# Patient Record
Sex: FEMALE | Race: WHITE | NOT HISPANIC OR LATINO | Employment: OTHER | URBAN - METROPOLITAN AREA
[De-identification: names, ages, dates, MRNs, and addresses within clinical notes are randomized per-mention and may not be internally consistent; named-entity substitution may affect disease eponyms.]

---

## 2017-01-06 ENCOUNTER — ALLSCRIPTS OFFICE VISIT (OUTPATIENT)
Dept: OTHER | Facility: OTHER | Age: 72
End: 2017-01-06

## 2017-01-06 DIAGNOSIS — E11.9 TYPE 2 DIABETES MELLITUS WITHOUT COMPLICATIONS (HCC): ICD-10-CM

## 2017-01-06 DIAGNOSIS — E78.5 HYPERLIPIDEMIA: ICD-10-CM

## 2017-03-03 ENCOUNTER — GENERIC CONVERSION - ENCOUNTER (OUTPATIENT)
Dept: OTHER | Facility: OTHER | Age: 72
End: 2017-03-03

## 2017-03-09 ENCOUNTER — GENERIC CONVERSION - ENCOUNTER (OUTPATIENT)
Dept: OTHER | Facility: OTHER | Age: 72
End: 2017-03-09

## 2017-08-30 ENCOUNTER — GENERIC CONVERSION - ENCOUNTER (OUTPATIENT)
Dept: OTHER | Facility: OTHER | Age: 72
End: 2017-08-30

## 2017-08-30 DIAGNOSIS — E11.9 TYPE 2 DIABETES MELLITUS WITHOUT COMPLICATIONS (HCC): ICD-10-CM

## 2017-11-13 ENCOUNTER — TRANSCRIBE ORDERS (OUTPATIENT)
Dept: ADMINISTRATIVE | Facility: HOSPITAL | Age: 72
End: 2017-11-13

## 2017-11-13 DIAGNOSIS — Z12.31 ENCOUNTER FOR SCREENING MAMMOGRAM FOR MALIGNANT NEOPLASM OF BREAST: ICD-10-CM

## 2017-11-13 DIAGNOSIS — Z12.39 SCREENING BREAST EXAMINATION: Primary | ICD-10-CM

## 2017-11-22 ENCOUNTER — GENERIC CONVERSION - ENCOUNTER (OUTPATIENT)
Dept: OTHER | Facility: OTHER | Age: 72
End: 2017-11-22

## 2017-11-22 ENCOUNTER — HOSPITAL ENCOUNTER (OUTPATIENT)
Dept: RADIOLOGY | Facility: HOSPITAL | Age: 72
Discharge: HOME | End: 2017-11-22
Attending: FAMILY MEDICINE
Payer: COMMERCIAL

## 2017-11-22 DIAGNOSIS — Z12.39 SCREENING BREAST EXAMINATION: ICD-10-CM

## 2017-11-22 LAB
BASOPHILS # BLD AUTO: 0 X10E3/UL (ref 0–0.2)
BASOPHILS # BLD AUTO: 1 %
CREATININE, URINE (HISTORICAL): 227.1 MG/DL
DEPRECATED RDW RBC AUTO: 14.3 % (ref 12.3–15.4)
EOSINOPHIL # BLD AUTO: 0.4 X10E3/UL (ref 0–0.4)
EOSINOPHIL # BLD AUTO: 5 %
HCT VFR BLD AUTO: 37.1 % (ref 34–46.6)
HGB BLD-MCNC: 11.9 G/DL (ref 11.1–15.9)
IMM.GRANULOCYTES (CD4/8) (HISTORICAL): 0 X10E3/UL (ref 0–0.1)
IMM.GRANULOCYTES (CD4/8) (HISTORICAL): 1 %
LYMPHOCYTES # BLD AUTO: 2.4 X10E3/UL (ref 0.7–3.1)
LYMPHOCYTES # BLD AUTO: 28 %
MCH RBC QN AUTO: 28.3 PG (ref 26.6–33)
MCHC RBC AUTO-ENTMCNC: 32.1 G/DL (ref 31.5–35.7)
MCV RBC AUTO: 88 FL (ref 79–97)
MICROALBUM.,U,RANDOM (HISTORICAL): 63.9 UG/ML
MICROALBUMIN/CREATININE RATIO (HISTORICAL): 28.1 MG/G CREAT (ref 0–30)
MONOCYTES # BLD AUTO: 0.8 X10E3/UL (ref 0.1–0.9)
MONOCYTES (HISTORICAL): 9 %
NEUTROPHILS # BLD AUTO: 4.9 X10E3/UL (ref 1.4–7)
NEUTROPHILS # BLD AUTO: 56 %
PLATELET # BLD AUTO: 396 X10E3/UL (ref 150–379)
RBC (HISTORICAL): 4.21 X10E6/UL (ref 3.77–5.28)
WBC # BLD AUTO: 8.5 X10E3/UL (ref 3.4–10.8)

## 2017-11-22 PROCEDURE — 77063 BREAST TOMOSYNTHESIS BI: CPT

## 2017-11-22 PROCEDURE — G0202 SCR MAMMO BI INCL CAD: HCPCS

## 2017-11-23 LAB
A/G RATIO (HISTORICAL): 1.8 (ref 1.2–2.2)
ALBUMIN SERPL BCP-MCNC: 4.4 G/DL (ref 3.5–4.8)
ALP SERPL-CCNC: 71 IU/L (ref 39–117)
ALT SERPL W P-5'-P-CCNC: 15 IU/L (ref 0–32)
AST SERPL W P-5'-P-CCNC: 12 IU/L (ref 0–40)
BILIRUB SERPL-MCNC: 0.3 MG/DL (ref 0–1.2)
BUN SERPL-MCNC: 27 MG/DL (ref 8–27)
BUN/CREA RATIO (HISTORICAL): 20 (ref 12–28)
CALCIUM SERPL-MCNC: 10.6 MG/DL (ref 8.7–10.3)
CHLORIDE SERPL-SCNC: 100 MMOL/L (ref 96–106)
CHOLEST SERPL-MCNC: 204 MG/DL (ref 100–199)
CO2 SERPL-SCNC: 24 MMOL/L (ref 18–29)
CREAT SERPL-MCNC: 1.34 MG/DL (ref 0.57–1)
EGFR AFRICAN AMERICAN (HISTORICAL): 45 ML/MIN/1.73
EGFR-AMERICAN CALC (HISTORICAL): 39 ML/MIN/1.73
GLUCOSE SERPL-MCNC: 172 MG/DL (ref 65–99)
HDLC SERPL-MCNC: 58 MG/DL
LDLC SERPL CALC-MCNC: 108 MG/DL (ref 0–99)
MAGNESIUM SERPL-MCNC: 2.3 MG/DL (ref 1.6–2.3)
POTASSIUM SERPL-SCNC: 5.1 MMOL/L (ref 3.5–5.2)
SODIUM SERPL-SCNC: 140 MMOL/L (ref 134–144)
TOT. GLOBULIN, SERUM (HISTORICAL): 2.4 G/DL (ref 1.5–4.5)
TOTAL PROTEIN (HISTORICAL): 6.8 G/DL (ref 6–8.5)
TRIGL SERPL-MCNC: 190 MG/DL (ref 0–149)

## 2017-11-30 ENCOUNTER — ALLSCRIPTS OFFICE VISIT (OUTPATIENT)
Dept: OTHER | Facility: OTHER | Age: 72
End: 2017-11-30

## 2017-11-30 LAB — HBA1C MFR BLD HPLC: 6.9 %

## 2018-01-14 VITALS
BODY MASS INDEX: 30.21 KG/M2 | TEMPERATURE: 96 F | DIASTOLIC BLOOD PRESSURE: 68 MMHG | HEART RATE: 112 BPM | RESPIRATION RATE: 18 BRPM | OXYGEN SATURATION: 98 % | HEIGHT: 61 IN | WEIGHT: 160 LBS | SYSTOLIC BLOOD PRESSURE: 124 MMHG

## 2018-01-22 VITALS
HEART RATE: 108 BPM | SYSTOLIC BLOOD PRESSURE: 126 MMHG | RESPIRATION RATE: 18 BRPM | DIASTOLIC BLOOD PRESSURE: 72 MMHG | OXYGEN SATURATION: 98 % | BODY MASS INDEX: 30.08 KG/M2 | HEIGHT: 61 IN | WEIGHT: 159.31 LBS

## 2018-01-31 DIAGNOSIS — M19.90 ARTHRITIS: Primary | ICD-10-CM

## 2018-01-31 RX ORDER — MELOXICAM 7.5 MG/1
TABLET ORAL
Qty: 30 TABLET | Refills: 5 | Status: SHIPPED | OUTPATIENT
Start: 2018-01-31 | End: 2018-02-06 | Stop reason: SDUPTHER

## 2018-02-06 DIAGNOSIS — M19.90 ARTHRITIS: ICD-10-CM

## 2018-02-06 RX ORDER — MELOXICAM 7.5 MG/1
7.5 TABLET ORAL DAILY
Qty: 90 TABLET | Refills: 0 | Status: SHIPPED | OUTPATIENT
Start: 2018-02-06 | End: 2018-10-26

## 2018-02-07 ENCOUNTER — TELEPHONE (OUTPATIENT)
Dept: FAMILY MEDICINE CLINIC | Facility: CLINIC | Age: 73
End: 2018-02-07

## 2018-02-07 NOTE — TELEPHONE ENCOUNTER
Pt needs refill of quinapril to [facility]    Claudette Vail said she spoke to someone about this already/

## 2018-02-08 DIAGNOSIS — I10 ESSENTIAL HYPERTENSION: Primary | ICD-10-CM

## 2018-02-08 RX ORDER — QUINAPRIL 40 MG/1
40 TABLET ORAL
Qty: 90 TABLET | Refills: 3 | Status: SHIPPED | OUTPATIENT
Start: 2018-02-08 | End: 2018-11-05 | Stop reason: SDUPTHER

## 2018-02-28 NOTE — PROGRESS NOTES
Assessment    1  Current every day smoker (305 1) (F17 200)   2  Family history of leukemia (V16 6) (Z80 6) : Father   3  Family history of diabetes mellitus (V18 0) (Z83 3) : Paternal Grandfather   4  Family history of malignant neoplasm of stomach (V16 0) (Z80 0) : Maternal Grandfather   5  Flu vaccine need (V04 81) (Z23)    Plan  DMII (diabetes mellitus, type 2)    · Accupril 40 MG Oral Tablet (Quinapril HCl); take 1 tablet by mouth once daily   · (1) MICROALBUMIN CREATININE RATIO, RANDOM URINE; Status:Active; Requested  for:13Nov2015;   Flu vaccine need    · Fluzone Quadrivalent 0 5 ML Intramuscular Suspension    Discussion/Summary    Here for lab followup        Chief Complaint  f/u blood work to check DM and c/o arthritis pain      History of Present Illness  here for review of labs   she is still having arthritis pain on occasion     Pain Assessment   the patient states they have pain  The pain is located in the arthritis pain  (on a scale of 0 to 10, the patient rates the pain at 9 )   Abuse And Domestic Violence Screen    Yes, the patient is safe at home  The patient states no one is hurting them  Depression And Suicide Screen  No, the patient has not had thoughts of hurting themself  No, the patient has not felt depressed in the past 7 days  Primary Language is  English  Review of Systems    Preventive Quality 65 and Older: The patient is currently asymptomatic Symptoms Include: The patient currently has no urinary incontinence symptoms  Active Problems    1  Bursitis Of Knee (726 60)   2  Capsulitis Of The Foot (726 90)   3  DMII (diabetes mellitus, type 2) (250 00) (E11 9)   4  Encounter for screening mammogram for malignant neoplasm of breast (V76 12)   (Z12 31)   5  Ganglion (727 43) (M67 40)   6  Hyperlipidemia (272 4) (E78 5)   7  Hypomagnesemia (275 2) (E83 42)   8  Screening for cardiovascular condition (V81 2) (Z13 6)   9   Screening for diabetes mellitus (V77 1) (Z13 1)    Past Medical History    1  History of sciatica (V12 49) (Z86 69)    The active problems and past medical history were reviewed and updated today  Surgical History    The surgical history was reviewed and updated today  Family History    1  Family history of leukemia (V16 6) (Z80 6)    2  Family history of malignant neoplasm of stomach (V16 0) (Z80 0)    3  Family history of diabetes mellitus (V18 0) (Z83 3)    The family history was reviewed and updated today  Social History    · Current every day smoker (305 1) (F17 200)  The social history was reviewed and updated today  Current Meds   1  Glimepiride 4 MG Oral Tablet; Take 1 tablet daily; Therapy: 46UZA0650 to (Last Rx:76Rvi9172)  Requested for: 50Nuf2668 Ordered   2  Magnesium Oxide 400 (241 3 Mg) MG Oral Tablet; TAKE 1 TABLET TWICE DAILY; Therapy: 15HSW3134 to (433 79 186)  Requested for: 41FFT4500; Last   Rx:[id number] Ordered   3  Magnesium Oxide 400 MG Oral Tablet; TAKE 1 TABLET TWICE DAILY  Requested for:   79GMP6082; Last Rx:71Rbd0407 Ordered   4  MetFORMIN HCl - 1000 MG Oral Tablet; take 1 tablet twice a day; Therapy: 07ZDG2173 to (Last Rx:46Hny7919)  Requested for: 19Fvy8607 Ordered   5  Pravastatin Sodium 80 MG Oral Tablet; Take 1 tablet daily; Therapy: 65WRQ0115 to (Last Rx:25Iol3781)  Requested for: 52QWS0384 Ordered   6  Quinapril HCl - 20 MG Oral Tablet; Take 1 tablet daily; Therapy: 00Ddr9703 to (Last Rx:09Nov2015)  Requested for: 72KUE9189 Ordered    Allergies    1  Motrin TABS   2  Sulfa Drugs    Vitals  Vital Signs [Data Includes: Current Encounter]    Recorded: 77VZX0048 10:11AM   Temperature 98 3 F   Heart Rate 103   Respiration 18   Systolic 214   Diastolic 60   Height 5 ft 0 5 in   Weight 167 lb    BMI Calculated 32 08   BSA Calculated 1 74   O2 Saturation 97     Physical Exam    Constitutional   General appearance: No acute distress, well appearing and well nourished      Eyes   Conjunctiva and lids: No swelling, erythema or discharge  Pulmonary   Auscultation of lungs: Clear to auscultation  Cardiovascular   Auscultation of heart: Normal rate and rhythm, normal S1 and S2, without murmurs  Signatures   Electronically signed by :  FABRICE Urrutia ; Nov 16 2015  2:51PM EST                       (Author)

## 2018-04-03 LAB
LEFT EYE DIABETIC RETINOPATHY: NORMAL
RIGHT EYE DIABETIC RETINOPATHY: NORMAL

## 2018-06-09 DIAGNOSIS — Z76.0 MEDICATION REFILL: Primary | ICD-10-CM

## 2018-06-10 DIAGNOSIS — Z79.4 TYPE 2 DIABETES MELLITUS WITH DIABETIC POLYNEUROPATHY, WITH LONG-TERM CURRENT USE OF INSULIN (HCC): Primary | ICD-10-CM

## 2018-06-10 DIAGNOSIS — E11.42 TYPE 2 DIABETES MELLITUS WITH DIABETIC POLYNEUROPATHY, WITH LONG-TERM CURRENT USE OF INSULIN (HCC): Primary | ICD-10-CM

## 2018-06-14 DIAGNOSIS — E11.9 TYPE 2 DIABETES MELLITUS WITHOUT COMPLICATION, WITHOUT LONG-TERM CURRENT USE OF INSULIN (HCC): Primary | ICD-10-CM

## 2018-06-14 RX ORDER — PRAVASTATIN SODIUM 80 MG/1
TABLET ORAL
COMMUNITY
Start: 2018-04-17 | End: 2018-06-14 | Stop reason: SDUPTHER

## 2018-06-19 DIAGNOSIS — E11.9 TYPE 2 DIABETES MELLITUS WITHOUT COMPLICATION, WITHOUT LONG-TERM CURRENT USE OF INSULIN (HCC): Primary | ICD-10-CM

## 2018-06-19 RX ORDER — PRAVASTATIN SODIUM 80 MG/1
80 TABLET ORAL DAILY
Qty: 90 TABLET | Refills: 0 | Status: SHIPPED | OUTPATIENT
Start: 2018-06-19 | End: 2019-01-17

## 2018-06-19 NOTE — TELEPHONE ENCOUNTER
This is another one that you need to go into the encounter and refuse and sign and close  Otherwise I can not close the message

## 2018-06-22 RX ORDER — PRAVASTATIN SODIUM 80 MG/1
TABLET ORAL
Qty: 90 TABLET | Refills: 3 | Status: SHIPPED | OUTPATIENT
Start: 2018-06-22 | End: 2019-05-25 | Stop reason: SDUPTHER

## 2018-06-27 RX ORDER — GLIMEPIRIDE 4 MG/1
4 TABLET ORAL
Qty: 90 TABLET | Refills: 1 | Status: SHIPPED | OUTPATIENT
Start: 2018-06-27 | End: 2018-11-05 | Stop reason: SDUPTHER

## 2018-07-23 ENCOUNTER — TELEPHONE (OUTPATIENT)
Dept: FAMILY MEDICINE CLINIC | Facility: CLINIC | Age: 73
End: 2018-07-23

## 2018-10-26 ENCOUNTER — OFFICE VISIT (OUTPATIENT)
Dept: FAMILY MEDICINE CLINIC | Facility: CLINIC | Age: 73
End: 2018-10-26
Payer: COMMERCIAL

## 2018-10-26 VITALS
BODY MASS INDEX: 28.43 KG/M2 | HEART RATE: 86 BPM | SYSTOLIC BLOOD PRESSURE: 132 MMHG | WEIGHT: 148 LBS | RESPIRATION RATE: 18 BRPM | DIASTOLIC BLOOD PRESSURE: 56 MMHG | OXYGEN SATURATION: 99 %

## 2018-10-26 DIAGNOSIS — D17.79 LIPOMA OF OTHER SPECIFIED SITES: ICD-10-CM

## 2018-10-26 DIAGNOSIS — Z23 NEED FOR INFLUENZA VACCINATION: ICD-10-CM

## 2018-10-26 DIAGNOSIS — E11.9 TYPE 2 DIABETES MELLITUS WITHOUT COMPLICATION, WITHOUT LONG-TERM CURRENT USE OF INSULIN (HCC): ICD-10-CM

## 2018-10-26 DIAGNOSIS — M19.90 ARTHRITIS: Primary | ICD-10-CM

## 2018-10-26 PROCEDURE — G0008 ADMIN INFLUENZA VIRUS VAC: HCPCS

## 2018-10-26 PROCEDURE — 99213 OFFICE O/P EST LOW 20 MIN: CPT | Performed by: FAMILY MEDICINE

## 2018-10-26 PROCEDURE — 90662 IIV NO PRSV INCREASED AG IM: CPT

## 2018-10-26 RX ORDER — MAGNESIUM OXIDE 400 MG/1
1 TABLET ORAL 2 TIMES DAILY
Refills: 5 | COMMUNITY
Start: 2018-09-08 | End: 2018-11-02 | Stop reason: SDUPTHER

## 2018-10-26 RX ORDER — CELECOXIB 200 MG/1
200 CAPSULE ORAL DAILY
Qty: 10 CAPSULE | Refills: 0 | Status: SHIPPED | OUTPATIENT
Start: 2018-10-26 | End: 2019-01-17

## 2018-10-26 RX ORDER — CELECOXIB 200 MG/1
200 CAPSULE ORAL 2 TIMES DAILY
Qty: 90 CAPSULE | Refills: 0 | Status: SHIPPED | OUTPATIENT
Start: 2018-10-26 | End: 2018-11-15 | Stop reason: SDUPTHER

## 2018-11-01 ENCOUNTER — OFFICE VISIT (OUTPATIENT)
Dept: SURGERY | Facility: CLINIC | Age: 73
End: 2018-11-01
Payer: COMMERCIAL

## 2018-11-01 VITALS
DIASTOLIC BLOOD PRESSURE: 60 MMHG | HEIGHT: 61 IN | WEIGHT: 148 LBS | HEART RATE: 109 BPM | BODY MASS INDEX: 27.94 KG/M2 | TEMPERATURE: 96.2 F | SYSTOLIC BLOOD PRESSURE: 140 MMHG

## 2018-11-01 DIAGNOSIS — D17.79 LIPOMA OF OTHER SPECIFIED SITES: ICD-10-CM

## 2018-11-01 PROBLEM — D17.24 LIPOMA OF LEFT LOWER EXTREMITY: Status: ACTIVE | Noted: 2018-11-01

## 2018-11-01 PROCEDURE — 99203 OFFICE O/P NEW LOW 30 MIN: CPT | Performed by: SURGERY

## 2018-11-01 NOTE — ASSESSMENT & PLAN NOTE
Soft, fluctuant, non tender mass on left buttock  · outpatient excision of lipoma to be scheduled for next tuesday

## 2018-11-01 NOTE — PROGRESS NOTES
Assessment/Plan:    Lipoma of left lower extremity  Soft, fluctuant, non tender mass on left buttock  · outpatient excision of lipoma to be scheduled for next tuesday          Subjective:      Patient ID: Paco Gr is a 74 y o  female  HPI    Pt comes to clinic today and complains of a mass on her left buttock  Patient reports that she has sciatica and has been given exercises to help alleviate pain; however over the last couple of months these exercises have not been helping and is suspicious that the mass she feel on her buttock is the culprit  Of note, She has a history of previous lipomas with a surgical removal of one lipoma in the left back area performed in 1986  Review of Systems  Per HPI and Physical Exam    Objective:      /60   Pulse (!) 109   Temp (!) 96 2 °F (35 7 °C) (Tympanic)   Ht 5' 0 5" (1 537 m)   Wt 67 1 kg (148 lb)   BMI 28 43 kg/m²          Physical Exam   Cardiovascular: Normal rate, regular rhythm and normal heart sounds  Pulmonary/Chest: Effort normal  She has wheezes     Skin:        soft, fluctuant, non tender, mass of left buttock

## 2018-11-02 DIAGNOSIS — E83.42 HYPOMAGNESEMIA: Primary | ICD-10-CM

## 2018-11-05 ENCOUNTER — TELEPHONE (OUTPATIENT)
Dept: SURGERY | Facility: CLINIC | Age: 73
End: 2018-11-05

## 2018-11-05 DIAGNOSIS — I10 ESSENTIAL HYPERTENSION: ICD-10-CM

## 2018-11-05 DIAGNOSIS — E11.9 TYPE 2 DIABETES MELLITUS WITHOUT COMPLICATION, WITHOUT LONG-TERM CURRENT USE OF INSULIN (HCC): ICD-10-CM

## 2018-11-05 RX ORDER — MAGNESIUM OXIDE 400 MG/1
1 TABLET ORAL 2 TIMES DAILY
Qty: 120 TABLET | Refills: 3 | Status: SHIPPED | OUTPATIENT
Start: 2018-11-05 | End: 2019-01-17

## 2018-11-05 RX ORDER — GLIMEPIRIDE 4 MG/1
TABLET ORAL
Qty: 90 TABLET | Refills: 0 | Status: SHIPPED | OUTPATIENT
Start: 2018-11-05 | End: 2019-02-18 | Stop reason: SDUPTHER

## 2018-11-05 RX ORDER — QUINAPRIL 40 MG/1
TABLET ORAL
Qty: 90 TABLET | Refills: 0 | Status: SHIPPED | OUTPATIENT
Start: 2018-11-05 | End: 2019-02-18 | Stop reason: SDUPTHER

## 2018-11-05 NOTE — TELEPHONE ENCOUNTER
No authorization required for CPT code 95735 per Natalie Cardona at [facility]  Patient is scheduled for procedure on 11/06/18 with Dr Lela Aguirre

## 2018-11-06 ENCOUNTER — PROCEDURE VISIT (OUTPATIENT)
Dept: SURGERY | Facility: CLINIC | Age: 73
End: 2018-11-06
Payer: COMMERCIAL

## 2018-11-06 VITALS
TEMPERATURE: 96 F | SYSTOLIC BLOOD PRESSURE: 140 MMHG | BODY MASS INDEX: 27.94 KG/M2 | WEIGHT: 148 LBS | HEIGHT: 61 IN | DIASTOLIC BLOOD PRESSURE: 70 MMHG

## 2018-11-06 DIAGNOSIS — D17.24 LIPOMA OF LEFT LOWER EXTREMITY: Primary | ICD-10-CM

## 2018-11-06 PROCEDURE — 99215 OFFICE O/P EST HI 40 MIN: CPT | Performed by: SURGERY

## 2018-11-06 PROCEDURE — 27043 EXC HIP PELVIS LES SC 3 CM/>: CPT | Performed by: SURGERY

## 2018-11-06 NOTE — PROGRESS NOTES
[facility] History and Physical Note:    Assessment:  Lipoma left buttock  Plan:  Excision in minor procedure clinic today  Chief Complaint:  I am here for removal of this lipoma  HPI  Patient is a very pleasant 68-year-old woman seen previously in the clinic for a slow growing mass her left buttock  Physical exam and ultrasound most consistent with a lipoma  Patient desires from removal due to painful symptoms when sitting on this area  Written consent obtained  PMH:  Past Medical History:   Diagnosis Date    Capsulitis of foot     Last assessed 07/29/13    Diabetes ([address] )     Ganglion     Last assessed 07/29/13    Sciatica        PSH:  Past Surgical History:   Procedure Laterality Date    HAND SURGERY Left     HYSTERECTOMY      KNEE SURGERY Left     LIPOMA RESECTION      TUBAL LIGATION         Home Meds:  Current Outpatient Prescriptions on File Prior to Visit   Medication Sig Dispense Refill    celecoxib (CeleBREX) 200 mg capsule Take 1 capsule (200 mg total) by mouth 2 (two) times a day 90 capsule 0    glimepiride (AMARYL) 4 mg tablet TAKE 1 TABLET BY MOUTH  DAILY WITH BREAKFAST 90 tablet 0    magnesium oxide (MAG-OX) 400 mg TAKE 1 TABLET TWICE DAILY   60 tablet 5    metFORMIN (GLUCOPHAGE) 1000 MG tablet TAKE 1 TABLET TWICE A  tablet 3    pravastatin (PRAVACHOL) 80 mg tablet TAKE 1 TABLET BY MOUTH  DAILY 90 tablet 3    quinapril (ACCUPRIL) 40 MG tablet TAKE 1 TABLET BY MOUTH  DAILY AT BEDTIME 90 tablet 0    celecoxib (CeleBREX) 200 mg capsule Take 1 capsule (200 mg total) by mouth daily (Patient not taking: Reported on 11/1/2018 ) 10 capsule 0    magnesium oxide (MAG-OX) 400 mg tablet Take 1 tablet (400 mg total) by mouth 2 (two) times a day (Patient not taking: Reported on 11/6/2018 ) 120 tablet 3    metFORMIN (GLUCOPHAGE) 1000 MG tablet Take 1 tablet (1,000 mg total) by mouth 2 (two) times a day with meals (Patient not taking: Reported on 11/1/2018 ) 120 tablet 0    pravastatin (PRAVACHOL) 80 mg tablet Take 1 tablet (80 mg total) by mouth daily (Patient not taking: Reported on 11/1/2018 ) 90 tablet 0     No current facility-administered medications on file prior to visit  Allergies: Allergies   Allergen Reactions    Ibuprofen     Sulfa Antibiotics        Social Hx:  Social History     Social History    Marital status:      Spouse name: N/A    Number of children: N/A    Years of education: N/A     Occupational History    Not on file  Social History Main Topics    Smoking status: Current Every Day Smoker    Smokeless tobacco: Never Used    Alcohol use Yes      Comment: Rarely    Drug use: No    Sexual activity: Not on file     Other Topics Concern    Not on file     Social History Narrative    No narrative on file        Family Hx:    Family History   Problem Relation Age of Onset    Leukemia Father     Stomach cancer Maternal Grandfather     Diabetes Paternal Grandfather          Review of Systems   Constitutional: Negative  HENT: Negative  Eyes: Negative  Respiratory: Negative  Cardiovascular: Negative  Gastrointestinal: Negative  Endocrine: Negative  Genitourinary: Negative  Musculoskeletal: Negative  Skin: Negative  Allergic/Immunologic: Negative  Neurological: Negative  Hematological: Negative  Psychiatric/Behavioral: Negative  /70   Temp (!) 96 °F (35 6 °C) (Tympanic)   Ht 5' 0 5" (1 537 m)   Wt 67 1 kg (148 lb)   BMI 28 43 kg/m²     Physical Exam   Constitutional: She is oriented to person, place, and time  She appears well-developed and well-nourished  HENT:   Head: Normocephalic and atraumatic  Eyes: Pupils are equal, round, and reactive to light  Neck: Normal range of motion  Neck supple  Cardiovascular: Normal rate and regular rhythm  No murmur heard  Pulmonary/Chest: Effort normal and breath sounds normal  No respiratory distress     Abdominal: She exhibits no distension  There is no tenderness  Musculoskeletal: Normal range of motion  Lymphadenopathy:     She has no cervical adenopathy  Neurological: She is alert and oriented to person, place, and time  Skin: Skin is warm and dry  Psychiatric: She has a normal mood and affect  Her behavior is normal      3 x 2 cm smooth, soft, mobile lesion is subcutaneous tissue left buttock not attached to skin or muscle  Procedures     4 cm skin incision made after local anesthesia  Lipoma removed from subcutaneous tissue intact  Skin closed with 4, interrupted 4 0 nylon sutures    Dressing placed    Pertinent labs reviewed    Pertinent images and available reads personally reviewed    Pertinent notes reviewed       Kaycee Garcia MD [address] [facility]  [phone]

## 2018-11-08 NOTE — PROGRESS NOTES
Assessment/Plan:    No problem-specific Assessment & Plan notes found for this encounter  Diagnoses and all orders for this visit:    Arthritis  -     celecoxib (CeleBREX) 200 mg capsule; Take 1 capsule (200 mg total) by mouth 2 (two) times a day  -     celecoxib (CeleBREX) 200 mg capsule; Take 1 capsule (200 mg total) by mouth daily (Patient not taking: Reported on 11/1/2018 )    Need for influenza vaccination  -     influenza vaccine, [id number], high-dose, PF 0 5 mL, for patients 65 yr+ (FLUZONE HIGH-DOSE)    Type 2 diabetes mellitus without complication, without long-term current use of insulin (HCC)  -     CBC and differential; Future  -     Comprehensive metabolic panel; Future  -     Cholesterol, total; Future  -     Hemoglobin A1C; Future  -     Microalbumin / creatinine urine ratio    Lipoma of other specified sites  -     Ambulatory referral to General Surgery; Future    Other orders  -     Discontinue: magnesium oxide (MAG-OX) 400 mg tablet; Take 1 tablet by mouth 2 (two) times a day          Subjective:      Patient ID: Ricky Wood is a 74 y o  female      Jamie Callahan is here with her son ED  She herself throughout the visit has voiced no complaints as I started to exit the room her son that she has been having a lot of pain in her feet which she did agree to once he mentioned it but wanted to know if I could give her narcotics for the time found are considering she had mention anything during the interview L I told him that I would not be willing to give narcotics and he seemed a little on the irritated side so I am not entirely sure if he was not seeking for himself  I told her that I would substitute the Mobic for Celebrex and we would see how that would do and she agreed to contact me if there was no relief        The following portions of the patient's history were reviewed and updated as appropriate: current medications, past family history, past medical history, past social history, past surgical history and problem list     Review of Systems   Constitutional: Negative  HENT: Negative  Eyes: Negative  Respiratory: Negative  Cardiovascular: Negative  Endocrine: Negative  Genitourinary: Negative  Objective:      /56   Pulse 86   Resp 18   Wt 67 1 kg (148 lb)   SpO2 99%   BMI 28 43 kg/m²          Physical Exam   Constitutional: She appears well-developed and well-nourished  Cardiovascular: Normal rate and regular rhythm  Pulmonary/Chest: Effort normal and breath sounds normal    Abdominal: Soft   Bowel sounds are normal

## 2018-11-15 DIAGNOSIS — M19.90 ARTHRITIS: ICD-10-CM

## 2018-11-19 ENCOUNTER — OFFICE VISIT (OUTPATIENT)
Dept: SURGERY | Facility: CLINIC | Age: 73
End: 2018-11-19

## 2018-11-19 VITALS
DIASTOLIC BLOOD PRESSURE: 62 MMHG | SYSTOLIC BLOOD PRESSURE: 102 MMHG | BODY MASS INDEX: 29.29 KG/M2 | HEART RATE: 92 BPM | WEIGHT: 149.2 LBS | HEIGHT: 60 IN

## 2018-11-19 DIAGNOSIS — Z09 POSTOPERATIVE EXAMINATION: Primary | ICD-10-CM

## 2018-11-19 PROBLEM — D17.24 LIPOMA OF LEFT LOWER EXTREMITY: Status: RESOLVED | Noted: 2018-11-01 | Resolved: 2018-11-19

## 2018-11-19 PROCEDURE — 99024 POSTOP FOLLOW-UP VISIT: CPT | Performed by: SURGERY

## 2018-11-19 RX ORDER — CELECOXIB 200 MG/1
CAPSULE ORAL
Qty: 90 CAPSULE | Refills: 3 | Status: SHIPPED | OUTPATIENT
Start: 2018-11-19 | End: 2019-01-17

## 2018-11-19 NOTE — PROGRESS NOTES
Patient is here for 1st visit status post excision of benign lipoma from left lower extremity  Wound healing well without signs of infection  Sutures removed  Follow up p r n

## 2018-12-07 DIAGNOSIS — Z76.0 MEDICATION REFILL: ICD-10-CM

## 2018-12-07 RX ORDER — MAGNESIUM OXIDE 400 MG/1
TABLET ORAL
Qty: 60 TABLET | Refills: 5 | Status: SHIPPED | OUTPATIENT
Start: 2018-12-07 | End: 2019-05-11 | Stop reason: SDUPTHER

## 2019-01-17 RX ORDER — FEXOFENADINE HCL 180 MG/1
180 TABLET ORAL AS NEEDED
COMMUNITY
End: 2020-03-26

## 2019-01-17 RX ORDER — IBUPROFEN 200 MG
600 TABLET ORAL EVERY 6 HOURS PRN
COMMUNITY
End: 2021-06-18 | Stop reason: HOSPADM

## 2019-01-17 NOTE — PRE-PROCEDURE INSTRUCTIONS
Pre-Surgery Instructions:   Medication Instructions    fexofenadine (ALLEGRA) 180 MG tablet Instructed patient per Anesthesia Guidelines   glimepiride (AMARYL) 4 mg tablet Instructed patient per Anesthesia Guidelines   ibuprofen (MOTRIN) 200 mg tablet Patient was instructed by Physician and understands   magnesium oxide (MAG-OX) 400 mg tablet Instructed patient per Anesthesia Guidelines   metFORMIN (GLUCOPHAGE) 1000 MG tablet Instructed patient per Anesthesia Guidelines   pravastatin (PRAVACHOL) 80 mg tablet Instructed patient per Anesthesia Guidelines   quinapril (ACCUPRIL) 40 MG tablet Instructed patient per Anesthesia Guidelines  Pre op instructions given  Pt to take blood sugar the am of surgery   [facility]    The following information was developed to assist you to prepare for your operation  What do I need to do before coming to the hospital?   Arrange for a responsible person to drive you to and from the hospital    Arrange care for your children at home  Children are not allowed in the recovery areas of the hospital   Plan to wear clothing that is easy to put on and take off  If you are having shoulder surgery, wear a shirt that buttons or zippers in the front  Bathing  o Shower the evening before and the morning of your surgery with an antibacterial soap  Please refer to the Pre Op Showering Instructions for Surgery Patients Sheet   o Remove nail polish and all body piercing jewelry  o Do not shave any body part for at least 24 hours before surgery-this includes face, arms, legs and upper body  Food  o Nothing to eat or drink after midnight the night before your surgery   This includes candy and chewing gum  o Exception: If your surgery is after 12:00pm (noon), you may have clear liquids such as 7-Up®, ginger ale, apple or cranberry juice, Jell-O®, water, or clear broth until 8:00 am  o Do not drink milk or juice with pulp on the morning before surgery  o Do not drink alcohol 24 hours before surgery  Medicine  o Follow instructions you received from your surgeon about which medicines you may take on the day of surgery  o If instructed to take medicine on the morning of surgery, take pills with just a small sip of water  Call your prescribing doctor for specific infroamtion on what to do if you take insulin    What should I bring to the hospital?    Bring:  Mitcheal Samanta or a walker, if you have them, for foot or knee surgery   A list of the daily medicines, vitamins, minerals, herbals and nutritional supplements you take  Include the dosages of medicines and the time you take them each day   Glasses, dentures or hearing aids   Minimal clothing; you will be wearing hospital sleepwear   Photo ID; required to verify your identity   If you have a Living Will or Power of , bring a copy of the documents   If you have an ostomy, bring an extra pouch and any supplies you use    Do not bring   Medicines or inhalers   Money, valuables or jewelry    What other information should I know about the day of surgery?  Notify your surgeons if you develop a cold, sore throat, cough, fever, rash or any other illness   Report to the Ambulatory Surgical/Same Day Surgery Unit   You will be instructed to stop at Registration only if you have not been pre-registered   Inform your  fi they do not stay that they will be asked by the staff to leave a phone number where they can be reached   Be available to be reached before surgery  In the event the operating room schedule changes, you may be asked to come in earlier or later than expected    *It is important to tell your doctor and others involved in your health care if you are taking or have been taking any non-prescription drugs, vitamins, minerals, herbals or other nutritional supplements   Any of these may interact with some food or medicines and cause a reaction

## 2019-01-18 ENCOUNTER — OFFICE VISIT (OUTPATIENT)
Dept: FAMILY MEDICINE CLINIC | Facility: CLINIC | Age: 74
End: 2019-01-18
Payer: COMMERCIAL

## 2019-01-18 VITALS
BODY MASS INDEX: 29.06 KG/M2 | TEMPERATURE: 97.3 F | HEART RATE: 107 BPM | SYSTOLIC BLOOD PRESSURE: 110 MMHG | OXYGEN SATURATION: 99 % | HEIGHT: 60 IN | RESPIRATION RATE: 18 BRPM | WEIGHT: 148 LBS | DIASTOLIC BLOOD PRESSURE: 50 MMHG

## 2019-01-18 DIAGNOSIS — Z01.818 PREOPERATIVE CLEARANCE: Primary | ICD-10-CM

## 2019-01-18 DIAGNOSIS — H25.9 AGE-RELATED CATARACT OF BOTH EYES, UNSPECIFIED AGE-RELATED CATARACT TYPE: ICD-10-CM

## 2019-01-18 PROCEDURE — 1160F RVW MEDS BY RX/DR IN RCRD: CPT | Performed by: FAMILY MEDICINE

## 2019-01-18 PROCEDURE — 99213 OFFICE O/P EST LOW 20 MIN: CPT | Performed by: FAMILY MEDICINE

## 2019-01-18 PROCEDURE — 1101F PT FALLS ASSESS-DOCD LE1/YR: CPT | Performed by: FAMILY MEDICINE

## 2019-01-18 PROCEDURE — 3725F SCREEN DEPRESSION PERFORMED: CPT | Performed by: FAMILY MEDICINE

## 2019-01-18 RX ORDER — TRIPROLIDINE/PSEUDOEPHEDRINE 2.5MG-60MG
TABLET ORAL
Refills: 3 | COMMUNITY
Start: 2019-01-08 | End: 2019-05-20

## 2019-01-18 RX ORDER — OFLOXACIN 3 MG/ML
SOLUTION/ DROPS OPHTHALMIC
Refills: 1 | COMMUNITY
Start: 2019-01-08 | End: 2019-02-08

## 2019-01-21 LAB
ALBUMIN SERPL-MCNC: 4.4 G/DL (ref 3.5–4.8)
ALBUMIN/GLOB SERPL: 1.8 {RATIO} (ref 1.2–2.2)
ALP SERPL-CCNC: 71 IU/L (ref 39–117)
ALT SERPL-CCNC: 14 IU/L (ref 0–32)
AST SERPL-CCNC: 14 IU/L (ref 0–40)
BASOPHILS # BLD AUTO: 0 X10E3/UL (ref 0–0.2)
BASOPHILS NFR BLD AUTO: 0 %
BILIRUB SERPL-MCNC: 0.3 MG/DL (ref 0–1.2)
BUN SERPL-MCNC: 23 MG/DL (ref 8–27)
BUN/CREAT SERPL: 18 (ref 12–28)
CALCIUM SERPL-MCNC: 11 MG/DL (ref 8.7–10.3)
CHLORIDE SERPL-SCNC: 98 MMOL/L (ref 96–106)
CHOLEST SERPL-MCNC: 181 MG/DL (ref 100–199)
CO2 SERPL-SCNC: 26 MMOL/L (ref 20–29)
CREAT SERPL-MCNC: 1.31 MG/DL (ref 0.57–1)
EOSINOPHIL # BLD AUTO: 0.4 X10E3/UL (ref 0–0.4)
EOSINOPHIL NFR BLD AUTO: 5 %
ERYTHROCYTE [DISTWIDTH] IN BLOOD BY AUTOMATED COUNT: 14.4 % (ref 12.3–15.4)
GLOBULIN SER-MCNC: 2.4 G/DL (ref 1.5–4.5)
GLUCOSE SERPL-MCNC: 136 MG/DL (ref 65–99)
HBA1C MFR BLD: 7.3 % (ref 4.8–5.6)
HCT VFR BLD AUTO: 32.2 % (ref 34–46.6)
HGB BLD-MCNC: 10.4 G/DL (ref 11.1–15.9)
IMM GRANULOCYTES # BLD: 0 X10E3/UL (ref 0–0.1)
IMM GRANULOCYTES NFR BLD: 0 %
LABCORP COMMENT: NORMAL
LYMPHOCYTES # BLD AUTO: 2.1 X10E3/UL (ref 0.7–3.1)
LYMPHOCYTES NFR BLD AUTO: 29 %
MCH RBC QN AUTO: 26.2 PG (ref 26.6–33)
MCHC RBC AUTO-ENTMCNC: 32.3 G/DL (ref 31.5–35.7)
MCV RBC AUTO: 81 FL (ref 79–97)
MONOCYTES # BLD AUTO: 0.7 X10E3/UL (ref 0.1–0.9)
MONOCYTES NFR BLD AUTO: 9 %
NEUTROPHILS # BLD AUTO: 4.2 X10E3/UL (ref 1.4–7)
NEUTROPHILS NFR BLD AUTO: 57 %
PLATELET # BLD AUTO: 410 X10E3/UL (ref 150–379)
POTASSIUM SERPL-SCNC: 5.4 MMOL/L (ref 3.5–5.2)
PROT SERPL-MCNC: 6.8 G/DL (ref 6–8.5)
RBC # BLD AUTO: 3.97 X10E6/UL (ref 3.77–5.28)
SL AMB EGFR AFRICAN AMERICAN: 46 ML/MIN/1.73
SL AMB EGFR NON AFRICAN AMERICAN: 40 ML/MIN/1.73
SODIUM SERPL-SCNC: 139 MMOL/L (ref 134–144)
WBC # BLD AUTO: 7.4 X10E3/UL (ref 3.4–10.8)

## 2019-01-23 ENCOUNTER — ANESTHESIA EVENT (OUTPATIENT)
Dept: PERIOP | Facility: AMBULARY SURGERY CENTER | Age: 74
End: 2019-01-23
Payer: COMMERCIAL

## 2019-01-23 PROBLEM — H25.811 COMBINED FORMS OF AGE-RELATED CATARACT OF RIGHT EYE: Status: ACTIVE | Noted: 2019-01-23

## 2019-01-23 NOTE — H&P
Admit Note     Burton Tirado    The above female patient   74y o   years old has been followed for cataract development in their Right eye  Due to the decrease in vision and the affect on their lifestyle, they have elected to have cataract surgery  The risks and benefits were discussed with the patient using verbal discussion and education material  The IOL option were also discussed  The patient was cleared by  their medical doctor and had an interview with the anesthesia department  No contraindications to the surgery were found  Informed consent was obtained for cataract surgery and possible intraocular lens implantation  Ophthalmic Examination:     A complete ophthalmic exam was performed  The best corrected visual acuity in each eye was  OD 20/100     and OS 20/50      The Snellen chart was used as well as glare testing if indicated to determine the level of vision function  Slit lamp was used to examine the cornea and anterior structures of the eye  No significant pathology was found as a contraindication to surgery  Intraocular pressures were checked and found to be within normal limits  Dilated fundus exam was performed and no significant pathology was found that would attribute to the decreased vision  Examination of the crystalline lens revealed significant cataract formation and the cataract was a significant cause of the patient's decrease in vision  The potential benefits of the cataract surgery out weighed the risks of the procedure and were reviewed with the patient during the pre-operative visit  In summary, it was determined that the patient's decrease in visual acuity was mainly attributable to the cataract formation  Cataract surgery was recommended to for visual rehabilitation and improvement in the patient's  lifestyle  The decision included the patient's ability to safely drive a motor vehicle as well as live independently   The patient had an A-scan to determine the power of the lens to be placed into the eye at the time of cataract surgery  The risks and benefits were also review again at this visit including total loss of the eye on rare occassions  The IOL options were also reviewed based on the patients lifestyle and ocular findings  The above patient was informed of the need to be cleared by their medical doctor prior to surgery  Any pre-operative labs would be determined by their primary care doctor and/or cardiologist      A potential Vision was checked and found to be  20/25 in the operative eye  The post operative plan and visits were reviewed with the patient and scheduled  Admitting Diagnosis:    Cataract Right Eye  Procedure Planned:  Cataract Extraction Right Eye with possible Intraocular lens Implant      Anesthesia: Local MAC    Surgeon: Rashid Browne MD

## 2019-01-24 ENCOUNTER — HOSPITAL ENCOUNTER (OUTPATIENT)
Facility: AMBULARY SURGERY CENTER | Age: 74
Setting detail: OUTPATIENT SURGERY
Discharge: HOME | End: 2019-01-24
Attending: OPHTHALMOLOGY | Admitting: OPHTHALMOLOGY
Payer: COMMERCIAL

## 2019-01-24 ENCOUNTER — ANESTHESIA (OUTPATIENT)
Dept: PERIOP | Facility: AMBULARY SURGERY CENTER | Age: 74
End: 2019-01-24
Payer: COMMERCIAL

## 2019-01-24 VITALS
OXYGEN SATURATION: 98 % | TEMPERATURE: 95.6 F | HEIGHT: 60 IN | BODY MASS INDEX: 29.25 KG/M2 | SYSTOLIC BLOOD PRESSURE: 138 MMHG | RESPIRATION RATE: 16 BRPM | WEIGHT: 149 LBS | DIASTOLIC BLOOD PRESSURE: 69 MMHG | HEART RATE: 76 BPM

## 2019-01-24 PROBLEM — H25.811 COMBINED FORMS OF AGE-RELATED CATARACT OF RIGHT EYE: Status: RESOLVED | Noted: 2019-01-23 | Resolved: 2019-01-24

## 2019-01-24 PROCEDURE — V2632 POST CHMBR INTRAOCULAR LENS: HCPCS | Performed by: OPHTHALMOLOGY

## 2019-01-24 DEVICE — IOL SN60WF 24.5: Type: IMPLANTABLE DEVICE | Site: EYE | Status: FUNCTIONAL

## 2019-01-24 RX ORDER — SODIUM CHLORIDE 9 MG/ML
125 INJECTION, SOLUTION INTRAVENOUS CONTINUOUS
Status: DISCONTINUED | OUTPATIENT
Start: 2019-01-24 | End: 2019-01-24 | Stop reason: HOSPADM

## 2019-01-24 RX ORDER — PROPOFOL 10 MG/ML
INJECTION, EMULSION INTRAVENOUS AS NEEDED
Status: DISCONTINUED | OUTPATIENT
Start: 2019-01-24 | End: 2019-01-24 | Stop reason: SURG

## 2019-01-24 RX ORDER — BALANCED SALT SOLUTION 6.4; .75; .48; .3; 3.9; 1.7 MG/ML; MG/ML; MG/ML; MG/ML; MG/ML; MG/ML
SOLUTION OPHTHALMIC AS NEEDED
Status: DISCONTINUED | OUTPATIENT
Start: 2019-01-24 | End: 2019-01-24 | Stop reason: HOSPADM

## 2019-01-24 RX ORDER — OFLOXACIN 3 MG/ML
1 SOLUTION/ DROPS OPHTHALMIC
Status: DISCONTINUED | OUTPATIENT
Start: 2019-01-25 | End: 2019-01-24 | Stop reason: HOSPADM

## 2019-01-24 RX ORDER — CYCLOPENTOLATE HYDROCHLORIDE 10 MG/ML
1 SOLUTION/ DROPS OPHTHALMIC
Status: COMPLETED | OUTPATIENT
Start: 2019-01-25 | End: 2019-01-24

## 2019-01-24 RX ORDER — LIDOCAINE HYDROCHLORIDE 20 MG/ML
1 JELLY TOPICAL
Status: DISCONTINUED | OUTPATIENT
Start: 2019-01-25 | End: 2019-01-24 | Stop reason: HOSPADM

## 2019-01-24 RX ORDER — TROPICAMIDE 10 MG/ML
1 SOLUTION/ DROPS OPHTHALMIC
Status: COMPLETED | OUTPATIENT
Start: 2019-01-25 | End: 2019-01-24

## 2019-01-24 RX ORDER — TETRACAINE HYDROCHLORIDE 5 MG/ML
SOLUTION OPHTHALMIC AS NEEDED
Status: DISCONTINUED | OUTPATIENT
Start: 2019-01-24 | End: 2019-01-24 | Stop reason: HOSPADM

## 2019-01-24 RX ORDER — OFLOXACIN 3 MG/ML
SOLUTION/ DROPS OPHTHALMIC AS NEEDED
Status: DISCONTINUED | OUTPATIENT
Start: 2019-01-24 | End: 2019-01-24 | Stop reason: HOSPADM

## 2019-01-24 RX ORDER — PHENYLEPHRINE HCL 2.5 %
1 DROPS OPHTHALMIC (EYE)
Status: COMPLETED | OUTPATIENT
Start: 2019-01-25 | End: 2019-01-24

## 2019-01-24 RX ADMIN — LIDOCAINE HYDROCHLORIDE 1 APPLICATION: 20 JELLY TOPICAL at 08:50

## 2019-01-24 RX ADMIN — CYCLOPENTOLATE HYDROCHLORIDE 1 DROP: 10 SOLUTION OPHTHALMIC at 09:03

## 2019-01-24 RX ADMIN — SODIUM CHLORIDE 125 ML/HR: 0.9 INJECTION, SOLUTION INTRAVENOUS at 08:52

## 2019-01-24 RX ADMIN — LIDOCAINE HYDROCHLORIDE 1 APPLICATION: 20 JELLY TOPICAL at 09:03

## 2019-01-24 RX ADMIN — OFLOXACIN 1 DROP: 3 SOLUTION/ DROPS OPHTHALMIC at 09:03

## 2019-01-24 RX ADMIN — CYCLOPENTOLATE HYDROCHLORIDE 1 DROP: 10 SOLUTION OPHTHALMIC at 08:36

## 2019-01-24 RX ADMIN — PHENYLEPHRINE HYDROCHLORIDE 1 DROP: 25 SOLUTION/ DROPS OPHTHALMIC at 08:51

## 2019-01-24 RX ADMIN — PROPOFOL 50 MG: 10 INJECTION, EMULSION INTRAVENOUS at 09:39

## 2019-01-24 RX ADMIN — CYCLOPENTOLATE HYDROCHLORIDE 1 DROP: 10 SOLUTION OPHTHALMIC at 08:50

## 2019-01-24 RX ADMIN — TROPICAMIDE 1 DROP: 10 SOLUTION/ DROPS OPHTHALMIC at 08:51

## 2019-01-24 RX ADMIN — OFLOXACIN 1 DROP: 3 SOLUTION/ DROPS OPHTHALMIC at 08:36

## 2019-01-24 RX ADMIN — TROPICAMIDE 1 DROP: 10 SOLUTION/ DROPS OPHTHALMIC at 09:04

## 2019-01-24 RX ADMIN — PHENYLEPHRINE HYDROCHLORIDE 1 DROP: 25 SOLUTION/ DROPS OPHTHALMIC at 09:03

## 2019-01-24 RX ADMIN — TROPICAMIDE 1 DROP: 10 SOLUTION/ DROPS OPHTHALMIC at 08:37

## 2019-01-24 RX ADMIN — PHENYLEPHRINE HYDROCHLORIDE 1 DROP: 25 SOLUTION/ DROPS OPHTHALMIC at 08:36

## 2019-01-24 RX ADMIN — OFLOXACIN 1 DROP: 3 SOLUTION/ DROPS OPHTHALMIC at 08:51

## 2019-01-24 RX ADMIN — LIDOCAINE HYDROCHLORIDE 1 APPLICATION: 20 JELLY TOPICAL at 08:36

## 2019-01-24 NOTE — OP NOTE
2 Postoperative Note  PATIENT NAME: Marko Morales  : 1945  MRN: [id number]  [facility] [address] OR ROOM 01    Surgery Date: 2019    Combined forms of age-related cataract of right eye [H25 811]  MIOSIS H57 03  Pseudoexfoliation of lens capsule     Post-Op Diagnosis Codes:     * Combined forms of age-related cataract of right eye [H25 811]  MIOSIS H57 03  Pseudoexfoliation of lens capsule       Procedure(s):  EXTRACTION EXTRACAPSULAR CATARACT PHACO INTRAOCULAR LENS (IOL) RIGHT EYE with BEEHLER PUPILLARY STRETCH FOR MIOSIS    Surgeon(s) and Role:     * Sohan Mcgraw MD - Primary    Assistants:  Circulator: Chelita Nunez RN; Armando Alejandro RN  Scrub Person: Irma Suarez    Anesthesia Type:   IV Sedation with Anesthesia     Anesthesiologist: Shabana Sotomayor MD    Operative Indication:  Vision reduced to 20/100 secondary to progressive cataract formation  The cataract was interfering with the patient's daily activities  All risks and benefits to the surgical procedure were explained to the patient and family members that were present during the pre-operative visit  Eye models and educational material were used as well as a video to explain cataract surgery  The risks included but were not limited to blindness, infection, choroidal hemorrhage, loss of the eye, glaucoma, corneal edema, macular edema, retinal detachment, the need for a corneal transplant and the need to wear glasses postoperatively  The risk of having to move or rotate the IOL in the rarre even the IOL power was incorrect was explained as well  The speciality IOL-intraocular lenses (ex  Toric, Multifocal),were discussed pre-operatively, if appropriate for this particular patient  The patient understood and signed the appropriate consent form  Operative Technique:  The patient was brought back to the operating suite and placed in the supine position  The patient was identified in from of the surgeon as well as the OR staff   The operative eye was confirmed and marked  The patient  was given a peribulbar block using  2% Lidocaine  The pressure of the eye was checked by digital massage  The operative eye was prepped and draped in the usual sterile fashion  A wire lid speculum was inserted  A clear corneal, temporal approach was used  A 2 75 Phaco blade was used to tunnel and enter the  cornea from the temporal side  The anterior chamber was entered and visco-elastic was used to deepen the anterior chamber  Side port paracentesis tracts were performed using a 1 0 mm paracentesis blade at approximately 12 and 6 position  A circular tear capsulotomy was performed using a 25 gauge bent needle and Utrata capsulotomy forceps  [place] dissection was carried out with balanced salt solution using a 27 gauge flat irrigating cannula  The nucleus was freely rotatable with in the capsular bag  The Nucleus was phacoemulsified   Using the [employer] Infinity machine and the phaco chop method  The cortical shell was removed using the bimanual I/A  The posterior capsule was polished as indicated  The posterior capsular bag was inflated using Provisc  The posterior chamber intraocular lens power+24 [id number] was taken from the package an  inspected and the power confirmed  The lens was then inserted  into the posterior capsular bag using the appropriate IOL folder and   The lens was well centered using the Sinsky hook  The Provisc was removed using the I/A unit  The side ports and the temporal incision were stromal hydrated until they were water tight  A 10-vicryl suture was placed to further secure the temporal incision if indicated after testing the incision  The pressure of the eye was adjusted accordingly using balance salt solution through the side ports  At the end of the case the patient was given a drop of Iopidine to prevent a pressure spike  Also, the patient was given a drop of antibiotic drop and antibiotic ointment to prevent infection   The patient's eye was then patched with an eye pad and covered with a plastic shield  The Patient was then taken to the recovery room  After vital signs were stable the patient was discharged with family/friend in satisfactory condition  Addendum: Due to poor dilation after several sets of dilating drops AND SUGARCAINE SOLUTION 1M X2 , the Beegarth iris pupillary stretcher-dilator was used to mechanically enlarge/stretch the miotic pupil  This improved visualization of the lens for completion of the circular tear capsulotomy as well as phacoemulsification of the lens  There were no complications    Joshua Villaseñor MD

## 2019-01-24 NOTE — DISCHARGE SUMMARY
Precious Camargo was discharged in satisfactory condition  Discharge instructions were reviewed and then given to the patient   Arrangements were made for follow up the next day with Janes Corona MD

## 2019-01-24 NOTE — ANESTHESIA PREPROCEDURE EVALUATION
Review of Systems/Medical History  Patient summary reviewed  Chart reviewed  No history of anesthetic complications     Cardiovascular  Exercise tolerance (METS): >4,  Hyperlipidemia, Hypertension ,    Pulmonary  Smoker cigarette smoker  Cumulative Pack Years: 25,        GI/Hepatic            Endo/Other  Diabetes type 2 Oral agent,      GYN       Hematology   Musculoskeletal    Arthritis     Neurology   Psychology           Physical Exam    Airway    Mallampati score: II  TM Distance: >3 FB  Neck ROM: full     Dental   lower dentures and upper dentures,     Cardiovascular  Cardiovascular exam normal    Pulmonary  Pulmonary exam normal     Other Findings        Anesthesia Plan  ASA Score- 2     Anesthesia Type- IV sedation with anesthesia with ASA Monitors  Additional Monitors:   Airway Plan:         Plan Factors-    Induction-     Postoperative Plan-     Informed Consent- Anesthetic plan and risks discussed with patient  I personally reviewed this patient with the CRNA  Discussed and agreed on the Anesthesia Plan with the CRNA  Goldy Russ

## 2019-01-24 NOTE — DISCHARGE INSTRUCTIONS
[facility]  Discharge Instructions    Dr Noni Bolanos and Dr Chau Bal discharged in satisfactory condition  Post operative instructions were given  Follow-up Appointment was given for 8:30 AM at [place] [address] on 1/25/19  Normal Symptoms: Foreign body sensation, scratchy, picky feeling  Try Extra Strength Tylenol for headache  Call Office if severe pain or discomfort  Keep patch on operative eye until 4 pm today  Bring sunglasses to office in the morning  Do not bring the 3 eye drops  NEW Instructions on how to use the 3 drops will be given in AM     Activity: Avoid any heavy,  bending, lifting or excessive activity until instructed  May walk around home with assistance  OK to watch TV  Avoid any excessive reading  No driving until told (Normally 2-4 days after surgery)  Avoid sleeping on operative eye  No water in the eye    Diet: Resume normal diet as tolerated  If experiencing nausea, try bland foods or liquid diet first      Resume normal medications unless otherwise instructed     If any Questions or Problems Please Call: [phone] take off [facility] and patch and start drops , Do not put shield back on during the day  Blurry Vision normal today  Pink/White Top                     [employer] Top               4:00PM                         4:05 PM               4:10 PM               8:00PM                         8: 05PM                8:10PM    BEDTIME:    Take Tan Top ONLY and then replace the plastic shield over eye  No gauze pad needed at bedtime    TOMORROW MORNING , before coming to office, take all THREE drops, then put on glasses  Example of times below  7:00AM                            7: 05AM                  7:10 AM            YOU MAY EXPERIENCE "DOUBLE VISION" - "Blurry Vision"  ONCE YOU TAKE OFF EYE PATCH/SHIELD THIS IS NORMAL

## 2019-01-31 NOTE — PROGRESS NOTES
Assessment/Plan:    No problem-specific Assessment & Plan notes found for this encounter  Patient is medically cleared for surgery     There are no diagnoses linked to this encounter  Subjective:      Patient ID: Shivani Tolliver is a 74 y o  female  Cayla Joy is here for preop clearance for cataract surgery in 2 weeks and then the next will be done 2 weeks after she is feeling fine no particular issues        The following portions of the patient's history were reviewed and updated as appropriate: current medications, past family history, past medical history, past social history, past surgical history and problem list     Review of Systems   Constitutional: Negative  HENT: Negative  Eyes: Negative  Respiratory: Negative  Cardiovascular: Negative  Endocrine: Negative  Genitourinary: Negative  Objective:      /50 (BP Location: Left arm, Patient Position: Sitting, Cuff Size: Adult)   Pulse (!) 107   Temp (!) 97 3 °F (36 3 °C) (Tympanic)   Resp 18   Ht 5' (1 524 m)   Wt 67 1 kg (148 lb)   SpO2 99%   BMI 28 90 kg/m²          Physical Exam   Constitutional: She appears well-developed and well-nourished  HENT:   Head: Normocephalic  Eyes: Pupils are equal, round, and reactive to light  Conjunctivae and EOM are normal    Neck: Normal range of motion  Cardiovascular: Normal rate and normal heart sounds  Pulmonary/Chest: Effort normal  No respiratory distress  She has no wheezes  Abdominal: Soft  Bowel sounds are normal    Musculoskeletal: She exhibits no edema  Psychiatric: She has a normal mood and affect   Her behavior is normal  Judgment and thought content normal

## 2019-02-07 DIAGNOSIS — Z79.4 TYPE 2 DIABETES MELLITUS WITH DIABETIC POLYNEUROPATHY, WITH LONG-TERM CURRENT USE OF INSULIN (HCC): ICD-10-CM

## 2019-02-07 DIAGNOSIS — E11.42 TYPE 2 DIABETES MELLITUS WITH DIABETIC POLYNEUROPATHY, WITH LONG-TERM CURRENT USE OF INSULIN (HCC): ICD-10-CM

## 2019-02-08 RX ORDER — ACETAMINOPHEN 500 MG
500 TABLET ORAL EVERY 6 HOURS PRN
COMMUNITY
End: 2020-01-10 | Stop reason: HOSPADM

## 2019-02-08 NOTE — PRE-PROCEDURE INSTRUCTIONS
Pre-Surgery Instructions:   Medication Instructions    acetaminophen (TYLENOL) 500 mg tablet Instructed patient per Anesthesia Guidelines   DUREZOL 0 05 % EMUL Instructed patient per Anesthesia Guidelines   fexofenadine (ALLEGRA) 180 MG tablet Instructed patient per Anesthesia Guidelines   glimepiride (AMARYL) 4 mg tablet Instructed patient per Anesthesia Guidelines   ibuprofen (MOTRIN) 200 mg tablet Instructed patient per Anesthesia Guidelines   magnesium oxide (MAG-OX) 400 mg tablet Instructed patient per Anesthesia Guidelines   metFORMIN (GLUCOPHAGE) 1000 MG tablet Instructed patient per Anesthesia Guidelines   pravastatin (PRAVACHOL) 80 mg tablet Instructed patient per Anesthesia Guidelines   quinapril (ACCUPRIL) 40 MG tablet Instructed patient per Anesthesia Guidelines  Pre op instructions given  Pt to take blood sugar the am of surgery   [facility] Surgical Experience    The following information was developed to assist you to prepare for your operation  What do I need to do before coming to the hospital?   Arrange for a responsible person to drive you to and from the hospital    Arrange care for your children at home  Children are not allowed in the recovery areas of the hospital   Plan to wear clothing that is easy to put on and take off  If you are having shoulder surgery, wear a shirt that buttons or zippers in the front  Bathing  o Shower the evening before and the morning of your surgery with an antibacterial soap  Please refer to the Pre Op Showering Instructions for Surgery Patients Sheet   o Remove nail polish and all body piercing jewelry  o Do not shave any body part for at least 24 hours before surgery-this includes face, arms, legs and upper body  Food  o Nothing to eat or drink after midnight the night before your surgery   This includes candy and chewing gum  o Exception: If your surgery is after 12:00pm (noon), you may have clear liquids such as 7-Up®, ginger ale, apple or cranberry juice, Jell-O®, water, or clear broth until 8:00 am  o Do not drink milk or juice with pulp on the morning before surgery  o Do not drink alcohol 24 hours before surgery  Medicine  o Follow instructions you received from your surgeon about which medicines you may take on the day of surgery  o If instructed to take medicine on the morning of surgery, take pills with just a small sip of water  Call your prescribing doctor for specific infroamtion on what to do if you take insulin    What should I bring to the hospital?    Bring:  Kathee Friday or a walker, if you have them, for foot or knee surgery   A list of the daily medicines, vitamins, minerals, herbals and nutritional supplements you take  Include the dosages of medicines and the time you take them each day   Glasses, dentures or hearing aids   Minimal clothing; you will be wearing hospital sleepwear   Photo ID; required to verify your identity   If you have a Living Will or Power of , bring a copy of the documents   If you have an ostomy, bring an extra pouch and any supplies you use    Do not bring   Medicines or inhalers   Money, valuables or jewelry    What other information should I know about the day of surgery?  Notify your surgeons if you develop a cold, sore throat, cough, fever, rash or any other illness   Report to the Ambulatory Surgical/Same Day Surgery Unit   You will be instructed to stop at Registration only if you have not been pre-registered   Inform your  fi they do not stay that they will be asked by the staff to leave a phone number where they can be reached   Be available to be reached before surgery   In the event the operating room schedule changes, you may be asked to come in earlier or later than expected    *It is important to tell your doctor and others involved in your health care if you are taking or have been taking any non-prescription drugs, vitamins, minerals, herbals or other nutritional supplements   Any of these may interact with some food or medicines and cause a reaction

## 2019-02-13 PROBLEM — H25.812 COMBINED FORMS OF AGE-RELATED CATARACT OF LEFT EYE: Status: ACTIVE | Noted: 2019-02-13

## 2019-02-13 NOTE — H&P
Admit Note     Winnie Larson    The above female patient   74y o   years old has been followed for cataract development in their Left eye  Due to the decrease in vision and the affect on their lifestyle, they have elected to have cataract surgery  The risks and benefits were discussed with the patient using verbal discussion and education material  The IOL option were also discussed  The patient was cleared by  their medical doctor and had an interview with the anesthesia department  No contraindications to the surgery were found  Informed consent was obtained for cataract surgery and possible intraocular lens implantation  Ophthalmic Examination:     A complete ophthalmic exam was performed  The best corrected visual acuity in each eye was  OD 20/60     and OS 20/50, 20/80 glare(med)      The Snellen chart was used as well as glare testing if indicated to determine the level of vision function  Slit lamp was used to examine the cornea and anterior structures of the eye  No significant pathology was found as a contraindication to surgery  Intraocular pressures were checked and found to be within normal limits  Dilated fundus exam was performed and no significant pathology was found that would attribute to the decreased vision  Examination of the crystalline lens revealed significant cataract formation and the cataract was a significant cause of the patient's decrease in vision  The potential benefits of the cataract surgery out weighed the risks of the procedure and were reviewed with the patient during the pre-operative visit  In summary, it was determined that the patient's decrease in visual acuity was mainly attributable to the cataract formation  Cataract surgery was recommended to for visual rehabilitation and improvement in the patient's  lifestyle  The decision included the patient's ability to safely drive a motor vehicle as well as live independently   The patient had an A-scan to determine the power of the lens to be placed into the eye at the time of cataract surgery  The risks and benefits were also review again at this visit including total loss of the eye on rare occassions  The IOL options were also reviewed based on the patients lifestyle and ocular findings  The above patient was informed of the need to be cleared by their medical doctor prior to surgery  Any pre-operative labs would be determined by their primary care doctor and/or cardiologist      A potential Vision was checked and found to be  20/25 in the operative eye  The post operative plan and visits were reviewed with the patient and scheduled  Admitting Diagnosis:    Cataract Left Eye  Procedure Planned:  Cataract Extraction Left Eye with possible Intraocular lens Implant      Anesthesia: Local MAC    Surgeon: Michael Espinoza MD

## 2019-02-14 ENCOUNTER — HOSPITAL ENCOUNTER (OUTPATIENT)
Facility: AMBULARY SURGERY CENTER | Age: 74
Setting detail: OUTPATIENT SURGERY
Discharge: HOME | End: 2019-02-14
Attending: OPHTHALMOLOGY | Admitting: OPHTHALMOLOGY
Payer: COMMERCIAL

## 2019-02-14 ENCOUNTER — ANESTHESIA EVENT (OUTPATIENT)
Dept: PERIOP | Facility: AMBULARY SURGERY CENTER | Age: 74
End: 2019-02-14
Payer: COMMERCIAL

## 2019-02-14 ENCOUNTER — ANESTHESIA (OUTPATIENT)
Dept: PERIOP | Facility: AMBULARY SURGERY CENTER | Age: 74
End: 2019-02-14
Payer: COMMERCIAL

## 2019-02-14 VITALS
SYSTOLIC BLOOD PRESSURE: 156 MMHG | DIASTOLIC BLOOD PRESSURE: 88 MMHG | RESPIRATION RATE: 16 BRPM | WEIGHT: 150 LBS | OXYGEN SATURATION: 99 % | HEIGHT: 60 IN | BODY MASS INDEX: 29.45 KG/M2 | TEMPERATURE: 96.5 F | HEART RATE: 72 BPM

## 2019-02-14 LAB — GLUCOSE SERPL-MCNC: 120 MG/DL (ref 65–140)

## 2019-02-14 PROCEDURE — V2632 POST CHMBR INTRAOCULAR LENS: HCPCS | Performed by: OPHTHALMOLOGY

## 2019-02-14 PROCEDURE — 82948 REAGENT STRIP/BLOOD GLUCOSE: CPT

## 2019-02-14 DEVICE — IOL SN60WF 24.0: Type: IMPLANTABLE DEVICE | Status: FUNCTIONAL

## 2019-02-14 RX ORDER — OFLOXACIN 3 MG/ML
SOLUTION/ DROPS OPHTHALMIC AS NEEDED
Status: DISCONTINUED | OUTPATIENT
Start: 2019-02-14 | End: 2019-02-14 | Stop reason: HOSPADM

## 2019-02-14 RX ORDER — SODIUM CHLORIDE, SODIUM LACTATE, POTASSIUM CHLORIDE, CALCIUM CHLORIDE 600; 310; 30; 20 MG/100ML; MG/100ML; MG/100ML; MG/100ML
125 INJECTION, SOLUTION INTRAVENOUS CONTINUOUS
Status: DISCONTINUED | OUTPATIENT
Start: 2019-02-14 | End: 2019-02-14 | Stop reason: HOSPADM

## 2019-02-14 RX ORDER — TETRACAINE HYDROCHLORIDE 5 MG/ML
SOLUTION OPHTHALMIC AS NEEDED
Status: DISCONTINUED | OUTPATIENT
Start: 2019-02-14 | End: 2019-02-14 | Stop reason: HOSPADM

## 2019-02-14 RX ORDER — LIDOCAINE HYDROCHLORIDE 10 MG/ML
INJECTION, SOLUTION INFILTRATION; PERINEURAL AS NEEDED
Status: DISCONTINUED | OUTPATIENT
Start: 2019-02-14 | End: 2019-02-14 | Stop reason: SURG

## 2019-02-14 RX ORDER — OFLOXACIN 3 MG/ML
1 SOLUTION/ DROPS OPHTHALMIC
Status: COMPLETED | OUTPATIENT
Start: 2019-02-14 | End: 2019-02-14

## 2019-02-14 RX ORDER — TROPICAMIDE 10 MG/ML
1 SOLUTION/ DROPS OPHTHALMIC
Status: COMPLETED | OUTPATIENT
Start: 2019-02-14 | End: 2019-02-14

## 2019-02-14 RX ORDER — PHENYLEPHRINE HCL 2.5 %
1 DROPS OPHTHALMIC (EYE)
Status: COMPLETED | OUTPATIENT
Start: 2019-02-14 | End: 2019-02-14

## 2019-02-14 RX ORDER — LIDOCAINE HYDROCHLORIDE 20 MG/ML
1 JELLY TOPICAL
Status: COMPLETED | OUTPATIENT
Start: 2019-02-14 | End: 2019-02-14

## 2019-02-14 RX ORDER — BALANCED SALT SOLUTION 6.4; .75; .48; .3; 3.9; 1.7 MG/ML; MG/ML; MG/ML; MG/ML; MG/ML; MG/ML
SOLUTION OPHTHALMIC AS NEEDED
Status: DISCONTINUED | OUTPATIENT
Start: 2019-02-14 | End: 2019-02-14 | Stop reason: HOSPADM

## 2019-02-14 RX ORDER — PROPOFOL 10 MG/ML
INJECTION, EMULSION INTRAVENOUS AS NEEDED
Status: DISCONTINUED | OUTPATIENT
Start: 2019-02-14 | End: 2019-02-14 | Stop reason: SURG

## 2019-02-14 RX ORDER — CYCLOPENTOLATE HYDROCHLORIDE 10 MG/ML
1 SOLUTION/ DROPS OPHTHALMIC
Status: COMPLETED | OUTPATIENT
Start: 2019-02-14 | End: 2019-02-14

## 2019-02-14 RX ADMIN — CYCLOPENTOLATE HYDROCHLORIDE 1 DROP: 10 SOLUTION/ DROPS OPHTHALMIC at 08:09

## 2019-02-14 RX ADMIN — SODIUM CHLORIDE, SODIUM LACTATE, POTASSIUM CHLORIDE, AND CALCIUM CHLORIDE 125 ML/HR: .6; .31; .03; .02 INJECTION, SOLUTION INTRAVENOUS at 08:11

## 2019-02-14 RX ADMIN — TROPICAMIDE 1 DROP: 10 SOLUTION/ DROPS OPHTHALMIC at 08:10

## 2019-02-14 RX ADMIN — PHENYLEPHRINE HYDROCHLORIDE 1 DROP: 25 SOLUTION/ DROPS OPHTHALMIC at 07:58

## 2019-02-14 RX ADMIN — PHENYLEPHRINE HYDROCHLORIDE 1 DROP: 25 SOLUTION/ DROPS OPHTHALMIC at 08:10

## 2019-02-14 RX ADMIN — LIDOCAINE HYDROCHLORIDE 1 APPLICATION: 20 JELLY TOPICAL at 08:17

## 2019-02-14 RX ADMIN — CYCLOPENTOLATE HYDROCHLORIDE 1 DROP: 10 SOLUTION/ DROPS OPHTHALMIC at 07:46

## 2019-02-14 RX ADMIN — OFLOXACIN 1 DROP: 3 SOLUTION OPHTHALMIC at 08:10

## 2019-02-14 RX ADMIN — CYCLOPENTOLATE HYDROCHLORIDE 1 DROP: 10 SOLUTION/ DROPS OPHTHALMIC at 07:58

## 2019-02-14 RX ADMIN — OFLOXACIN 1 DROP: 3 SOLUTION OPHTHALMIC at 07:58

## 2019-02-14 RX ADMIN — PHENYLEPHRINE HYDROCHLORIDE 1 DROP: 25 SOLUTION/ DROPS OPHTHALMIC at 07:46

## 2019-02-14 RX ADMIN — LIDOCAINE HYDROCHLORIDE 30 MG: 10 INJECTION, SOLUTION INFILTRATION; PERINEURAL at 08:39

## 2019-02-14 RX ADMIN — LIDOCAINE HYDROCHLORIDE 1 APPLICATION: 20 JELLY TOPICAL at 08:02

## 2019-02-14 RX ADMIN — PROPOFOL 50 MG: 10 INJECTION, EMULSION INTRAVENOUS at 08:39

## 2019-02-14 RX ADMIN — TROPICAMIDE 1 DROP: 10 SOLUTION/ DROPS OPHTHALMIC at 07:47

## 2019-02-14 RX ADMIN — LIDOCAINE HYDROCHLORIDE 1 APPLICATION: 20 JELLY TOPICAL at 07:47

## 2019-02-14 RX ADMIN — TROPICAMIDE 1 DROP: 10 SOLUTION/ DROPS OPHTHALMIC at 07:58

## 2019-02-14 RX ADMIN — OFLOXACIN 1 DROP: 3 SOLUTION OPHTHALMIC at 07:47

## 2019-02-14 RX ADMIN — LIDOCAINE HYDROCHLORIDE 1 APPLICATION: 20 JELLY TOPICAL at 08:31

## 2019-02-14 NOTE — DISCHARGE INSTRUCTIONS
[facility]  Discharge Instructions    Dr Dimitri Bolanos and Dr Maria Elena Guajardo discharged in satisfactory condition  Post operative instructions were given  Follow-up Appointment was given for 8:30 AM at the  [facility] on Friday 2/15/18  Normal Symptoms: Foreign body sensation, scratchy, picky feeling  Try Extra Strength Tylenol for headache  Call Office if severe pain or discomfort  Keep patch on operative eye until 4 pm today  Bring sunglasses to office in the morning  Do not bring the 3 eye drops  NEW Instructions on how to use the 3 drops will be given in AM     Activity: Avoid any heavy,  bending, lifting or excessive activity until instructed  May walk around home with assistance  OK to watch TV  Avoid any excessive reading  No driving until told (Normally 2-4 days after surgery)  Avoid sleeping on operative eye  No water in the eye    Diet: Resume normal diet as tolerated  If experiencing nausea, try bland foods or liquid diet first      Resume normal medications unless otherwise instructed     If any Questions or Problems Please Call: [phone] [facility] take off [facility] and patch and start drops , Do not put shield back on during the day  Blurry Vision normal today  Pink/White Top                     [employer] Top               4:00PM                         4:05 PM               4:10 PM               8:00PM                         8: 05PM                8:10PM    BEDTIME:    Take Tan Top ONLY and then replace the plastic shield over eye  No gauze pad needed at bedtime    TOMORROW MORNING , before coming to office, take all THREE drops, then put on glasses  Example of times below  7:00AM                      7: 05AM                  7:10 AM            YOU MAY EXPERIENCE "DOUBLE VISION" - "Blurry Vision"  ONCE YOU TAKE OFF EYE PATCH/SHIELD THIS IS NORMAL

## 2019-02-14 NOTE — ANESTHESIA PREPROCEDURE EVALUATION
Review of Systems/Medical History  Patient summary reviewed  Chart reviewed  No history of anesthetic complications     Cardiovascular  Exercise tolerance (METS): >4,  Hyperlipidemia, Hypertension ,    Pulmonary  Smoker cigarette smoker  Cumulative Pack Years: 25,        GI/Hepatic            Endo/Other  Diabetes type 2 Oral agent,      GYN       Hematology   Musculoskeletal    Arthritis     Neurology   Psychology           Physical Exam    Airway    Mallampati score: II  TM Distance: >3 FB  Neck ROM: full     Dental   lower dentures and upper dentures,     Cardiovascular  Cardiovascular exam normal    Pulmonary  Pulmonary exam normal     Other Findings        Anesthesia Plan  ASA Score- 2     Anesthesia Type- IV sedation with anesthesia with ASA Monitors  Additional Monitors:   Airway Plan:         Plan Factors-    Induction-     Postoperative Plan-     Informed Consent- Anesthetic plan and risks discussed with patient  I personally reviewed this patient with the CRNA  Discussed and agreed on the Anesthesia Plan with the CRNA  Claudette Vail

## 2019-02-14 NOTE — ANESTHESIA POSTPROCEDURE EVALUATION
Post-Op Assessment Note    CV Status:  Stable  Pain Score: 0    Pain management: adequate     Mental Status:  Alert and awake   Hydration Status:  Euvolemic   PONV Controlled:  None   Airway Patency:  Patent and adequate   Post Op Vitals Reviewed: Yes      Staff: CRNA           BP     Temp     Pulse     Resp      SpO2

## 2019-02-14 NOTE — OP NOTE
Postoperative Note  PATIENT NAME: Tex Bolivar  : 1945  MRN: [id number]  [place] [address] OR ROOM 01    Surgery Date: 2019    Combined forms of age-related cataract of left eye [H25 812]    Post-Op Diagnosis Codes:     * Combined forms of age-related cataract of left eye [H25 812]    Procedure(s):  EXTRACTION EXTRACAPSULAR CATARACT PHACO INTRAOCULAR LENS (IOL) LEFT EYE    Surgeon(s) and Role:     * Ronel Merino MD - Primary    Assistants:  Circulator: Katherine Orellana RN  Scrub Person: Florencia Corona    Anesthesia Type:   IV Sedation with Anesthesia     Anesthesiologist: Jocelyn Michaud DO  CRNA: Darrel Felipe CRNA    Operative Indication:  Vision reduced to 20/50, 20/80 glare (med) in the left eye secondary to progressive cataract formation  The cataract was interfering with the patient's daily activities  All risks and benefits to the surgical procedure were explained to the patient and family members that were present during the pre-operative visit  The risks included but were not limited to blindness, infection, choroidal hemorrhage, loss of the eye, glaucoma, corneal edema, macular edema, retinal detachment, the need for a corneal transplant and the need to wear glasses postoperatively  The rare possibility of having to remove/excahnage the IOL due to incorrect power or off axis (toric) was explained pre-oper to the patient  Educational materials, eye model and a video were all used to explain cataract surgery  The option of speciality IOL-lenses (Multifocal, toric IOL) were discussed pre-operatively as well as the risks and benefit of using the speciality IOL if indicated in this particular patient  The patient understood and signed the appropriate consent form  Operative Technique:  The patient was brought back to the operating suite and placed in the supine position  The patient was identified in from of the surgeon as well as the OR staff  The operative eye was confirmed and marked   The patient was given a peribulbar block using  2% Lidocaine  The pressure of the eye was checked by digital massage  The operative eye was prepped and draped in the usual sterile fashion  A wire lid speculum was inserted  A clear corneal, temporal approach was used  A 2 75 Phaco blade was used to tunnel and enter the  cornea from the temporal side  The anterior chamber was entered and visco-elastic was used to deepen the anterior chamber  Side port paracentesis tracts were performed using a 1 0 mm paracentesis blade at approximately 12 and 6 position  A circular tear capsulotomy was performed using a 25 gauge bent needle and Utrata capsulotomy forceps  [place] dissection was carried out with balanced salt solution using a 27 gauge flat irrigating cannula  The nucleus was freely rotatable with in the capsular bag  The Nucleus was phacoemulsified   Using the [employer] machine and the phaco chop method  The cortical shell was removed using the bimanual I/A  The posterior capsule was polished as indicated  The posterior capsular bag was inflated using Provisc  The posterior chamber intraocular lens power+24  [id number] was taken from the package an  inspected and the power confirmed  The lens was then inserted  into the posterior capsular bag using the appropriate IOL folder and   The lens was well centered using the Sinsky hook  The Provisc was removed using the I/A unit  The side ports and the temporal incision were stromal hydrated until they were water tight  A 10-vicryl suture was placed to further secure the temporal incision if indicated after testing the incision  The pressure of the eye was adjusted accordingly using balance salt solution through the side ports  At the end of the case the patient was given a drop of Iopidine to prevent a pressure spike  Also, the patient was given a drop of antibiotic drop and antibiotic ointment to prevent infection   The patient's eye was then patched with an eye pad and covered with a plastic shield  The Patient was then taken to the recovery room  After vital signs were stable the patient was discharged with family/friend in satisfactory condition       Addendum:  Marge Gomez MD

## 2019-02-14 NOTE — DISCHARGE SUMMARY
Paco Gr was discharged in satisfactory condition  Discharge instructions were reviewed and then given to the patient   Arrangements were made for follow up the next day with Dominique South MD

## 2019-02-18 DIAGNOSIS — I10 ESSENTIAL HYPERTENSION: ICD-10-CM

## 2019-02-18 DIAGNOSIS — E11.9 TYPE 2 DIABETES MELLITUS WITHOUT COMPLICATION, WITHOUT LONG-TERM CURRENT USE OF INSULIN (HCC): ICD-10-CM

## 2019-02-18 RX ORDER — QUINAPRIL 40 MG/1
TABLET ORAL
Qty: 90 TABLET | Refills: 0 | Status: SHIPPED | OUTPATIENT
Start: 2019-02-18 | End: 2019-05-25 | Stop reason: SDUPTHER

## 2019-02-18 RX ORDER — GLIMEPIRIDE 4 MG/1
TABLET ORAL
Qty: 90 TABLET | Refills: 0 | Status: SHIPPED | OUTPATIENT
Start: 2019-02-18 | End: 2019-05-25 | Stop reason: SDUPTHER

## 2019-03-26 LAB
LEFT EYE DIABETIC RETINOPATHY: NORMAL
RIGHT EYE DIABETIC RETINOPATHY: NORMAL

## 2019-05-11 DIAGNOSIS — Z76.0 MEDICATION REFILL: ICD-10-CM

## 2019-05-11 RX ORDER — MAGNESIUM OXIDE 400 MG/1
TABLET ORAL
Qty: 60 TABLET | Refills: 4 | Status: SHIPPED | OUTPATIENT
Start: 2019-05-11 | End: 2020-03-26 | Stop reason: SDUPTHER

## 2019-05-14 ENCOUNTER — TELEPHONE (OUTPATIENT)
Dept: FAMILY MEDICINE CLINIC | Facility: CLINIC | Age: 74
End: 2019-05-14

## 2019-05-20 ENCOUNTER — OFFICE VISIT (OUTPATIENT)
Dept: FAMILY MEDICINE CLINIC | Facility: CLINIC | Age: 74
End: 2019-05-20
Payer: COMMERCIAL

## 2019-05-20 VITALS
BODY MASS INDEX: 28.51 KG/M2 | DIASTOLIC BLOOD PRESSURE: 42 MMHG | HEART RATE: 112 BPM | WEIGHT: 151 LBS | SYSTOLIC BLOOD PRESSURE: 122 MMHG | OXYGEN SATURATION: 97 % | HEIGHT: 61 IN | RESPIRATION RATE: 20 BRPM | TEMPERATURE: 97.5 F

## 2019-05-20 DIAGNOSIS — Z00.00 MEDICARE ANNUAL WELLNESS VISIT, SUBSEQUENT: Primary | ICD-10-CM

## 2019-05-20 DIAGNOSIS — H91.93 BILATERAL CHANGE IN HEARING: ICD-10-CM

## 2019-05-20 DIAGNOSIS — Z12.31 ENCOUNTER FOR SCREENING MAMMOGRAM FOR BREAST CANCER: ICD-10-CM

## 2019-05-20 DIAGNOSIS — Z23 ENCOUNTER FOR IMMUNIZATION: ICD-10-CM

## 2019-05-20 DIAGNOSIS — Z12.12 SCREENING FOR COLORECTAL CANCER: ICD-10-CM

## 2019-05-20 DIAGNOSIS — Z12.11 SCREENING FOR COLORECTAL CANCER: ICD-10-CM

## 2019-05-20 DIAGNOSIS — Z74.09 DECLINING MOBILITY: ICD-10-CM

## 2019-05-20 DIAGNOSIS — Z72.0 DECLINED SMOKING CESSATION: ICD-10-CM

## 2019-05-20 DIAGNOSIS — Z12.11 COLON CANCER SCREENING: ICD-10-CM

## 2019-05-20 DIAGNOSIS — H53.9 CHANGE IN VISION: ICD-10-CM

## 2019-05-20 PROCEDURE — 4040F PNEUMOC VAC/ADMIN/RCVD: CPT | Performed by: FAMILY MEDICINE

## 2019-05-20 PROCEDURE — 1101F PT FALLS ASSESS-DOCD LE1/YR: CPT | Performed by: FAMILY MEDICINE

## 2019-05-20 PROCEDURE — G0439 PPPS, SUBSEQ VISIT: HCPCS | Performed by: FAMILY MEDICINE

## 2019-05-20 PROCEDURE — 1170F FXNL STATUS ASSESSED: CPT | Performed by: FAMILY MEDICINE

## 2019-05-20 PROCEDURE — 90670 PCV13 VACCINE IM: CPT | Performed by: FAMILY MEDICINE

## 2019-05-20 PROCEDURE — 1125F AMNT PAIN NOTED PAIN PRSNT: CPT | Performed by: FAMILY MEDICINE

## 2019-05-20 PROCEDURE — G0009 ADMIN PNEUMOCOCCAL VACCINE: HCPCS | Performed by: FAMILY MEDICINE

## 2019-05-20 PROCEDURE — 3725F SCREEN DEPRESSION PERFORMED: CPT | Performed by: FAMILY MEDICINE

## 2019-05-20 PROCEDURE — 1160F RVW MEDS BY RX/DR IN RCRD: CPT | Performed by: FAMILY MEDICINE

## 2019-05-25 DIAGNOSIS — E11.9 TYPE 2 DIABETES MELLITUS WITHOUT COMPLICATION, WITHOUT LONG-TERM CURRENT USE OF INSULIN (HCC): ICD-10-CM

## 2019-05-25 DIAGNOSIS — Z79.4 TYPE 2 DIABETES MELLITUS WITH DIABETIC POLYNEUROPATHY, WITH LONG-TERM CURRENT USE OF INSULIN (HCC): ICD-10-CM

## 2019-05-25 DIAGNOSIS — I10 ESSENTIAL HYPERTENSION: ICD-10-CM

## 2019-05-25 DIAGNOSIS — E11.42 TYPE 2 DIABETES MELLITUS WITH DIABETIC POLYNEUROPATHY, WITH LONG-TERM CURRENT USE OF INSULIN (HCC): ICD-10-CM

## 2019-05-28 RX ORDER — QUINAPRIL 40 MG/1
TABLET ORAL
Qty: 90 TABLET | Refills: 0 | Status: SHIPPED | OUTPATIENT
Start: 2019-05-28 | End: 2019-09-01 | Stop reason: SDUPTHER

## 2019-05-28 RX ORDER — PRAVASTATIN SODIUM 80 MG/1
TABLET ORAL
Qty: 90 TABLET | Refills: 3 | Status: SHIPPED | OUTPATIENT
Start: 2019-05-28 | End: 2020-01-10 | Stop reason: HOSPADM

## 2019-05-28 RX ORDER — GLIMEPIRIDE 4 MG/1
TABLET ORAL
Qty: 90 TABLET | Refills: 0 | Status: SHIPPED | OUTPATIENT
Start: 2019-05-28 | End: 2019-09-01 | Stop reason: SDUPTHER

## 2019-09-01 DIAGNOSIS — I10 ESSENTIAL HYPERTENSION: ICD-10-CM

## 2019-09-01 DIAGNOSIS — E11.9 TYPE 2 DIABETES MELLITUS WITHOUT COMPLICATION, WITHOUT LONG-TERM CURRENT USE OF INSULIN (HCC): ICD-10-CM

## 2019-09-03 RX ORDER — GLIMEPIRIDE 4 MG/1
TABLET ORAL
Qty: 90 TABLET | Refills: 0 | Status: SHIPPED | OUTPATIENT
Start: 2019-09-03 | End: 2019-12-04 | Stop reason: SDUPTHER

## 2019-09-03 RX ORDER — QUINAPRIL 40 MG/1
TABLET ORAL
Qty: 90 TABLET | Refills: 0 | Status: SHIPPED | OUTPATIENT
Start: 2019-09-03 | End: 2019-12-04 | Stop reason: SDUPTHER

## 2019-10-10 ENCOUNTER — TELEPHONE (OUTPATIENT)
Dept: FAMILY MEDICINE CLINIC | Facility: CLINIC | Age: 74
End: 2019-10-10

## 2019-12-04 DIAGNOSIS — I10 ESSENTIAL HYPERTENSION: ICD-10-CM

## 2019-12-04 DIAGNOSIS — E11.9 TYPE 2 DIABETES MELLITUS WITHOUT COMPLICATION, WITHOUT LONG-TERM CURRENT USE OF INSULIN (HCC): ICD-10-CM

## 2019-12-04 RX ORDER — QUINAPRIL 40 MG/1
TABLET ORAL
Qty: 90 TABLET | Refills: 0 | Status: SHIPPED | OUTPATIENT
Start: 2019-12-04 | End: 2020-01-14 | Stop reason: SDUPTHER

## 2019-12-04 RX ORDER — GLIMEPIRIDE 4 MG/1
TABLET ORAL
Qty: 90 TABLET | Refills: 0 | Status: SHIPPED | OUTPATIENT
Start: 2019-12-04 | End: 2020-01-14 | Stop reason: SDUPTHER

## 2019-12-23 DIAGNOSIS — Z76.0 MEDICATION REFILL: ICD-10-CM

## 2019-12-24 RX ORDER — MAGNESIUM OXIDE 400 MG/1
TABLET ORAL
Qty: 180 TABLET | Refills: 1 | Status: SHIPPED | OUTPATIENT
Start: 2019-12-24 | End: 2020-01-24 | Stop reason: SDUPTHER

## 2020-01-09 ENCOUNTER — APPOINTMENT (EMERGENCY)
Dept: RADIOLOGY | Facility: HOSPITAL | Age: 75
DRG: 065 | End: 2020-01-09
Payer: COMMERCIAL

## 2020-01-09 ENCOUNTER — HOSPITAL ENCOUNTER (INPATIENT)
Facility: HOSPITAL | Age: 75
LOS: 1 days | Discharge: HOME HEALTH | DRG: 065 | End: 2020-01-10
Attending: EMERGENCY MEDICINE | Admitting: FAMILY MEDICINE
Payer: COMMERCIAL

## 2020-01-09 DIAGNOSIS — D64.9 ANEMIA: ICD-10-CM

## 2020-01-09 DIAGNOSIS — N39.0 UTI (URINARY TRACT INFECTION): ICD-10-CM

## 2020-01-09 DIAGNOSIS — E78.5 HYPERLIPIDEMIA: ICD-10-CM

## 2020-01-09 DIAGNOSIS — I63.9 STROKE (CEREBRUM) (HCC): Primary | ICD-10-CM

## 2020-01-09 DIAGNOSIS — R41.82 ALTERED MENTAL STATUS: ICD-10-CM

## 2020-01-09 PROBLEM — Z81.2 FAMILY HISTORY OF TOBACCO ABUSE: Status: ACTIVE | Noted: 2020-01-09

## 2020-01-09 PROBLEM — M19.90 ARTHRITIS: Status: ACTIVE | Noted: 2020-01-09

## 2020-01-09 PROBLEM — I10 HYPERTENSION: Status: ACTIVE | Noted: 2020-01-09

## 2020-01-09 PROBLEM — N18.9 ACUTE KIDNEY INJURY SUPERIMPOSED ON CKD (HCC): Status: ACTIVE | Noted: 2020-01-09

## 2020-01-09 PROBLEM — Z87.891 HISTORY OF TOBACCO ABUSE: Status: ACTIVE | Noted: 2020-01-09

## 2020-01-09 PROBLEM — N17.9 AKI (ACUTE KIDNEY INJURY) (HCC): Status: ACTIVE | Noted: 2020-01-09

## 2020-01-09 LAB
ABO GROUP BLD: NORMAL
ABO GROUP BLD: NORMAL
ALBUMIN SERPL BCP-MCNC: 3.6 G/DL (ref 3.5–5)
ALP SERPL-CCNC: 64 U/L (ref 46–116)
ALT SERPL W P-5'-P-CCNC: 19 U/L (ref 12–78)
AMMONIA PLAS-SCNC: <10 UMOL/L (ref 11–35)
ANION GAP SERPL CALCULATED.3IONS-SCNC: 7 MMOL/L (ref 4–13)
APTT PPP: 21 SECONDS (ref 25–32)
AST SERPL W P-5'-P-CCNC: 14 U/L (ref 5–45)
BASOPHILS # BLD AUTO: 0.03 THOUSANDS/ΜL (ref 0–0.1)
BASOPHILS NFR BLD AUTO: 0 % (ref 0–1)
BILIRUB SERPL-MCNC: 0.5 MG/DL (ref 0.2–1)
BLD GP AB SCN SERPL QL: NEGATIVE
BUN SERPL-MCNC: 31 MG/DL (ref 5–25)
CALCIUM SERPL-MCNC: 9.9 MG/DL (ref 8.3–10.1)
CHLORIDE SERPL-SCNC: 104 MMOL/L (ref 100–108)
CHOLEST SERPL-MCNC: 150 MG/DL (ref 50–200)
CO2 SERPL-SCNC: 27 MMOL/L (ref 21–32)
CREAT SERPL-MCNC: 1.7 MG/DL (ref 0.6–1.3)
EOSINOPHIL # BLD AUTO: 0.24 THOUSAND/ΜL (ref 0–0.61)
EOSINOPHIL NFR BLD AUTO: 3 % (ref 0–6)
ERYTHROCYTE [DISTWIDTH] IN BLOOD BY AUTOMATED COUNT: 16 % (ref 11.6–15.1)
ETHANOL SERPL-MCNC: <3 MG/DL (ref 0–3)
FERRITIN SERPL-MCNC: 6 NG/ML (ref 8–388)
FOLATE SERPL-MCNC: 12.5 NG/ML (ref 3.1–17.5)
GFR SERPL CREATININE-BSD FRML MDRD: 29 ML/MIN/1.73SQ M
GLUCOSE SERPL-MCNC: 121 MG/DL (ref 65–140)
GLUCOSE SERPL-MCNC: 131 MG/DL (ref 65–140)
GLUCOSE SERPL-MCNC: 79 MG/DL (ref 65–140)
HCT VFR BLD AUTO: 26 % (ref 34.8–46.1)
HDLC SERPL-MCNC: 57 MG/DL
HGB BLD-MCNC: 7.3 G/DL (ref 11.5–15.4)
HGB RETIC QN AUTO: 21.6 PG (ref 30–38.3)
IMM GRANULOCYTES # BLD AUTO: 0.03 THOUSAND/UL (ref 0–0.2)
IMM GRANULOCYTES NFR BLD AUTO: 0 % (ref 0–2)
IMM RETICS NFR: 23.8 % (ref 0–14)
INR PPP: 0.88 (ref 0.91–1.09)
IRON SATN MFR SERPL: 3 %
IRON SERPL-MCNC: 14 UG/DL (ref 50–170)
LDLC SERPL CALC-MCNC: 71 MG/DL (ref 0–100)
LIPASE SERPL-CCNC: 162 U/L (ref 73–393)
LYMPHOCYTES # BLD AUTO: 0.94 THOUSANDS/ΜL (ref 0.6–4.47)
LYMPHOCYTES NFR BLD AUTO: 13 % (ref 14–44)
MAGNESIUM SERPL-MCNC: 2.3 MG/DL (ref 1.6–2.6)
MCH RBC QN AUTO: 21.4 PG (ref 26.8–34.3)
MCHC RBC AUTO-ENTMCNC: 28.1 G/DL (ref 31.4–37.4)
MCV RBC AUTO: 76 FL (ref 82–98)
MONOCYTES # BLD AUTO: 0.41 THOUSAND/ΜL (ref 0.17–1.22)
MONOCYTES NFR BLD AUTO: 6 % (ref 4–12)
NEUTROPHILS # BLD AUTO: 5.37 THOUSANDS/ΜL (ref 1.85–7.62)
NEUTS SEG NFR BLD AUTO: 78 % (ref 43–75)
NONHDLC SERPL-MCNC: 93 MG/DL
NRBC BLD AUTO-RTO: 0 /100 WBCS
PLATELET # BLD AUTO: 358 THOUSANDS/UL (ref 149–390)
PMV BLD AUTO: 9.7 FL (ref 8.9–12.7)
POTASSIUM SERPL-SCNC: 5.2 MMOL/L (ref 3.5–5.3)
PROT SERPL-MCNC: 7 G/DL (ref 6.4–8.2)
PROTHROMBIN TIME: 9.4 SECONDS (ref 9.8–12)
RBC # BLD AUTO: 3.41 MILLION/UL (ref 3.81–5.12)
RETICS # AUTO: ABNORMAL 10*3/UL (ref 14097–95744)
RETICS # CALC: 1.42 % (ref 0.37–1.87)
RH BLD: POSITIVE
RH BLD: POSITIVE
SODIUM SERPL-SCNC: 138 MMOL/L (ref 136–145)
SPECIMEN EXPIRATION DATE: NORMAL
TIBC SERPL-MCNC: 424 UG/DL (ref 250–450)
TRIGL SERPL-MCNC: 108 MG/DL
TROPONIN I SERPL-MCNC: <0.02 NG/ML
TSH SERPL DL<=0.05 MIU/L-ACNC: 0.86 UIU/ML (ref 0.36–3.74)
WBC # BLD AUTO: 7.02 THOUSAND/UL (ref 4.31–10.16)

## 2020-01-09 PROCEDURE — 83735 ASSAY OF MAGNESIUM: CPT | Performed by: EMERGENCY MEDICINE

## 2020-01-09 PROCEDURE — 85046 RETICYTE/HGB CONCENTRATE: CPT | Performed by: STUDENT IN AN ORGANIZED HEALTH CARE EDUCATION/TRAINING PROGRAM

## 2020-01-09 PROCEDURE — 80320 DRUG SCREEN QUANTALCOHOLS: CPT | Performed by: EMERGENCY MEDICINE

## 2020-01-09 PROCEDURE — 99223 1ST HOSP IP/OBS HIGH 75: CPT | Performed by: FAMILY MEDICINE

## 2020-01-09 PROCEDURE — 96361 HYDRATE IV INFUSION ADD-ON: CPT

## 2020-01-09 PROCEDURE — 96360 HYDRATION IV INFUSION INIT: CPT

## 2020-01-09 PROCEDURE — 86900 BLOOD TYPING SEROLOGIC ABO: CPT | Performed by: EMERGENCY MEDICINE

## 2020-01-09 PROCEDURE — 80061 LIPID PANEL: CPT | Performed by: STUDENT IN AN ORGANIZED HEALTH CARE EDUCATION/TRAINING PROGRAM

## 2020-01-09 PROCEDURE — 36415 COLL VENOUS BLD VENIPUNCTURE: CPT | Performed by: EMERGENCY MEDICINE

## 2020-01-09 PROCEDURE — 80053 COMPREHEN METABOLIC PANEL: CPT | Performed by: EMERGENCY MEDICINE

## 2020-01-09 PROCEDURE — 85610 PROTHROMBIN TIME: CPT | Performed by: EMERGENCY MEDICINE

## 2020-01-09 PROCEDURE — 83918 ORGANIC ACIDS TOTAL QUANT: CPT | Performed by: STUDENT IN AN ORGANIZED HEALTH CARE EDUCATION/TRAINING PROGRAM

## 2020-01-09 PROCEDURE — 87081 CULTURE SCREEN ONLY: CPT | Performed by: STUDENT IN AN ORGANIZED HEALTH CARE EDUCATION/TRAINING PROGRAM

## 2020-01-09 PROCEDURE — 71046 X-RAY EXAM CHEST 2 VIEWS: CPT

## 2020-01-09 PROCEDURE — P9016 RBC LEUKOCYTES REDUCED: HCPCS

## 2020-01-09 PROCEDURE — 82140 ASSAY OF AMMONIA: CPT | Performed by: EMERGENCY MEDICINE

## 2020-01-09 PROCEDURE — 86850 RBC ANTIBODY SCREEN: CPT | Performed by: EMERGENCY MEDICINE

## 2020-01-09 PROCEDURE — 86901 BLOOD TYPING SEROLOGIC RH(D): CPT | Performed by: EMERGENCY MEDICINE

## 2020-01-09 PROCEDURE — 93005 ELECTROCARDIOGRAM TRACING: CPT

## 2020-01-09 PROCEDURE — 83540 ASSAY OF IRON: CPT | Performed by: STUDENT IN AN ORGANIZED HEALTH CARE EDUCATION/TRAINING PROGRAM

## 2020-01-09 PROCEDURE — 86920 COMPATIBILITY TEST SPIN: CPT

## 2020-01-09 PROCEDURE — 84484 ASSAY OF TROPONIN QUANT: CPT | Performed by: EMERGENCY MEDICINE

## 2020-01-09 PROCEDURE — 99223 1ST HOSP IP/OBS HIGH 75: CPT | Performed by: PSYCHIATRY & NEUROLOGY

## 2020-01-09 PROCEDURE — 85025 COMPLETE CBC W/AUTO DIFF WBC: CPT | Performed by: EMERGENCY MEDICINE

## 2020-01-09 PROCEDURE — 83690 ASSAY OF LIPASE: CPT | Performed by: EMERGENCY MEDICINE

## 2020-01-09 PROCEDURE — 99285 EMERGENCY DEPT VISIT HI MDM: CPT | Performed by: EMERGENCY MEDICINE

## 2020-01-09 PROCEDURE — 82948 REAGENT STRIP/BLOOD GLUCOSE: CPT

## 2020-01-09 PROCEDURE — 85730 THROMBOPLASTIN TIME PARTIAL: CPT | Performed by: EMERGENCY MEDICINE

## 2020-01-09 PROCEDURE — 82728 ASSAY OF FERRITIN: CPT | Performed by: STUDENT IN AN ORGANIZED HEALTH CARE EDUCATION/TRAINING PROGRAM

## 2020-01-09 PROCEDURE — 83550 IRON BINDING TEST: CPT | Performed by: STUDENT IN AN ORGANIZED HEALTH CARE EDUCATION/TRAINING PROGRAM

## 2020-01-09 PROCEDURE — 82746 ASSAY OF FOLIC ACID SERUM: CPT | Performed by: STUDENT IN AN ORGANIZED HEALTH CARE EDUCATION/TRAINING PROGRAM

## 2020-01-09 PROCEDURE — 99285 EMERGENCY DEPT VISIT HI MDM: CPT

## 2020-01-09 PROCEDURE — 70450 CT HEAD/BRAIN W/O DYE: CPT

## 2020-01-09 PROCEDURE — 84443 ASSAY THYROID STIM HORMONE: CPT | Performed by: EMERGENCY MEDICINE

## 2020-01-09 RX ORDER — NICOTINE 21 MG/24HR
1 PATCH, TRANSDERMAL 24 HOURS TRANSDERMAL DAILY
Status: DISCONTINUED | OUTPATIENT
Start: 2020-01-09 | End: 2020-01-10 | Stop reason: HOSPADM

## 2020-01-09 RX ORDER — HEPARIN SODIUM 5000 [USP'U]/ML
5000 INJECTION, SOLUTION INTRAVENOUS; SUBCUTANEOUS EVERY 8 HOURS SCHEDULED
Status: DISCONTINUED | OUTPATIENT
Start: 2020-01-09 | End: 2020-01-10 | Stop reason: HOSPADM

## 2020-01-09 RX ORDER — ACETAMINOPHEN 325 MG/1
650 TABLET ORAL EVERY 6 HOURS PRN
Status: DISCONTINUED | OUTPATIENT
Start: 2020-01-09 | End: 2020-01-10 | Stop reason: HOSPADM

## 2020-01-09 RX ORDER — ASPIRIN 325 MG
325 TABLET ORAL DAILY
Status: DISCONTINUED | OUTPATIENT
Start: 2020-01-09 | End: 2020-01-10 | Stop reason: HOSPADM

## 2020-01-09 RX ORDER — SODIUM CHLORIDE 9 MG/ML
100 INJECTION, SOLUTION INTRAVENOUS CONTINUOUS
Status: DISCONTINUED | OUTPATIENT
Start: 2020-01-09 | End: 2020-01-10 | Stop reason: HOSPADM

## 2020-01-09 RX ORDER — PRAVASTATIN SODIUM 80 MG/1
80 TABLET ORAL DAILY
Status: DISCONTINUED | OUTPATIENT
Start: 2020-01-10 | End: 2020-01-10

## 2020-01-09 RX ORDER — LISINOPRIL 20 MG/1
40 TABLET ORAL DAILY
Status: DISCONTINUED | OUTPATIENT
Start: 2020-01-10 | End: 2020-01-10 | Stop reason: HOSPADM

## 2020-01-09 RX ADMIN — SODIUM CHLORIDE 100 ML/HR: 0.9 INJECTION, SOLUTION INTRAVENOUS at 20:16

## 2020-01-09 RX ADMIN — ASPIRIN 325 MG: 325 TABLET, FILM COATED ORAL at 18:16

## 2020-01-09 RX ADMIN — HEPARIN SODIUM 5000 UNITS: 5000 INJECTION INTRAVENOUS; SUBCUTANEOUS at 18:16

## 2020-01-09 RX ADMIN — SODIUM CHLORIDE 1000 ML: 0.9 INJECTION, SOLUTION INTRAVENOUS at 11:23

## 2020-01-09 RX ADMIN — ACETAMINOPHEN 650 MG: 325 TABLET, FILM COATED ORAL at 18:16

## 2020-01-09 NOTE — H&P
H&P Exam - Kelley Benítez 66 y o  female MRN: [id number]    Unit/Bed#: [address] [place] [address] Encounter: [id number]      Assessment/Plan     Assessment:  Patient is a 12-year-old female with past medical history of diabetes, hypertension, hyperlipidemia, and arthritis who presents altered mental status that began the morning of admission  The patient is seen and evaluated at bedside and found to require inpatient admission  She will be admitted to [address] [place] [address]  Plan:  * Altered mental status  Assessment & Plan  Patient was also found to be at baseline 16 hours prior to admission  At baseline patient with functional to all ADLs  At time of admission patient is AAO to person and place only  HB 7 3 on admission  Ammonia within normal limits  Ethanol < 0 3  CT head:   No CT evidence of acute intracranial process  Chronic microangiopathy  TSH within normal limits    Altered mental status likely secondary to affects of chronic anemia  No focal deficits found on neurological exam, making TIA/stroke less likely, however expressive aphasia is suggestive  Patient is also outside of tPA window  Seizure unlikely, however given acuity and patient's symptoms of staring and not responding appropriately cannot rule out absence seizure  Possible source of infection  UA not resulted yet, although pt has no urinary complaints  Wheezes and crackles heard over bilateral lungs, however CXR showed NAD  · Will start aspirin 325 mg p o  q day  · Will order bedside speech and swallow and keep patient NPO, sips with meds for now  · Will follow-up B12, iron panel, reticulocyte count  · Will follow-up MRI head  · Will follow-up UDS  · Will follow-up UA  · Will consult Neurology for now  · Neurochecks q 4 hours  · Will order PT/OT evaluation       Acute kidney injury superimposed on CKD [facility])  Assessment & Plan  A creatinine on baseline was 1 7 elevated from baseline of approximately 1 3  GFR is 29 on admission  Prior GFR in January 2019 was 36, confirming diagnosis of CKD    ALEXANDREA could be present in the setting of hypoxic injury, dehydration or developing infection  Given elevated BUN and patient is history, dehydration most likely  · Will continue to monitor with daily BMP  · Will hold all nephrotoxic medications  · Will start normal saline at 100 cc/hour    Anemia  Assessment & Plan  Patient's hemoglobin was 7 3 admission     Previous hemoglobin in November 2017 was 11 1  MCV on admission was 76 suggesting possible iron deficiency anemia, infection, or malnutrition  No laura blood per rectal exam   Hemoccult done in ED was negative  Order was placed for 1 unit PRBC transfusion in the ED  Will order a 2nd unit PRBC as well as H&H to be drawn 2 hours post transfusion    · Will follow-up iron panel  · Will follow-up reticulocyte count  · Will follow-up Hemoccult x2  · Will defer GI consult for colonoscopy for now  · Will follow-up post transfusion H&H  · Will follow daily CBC  · Patient currently denies shortness of breath, diaphoresis, headache, nausea, dizziness  Will continue to follow clinically  Hypertension  Assessment & Plan  VS WNL for now  BP on admission 141/62    · Given that a CT likely secondary to dehydration and patient has slightly elevated blood pressure on admission, will continue ACE-inhibitor  · Will replace home quinapril to lisinopril 40 mg p o  Q day as quinapril is not on formulary  Arthritis  Assessment & Plan  Pt does not complain of pain at this time  Will order Tylenol 600 mg po q6h prn  · Will not give NSAID secondary to impaired renal function    History of tobacco abuse  Assessment & Plan  Patient has a 50 pack-year history of tobacco abuse, and she currently smokes 1 pack per day  Son who lives at home also is a current smoker  · Will  on tobacco cessation during this admission  · Will order nicotine patch          Hyperlipidemia  Assessment & Plan  Prior lipid panel in November 2017: Chol 204 Trig 190 HDL 58     · Will order lipid panel  · Continue home pravastatin 80 mg p o  q d    DMII (diabetes mellitus, type 2) ([facility])  Assessment & Plan  Lab Results   Component Value Date    HGBA1C 7 3 (H) 01/15/2019       Recent Labs     01/09/20  1052   POCGLU 121       Blood Sugar Average: Last 72 hrs:  (P) 121     · Last hemoglobin A1c in January 2019 was 7 3  PCOT glucose in ED was 121  · Will order HGB A1c  · Will hold home medications glimepiride 4 mg p o  Q d  and metformin 1000 mg p o  B i d  secondary to ALEXANDREA  · ISSA2  with Accu-Cheks t i d  And q h s  Combined forms of age-related cataract of left eye  Assessment & Plan  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/2019        History of Present Illness    Patient is a 64-year-old female with past medical history of diabetes, CKD, hypertension, hyperlipidemia, and arthritis who presents with altered mental status that began the morning of admission  Patient unable to appropriately answer all questions  History obtained from both son who resides patient as well as patient  Per patient's son who spoke to patient several times during the day yesterday,  patient was able to respond appropriately and was at her baseline  The patient did not wake up for breakfast at her usual time this morning  Patient's son went to her room to wake her from bed at 9:30 a m  Valerie Skipper At that time patient was able to sit up slightly but was unable to respond to questions appropriately or maintain appropriate eye contact  Patient is then attempted to pull patient out of bed but had difficulty doing so  Patient complained to sign a headache the night prior to admission  Son last spoke to patient at 5:00 p m  Night prior to admission  Patient does not recall this entire episode and is unable to say why she is here today    Per patient's son, pt did not have involuntary muscle movements, loss of bowel or bladder function, facial droop, fall, trauma, loss of consciousness  At baseline patient is fully independent to all ADLs  When questioned regarding fatigue or chest pain in the last 2 weeks patient stated that she does believe she has occasional fatigue and dizziness  At the time of exam patient denies chest pain, headache, diaphoresis, fever, chills, abdominal pain, dysuria, hematuria, difficulty with bowel movements  PCP: Oscar Bright, DO    Review of Systems   Constitutional: Positive for activity change  Negative for appetite change, chills, diaphoresis, fatigue, fever and unexpected weight change  HENT: Negative for hearing loss, rhinorrhea, sinus pressure, sinus pain and sore throat  Eyes: Negative for photophobia, pain and visual disturbance  Respiratory: Negative for cough, choking, chest tightness, shortness of breath and wheezing  Cardiovascular: Negative for chest pain, palpitations and leg swelling  Gastrointestinal: Negative  Genitourinary: Negative for decreased urine volume, difficulty urinating, dysuria, flank pain, hematuria and urgency  Neurological: Positive for dizziness, speech difficulty and weakness  Negative for tremors, seizures, syncope, facial asymmetry, light-headedness, numbness and headaches         Historical Information   Past Medical History:   Diagnosis Date    Arthritis     Capsulitis of foot     Last assessed 07/29/13    Diabetes ([facility] [address] )     type 2    Full dentures     Ganglion     Last assessed 07/29/13    Hearing decreased     Hyperlipidemia     Hypertension     Magnesium deficiency     Sciatica     Wears glasses      Past Surgical History:   Procedure Laterality Date    CATARACT EXTRACTION      COLONOSCOPY      DILATION AND CURETTAGE OF UTERUS      x 4    HAND SURGERY Left     fx repaired w/wrist bone    HYSTERECTOMY      left ovary remains    KNEE ARTHROSCOPY Left     LIPOMA RESECTION      removed from back and left buttock    MD XCAPSL CTRC RMVL INSJ IO LENS PROSTH W/O ECP Right 1/24/2019    Procedure: EXTRACTION EXTRACAPSULAR CATARACT PHACO INTRAOCULAR LENS (IOL); Surgeon: Nikunj Murry MD;  Location: [facility] MAIN OR;  Service: Ophthalmology    NV XCAPSL CTRC RMVL INSJ IO LENS PROSTH W/O ECP Left 2/14/2019    Procedure: EXTRACTION EXTRACAPSULAR CATARACT PHACO INTRAOCULAR LENS (IOL); Surgeon: Nikunj Murry MD;  Location: [facility] MAIN OR;  Service: Ophthalmology    TONSILLECTOMY      and adenoids    TUBAL LIGATION       Social History   Social History     Substance and Sexual Activity   Alcohol Use Yes    Frequency: Monthly or less    Drinks per session: 1 or 2    Binge frequency: Never    Comment: Rarely     Social History     Substance and Sexual Activity   Drug Use No     Social History     Tobacco Use   Smoking Status Current Every Day Smoker    Packs/day: 1 00    Years: 35 00    Pack years: 35 00   Smokeless Tobacco Never Used     Family History: non-contributory    Meds/Allergies   all medications and allergies reviewed  Allergies   Allergen Reactions    Aleve [Naproxen] Other (See Comments)     "weird" sensation entire body    Sulfa Antibiotics Rash       Objective   Vitals: Blood pressure 159/67, pulse 72, temperature 98 5 °F (36 9 °C), temperature source Oral, resp  rate 16, height 5' 1" (1 549 m), weight 69 1 kg (152 lb 5 4 oz), SpO2 98 %  No intake or output data in the 24 hours ending 01/09/20 1612    Invasive Devices     Peripheral Intravenous Line            Peripheral IV 01/09/20 Left Antecubital less than 1 day    Peripheral IV 01/09/20 Right Antecubital less than 1 day                Physical Exam   Constitutional: She appears well-developed and well-nourished  HENT:   Head: Normocephalic and atraumatic  Right Ear: External ear normal    Left Ear: External ear normal    Eyes: Pupils are equal, round, and reactive to light  Neck: Normal range of motion  Neck supple  Cardiovascular: Normal rate, regular rhythm and normal heart sounds  Pulmonary/Chest:   Minimal crackles and wheezes heard bilaterally  Worse over left side   Abdominal: Soft  Bowel sounds are normal  She exhibits no distension  There is no tenderness  There is no rebound and no guarding  No gross blood per rectum   Vitals reviewed        Lab Results:   Results for orders placed or performed during the hospital encounter of 01/09/20   CBC and differential   Result Value Ref Range    WBC 7 02 4 31 - 10 16 Thousand/uL    RBC 3 41 (L) 3 81 - 5 12 Million/uL    Hemoglobin 7 3 (L) 11 5 - 15 4 g/dL    Hematocrit 26 0 (L) 34 8 - 46 1 %    MCV 76 (L) 82 - 98 fL    MCH 21 4 (L) 26 8 - 34 3 pg    MCHC 28 1 (L) 31 4 - 37 4 g/dL    RDW 16 0 (H) 11 6 - 15 1 %    MPV 9 7 8 9 - 12 7 fL    Platelets 234 414 - 760 Thousands/uL    nRBC 0 /100 WBCs    Neutrophils Relative 78 (H) 43 - 75 %    Immat GRANS % 0 0 - 2 %    Lymphocytes Relative 13 (L) 14 - 44 %    Monocytes Relative 6 4 - 12 %    Eosinophils Relative 3 0 - 6 %    Basophils Relative 0 0 - 1 %    Neutrophils Absolute 5 37 1 85 - 7 62 Thousands/µL    Immature Grans Absolute 0 03 0 00 - 0 20 Thousand/uL    Lymphocytes Absolute 0 94 0 60 - 4 47 Thousands/µL    Monocytes Absolute 0 41 0 17 - 1 22 Thousand/µL    Eosinophils Absolute 0 24 0 00 - 0 61 Thousand/µL    Basophils Absolute 0 03 0 00 - 0 10 Thousands/µL   Protime-INR   Result Value Ref Range    Protime 9 4 (L) 9 8 - 12 0 seconds    INR 0 88 (L) 0 91 - 1 09   APTT   Result Value Ref Range    PTT 21 (L) 25 - 32 seconds   Comprehensive metabolic panel   Result Value Ref Range    Sodium 138 136 - 145 mmol/L    Potassium 5 2 3 5 - 5 3 mmol/L    Chloride 104 100 - 108 mmol/L    CO2 27 21 - 32 mmol/L    ANION GAP 7 4 - 13 mmol/L    BUN 31 (H) 5 - 25 mg/dL    Creatinine 1 70 (H) 0 60 - 1 30 mg/dL    Glucose 131 65 - 140 mg/dL    Calcium 9 9 8 3 - 10 1 mg/dL    AST 14 5 - 45 U/L    ALT 19 12 - 78 U/L    Alkaline Phosphatase 64 46 - 116 U/L    Total Protein 7 0 6 4 - 8 2 g/dL    Albumin 3 6 3 5 - 5 0 g/dL    Total Bilirubin 0 50 0 20 - 1 00 mg/dL    eGFR 29 ml/min/1 73sq m   TSH   Result Value Ref Range    TSH 3RD GENERATON 0 865 0 358 - 3 740 uIU/mL   Ammonia   Result Value Ref Range    Ammonia <10 (L) 11 - 35 umol/L   Magnesium   Result Value Ref Range    Magnesium 2 3 1 6 - 2 6 mg/dL   Lipase   Result Value Ref Range    Lipase 162 73 - 393 u/L   Troponin I   Result Value Ref Range    Troponin I <0 02 <=0 04 ng/mL   Ethanol   Result Value Ref Range    Ethanol Lvl <3 0 - 3 mg/dL   Type and screen   Result Value Ref Range    ABO Grouping A     Rh Factor Positive     Antibody Screen Negative     Specimen Expiration Date [id number]    Prepare Leukoreduced RBC:Has consent been obtained? Yes, 2 Units   Result Value Ref Range    Unit Product Code E5034Z08     Unit Number [id number]     Unit ABO A     Unit [facility] NEG     Crossmatch Compatible     Unit Dispense Status Crossmatched     Unit Product Code M2249L53     Unit Number [id number]     Unit ABO A     Unit RH NEG     Crossmatch Compatible     Unit Dispense Status Issued    ABO/Rh   Result Value Ref Range    ABO Grouping A     Rh Factor Positive    Fingerstick Glucose (POCT)   Result Value Ref Range    POC Glucose 121 65 - 140 mg/dl       Imaging: I have personally reviewed pertinent reports  EKG, Pathology, and Other Studies: I have personally reviewed pertinent reports  Code Status: Level 1 - Full Code    Counseling / Coordination of Care  Total floor / unit time spent today 30 minutes

## 2020-01-09 NOTE — ASSESSMENT & PLAN NOTE
Patient's hemoglobin was 7 3 admission     Previous hemoglobin in November 2017 was 11 1  MCV on admission was 76 suggesting possible iron deficiency anemia, infection, or malnutrition  No laura blood per rectal exam   Hemoccult done in ED was negative  Order was placed for 1 unit PRBC transfusion in the ED  Will order a 2nd unit PRBC as well as H&H to be drawn 2 hours post transfusion    · HB increased to 10 from 7 s/p 2 U leukoreduced PRBCs  · Fe panel showed decreased Fe and Ferritin, indicating iron deficiency anemia  · Immature retic count elevated indicating chronic increase in RBC production  · Patient likely had FE deficiency anemia in the setting of CKD, will give IV Fe x 1 now  · Will DC patient on PO Fe  · Will follow-up Hemoccult x2  · Will defer GI consult for colonoscopy for now  · Will follow daily CBC  · Patient currently denies shortness of breath, diaphoresis, headache, nausea, dizziness  Will continue to follow clinically

## 2020-01-09 NOTE — ASSESSMENT & PLAN NOTE
Prior lipid panel in November 2017: Chol 204 Trig 190 HDL 58     · lipid panel results (1/10):  Chol 150 Trig 108  HDL 57 LDL 71  · Changed  home pravastatin 80 mg p o  Q d to atorvastatin 80 mg po qd  · Will DC patient on atorvastatin 40 mg po qd

## 2020-01-09 NOTE — ASSESSMENT & PLAN NOTE
VS WNL for now  BP on admission 141/62  Elevated Bps overnight, currently stable  · Given that a CT likely secondary to dehydration and patient has slightly elevated blood pressure on admission, will continue ACE-inhibitor  · Will replace home quinapril with  lisinopril 40 mg p o  Q day as quinapril is not on formulary

## 2020-01-09 NOTE — ASSESSMENT & PLAN NOTE
Pt does not complain of pain at this time  Will order Tylenol 600 mg po q6h prn      · Will not give NSAID secondary to impaired renal function

## 2020-01-09 NOTE — ED PROVIDER NOTES
History  Chief Complaint   Patient presents with    Altered Mental Status     son reports pt last normal 200 yesterday - found pt with weakness and difficulty "finding words" this am      Headache       History provided by:  Patient   used: No      Patient brought by family for report of generalized weakness and difficulty finding words  Patient was last known normal last evening  No recent falls or trauma  Denies any recent fever, cough  Reports diabetes  Denies any pain, shortness of breath  Denies any nausea, vomiting, diarrhea  No aggravating or alleviating symptoms  Per family, difficulty ambulating at home  Prior to Admission Medications   Prescriptions Last Dose Informant Patient Reported?  Taking?   acetaminophen (TYLENOL) 500 mg tablet   Yes No   Sig: Take 500 mg by mouth every 6 (six) hours as needed for mild pain   fexofenadine (ALLEGRA) 180 MG tablet   Yes No   Sig: Take 180 mg by mouth as needed   glimepiride (AMARYL) 4 mg tablet   No No   Sig: TAKE 1 TABLET BY MOUTH  DAILY WITH BREAKFAST   ibuprofen (MOTRIN) 200 mg tablet   Yes No   Sig: Take 600 mg by mouth every 6 (six) hours as needed for mild pain   magnesium oxide (MAG-OX) 400 mg tablet   No No   Sig: TAKE 1 TABLET BY MOUTH TWICE A DAY   magnesium oxide (MAG-OX) 400 mg tablet   No No   Sig: TAKE 1 TABLET BY MOUTH TWICE A DAY   metFORMIN (GLUCOPHAGE) 1000 MG tablet   No No   Sig: TAKE 1 TABLET BY MOUTH  TWICE A DAY   pravastatin (PRAVACHOL) 80 mg tablet   No No   Sig: TAKE 1 TABLET BY MOUTH  DAILY   quinapril (ACCUPRIL) 40 MG tablet   No No   Sig: TAKE 1 TABLET BY MOUTH  DAILY AT BEDTIME      Facility-Administered Medications: None       Past Medical History:   Diagnosis Date    Arthritis     Capsulitis of foot     Last assessed 07/29/13    Diabetes (HCC)     type 2    Full dentures     Ganglion     Last assessed 07/29/13    Hearing decreased     Hyperlipidemia     Hypertension     Magnesium deficiency  Sciatica     Wears glasses        Past Surgical History:   Procedure Laterality Date    CATARACT EXTRACTION      COLONOSCOPY      DILATION AND CURETTAGE OF UTERUS      x 4    HAND SURGERY Left     fx repaired w/wrist bone    HYSTERECTOMY      left ovary remains    KNEE ARTHROSCOPY Left     LIPOMA RESECTION      removed from back and left buttock    AZ XCAPSL CTRC RMVL INSJ IO LENS PROSTH W/O ECP Right 1/24/2019    Procedure: EXTRACTION EXTRACAPSULAR CATARACT PHACO INTRAOCULAR LENS (IOL); Surgeon: Narayan Martines MD;  Location: [facility] MAIN OR;  Service: Ophthalmology    AZ XCAPSL CTRC RMVL INSJ IO LENS PROSTH W/O ECP Left 2/14/2019    Procedure: EXTRACTION EXTRACAPSULAR CATARACT PHACO INTRAOCULAR LENS (IOL); Surgeon: Narayan Martines MD;  Location: [facility] MAIN OR;  Service: Ophthalmology    TONSILLECTOMY      and adenoids    TUBAL LIGATION         Family History   Problem Relation Age of Onset    Leukemia Father     Early death Father 39        leukemia    Stomach cancer Maternal Grandfather     Diabetes Paternal Grandfather     Diabetes Sister         insulin dependent    Diabetes Brother         insulin    Diabetes Sister         insulin    Diabetes Sister         insulin     I have reviewed and agree with the history as documented  Social History     Tobacco Use    Smoking status: Current Every Day Smoker     Packs/day: 0 50     Years: 35 00     Pack years: 17 50    Smokeless tobacco: Never Used   Substance Use Topics    Alcohol use: Yes     Comment: Rarely    Drug use: No        Review of Systems   Constitutional: Positive for activity change and fatigue  Negative for appetite change, chills, diaphoresis and fever  HENT: Negative for congestion, dental problem, ear discharge, facial swelling, nosebleeds, rhinorrhea, sinus pressure and trouble swallowing  Eyes: Negative for photophobia, discharge, itching and visual disturbance     Respiratory: Negative for choking, chest tightness and shortness of breath  Cardiovascular: Negative for chest pain, palpitations and leg swelling  Gastrointestinal: Negative for abdominal distention, abdominal pain, constipation, diarrhea, nausea and vomiting  Endocrine: Negative for polydipsia and polyphagia  Genitourinary: Negative for decreased urine volume, difficulty urinating, dysuria, flank pain, frequency, hematuria, vaginal bleeding and vaginal discharge  Musculoskeletal: Negative for back pain, gait problem, joint swelling, neck pain and neck stiffness  Skin: Negative for color change and rash  Neurological: Positive for weakness  Negative for dizziness, facial asymmetry, speech difficulty and light-headedness  Psychiatric/Behavioral: Negative for agitation and behavioral problems  The patient is not nervous/anxious and is not hyperactive  All other systems reviewed and are negative  Physical Exam  Physical Exam   Constitutional: She is oriented to person, place, and time  She appears well-developed and well-nourished  No distress  HENT:   Head: Normocephalic and atraumatic  Eyes: Pupils are equal, round, and reactive to light  EOM are normal    Neck: Normal range of motion  Neck supple  Cardiovascular: Normal rate, regular rhythm and normal heart sounds  No murmur heard  Pulmonary/Chest: Effort normal and breath sounds normal  No respiratory distress  She has no wheezes  She has no rales  Abdominal: Soft  Bowel sounds are normal  She exhibits no distension  There is no tenderness  There is no rebound and no guarding  Rectal exam with no gross blood   Musculoskeletal: Normal range of motion  She exhibits no edema or deformity  Lymphadenopathy:     She has no cervical adenopathy  Neurological: She is alert and oriented to person, place, and time  No cranial nerve deficit  She exhibits normal muscle tone  Coordination normal    Alert, oriented  Slowed mentation  No focal neurologic deficits    Normal strength and sensation  No coordination deficits  Skin: Capillary refill takes less than 2 seconds  No rash noted  No erythema  There is pallor  Psychiatric: She has a normal mood and affect  Her behavior is normal    Nursing note and vitals reviewed  Vital Signs  ED Triage Vitals   Temperature Pulse Respirations Blood Pressure SpO2   01/09/20 1050 01/09/20 1050 01/09/20 1050 01/09/20 1052 01/09/20 1050   (!) 96 8 °F (36 °C) 71 18 144/63 97 %      Temp Source Heart Rate Source Patient Position - Orthostatic VS BP Location FiO2 (%)   01/09/20 1050 01/09/20 1050 01/09/20 1052 01/09/20 1052 --   Tympanic Monitor Lying Right arm       Pain Score       01/09/20 1050       No Pain           Vitals:    01/09/20 1100 01/09/20 1212 01/09/20 1215 01/09/20 1221   BP: 144/63 142/62 163/70 141/62   Pulse: 70 70 71 84   Patient Position - Orthostatic VS:  Lying - Orthostatic VS Sitting - Orthostatic VS Standing - Orthostatic VS         Visual Acuity  Visual Acuity      Most Recent Value   L Pupil Size (mm)  2   R Pupil Size (mm)  2          ED Medications  Medications   sodium chloride 0 9 % bolus 1,000 mL (1,000 mL Intravenous New Bag 1/9/20 1123)       Diagnostic Studies  Results Reviewed     Procedure Component Value Units Date/Time    TSH [[id number]]  (Normal) Collected:  01/09/20 1114    Lab Status:  Final result Specimen:  Blood from Arm, Right Updated:  01/09/20 1214     TSH 3RD GENERATON 0 865 uIU/mL     Narrative:       Patients undergoing fluorescein dye angiography may retain small amounts of fluorescein in the body for 48-72 hours post procedure  Samples containing fluorescein can produce falsely depressed TSH values  If the patient had this procedure,a specimen should be resubmitted post fluorescein clearance        Magnesium [[id number]]  (Normal) Collected:  01/09/20 1114    Lab Status:  Final result Specimen:  Blood from Arm, Right Updated:  01/09/20 1214     Magnesium 2 3 mg/dL     Lipase [[id number]]  (Normal) Collected:  01/09/20 1114    Lab Status:  Final result Specimen:  Blood from Arm, Right Updated:  01/09/20 1214     Lipase 162 u/L     Troponin I [[id number]]  (Normal) Collected:  01/09/20 1114    Lab Status:  Final result Specimen:  Blood from Arm, Right Updated:  01/09/20 1211     Troponin I <0 02 ng/mL     Comprehensive metabolic panel [[id number]]  (Abnormal) Collected:  01/09/20 1114    Lab Status:  Final result Specimen:  Blood from Arm, Right Updated:  01/09/20 1204     Sodium 138 mmol/L      Potassium 5 2 mmol/L      Chloride 104 mmol/L      CO2 27 mmol/L      ANION GAP 7 mmol/L      BUN 31 mg/dL      Creatinine 1 70 mg/dL      Glucose 131 mg/dL      Calcium 9 9 mg/dL      AST 14 U/L      ALT 19 U/L      Alkaline Phosphatase 64 U/L      Total Protein 7 0 g/dL      Albumin 3 6 g/dL      Total Bilirubin 0 50 mg/dL      eGFR 29 ml/min/1 73sq m     Narrative:       [place] guidelines for Chronic Kidney Disease (CKD):     Stage 1 with normal or high GFR (GFR > 90 mL/min/1 73 square meters)    Stage 2 Mild CKD (GFR = 60-89 mL/min/1 73 square meters)    Stage 3A Moderate CKD (GFR = 45-59 mL/min/1 73 square meters)    Stage 3B Moderate CKD (GFR = 30-44 mL/min/1 73 square meters)    Stage 4 Severe CKD (GFR = 15-29 mL/min/1 73 square meters)    Stage 5 End Stage CKD (GFR <15 mL/min/1 73 square meters)  Note: GFR calculation is accurate only with a steady state creatinine    Protime-INR [[id number]]  (Abnormal) Collected:  01/09/20 1114    Lab Status:  Final result Specimen:  Blood from Arm, Right Updated:  01/09/20 1159     Protime 9 4 seconds      INR 0 88    APTT [[id number]]  (Abnormal) Collected:  01/09/20 1114    Lab Status:  Final result Specimen:  Blood from Arm, Right Updated:  01/09/20 1159     PTT 21 seconds     Ethanol [[id number]]  (Normal) Collected:  01/09/20 1114    Lab Status:  Final result Specimen:  Blood from Arm, Right Updated:  01/09/20 1156     Ethanol Lvl <3 mg/dL Ammonia [[id number]]  (Abnormal) Collected:  01/09/20 1114    Lab Status:  Final result Specimen:  Blood from Arm, Right Updated:  01/09/20 1149     Ammonia <10 umol/L     CBC and differential [[id number]]  (Abnormal) Collected:  01/09/20 1114    Lab Status:  Final result Specimen:  Blood from Arm, Right Updated:  01/09/20 1141     WBC 7 02 Thousand/uL      RBC 3 41 Million/uL      Hemoglobin 7 3 g/dL      Hematocrit 26 0 %      MCV 76 fL      MCH 21 4 pg      MCHC 28 1 g/dL      RDW 16 0 %      MPV 9 7 fL      Platelets 781 Thousands/uL      nRBC 0 /100 WBCs      Neutrophils Relative 78 %      Immat GRANS % 0 %      Lymphocytes Relative 13 %      Monocytes Relative 6 %      Eosinophils Relative 3 %      Basophils Relative 0 %      Neutrophils Absolute 5 37 Thousands/µL      Immature Grans Absolute 0 03 Thousand/uL      Lymphocytes Absolute 0 94 Thousands/µL      Monocytes Absolute 0 41 Thousand/µL      Eosinophils Absolute 0 24 Thousand/µL      Basophils Absolute 0 03 Thousands/µL     Fingerstick Glucose (POCT) [[id number]]  (Normal) Collected:  01/09/20 1052    Lab Status:  Final result Updated:  01/09/20 1107     POC Glucose 121 mg/dl     Rapid drug screen, urine [[id number]]     Lab Status:  No result Specimen:  Urine     UA w Reflex to Microscopic w Reflex to Culture [[id number]]     Lab Status:  No result Specimen:  Urine                  CT head wo contrast   Final Result by Yovani Benítez MD (01/09 1247)      No CT evidence of acute intracranial process  Chronic microangiopathy                          Workstation performed: [id number]         XR chest 2 views    (Results Pending)              Procedures  ECG 12 Lead Documentation Only  Date/Time: 1/9/2020 10:53 AM  Performed by: Karla Larsen MD  Authorized by: Karla Larsen MD     Indications / Diagnosis:  Generalized weakness  ECG reviewed by me, the ED Provider: yes    Patient location:  ED  Interpretation:     Interpretation: normal    Rate: ECG rate:  72    ECG rate assessment: normal    Rhythm:     Rhythm: sinus rhythm    Ectopy:     Ectopy: none    QRS:     QRS axis:  Normal    QRS intervals:  Normal  ST segments:     ST segments:  Normal  T waves:     T waves: normal    CriticalCare Time  Performed by: Eloisa Kehr, MD  Authorized by: Eloisa Kehr, MD     Critical care provider statement:     Critical care time (minutes):  35    Critical care time was exclusive of:  Separately billable procedures and treating other patients and teaching time    Critical care was necessary to treat or prevent imminent or life-threatening deterioration of the following conditions:  Circulatory failure    Critical care was time spent personally by me on the following activities:  Ordering and review of laboratory studies, ordering and review of radiographic studies, re-evaluation of patient's condition and examination of patient    I assumed direction of critical care for this patient from another provider in my specialty: no    Comments:      New onset symptomatic anemia, generalized weakness, need for blood transfusion in emergency department  ED Course                               MDM  Number of Diagnoses or Management Options  Altered mental status: new and requires workup  Anemia: new and requires workup     Amount and/or Complexity of Data Reviewed  Clinical lab tests: ordered and reviewed  Tests in the radiology section of CPT®: ordered and reviewed  Tests in the medicine section of CPT®: ordered and reviewed  Discuss the patient with other providers: yes    Risk of Complications, Morbidity, and/or Mortality  Presenting problems: high  Diagnostic procedures: moderate  Management options: high  General comments: Generalized weakness  History and presentation not consistent with acute stroke  Out of tPA window regardless  CT head with microangiopathic disease  Laboratory studies concerning for new onset anemia  Symptomatic anemia    No physical exam evidence of lower GI bleed  Will transfuse 1 unit packed red blood cells and admit to hospital for further evaluation of generalized weakness, new onset anemia  Patient Progress  Patient progress: improved        Disposition  Final diagnoses: Altered mental status   Anemia     Time reflects when diagnosis was documented in both MDM as applicable and the Disposition within this note     Time User Action Codes Description Comment    1/9/2020 12:54 PM Leticia Lighter Add [R41 82] Altered mental status     1/9/2020 12:54 PM Leticia Lighter Add [D64 9] Anemia       ED Disposition     ED Disposition Condition Date/Time Comment    Admit Stable Thu Jan 9, 2020 12:54 PM Case was discussed with Dr Charly Quevedo and the patient's admission status was agreed to be Admission Status: inpatient status to the service of Dr Charly Silvestre    None         Patient's Medications   Discharge Prescriptions    No medications on file     No discharge procedures on file      ED Provider  Electronically Signed by           Mayra Smart MD  01/09/20 [phone]

## 2020-01-09 NOTE — ASSESSMENT & PLAN NOTE
Patient has a 50 pack-year history of tobacco abuse, and she currently smokes 1 pack per day  Son who lives at home also is a current smoker  · Will  on tobacco cessation during this admission  · Will order nicotine patch

## 2020-01-09 NOTE — ASSESSMENT & PLAN NOTE
A creatinine on baseline was 1 7 elevated from baseline of approximately 1 3  GFR is 29 on admission  Prior GFR in January 2019 was 36, confirming diagnosis of CKD    ALEXANDREA could be present in the setting of hypoxic injury, dehydration or developing infection  Given elevated BUN and patient's history, dehydration most likely      · Started normal saline at 100 cc/hour  - ALEXANDREA resolved ; Cr 1 2 today  · Will continue to monitor with daily BMP  · Will hold all nephrotoxic medications

## 2020-01-09 NOTE — ASSESSMENT & PLAN NOTE
Patient was also found to be at baseline 16 hours prior to admission  At baseline patient with functional to all ADLs  At time of admission patient is AAO to person and place only  HB 7 3 on admission  Ammonia within normal limits  Ethanol < 0 3  CT head:   No CT evidence of acute intracranial process  Chronic microangiopathy  TSH within normal limits    Altered mental status likely secondary to affects of chronic anemia  No focal deficits found on neurological exam, making TIA/stroke less likely, however expressive aphasia is suggestive  Patient is also outside of tPA window  Seizure unlikely, however given acuity and patient's symptoms of staring and not responding appropriately cannot rule out absence seizure  Possible source of infection  UA not resulted yet, although pt has no urinary complaints  Wheezes and crackles heard over bilateral lungs, however CXR showed NAD  · MRI head confirmed L GABO infarct  · Will start aspirin 325 mg p o  Q day, plavix dosing dose 300 mg x1 and plavix 75 mg po during admission  · Neurology consult: recommend Plavix during admission with loading dose 300mg followed by 75 mg po qd, and DC patient on Atorvastatin 40 mg po qd and  mg po qd (NO plavix on DC)  · Pt passed bedside speech/swallow   Ordered carb control level 2 diet  · Will follow-up B12, Folate WNL  · Will follow-up UDS  · UA positive for leuks - will follow-up Urine Micro  · Neurochecks q 4 hours  · Will fu PT/OT evaluation

## 2020-01-09 NOTE — ASSESSMENT & PLAN NOTE
Lab Results   Component Value Date    HGBA1C 7 3 (H) 01/15/2019       Recent Labs     01/09/20  1052   POCGLU 121       Blood Sugar Average: Last 72 hrs:  (P) 121     · Last hemoglobin A1c in January 2019 was 7 3  Glucose controlled so far  · FU  HGB A1c  · Will hold home medications glimepiride 4 mg p o  Q d  and metformin 1000 mg p o  B i d  secondary to ALEXANDREA  · ISSA2  with Accu-Cheks t i d  And q h s

## 2020-01-10 ENCOUNTER — APPOINTMENT (INPATIENT)
Dept: RADIOLOGY | Facility: HOSPITAL | Age: 75
DRG: 065 | End: 2020-01-10
Payer: COMMERCIAL

## 2020-01-10 VITALS
OXYGEN SATURATION: 93 % | RESPIRATION RATE: 17 BRPM | TEMPERATURE: 99.2 F | WEIGHT: 152.1 LBS | BODY MASS INDEX: 28.72 KG/M2 | HEIGHT: 61 IN | HEART RATE: 57 BPM | SYSTOLIC BLOOD PRESSURE: 147 MMHG | DIASTOLIC BLOOD PRESSURE: 64 MMHG

## 2020-01-10 DIAGNOSIS — I63.9 STROKE (HCC): Primary | ICD-10-CM

## 2020-01-10 DIAGNOSIS — E78.5 HYPERLIPIDEMIA: ICD-10-CM

## 2020-01-10 DIAGNOSIS — I63.9 STROKE (CEREBRUM) (HCC): ICD-10-CM

## 2020-01-10 DIAGNOSIS — D64.9 ANEMIA: ICD-10-CM

## 2020-01-10 DIAGNOSIS — Z87.891 HISTORY OF TOBACCO ABUSE: Primary | ICD-10-CM

## 2020-01-10 DIAGNOSIS — N39.0 UTI (URINARY TRACT INFECTION): ICD-10-CM

## 2020-01-10 LAB
ABO GROUP BLD BPU: NORMAL
ALBUMIN SERPL BCP-MCNC: 3.2 G/DL (ref 3.5–5)
ALP SERPL-CCNC: 60 U/L (ref 46–116)
ALT SERPL W P-5'-P-CCNC: 16 U/L (ref 12–78)
AMPHETAMINES SERPL QL SCN: NEGATIVE
ANION GAP SERPL CALCULATED.3IONS-SCNC: 7 MMOL/L (ref 4–13)
AST SERPL W P-5'-P-CCNC: 15 U/L (ref 5–45)
ATRIAL RATE: 72 BPM
BACTERIA UR QL AUTO: ABNORMAL /HPF
BARBITURATES UR QL: NEGATIVE
BENZODIAZ UR QL: NEGATIVE
BILIRUB SERPL-MCNC: 1.9 MG/DL (ref 0.2–1)
BILIRUB UR QL STRIP: NEGATIVE
BPU ID: NORMAL
BUN SERPL-MCNC: 22 MG/DL (ref 5–25)
CALCIUM SERPL-MCNC: 9 MG/DL (ref 8.3–10.1)
CHLORIDE SERPL-SCNC: 107 MMOL/L (ref 100–108)
CLARITY UR: ABNORMAL
CO2 SERPL-SCNC: 26 MMOL/L (ref 21–32)
COCAINE UR QL: NEGATIVE
COLOR UR: ABNORMAL
CREAT SERPL-MCNC: 1.29 MG/DL (ref 0.6–1.3)
CROSSMATCH: NORMAL
ERYTHROCYTE [DISTWIDTH] IN BLOOD BY AUTOMATED COUNT: 15.7 % (ref 11.6–15.1)
EST. AVERAGE GLUCOSE BLD GHB EST-MCNC: 137 MG/DL
GFR SERPL CREATININE-BSD FRML MDRD: 40 ML/MIN/1.73SQ M
GLUCOSE SERPL-MCNC: 242 MG/DL (ref 65–140)
GLUCOSE SERPL-MCNC: 67 MG/DL (ref 65–140)
GLUCOSE SERPL-MCNC: 72 MG/DL (ref 65–140)
GLUCOSE UR STRIP-MCNC: NEGATIVE MG/DL
HBA1C MFR BLD: 6.4 % (ref 4.2–6.3)
HCT VFR BLD AUTO: 32.7 % (ref 34.8–46.1)
HGB BLD-MCNC: 10 G/DL (ref 11.5–15.4)
HGB UR QL STRIP.AUTO: NEGATIVE
KETONES UR STRIP-MCNC: NEGATIVE MG/DL
LEUKOCYTE ESTERASE UR QL STRIP: ABNORMAL
MAGNESIUM SERPL-MCNC: 2.1 MG/DL (ref 1.6–2.6)
MCH RBC QN AUTO: 24 PG (ref 26.8–34.3)
MCHC RBC AUTO-ENTMCNC: 30.6 G/DL (ref 31.4–37.4)
MCV RBC AUTO: 79 FL (ref 82–98)
METHADONE UR QL: NEGATIVE
NITRITE UR QL STRIP: NEGATIVE
NON-SQ EPI CELLS URNS QL MICRO: ABNORMAL /HPF
OPIATES UR QL SCN: NEGATIVE
P AXIS: 14 DEGREES
PCP UR QL: NEGATIVE
PH UR STRIP.AUTO: 6 [PH]
PHOSPHATE SERPL-MCNC: 2.9 MG/DL (ref 2.3–4.1)
PLATELET # BLD AUTO: 312 THOUSANDS/UL (ref 149–390)
PMV BLD AUTO: 9.7 FL (ref 8.9–12.7)
POTASSIUM SERPL-SCNC: 4.5 MMOL/L (ref 3.5–5.3)
PR INTERVAL: 132 MS
PROT SERPL-MCNC: 6.1 G/DL (ref 6.4–8.2)
PROT UR STRIP-MCNC: NEGATIVE MG/DL
QRS AXIS: -27 DEGREES
QRSD INTERVAL: 86 MS
QT INTERVAL: 398 MS
QTC INTERVAL: 435 MS
RBC # BLD AUTO: 4.16 MILLION/UL (ref 3.81–5.12)
RBC #/AREA URNS AUTO: ABNORMAL /HPF
SODIUM SERPL-SCNC: 140 MMOL/L (ref 136–145)
SP GR UR STRIP.AUTO: 1.01 (ref 1–1.03)
T WAVE AXIS: 48 DEGREES
THC UR QL: NEGATIVE
UNIT DISPENSE STATUS: NORMAL
UNIT PRODUCT CODE: NORMAL
UNIT RH: NORMAL
UROBILINOGEN UR QL STRIP.AUTO: 0.2 E.U./DL
VENTRICULAR RATE: 72 BPM
WBC # BLD AUTO: 6.81 THOUSAND/UL (ref 4.31–10.16)
WBC #/AREA URNS AUTO: ABNORMAL /HPF

## 2020-01-10 PROCEDURE — 84100 ASSAY OF PHOSPHORUS: CPT | Performed by: STUDENT IN AN ORGANIZED HEALTH CARE EDUCATION/TRAINING PROGRAM

## 2020-01-10 PROCEDURE — 70551 MRI BRAIN STEM W/O DYE: CPT

## 2020-01-10 PROCEDURE — 97110 THERAPEUTIC EXERCISES: CPT

## 2020-01-10 PROCEDURE — 97530 THERAPEUTIC ACTIVITIES: CPT

## 2020-01-10 PROCEDURE — 82948 REAGENT STRIP/BLOOD GLUCOSE: CPT

## 2020-01-10 PROCEDURE — 97129 THER IVNTJ 1ST 15 MIN: CPT

## 2020-01-10 PROCEDURE — NC001 PR NO CHARGE: Performed by: FAMILY MEDICINE

## 2020-01-10 PROCEDURE — 85027 COMPLETE CBC AUTOMATED: CPT | Performed by: STUDENT IN AN ORGANIZED HEALTH CARE EDUCATION/TRAINING PROGRAM

## 2020-01-10 PROCEDURE — 81001 URINALYSIS AUTO W/SCOPE: CPT | Performed by: STUDENT IN AN ORGANIZED HEALTH CARE EDUCATION/TRAINING PROGRAM

## 2020-01-10 PROCEDURE — 93010 ELECTROCARDIOGRAM REPORT: CPT | Performed by: INTERNAL MEDICINE

## 2020-01-10 PROCEDURE — 30233N1 TRANSFUSION OF NONAUTOLOGOUS RED BLOOD CELLS INTO PERIPHERAL VEIN, PERCUTANEOUS APPROACH: ICD-10-PCS | Performed by: FAMILY MEDICINE

## 2020-01-10 PROCEDURE — 83735 ASSAY OF MAGNESIUM: CPT | Performed by: STUDENT IN AN ORGANIZED HEALTH CARE EDUCATION/TRAINING PROGRAM

## 2020-01-10 PROCEDURE — 80053 COMPREHEN METABOLIC PANEL: CPT | Performed by: STUDENT IN AN ORGANIZED HEALTH CARE EDUCATION/TRAINING PROGRAM

## 2020-01-10 PROCEDURE — 80307 DRUG TEST PRSMV CHEM ANLYZR: CPT | Performed by: STUDENT IN AN ORGANIZED HEALTH CARE EDUCATION/TRAINING PROGRAM

## 2020-01-10 PROCEDURE — 97167 OT EVAL HIGH COMPLEX 60 MIN: CPT

## 2020-01-10 PROCEDURE — 97163 PT EVAL HIGH COMPLEX 45 MIN: CPT

## 2020-01-10 PROCEDURE — 83036 HEMOGLOBIN GLYCOSYLATED A1C: CPT | Performed by: STUDENT IN AN ORGANIZED HEALTH CARE EDUCATION/TRAINING PROGRAM

## 2020-01-10 PROCEDURE — 99238 HOSP IP/OBS DSCHRG MGMT 30/<: CPT | Performed by: FAMILY MEDICINE

## 2020-01-10 RX ORDER — ASPIRIN 325 MG
325 TABLET ORAL DAILY
Qty: 90 TABLET | Refills: 3 | Status: SHIPPED | OUTPATIENT
Start: 2020-01-11 | End: 2020-02-28 | Stop reason: ALTCHOICE

## 2020-01-10 RX ORDER — NICOTINE 21 MG/24HR
1 PATCH, TRANSDERMAL 24 HOURS TRANSDERMAL EVERY 24 HOURS
Qty: 28 PATCH | Refills: 0 | Status: SHIPPED | OUTPATIENT
Start: 2020-01-10 | End: 2020-02-17

## 2020-01-10 RX ORDER — ATORVASTATIN CALCIUM 40 MG/1
40 TABLET, FILM COATED ORAL DAILY
Qty: 90 TABLET | Refills: 3 | Status: SHIPPED | OUTPATIENT
Start: 2020-01-10 | End: 2020-01-10 | Stop reason: SDUPTHER

## 2020-01-10 RX ORDER — CLOPIDOGREL BISULFATE 75 MG/1
75 TABLET ORAL DAILY
Status: DISCONTINUED | OUTPATIENT
Start: 2020-01-11 | End: 2020-01-10 | Stop reason: HOSPADM

## 2020-01-10 RX ORDER — CLOPIDOGREL BISULFATE 75 MG/1
300 TABLET ORAL DAILY
Status: DISCONTINUED | OUTPATIENT
Start: 2020-01-10 | End: 2020-01-10

## 2020-01-10 RX ORDER — CEPHALEXIN 500 MG/1
500 CAPSULE ORAL 3 TIMES DAILY
Qty: 21 CAPSULE | Refills: 0 | Status: SHIPPED | OUTPATIENT
Start: 2020-01-10 | End: 2020-01-10 | Stop reason: SDUPTHER

## 2020-01-10 RX ORDER — ASPIRIN 325 MG
325 TABLET ORAL DAILY
Qty: 90 TABLET | Refills: 3 | Status: SHIPPED | OUTPATIENT
Start: 2020-01-11 | End: 2020-01-10

## 2020-01-10 RX ORDER — ATORVASTATIN CALCIUM 40 MG/1
40 TABLET, FILM COATED ORAL DAILY
Qty: 90 TABLET | Refills: 3 | Status: CANCELLED | OUTPATIENT
Start: 2020-01-10

## 2020-01-10 RX ORDER — CLOPIDOGREL BISULFATE 75 MG/1
300 TABLET ORAL ONCE
Status: COMPLETED | OUTPATIENT
Start: 2020-01-10 | End: 2020-01-10

## 2020-01-10 RX ORDER — CEPHALEXIN 500 MG/1
500 CAPSULE ORAL 3 TIMES DAILY
Qty: 21 CAPSULE | Refills: 0 | Status: SHIPPED | OUTPATIENT
Start: 2020-01-10 | End: 2020-01-17

## 2020-01-10 RX ORDER — FERROUS SULFATE TAB EC 324 MG (65 MG FE EQUIVALENT) 324 (65 FE) MG
324 TABLET DELAYED RESPONSE ORAL
Qty: 180 TABLET | Refills: 3 | Status: SHIPPED | OUTPATIENT
Start: 2020-01-10 | End: 2020-01-10 | Stop reason: SDUPTHER

## 2020-01-10 RX ORDER — ATORVASTATIN CALCIUM 40 MG/1
40 TABLET, FILM COATED ORAL DAILY
Qty: 90 TABLET | Refills: 3 | Status: SHIPPED | OUTPATIENT
Start: 2020-01-10 | End: 2020-03-26 | Stop reason: SDUPTHER

## 2020-01-10 RX ORDER — FERROUS SULFATE TAB EC 324 MG (65 MG FE EQUIVALENT) 324 (65 FE) MG
324 TABLET DELAYED RESPONSE ORAL
Qty: 180 TABLET | Refills: 3 | Status: SHIPPED | OUTPATIENT
Start: 2020-01-10 | End: 2020-07-02 | Stop reason: SDUPTHER

## 2020-01-10 RX ORDER — ASPIRIN 325 MG
325 TABLET ORAL DAILY
Qty: 90 TABLET | Refills: 0 | Status: SHIPPED | OUTPATIENT
Start: 2020-01-11 | End: 2020-01-10 | Stop reason: SDUPTHER

## 2020-01-10 RX ORDER — ASPIRIN 325 MG
325 TABLET ORAL DAILY
Qty: 90 TABLET | Refills: 3 | Status: CANCELLED | OUTPATIENT
Start: 2020-01-11

## 2020-01-10 RX ORDER — ATORVASTATIN CALCIUM 80 MG/1
80 TABLET, FILM COATED ORAL
Status: DISCONTINUED | OUTPATIENT
Start: 2020-01-11 | End: 2020-01-10 | Stop reason: HOSPADM

## 2020-01-10 RX ADMIN — CLOPIDOGREL BISULFATE 300 MG: 75 TABLET ORAL at 12:50

## 2020-01-10 RX ADMIN — HEPARIN SODIUM 5000 UNITS: 5000 INJECTION INTRAVENOUS; SUBCUTANEOUS at 05:30

## 2020-01-10 RX ADMIN — PRAVASTATIN SODIUM 80 MG: 80 TABLET ORAL at 10:15

## 2020-01-10 RX ADMIN — ASPIRIN 325 MG: 325 TABLET, FILM COATED ORAL at 10:15

## 2020-01-10 RX ADMIN — INSULIN LISPRO 2 UNITS: 100 INJECTION, SOLUTION INTRAVENOUS; SUBCUTANEOUS at 12:50

## 2020-01-10 RX ADMIN — LISINOPRIL 40 MG: 20 TABLET ORAL at 10:15

## 2020-01-10 RX ADMIN — SODIUM CHLORIDE 100 ML/HR: 0.9 INJECTION, SOLUTION INTRAVENOUS at 04:09

## 2020-01-10 RX ADMIN — IRON SUCROSE 200 MG: 20 INJECTION, SOLUTION INTRAVENOUS at 10:14

## 2020-01-10 NOTE — PROGRESS NOTES
Progress Note - Alexis Adame 66 y o  female MRN: [id number]    Unit/Bed#: [address] 404[address]01 Encounter: [id number]      Assessment/Plan:  * Altered mental status  Assessment & Plan  Patient was also found to be at baseline 16 hours prior to admission  At baseline patient with functional to all ADLs  At time of admission patient is AAO to person and place only  HB 7 3 on admission  Ammonia within normal limits  Ethanol < 0 3  CT head:   No CT evidence of acute intracranial process  Chronic microangiopathy  TSH within normal limits    Altered mental status likely secondary to affects of chronic anemia  No focal deficits found on neurological exam, making TIA/stroke less likely, however expressive aphasia is suggestive  Patient is also outside of tPA window  Seizure unlikely, however given acuity and patient's symptoms of staring and not responding appropriately cannot rule out absence seizure  Possible source of infection  UA not resulted yet, although pt has no urinary complaints  Wheezes and crackles heard over bilateral lungs, however CXR showed NAD  · Will start aspirin 325 mg p o  Q day (given negative Hemoccult as per ED physician, will start given possibility of thromboembolic event in brain  Will re-evaluate this medication after MRI read)  · Neurology consult: fu MRI, cont ASA  · Pt passed bedside speech/swallow  Ordered carb control level 2 diet  · Will follow-up B12, Folate WNL  · Will follow-up MRI head  · Will follow-up UDS  · UA positive for leuks - will follow-up Urine Micro  · Neurochecks q 4 hours  · Will fu PT/OT evaluation       Acute kidney injury superimposed on CKD [facility])  Assessment & Plan  A creatinine on baseline was 1 7 elevated from baseline of approximately 1 3  GFR is 29 on admission  Prior GFR in January 2019 was 36, confirming diagnosis of CKD    ALEXANDREA could be present in the setting of hypoxic injury, dehydration or developing infection    Given elevated BUN and patient's history, dehydration most likely  · Started normal saline at 100 cc/hour  - ALEXANDREA resolved ; Cr 1 2 today  · Will continue to monitor with daily BMP  · Will hold all nephrotoxic medications      Anemia  Assessment & Plan  Patient's hemoglobin was 7 3 admission     Previous hemoglobin in November 2017 was 11 1  MCV on admission was 76 suggesting possible iron deficiency anemia, infection, or malnutrition  No laura blood per rectal exam   Hemoccult done in ED was negative  Order was placed for 1 unit PRBC transfusion in the ED  Will order a 2nd unit PRBC as well as H&H to be drawn 2 hours post transfusion    · HB increased to 10 from 7 s/p 2 U leukoreduced PRBCs  · Fe panel showed decreased Fe and Ferritin, indicating iron deficiency anemia  · Immature retic count elevated indicating chronic increase in RBC production  · Patient likely had FE deficiency anemia in the setting of CKD, will give IV Fe x 1 now  · Will follow-up Hemoccult x2  · Will defer GI consult for colonoscopy for now  · Will follow daily CBC  · Patient currently denies shortness of breath, diaphoresis, headache, nausea, dizziness  Will continue to follow clinically  Hypertension  Assessment & Plan  VS WNL for now  BP on admission 141/62  Elevated Bps overnight, currently stable  · Given that a CT likely secondary to dehydration and patient has slightly elevated blood pressure on admission, will continue ACE-inhibitor  · Will replace home quinapril with  lisinopril 40 mg p o  Q day as quinapril is not on formulary  Arthritis  Assessment & Plan  Pt does not complain of pain at this time  Will order Tylenol 600 mg po q6h prn  · Will not give NSAID secondary to impaired renal function    History of tobacco abuse  Assessment & Plan  Patient has a 50 pack-year history of tobacco abuse, and she currently smokes 1 pack per day  Son who lives at home also is a current smoker      · Will  on tobacco cessation during this admission  · Will order nicotine patch  Hyperlipidemia  Assessment & Plan  Prior lipid panel in November 2017: Chol 204 Trig 190 HDL 58     · lipid panel results (1/10): Chol 150 Trig 108  HDL 57 LDL 71  · Continue home pravastatin 80 mg p o  q d    DMII (diabetes mellitus, type 2) ([facility])  Assessment & Plan  Lab Results   Component Value Date    HGBA1C 7 3 (H) 01/15/2019       Recent Labs     01/09/20  1052   POCGLU 121       Blood Sugar Average: Last 72 hrs:  (P) 121     · Last hemoglobin A1c in January 2019 was 7 3  Glucose controlled so far  · FU  HGB A1c  · Will hold home medications glimepiride 4 mg p o  Q d  and metformin 1000 mg p o  B i d  secondary to ALEXANDREA  · ISSA2  with Accu-Cheks t i d  And q h s  Combined forms of age-related cataract of left eye  Assessment & Plan  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/2019        Subjective:   Pt AAO x 2  Denies headache, fever, chills, chest pain  Pt still unable to respond consistently or appropriately to questions  Pt does not recall recent events over the past few days  Objective:     Vitals: Blood pressure 147/64, pulse 57, temperature 99 2 °F (37 3 °C), temperature source Oral, resp  rate 17, height 5' 1" (1 549 m), weight 69 kg (152 lb 1 6 oz), SpO2 93 %  ,Body mass index is 28 74 kg/m²        Intake/Output Summary (Last 24 hours) at 1/10/2020 0950  Last data filed at 1/10/2020 0050  Gross per 24 hour   Intake 700 ml   Output --   Net 700 ml       Physical Exam: General appearance: alert, oriented to person and place only  Head: Normocephalic, without obvious abnormality, atraumatic  Lungs: wheezes  Heart: regular rate and rhythm, S1, S2 normal, no murmur, click, rub or gallop  Abdomen: soft, non-tender; bowel sounds normal; no masses,  no organomegaly  Extremities: extremities normal, warm and well-perfused; no cyanosis, clubbing, or edema  Pulses: 2+ and symmetric  Neurologic: Mental status: alert, oriented to person and place only  Cranial nerves: normal  Sensory: normal  Motor: grossly normal  Coordination: finger to nose normal normal   Speech: difficulty word finding    Invasive Devices     Peripheral Intravenous Line            Peripheral IV 01/09/20 Left Antecubital 1 day    Peripheral IV 01/09/20 Right Antecubital less than 1 day                Lab, Imaging and other studies: I have personally reviewed pertinent reports      VTE Pharmacologic Prophylaxis: Heparin  VTE Mechanical Prophylaxis: sequential compression device

## 2020-01-10 NOTE — DISCHARGE SUMMARY
[facility] Discharge Summary - Ping Sanchez 66 y o  female MRN: [id number]    [facility] [address] [facility] MRI Room / Bed: [place] [address]-* Encounter: [id number]    BRIEF OVERVIEW  Admitting Provider: Ana Victoria MD  Discharge Provider: Ana Victoria MD    Discharge To: Home      Outpatient Follow-Up:   [facility]  Dr Sydnie Nivees Neurology    Things to address at first follow up visit:   Has patient followed up with Dr Sydnie Nieves? Has patient continued with  mg po qd and Atorvastatin 40 mg po qd started during admission? Is patient taking PO Fe started during admission? Has patient finished 7 day course of Levaquin for incidental UTI treatment? Is patient still smoking? ?  Did patient send in the cologard with specimen? Labs and results pending at discharge:   Methylmalonic acide, serum    Admission Date: 1/9/2020     Discharge Date: No discharge date for patient encounter  Primary Discharge Diagnosis  Principal Problem:    Stroke (cerebrum) (HCC)  Active Problems:    Anemia    Acute kidney injury superimposed on CKD (HCC)    Hypertension    Combined forms of age-related cataract of left eye    DMII (diabetes mellitus, type 2) (HCC)    Hyperlipidemia    History of tobacco abuse    Arthritis  Resolved Problems:    * No resolved hospital problems  *      Consulting Providers   Dr Sydnie Nieves Neurology        [address] [place]    Procedures Performed/Pertinent Test results  MRI brain wo contrast   Final Result      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  I personally discussed this study with Dr Sid Pandya on 1/10/2020 at 10:35 AM                Workstation performed: [id number]         CT head wo contrast   Final Result      No CT evidence of acute intracranial process  Chronic microangiopathy                          Workstation performed: [id number]         XR chest 2 views   Final Result      No acute cardiopulmonary disease  Workstation performed: [id number]               HPI  As per admission note  Patient is a 66-year-old female with past medical history of diabetes, CKD, hypertension, hyperlipidemia, and arthritis who presents with altered mental status that began the morning of admission  Patient unable to appropriately answer all questions  History obtained from both son who resides patient as well as patient  Per patient's son who spoke to patient several times during the day yesterday,  patient was able to respond appropriately and was at her baseline  The patient did not wake up for breakfast at her usual time this morning  Patient's son went to her room to wake her from bed at 9:30 a m  [place] At that time patient was able to sit up slightly but was unable to respond to questions appropriately or maintain appropriate eye contact  Patient is then attempted to pull patient out of bed but had difficulty doing so  Patient complained to sign a headache the night prior to admission  Son last spoke to patient at 5:00 p m  Night prior to admission  Patient does not recall this entire episode and is unable to say why she is here today  Per patient's son, pt did not have involuntary muscle movements, loss of bowel or bladder function, facial droop, fall, trauma, loss of consciousness  At baseline patient is fully independent to all ADLs  When questioned regarding fatigue or chest pain in the last 2 weeks patient stated that she does believe she has occasional fatigue and dizziness  At the time of exam patient denies chest pain, headache, diaphoresis, fever, chills, abdominal pain, dysuria, hematuria, difficulty with bowel movements  Hospital Course  CT head performed in the ED was negative  CT was deferred secondary to patient's baseline chronic kidney disease disease  MRI was ordered and completed the following day    Patient had no neuro logical focal deficits, but displayed expressive aphasia and possible memory loss  The friends at this time of admission included stroke, metabolic encephalopathy secondary to anemia found on admission, delirium secondary to unknown source of infection, and absence seizures  Neurology consult was placed  Patient was initially placed NPO pending result of a bedside speech and swallow, which she passed  MRI resulted on the 2nd day of admission and patient was found to have left anterior cerebral artery infarct  At the time of admission patient was outside of tPA window  Aspirin 365 mg p o  Q day had already been started  Patient was already on pravastatin and  This was changed to atorvastatin 40 mg p o  Q day to be continued on discharge  Patient was given 1 loading dose of Plavix 300 mg p o  Per Neurology, patient will only need to continue aspirin and atorvastatin on discharge  Patient will follow up with Neurology on discharge  Hemoglobin was found to be 7 3 at admission  Given patient's presentation of altered mentation patient was treated as possible toxic metabolic encephalopathy  Rectal lung exam ED showed no gross blood, Hemoccult x1 in ED was negative  Hemoccult x2 was ordered however patient did not pass bowel movement during admission  1 unit PRBC due to reduced as ordered by the ED physician  A 2nd unit of PRBC was ordered by floor attending  Hemoglobin status post 2 unit PRBC was 10 0  Iron panel showed low ferritin and low iron, and reticulocyte count was found to be low  Patient presumed to have iron deficiency anemia secondary to CKD  Patient started on p o  Iron which she will continue at discharge  GI evaluation for colonoscopy was deferred as patient did not want to have colonoscopy done and Hemoccult on admission was negative  Also, patient stated that she had occult cologard kit at her home which she had not filled yet  Advised patient on the importance of colon cancer screening    Patient stated that she will complete and send the color guard kit upon return to home  UA was collected in the ED as patient was displaying altered mentation  Patient denied any dysuria, hematuria, increased frequency  UA resulted on the 2nd day of admission  UA found to have leukocytes with subsequent reflex to urine micro found to have bacteria  Patient was discharged on p o  Levaquin 7 day course  Patient was seen and evaluated by Physical therapy  Recommendation is made for home PT and supervision  Patient also asked for rolling walker  All aspects of care and discharge instructions as well as follow-up provider the discussed with patient and her son at bedside  Patient and son verbalized understanding  On the day of discharge patient was eating a cardiac diet without problem,  ambulating with supervision around the room using rolling walker, and saturating well on room air  Physical Exam at Discharge  See progress note from day of discharge  Medications   Prior to Admission medications    Medication Sig Start Date End Date Taking?  Authorizing Provider   acetaminophen (TYLENOL) 500 mg tablet Take 500 mg by mouth every 6 (six) hours as needed for mild pain   Yes Historical Provider, MD   fexofenadine (ALLEGRA) 180 MG tablet Take 180 mg by mouth as needed   Yes Historical Provider, MD   glimepiride (AMARYL) 4 mg tablet TAKE 1 TABLET BY MOUTH  DAILY WITH BREAKFAST 12/4/19  Yes Taylor Roles, DO   ibuprofen (MOTRIN) 200 mg tablet Take 600 mg by mouth every 6 (six) hours as needed for mild pain   Yes Historical Provider, MD   magnesium oxide (MAG-OX) 400 mg tablet TAKE 1 TABLET BY MOUTH TWICE A DAY 5/11/19  Yes Darrold Gina, DO   metFORMIN (GLUCOPHAGE) 1000 MG tablet TAKE 1 TABLET BY MOUTH  TWICE A DAY 5/28/19  Yes Hansa Avila, DO   pravastatin (PRAVACHOL) 80 mg tablet TAKE 1 TABLET BY MOUTH  DAILY 5/28/19  Yes Hansa Avila,    quinapril (ACCUPRIL) 40 MG tablet TAKE 1 TABLET BY MOUTH  DAILY AT BEDTIME 12/4/19  Yes Gagandeep Weeks [place], DO   aspirin 325 mg tablet Take 1 tablet (325 mg total) by mouth daily 1/11/20   Tino Ríos MD   atorvastatin (LIPITOR) 40 mg tablet Take 1 tablet (40 mg total) by mouth daily 1/10/20   Tino Ríos MD   cephalexin (KEFLEX) 500 mg capsule Take 1 capsule (500 mg total) by mouth 3 (three) times a day for 7 days 1/10/20 1/17/20  Tino Ríos MD   ferrous sulfate 324 (65 Fe) mg Take 1 tablet (324 mg total) by mouth 2 (two) times a day before meals 1/10/20 4/9/20  Tino Ríos MD   magnesium oxide (MAG-OX) 400 mg tablet TAKE 1 TABLET BY MOUTH TWICE A DAY 12/24/19   Brent Vernon,    aspirin 325 mg tablet Take 1 tablet (325 mg total) by mouth daily 1/11/20 1/10/20  Tino Ríos MD      copy from the AVS    Allergies  Allergies   Allergen Reactions    Aleve [Naproxen] Other (See Comments)     "weird" sensation entire body    Sulfa Antibiotics Rash       Diet restrictions: as tolerated  Activity restrictions: as tolerated  Code Status: Level 1 - Full Code    Discharge Condition: stable      Discharge  Statement   I spent 30 minutes discharging the patient  This time was spent on the day of discharge  I had direct contact with the patient on the day of discharge

## 2020-01-10 NOTE — SPEECH THERAPY NOTE
Speech Language/Pathology  Speech/Language Pathology  Assessment    Patient Name: Claudia VICTOR Date: 1/10/2020     Problem List  Principal Problem:    Altered mental status  Active Problems:    Combined forms of age-related cataract of left eye    DMII (diabetes mellitus, type 2) ([address] )    Hyperlipidemia    Anemia    History of tobacco abuse    Acute kidney injury superimposed on CKD ([address] )    Arthritis    Hypertension    Past Medical History  Past Medical History:   Diagnosis Date    Arthritis     Capsulitis of foot     Last assessed 07/29/13    Diabetes ([address] )     type 2    Full dentures     Ganglion     Last assessed 07/29/13    Hearing decreased     Hyperlipidemia     Hypertension     Magnesium deficiency     Sciatica     Wears glasses      Past Surgical History  Past Surgical History:   Procedure Laterality Date    CATARACT EXTRACTION      COLONOSCOPY      DILATION AND CURETTAGE OF UTERUS      x 4    HAND SURGERY Left     fx repaired w/wrist bone    HYSTERECTOMY      left ovary remains    KNEE ARTHROSCOPY Left     LIPOMA RESECTION      removed from back and left buttock    MD XCAPSL CTRC RMVL INSJ IO LENS PROSTH W/O ECP Right 1/24/2019    Procedure: EXTRACTION EXTRACAPSULAR CATARACT PHACO INTRAOCULAR LENS (IOL); Surgeon: Jose Romo MD;  Location: [facility] MAIN OR;  Service: Ophthalmology    MD XCAPSL CTRC RMVL INSJ IO LENS PROSTH W/O ECP Left 2/14/2019    Procedure: EXTRACTION EXTRACAPSULAR CATARACT PHACO INTRAOCULAR LENS (IOL); Surgeon: Jose Romo MD;  Location: [facility] OR;  Service: Ophthalmology    TONSILLECTOMY      and adenoids    TUBAL LIGATION         Patient is a 73-year-old female with past medical history of diabetes, CKD, hypertension, hyperlipidemia, and arthritis who presents with altered mental status that began the morning of admission  Patient unable to appropriately answer all questions    History obtained from both son who resides patient as well as patient  Per patient's son who spoke to patient several times during the day yesterday,  patient was able to respond appropriately and was at her baseline  The patient did not wake up for breakfast at her usual time this morning  Patient's son went to her room to wake her from bed at 9:30 a m  [place] At that time patient was able to sit up slightly but was unable to respond to questions appropriately or maintain appropriate eye contact  Patient is then attempted to pull patient out of bed but had difficulty doing so  Patient complained to sign a headache the night prior to admission  Son last spoke to patient at 5:00 p m  Night prior to admission  Patient does not recall this entire episode and is unable to say why she is here today  Per patient's son, pt did not have involuntary muscle movements, loss of bowel or bladder function, facial droop, fall, trauma, loss of consciousness  At baseline patient is fully independent to all ADLs  When questioned regarding fatigue or chest pain in the last 2 weeks patient stated that she does believe she has occasional fatigue and dizziness  Consult received for speech/swallow eval on stroke pathway  Pt passed nsg swallow screen; observed pt eating breakkfast-bagel sand and coffee  Pt able to bite and chew bagel sand w/o dentures in place w/o difficulty  Dentures left at home per pt  No speech/language deficits noted  Pt was oriented x4  Mild errors w/ bio info (age as 66 instead of 66,  as 1945 instead of Kailyn 10, 1945; address was [facility] instead of [facility]-able to self correct this one)- will defer to OT for cog eval     NIH score is 0  MRI is pending  No need for formal speech/swallow eval at this time  Reconsult if needed      April Ronni Claude, MA CCC-SLP  Speech Patholgist  [place] license # [id number] [id number]  [place] license # [id number]  Available via [employer]

## 2020-01-10 NOTE — PHYSICAL THERAPY NOTE
PT EVALUATION       01/10/20 1025   Note Type   Note type Eval/Treat   Pain Assessment   Pain Assessment No/denies pain   Home Living   Type of [address] Two level;Bed/bath upstairs  (3 [address])   Home Equipment   (none)   Prior Function   Level of [place] Independent with ADLs and functional mobility   Lives With Alone  (son is around often)   ADL Assistance Independent   Restrictions/Precautions   Other Precautions Fall Risk   General   Additional Pertinent History Pt admitted with TME with reports of transient aphasia and dizziness  Pt reports she feels at her baseline now  MRI positive for L GABO infarction  Cognition   Overall Cognitive Status WFL   Arousal/Participation Alert   Comments impulsive   RLE Assessment   RLE Assessment   (ROM WFL, MMT 4/5)   LLE Assessment   LLE Assessment   (ROM WFL, MMT 4/5)   Coordination   Movements are Fluid and Coordinated   (heel to shin intact)   Bed Mobility   Supine to Sit 5  Supervision   Sit to Supine 5  Supervision   Transfers   Sit to Stand 4  Minimal assistance   Stand to Sit 4  Minimal assistance   Stand pivot 4  Minimal assistance   Ambulation/Elevation   Gait pattern   (multiple balance losses)   Gait Assistance 4  Minimal assist   Assistive Device   (none)   Distance 50 feet   Balance   Static Sitting Fair +   Dynamic Sitting Fair +   Static Standing Fair +   Dynamic Standing Fair -   Activity Tolerance   Activity Tolerance Patient tolerated treatment well   Assessment   Prognosis Good   Problem List Decreased strength; Impaired balance;Decreased mobility; Decreased safety awareness   Assessment Patient seen for Physical Therapy evaluation  Patient admitted with Altered mental status  Comorbidities affecting patient's physical performance include: DM, anemia, smoker, arthritis    Personal factors affecting patient at time of initial evaluation include: stairs to enter home, inability to navigate level surfaces without external assistance, tobacco use  and inability to live alone  Prior to admission, patient was independent with functional mobility without assistive device and independent with ADLS  Please find objective findings from Physical Therapy assessment regarding body systems outlined above with impairments and limitations including impaired balance, gait deviations, decreased activity tolerance, decreased functional mobility tolerance, decreased safety awareness and fall risk  The Barthel Index was used as a functional outcome tool presenting with a score of 60 today indicating moderate limitations of functional mobility and ADLS  Patient's clinical presentation is currently unstable/unpredictable as seen in patient's presentation of increased fall risk and new onset of impairment of functional mobility  Pt would benefit from continued Physical Therapy treatment to address deficits as defined above and maximize level of functional mobility  As demonstrated by objective findings, the assigned level of complexity for this evaluation is high  Goals   Patient Goals "go home"   STG Expiration Date 01/17/20   Short Term Goal #1 improve transfers to supervision, improve ambulation with walker to supervision 100 feet, supervision up and down 4 steps   LTG Expiration Date 01/24/20   Long Term Goal #1 no falls, improve standing dynamic balance to at least fair plus to decrease fall risk, independent transfers and ambulation with least restrictive device  Plan   Treatment/Interventions ADL retraining;Functional transfer training;LE strengthening/ROM; Elevations; Therapeutic exercise; Endurance training;Gait training;Bed mobility; Equipment eval/education;Patient/family training   PT Frequency 7x/wk   Recommendation   Recommendation 24 hour supervision/assist;Home PT   Equipment Recommended   (rolling walker)   Barthel Index   Feeding 10   Bathing 0   Grooming Score 5   Dressing Score 5   Bladder Score 0   Bowels Score 10   Toilet Use Score 5 Transfers (Bed/Chair) Score 10   Mobility (Level Surface) Score 10   Stairs Score 5   Barthel Index Score 61   Licensure   [place] License Number  Ariel PT [id number]       Time In:1010  Time Out:1025  Time in 1230  Time out 1250  Total Time: 35      S: "I feel fine"  O:  Gait training with cane, with rolling walker, and without device x 60 feet each with min assist/supervision  Pt had multiple balance losses without device, a few with the cane, and even less with use of the walker  Pt totally incontinent of urine so pt given new socks, venodynes, gown, underwear with pad  Pt able to toilet with supervision  A:  Recommend rolling walker use with supervision as pt demonstrates impaired balance as well as impaired safety awareness  Pt is also impulsive leading to balance losses    P:  Continue PT    Stefan Samayoa, PT

## 2020-01-10 NOTE — DISCHARGE INSTRUCTIONS
Stroke   AMBULATORY CARE:   A stroke  happens when blood flow to part of the brain is interrupted  This can cause serious brain damage from a lack of oxygen  Brain function may be affected depending on where the stroke happens  A stroke caused by a blood clot is called an ischemic stroke  Ischemic stroke is the most common type  A stroke caused by a burst or torn blood vessel is called a hemorrhagic stroke  When stroke symptoms go away completely within minutes to hours and do not cause damage, it is called a transient ischemic attack (TIA)  A TIA is a warning sign that you are at risk of soon having a stroke  Know the warning signs of a stroke: The word F A S T  can help you remember and recognize warning signs of a stroke  · F = Face:  One side of the face droops  · A = Arms:  One arm starts to drop when both arms are raised  · S = Speech:  Speech is slurred or sounds different than usual     · T = Time:  A person who is having a stroke needs to be seen immediately  A stroke is a medical emergency that needs immediate treatment  Most medicines and treatments work best the sooner they are given  ·        Call 911 for any of the following:   · You have any of the following signs of a stroke:      ¨ Numbness or drooping on one side of your face     ¨ Weakness in an arm or leg    ¨ Confusion or difficulty speaking    ¨ Dizziness, a severe headache, or vision loss    · You have a seizure  · You feel lightheaded, short of breath, and have chest pain  Seek care immediately if:   · Your blood sugar level or blood pressure is higher or lower than usual     · You have trouble swallowing  · You fall  Contact your healthcare provider if:   · You have trouble having a bowel movement or urinating  · You have questions or concerns about your condition or care    Signs and symptoms of a stroke  will depend on the type of stroke you had and where it occurred:  · Sudden weakness in your arm, leg, or face    · Sudden trouble walking    · Trouble speaking or understanding words you read or hear     · Vision changes    · Sudden inability to feel part of your body or limbs    · Loss of consciousness    · Vomiting or a severe headache  Treatment  depends on the type of stroke you had:  · Medicines  may be given to prevent blood clots, break up clots, or help your blood clot more easily  You may need medicines to treat high cholesterol, high blood pressure, or diabetes  · Thrombolysis  is a procedure used to break apart clots in an artery  A catheter is guided into the artery until it is near the clot  Medicine is put through the catheter that will help break apart the clot  Or, the clot is pulled out of the artery  · Surgery  may be used to remove a blood clot or to relieve pressure within your brain  You may also need surgery to remove plaque buildup from your carotid arteries  Follow up with your healthcare provider as directed: You may need to come in for regular tests of your brain function  If you are taking warfarin, you will need to come in for regular blood tests  Your INR levels will also need to be checked  These tests help make sure you are taking the right amount of warfarin  Write down your questions so you remember to ask them during your visits  Go to rehabilitation (rehab) as directed:  Rehab is an important part of treatment  A speech therapist can help you relearn or improve your ability to talk and swallow  You may start slowly and start doing more difficult tasks over time  Physical therapists can help you gain strength and build endurance  Occupational therapists can teach you new ways to do daily activities, such as getting dressed  Therapy can help you improve your ability to walk or keep your balance  Your therapy may include tasks or movements you will need to do for everyday activities  An example is being able to raise or lower yourself from a chair    Care for yourself after a stroke:   · Make your home safe  Remove anything you might trip over  Tape electrical cords down  Keep paths clear throughout your home  Make sure your home is well lighted  Put nonslip materials on surfaces that might be slippery  An example is your bathtub or shower floor  · Use assistive devices  A cane or walker may help you keep your balance as you walk  Prevent a stroke:   · Manage health conditions  Conditions such as atrial fibrillation and diabetes can increase your risk for a stroke  Control your glucose carefully if you have hyperglycemia or diabetes  · Check your blood pressure as directed  High blood pressure can increase your risk for a stroke  If you have high blood pressure, follow your healthcare provider's directions for controlling your blood pressure  · Do not smoke  Nicotine and other chemicals in cigarettes and cigars can increase your risk for another stroke and cause lung damage  Ask your healthcare provider for information if you currently smoke and need help to quit  E-cigarettes or smokeless tobacco still contain nicotine  Talk to your healthcare provider before you use these products  · Do not drink alcohol  Alcohol can increase your risk for a stroke  Alcohol may also increase your blood pressure or thin your blood  Blood thinning can increase your risk for hemorrhagic stroke  · Eat a variety of healthy foods  Healthy foods include whole-grain breads, low-fat dairy products, beans, lean meats, and fish  Eat at least 5 servings of fruits and vegetables each day  Choose foods that are low in fat, cholesterol, salt, and sugar  Eat foods that are high in potassium, such as potatoes and bananas  · Exercise as directed  Activity is important for preventing another stroke  You may need to work with an exercise therapist to learn how to exercise safely  Exercise may help you be able to do your normal activities more easily   Exercise also helps control your blood pressure and weight  · Maintain a healthy weight  Ask your healthcare provider how much you should weigh  Ask him or her to help you create a weight loss plan if you are overweight  Ask about the best exercise plan for you  · Manage stress  Stress can increase your blood pressure  Find new ways to relax, such as deep breathing or listening to music  What you need to know about depression:  Depression can happen after a stroke  Talk to your healthcare provider if you have depression that continues or is getting worse  Your provider may be able to help treat your depression  Your provider can also recommend support groups for you to join  A support group is a place to talk with others who have had a stroke  It may also help to talk to friends and family members about how you are feeling  Tell your family and friends that if they see these signs, to let your healthcare provider know  You may show any of the following signs of depression:  · Extreme sadness    · Avoiding social interaction with family or friends    · A lack of interest in things you once enjoyed    · Irritability    · Trouble sleeping    · Low energy levels    · A change in eating habits or sudden weight gain or loss  © 2017 [address] [facility] Information is for End User's use only and may not be sold, redistributed or otherwise used for commercial purposes  All illustrations and images included in CareNotes® are the copyrighted property of A D [employer] M [employer]  or Archie Esquivel  The above information is an  only  It is not intended as medical advice for individual conditions or treatments  Talk to your doctor, nurse or pharmacist before following any medical regimen to see if it is safe and effective for you

## 2020-01-10 NOTE — OCCUPATIONAL THERAPY NOTE
OT Evaluation       01/10/20 1155   Note Type   Note type Eval only   Restrictions/Precautions   Other Precautions Fall Risk   Pain Assessment   Pain Assessment No/denies pain   Pain Score No Pain   Home Living   Type of Home House   Home Layout Two level;Bed/bath upstairs  (3 PEDRITO)   Bathroom Shower/Tub Tub/shower unit   Bathroom Toilet Standard   Bathroom Equipment Other (Comment)  (None)   Home Equipment Other (Comment)  (None)   Prior Function   Level of Cross Independent with ADLs and functional mobility   Lives With Alone  (Son is around often, checks on patient multiple times a day)   Receives Help From Family   ADL Assistance Independent   IADLs Needs assistance   ADL   Eating Assistance 7  Independent   Grooming Assistance 5  Supervision/Setup   UB Bathing Assistance 5  Supervision/Setup   LB Bathing Assistance 4  Minimal Assistance   UB Dressing Assistance 5  Supervision/Setup   LB Dressing Assistance 4  Minimal Assistance   Toileting Assistance  5  Supervision/Setup   Bed Mobility   Supine to Sit 5  Supervision   Sit to Supine 5  Supervision   Transfers   Sit to Stand 4  Minimal assistance   Stand to Sit 4  Minimal assistance   Stand pivot 4  Minimal assistance   Additional items   (RW)   Functional Mobility   Functional Mobility 4  Minimal assistance   Additional Comments Ambulated several steps in room with RW and min assist   Balance   Static Sitting Fair +   Dynamic Sitting Fair +   Static Standing Fair +   Dynamic Standing Fair -   Activity Tolerance   Activity Tolerance Patient tolerated treatment well   RUE Assessment   RUE Assessment X  (AROM WFL, strength impaired)   RUE Strength   RUE Overall Strength Deficits   R Shoulder Flexion 4-/5   R Shoulder Extension 4-/5   R Shoulder ABduction 4-/5   R Elbow Flexion 4-/5   R Elbow Extension 4-/5   R Wrist Flexion 4-/5   R Wrist Extension 4-/5   LUE Assessment   LUE Assessment WFL  (Strength grossly 4+/5 throughout)   Hand Function   Fine Motor Coordination Impaired   Sensation   Light Touch Partial deficits in the RUE   Cognition   Arousal/Participation Alert  (Easily distracted)   Attention Attends with cues to redirect   Orientation Level Oriented to person;Oriented to place;Oriented to time;Oriented to situation   Following Commands Follows multistep commands with increased time or repetition   Comments Patient with some word finding difficulties during routine conversation; required occassional increased time to answer questions; when asked the date unable to recall the word 'January' however per SLP was able to earlier in the AM  Patient requires increased time for multistep command following and problem solving, limited insight into current functional limitations  Will perform cognitive assessment on patient at later time   Assessment   Limitation Decreased ADL status; Decreased Safe judgement during ADL;Decreased UE strength;Decreased cognition;Decreased endurance;Decreased self-care trans;Decreased high-level ADLs   Prognosis Good   Assessment Patient evaluated by Occupational Therapy  Patient admitted with Stroke (cerebrum) ([facility] [address] )  The patients occupational profile, medical and therapy history includes a extensive additional review of physical, cognitive, or psychosocial history related to current functional performance  Comorbidities affecting functional mobility and ADLS include: DM, anemia, smoker, arthritis  Prior to admission, patient was independent with functional mobility without assistive device, independent with ADLS and independent with IADLS  The evaluation identifies the following performance deficits: weakness, impaired balance, decreased endurance, increased fall risk, new onset of impairment of functional mobility, decreased ADLS, pain, decreased activity tolerance, decreased safety awareness, decreased cognition and decreased strength, that result in activity limitations and/or participation restrictions   This evaluation requires clinical decision making of high complexity, because the patient presents with comorbidites that affect occupational performance and required significant modification of tasks or assistance with consideration of multiple treatment options  The Barthel Index was used as a functional outcome tool presenting with a score of 60, indicating moderate limitations of functional mobility and ADLS  Patient will benefit from skilled Occupational Therapy services to address above deficits and facilitate a safe return to prior level of function  Goals   Patient Goals to go home   STG Time Frame   (1-7 days)   Short Term Goal  Goals established to promote patient goal of going home:  Patient will increase standing tolerance to 5 minutes during ADL task to decrease assistance level and decrease fall risk; Patient will increase bed mobility to independent in preparation for ADLS and transfers; Patient will increase functional mobility to and from bathroom with rolling walker with supervision to increase performance with ADLS and to use a toilet; Patient will tolerate 5 minutes of UE ROM/strengthening to increase general activity tolerance and performance in ADLS/IADLS; Patient will improve functional activity tolerance to 5 minutes of sustained functional tasks to increase participation in basic self-care and decrease assistance level;  Patient will be able to to verbalize understanding and perform energy conservation/proper body mechanics during ADLS and functional mobility at least 75% of the time with minimal cueing to decrease signs of fatigue and increase stamina to return to prior level of function; Patient will increase static/dynamic sitting balance to good to improve the ability to sit at edge of bed or on a chair for ADLS;  Patient will increase dynamic standing balance to fair to improve postural stability and decrease fall risk during standing ADLS and transfers       LTG Time Frame   (8-14 days)   Long Term Goal Patient will increase standing tolerance to 10 minutes during ADL task to decrease assistance level and decrease fall risk; Patient will increase functional mobility to and from bathroom with rolling walker independently to increase performance with ADLS and to use a toilet; Patient will tolerate 10 minutes of UE ROM/strengthening to increase general activity tolerance and performance in ADLS/IADLS; Patient will improve functional activity tolerance to 10 minutes of sustained functional tasks to increase participation in basic self-care and decrease assistance level;  Patient will be able to to verbalize understanding and perform energy conservation/proper body mechanics during ADLS and functional mobility at least 90% of the time with nocueing to decrease signs of fatigue and increase stamina to return to prior level of function; Patient will increase dynamic standing balance to fair+ to improve postural stability and decrease fall risk during standing ADLS and transfers  Functional Transfer Goals   Pt Will Perform All Functional Transfers   (STG supervision, LTG independent)   ADL Goals   Pt Will Perform Grooming   (LTG independent)   Pt Will Perform Bathing   (STG supervision, LTG independent)   Pt Will Perform UE Dressing   (LTG independent)   Pt Will Perform LE Dressing   (STG supervision, LTG Independent)   Pt Will Perform Toileting   (LTG independent)   Plan   Treatment Interventions ADL retraining;Functional transfer training;UE strengthening/ROM; Endurance training;Cognitive reorientation;Patient/family training; Compensatory technique education;Continued evaluation; Activityengagement; Energy conservation   Goal Expiration Date 01/24/20   OT Frequency 3-5x/wk   Recommendation   OT Discharge Recommendation Home OT  (Home PT, Home SLP)   Barthel Index   Feeding 10   Bathing 0   Grooming Score 0   Dressing Score 5   Bladder Score 5   Bowels Score 10   Toilet Use Score 5   Transfers (Bed/Chair) Score 10 Mobility (Level Surface) Score 10   Stairs Score 5   Barthel Index Score 61   Licensure   [place] License Number  Serafin Caleb, OTR/L [id number]

## 2020-01-10 NOTE — SOCIAL WORK
Per Marilyn Alfaro, PT, recommended RW, 24 hr supervision, and home therapy vs outpatient therapy  Discussed with pt  Denies need for either rehab option  Feels she is doing well  Will accept RW for home  Dr Laurie Wright made aware  Will follow through with referral to RW to Lat[facility] xavier  No other d/c needs

## 2020-01-10 NOTE — UTILIZATION REVIEW
Initial Clinical Review    Admission: Date/Time/Statement: Inpatient Admission Orders (From admission, onward)     Ordered        01/09/20 1254  Inpatient Admission  Once                   Orders Placed This Encounter   Procedures    Inpatient Admission     Standing Status:   Standing     Number of Occurrences:   1     Order Specific Question:   Admitting Physician     Answer:   Chidi Torres [[id number]]     Order Specific Question:   Level of Care     Answer:   Med Surg [[id number]]     Order Specific Question:   Estimated length of stay     Answer:   More than 2 Midnights     Order Specific Question:   Certification     Answer:   I certify that inpatient services are medically necessary for this patient for a duration of greater than two midnights  See H&P and MD Progress Notes for additional information about the patient's course of treatment  ED Arrival Information     Expected Arrival Acuity Means of Arrival Escorted By Service Admission Type    - 1/9/2020 10:47 Emergent Ambulance Other (Comment) General Medicine Emergency    Arrival Complaint    shortness of breath        Chief Complaint   Patient presents with    Altered Mental Status     son reports pt last normal 200 yesterday - found pt with weakness and difficulty "finding words" this am      Headache   Assessment/Plan: 39-year-old female to ED from home via ambulance, past medical history of diabetes, CKD, hypertension, hyperlipidemia, and arthritis who presents with altered mental status that began the morning of admission  Patient unable to appropriately answer all questions, son present  Patient last known normal was last evening  C/o generaized weakness and difficulty finding words  In ED patient alert,oriented  Slowed mentation, no focal neurologic deficits  hgb found 7 3 , ct head no evidence of acute intracranial process  Admitted inpatient altered mental status, consult neurology ,continue asa qd, npo, mri ,neuro checks  timo continue ivns @100  Anemia, No laura blood per rectal exam   Hemoccult done in ED was negative  Order was placed for 1 unit PRBC transfusion in the ED  Will order a 2nd unit PRBC as well as H&H to be drawn 2 hours post transfusion    NEUROLOGY CONSULT  Pending MRI recommend continue asa until mri  Possible having confusion from low hgb ,once hgb metabolic issue are improved may be more lucid  ED Triage Vitals   Temperature Pulse Respirations Blood Pressure SpO2   01/09/20 1050 01/09/20 1050 01/09/20 1050 01/09/20 1052 01/09/20 1050   (!) 96 8 °F (36 °C) 71 18 144/63 97 %      Temp Source Heart Rate Source Patient Position - Orthostatic VS BP Location FiO2 (%)   01/09/20 1050 01/09/20 1050 01/09/20 1052 01/09/20 1052 --   Tympanic Monitor Lying Right arm       Pain Score       01/09/20 1050       No Pain        Wt Readings from Last 1 Encounters:   01/10/20 69 kg (152 lb 1 6 oz)     Additional Vital Signs:   01/09/20 1349  96 9 °F (36 1 °C)Abnormal   --  --  --  --  --  --   01/09/20 1345  --  70  23Abnormal   154/66  99 %  --  --   01/09/20 1339  --  84  18  154/66  100 %  None (Room air)  Lying   01/09/20 1221  --  84  20  141/62  99 %  None (Room air)  Standing - Orthostatic VS   01/09/20 1215  --  71  20  163/70  99 %  None (Room air)  Sitting - Orthostatic VS   01/09/20 1212  --  70  24Abnormal   142/62  99 %  None (Room air)         Pertinent Labs/Diagnostic Test Results:    MRI 1/10/20 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  1/9/20 CT HEAD No CT evidence of acute intracranial process  Chronic microangiopathy    CXR NAD   Results from last 7 days   Lab Units 01/10/20  0457 01/09/20  1114   WBC Thousand/uL 6 81 7 02   HEMOGLOBIN g/dL 10 0* 7 3*   HEMATOCRIT % 32 7* 26 0*   PLATELETS Thousands/uL 312 358   NEUTROS ABS Thousands/µL  --  5 37     Results from last 7 days   Lab Units 01/09/20  1824   RETIC CT ABS  53,700   RETIC CT PCT % 1 42     Results from last 7 days   Lab Units 01/10/20  0455 01/09/20  1114   SODIUM mmol/L 140 138   POTASSIUM mmol/L 4 5 5 2   CHLORIDE mmol/L 107 104   CO2 mmol/L 26 27   ANION GAP mmol/L 7 7   BUN mg/dL 22 31*   CREATININE mg/dL 1 29 1 70*   EGFR ml/min/1 73sq m 40 29   CALCIUM mg/dL 9 0 9 9   MAGNESIUM mg/dL 2 1 2 3   PHOSPHORUS mg/dL 2 9  --      Results from last 7 days   Lab Units 01/10/20  0457 01/09/20  1114   AST U/L 15 14   ALT U/L 16 19   ALK PHOS U/L 60 64   TOTAL PROTEIN g/dL 6 1* 7 0   ALBUMIN g/dL 3 2* 3 6   TOTAL BILIRUBIN mg/dL 1 90* 0 50   AMMONIA umol/L  --  <10*     Results from last 7 days   Lab Units 01/10/20  1216 01/10/20  0740 01/09/20  2113 01/09/20  1052   POC GLUCOSE mg/dl 242* 72 79 121     Results from last 7 days   Lab Units 01/10/20  0457 01/09/20  1114   GLUCOSE RANDOM mg/dL 67 131     Results from last 7 days   Lab Units 01/10/20  0457   HEMOGLOBIN A1C % 6 4*   EAG mg/dl 137     Results from last 7 days   Lab Units 01/09/20  1114   TROPONIN I ng/mL <0 02     Results from last 7 days   Lab Units 01/09/20  1114   PROTIME seconds 9 4*   INR  0 88*   PTT seconds 21*     Results from last 7 days   Lab Units 01/09/20  1114   TSH 3RD GENERATON uIU/mL 0 865     Results from last 7 days   Lab Units 01/09/20  1114   FERRITIN ng/mL 6*     Results from last 7 days   Lab Units 01/09/20  1114   LIPASE u/L 162     Results from last 7 days   Lab Units 01/10/20  0806   CLARITY UA  Cloudy   COLOR UA  Light Yellow   SPEC GRAV UA  1 015   PH UA  6 0   GLUCOSE UA mg/dl Negative   KETONES UA mg/dl Negative   BLOOD UA  Negative   PROTEIN UA mg/dl Negative   NITRITE UA  Negative   BILIRUBIN UA  Negative   UROBILINOGEN UA E U /dl 0 2   LEUKOCYTES UA  Trace*   WBC UA /hpf 2-4*   RBC UA /hpf 0-1*   BACTERIA UA /hpf Innumerable*   EPITHELIAL CELLS WET PREP /hpf Occasional     Results from last 7 days   Lab Units 01/10/20  0807   AMPH/METH  Negative   BARBITURATE UR  Negative   BENZODIAZEPINE UR  Negative   COCAINE UR  Negative   METHADONE URINE  Negative   OPIATE UR Negative   PCP UR  Negative   THC UR  Negative     Results from last 7 days   Lab Units 01/09/20  1114   ETHANOL LVL mg/dL <3     ED Treatment:   Medication Administration from 01/09/2020 1047 to 01/09/2020 1406       Date/Time Order Dose Route Action Action by Comments     01/09/2020 1123 sodium chloride 0 9 % bolus 1,000 mL 1,000 mL Intravenous New Bag Reynaldo Almazan RN         Past Medical History:   Diagnosis Date    Arthritis     Capsulitis of foot     Last assessed 07/29/13    Diabetes ([facility] [address] )     type 2    Full dentures     Ganglion     Last assessed 07/29/13    Hearing decreased     Hyperlipidemia     Hypertension     Magnesium deficiency     Sciatica     Wears glasses      Present on Admission:   Combined forms of age-related cataract of left eye   DMII (diabetes mellitus, type 2) ([facility])   Hyperlipidemia   Altered mental status   Anemia   Acute kidney injury superimposed on CKD (HCC)   Arthritis      Admitting Diagnosis: Altered mental status [R41 82]  Anemia [D64 9]  Age/Sex: 66 y o  female  Admission Orders:  Inpatient admit  Transfuse 2u prbc   Neuro checks q4h  MRI   npo  Speech swall bedside evaluation  Pt ot speech    Scheduled Medications:    Medications:  aspirin 325 mg Oral Daily   [START ON 1/11/2020] atorvastatin 80 mg Oral Daily With Dinner   clopidogrel 300 mg Oral Once   [START ON 1/11/2020] clopidogrel 75 mg Oral Daily   heparin (porcine) 5,000 Units Subcutaneous Q8H [address]   insulin lispro 1-5 Units Subcutaneous TID AC   lisinopril 40 mg Oral Daily   nicotine 1 patch Transdermal Daily     Continuous IV Infusions:    sodium chloride 100 mL/hr Intravenous Continuous     PRN Meds:    acetaminophen 650 mg Oral Q6H PRN       IP CONSULT TO NEUROLOGY  IP CONSULT TO CASE MANAGEMENT    Network Utilization Review Department  [email or web address]  ATTENTION: Please call with any questions or concerns to [phone] and carefully listen to the prompts so that you are directed to the right person  All voicemails are confidential   Zoila Langford all requests for admission clinical reviews, approved or denied determinations and any other requests to dedicated fax number below belonging to the campus where the patient is receiving treatment   List of dedicated fax numbers for the Facilities:  [address] DENIALS (Administrative/Medical Necessity) [phone]   [address]  (Maternity/NICU/Pediatrics) [phone]   Conda Sas [phone]   Jamas Piggs [phone]   Gina Dress [phone]   Levell Pleasure [phone]   [address] [place] [address] [phone]   [facility]  [phone]   [address] [facility]  [address] [facility] [address] [facility] [phone]

## 2020-01-10 NOTE — PLAN OF CARE
Problem: Potential for Falls  Goal: Patient will remain free of falls  Description  INTERVENTIONS:  - Assess patient frequently for physical needs  -  Identify cognitive and physical deficits and behaviors that affect risk of falls    -  Vincent fall precautions as indicated by assessment   - Educate patient/family on patient safety including physical limitations  - Instruct patient to call for assistance with activity based on assessment  - Modify environment to reduce risk of injury  - Consider OT/PT consult to assist with strengthening/mobility  Outcome: Progressing     Problem: Prexisting or High Potential for Compromised Skin Integrity  Goal: Skin integrity is maintained or improved  Description  INTERVENTIONS:  - Identify patients at risk for skin breakdown  - Assess and monitor skin integrity  - Assess and monitor nutrition and hydration status  - Monitor labs   - Assess for incontinence   - Turn and reposition patient  - Assist with mobility/ambulation  - Relieve pressure over bony prominences  - Avoid friction and shearing  - Provide appropriate hygiene as needed including keeping skin clean and dry  - Evaluate need for skin moisturizer/barrier cream  - Collaborate with interdisciplinary team   - Patient/family teaching  - Consider wound care consult   Outcome: Progressing     Problem: PAIN - ADULT  Goal: Verbalizes/displays adequate comfort level or baseline comfort level  Description  Interventions:  - Encourage patient to monitor pain and request assistance  - Assess pain using appropriate pain scale  - Administer analgesics based on type and severity of pain and evaluate response  - Implement non-pharmacological measures as appropriate and evaluate response  - Consider cultural and social influences on pain and pain management  - Notify physician/advanced practitioner if interventions unsuccessful or patient reports new pain  Outcome: Progressing     Problem: INFECTION - ADULT  Goal: Absence or prevention of progression during hospitalization  Description  INTERVENTIONS:  - Assess and monitor for signs and symptoms of infection  - Monitor lab/diagnostic results  - Monitor all insertion sites, i e  indwelling lines, tubes, and drains  - Monitor endotracheal if appropriate and nasal secretions for changes in amount and color  - Toyah appropriate cooling/warming therapies per order  - Administer medications as ordered  - Instruct and encourage patient and family to use good hand hygiene technique  - Identify and instruct in appropriate isolation precautions for identified infection/condition  Outcome: Progressing     Problem: SAFETY ADULT  Goal: Maintain or return to baseline ADL function  Description  INTERVENTIONS:  -  Assess patient's ability to carry out ADLs; assess patient's baseline for ADL function and identify physical deficits which impact ability to perform ADLs (bathing, care of mouth/teeth, toileting, grooming, dressing, etc )  - Assess/evaluate cause of self-care deficits   - Assess range of motion  - Assess patient's mobility; develop plan if impaired  - Assess patient's need for assistive devices and provide as appropriate  - Encourage maximum independence but intervene and supervise when necessary  - Involve family in performance of ADLs  - Assess for home care needs following discharge   - Consider OT consult to assist with ADL evaluation and planning for discharge  - Provide patient education as appropriate  Outcome: Progressing  Goal: Maintain or return mobility status to optimal level  Description  INTERVENTIONS:  - Assess patient's baseline mobility status (ambulation, transfers, stairs, etc )    - Identify cognitive and physical deficits and behaviors that affect mobility  - Identify mobility aids required to assist with transfers and/or ambulation (gait belt, sit-to-stand, lift, walker, cane, etc )  - Toyah fall precautions as indicated by assessment  - Record patient progress and toleration of activity level on Mobility SBAR; progress patient to next Phase/Stage  - Instruct patient to call for assistance with activity based on assessment  - Consider rehabilitation consult to assist with strengthening/weightbearing, etc   Outcome: Progressing     Problem: DISCHARGE PLANNING  Goal: Discharge to home or other facility with appropriate resources  Description  INTERVENTIONS:  - Identify barriers to discharge w/patient and caregiver  - Arrange for needed discharge resources and transportation as appropriate  - Identify discharge learning needs (meds, wound care, etc )  - Arrange for interpretive services to assist at discharge as needed  - Refer to Case Management Department for coordinating discharge planning if the patient needs post-hospital services based on physician/advanced practitioner order or complex needs related to functional status, cognitive ability, or social support system  Outcome: Progressing     Problem: Knowledge Deficit  Goal: Patient/family/caregiver demonstrates understanding of disease process, treatment plan, medications, and discharge instructions  Description  Complete learning assessment and assess knowledge base    Interventions:  - Provide teaching at level of understanding  - Provide teaching via preferred learning methods  Outcome: Progressing

## 2020-01-10 NOTE — CONSULTS
[facility]   Neurology Initial Consult    Senia Truong is a 66 y o  female  [address] [facility] [address] [facility]          Information obtained from:   Chief Complaint   Patient presents with    Altered Mental Status     son reports pt last normal 200 yesterday - found pt with weakness and difficulty "finding words" this am      Headache         Assessment/Plan:    1  T/M Encephalopathy  2  Transient aphasia  3  HTN  4  DM  5  Low hemoglobin  6  Tobacco abuse    67 yo female with episode of aphasia and confusion at home, some dysarthria per her son, all lasting possibly 30 minutes per him  Denies focal weakness, fall, numbness, vision issue  Brain mri pending at [facility] was unremarkable  Discussed smoking cessation and control of glucose  She was on Pravachol but unsure about daily asa  Recommend continue daily asa 325 until brain mri  Also possible she was having confusion from low hemoglobin and once metabolic issues are improved she may be more lucid  Await Dr Fatimah Matthews attest      HPI:    Ms  Senia Truong is a  17-year-old female admitted for an acute episode of confusion at home  She was found to have a very low hemoglobin of 7 3 and is currently getting a transfusion  Her son is here along with her grandchildren  Her some provides the history  The patient is still acutely confused somewhat but she answers some questions when prompted  She is not somnolent  Her son tells me that earlier this morning he came to make sure she was okay at her home as she lives alone  He states that she was acting strangely such as she stood up and then looked like she got dizzy so sat back down suddenly  She stood up and looked like she went to say something but could not get the words out, then held her chest or upper abdomen  This occurred about 3 times where she went to say something but could not get the words out, and then held her upper abdomen    The patient's son states that she looked like she may not have been able to speak due to pain  He is not entirely sure  He called 911 and she was able to walk out to the ambulance  There was no focal weakness of the arms or legs, no facial droop  He mentioned that there was transient dysarthria at 1 point  She was confused and not herself for most of this time  It was about a 1/2 hour between her symptoms and going into the ambulance  He states that since hospitalized she is beginning to be more herself  Head CT is unremarkable  Brain MRI is ordered and pending  Hemoglobin A1c 7 3%    LDL 71  Past Medical History:   Diagnosis Date    Arthritis     Capsulitis of foot     Last assessed 07/29/13    Diabetes ([facility] [address] )     type 2    Full dentures     Ganglion     Last assessed 07/29/13    Hearing decreased     Hyperlipidemia     Hypertension     Magnesium deficiency     Sciatica     Wears glasses        Past Surgical History:   Procedure Laterality Date    CATARACT EXTRACTION      COLONOSCOPY      DILATION AND CURETTAGE OF UTERUS      x 4    HAND SURGERY Left     fx repaired w/wrist bone    HYSTERECTOMY      left ovary remains    KNEE ARTHROSCOPY Left     LIPOMA RESECTION      removed from back and left buttock    MS XCAPSL CTRC RMVL INSJ IO LENS PROSTH W/O ECP Right 1/24/2019    Procedure: EXTRACTION EXTRACAPSULAR CATARACT PHACO INTRAOCULAR LENS (IOL); Surgeon: Rita Herzog MD;  Location: [facility] MAIN OR;  Service: Ophthalmology    MS XCAPSL CTRC RMVL INSJ IO LENS PROSTH W/O ECP Left 2/14/2019    Procedure: EXTRACTION EXTRACAPSULAR CATARACT PHACO INTRAOCULAR LENS (IOL);   Surgeon: Rita Herzog MD;  Location: [facility] OR;  Service: Ophthalmology    TONSILLECTOMY      and adenoids    TUBAL LIGATION         Allergies   Allergen Reactions    Aleve [Naproxen] Other (See Comments)     "weird" sensation entire body    Sulfa Antibiotics Rash         Current Facility-Administered Medications:     acetaminophen (TYLENOL) tablet 650 mg, 650 mg, Oral, Q6H PRN, Leslie Wilson MD, 650 mg at 01/09/20 1816    aspirin tablet 325 mg, 325 mg, Oral, Daily, Jailyn Palomares MD, 325 mg at 01/09/20 1816    heparin (porcine) subcutaneous injection 5,000 Units, 5,000 Units, Subcutaneous, Q8H [address], 5,000 Units at 01/10/20 0530 **AND** Platelet count, , , Once, Jailyn Palomares MD    insulin lispro (HumaLOG) 100 units/mL subcutaneous injection 1-5 Units, 1-5 Units, Subcutaneous, TID AC **AND** Fingerstick Glucose (POCT), , , TID AC, Jailyn Palomares MD    lisinopril (ZESTRIL) tablet 40 mg, 40 mg, Oral, Daily, Jailyn Palomares MD    nicotine (NICODERM CQ) 21 mg/24 hr TD 24 hr patch 1 patch, 1 patch, Transdermal, Daily, Jailyn Palomares MD    pravastatin (PRAVACHOL) tablet 80 mg, 80 mg, Oral, Daily, Jailyn Palomares MD    sodium chloride 0 9 % infusion, 100 mL/hr, Intravenous, Continuous, Jailyn Palomares MD, Last Rate: 100 mL/hr at 01/10/20 0409, 100 mL/hr at 01/10/20 0409    Social History     Socioeconomic History    Marital status:      Spouse name: Not on file    Number of children: Not on file    Years of education: Not on file    Highest education level: Not on file   Occupational History    Not on file   Social Needs    Financial resource strain: Not on file    Food insecurity:     Worry: Not on file     Inability: Not on file    Transportation needs:     Medical: Not on file     Non-medical: Not on file   Tobacco Use    Smoking status: Current Every Day Smoker     Packs/day: 1 00     Years: 35 00     Pack years: 35 00     Types: Cigarettes    Smokeless tobacco: Never Used   Substance and Sexual Activity    Alcohol use: Yes     Frequency: Monthly or less     Drinks per session: 1 or 2     Binge frequency: Never     Comment: Rarely    Drug use: No    Sexual activity: Not Currently   Lifestyle    Physical activity:     Days per week: Not on file     Minutes per session: Not on file    Stress: Not on file   Relationships    Social connections:     Talks on phone: Not on file     Gets together: Not on file     Attends Denominational service: Not on file     Active member of club or organization: Not on file     Attends meetings of clubs or organizations: Not on file     Relationship status: Not on file    Intimate partner violence:     Fear of current or ex partner: Not on file     Emotionally abused: Not on file     Physically abused: Not on file     Forced sexual activity: Not on file   Other Topics Concern    Not on file   Social History Narrative    Not on file       Family History   Problem Relation Age of Onset    Leukemia Father     Early death Father 39        leukemia    Stomach cancer Maternal Grandfather     Diabetes Paternal Grandfather     Diabetes Sister         insulin dependent    Diabetes Brother         insulin    Diabetes Sister         insulin    Diabetes Sister         insulin         Review of systems:  Please see HPI for positive symptoms  No fever, no chills, no weight change  Ocular: No drainage, no blurred vision  HEENT:  No sore throat, earache, or congestion  No neck pain  COR:  No chest pain  No palpitations  Lungs:  no sob, wheezing,  GI:  no  nausea, no vomiting, no diarrhea, no constipation, no anorexia  :  No dysuria, frequency, or urgency  No hematuria  Musculoskeletal:  No joint pain or swelling or edema  Skin:  No rash or itching  Psychiatric:  no anxiety, no depression  Endocrine:  No polyuria or polydipsia  Physical examination:  Vitals:    01/10/20 0050   BP: 162/72   Pulse: 60   Resp: 18   Temp: 98 °F (36 7 °C)   SpO2:        GENERAL APPEARANCE:  The patient is alert, oriented  He is in no acute distress  HEENT:  Head is normocephalic  The sinuses are otherwise nontender  Pupils are equal and reactive  NECK:  Supple without lymphadenopathy  Could not hear bruits b/l  HEART:  Regular rate and rhythm  LUNGS:  clear to auscultation  No crackles but there is diffuse wheezing    ABDOMEN: Soft, nontender, nondistended with good bowel sounds heard  EXTREMITIES:  Without cyanosis, clubbing or edema  Mental status: The patient is alert  Naming is good- she names toothbrush and comb  She is oriented to hospital and floor, but not city, day, month or year  She knows the President  She repeats 3 words but cannot recall them in 3 minutes  Cannot perform serial sevens or spelling  No dysarthria  Cranial nerves:  CN II: Visual fields are full to confrontation  Fundoscopic exam is normal with sharp discs and no vascular changes    Pupils are 3 mm and briskly reactive to light  CN III, IV, VI: At primary gaze, there is no eye deviation  CN V: Facial sensation is intact to pinprick in all 3 divisions bilaterally  Corneal responses are intact  CN VII: Face is symmetric with normal eye closure and smile  CN VIII: Hearing is normal to rubbing fingers  CN IX, X: Palate elevates symmetrically  Phonation is normal   CN XI: Head turning and shoulder shrug are intact  CN XII: Tongue is midline with normal movements and no atrophy  Motor: There is no pronator drift of out-stretched arms  Muscle bulk and tone are normal      Muscle exam-- generalized muscle weakness  Arm Right Left Leg Right Left   Deltoid 5-/5 5-/5 Iliopsoas 4/5 4/5   Biceps 5/5 5/5 Quads 4/5 4/5   Triceps 5/5 5/5 Hamstrings 4/5 4/5   Wrist Extension   Ankle Dorsi Flexion 5/5 5/5   Wrist Flexion   Ankle Plantar Flexion 5/5 5/5   Interossei 4/5 4/5 Ankle Eversion 5/5 5/5   APB 5-/5 5-/5 Ankle Inversion 5/5 5/5       Reflexes   RJ BJ TJ KJ AJ Plantars Greenwood's   Right 2+ 2+ 2+ 2+ Trace Mute Not present   Left 2+ 2+ 2+ 2+ Trace Mute Not present     Sensory:  Light touch, pinprick, position sense, and vibration sense are intact in fingers and toes  Coordination:  Rapid alternating movements and fine finger movements are intact  There is no dysmetria on finger-to-nose and heel-knee-shin   There are no abnormal or extraneous movements  Gait/Stance:  Deferred  Lab Results   Component Value Date    WBC 6 81 01/10/2020    HGB 10 0 (L) 01/10/2020    HCT 32 7 (L) 01/10/2020    MCV 79 (L) 01/10/2020     01/10/2020     Lab Results   Component Value Date    HGBA1C 7 3 (H) 01/15/2019     Lab Results   Component Value Date    ALT 19 01/09/2020    AST 14 01/09/2020    ALKPHOS 64 01/09/2020    BILITOT 0 3 11/22/2017     Lab Results   Component Value Date    GLUCOSE 172 (H) 11/22/2017    CALCIUM 9 9 01/09/2020     11/22/2017    K 5 2 01/09/2020    CO2 27 01/09/2020     01/09/2020    BUN 31 (H) 01/09/2020    CREATININE 1 70 (H) 01/09/2020         Radiology          Review of reports and notes reveal:    Independent Interpretation of images or specimens:  Xr Chest 2 Views    Result Date: 1/9/2020  No acute cardiopulmonary disease  Workstation performed: [id number]     Ct Head Wo Contrast    Result Date: 1/9/2020  No CT evidence of acute intracranial process  Chronic microangiopathy  Workstation performed: [id number]       Thank you for this consult  Total time of encounter: 70 minutes  More than 50% of time was spent in counseling and coordination of care of patient  MAYDA Mendoza    [facility]  [address]  [address]

## 2020-01-10 NOTE — SOCIAL WORK
Son in room, wants pt to have home therapy  Pt now agreeable  Provided choices of [facility] agencies, chose [facility]    Will make referral

## 2020-01-10 NOTE — OCCUPATIONAL THERAPY NOTE
OT Treatment Note       01/10/20 1415   Restrictions/Precautions   Other Precautions Cognitive; Fall Risk   Pain Assessment   Pain Assessment No/denies pain   Pain Score No Pain   Right Upper Extremity- Strength   R Hand Thumb; Index finger; Long finger;Ring finger;Little finger   RUE Strength Comment Patient issued red grade resistive sponge  Educated on R hand fine motor strengthening and coordination activities to improve use, strength, and coordination of R hand including: gross grasp and release, in hand manipulation, and gross finger flexion exercies  Patient performed 1`0 reps each  Encouraged to continue at home, pt verbalized good understanding   Cognition   Arousal/Participation Alert; Cooperative   Attention Attends with cues to redirect   Orientation Level Oriented to person;Oriented to place;Oriented to time;Oriented to situation   Following Commands Follows multistep commands with increased time or repetition   Comments Cognition formally assessed with the [address] [place] [address] and SLUMS assessments  Patient scored a 15/30 (normal is > 26/30)  Patient with increased difficulty in memory recall, attention, and executive functioning tasks  Patient with increased frustration during assessment and taking increased time to complete  Also completed SLUMS assessment: Patient scored a 12/30 indicating 'dementia'  Patient with increased difficulty with problem solving tasks, attention, and recall tasks  Patient will benefit from continued OT services for cognitive assessment and intervention during functional ADLs/iADLs  Patient will also benefit from outpatient or home speech therapy services for higher level cognitive tasks  Cognition Assessment Tools [facility]  (and SLUMS)   Assessment   Assessment Patient with less word finding difficulties during PM session compared to AM however still with increased time required to answer questions, patient also with difficulties during cognitive assessments as described above   Patient educated on RUE strengthening exercises with use of red grade resistive sponge and verbalized understanding  Per chart patient initially refusing home therapy services, spoke with patient and son about role and benefit of continue therapy services and patient now agreeable  DIMPLE, Igor Morrow , and Junie Armagh notified of the same  At end of session patient supine in bed, son at bedside and all needs met     Recommendation   OT Discharge Recommendation Home OT  (Home PT, Home SLP)   Licensure   [place] License Number  [place], [place] [id number]

## 2020-01-11 LAB — MRSA NOSE QL CULT: NORMAL

## 2020-01-13 ENCOUNTER — TRANSCRIBE ORDERS (OUTPATIENT)
Dept: ADMINISTRATIVE | Facility: HOSPITAL | Age: 75
End: 2020-01-13

## 2020-01-13 ENCOUNTER — TRANSITIONAL CARE MANAGEMENT (OUTPATIENT)
Dept: FAMILY MEDICINE CLINIC | Facility: CLINIC | Age: 75
End: 2020-01-13

## 2020-01-13 NOTE — UTILIZATION REVIEW
Initial Clinical Review    Admission: Date/Time/Statement:   Inpatient Admission Orders (From admission, onward)     Ordered        01/09/20 1254  Inpatient Admission  Once                   Orders Placed This Encounter   Procedures    Inpatient Admission     Standing Status:   Standing     Number of Occurrences:   1     Order Specific Question:   Admitting Physician     Answer:   Volodymyr Kulkarni [[id number]]     Order Specific Question:   Level of Care     Answer:   Med Surg [[id number]]     Order Specific Question:   Estimated length of stay     Answer:   More than 2 Midnights     Order Specific Question:   Certification     Answer:   I certify that inpatient services are medically necessary for this patient for a duration of greater than two midnights  See H&P and MD Progress Notes for additional information about the patient's course of treatment  ED Arrival Information     Expected Arrival Acuity Means of Arrival Escorted By Service Admission Type    - 1/9/2020 10:47 Emergent Ambulance Other (Comment) General Medicine Emergency    Arrival Complaint    shortness of breath        Chief Complaint   Patient presents with    Altered Mental Status     son reports pt last normal 200 yesterday - found pt with weakness and difficulty "finding words" this am      Headache   Assessment/Plan: 66-year-old female to ED from home via ambulance, past medical history of diabetes, CKD, hypertension, hyperlipidemia, and arthritis who presents with altered mental status that began the morning of admission  Patient unable to appropriately answer all questions, son present  Patient last known normal was last evening  C/o generaized weakness and difficulty finding words  In ED patient alert,oriented  Slowed mentation, no focal neurologic deficits  hgb found 7 3 , ct head no evidence of acute intracranial process  Admitted inpatient altered mental status, consult neurology ,continue asa qd, npo, mri ,neuro checks  timo continue ivns @100  Anemia, No laura blood per rectal exam   Hemoccult done in ED was negative  Order was placed for 1 unit PRBC transfusion in the ED  Will order a 2nd unit PRBC as well as H&H to be drawn 2 hours post transfusion    NEUROLOGY CONSULT  Pending MRI recommend continue asa until mri  Possible having confusion from low hgb ,once hgb metabolic issue are improved may be more lucid  ED Triage Vitals   Temperature Pulse Respirations Blood Pressure SpO2   01/09/20 1050 01/09/20 1050 01/09/20 1050 01/09/20 1052 01/09/20 1050   (!) 96 8 °F (36 °C) 71 18 144/63 97 %      Temp Source Heart Rate Source Patient Position - Orthostatic VS BP Location FiO2 (%)   01/09/20 1050 01/09/20 1050 01/09/20 1052 01/09/20 1052 --   Tympanic Monitor Lying Right arm       Pain Score       01/09/20 1050       No Pain          Wt Readings from Last 1 Encounters:   01/10/20 69 kg (152 lb 1 6 oz)     Additional Vital Signs:   01/09/20 1349  96 9 °F (36 1 °C)Abnormal   --  --  --  --  --  --   01/09/20 1345  --  70  23Abnormal   154/66  99 %  --  --   01/09/20 1339  --  84  18  154/66  100 %  None (Room air)  Lying   01/09/20 1221  --  84  20  141/62  99 %  None (Room air)  Standing - Orthostatic VS   01/09/20 1215  --  71  20  163/70  99 %  None (Room air)  Sitting - Orthostatic VS   01/09/20 1212  --  70  24Abnormal   142/62  99 %  None (Room air)         Pertinent Labs/Diagnostic Test Results:    MRI 1/10/20 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  1/9/20 CT HEAD No CT evidence of acute intracranial process  Chronic microangiopathy    CXR NAD   Results from last 7 days   Lab Units 01/10/20  0457 01/09/20  1114   WBC Thousand/uL 6 81 7 02   HEMOGLOBIN g/dL 10 0* 7 3*   HEMATOCRIT % 32 7* 26 0*   PLATELETS Thousands/uL 312 358   NEUTROS ABS Thousands/µL  --  5 37     Results from last 7 days   Lab Units 01/09/20  1824   RETIC CT ABS  53,700   RETIC CT PCT % 1 42     Results from last 7 days   Lab Units 01/10/20  0452 01/09/20  1114   SODIUM mmol/L 140 138   POTASSIUM mmol/L 4 5 5 2   CHLORIDE mmol/L 107 104   CO2 mmol/L 26 27   ANION GAP mmol/L 7 7   BUN mg/dL 22 31*   CREATININE mg/dL 1 29 1 70*   EGFR ml/min/1 73sq m 40 29   CALCIUM mg/dL 9 0 9 9   MAGNESIUM mg/dL 2 1 2 3   PHOSPHORUS mg/dL 2 9  --      Results from last 7 days   Lab Units 01/10/20  0457 01/09/20  1114   AST U/L 15 14   ALT U/L 16 19   ALK PHOS U/L 60 64   TOTAL PROTEIN g/dL 6 1* 7 0   ALBUMIN g/dL 3 2* 3 6   TOTAL BILIRUBIN mg/dL 1 90* 0 50   AMMONIA umol/L  --  <10*     Results from last 7 days   Lab Units 01/10/20  1216 01/10/20  0740 01/09/20  2113 01/09/20  1052   POC GLUCOSE mg/dl 242* 72 79 121     Results from last 7 days   Lab Units 01/10/20  0457 01/09/20  1114   GLUCOSE RANDOM mg/dL 67 131     Results from last 7 days   Lab Units 01/10/20  0457   HEMOGLOBIN A1C % 6 4*   EAG mg/dl 137     Results from last 7 days   Lab Units 01/09/20  1114   TROPONIN I ng/mL <0 02     Results from last 7 days   Lab Units 01/09/20  1114   PROTIME seconds 9 4*   INR  0 88*   PTT seconds 21*     Results from last 7 days   Lab Units 01/09/20  1114   TSH 3RD GENERATON uIU/mL 0 865     Results from last 7 days   Lab Units 01/09/20  1114   FERRITIN ng/mL 6*     Results from last 7 days   Lab Units 01/09/20  1114   LIPASE u/L 162     Results from last 7 days   Lab Units 01/10/20  0806   CLARITY UA  Cloudy   COLOR UA  Light Yellow   SPEC GRAV UA  1 015   PH UA  6 0   GLUCOSE UA mg/dl Negative   KETONES UA mg/dl Negative   BLOOD UA  Negative   PROTEIN UA mg/dl Negative   NITRITE UA  Negative   BILIRUBIN UA  Negative   UROBILINOGEN UA E U /dl 0 2   LEUKOCYTES UA  Trace*   WBC UA /hpf 2-4*   RBC UA /hpf 0-1*   BACTERIA UA /hpf Innumerable*   EPITHELIAL CELLS WET PREP /hpf Occasional     Results from last 7 days   Lab Units 01/10/20  0807   AMPH/METH  Negative   BARBITURATE UR  Negative   BENZODIAZEPINE UR  Negative   COCAINE UR  Negative   METHADONE URINE  Negative   OPIATE UR Negative   PCP UR  Negative   THC UR  Negative     Results from last 7 days   Lab Units 01/09/20  1114   ETHANOL LVL mg/dL <3     ED Treatment:   Medication Administration from 01/09/2020 1047 to 01/09/2020 1406       Date/Time Order Dose Route Action Action by Comments     01/09/2020 1123 sodium chloride 0 9 % bolus 1,000 mL 1,000 mL Intravenous New Deborah Boss RN         Past Medical History:   Diagnosis Date    Arthritis     Capsulitis of foot     Last assessed 07/29/13    Diabetes ([facility] [address] )     type 2    Full dentures     Ganglion     Last assessed 07/29/13    Hearing decreased     Hyperlipidemia     Hypertension     Magnesium deficiency     Sciatica     Wears glasses      Present on Admission:   Combined forms of age-related cataract of left eye   DMII (diabetes mellitus, type 2) ([facility] [address] )   Hyperlipidemia   [facility]   Anemia   Acute kidney injury superimposed on CKD ([facility])   Arthritis      Admitting Diagnosis: Altered mental status [R41 82]  Anemia [D64 9]  Age/Sex: 66 y o  female  Admission Orders:  Inpatient admit  Transfuse 2u prbc   Neuro checks q4h  MRI   npo  Speech swall bedside evaluation  Pt ot speech    Scheduled Medications:    No current facility-administered medications for this encounter  Continuous IV Infusions:    No current facility-administered medications for this encounter  PRN Meds:    No current facility-administered medications for this encounter  IP CONSULT TO NEUROLOGY  IP CONSULT TO CASE MANAGEMENT    Network Utilization Review Department  [email or web address]  ATTENTION: Please call with any questions or concerns to [phone] and carefully listen to the prompts so that you are directed to the right person   All voicemails are confidential   Ty Limb all requests for admission clinical reviews, approved or denied determinations and any other requests to dedicated fax number below belonging to the campus where the patient is receiving treatment  List of dedicated fax numbers for the Facilities:  [address] [place] [address] DENIALS (Administrative/Medical Necessity) [phone]   [address] [place] [address] (Maternity/NICU/Pediatrics) [phone]   Yu Ernst [phone]   Marcello Horner [phone]   Aissatou Delgado [phone]   Deisy Thomas [phone]   [facility] 15[address] [facility] [phone]   [facility]  [phone]   [address] [facility]  [phone]   [address] [place] [address] [address] [facility] [phone]       Notification of Discharge  This is a Notification of Discharge from our facility [address]  Please be advised that this patient has been discharge from our facility  Below you will find the admission and discharge date and time including the patients disposition  PRESENTATION DATE: 1/9/2020 10:49 AM  OBS ADMISSION DATE:   IP ADMISSION DATE: 1/9/20 1254   DISCHARGE DATE: 1/10/2020  3:20 PM  DISPOSITION: Home/Self Care Home/Self Care   Admission Orders listed below:  Admission Orders (From admission, onward)     Ordered        01/09/20 1254  Inpatient Admission  Once                   Please contact the UR Department if additional information is required to close this patient's authorization/case  [facility] Utilization Review Department  Main: [phone] x carefully listen to the prompts  All voicemails are confidential   [email or web address]  Send all requests for admission clinical reviews, approved or denied determinations and any other requests to dedicated fax number below belonging to the campus where the patient is receiving treatment   List of dedicated fax numbers:  [address] DENIALS (Administrative/Medical Necessity) [phone]   [address] [place] [address] (Maternity/NICU/Pediatrics) [phone]   ST  Fatimah Pole [phone]   Mariely Ros [phone]   [address] [place] [address] [phone]   [address] [place] [address] 1525 [facility] [phone]   [facility]  [phone]   [address] [facility]  [address] [facility] [address] [phone]

## 2020-01-14 ENCOUNTER — HOSPITAL ENCOUNTER (OUTPATIENT)
Dept: NON INVASIVE DIAGNOSTICS | Facility: HOSPITAL | Age: 75
Discharge: HOME | End: 2020-01-14
Attending: PSYCHIATRY & NEUROLOGY
Payer: COMMERCIAL

## 2020-01-14 ENCOUNTER — HOSPITAL ENCOUNTER (OUTPATIENT)
Dept: RADIOLOGY | Facility: HOSPITAL | Age: 75
Discharge: HOME | End: 2020-01-14
Attending: PSYCHIATRY & NEUROLOGY
Payer: COMMERCIAL

## 2020-01-14 DIAGNOSIS — I63.9 STROKE (HCC): ICD-10-CM

## 2020-01-14 DIAGNOSIS — E11.9 TYPE 2 DIABETES MELLITUS WITHOUT COMPLICATION, WITHOUT LONG-TERM CURRENT USE OF INSULIN (HCC): ICD-10-CM

## 2020-01-14 DIAGNOSIS — I10 ESSENTIAL HYPERTENSION: ICD-10-CM

## 2020-01-14 PROCEDURE — 93880 EXTRACRANIAL BILAT STUDY: CPT | Performed by: SURGERY

## 2020-01-14 PROCEDURE — 93306 TTE W/DOPPLER COMPLETE: CPT

## 2020-01-14 PROCEDURE — 93880 EXTRACRANIAL BILAT STUDY: CPT

## 2020-01-14 PROCEDURE — 93799 UNLISTED CV SVC/PROCEDURE: CPT

## 2020-01-14 RX ORDER — QUINAPRIL 40 MG/1
40 TABLET ORAL
Qty: 90 TABLET | Refills: 1 | Status: SHIPPED | OUTPATIENT
Start: 2020-01-14 | End: 2020-03-26 | Stop reason: SDUPTHER

## 2020-01-14 RX ORDER — GLIMEPIRIDE 4 MG/1
4 TABLET ORAL
Qty: 90 TABLET | Refills: 1 | Status: SHIPPED | OUTPATIENT
Start: 2020-01-14 | End: 2020-03-26 | Stop reason: SDUPTHER

## 2020-01-14 NOTE — TELEPHONE ENCOUNTER
DR Espinoza 23  SHE IS CALLING TO LET YOU KNOW THAT SHE WILL BE SEEING PT  2 TIMES A WEEK FOR 3 WEEKS, AND PT HAS REALLY GOOD SPONTANEOUS RECOVERY FROM THE CVA  SHE WILL BE FAXING OVER THE ORDERS FOR YOU TO SIGN

## 2020-01-15 PROCEDURE — 93306 TTE W/DOPPLER COMPLETE: CPT | Performed by: INTERNAL MEDICINE

## 2020-01-16 LAB
METHYLMALONATE SERPL-SCNC: 252 NMOL/L (ref 0–378)
SL AMB DISCLAIMER: NORMAL

## 2020-01-24 ENCOUNTER — OFFICE VISIT (OUTPATIENT)
Dept: FAMILY MEDICINE CLINIC | Facility: CLINIC | Age: 75
End: 2020-01-24
Payer: COMMERCIAL

## 2020-01-24 VITALS
HEIGHT: 60 IN | BODY MASS INDEX: 29.84 KG/M2 | WEIGHT: 152 LBS | RESPIRATION RATE: 18 BRPM | DIASTOLIC BLOOD PRESSURE: 68 MMHG | OXYGEN SATURATION: 96 % | SYSTOLIC BLOOD PRESSURE: 106 MMHG | TEMPERATURE: 96 F | HEART RATE: 62 BPM

## 2020-01-24 DIAGNOSIS — Z23 ENCOUNTER FOR IMMUNIZATION: ICD-10-CM

## 2020-01-24 DIAGNOSIS — I63.9 CEREBROVASCULAR ACCIDENT (CVA), UNSPECIFIED MECHANISM (HCC): ICD-10-CM

## 2020-01-24 DIAGNOSIS — D50.9 IRON DEFICIENCY ANEMIA, UNSPECIFIED IRON DEFICIENCY ANEMIA TYPE: Primary | ICD-10-CM

## 2020-01-24 PROCEDURE — G0008 ADMIN INFLUENZA VIRUS VAC: HCPCS

## 2020-01-24 PROCEDURE — 99214 OFFICE O/P EST MOD 30 MIN: CPT | Performed by: FAMILY MEDICINE

## 2020-01-24 PROCEDURE — 90662 IIV NO PRSV INCREASED AG IM: CPT

## 2020-01-24 PROCEDURE — 1160F RVW MEDS BY RX/DR IN RCRD: CPT | Performed by: FAMILY MEDICINE

## 2020-01-24 PROCEDURE — 4004F PT TOBACCO SCREEN RCVD TLK: CPT | Performed by: FAMILY MEDICINE

## 2020-01-27 NOTE — PROGRESS NOTES
Assessment/Plan:    No problem-specific Assessment & Plan notes found for this encounter  Diagnoses and all orders for this visit:    Iron deficiency anemia, unspecified iron deficiency anemia type  -     CBC and differential; Future    Encounter for immunization  -     influenza vaccine, [id number], high-dose, PF 0 5 mL (FLUZONE HIGH-DOSE)    Cerebrovascular accident (CVA), unspecified mechanism ([facility] [address] )          Subjective:      Patient ID: Vijay Rodarte is a 66 y o  female  Octavio Cox is here with her son for a follow-up visit post hospital  She was admitted for garbled speech and right-sided weakness and was found to have an acute stroke she was also found to be cyclic be significantly anemic which is not in her past medical history  She is now feeling much better she does feel like she might have some residual decreased strength in her right but physical therapy and occupational therapy very happy with the way she is coming along   She did bring her sugar log her sugars are all over the place she is taking the med various times some of them right after lunch some of them for several hours after lunch I printed up a referral for dietary for her she can pick that up  some stool to do so she will keep track of her sugars and her food content and the time she is taking her sugars and relationship to her meal and come in in 2 weeks  She has not had any cigarettes since before she went to the hospital  The following portions of the patient's history were reviewed and updated as appropriate: current medications, past family history, past medical history, past social history, past surgical history and problem list     Review of Systems   Constitutional: Negative  Eyes: Negative  Respiratory: Negative  Cardiovascular: Negative            Objective:      /68 (BP Location: Left arm, Patient Position: Sitting, Cuff Size: Large)   Pulse 62   Temp (!) 96 °F (35 6 °C) (Tympanic)   Resp 18   Ht 5' (1 524 m)   Wt 68 9 kg (152 lb)   SpO2 96%   BMI 29 69 kg/m²          Physical Exam   Constitutional: She is oriented to person, place, and time  She appears well-developed and well-nourished  Eyes: Pupils are equal, round, and reactive to light  EOM are normal    Cardiovascular: Normal rate, regular rhythm, normal heart sounds and intact distal pulses  Exam reveals no gallop and no friction rub  No murmur heard  Pulmonary/Chest: Effort normal and breath sounds normal  No stridor  No respiratory distress  She has no wheezes  She has no rales  She exhibits no tenderness  Musculoskeletal: She exhibits no edema  Neurological: She is alert and oriented to person, place, and time  She displays normal reflexes  No cranial nerve deficit or sensory deficit  She exhibits normal muscle tone   Coordination normal    Muscle strength is basically equal there may be a very very slight decrease in her shoulder flexors and extensors but it is minimal best

## 2020-01-29 ENCOUNTER — TELEPHONE (OUTPATIENT)
Dept: FAMILY MEDICINE CLINIC | Facility: CLINIC | Age: 75
End: 2020-01-29

## 2020-01-29 NOTE — TELEPHONE ENCOUNTER
We were faxed over forms to be signed by physician from [facility] [place]  Pt last saw Dr Marie Craft on 1/24/2020  Original in blue team bin  Copy to be scanned

## 2020-01-31 ENCOUNTER — TELEPHONE (OUTPATIENT)
Dept: FAMILY MEDICINE CLINIC | Facility: CLINIC | Age: 75
End: 2020-01-31

## 2020-01-31 NOTE — TELEPHONE ENCOUNTER
Dr Gwendel Saint pt    , request for order from health services placed in red team folder at nurse station

## 2020-02-03 LAB
BASOPHILS # BLD AUTO: 0 X10E3/UL (ref 0–0.2)
BASOPHILS NFR BLD AUTO: 0 %
EOSINOPHIL # BLD AUTO: 0.4 X10E3/UL (ref 0–0.4)
EOSINOPHIL NFR BLD AUTO: 5 %
ERYTHROCYTE [DISTWIDTH] IN BLOOD BY AUTOMATED COUNT: 23.1 % (ref 11.7–15.4)
HCT VFR BLD AUTO: 32.8 % (ref 34–46.6)
HGB BLD-MCNC: 10.5 G/DL (ref 11.1–15.9)
IMM GRANULOCYTES # BLD: 0 X10E3/UL (ref 0–0.1)
IMM GRANULOCYTES NFR BLD: 0 %
LYMPHOCYTES # BLD AUTO: 1.7 X10E3/UL (ref 0.7–3.1)
LYMPHOCYTES NFR BLD AUTO: 18 %
MCH RBC QN AUTO: 24.8 PG (ref 26.6–33)
MCHC RBC AUTO-ENTMCNC: 32 G/DL (ref 31.5–35.7)
MCV RBC AUTO: 77 FL (ref 79–97)
MONOCYTES # BLD AUTO: 0.6 X10E3/UL (ref 0.1–0.9)
MONOCYTES NFR BLD AUTO: 7 %
MORPHOLOGY BLD-IMP: ABNORMAL
NEUTROPHILS # BLD AUTO: 6.4 X10E3/UL (ref 1.4–7)
NEUTROPHILS NFR BLD AUTO: 70 %
PLATELET # BLD AUTO: 415 X10E3/UL (ref 150–450)
RBC # BLD AUTO: 4.24 X10E6/UL (ref 3.77–5.28)
WBC # BLD AUTO: 9.2 X10E3/UL (ref 3.4–10.8)

## 2020-02-04 ENCOUNTER — TELEPHONE (OUTPATIENT)
Dept: FAMILY MEDICINE CLINIC | Facility: CLINIC | Age: 75
End: 2020-02-04

## 2020-02-04 NOTE — TELEPHONE ENCOUNTER
We got a form from [facility] [place] to be signed by Bhavesh Rowland  Patient was last seen in the office on 1/24/2020  Original left in blue halle and copy left to be scanned in   Thanks

## 2020-02-17 ENCOUNTER — OFFICE VISIT (OUTPATIENT)
Dept: FAMILY MEDICINE CLINIC | Facility: CLINIC | Age: 75
End: 2020-02-17
Payer: COMMERCIAL

## 2020-02-17 VITALS
OXYGEN SATURATION: 99 % | TEMPERATURE: 97.8 F | HEIGHT: 60 IN | HEART RATE: 87 BPM | WEIGHT: 151 LBS | BODY MASS INDEX: 29.64 KG/M2 | DIASTOLIC BLOOD PRESSURE: 60 MMHG | SYSTOLIC BLOOD PRESSURE: 110 MMHG

## 2020-02-17 DIAGNOSIS — D50.9 IRON DEFICIENCY ANEMIA, UNSPECIFIED IRON DEFICIENCY ANEMIA TYPE: Primary | ICD-10-CM

## 2020-02-17 DIAGNOSIS — E11.42 TYPE 2 DIABETES MELLITUS WITH DIABETIC POLYNEUROPATHY, WITHOUT LONG-TERM CURRENT USE OF INSULIN (HCC): ICD-10-CM

## 2020-02-17 DIAGNOSIS — R26.89 BALANCE DISORDER: ICD-10-CM

## 2020-02-17 PROCEDURE — 3066F NEPHROPATHY DOC TX: CPT | Performed by: FAMILY MEDICINE

## 2020-02-17 PROCEDURE — 1111F DSCHRG MED/CURRENT MED MERGE: CPT | Performed by: FAMILY MEDICINE

## 2020-02-17 PROCEDURE — 3008F BODY MASS INDEX DOCD: CPT | Performed by: FAMILY MEDICINE

## 2020-02-17 PROCEDURE — 99213 OFFICE O/P EST LOW 20 MIN: CPT | Performed by: FAMILY MEDICINE

## 2020-02-17 PROCEDURE — 3078F DIAST BP <80 MM HG: CPT | Performed by: FAMILY MEDICINE

## 2020-02-17 PROCEDURE — 2022F DILAT RTA XM EVC RTNOPTHY: CPT | Performed by: FAMILY MEDICINE

## 2020-02-17 PROCEDURE — 1160F RVW MEDS BY RX/DR IN RCRD: CPT | Performed by: FAMILY MEDICINE

## 2020-02-17 PROCEDURE — 4004F PT TOBACCO SCREEN RCVD TLK: CPT | Performed by: FAMILY MEDICINE

## 2020-02-17 PROCEDURE — 4040F PNEUMOC VAC/ADMIN/RCVD: CPT | Performed by: FAMILY MEDICINE

## 2020-02-17 PROCEDURE — 3074F SYST BP LT 130 MM HG: CPT | Performed by: FAMILY MEDICINE

## 2020-02-17 PROCEDURE — 3044F HG A1C LEVEL LT 7.0%: CPT | Performed by: FAMILY MEDICINE

## 2020-02-17 NOTE — PROGRESS NOTES
Assessment/Plan:    No problem-specific Assessment & Plan notes found for this encounter  Diagnoses and all orders for this visit:    Iron deficiency anemia, unspecified iron deficiency anemia type  -     Ambulatory referral to Gastroenterology; Future    Balance disorder  -     Ambulatory referral to Physical Therapy; Future    Type 2 diabetes mellitus with diabetic polyneuropathy, without long-term current use of insulin (HCC)          Subjective:      Patient ID: Brooks Campbell is a 66 y o  female      Here for recheck   She did not bring in her sugar diary  According to both of them Her and son they are more consistant in low to mis 100s with more careful things with diet  She is feelign well  Her son noticed with walking she will sway occasionally   She does not notice it  They request OP PT referral given  Her HB is level at 10 8 and holding  She is having a lot of heartburn even on pepcid which she takes over the counter  For months  She never got her cologuard   Set is home per her son  I told her with the persistant GERD and anemia she should be scoped from above and below         The following portions of the patient's history were reviewed and updated as appropriate: current medications, past family history, past medical history, past social history, past surgical history and problem list     Review of Systems      Objective:      /60   Pulse 87   Temp 97 8 °F (36 6 °C)   Ht 5' (1 524 m)   Wt 68 5 kg (151 lb)   SpO2 99%   BMI 29 49 kg/m²          Physical Exam  no exam done as was here 3 weeks for TRACY HUMPHREY todays visit was to close loops on care post DC

## 2020-02-21 ENCOUNTER — TELEPHONE (OUTPATIENT)
Dept: FAMILY MEDICINE CLINIC | Facility: CLINIC | Age: 75
End: 2020-02-21

## 2020-02-24 ENCOUNTER — EVALUATION (OUTPATIENT)
Dept: PHYSICAL THERAPY | Facility: CLINIC | Age: 75
End: 2020-02-24
Payer: COMMERCIAL

## 2020-02-24 DIAGNOSIS — R26.89 BALANCE DISORDER: ICD-10-CM

## 2020-02-24 DIAGNOSIS — I63.9 CEREBROVASCULAR ACCIDENT (CVA), UNSPECIFIED MECHANISM (HCC): Primary | ICD-10-CM

## 2020-02-24 PROCEDURE — 97163 PT EVAL HIGH COMPLEX 45 MIN: CPT | Performed by: PHYSICAL THERAPIST

## 2020-02-24 NOTE — PROGRESS NOTES
PT Evaluation     Today's date: 2020  Patient name: Emperatriz Stevenson  : 1945  MRN: [id number]  Referring provider: Daniel Davis DO  Dx:   Encounter Diagnosis     ICD-10-CM    1  Cerebrovascular accident (CVA), unspecified mechanism ([facility] [address] ) I63 9    2  Balance disorder R26 89 Ambulatory referral to Physical Therapy                  Assessment  Assessment details: Norrine Lombard is a 66 y o  Patient who presents for PT evaluation of dizziness and balance issues  She reports that she had a stroke in January and has been having PT at home since  She reports decreased strength in her legs, which gross MMT rated 3-3+/5 for left side and 3/5 for right side  She also shows decreased muscular strength from 5xSTS test, which she completed in 17 seconds  Patient displays full ROM with bilateral LE  Patient completed her TUG test in 12 46 seconds which is WNL  She demonstrated an increased risk of falls through her  DGI score  Her muscular endurance is below average for her age as shown through the 6 minute walk test where she walked 775 ft  Her walking speed is also below average, with her 10 meter walk test showing her gait speed at 2 75 ft/sec  She displays a veering gait pattern where she drifts from right to left instead of walking in a straight line  She occasionally demonstrates a scissoring gait pattern and has increased dizziness when making quick turns  Patient will benefit from skilled PT services to increase her muscular strength, endurance, and decrease her risk of falls  Impairments: abnormal gait, abnormal movement, activity intolerance, impaired balance, impaired physical strength, lacks appropriate home exercise program and safety issue  Understanding of Dx/Px/POC: good   Prognosis: good    Goals  STG (4 Weeks)  1  Patient will be independent with basic HEP  2  Patient will increase her DGI score from  to 15/24 to show minimal detectable change and a decrease in risk of falls  3   Patient will improve her 6 minute walk test from 775 ft to 1000 to show an increase in muscular endurance and minimal detectable change  4  Patient will improve 5xSTS test from 17 seconds to 14 seconds to show increased muscular strength and minimal detectable change      LTG (8 Weeks)  1  Patient will be independent with complex HEP  2  Patient will independently ambulate outside without LOB for increased community ambulation  3  Patient will independently transfer from floor to standing for increased functional independence  4  Patient will increase 6 minute walk test from 1000 ft to 1300 ft to show increased muscular endurance and minimal detectable change  5  Patient will increase 5xSTS from 14 seconds to 11 seconds to show increased muscular strength and minimal detectable change      Plan  Patient would benefit from: skilled physical therapy  Planned therapy interventions: ADL retraining, balance, balance/weight bearing training, gait training, graded activity, graded exercise, home exercise program, manual therapy, neuromuscular re-education, patient education, postural training, stretching, strengthening, therapeutic activities, therapeutic exercise and therapeutic training  Frequency: 2x week  Plan of Care beginning date: 2020  Plan of Care expiration date: 2020        Subjective Evaluation    History of Present Illness  Mechanism of injury: Patient reports that she had a stroke in January and has been having home therapy since  She states she is having pain in her hands/hips/knees/and right foot that she rates 5/10  She reports that she has been having difficulty with her balance and increased right leg weakness     Pain  Current pain ratin  Relieving factors: medications    Social Support  Steps to enter house: yes  Stairs in house: yes   Lives in: multiple-level home  Lives with: young children    Employment status: not working  Hand dominance: right    Treatments  Previous treatment: physical therapy  Patient Goals  Patient goals for therapy: decreased pain, improved balance, increased strength and independence with ADLs/IADLs          Objective     Functional Assessment        Comments  BLE MMT: 3-3+/5     MCTSIB:   -FTEO: 30 seconds  -FTEC: 30 seconds  -FTEO w/ foam: 30 seconds  -FTEC w/ foam: 19    TU 46    5xSTS: 17 seconds    30 second STS: 8 reps    DGI:     6 MWT: 775 ft    10 MWT: 11 82 seconds = 0 84 m/sec    Gait Deviation: occasional scissoring gait, slow carlie, R/L veering,              Precautions:  has a past medical history of Arthritis, Capsulitis of foot, Diabetes ([address] ), Full dentures, Ganglion, Hearing decreased, Hyperlipidemia, Hypertension, Magnesium deficiency, Sciatica, and Wears glasses      Daily Interventions:     -Hip Flexion  -Hip Abduction  -Hip Extension  -6" Step ups  -6" Hurdles, FWD/LAT  -STS  -Med Yue

## 2020-02-26 ENCOUNTER — TELEPHONE (OUTPATIENT)
Dept: FAMILY MEDICINE CLINIC | Facility: CLINIC | Age: 75
End: 2020-02-26

## 2020-02-26 ENCOUNTER — OFFICE VISIT (OUTPATIENT)
Dept: PHYSICAL THERAPY | Facility: CLINIC | Age: 75
End: 2020-02-26
Payer: COMMERCIAL

## 2020-02-26 DIAGNOSIS — I63.9 CEREBROVASCULAR ACCIDENT (CVA), UNSPECIFIED MECHANISM (HCC): ICD-10-CM

## 2020-02-26 DIAGNOSIS — R26.89 BALANCE DISORDER: Primary | ICD-10-CM

## 2020-02-26 PROCEDURE — 97110 THERAPEUTIC EXERCISES: CPT | Performed by: PHYSICAL THERAPIST

## 2020-02-26 PROCEDURE — 97530 THERAPEUTIC ACTIVITIES: CPT | Performed by: PHYSICAL THERAPIST

## 2020-02-26 NOTE — TELEPHONE ENCOUNTER
Received fax from [facility] of discharge orders  Copy scanned into chart & original placed in [facility] [place] team bin

## 2020-02-26 NOTE — PROGRESS NOTES
Daily Note     Today's date: 2020  Patient name: Carolina Aponte  : 1945  MRN: [id number]  Referring provider: Saritha Ridley DO  Dx:   Encounter Diagnosis     ICD-10-CM    1  Balance disorder R26 89    2  C[facility]brovascular accident ([facility]), unspecified [facility] ) I63 9                   Subjective: Patient reports that she is having no pain this morning       Objective: See treatment diary below    -Hip Flexion: x20  -Hip Abduction: x20  -Hip Extension: x20  -6" Step ups: x20, FWD/Lateral  -6" Step taps: x20 FWD/Lateral  -Seated TB (red) Hip Abduction: x20  -STS w/ 5 lb DB: x20  -Core Rotation w/ 5 lb DB: x20      Assessment: Patient tolerated treatment well today  Patient able to perform hip exercises with 1 UE support  Patient required verbal cueing for hip exercises to improve exercise form  Patient able to complete core rotation and STS exercises with 5 lbs to increase muscular strength and improve balance  Hip abduction TB exercises were added to strengthen hip abductors due to valgus collapse during sit to stand exercises  Patient will benefit from skilled PT services to increase her muscular strength, endurance, balance, and functional mobility      Plan: Continue per plan of care  Precautions:  has a past medical history of Arthritis, Capsulitis of foot, Diabetes ([facility] ), Full dentures, Ganglion, Hearing decreased, Hyperlipidemia, Hypertension, Magnesium deficiency, Sciatica, and Wears glasses

## 2020-02-28 ENCOUNTER — TELEPHONE (OUTPATIENT)
Dept: FAMILY MEDICINE CLINIC | Facility: CLINIC | Age: 75
End: 2020-02-28

## 2020-02-28 ENCOUNTER — OFFICE VISIT (OUTPATIENT)
Dept: PHYSICAL THERAPY | Facility: CLINIC | Age: 75
End: 2020-02-28
Payer: COMMERCIAL

## 2020-02-28 DIAGNOSIS — I63.9 CEREBROVASCULAR ACCIDENT (CVA), UNSPECIFIED MECHANISM (HCC): ICD-10-CM

## 2020-02-28 DIAGNOSIS — R26.89 BALANCE DISORDER: Primary | ICD-10-CM

## 2020-02-28 PROCEDURE — 97110 THERAPEUTIC EXERCISES: CPT | Performed by: PHYSICAL THERAPIST

## 2020-02-28 PROCEDURE — 97112 NEUROMUSCULAR REEDUCATION: CPT | Performed by: PHYSICAL THERAPIST

## 2020-02-28 RX ORDER — CLOPIDOGREL BISULFATE 75 MG/1
TABLET ORAL
COMMUNITY
Start: 2020-01-29 | End: 2020-07-28 | Stop reason: SDUPTHER

## 2020-02-28 NOTE — PROGRESS NOTES
Daily Note     Today's date: 2020  Patient name: Haylee Noriega  : 1945  MRN: [id number]  Referring provider: Dimas Peraza DO  Dx:   Encounter Diagnosis     ICD-10-CM    1  Balance disorder R26 89    2  C[facility]brovascular accident ([facility]), unspecified [facility] ([facility] ) I63 9                   Subjective: Patient reports feeling sore yesterday from therapy on , but is feeling well today       Objective: See treatment diary below    -Hip Flexion: x20  -Hip Abduction: x20  -Hip Extension: x20  -6" Hurdles: FWD, 3 cycles  -STS w/ 5 lb DB and TB (red) for abduction: 10 x 2  -Core Rotation w/ 5 lb DB: x20  -FTEC w/ foam: 4 x 30 seconds      Assessment: Patient tolerated treatment well today  Patient progressed to STS exercises with TB to provide cueing to prevent valgus knee collapse  During 6" Hurdles patient displayed bilateral circumduction gait pattern when going over the hurdles  After 3 cycles patient reported increased right leg pain and increased fatigue, therefore exercise was stopped  Patient josefina benefit from skilled PT services to increase her muscular strength, endurance, balance, and functional mobility  Plan: Continue per plan of care  Precautions:  has a past medical history of Arthritis, Capsulitis of foot, Diabetes ([facility] [address] ), Full dentures, Ganglion, Hearing decreased, Hyperlipidemia, Hypertension, Magnesium deficiency, Sciatica, and Wears glasses

## 2020-02-28 NOTE — TELEPHONE ENCOUNTER
Received fax from [employer] to have Discharge orders signed   Copy scanned into chart & original placed in [facility] team bin

## 2020-03-02 ENCOUNTER — APPOINTMENT (OUTPATIENT)
Dept: PHYSICAL THERAPY | Facility: CLINIC | Age: 75
End: 2020-03-02
Payer: COMMERCIAL

## 2020-03-04 ENCOUNTER — OFFICE VISIT (OUTPATIENT)
Dept: PHYSICAL THERAPY | Facility: CLINIC | Age: 75
End: 2020-03-04
Payer: COMMERCIAL

## 2020-03-04 DIAGNOSIS — I63.9 CEREBROVASCULAR ACCIDENT (CVA), UNSPECIFIED MECHANISM (HCC): ICD-10-CM

## 2020-03-04 DIAGNOSIS — R26.89 BALANCE DISORDER: Primary | ICD-10-CM

## 2020-03-04 PROCEDURE — 97110 THERAPEUTIC EXERCISES: CPT | Performed by: PHYSICAL THERAPIST

## 2020-03-04 PROCEDURE — 97112 NEUROMUSCULAR REEDUCATION: CPT | Performed by: PHYSICAL THERAPIST

## 2020-03-04 NOTE — PROGRESS NOTES
Daily Note     Today's date: 3/4/2020  Patient name: Nicho Browne  : 1945  MRN: [id number]  Referring provider: Faina Frey DO  Dx:   Encounter Diagnosis     ICD-10-CM    1  Balance disorder R26 89    2  Cerebrovascular accident (CVA), unspecified mechanism ([facility] [address] ) I63 9        Start Time: 1015  Stop Time: 1100  Total time in clinic (min): 45 minutes    Subjective: Patient reports that she is feeling good today, reports that she drove today  Objective: See treatment diary below    -Hip Flexion: x20 bilaterally on foam today  -Hip Abduction: x20 bilaterally on foam today  -Hip Extension: x20 bilaterally on foam today  -6" Hurdles: FWD 10 cycles, LAT 3 cycles  -STS w/ 5 lb DB and TB (red) for abduction: 10 x 2  -Core Rotation w/ 5 lb DB: x20  -FTEC w/ foam: 3 x 60 seconds      Assessment: Patient tolerated treatment well today  Patient progressed to foam pad with hip exercises  Patient able to increase time with FTEC on foam and decrease swaying, demonstrating increased proprioceptive awareness  Patient required verbal cueing for Hurdles to prevent circumduction gait pattern  Patient will benefit from skilled PT services to increase her muscular strength, endurance, balance, and functional mobility  Plan: Continue per plan of care  Precautions:  has a past medical history of Arthritis, Capsulitis of foot, Diabetes ([facility] [address] ), Full dentures, Ganglion, Hearing decreased, Hyperlipidemia, Hypertension, Magnesium deficiency, Sciatica, and Wears glasses

## 2020-03-06 ENCOUNTER — OFFICE VISIT (OUTPATIENT)
Dept: PHYSICAL THERAPY | Facility: CLINIC | Age: 75
End: 2020-03-06
Payer: COMMERCIAL

## 2020-03-06 DIAGNOSIS — R26.89 BALANCE DISORDER: Primary | ICD-10-CM

## 2020-03-06 DIAGNOSIS — I63.9 CEREBROVASCULAR ACCIDENT (CVA), UNSPECIFIED MECHANISM (HCC): ICD-10-CM

## 2020-03-06 PROCEDURE — 97112 NEUROMUSCULAR REEDUCATION: CPT | Performed by: PHYSICAL THERAPIST

## 2020-03-06 PROCEDURE — 97110 THERAPEUTIC EXERCISES: CPT | Performed by: PHYSICAL THERAPIST

## 2020-03-06 NOTE — PROGRESS NOTES
Daily Note     Today's date: 3/6/2020  Patient name: Emperatriz Stevenson  : 1945  MRN: [id number]  Referring provider: Daniel Davis DO  Dx:   Encounter Diagnosis     ICD-10-CM    1  Balance disorder R26 89    2  Cerebrovascular accident (CVA), unspecified mechanism ([facility] [address] ) I63 9        Start Time: 1003  Stop Time: 1048  Total time in clinic (min): 45 minutes    Subjective: Patient reports that she is having R knee pain due to the weather  She notes that her pain was significant this morning  Objective: See treatment diary below    -Hip Flexion: x20 bilaterally on foam today; 1 UE   -Hip Abduction: x20 bilaterally on foam today; 1 UE  -Hip Extension: x20 bilaterally on foam today; 1 UE  -6" Hurdles: FWD 10 cycles, LAT 3 cycles (held today due to knee pain)  -STS w/ 5 lb DB and TB (red) for abduction: 10 x 2 (held today due to knee pain)  -Standing Core Rotation w/ 6 lb DB on foam, back supported: x10   -Seated Core rotation w/ 6 lb DB; x20  -Seated PNF diagonals w/ 6 lb DB; 20x each side --> therapist facilitating scapular depression on R side during shoulder elevation    -Anterior/Posterior Sway on foam, 1 UE; 30x  -Tandem stepping forward, firm surface, 1 UE; 10 feet x10 cycles   -FTEC w/ foam: 3 x 60 seconds    Assessment: Patient tolerated her treatment session well this date with less need for rest breaks  She was challenged with first trial of feet together EC on foam with frequent utilization of hip strategy to stay upright however with increased reps minimal postural sway noted  This demonstrates improvements made with vestibular system of balance  Regressed standing core rotation to sitting due to patient complaints of knee pain  Patient notes that her knee pain was 5/10 at end of session this date and her pain feels better then it did when she walked  Patient will benefit from skilled PT services to increase her muscular strength, endurance, balance, and functional mobility       Plan: Continue per plan of care  Precautions:  has a past medical history of Arthritis, Capsulitis of foot, Diabetes ([address] ), Full dentures, Ganglion, Hearing decreased, Hyperlipidemia, Hypertension, Magnesium deficiency, Sciatica, and Wears glasses

## 2020-03-09 ENCOUNTER — OFFICE VISIT (OUTPATIENT)
Dept: PHYSICAL THERAPY | Facility: CLINIC | Age: 75
End: 2020-03-09
Payer: COMMERCIAL

## 2020-03-09 DIAGNOSIS — I63.9 CEREBROVASCULAR ACCIDENT (CVA), UNSPECIFIED MECHANISM (HCC): ICD-10-CM

## 2020-03-09 DIAGNOSIS — R26.89 BALANCE DISORDER: Primary | ICD-10-CM

## 2020-03-09 PROCEDURE — 97140 MANUAL THERAPY 1/> REGIONS: CPT | Performed by: PHYSICAL THERAPIST

## 2020-03-09 NOTE — PROGRESS NOTES
Daily Note     Today's date: 3/9/2020  Patient name: Lois Jim  : 1945  MRN: [id number]  Referring provider: Lucy Lloyd DO  Dx:   Encounter Diagnosis     ICD-10-CM    1  Balance disorder R26 89    2  Cerebrovascular accident (CVA), unspecified mechanism ([facility] [address] ) I63 9                   Subjective: Patient reports that she was able to grocery shop and put away all groceries independently    Objective: See treatment diary below    -Hip Flexion: x20 bilaterally on foam today; 1 UE   -Hip Abduction: x20 bilaterally on foam today; 1 UE  -Hip Extension: x20 bilaterally on foam today; 1 UE  -FTEC w/ foam: 1 min x 3  -Tandem Ambulation: 10 ft x 10  -BOSU stand ups: FWD/LAT, x20      Not Performed    -6" Hurdles: FWD 10 cycles, LAT 3 cycles (held today due to knee pain)  -STS w/ 5 lb DB and TB (red) for abduction: 10 x 2 (held today due to knee pain)  -Standing Core Rotation w/ 6 lb DB on foam, back supported: x10   -Seated Core rotation w/ 6 lb DB; x20  -Seated PNF diagonals w/ 6 lb DB; 20x each side --> therapist facilitating scapular depression on R side during shoulder elevation    -Anterior/Posterior Sway on foam, 1 UE; 30x  -Tandem stepping forward, firm surface, 1 UE; 10 feet x10 cycles   -FTEC w/ foam: 3 x 60 seconds    Assessment: Patient tolerated treatment well today  Patient able to perform 3-way hip exercises standing on foam to challenge dynamic balance  Patient required 1 UE support while on foam, looking to decrease UE support in the future  Patient progressed to step ups on BOSU ball to challenge patients balance and improve muscular strength  Patient also started tandem ambulation, but needed 1 UE support to prevent lateral LOB  Patient will benefit from skilled PT services to improve her muscular strength, endurance, balance, and functional mobility      Plan: Continue per plan of care        Precautions:  has a past medical history of Arthritis, Capsulitis of foot, Diabetes ([facility] [address] ), Full dentures, Ganglion, Hearing decreased, Hyperlipidemia, Hypertension, Magnesium deficiency, Sciatica, and Wears glasses

## 2020-03-11 ENCOUNTER — OFFICE VISIT (OUTPATIENT)
Dept: PHYSICAL THERAPY | Facility: CLINIC | Age: 75
End: 2020-03-11
Payer: COMMERCIAL

## 2020-03-11 DIAGNOSIS — R26.89 BALANCE DISORDER: Primary | ICD-10-CM

## 2020-03-11 DIAGNOSIS — I63.9 CEREBROVASCULAR ACCIDENT (CVA), UNSPECIFIED MECHANISM (HCC): ICD-10-CM

## 2020-03-11 PROCEDURE — 97110 THERAPEUTIC EXERCISES: CPT

## 2020-03-11 PROCEDURE — 97112 NEUROMUSCULAR REEDUCATION: CPT

## 2020-03-11 PROCEDURE — 97116 GAIT TRAINING THERAPY: CPT

## 2020-03-11 NOTE — PROGRESS NOTES
Daily Note     Today's date: 3/11/2020  Patient name: Renetta Guerrero  : 1945  MRN: [id number]  Referring provider: Mary Wong DO  Dx:   Encounter Diagnosis     ICD-10-CM    1  Balance disorder R26 89    2  Cerebrovascular accident (CVA), unspecified mechanism ([facility] [address] ) I63 9                   Subjective: Patient reports that she was able to grocery shop and put away all groceries independently    Objective: See treatment diary below    Performed w/ 3 lb ankle weights  - Hip Flexion: x20 bilaterally on foam today; 1 UE   - Hip Abduction: x20 bilaterally on foam today; 1 UE  - Hip Extension: x20 bilaterally on foam today; 1 UE  - Step Ups from foam: 4", fwd/lat, haptic UE  - Tandem Ambulation: 10 ft x 10  - Sidestepping on firm with cone taps: 4 cycles, 0 UE, PT supervision  - Standing core rotations on foam: 6 lb DB, 15 reps, with back supported    Not Performed    -6" Hurdles: FWD 10 cycles, LAT 3 cycles (held today due to knee pain)  -STS w/ 5 lb DB and TB (red) for abduction: 10 x 2 (held today due to knee pain)  -Standing Core Rotation w/ 6 lb DB on foam, back supported: x10   -Seated Core rotation w/ 6 lb DB; x20  -Seated PNF diagonals w/ 6 lb DB; 20x each side --> therapist facilitating scapular depression on R side during shoulder elevation    -Anterior/Posterior Sway on foam, 1 UE; 30x  -Tandem stepping forward, firm surface, 1 UE; 10 feet x10 cycles   -FTEC w/ foam: 3 x 60 seconds    Assessment: Patient able to tolerate treatment session well today  She was challenged with sidestepping on firm with cone taps with increased postural sway and 2 LoB requiring UE support to maintain balance  She demonstrated 1 LoB posteriorly with core rotations on foam and reported "my arm is a little sore  Patient will benefit from skilled PT services to improve her muscular strength, endurance, balance, and functional mobility  Plan: Continue per plan of care        Precautions:  has a past medical history of Arthritis, Capsulitis of foot, Diabetes ([facility] [address] ), Full dentures, Ganglion, Hearing decreased, Hyperlipidemia, Hypertension, Magnesium deficiency, Sciatica, and Wears glasses

## 2020-03-13 ENCOUNTER — OFFICE VISIT (OUTPATIENT)
Dept: PHYSICAL THERAPY | Facility: CLINIC | Age: 75
End: 2020-03-13
Payer: COMMERCIAL

## 2020-03-13 DIAGNOSIS — I63.9 CEREBROVASCULAR ACCIDENT (CVA), UNSPECIFIED MECHANISM (HCC): ICD-10-CM

## 2020-03-13 DIAGNOSIS — R26.89 BALANCE DISORDER: Primary | ICD-10-CM

## 2020-03-13 PROCEDURE — 97112 NEUROMUSCULAR REEDUCATION: CPT | Performed by: PHYSICAL THERAPIST

## 2020-03-13 NOTE — PROGRESS NOTES
Daily Note     Today's date: 3/13/2020  Patient name: Vijay Rodarte  : 1945  MRN: [id number]  Referring provider: Román Wallace DO  Dx:   Encounter Diagnosis     ICD-10-CM    1  Balance disorder R26 89    2  Cerebrovascular accident (CVA), unspecified mechanism ([facility] [address] ) I63 9                   Subjective: Patient reports no complaints  Objective: See treatment diary below    Performed w/ 3 lb ankle weights  - Hip Flexion: x20 bilaterally on foam today; 1 UE   - Hip Abduction: x20 bilaterally on foam today; 1 UE  - Hip Extension: x20 bilaterally on foam today; 1 UE  - Step Taps from foam: 8", fwd, 1 fingertip support  - Step Ups from foam: 4", fwd/lat, haptic UE   - Tandem Ambulation: 10 ft x 10  - Sidestepping on firm with cone taps: 4 cycles, 0 UE, PT supervision  - 6" Hurdles: FWD and LAT 10 cycles, LAT 3 cycles    Not Performed:  - Standing core rotations on foam: 6 lb DB, 15 reps, with back supported  -STS w/ 5 lb DB and TB (red) for abduction: 10 x 2 (held today due to knee pain)  -Standing Core Rotation w/ 6 lb DB on foam, back supported: x10   -Seated Core rotation w/ 6 lb DB; x20  -Seated PNF diagonals w/ 6 lb DB; 20x each side --> therapist facilitating scapular depression on R side during shoulder elevation    -Anterior/Posterior Sway on foam, 1 UE; 30x  -Tandem stepping forward, firm surface, 1 UE; 10 feet x10 cycles   -FTEC w/ foam: 3 x 60 seconds    Assessment: Patient able to tolerate treatment session well today  Guarding required during all exercises without UE support  Staggering noted during tandem ambulation, pt close to the wall for haptic touch  Patient will benefit from skilled PT services to improve her muscular strength, endurance, balance, and functional mobility  Plan: Continue per plan of care        Precautions:  has a past medical history of Arthritis, Capsulitis of foot, Diabetes ([facility] [address] ), Full dentures, Ganglion, Hearing decreased, Hyperlipidemia, Hypertension, Magnesium deficiency, Sciatica, and Wears glasses

## 2020-03-16 ENCOUNTER — APPOINTMENT (OUTPATIENT)
Dept: PHYSICAL THERAPY | Facility: CLINIC | Age: 75
End: 2020-03-16
Payer: COMMERCIAL

## 2020-03-18 ENCOUNTER — APPOINTMENT (OUTPATIENT)
Dept: PHYSICAL THERAPY | Facility: CLINIC | Age: 75
End: 2020-03-18
Payer: COMMERCIAL

## 2020-03-20 ENCOUNTER — APPOINTMENT (OUTPATIENT)
Dept: PHYSICAL THERAPY | Facility: CLINIC | Age: 75
End: 2020-03-20
Payer: COMMERCIAL

## 2020-03-23 ENCOUNTER — APPOINTMENT (OUTPATIENT)
Dept: PHYSICAL THERAPY | Facility: CLINIC | Age: 75
End: 2020-03-23
Payer: COMMERCIAL

## 2020-03-25 ENCOUNTER — APPOINTMENT (OUTPATIENT)
Dept: PHYSICAL THERAPY | Facility: CLINIC | Age: 75
End: 2020-03-25
Payer: COMMERCIAL

## 2020-03-26 ENCOUNTER — TELEMEDICINE (OUTPATIENT)
Dept: FAMILY MEDICINE CLINIC | Facility: CLINIC | Age: 75
End: 2020-03-26
Payer: COMMERCIAL

## 2020-03-26 DIAGNOSIS — Z76.0 MEDICATION REFILL: ICD-10-CM

## 2020-03-26 DIAGNOSIS — E78.5 HYPERLIPIDEMIA: ICD-10-CM

## 2020-03-26 DIAGNOSIS — I10 ESSENTIAL HYPERTENSION: ICD-10-CM

## 2020-03-26 DIAGNOSIS — Z79.4 TYPE 2 DIABETES MELLITUS WITH DIABETIC POLYNEUROPATHY, WITH LONG-TERM CURRENT USE OF INSULIN (HCC): ICD-10-CM

## 2020-03-26 DIAGNOSIS — E11.9 TYPE 2 DIABETES MELLITUS WITHOUT COMPLICATION, WITHOUT LONG-TERM CURRENT USE OF INSULIN (HCC): ICD-10-CM

## 2020-03-26 DIAGNOSIS — E11.42 TYPE 2 DIABETES MELLITUS WITH DIABETIC POLYNEUROPATHY, WITH LONG-TERM CURRENT USE OF INSULIN (HCC): ICD-10-CM

## 2020-03-26 DIAGNOSIS — I63.9 STROKE (CEREBRUM) (HCC): ICD-10-CM

## 2020-03-26 PROCEDURE — 99213 OFFICE O/P EST LOW 20 MIN: CPT | Performed by: FAMILY MEDICINE

## 2020-03-26 PROCEDURE — 1160F RVW MEDS BY RX/DR IN RCRD: CPT | Performed by: FAMILY MEDICINE

## 2020-03-26 RX ORDER — GLIMEPIRIDE 4 MG/1
4 TABLET ORAL
Qty: 90 TABLET | Refills: 1 | Status: SHIPPED | OUTPATIENT
Start: 2020-03-26 | End: 2020-07-02

## 2020-03-26 RX ORDER — MAGNESIUM OXIDE 400 MG/1
1 TABLET ORAL 2 TIMES DAILY
Qty: 60 TABLET | Refills: 4 | Status: SHIPPED | OUTPATIENT
Start: 2020-03-26 | End: 2020-07-13 | Stop reason: SDUPTHER

## 2020-03-26 RX ORDER — ATORVASTATIN CALCIUM 40 MG/1
40 TABLET, FILM COATED ORAL DAILY
Qty: 90 TABLET | Refills: 3 | Status: SHIPPED | OUTPATIENT
Start: 2020-03-26 | End: 2020-07-02 | Stop reason: ALTCHOICE

## 2020-03-26 RX ORDER — QUINAPRIL 40 MG/1
40 TABLET ORAL
Qty: 90 TABLET | Refills: 1 | Status: SHIPPED | OUTPATIENT
Start: 2020-03-26 | End: 2020-07-28 | Stop reason: SDUPTHER

## 2020-03-27 ENCOUNTER — TELEPHONE (OUTPATIENT)
Dept: PHYSICAL THERAPY | Facility: CLINIC | Age: 75
End: 2020-03-27

## 2020-03-27 ENCOUNTER — APPOINTMENT (OUTPATIENT)
Dept: PHYSICAL THERAPY | Facility: CLINIC | Age: 75
End: 2020-03-27
Payer: COMMERCIAL

## 2020-03-27 NOTE — TELEPHONE ENCOUNTER
Patient reports she has not been to PT because "I have not been feeling well from my sinuses " She declined evisits/virtual visits and plans to return to PT "when my sinuses clear up "

## 2020-04-29 ENCOUNTER — TELEPHONE (OUTPATIENT)
Dept: PHYSICAL THERAPY | Facility: CLINIC | Age: 75
End: 2020-04-29

## 2020-05-26 LAB
LEFT EYE DIABETIC RETINOPATHY: NORMAL
RIGHT EYE DIABETIC RETINOPATHY: NORMAL

## 2020-05-28 ENCOUNTER — TELEPHONE (OUTPATIENT)
Dept: FAMILY MEDICINE CLINIC | Facility: CLINIC | Age: 75
End: 2020-05-28

## 2020-06-03 ENCOUNTER — TELEPHONE (OUTPATIENT)
Dept: NEUROLOGY | Facility: CLINIC | Age: 75
End: 2020-06-03

## 2020-06-05 ENCOUNTER — TELEMEDICINE (OUTPATIENT)
Dept: GASTROENTEROLOGY | Facility: CLINIC | Age: 75
End: 2020-06-05
Payer: COMMERCIAL

## 2020-06-05 DIAGNOSIS — K21.9 GASTROESOPHAGEAL REFLUX DISEASE WITHOUT ESOPHAGITIS: Primary | ICD-10-CM

## 2020-06-05 DIAGNOSIS — D50.9 IRON DEFICIENCY ANEMIA, UNSPECIFIED IRON DEFICIENCY ANEMIA TYPE: ICD-10-CM

## 2020-06-05 PROCEDURE — 99442 PR PHYS/QHP TELEPHONE EVALUATION 11-20 MIN: CPT | Performed by: INTERNAL MEDICINE

## 2020-06-09 ENCOUNTER — TELEPHONE (OUTPATIENT)
Dept: FAMILY MEDICINE CLINIC | Facility: CLINIC | Age: 75
End: 2020-06-09

## 2020-06-10 ENCOUNTER — TELEPHONE (OUTPATIENT)
Dept: GASTROENTEROLOGY | Facility: AMBULARY SURGERY CENTER | Age: 75
End: 2020-06-10

## 2020-06-15 ENCOUNTER — TELEPHONE (OUTPATIENT)
Dept: GASTROENTEROLOGY | Facility: AMBULARY SURGERY CENTER | Age: 75
End: 2020-06-15

## 2020-06-17 LAB
ALBUMIN SERPL-MCNC: 4.2 G/DL (ref 3.7–4.7)
ALBUMIN/GLOB SERPL: 2 {RATIO} (ref 1.2–2.2)
ALP SERPL-CCNC: 67 IU/L (ref 39–117)
ALT SERPL-CCNC: 14 IU/L (ref 0–32)
AST SERPL-CCNC: 14 IU/L (ref 0–40)
BASOPHILS # BLD AUTO: 0 X10E3/UL (ref 0–0.2)
BASOPHILS NFR BLD AUTO: 1 %
BILIRUB SERPL-MCNC: 0.4 MG/DL (ref 0–1.2)
BUN SERPL-MCNC: 26 MG/DL (ref 8–27)
BUN/CREAT SERPL: 20 (ref 12–28)
CALCIUM SERPL-MCNC: 11 MG/DL (ref 8.7–10.3)
CHLORIDE SERPL-SCNC: 102 MMOL/L (ref 96–106)
CO2 SERPL-SCNC: 22 MMOL/L (ref 20–29)
CREAT SERPL-MCNC: 1.31 MG/DL (ref 0.57–1)
EOSINOPHIL # BLD AUTO: 0.3 X10E3/UL (ref 0–0.4)
EOSINOPHIL NFR BLD AUTO: 5 %
ERYTHROCYTE [DISTWIDTH] IN BLOOD BY AUTOMATED COUNT: 13.5 % (ref 11.7–15.4)
GLOBULIN SER-MCNC: 2.1 G/DL (ref 1.5–4.5)
GLUCOSE SERPL-MCNC: 82 MG/DL (ref 65–99)
HBA1C MFR BLD: 6.3 % (ref 4.8–5.6)
HCT VFR BLD AUTO: 30 % (ref 34–46.6)
HGB BLD-MCNC: 9.4 G/DL (ref 11.1–15.9)
IMM GRANULOCYTES # BLD: 0 X10E3/UL (ref 0–0.1)
IMM GRANULOCYTES NFR BLD: 0 %
LYMPHOCYTES # BLD AUTO: 1.3 X10E3/UL (ref 0.7–3.1)
LYMPHOCYTES NFR BLD AUTO: 21 %
MCH RBC QN AUTO: 27.3 PG (ref 26.6–33)
MCHC RBC AUTO-ENTMCNC: 31.3 G/DL (ref 31.5–35.7)
MCV RBC AUTO: 87 FL (ref 79–97)
MONOCYTES # BLD AUTO: 0.6 X10E3/UL (ref 0.1–0.9)
MONOCYTES NFR BLD AUTO: 9 %
NEUTROPHILS # BLD AUTO: 3.9 X10E3/UL (ref 1.4–7)
NEUTROPHILS NFR BLD AUTO: 64 %
PLATELET # BLD AUTO: 461 X10E3/UL (ref 150–450)
POTASSIUM SERPL-SCNC: 4.7 MMOL/L (ref 3.5–5.2)
PROT SERPL-MCNC: 6.3 G/DL (ref 6–8.5)
RBC # BLD AUTO: 3.44 X10E6/UL (ref 3.77–5.28)
SL AMB EGFR AFRICAN AMERICAN: 45 ML/MIN/1.73
SL AMB EGFR NON AFRICAN AMERICAN: 39 ML/MIN/1.73
SODIUM SERPL-SCNC: 141 MMOL/L (ref 134–144)
WBC # BLD AUTO: 6.1 X10E3/UL (ref 3.4–10.8)

## 2020-06-22 DIAGNOSIS — Z11.59 SCREENING FOR VIRAL DISEASE: ICD-10-CM

## 2020-06-22 PROCEDURE — U0003 INFECTIOUS AGENT DETECTION BY NUCLEIC ACID (DNA OR RNA); SEVERE ACUTE RESPIRATORY SYNDROME CORONAVIRUS 2 (SARS-COV-2) (CORONAVIRUS DISEASE [COVID-19]), AMPLIFIED PROBE TECHNIQUE, MAKING USE OF HIGH THROUGHPUT TECHNOLOGIES AS DESCRIBED BY CMS-2020-01-R: HCPCS

## 2020-06-23 LAB — SARS-COV-2 RNA SPEC QL NAA+PROBE: NOT DETECTED

## 2020-06-25 ENCOUNTER — ANESTHESIA EVENT (OUTPATIENT)
Dept: GASTROENTEROLOGY | Facility: AMBULARY SURGERY CENTER | Age: 75
End: 2020-06-25

## 2020-06-26 ENCOUNTER — HOSPITAL ENCOUNTER (OUTPATIENT)
Dept: GASTROENTEROLOGY | Facility: AMBULARY SURGERY CENTER | Age: 75
Setting detail: OUTPATIENT SURGERY
Discharge: HOME | End: 2020-06-26
Attending: INTERNAL MEDICINE
Payer: COMMERCIAL

## 2020-06-26 ENCOUNTER — ANESTHESIA (OUTPATIENT)
Dept: GASTROENTEROLOGY | Facility: AMBULARY SURGERY CENTER | Age: 75
End: 2020-06-26

## 2020-06-26 VITALS
TEMPERATURE: 95.6 F | RESPIRATION RATE: 16 BRPM | HEIGHT: 60 IN | OXYGEN SATURATION: 98 % | SYSTOLIC BLOOD PRESSURE: 138 MMHG | DIASTOLIC BLOOD PRESSURE: 62 MMHG | HEART RATE: 67 BPM | BODY MASS INDEX: 29.64 KG/M2 | WEIGHT: 151 LBS

## 2020-06-26 DIAGNOSIS — D50.9 IRON DEFICIENCY ANEMIA, UNSPECIFIED IRON DEFICIENCY ANEMIA TYPE: ICD-10-CM

## 2020-06-26 DIAGNOSIS — K21.9 GASTROESOPHAGEAL REFLUX DISEASE WITHOUT ESOPHAGITIS: ICD-10-CM

## 2020-06-26 LAB — GLUCOSE SERPL-MCNC: 138 MG/DL (ref 65–140)

## 2020-06-26 PROCEDURE — 45380 COLONOSCOPY AND BIOPSY: CPT | Performed by: INTERNAL MEDICINE

## 2020-06-26 PROCEDURE — 88305 TISSUE EXAM BY PATHOLOGIST: CPT | Performed by: PATHOLOGY

## 2020-06-26 PROCEDURE — 43239 EGD BIOPSY SINGLE/MULTIPLE: CPT | Performed by: INTERNAL MEDICINE

## 2020-06-26 PROCEDURE — 82948 REAGENT STRIP/BLOOD GLUCOSE: CPT

## 2020-06-26 RX ORDER — PANTOPRAZOLE SODIUM 40 MG/1
40 TABLET, DELAYED RELEASE ORAL DAILY
Qty: 30 TABLET | Refills: 3 | Status: SHIPPED | OUTPATIENT
Start: 2020-06-26 | End: 2020-09-18

## 2020-06-26 RX ORDER — PROPOFOL 10 MG/ML
INJECTION, EMULSION INTRAVENOUS CONTINUOUS PRN
Status: DISCONTINUED | OUTPATIENT
Start: 2020-06-26 | End: 2020-06-26 | Stop reason: SURG

## 2020-06-26 RX ORDER — LIDOCAINE HYDROCHLORIDE 10 MG/ML
INJECTION, SOLUTION EPIDURAL; INFILTRATION; INTRACAUDAL; PERINEURAL AS NEEDED
Status: DISCONTINUED | OUTPATIENT
Start: 2020-06-26 | End: 2020-06-26 | Stop reason: SURG

## 2020-06-26 RX ORDER — PROPOFOL 10 MG/ML
INJECTION, EMULSION INTRAVENOUS AS NEEDED
Status: DISCONTINUED | OUTPATIENT
Start: 2020-06-26 | End: 2020-06-26 | Stop reason: SURG

## 2020-06-26 RX ORDER — SODIUM CHLORIDE, SODIUM LACTATE, POTASSIUM CHLORIDE, CALCIUM CHLORIDE 600; 310; 30; 20 MG/100ML; MG/100ML; MG/100ML; MG/100ML
75 INJECTION, SOLUTION INTRAVENOUS CONTINUOUS
Status: DISCONTINUED | OUTPATIENT
Start: 2020-06-26 | End: 2020-06-30 | Stop reason: HOSPADM

## 2020-06-26 RX ADMIN — PROPOFOL 150 MG: 10 INJECTION, EMULSION INTRAVENOUS at 09:03

## 2020-06-26 RX ADMIN — PROPOFOL 100 MCG/KG/MIN: 10 INJECTION, EMULSION INTRAVENOUS at 09:03

## 2020-06-26 RX ADMIN — SODIUM CHLORIDE, SODIUM LACTATE, POTASSIUM CHLORIDE, AND CALCIUM CHLORIDE 75 ML/HR: .6; .31; .03; .02 INJECTION, SOLUTION INTRAVENOUS at 08:58

## 2020-06-26 RX ADMIN — LIDOCAINE HYDROCHLORIDE 50 MG: 10 INJECTION, SOLUTION EPIDURAL; INFILTRATION; INTRACAUDAL; PERINEURAL at 09:03

## 2020-06-26 RX ADMIN — SODIUM CHLORIDE, SODIUM LACTATE, POTASSIUM CHLORIDE, AND CALCIUM CHLORIDE: .6; .31; .03; .02 INJECTION, SOLUTION INTRAVENOUS at 08:59

## 2020-06-30 ENCOUNTER — TELEPHONE (OUTPATIENT)
Dept: GASTROENTEROLOGY | Facility: AMBULARY SURGERY CENTER | Age: 75
End: 2020-06-30

## 2020-06-30 NOTE — TELEPHONE ENCOUNTER
Patients GI provider:  Dr Jack Best    Number to return call: (  [phone]    Reason for call: Pt calling to get biopsy results    Scheduled procedure/appointment date if applicable: Apt/procedure  NA

## 2020-07-02 ENCOUNTER — OFFICE VISIT (OUTPATIENT)
Dept: FAMILY MEDICINE CLINIC | Facility: CLINIC | Age: 75
End: 2020-07-02
Payer: COMMERCIAL

## 2020-07-02 VITALS
BODY MASS INDEX: 29.06 KG/M2 | TEMPERATURE: 98 F | HEART RATE: 76 BPM | HEIGHT: 60 IN | WEIGHT: 148 LBS | SYSTOLIC BLOOD PRESSURE: 116 MMHG | DIASTOLIC BLOOD PRESSURE: 70 MMHG | RESPIRATION RATE: 20 BRPM | OXYGEN SATURATION: 97 %

## 2020-07-02 DIAGNOSIS — M19.90 ARTHRITIS: ICD-10-CM

## 2020-07-02 DIAGNOSIS — N18.9 ACUTE KIDNEY INJURY SUPERIMPOSED ON CKD (HCC): ICD-10-CM

## 2020-07-02 DIAGNOSIS — E11.9 TYPE 2 DIABETES MELLITUS WITHOUT COMPLICATION, WITHOUT LONG-TERM CURRENT USE OF INSULIN (HCC): ICD-10-CM

## 2020-07-02 DIAGNOSIS — E78.5 HYPERLIPIDEMIA, UNSPECIFIED HYPERLIPIDEMIA TYPE: ICD-10-CM

## 2020-07-02 DIAGNOSIS — K21.9 GASTROESOPHAGEAL REFLUX DISEASE WITHOUT ESOPHAGITIS: ICD-10-CM

## 2020-07-02 DIAGNOSIS — D50.0 IRON DEFICIENCY ANEMIA DUE TO CHRONIC BLOOD LOSS: Primary | ICD-10-CM

## 2020-07-02 DIAGNOSIS — I63.9 CEREBROVASCULAR ACCIDENT (CVA), UNSPECIFIED MECHANISM (HCC): ICD-10-CM

## 2020-07-02 DIAGNOSIS — N17.9 ACUTE KIDNEY INJURY SUPERIMPOSED ON CKD (HCC): ICD-10-CM

## 2020-07-02 DIAGNOSIS — D64.9 ANEMIA: Primary | ICD-10-CM

## 2020-07-02 DIAGNOSIS — I10 ESSENTIAL HYPERTENSION: ICD-10-CM

## 2020-07-02 PROCEDURE — 3044F HG A1C LEVEL LT 7.0%: CPT | Performed by: FAMILY MEDICINE

## 2020-07-02 PROCEDURE — 1100F PTFALLS ASSESS-DOCD GE2>/YR: CPT | Performed by: FAMILY MEDICINE

## 2020-07-02 PROCEDURE — 4040F PNEUMOC VAC/ADMIN/RCVD: CPT | Performed by: FAMILY MEDICINE

## 2020-07-02 PROCEDURE — 3078F DIAST BP <80 MM HG: CPT | Performed by: FAMILY MEDICINE

## 2020-07-02 PROCEDURE — 4004F PT TOBACCO SCREEN RCVD TLK: CPT | Performed by: FAMILY MEDICINE

## 2020-07-02 PROCEDURE — 99215 OFFICE O/P EST HI 40 MIN: CPT | Performed by: FAMILY MEDICINE

## 2020-07-02 PROCEDURE — 3008F BODY MASS INDEX DOCD: CPT | Performed by: FAMILY MEDICINE

## 2020-07-02 PROCEDURE — 3288F FALL RISK ASSESSMENT DOCD: CPT | Performed by: FAMILY MEDICINE

## 2020-07-02 PROCEDURE — 2022F DILAT RTA XM EVC RTNOPTHY: CPT | Performed by: FAMILY MEDICINE

## 2020-07-02 PROCEDURE — 3066F NEPHROPATHY DOC TX: CPT | Performed by: FAMILY MEDICINE

## 2020-07-02 PROCEDURE — 3074F SYST BP LT 130 MM HG: CPT | Performed by: FAMILY MEDICINE

## 2020-07-02 PROCEDURE — 1160F RVW MEDS BY RX/DR IN RCRD: CPT | Performed by: FAMILY MEDICINE

## 2020-07-02 RX ORDER — FERROUS SULFATE TAB EC 324 MG (65 MG FE EQUIVALENT) 324 (65 FE) MG
324 TABLET DELAYED RESPONSE ORAL
Qty: 180 TABLET | Refills: 0 | Status: SHIPPED | OUTPATIENT
Start: 2020-07-02 | End: 2020-07-13 | Stop reason: SDUPTHER

## 2020-07-02 RX ORDER — GLIMEPIRIDE 4 MG/1
2 TABLET ORAL
Qty: 90 TABLET | Refills: 1
Start: 2020-07-02 | End: 2020-07-28 | Stop reason: SDUPTHER

## 2020-07-02 RX ORDER — PRAVASTATIN SODIUM 80 MG/1
80 TABLET ORAL DAILY
COMMUNITY
End: 2020-10-07

## 2020-07-02 NOTE — PROGRESS NOTES
Assessment/Plan:     Diagnoses and all orders for this visit:    Anemia  Comments: Follow up repeat CBC ordered by GI  Possibly due to CKD  Continue PO iron  Pt is currently asymptomatic  Most recent Hgb 9 4  Orders:  -     ferrous sulfate 324 (65 Fe) mg; Take 1 tablet (324 mg total) by mouth 2 (two) times a day before meals    Acute kidney injury superimposed on CKD ([facility] )  Comments:  Cr level is currently at baseline at 1 3  Will monitor  Cerebrovascular accident (CVA), unspecified mechanism ([facility] )        - Follow up with neurology  Continue Plavix, statin  Essential hypertension  Comments:  Controlled  Continue quinapril  Counseled on diet/exercise modifications  Gastroesophageal reflux disease without esophagitis  Comments:  Controlled on protonix  Hyperlipidemia, unspecified hyperlipidemia type  Comments:  Controlled  Continue pravastatin  Arthritis  Comments:  Controlled with OTC Tylenol use  Type 2 diabetes mellitus without complication, without long-term current use of insulin ([facility])  Comments:  Controlled  Given episodes of hypoglycemia, will decrease dose of glimepiride from 4mg to 2 mg  Continue to monitor blood sugars  Foot exam at next visit  Orders:  -     glimepiride (AMARYL) 4 mg tablet; Take 0 5 tablets (2 mg total) by mouth daily with breakfast      BMI Counseling: Body mass index is 28 9 kg/m²  Discussed with patient's BMI with her  The BMI is above normal  Nutrition recommendations include reducing portion sizes, decreasing overall calorie intake, 3-5 servings of fruits/vegetables daily and reducing fast food intake  Exercise recommendations include moderate aerobic physical activity for 150 minutes/week  Subjective:      Patient ID: Ricky Wood is a 66 y o  female  HPI   Jamie Callahan presents today to establish care and to discuss chronic medical conditions   Pt has history of type II DM, CVA, hypertension, ALEXANDREA on CKD, GERD, iron def anemia, hyperlipidemia, and arthritis  Pt was previously a pt of [facility], seeing Dr Beth Skinner  Today states she overall feels well  Recently had an EGD and colonoscopy done by GI for microscopic anemia  EGD showed mild duodenitis, gastritis and grade B esophagitis  Colonoscopy showed mild sigmoid diverticulosis and internal hemorrhoids  Biopsies pending  No acute source of anemia  Pt's last Hgb was 9 4  Repeat CBC and FOBT pending  She is currently taking protonix daily  She denies fatigue, SOB, weakness, HA/dizziness  Pt checks her blood sugars regularly at home and they have been in the normal range with some occasional low blood sugars  Her recent A1c is 6 3  She is currently on metformin and glimepiride  Hypertension has been controlled on quinapril and cholesterol controlled on pravastatin  Pt eats a well balanced diet and tries to walk for exercise  She denies  Pt had CVA at the beginning of this year  Currently on Plavix, statin  Denies any weakness, numbness/tingling, HA/dizziness, vision changes  Arthritis is controlled with PRN Tylenol      The following portions of the patient's history were reviewed and updated as appropriate: allergies, current medications, past family history, past medical history, past social history, past surgical history and problem list     Review of Systems   Constitutional: Negative for activity change, appetite change, chills, diaphoresis, fatigue and fever  HENT: Negative  Respiratory: Negative for cough, choking, chest tightness, shortness of breath and wheezing  Cardiovascular: Negative for chest pain, palpitations and leg swelling  Gastrointestinal: Negative for abdominal distention, abdominal pain, constipation, diarrhea, nausea and vomiting  Genitourinary: Negative for difficulty urinating, dyspareunia, dysuria, enuresis, flank pain, frequency, menstrual problem, pelvic pain and urgency  Musculoskeletal: Positive for arthralgias   Negative for back pain, gait problem, joint swelling, myalgias, neck pain and neck stiffness  Skin: Negative  Neurological: Negative for dizziness, tremors, syncope, facial asymmetry, weakness, light-headedness, numbness and headaches  Psychiatric/Behavioral: Negative            Objective:      /70   Pulse 76   Temp 98 °F (36 7 °C)   Resp 20   Ht 5' (1 524 m)   Wt 67 1 kg (148 lb)   SpO2 97%   BMI 28 90 kg/m²          Physical Exam

## 2020-07-08 NOTE — TELEPHONE ENCOUNTER
Patient Is aware of her results and going for testing she did ask that the slip be mailed to her so I will do that as well

## 2020-07-13 DIAGNOSIS — D64.9 ANEMIA: ICD-10-CM

## 2020-07-13 DIAGNOSIS — Z76.0 MEDICATION REFILL: ICD-10-CM

## 2020-07-13 RX ORDER — FERROUS SULFATE TAB EC 324 MG (65 MG FE EQUIVALENT) 324 (65 FE) MG
324 TABLET DELAYED RESPONSE ORAL
Qty: 180 TABLET | Refills: 0 | Status: SHIPPED | OUTPATIENT
Start: 2020-07-13 | End: 2020-09-03

## 2020-07-13 RX ORDER — MAGNESIUM OXIDE 400 MG/1
1 TABLET ORAL 2 TIMES DAILY
Qty: 60 TABLET | Refills: 4 | Status: ON HOLD | OUTPATIENT
Start: 2020-07-13 | End: 2021-06-14

## 2020-07-23 LAB
BASOPHILS # BLD AUTO: 0 X10E3/UL (ref 0–0.2)
BASOPHILS NFR BLD AUTO: 1 %
EOSINOPHIL # BLD AUTO: 0.3 X10E3/UL (ref 0–0.4)
EOSINOPHIL NFR BLD AUTO: 4 %
ERYTHROCYTE [DISTWIDTH] IN BLOOD BY AUTOMATED COUNT: 13.9 % (ref 11.7–15.4)
HCT VFR BLD AUTO: 32.3 % (ref 34–46.6)
HGB BLD-MCNC: 9.9 G/DL (ref 11.1–15.9)
IMM GRANULOCYTES # BLD: 0 X10E3/UL (ref 0–0.1)
IMM GRANULOCYTES NFR BLD: 1 %
LYMPHOCYTES # BLD AUTO: 1.6 X10E3/UL (ref 0.7–3.1)
LYMPHOCYTES NFR BLD AUTO: 23 %
MCH RBC QN AUTO: 25.4 PG (ref 26.6–33)
MCHC RBC AUTO-ENTMCNC: 30.7 G/DL (ref 31.5–35.7)
MCV RBC AUTO: 83 FL (ref 79–97)
MONOCYTES # BLD AUTO: 0.7 X10E3/UL (ref 0.1–0.9)
MONOCYTES NFR BLD AUTO: 9 %
NEUTROPHILS # BLD AUTO: 4.6 X10E3/UL (ref 1.4–7)
NEUTROPHILS NFR BLD AUTO: 62 %
PLATELET # BLD AUTO: 503 X10E3/UL (ref 150–450)
RBC # BLD AUTO: 3.89 X10E6/UL (ref 3.77–5.28)
WBC # BLD AUTO: 7.3 X10E3/UL (ref 3.4–10.8)

## 2020-07-24 LAB — HEMOCCULT STL QL IA: NEGATIVE

## 2020-07-28 ENCOUNTER — TELEPHONE (OUTPATIENT)
Dept: FAMILY MEDICINE CLINIC | Facility: CLINIC | Age: 75
End: 2020-07-28

## 2020-07-28 DIAGNOSIS — E11.9 TYPE 2 DIABETES MELLITUS WITHOUT COMPLICATION, WITHOUT LONG-TERM CURRENT USE OF INSULIN (HCC): ICD-10-CM

## 2020-07-28 DIAGNOSIS — I10 ESSENTIAL HYPERTENSION: ICD-10-CM

## 2020-07-28 DIAGNOSIS — Z79.4 TYPE 2 DIABETES MELLITUS WITH DIABETIC POLYNEUROPATHY, WITH LONG-TERM CURRENT USE OF INSULIN (HCC): ICD-10-CM

## 2020-07-28 DIAGNOSIS — I63.9 CEREBROVASCULAR ACCIDENT (CVA), UNSPECIFIED MECHANISM (HCC): Primary | ICD-10-CM

## 2020-07-28 DIAGNOSIS — E11.42 TYPE 2 DIABETES MELLITUS WITH DIABETIC POLYNEUROPATHY, WITH LONG-TERM CURRENT USE OF INSULIN (HCC): ICD-10-CM

## 2020-07-28 RX ORDER — CLOPIDOGREL BISULFATE 75 MG/1
75 TABLET ORAL DAILY
Qty: 90 TABLET | Refills: 0 | Status: SHIPPED | OUTPATIENT
Start: 2020-07-28 | End: 2020-09-09 | Stop reason: SDUPTHER

## 2020-07-28 RX ORDER — GLIMEPIRIDE 4 MG/1
2 TABLET ORAL
Qty: 90 TABLET | Refills: 1
Start: 2020-07-28 | End: 2020-12-02 | Stop reason: ALTCHOICE

## 2020-07-28 RX ORDER — QUINAPRIL 40 MG/1
40 TABLET ORAL
Qty: 90 TABLET | Refills: 1 | Status: SHIPPED | OUTPATIENT
Start: 2020-07-28 | End: 2020-10-02 | Stop reason: SDUPTHER

## 2020-07-29 ENCOUNTER — TELEPHONE (OUTPATIENT)
Dept: GASTROENTEROLOGY | Facility: CLINIC | Age: 75
End: 2020-07-29

## 2020-07-29 ENCOUNTER — TELEPHONE (OUTPATIENT)
Dept: SURGICAL ONCOLOGY | Facility: CLINIC | Age: 75
End: 2020-07-29

## 2020-07-29 DIAGNOSIS — D75.839 THROMBOCYTOSIS: Primary | ICD-10-CM

## 2020-07-29 DIAGNOSIS — D64.9 ANEMIA, UNSPECIFIED TYPE: ICD-10-CM

## 2020-07-29 NOTE — TELEPHONE ENCOUNTER
----- Message from Trent Lyman MD sent at 7/28/2020  1:52 PM EDT -----  Please inform the patient that her hemoglobin is stable  Her platelet count has been pretty high  I would recommend that she follow-up with her PCP and consider referral to a hematologist to evaluate for other sources of anemia that are not related to GI blood loss  Please have the patient call with any questions or concerns

## 2020-07-29 NOTE — TELEPHONE ENCOUNTER
----- Message from Jazmin Mcclellan MD sent at 7/28/2020  5:38 PM EDT -----  Please inform the patient that her stool tests did not show any blood which is good news  It is possible that she should receive IV iron infusions  Additionally it is possible that she may have had slow bleeding from the inflammation in her esophagus while she is on her Plavix  We will continue to monitor her blood count, I would like to see her back in 2 months time in the office  Please have the patient call with any questions or concerns

## 2020-07-29 NOTE — PROGRESS NOTES
Called pt and discussed recent blood work which was done by her GI, Dr Lavonne Sanford  She continues to be anemic and now platelet count has been increasing  Will have Carla evaluated by heme-onc  Referral placed in chart

## 2020-07-29 NOTE — TELEPHONE ENCOUNTER
New Patient Encounter    New Patient Intake Form   Patient Details:  Sammy Garzon  1945  [id number]    Background Information:  [address] starts by opening a telephone encounter and gathering the following information   Who is calling to schedule? If not self, relationship to patient? self   Referring Provider Dr Dennis Muñoz   What is the diagnosis? Thrombocytosis and Anemia   Is this diagnosis confirmed? Yes   When was the diagnosis? 7/29   Is there a confirmed diagnosis from a biopsy/tissue reviewed by pathology? na   Is patient aware of diagnosis? Yes   Is there a personal history and what kind? na   Is there a family history and what kind? na   Reason for visit? New Diagnosis   Have you had any imaging or labs done? If so: when, where? yes  [facility]   Are records in [email or web address]? yes   Was the patient told to bring a disk? no   Does the patient smoke or Vape? no   If yes, how many packs or cartridges per day? na   Scheduling Information:   Preferred [place]:  [facility]     Are there any dates/time the patient cannot be seen? na   Miscellaneous: na   After completing the above information, please route to Financial Counselor and the appropriate Nurse Navigator for review

## 2020-07-30 DIAGNOSIS — I63.9 CEREBROVASCULAR ACCIDENT (CVA), UNSPECIFIED MECHANISM (HCC): ICD-10-CM

## 2020-07-31 RX ORDER — CLOPIDOGREL BISULFATE 75 MG/1
75 TABLET ORAL DAILY
Qty: 90 TABLET | Refills: 0 | OUTPATIENT
Start: 2020-07-31

## 2020-08-14 ENCOUNTER — TELEPHONE (OUTPATIENT)
Dept: GASTROENTEROLOGY | Facility: CLINIC | Age: 75
End: 2020-08-14

## 2020-08-23 DIAGNOSIS — E11.9 TYPE 2 DIABETES MELLITUS WITHOUT COMPLICATION, WITHOUT LONG-TERM CURRENT USE OF INSULIN (HCC): ICD-10-CM

## 2020-09-01 ENCOUNTER — CONSULT (OUTPATIENT)
Dept: HEMATOLOGY ONCOLOGY | Facility: MEDICAL CENTER | Age: 75
End: 2020-09-01
Payer: COMMERCIAL

## 2020-09-01 VITALS
DIASTOLIC BLOOD PRESSURE: 52 MMHG | RESPIRATION RATE: 16 BRPM | WEIGHT: 148.4 LBS | HEART RATE: 112 BPM | HEIGHT: 60 IN | OXYGEN SATURATION: 100 % | SYSTOLIC BLOOD PRESSURE: 120 MMHG | TEMPERATURE: 96.8 F | BODY MASS INDEX: 29.13 KG/M2

## 2020-09-01 DIAGNOSIS — D64.9 ANEMIA, UNSPECIFIED TYPE: ICD-10-CM

## 2020-09-01 DIAGNOSIS — D75.839 THROMBOCYTOSIS: ICD-10-CM

## 2020-09-01 PROBLEM — D50.9 IRON DEFICIENCY ANEMIA: Status: ACTIVE | Noted: 2020-09-01

## 2020-09-01 PROCEDURE — 99204 OFFICE O/P NEW MOD 45 MIN: CPT | Performed by: PHYSICIAN ASSISTANT

## 2020-09-01 PROCEDURE — 3078F DIAST BP <80 MM HG: CPT | Performed by: PHYSICIAN ASSISTANT

## 2020-09-01 RX ORDER — SODIUM CHLORIDE 9 MG/ML
20 INJECTION, SOLUTION INTRAVENOUS ONCE
Status: CANCELLED | OUTPATIENT
Start: 2020-09-15

## 2020-09-01 NOTE — PROGRESS NOTES
[facility] [address] [address]  Hematology Ambulatory Consult  Senia Truong, 1945, [id number]  9/1/2020    Assessment/Plan:  1  Thrombocytosis ([address] ); 2  Anemia, unspecified type  This is a 78-year-old female with history of iron deficiency found in January 2021 patient was hospitalized for stroke  Etiology of iron deficiency was never discovered  Patient has been on oral supplementation  Unfortunately, patient's hemoglobin has never rebounded back to a normal range  Patient is now at the Hematology Clinic for further evaluation  Patient's anemia is likely secondary to incomplete iron supplementation given that her RBCs arm microcytic and the patient has mild thrombocytopenia which can be seen in iron deficiency anemias  I have advised the patient to follow up with blood work tomorrow 9/2/20 which will include iron studies and CBC through [facility]   If these blood tests demonstrate true iron deficiency patient will keep appointments for IV iron replacement  I discussed with the patient different formulations of IV iron including Venofer and Feraheme  Patient understands that at depending on insurance coverage, Georga Kenrick will be the treatment of choice however, this will be substituted for Venofer if insurance dictates  I reviewed with the patient that both formulations result similarly  We discussed the differences in the amount of time needed for administration  Patient obviously would prefer to infusion room visits verses seven but is accepting of seven weekly infusions of necessary  I discussed with the patient potential side effects of IV iron which include but are not limited to infusion site reactions, headache, nausea, hypotension and anaphylaxis  Patient wishes to proceed  This appointment will be made for the patient today    However if blood work does not demonstrate iron deficiency then they will be canceled  I reviewed with the patient that once IV iron infusions start, she should discontinue oral supplementation  I recommended instead a daily supplement, multivitamin with iron  Patient has seen Dr Leny Landon in the past and I will get his most recent note as well as results from studies including cystoscopy however I have asked the patient to undergo urinalysis to rule out hematuria  Etiology:  Etiology of anemia is likely iron deficiency secondary to malabsorption issue due to PPI use plus an insult prior to January 2020  Oral supplementation has failed to bring the patient's hemoglobin back up to normal range  Colonoscopy and endoscopy did not reveal blood-loss anemia source  Patient has never had a capsule endoscopy  Last fecal occult blood test was completed in July 2020, no additional workup needed at this time  - Ambulatory referral to Hematology / Oncology  - CBC and differential; Future  - Iron Saturation %; Future  - TIBC; Future  - Iron; Future  - Ferritin; Future  - CBC and differential; Future      The patient is scheduled for follow-up in approximately 3 months with Dr Giovany Wilkinson  Patient voiced agreement and understanding to the above  Patient knows to call the Hematology/Oncology office with any questions and concerns regarding the above  I have spent 60 minutes with Patient and family today in which greater than 50% of this time was spent in counseling/coordination of care regarding Diagnostic results, Prognosis, Intructions for management and Impressions  Barrier(s) to care: None  The patient is able to self care     -------------------------------------------------------------------------------------------------------    Chief Complaint   Patient presents with    Consult     thrombocytopenia and anemia     Referring provider:   Marlene Bergman MD  [address] [place] [address]    History of present illness:    This is a 66year old female with past medical history of diabetes well controlled, GERD on Protonix therapy, hypertension, hyperlipidemia who was found to be iron deficient in January 2020 when hospitalized after having a stroke  Patient was hospitalized after having altered mental status changes  Patient was back to mental status baseline after 16 hours  On admission, the patient had a hemoglobin of 7 3 grams/deciliter and was very microcytic with high RDW  Patient was transfused with 2 units of packed red blood cells in the emergency room  Patient also underwent GI consultation and had a colonoscopy as well as an endoscopy after discharge that did not demonstrate etiology of iron deficiency  Iron studies in January 2020 demonstrated iron saturation of 3% with ferritin of 6 ng/dL  At discharge patient was started on oral iron supplementation twice a day  Patient initially had a lot of constipation but after being on it for some time, this seemed to even now  Patient continue to have follow-up with GI physicians  Follow-up with fecal occult blood test was negative  Patient had a urinalysis completed during hospitalization in January 2020 but follow-up urinalysis has not been completed  Patient denies blood-loss vaginally  Interval history:  Presently the patient notes that she has mild fatigue however she does not have any other significant symptoms  Patient is happy with new physicians following up with the [address]  Review of Systems   Constitutional: Positive for fatigue  Negative for appetite change, fever and unexpected weight change  HENT: Negative for nosebleeds  Respiratory: Negative for cough, choking and shortness of breath  Negative hemoptysis  Cardiovascular: Negative for chest pain, palpitations and leg swelling  Gastrointestinal: Negative  Negative for abdominal distention, abdominal pain, anal bleeding, blood in stool, constipation, diarrhea, nausea and vomiting  Endocrine: Negative  Negative for cold intolerance  Genitourinary: Negative  Negative for hematuria, menstrual problem, vaginal bleeding, vaginal discharge and vaginal pain  Musculoskeletal: Negative  Negative for arthralgias, myalgias, neck pain and neck stiffness  Skin: Negative  Negative for color change, pallor and rash  Allergic/Immunologic: Negative  Negative for immunocompromised state  Neurological: Negative  Negative for weakness and headaches  Hematological: Negative for adenopathy  Does not bruise/bleed easily  All other systems reviewed and are negative  Patient Active Problem List   Diagnosis    Combined forms of age-related cataract of left eye    DMII (diabetes mellitus, type 2) ([facility] )    Hyperlipidemia    Stroke (cerebrum) ([facility] )    Anemia    Family history of tobacco abuse    History of tobacco abuse    [facility]    Arthritis    Hypertension    GERD (gastroesophageal reflux disease)    Iron deficiency anemia       Past Medical History:   Diagnosis Date    Arthritis     Capsulitis of foot     Last assessed 07/29/13    Diabetes ([facility] )     type 2    Full dentures     Ganglion     Last assessed 07/29/13    Hearing decreased     Hyperlipidemia     Hypertension     Magnesium deficiency     Sciatica     Wears glasses        Past Surgical History:   Procedure Laterality Date    CATARACT EXTRACTION      COLONOSCOPY      DILATION AND CURETTAGE OF UTERUS      x 4    HAND SURGERY Left     fx repaired w/wrist bone    HYSTERECTOMY      left ovary remains    KNEE ARTHROSCOPY Left     LIPOMA RESECTION      removed from back and left buttock    MD XCAPSL CTRC RMVL INSJ IO LENS PROSTH W/O ECP Right 1/24/2019    Procedure: EXTRACTION EXTRACAPSULAR CATARACT PHACO INTRAOCULAR LENS (IOL);   Surgeon: Vernon Tristan MD;  [facility] MA[facility];  Service: Ophthalmology    MD XCAPSL CTRC RMVL INSJ IO LENS PROSTH W/O ECP Left 2/14/2019    Procedure: EXTRACTION EXTRACAPSULAR CATARACT PHACO INTRAOCULAR LENS (IOL);   Surgeon: Ronel Merino MD;  Location: [facility] MAIN OR;  Service: Ophthalmology    TONSILLECTOMY      and adenoids    TUBAL LIGATION         Family History   Problem Relation Age of Onset    Leukemia Father     Early death Father 39        leukemia    Stomach cancer Maternal Grandfather     Diabetes Paternal Grandfather     Diabetes Sister         insulin dependent    Diabetes Brother         insulin    Diabetes Sister         insulin    Diabetes Sister         insulin     Social History     Socioeconomic History    Marital status:      Spouse name: None    Number of children: None    Years of education: None    Highest education level: None   Occupational History    None   Social Needs    Financial resource strain: None    Food insecurity     Worry: None     Inability: None    Transportation needs     Medical: None     Non-medical: None   Tobacco Use    Smoking status: Current Every Day Smoker     Packs/day: 0 25     Years: 35 00     Pack years: 8 75     Types: Cigarettes    Smokeless tobacco: Never Used   Substance and Sexual Activity    Alcohol use: Not Currently     Comment: Rarely    Drug use: No    Sexual activity: Not Currently   Lifestyle    Physical activity     Days per week: None     Minutes per session: None    Stress: None   Relationships    Social connections     Talks on phone: None     Gets together: None     Attends [employer] service: None     Active member of club or organization: None     Attends meetings of clubs or organizations: None     Relationship status: None    Intimate partner violence     Fear of current or ex partner: None     Emotionally abused: None     Physically abused: None     Forced sexual activity: None   Other Topics Concern    None   Social History Narrative    None       Current Outpatient Medications:     clopidogrel (PLAVIX) 75 mg tablet, Take 1 tablet (75 mg total) by mouth daily, Disp: 90 tablet, Rfl: 0    ferrous sulfate 324 (65 Fe) mg, Take 1 tablet (324 mg total) by mouth 2 (two) times a day before meals, Disp: 180 tablet, Rfl: 0    glimepiride (AMARYL) 4 mg tablet, Take 0 5 tablets (2 mg total) by mouth daily with breakfast, Disp: 90 tablet, Rfl: 1    ibuprofen (MOTRIN) 200 mg tablet, Take 600 mg by mouth every 6 (six) hours as needed for mild pain, Disp: , Rfl:     magnesium oxide (MAG-OX) 400 mg tablet, Take 1 tablet (400 mg total) by mouth 2 (two) times a day, Disp: 60 tablet, Rfl: 4    metFORMIN (GLUCOPHAGE) 1000 MG tablet, Take 1 tablet (1,000 mg total) by mouth 2 (two) times a day, Disp: 180 tablet, Rfl: 3    pantoprazole (PROTONIX) 40 mg tablet, Take 1 tablet (40 mg total) by mouth daily, Disp: 30 tablet, Rfl: 3    pravastatin (PRAVACHOL) 80 mg tablet, Take 80 mg by mouth daily, Disp: , Rfl:     quinapril (ACCUPRIL) 40 MG tablet, Take 1 tablet (40 mg total) by mouth daily at bedtime, Disp: 90 tablet, Rfl: 1    Allergies   Allergen Reactions    Aleve [Naproxen] Other (See Comments)     "weird" sensation entire body    Sulfa Antibiotics Rash     Objective:  /52 (BP Location: Left arm, Patient Position: Sitting, Cuff Size: Adult)   Pulse (!) 112   Temp (!) 96 8 °F (36 °C)   Resp 16   Ht 5' (1 524 m)   Wt 67 3 kg (148 lb 6 4 oz)   SpO2 100%   BMI 28 98 kg/m²   Physical Exam  Constitutional:       General: She is not in acute distress  Appearance: She is well-developed  HENT:      Head: Normocephalic and atraumatic  Eyes:      General: No scleral icterus  Pupils: Pupils are equal, round, and reactive to light  Cardiovascular:      Rate and Rhythm: Normal rate and regular rhythm  Heart sounds: No murmur  Pulmonary:      Effort: Pulmonary effort is normal  No respiratory distress  Skin:     General: Skin is warm  Coloration: Skin is not pale  Findings: No rash     Neurological:      Mental Status: She is alert and oriented to person, place, and time  Psychiatric:         Thought Content: Thought content normal        Result Review  Labs:  Lab Results   Component Value Date    WBC 7 3 07/22/2020    HGB 9 9 (L) 07/22/2020    HCT 32 3 (L) 07/22/2020    MCV 83 07/22/2020     (H) 07/22/2020     Lab Results   Component Value Date     11/22/2017    SODIUM 141 06/16/2020    K 4 7 06/16/2020     06/16/2020    CO2 22 06/16/2020    AGAP 7 01/10/2020    BUN 26 06/16/2020    CREATININE 1 31 (H) 06/16/2020    GLUC 82 06/16/2020    CALCIUM 9 0 01/10/2020    AST 14 06/16/2020    ALT 14 06/16/2020    ALKPHOS 60 01/10/2020    PROT 6 8 11/22/2017    TP 6 3 06/16/2020    BILITOT 0 3 11/22/2017    TBILI 0 4 06/16/2020    EGFR 40 01/10/2020     Lab Results   Component Value Date    IRON 14 (L) 01/09/2020    TIBC 424 01/09/2020    FERRITIN 6 (L) 01/09/2020       Please note: This report has been generated by a voice recognition software system  Therefore there may be syntax, spelling, and/or grammatical errors  Please call if you have any questions

## 2020-09-03 DIAGNOSIS — D64.9 ANEMIA: ICD-10-CM

## 2020-09-03 LAB
BASOPHILS # BLD AUTO: 0 X10E3/UL (ref 0–0.2)
BASOPHILS NFR BLD AUTO: 1 %
EOSINOPHIL # BLD AUTO: 0.2 X10E3/UL (ref 0–0.4)
EOSINOPHIL NFR BLD AUTO: 4 %
ERYTHROCYTE [DISTWIDTH] IN BLOOD BY AUTOMATED COUNT: 15.5 % (ref 11.7–15.4)
FERRITIN SERPL-MCNC: 12 NG/ML (ref 15–150)
HCT VFR BLD AUTO: 29.9 % (ref 34–46.6)
HGB BLD-MCNC: 9.4 G/DL (ref 11.1–15.9)
IMM GRANULOCYTES # BLD: 0 X10E3/UL (ref 0–0.1)
IMM GRANULOCYTES NFR BLD: 0 %
IRON SATN MFR SERPL: 8 % (ref 15–55)
IRON SERPL-MCNC: 30 UG/DL (ref 27–139)
LYMPHOCYTES # BLD AUTO: 1.3 X10E3/UL (ref 0.7–3.1)
LYMPHOCYTES NFR BLD AUTO: 21 %
MCH RBC QN AUTO: 25.6 PG (ref 26.6–33)
MCHC RBC AUTO-ENTMCNC: 31.4 G/DL (ref 31.5–35.7)
MCV RBC AUTO: 82 FL (ref 79–97)
MONOCYTES # BLD AUTO: 0.5 X10E3/UL (ref 0.1–0.9)
MONOCYTES NFR BLD AUTO: 9 %
NEUTROPHILS # BLD AUTO: 3.9 X10E3/UL (ref 1.4–7)
NEUTROPHILS NFR BLD AUTO: 65 %
PLATELET # BLD AUTO: 442 X10E3/UL (ref 150–450)
RBC # BLD AUTO: 3.67 X10E6/UL (ref 3.77–5.28)
TIBC SERPL-MCNC: 369 UG/DL (ref 250–450)
UIBC SERPL-MCNC: 339 UG/DL (ref 118–369)
WBC # BLD AUTO: 6 X10E3/UL (ref 3.4–10.8)

## 2020-09-03 RX ORDER — SODIUM CHLORIDE 9 MG/ML
20 INJECTION, SOLUTION INTRAVENOUS ONCE
Status: CANCELLED | OUTPATIENT
Start: 2020-09-17

## 2020-09-03 RX ORDER — SODIUM CHLORIDE 9 MG/ML
20 INJECTION, SOLUTION INTRAVENOUS ONCE
Status: CANCELLED | OUTPATIENT
Start: 2020-09-03

## 2020-09-03 RX ORDER — FERROUS SULFATE TAB EC 324 MG (65 MG FE EQUIVALENT) 324 (65 FE) MG
324 TABLET DELAYED RESPONSE ORAL
Qty: 60 TABLET | Refills: 2 | Status: SHIPPED | OUTPATIENT
Start: 2020-09-03 | End: 2020-11-06 | Stop reason: SDUPTHER

## 2020-09-08 ENCOUNTER — TELEPHONE (OUTPATIENT)
Dept: HEMATOLOGY ONCOLOGY | Facility: CLINIC | Age: 75
End: 2020-09-08

## 2020-09-08 NOTE — TELEPHONE ENCOUNTER
Plavix was reviewed on med list during consult with ANTHONY Nolan  Patient is taking this medication as prescribed by her PCP with dx of CVA  She should continue as directed by that physician  thanks

## 2020-09-08 NOTE — TELEPHONE ENCOUNTER
Patient called to verify with Dr Melburn Canavan and AMANDA Christian that it is still ok for her to be on Plavix with a diagnosis of Thrombocytosis ([facility] ); 2  Anemia, unspecified type  No active signs of bleeding per patient  Please review with Dr Melburn Canavan  Ok to 's Oncology Nurse Triage

## 2020-09-09 DIAGNOSIS — I63.9 CEREBROVASCULAR ACCIDENT (CVA), UNSPECIFIED MECHANISM (HCC): ICD-10-CM

## 2020-09-09 RX ORDER — CLOPIDOGREL BISULFATE 75 MG/1
75 TABLET ORAL DAILY
Qty: 90 TABLET | Refills: 0 | Status: SHIPPED | OUTPATIENT
Start: 2020-09-09 | End: 2020-10-01

## 2020-09-15 ENCOUNTER — HOSPITAL ENCOUNTER (OUTPATIENT)
Dept: INFUSION CENTER | Facility: HOSPITAL | Age: 75
Discharge: HOME | End: 2020-09-15
Attending: INTERNAL MEDICINE

## 2020-09-16 RX ORDER — SODIUM CHLORIDE 9 MG/ML
20 INJECTION, SOLUTION INTRAVENOUS ONCE
Status: CANCELLED | OUTPATIENT
Start: 2020-09-24

## 2020-09-16 NOTE — PROGRESS NOTES
Patient is a self DC at this time with several phone calls with not wanting to return  Pt was self DC secondary to start of COVID 19

## 2020-09-17 ENCOUNTER — HOSPITAL ENCOUNTER (OUTPATIENT)
Dept: INFUSION CENTER | Facility: HOSPITAL | Age: 75
Discharge: HOME | End: 2020-09-17
Attending: INTERNAL MEDICINE
Payer: COMMERCIAL

## 2020-09-17 VITALS
OXYGEN SATURATION: 99 % | SYSTOLIC BLOOD PRESSURE: 128 MMHG | HEART RATE: 112 BPM | RESPIRATION RATE: 16 BRPM | TEMPERATURE: 96.7 F | DIASTOLIC BLOOD PRESSURE: 59 MMHG

## 2020-09-17 DIAGNOSIS — D50.9 IRON DEFICIENCY ANEMIA, UNSPECIFIED IRON DEFICIENCY ANEMIA TYPE: Primary | ICD-10-CM

## 2020-09-17 PROCEDURE — 96365 THER/PROPH/DIAG IV INF INIT: CPT

## 2020-09-17 RX ORDER — SODIUM CHLORIDE 9 MG/ML
20 INJECTION, SOLUTION INTRAVENOUS ONCE
Status: COMPLETED | OUTPATIENT
Start: 2020-09-17 | End: 2020-09-17

## 2020-09-17 RX ADMIN — IRON SUCROSE 200 MG: 20 INJECTION, SOLUTION INTRAVENOUS at 12:07

## 2020-09-17 RX ADMIN — SODIUM CHLORIDE 20 ML/HR: 9 INJECTION, SOLUTION INTRAVENOUS at 12:07

## 2020-09-18 DIAGNOSIS — K21.9 GASTROESOPHAGEAL REFLUX DISEASE WITHOUT ESOPHAGITIS: ICD-10-CM

## 2020-09-18 RX ORDER — PANTOPRAZOLE SODIUM 40 MG/1
TABLET, DELAYED RELEASE ORAL
Qty: 90 TABLET | Refills: 1 | Status: SHIPPED | OUTPATIENT
Start: 2020-09-18 | End: 2021-01-30

## 2020-09-24 ENCOUNTER — HOSPITAL ENCOUNTER (OUTPATIENT)
Dept: INFUSION CENTER | Facility: HOSPITAL | Age: 75
Discharge: HOME | End: 2020-09-24
Attending: INTERNAL MEDICINE
Payer: COMMERCIAL

## 2020-09-24 VITALS
OXYGEN SATURATION: 99 % | RESPIRATION RATE: 16 BRPM | TEMPERATURE: 97.6 F | DIASTOLIC BLOOD PRESSURE: 60 MMHG | HEART RATE: 114 BPM | SYSTOLIC BLOOD PRESSURE: 126 MMHG

## 2020-09-24 DIAGNOSIS — D50.9 IRON DEFICIENCY ANEMIA, UNSPECIFIED IRON DEFICIENCY ANEMIA TYPE: Primary | ICD-10-CM

## 2020-09-24 PROCEDURE — 96365 THER/PROPH/DIAG IV INF INIT: CPT

## 2020-09-24 RX ORDER — SODIUM CHLORIDE 9 MG/ML
20 INJECTION, SOLUTION INTRAVENOUS ONCE
Status: CANCELLED | OUTPATIENT
Start: 2020-10-01

## 2020-09-24 RX ORDER — SODIUM CHLORIDE 9 MG/ML
20 INJECTION, SOLUTION INTRAVENOUS ONCE
Status: COMPLETED | OUTPATIENT
Start: 2020-09-24 | End: 2020-09-24

## 2020-09-24 RX ADMIN — IRON SUCROSE 200 MG: 20 INJECTION, SOLUTION INTRAVENOUS at 12:14

## 2020-09-24 RX ADMIN — SODIUM CHLORIDE 20 ML/HR: 9 INJECTION, SOLUTION INTRAVENOUS at 12:14

## 2020-09-25 ENCOUNTER — OFFICE VISIT (OUTPATIENT)
Dept: GASTROENTEROLOGY | Facility: CLINIC | Age: 75
End: 2020-09-25
Payer: COMMERCIAL

## 2020-09-25 VITALS — HEIGHT: 60 IN | TEMPERATURE: 97.9 F | HEART RATE: 106 BPM | BODY MASS INDEX: 28.27 KG/M2 | WEIGHT: 144 LBS

## 2020-09-25 DIAGNOSIS — D50.9 IRON DEFICIENCY ANEMIA, UNSPECIFIED IRON DEFICIENCY ANEMIA TYPE: ICD-10-CM

## 2020-09-25 DIAGNOSIS — K21.00 GASTROESOPHAGEAL REFLUX DISEASE WITH ESOPHAGITIS: Primary | ICD-10-CM

## 2020-09-25 PROCEDURE — 99213 OFFICE O/P EST LOW 20 MIN: CPT | Performed by: INTERNAL MEDICINE

## 2020-09-25 NOTE — LETTER
September 25, 2020     Juarez Moreno MD  [facility] [address]    Patient: Edy Mccall   YOB: 1945   Date of Visit: 9/25/2020       Dear Dr Sajan Mendez:    Thank you for referring Jr Hood to me for evaluation  Below are my notes for this consultation  If you have questions, please do not hesitate to call me  I look forward to following your patient along with you  Sincerely,        Demetrio Capps MD        CC: No Recipients  Demetrio Capps MD  9/25/2020 10:00 AM  [facility]  Edy Mccall 66 y o  female MRN: [id number]            Assessment & Plan:    Pleasant 64-year-old female here for follow-up of GERD and iron deficiency anemia  1  GERD:  Currently well controlled with daily PPI therapy  -we will continue  -very small possibility that some of her microcytic anemia was result of severe esophagitis in the setting of Plavix  -no indication to repeat upper endoscopy at this time  -continue PPI therapy for 6 more months and then we will try to taper off    2  Iron deficiency anemia:  Does not appear to be a GI source, stools were negative for occult blood, EGD and colonoscopy as noted above relatively unremarkable  -patient is following with Hematology, will have repeat CBC in December  -possibly secondary to poor oral intake or malabsorption process not identified by endoscopic evaluation  -no indication for capsule endoscopy at this time if hemoglobin responds appropriately to iron infusions, if not then we will proceed with capsule endoscopy to exclude other sources of GI continued blood loss      There are no diagnoses linked to this encounter         _____________________________________________________________        CC: Follow-up of iron deficiency    HPI:  Edy Mccall is a 66 y  o female who is here for follow-up    As you know this is a pleasant 64-year-old female, initially presented with iron-deficiency anemia of unclear etiology  Endoscopy and colonoscopy were relatively unremarkable except for esophagitis, patient reports that her reflux symptoms, belching much improved after starting daily PPI therapy  She has started iron infusions with Hematology she notes after her 2nd infusions, her shortness of breath and fatigue has greatly improved  Patient denies any melena or rectal bleeding  Denies any abdominal pain  She has tried to modify her diet and has lost about 6 or 7 lb but still within a few lb of her baseline weight  ROS:  The remainder of the ROS was negative except for the pertinent positives mentioned in HPI  Allergies: Aleve [naproxen] and Sulfa antibiotics    Medications:   Current Outpatient Medications:     clopidogrel (PLAVIX) 75 mg tablet, Take 1 tablet (75 mg total) by mouth daily, Disp: 90 tablet, Rfl: 0    ferrous sulfate 324 (65 Fe) mg, TAKE 1 TABLET (324 MG TOTAL) BY MOUTH 2 (TWO) TIMES A DAY BEFORE MEALS, Disp: 60 tablet, Rfl: 2    glimepiride (AMARYL) 4 mg tablet, Take 0 5 tablets (2 mg total) by mouth daily with breakfast, Disp: 90 tablet, Rfl: 1    ibuprofen (MOTRIN) 200 mg tablet, Take 600 mg by mouth every 6 (six) hours as needed for mild pain, Disp: , Rfl:     magnesium oxide (MAG-OX) 400 mg tablet, Take 1 tablet (400 mg total) by mouth 2 (two) times a day, Disp: 60 tablet, Rfl: 4    metFORMIN (GLUCOPHAGE) 1000 MG tablet, Take 1 tablet (1,000 mg total) by mouth 2 (two) times a day, Disp: 180 tablet, Rfl: 3    pantoprazole (PROTONIX) 40 mg tablet, TAKE 1 TABLET BY MOUTH EVERY DAY, Disp: 90 tablet, Rfl: 1    pravastatin (PRAVACHOL) 80 mg tablet, Take 80 mg by mouth daily, Disp: , Rfl:     quinapril (ACCUPRIL) 40 MG tablet, Take 1 tablet (40 mg total) by mouth daily at bedtime, Disp: 90 tablet, Rfl: 1  No current facility-administered medications for this visit       Past Medical History:   Diagnosis Date    Anemia     Arthritis     Capsulitis of foot     Last assessed 07/29/13    Diabetes ([address] )     type 2    Full dentures     Ganglion     Last assessed 07/29/13    Hearing decreased     Hyperlipidemia     Hypertension     Magnesium deficiency     Sciatica     Wears glasses        Past Surgical History:   Procedure Laterality Date    CATARACT EXTRACTION      COLONOSCOPY      DILATION AND CURETTAGE OF UTERUS      x 4    HAND SURGERY Left     fx repaired w/wrist bone    HYSTERECTOMY      left ovary remains    KNEE ARTHROSCOPY Left     LIPOMA RESECTION      removed from back and left buttock    MO XCAPSL CTRC RMVL INSJ IO LENS PROSTH W/O ECP Right 1/24/2019    Procedure: EXTRACTION EXTRACAPSULAR CATARACT PHACO INTRAOCULAR LENS (IOL); Surgeon: Chetan Wei MD;  Location: [facility] MAIN OR;  Service: Ophthalmology    MO XCAPSL CTRC RMVL INSJ IO LENS PROSTH W/O ECP Left 2/14/2019    Procedure: EXTRACTION EXTRACAPSULAR CATARACT PHACO INTRAOCULAR LENS (IOL); Surgeon: Chetan Wei MD;  Location: [facility] MAIN OR;  Service: Ophthalmology    TONSILLECTOMY      and adenoids    TUBAL LIGATION         Family History   Problem Relation Age of Onset    Leukemia Father     Early death Father 39        leukemia    Stomach cancer Maternal Grandfather     Diabetes Paternal Grandfather     Diabetes Sister         insulin dependent    Diabetes Brother         insulin    Diabetes Sister         insulin    Diabetes Sister         insulin        reports that she has been smoking cigarettes  She has a 17 50 pack-year smoking history  She has never used smokeless tobacco  She reports previous alcohol use  She reports that she does not use drugs        Physical Exam:    Pulse (!) 106   Temp 97 9 °F (36 6 °C)   Ht 5' (1 524 m)   Wt 65 3 kg (144 lb)   BMI 28 12 kg/m²     Gen: wn/wd, NAD, pleasant elderly female  HEENT: anicteric, MMM, no cervical LAD  CVS: RRR, no m/r/g  CHEST: CTA b/l  ABD: +BS, soft, NT,ND, no hepatosplenomegaly  EXT: no c/c/e  NEURO: aaox3  SKIN: NO rashes

## 2020-09-25 NOTE — PROGRESS NOTES
[facility]  Oanh Reed 66 y o  female MRN: [id number]            Assessment & Plan:    Pleasant 66-year-old female here for follow-up of GERD and iron deficiency anemia  1  GERD:  Currently well controlled with daily PPI therapy  -we will continue  -very small possibility that some of her microcytic anemia was result of severe esophagitis in the setting of Plavix  -no indication to repeat upper endoscopy at this time  -continue PPI therapy for 6 more months and then we will try to taper off    2  Iron deficiency anemia:  Does not appear to be a GI source, stools were negative for occult blood, EGD and colonoscopy as noted above relatively unremarkable  -patient is following with Hematology, will have repeat CBC in December  -possibly secondary to poor oral intake or malabsorption process not identified by endoscopic evaluation  -no indication for capsule endoscopy at this time if hemoglobin responds appropriately to iron infusions, if not then we will proceed with capsule endoscopy to exclude other sources of GI continued blood loss      There are no diagnoses linked to this encounter         _____________________________________________________________        CC: Follow-up of iron deficiency    HPI:  Oanh Reed is a 66 y  o female who is here for follow-up  As you know this is a pleasant 66-year-old female, initially presented with iron-deficiency anemia of unclear etiology  Endoscopy and colonoscopy were relatively unremarkable except for esophagitis, patient reports that her reflux symptoms, belching much improved after starting daily PPI therapy  She has started iron infusions with Hematology she notes after her 2nd infusions, her shortness of breath and fatigue has greatly improved  Patient denies any melena or rectal bleeding  Denies any abdominal pain    She has tried to modify her diet and has lost about 6 or 7 lb but still within a few lb of her baseline weight  ROS:  The remainder of the ROS was negative except for the pertinent positives mentioned in HPI  Allergies: Aleve [naproxen] and Sulfa antibiotics    Medications:   Current Outpatient Medications:     clopidogrel (PLAVIX) 75 mg tablet, Take 1 tablet (75 mg total) by mouth daily, Disp: 90 tablet, Rfl: 0    ferrous sulfate 324 (65 Fe) mg, TAKE 1 TABLET (324 MG TOTAL) BY MOUTH 2 (TWO) TIMES A DAY BEFORE MEALS, Disp: 60 tablet, Rfl: 2    glimepiride (AMARYL) 4 mg tablet, Take 0 5 tablets (2 mg total) by mouth daily with breakfast, Disp: 90 tablet, Rfl: 1    ibuprofen (MOTRIN) 200 mg tablet, Take 600 mg by mouth every 6 (six) hours as needed for mild pain, Disp: , Rfl:     magnesium oxide (MAG-OX) 400 mg tablet, Take 1 tablet (400 mg total) by mouth 2 (two) times a day, Disp: 60 tablet, Rfl: 4    metFORMIN (GLUCOPHAGE) 1000 MG tablet, Take 1 tablet (1,000 mg total) by mouth 2 (two) times a day, Disp: 180 tablet, Rfl: 3    pantoprazole (PROTONIX) 40 mg tablet, TAKE 1 TABLET BY MOUTH EVERY DAY, Disp: 90 tablet, Rfl: 1    pravastatin (PRAVACHOL) 80 mg tablet, Take 80 mg by mouth daily, Disp: , Rfl:     quinapril (ACCUPRIL) 40 MG tablet, Take 1 tablet (40 mg total) by mouth daily at bedtime, Disp: 90 tablet, Rfl: 1  No current facility-administered medications for this visit       Past Medical History:   Diagnosis Date    Anemia     Arthritis     Capsulitis of foot     Last assessed 07/29/13    Diabetes ([address] )     type 2    Full dentures     Ganglion     Last assessed 07/29/13    Hearing decreased     Hyperlipidemia     Hypertension     Magnesium deficiency     Sciatica     Wears glasses        Past Surgical History:   Procedure Laterality Date    CATARACT EXTRACTION      COLONOSCOPY      DILATION AND CURETTAGE OF UTERUS      x 4    HAND SURGERY Left     fx repaired w/wrist bone    HYSTERECTOMY      left ovary remains    KNEE ARTHROSCOPY Left     LIPOMA RESECTION      removed from back and left buttock    WI XCAPSL CTRC RMVL INSJ IO LENS PROSTH W/O ECP Right 1/24/2019    Procedure: EXTRACTION EXTRACAPSULAR CATARACT PHACO INTRAOCULAR LENS (IOL); Surgeon: Dominique South MD;  Location: [facility] MAIN OR;  Service: Ophthalmology    WI XCAPSL CTRC RMVL INSJ IO LENS PROSTH W/O ECP Left 2/14/2019    Procedure: EXTRACTION EXTRACAPSULAR CATARACT PHACO INTRAOCULAR LENS (IOL); Surgeon: Dominique South MD;  Location: [facility] MAIN OR;  Service: Ophthalmology    TONSILLECTOMY      and adenoids    TUBAL LIGATION         Family History   Problem Relation Age of Onset    Leukemia Father     Early death Father 39        leukemia    Stomach cancer Maternal Grandfather     Diabetes Paternal Grandfather     Diabetes Sister         insulin dependent    Diabetes Brother         insulin    Diabetes Sister         insulin    Diabetes Sister         insulin        reports that she has been smoking cigarettes  She has a 17 50 pack-year smoking history  She has never used smokeless tobacco  She reports previous alcohol use  She reports that she does not use drugs        Physical Exam:    Pulse (!) 106   Temp 97 9 °F (36 6 °C)   Ht 5' (1 524 m)   Wt 65 3 kg (144 lb)   BMI 28 12 kg/m²     Gen: wn/wd, NAD, pleasant elderly female  HEENT: anicteric, MMM, no cervical LAD  CVS: RRR, no m/r/g  CHEST: CTA b/l  ABD: +BS, soft, NT,ND, no hepatosplenomegaly  EXT: no c/c/e  NEURO: aaox3  SKIN: NO rashes

## 2020-10-01 ENCOUNTER — HOSPITAL ENCOUNTER (OUTPATIENT)
Dept: INFUSION CENTER | Facility: HOSPITAL | Age: 75
Discharge: HOME | End: 2020-10-01
Attending: INTERNAL MEDICINE
Payer: COMMERCIAL

## 2020-10-01 VITALS
TEMPERATURE: 97.2 F | RESPIRATION RATE: 16 BRPM | SYSTOLIC BLOOD PRESSURE: 148 MMHG | DIASTOLIC BLOOD PRESSURE: 67 MMHG | HEART RATE: 117 BPM | OXYGEN SATURATION: 99 %

## 2020-10-01 DIAGNOSIS — D50.9 IRON DEFICIENCY ANEMIA, UNSPECIFIED IRON DEFICIENCY ANEMIA TYPE: Primary | ICD-10-CM

## 2020-10-01 DIAGNOSIS — I63.9 CEREBROVASCULAR ACCIDENT (CVA), UNSPECIFIED MECHANISM (HCC): ICD-10-CM

## 2020-10-01 PROCEDURE — 96365 THER/PROPH/DIAG IV INF INIT: CPT

## 2020-10-01 RX ORDER — SODIUM CHLORIDE 9 MG/ML
20 INJECTION, SOLUTION INTRAVENOUS ONCE
Status: CANCELLED | OUTPATIENT
Start: 2020-10-08

## 2020-10-01 RX ORDER — SODIUM CHLORIDE 9 MG/ML
20 INJECTION, SOLUTION INTRAVENOUS ONCE
Status: COMPLETED | OUTPATIENT
Start: 2020-10-01 | End: 2020-10-01

## 2020-10-01 RX ORDER — CLOPIDOGREL BISULFATE 75 MG/1
TABLET ORAL
Qty: 90 TABLET | Refills: 0 | Status: SHIPPED | OUTPATIENT
Start: 2020-10-01 | End: 2021-02-03 | Stop reason: SDUPTHER

## 2020-10-01 RX ADMIN — IRON SUCROSE 200 MG: 20 INJECTION, SOLUTION INTRAVENOUS at 12:17

## 2020-10-01 RX ADMIN — SODIUM CHLORIDE 20 ML/HR: 0.9 INJECTION, SOLUTION INTRAVENOUS at 12:17

## 2020-10-02 ENCOUNTER — OFFICE VISIT (OUTPATIENT)
Dept: FAMILY MEDICINE CLINIC | Facility: CLINIC | Age: 75
End: 2020-10-02
Payer: COMMERCIAL

## 2020-10-02 VITALS
HEIGHT: 60 IN | WEIGHT: 141 LBS | SYSTOLIC BLOOD PRESSURE: 128 MMHG | DIASTOLIC BLOOD PRESSURE: 66 MMHG | TEMPERATURE: 97 F | BODY MASS INDEX: 27.68 KG/M2 | RESPIRATION RATE: 16 BRPM | HEART RATE: 84 BPM

## 2020-10-02 DIAGNOSIS — Z79.4 TYPE 2 DIABETES MELLITUS WITH DIABETIC POLYNEUROPATHY, WITH LONG-TERM CURRENT USE OF INSULIN (HCC): ICD-10-CM

## 2020-10-02 DIAGNOSIS — E78.5 HYPERLIPIDEMIA, UNSPECIFIED HYPERLIPIDEMIA TYPE: ICD-10-CM

## 2020-10-02 DIAGNOSIS — Z00.00 MEDICARE ANNUAL WELLNESS VISIT, SUBSEQUENT: Primary | ICD-10-CM

## 2020-10-02 DIAGNOSIS — R00.0 TACHYCARDIA: ICD-10-CM

## 2020-10-02 DIAGNOSIS — M19.90 ARTHRITIS: ICD-10-CM

## 2020-10-02 DIAGNOSIS — K21.00 GASTROESOPHAGEAL REFLUX DISEASE WITH ESOPHAGITIS WITHOUT HEMORRHAGE: ICD-10-CM

## 2020-10-02 DIAGNOSIS — E11.42 TYPE 2 DIABETES MELLITUS WITH DIABETIC POLYNEUROPATHY, WITH LONG-TERM CURRENT USE OF INSULIN (HCC): ICD-10-CM

## 2020-10-02 DIAGNOSIS — I10 ESSENTIAL HYPERTENSION: ICD-10-CM

## 2020-10-02 DIAGNOSIS — D50.9 IRON DEFICIENCY ANEMIA, UNSPECIFIED IRON DEFICIENCY ANEMIA TYPE: ICD-10-CM

## 2020-10-02 PROCEDURE — 99214 OFFICE O/P EST MOD 30 MIN: CPT | Performed by: FAMILY MEDICINE

## 2020-10-02 PROCEDURE — 1125F AMNT PAIN NOTED PAIN PRSNT: CPT | Performed by: FAMILY MEDICINE

## 2020-10-02 PROCEDURE — 3725F SCREEN DEPRESSION PERFORMED: CPT | Performed by: FAMILY MEDICINE

## 2020-10-02 PROCEDURE — 1170F FXNL STATUS ASSESSED: CPT | Performed by: FAMILY MEDICINE

## 2020-10-02 PROCEDURE — G0439 PPPS, SUBSEQ VISIT: HCPCS | Performed by: FAMILY MEDICINE

## 2020-10-02 PROCEDURE — 1160F RVW MEDS BY RX/DR IN RCRD: CPT | Performed by: FAMILY MEDICINE

## 2020-10-02 PROCEDURE — 4004F PT TOBACCO SCREEN RCVD TLK: CPT | Performed by: FAMILY MEDICINE

## 2020-10-02 RX ORDER — QUINAPRIL 40 MG/1
40 TABLET ORAL
Qty: 90 TABLET | Refills: 1 | Status: SHIPPED | OUTPATIENT
Start: 2020-10-02 | End: 2021-05-03

## 2020-10-06 ENCOUNTER — TELEPHONE (OUTPATIENT)
Dept: FAMILY MEDICINE CLINIC | Facility: CLINIC | Age: 75
End: 2020-10-06

## 2020-10-06 DIAGNOSIS — E78.5 HYPERLIPIDEMIA, UNSPECIFIED HYPERLIPIDEMIA TYPE: Primary | ICD-10-CM

## 2020-10-07 RX ORDER — ATORVASTATIN CALCIUM 40 MG/1
40 TABLET, FILM COATED ORAL DAILY
Qty: 90 TABLET | Refills: 1 | Status: SHIPPED | OUTPATIENT
Start: 2020-10-07 | End: 2021-03-13

## 2020-10-08 ENCOUNTER — HOSPITAL ENCOUNTER (OUTPATIENT)
Dept: INFUSION CENTER | Facility: HOSPITAL | Age: 75
Discharge: HOME | End: 2020-10-08
Attending: INTERNAL MEDICINE
Payer: COMMERCIAL

## 2020-10-08 VITALS
DIASTOLIC BLOOD PRESSURE: 68 MMHG | HEART RATE: 108 BPM | OXYGEN SATURATION: 99 % | TEMPERATURE: 96.9 F | RESPIRATION RATE: 18 BRPM | SYSTOLIC BLOOD PRESSURE: 132 MMHG

## 2020-10-08 DIAGNOSIS — D50.9 IRON DEFICIENCY ANEMIA, UNSPECIFIED IRON DEFICIENCY ANEMIA TYPE: Primary | ICD-10-CM

## 2020-10-08 PROCEDURE — 96365 THER/PROPH/DIAG IV INF INIT: CPT

## 2020-10-08 RX ORDER — SODIUM CHLORIDE 9 MG/ML
20 INJECTION, SOLUTION INTRAVENOUS ONCE
Status: CANCELLED | OUTPATIENT
Start: 2020-10-15

## 2020-10-08 RX ORDER — SODIUM CHLORIDE 9 MG/ML
20 INJECTION, SOLUTION INTRAVENOUS ONCE
Status: COMPLETED | OUTPATIENT
Start: 2020-10-08 | End: 2020-10-08

## 2020-10-08 RX ADMIN — IRON SUCROSE 200 MG: 20 INJECTION, SOLUTION INTRAVENOUS at 12:18

## 2020-10-08 RX ADMIN — SODIUM CHLORIDE 20 ML/HR: 9 INJECTION, SOLUTION INTRAVENOUS at 12:18

## 2020-10-15 ENCOUNTER — HOSPITAL ENCOUNTER (OUTPATIENT)
Dept: INFUSION CENTER | Facility: HOSPITAL | Age: 75
Discharge: HOME | End: 2020-10-15
Attending: INTERNAL MEDICINE
Payer: COMMERCIAL

## 2020-10-15 VITALS
OXYGEN SATURATION: 96 % | SYSTOLIC BLOOD PRESSURE: 138 MMHG | HEART RATE: 110 BPM | RESPIRATION RATE: 16 BRPM | TEMPERATURE: 96.3 F | DIASTOLIC BLOOD PRESSURE: 61 MMHG

## 2020-10-15 DIAGNOSIS — D50.9 IRON DEFICIENCY ANEMIA, UNSPECIFIED IRON DEFICIENCY ANEMIA TYPE: Primary | ICD-10-CM

## 2020-10-15 PROCEDURE — 96365 THER/PROPH/DIAG IV INF INIT: CPT

## 2020-10-15 RX ORDER — SODIUM CHLORIDE 9 MG/ML
20 INJECTION, SOLUTION INTRAVENOUS ONCE
Status: CANCELLED | OUTPATIENT
Start: 2020-10-22

## 2020-10-15 RX ORDER — SODIUM CHLORIDE 9 MG/ML
20 INJECTION, SOLUTION INTRAVENOUS ONCE
Status: COMPLETED | OUTPATIENT
Start: 2020-10-15 | End: 2020-10-15

## 2020-10-15 RX ADMIN — SODIUM CHLORIDE 20 ML/HR: 9 INJECTION, SOLUTION INTRAVENOUS at 12:20

## 2020-10-15 RX ADMIN — IRON SUCROSE 200 MG: 20 INJECTION, SOLUTION INTRAVENOUS at 12:20

## 2020-10-20 ENCOUNTER — IMMUNIZATIONS (OUTPATIENT)
Dept: FAMILY MEDICINE CLINIC | Facility: CLINIC | Age: 75
End: 2020-10-20
Payer: COMMERCIAL

## 2020-10-20 VITALS — TEMPERATURE: 97.5 F

## 2020-10-20 DIAGNOSIS — Z23 NEED FOR VACCINATION: Primary | ICD-10-CM

## 2020-10-20 PROCEDURE — G0008 ADMIN INFLUENZA VIRUS VAC: HCPCS

## 2020-10-20 PROCEDURE — 90662 IIV NO PRSV INCREASED AG IM: CPT

## 2020-10-22 ENCOUNTER — HOSPITAL ENCOUNTER (OUTPATIENT)
Dept: INFUSION CENTER | Facility: HOSPITAL | Age: 75
Discharge: HOME | End: 2020-10-22
Attending: INTERNAL MEDICINE
Payer: COMMERCIAL

## 2020-10-22 VITALS
OXYGEN SATURATION: 98 % | SYSTOLIC BLOOD PRESSURE: 119 MMHG | HEART RATE: 98 BPM | DIASTOLIC BLOOD PRESSURE: 74 MMHG | RESPIRATION RATE: 16 BRPM | TEMPERATURE: 97.1 F

## 2020-10-22 DIAGNOSIS — D50.9 IRON DEFICIENCY ANEMIA, UNSPECIFIED IRON DEFICIENCY ANEMIA TYPE: Primary | ICD-10-CM

## 2020-10-22 PROCEDURE — 96365 THER/PROPH/DIAG IV INF INIT: CPT

## 2020-10-22 RX ORDER — SODIUM CHLORIDE 9 MG/ML
20 INJECTION, SOLUTION INTRAVENOUS ONCE
Status: CANCELLED | OUTPATIENT
Start: 2020-10-29

## 2020-10-22 RX ORDER — SODIUM CHLORIDE 9 MG/ML
20 INJECTION, SOLUTION INTRAVENOUS ONCE
Status: COMPLETED | OUTPATIENT
Start: 2020-10-22 | End: 2020-10-22

## 2020-10-22 RX ADMIN — SODIUM CHLORIDE 20 ML/HR: 0.9 INJECTION, SOLUTION INTRAVENOUS at 12:22

## 2020-10-22 RX ADMIN — IRON SUCROSE 200 MG: 20 INJECTION, SOLUTION INTRAVENOUS at 12:23

## 2020-10-27 ENCOUNTER — HOSPITAL ENCOUNTER (OUTPATIENT)
Dept: INFUSION CENTER | Facility: HOSPITAL | Age: 75
Discharge: HOME | End: 2020-10-27
Attending: INTERNAL MEDICINE
Payer: COMMERCIAL

## 2020-10-27 VITALS
SYSTOLIC BLOOD PRESSURE: 119 MMHG | RESPIRATION RATE: 18 BRPM | HEART RATE: 84 BPM | DIASTOLIC BLOOD PRESSURE: 58 MMHG | TEMPERATURE: 96.8 F | OXYGEN SATURATION: 100 %

## 2020-10-27 DIAGNOSIS — D50.9 IRON DEFICIENCY ANEMIA, UNSPECIFIED IRON DEFICIENCY ANEMIA TYPE: Primary | ICD-10-CM

## 2020-10-27 PROCEDURE — 96365 THER/PROPH/DIAG IV INF INIT: CPT

## 2020-10-27 RX ORDER — SODIUM CHLORIDE 9 MG/ML
20 INJECTION, SOLUTION INTRAVENOUS ONCE
Status: COMPLETED | OUTPATIENT
Start: 2020-10-27 | End: 2020-10-27

## 2020-10-27 RX ORDER — SODIUM CHLORIDE 9 MG/ML
20 INJECTION, SOLUTION INTRAVENOUS ONCE
Status: CANCELLED | OUTPATIENT
Start: 2020-10-27

## 2020-10-27 RX ADMIN — SODIUM CHLORIDE 20 ML/HR: 0.9 INJECTION, SOLUTION INTRAVENOUS at 12:20

## 2020-10-27 RX ADMIN — IRON SUCROSE 200 MG: 20 INJECTION, SOLUTION INTRAVENOUS at 12:21

## 2020-11-06 DIAGNOSIS — D64.9 ANEMIA: ICD-10-CM

## 2020-11-06 RX ORDER — FERROUS SULFATE TAB EC 324 MG (65 MG FE EQUIVALENT) 324 (65 FE) MG
324 TABLET DELAYED RESPONSE ORAL
Qty: 60 TABLET | Refills: 2 | Status: SHIPPED | OUTPATIENT
Start: 2020-11-06 | End: 2020-12-01

## 2020-11-27 LAB
ALBUMIN SERPL-MCNC: 4.2 G/DL (ref 3.7–4.7)
ALBUMIN/GLOB SERPL: 2.2 {RATIO} (ref 1.2–2.2)
ALP SERPL-CCNC: 75 IU/L (ref 39–117)
ALT SERPL-CCNC: 17 IU/L (ref 0–32)
AST SERPL-CCNC: 15 IU/L (ref 0–40)
BASOPHILS # BLD AUTO: 0 X10E3/UL (ref 0–0.2)
BASOPHILS NFR BLD AUTO: 1 %
BILIRUB SERPL-MCNC: 0.5 MG/DL (ref 0–1.2)
BUN SERPL-MCNC: 20 MG/DL (ref 8–27)
BUN/CREAT SERPL: 18 (ref 12–28)
CALCIUM SERPL-MCNC: 11.8 MG/DL (ref 8.7–10.3)
CHLORIDE SERPL-SCNC: 101 MMOL/L (ref 96–106)
CHOLEST SERPL-MCNC: 114 MG/DL (ref 100–199)
CO2 SERPL-SCNC: 27 MMOL/L (ref 20–29)
CREAT SERPL-MCNC: 1.13 MG/DL (ref 0.57–1)
EOSINOPHIL # BLD AUTO: 0.2 X10E3/UL (ref 0–0.4)
EOSINOPHIL NFR BLD AUTO: 3 %
ERYTHROCYTE [DISTWIDTH] IN BLOOD BY AUTOMATED COUNT: 17.7 % (ref 11.7–15.4)
FERRITIN SERPL-MCNC: 246 NG/ML (ref 15–150)
GLOBULIN SER-MCNC: 1.9 G/DL (ref 1.5–4.5)
GLUCOSE SERPL-MCNC: 106 MG/DL (ref 65–99)
HBA1C MFR BLD: 5.8 % (ref 4.8–5.6)
HCT VFR BLD AUTO: 35.3 % (ref 34–46.6)
HDLC SERPL-MCNC: 47 MG/DL
HGB BLD-MCNC: 11.9 G/DL (ref 11.1–15.9)
IMM GRANULOCYTES # BLD: 0 X10E3/UL (ref 0–0.1)
IMM GRANULOCYTES NFR BLD: 1 %
IRON SATN MFR SERPL: 63 % (ref 15–55)
IRON SERPL-MCNC: 164 UG/DL (ref 27–139)
LDLC SERPL CALC-MCNC: 41 MG/DL (ref 0–99)
LYMPHOCYTES # BLD AUTO: 1.4 X10E3/UL (ref 0.7–3.1)
LYMPHOCYTES NFR BLD AUTO: 21 %
MCH RBC QN AUTO: 30.7 PG (ref 26.6–33)
MCHC RBC AUTO-ENTMCNC: 33.7 G/DL (ref 31.5–35.7)
MCV RBC AUTO: 91 FL (ref 79–97)
MICRODELETION SYND BLD/T FISH: NORMAL
MICRODELETION SYND BLD/T FISH: NORMAL
MONOCYTES # BLD AUTO: 0.5 X10E3/UL (ref 0.1–0.9)
MONOCYTES NFR BLD AUTO: 7 %
NEUTROPHILS # BLD AUTO: 4.5 X10E3/UL (ref 1.4–7)
NEUTROPHILS NFR BLD AUTO: 67 %
PLATELET # BLD AUTO: 311 X10E3/UL (ref 150–450)
POTASSIUM SERPL-SCNC: 4.5 MMOL/L (ref 3.5–5.2)
PROT SERPL-MCNC: 6.1 G/DL (ref 6–8.5)
RBC # BLD AUTO: 3.87 X10E6/UL (ref 3.77–5.28)
SL AMB EGFR AFRICAN AMERICAN: 54 ML/MIN/1.73
SL AMB EGFR NON AFRICAN AMERICAN: 47 ML/MIN/1.73
SL AMB PDF IMAGE: NORMAL
SODIUM SERPL-SCNC: 140 MMOL/L (ref 134–144)
TIBC SERPL-MCNC: 262 UG/DL (ref 250–450)
TRIGL SERPL-MCNC: 153 MG/DL (ref 0–149)
UIBC SERPL-MCNC: 98 UG/DL (ref 118–369)
WBC # BLD AUTO: 6.5 X10E3/UL (ref 3.4–10.8)

## 2020-11-30 ENCOUNTER — DOCUMENTATION (OUTPATIENT)
Dept: HEMATOLOGY ONCOLOGY | Facility: MEDICAL CENTER | Age: 75
End: 2020-11-30

## 2020-12-01 ENCOUNTER — OFFICE VISIT (OUTPATIENT)
Dept: HEMATOLOGY ONCOLOGY | Facility: MEDICAL CENTER | Age: 75
End: 2020-12-01
Payer: COMMERCIAL

## 2020-12-01 VITALS
BODY MASS INDEX: 26.7 KG/M2 | DIASTOLIC BLOOD PRESSURE: 78 MMHG | OXYGEN SATURATION: 94 % | TEMPERATURE: 97.7 F | WEIGHT: 136 LBS | HEART RATE: 91 BPM | SYSTOLIC BLOOD PRESSURE: 148 MMHG | RESPIRATION RATE: 16 BRPM | HEIGHT: 60 IN

## 2020-12-01 DIAGNOSIS — D75.838 REACTIVE THROMBOCYTOSIS: ICD-10-CM

## 2020-12-01 DIAGNOSIS — D50.8 OTHER IRON DEFICIENCY ANEMIA: Primary | ICD-10-CM

## 2020-12-01 DIAGNOSIS — D64.9 ANEMIA: ICD-10-CM

## 2020-12-01 PROCEDURE — 1160F RVW MEDS BY RX/DR IN RCRD: CPT | Performed by: PHYSICIAN ASSISTANT

## 2020-12-01 PROCEDURE — 99215 OFFICE O/P EST HI 40 MIN: CPT | Performed by: PHYSICIAN ASSISTANT

## 2020-12-01 RX ORDER — FERROUS SULFATE TAB EC 324 MG (65 MG FE EQUIVALENT) 324 (65 FE) MG
324 TABLET DELAYED RESPONSE ORAL
Qty: 60 TABLET | Refills: 2 | Status: SHIPPED | OUTPATIENT
Start: 2020-12-01 | End: 2020-12-31

## 2020-12-02 ENCOUNTER — OFFICE VISIT (OUTPATIENT)
Dept: FAMILY MEDICINE CLINIC | Facility: CLINIC | Age: 75
End: 2020-12-02
Payer: COMMERCIAL

## 2020-12-02 VITALS
HEART RATE: 68 BPM | TEMPERATURE: 97.5 F | DIASTOLIC BLOOD PRESSURE: 64 MMHG | RESPIRATION RATE: 16 BRPM | WEIGHT: 133 LBS | BODY MASS INDEX: 26.11 KG/M2 | SYSTOLIC BLOOD PRESSURE: 122 MMHG | HEIGHT: 60 IN

## 2020-12-02 DIAGNOSIS — M25.561 CHRONIC PAIN OF RIGHT KNEE: ICD-10-CM

## 2020-12-02 DIAGNOSIS — K21.00 GASTROESOPHAGEAL REFLUX DISEASE WITH ESOPHAGITIS WITHOUT HEMORRHAGE: ICD-10-CM

## 2020-12-02 DIAGNOSIS — N17.9 ACUTE KIDNEY INJURY SUPERIMPOSED ON CKD (HCC): ICD-10-CM

## 2020-12-02 DIAGNOSIS — D50.9 IRON DEFICIENCY ANEMIA, UNSPECIFIED IRON DEFICIENCY ANEMIA TYPE: ICD-10-CM

## 2020-12-02 DIAGNOSIS — N18.9 ACUTE KIDNEY INJURY SUPERIMPOSED ON CKD (HCC): ICD-10-CM

## 2020-12-02 DIAGNOSIS — G89.29 CHRONIC PAIN OF RIGHT KNEE: ICD-10-CM

## 2020-12-02 DIAGNOSIS — E11.42 TYPE 2 DIABETES MELLITUS WITH DIABETIC POLYNEUROPATHY, WITHOUT LONG-TERM CURRENT USE OF INSULIN (HCC): Primary | ICD-10-CM

## 2020-12-02 DIAGNOSIS — I10 ESSENTIAL HYPERTENSION: ICD-10-CM

## 2020-12-02 PROCEDURE — 1160F RVW MEDS BY RX/DR IN RCRD: CPT | Performed by: FAMILY MEDICINE

## 2020-12-02 PROCEDURE — 3074F SYST BP LT 130 MM HG: CPT | Performed by: FAMILY MEDICINE

## 2020-12-02 PROCEDURE — 3078F DIAST BP <80 MM HG: CPT | Performed by: FAMILY MEDICINE

## 2020-12-02 PROCEDURE — 99214 OFFICE O/P EST MOD 30 MIN: CPT | Performed by: FAMILY MEDICINE

## 2020-12-02 PROCEDURE — 4004F PT TOBACCO SCREEN RCVD TLK: CPT | Performed by: FAMILY MEDICINE

## 2020-12-24 RX ORDER — GLIMEPIRIDE 4 MG/1
TABLET ORAL
Qty: 90 TABLET | Refills: 3 | OUTPATIENT
Start: 2020-12-24

## 2020-12-28 ENCOUNTER — EVALUATION (OUTPATIENT)
Dept: PHYSICAL THERAPY | Facility: CLINIC | Age: 75
End: 2020-12-28
Payer: COMMERCIAL

## 2020-12-28 DIAGNOSIS — M25.561 CHRONIC PAIN OF RIGHT KNEE: Primary | ICD-10-CM

## 2020-12-28 DIAGNOSIS — G89.29 CHRONIC PAIN OF RIGHT KNEE: Primary | ICD-10-CM

## 2020-12-28 PROCEDURE — 97110 THERAPEUTIC EXERCISES: CPT | Performed by: PHYSICAL THERAPIST

## 2020-12-28 PROCEDURE — 97161 PT EVAL LOW COMPLEX 20 MIN: CPT | Performed by: PHYSICAL THERAPIST

## 2020-12-31 DIAGNOSIS — D64.9 ANEMIA: ICD-10-CM

## 2020-12-31 RX ORDER — FERROUS SULFATE TAB EC 324 MG (65 MG FE EQUIVALENT) 324 (65 FE) MG
324 TABLET DELAYED RESPONSE ORAL
Qty: 60 TABLET | Refills: 2 | Status: SHIPPED | OUTPATIENT
Start: 2020-12-31 | End: 2021-01-29 | Stop reason: SDUPTHER

## 2021-01-04 ENCOUNTER — OFFICE VISIT (OUTPATIENT)
Dept: PHYSICAL THERAPY | Facility: CLINIC | Age: 76
End: 2021-01-04
Payer: COMMERCIAL

## 2021-01-04 DIAGNOSIS — M25.561 CHRONIC PAIN OF RIGHT KNEE: Primary | ICD-10-CM

## 2021-01-04 DIAGNOSIS — G89.29 CHRONIC PAIN OF RIGHT KNEE: Primary | ICD-10-CM

## 2021-01-04 PROCEDURE — 97140 MANUAL THERAPY 1/> REGIONS: CPT | Performed by: PHYSICAL THERAPIST

## 2021-01-04 PROCEDURE — 97110 THERAPEUTIC EXERCISES: CPT | Performed by: PHYSICAL THERAPIST

## 2021-01-04 NOTE — PROGRESS NOTES
Daily Note     Today's date: 2021  Patient name: Александр Hill  : 1945  MRN: [id number]  Referring provider: Othel Goldmann, MD  Dx:   Encounter Diagnosis     ICD-10-CM    1  Chronic pain of right knee  M25 561     G89 29                   Subjective: "feelign better"  Pt purchased new sneakers and feeling better      Objective: See treatment diary below      Assessment: Tolerated treatment well  Patient would benefit from continued PT  Challenged by strengthening exercises      Plan: Continue per plan of care        Precautions: DM II, Fall Risk      Manuals             PROM 10                                                   Neuro Re-Ed             Q Sets             SLS                                                                              Ther Ex             HEP             Bike 6'            TKE Blue :03 20            SLR 20            SL Hip Abd 20            Clamshells R :03 20            LAQ/SAQ 1 5# 20            SL Leg Press 35# 20            Stand Hip 3 way                                                    Ther Activity                                       Gait Training                                       Modalities             CP prn

## 2021-01-07 ENCOUNTER — OFFICE VISIT (OUTPATIENT)
Dept: PHYSICAL THERAPY | Facility: CLINIC | Age: 76
End: 2021-01-07
Payer: COMMERCIAL

## 2021-01-07 DIAGNOSIS — M25.561 CHRONIC PAIN OF RIGHT KNEE: Primary | ICD-10-CM

## 2021-01-07 DIAGNOSIS — G89.29 CHRONIC PAIN OF RIGHT KNEE: Primary | ICD-10-CM

## 2021-01-07 PROCEDURE — 97112 NEUROMUSCULAR REEDUCATION: CPT

## 2021-01-07 PROCEDURE — 97140 MANUAL THERAPY 1/> REGIONS: CPT

## 2021-01-07 PROCEDURE — 97110 THERAPEUTIC EXERCISES: CPT

## 2021-01-07 NOTE — PROGRESS NOTES
Daily Note     Today's date: 2021  Patient name: Sommer Quiroga  : 1945  MRN: [id number]  Referring provider: Char Cain MD  Dx:   Encounter Diagnosis     ICD-10-CM    1  Chronic pain of right knee  M25 561     G89 29        Start Time: 1500  Stop Time: 1545  Total time in clinic (min): 45 minutes    Subjective: Pt reports that she didn't sleep well last night due to noisy car traffic outside her house  She does feel that her R knee is improving  Objective: See treatment diary below      Assessment: Tolerated treatment well  Patient would benefit from continued PT  Quad lag noted during SLR, trialed Q set with max VC  Some soreness reported post session  Plan: Continue per plan of care        Precautions: DM II, Fall Risk      Manuals            PROM 10 10                                                  Neuro Re-Ed             Q Sets  10x :03           SLS  5x :05                                                                            Ther Ex             HEP             Bike 6' 6'           TKE Blue :03 20            SLR 20 20           SL Hip Abd 20 10           Rosangela R :03 20 R :03 20           LAQ/SAQ 1 5# 20 1 5# 20            SL Leg Press 35# 20 35# 20            Stand Hip 3 way  10 ea                                                  Ther Activity                                       Gait Training                                       Modalities             CP prn

## 2021-01-11 ENCOUNTER — OFFICE VISIT (OUTPATIENT)
Dept: PHYSICAL THERAPY | Facility: CLINIC | Age: 76
End: 2021-01-11
Payer: COMMERCIAL

## 2021-01-11 DIAGNOSIS — M25.561 CHRONIC PAIN OF RIGHT KNEE: Primary | ICD-10-CM

## 2021-01-11 DIAGNOSIS — G89.29 CHRONIC PAIN OF RIGHT KNEE: Primary | ICD-10-CM

## 2021-01-11 PROCEDURE — 97110 THERAPEUTIC EXERCISES: CPT

## 2021-01-11 PROCEDURE — 97140 MANUAL THERAPY 1/> REGIONS: CPT

## 2021-01-11 PROCEDURE — 97112 NEUROMUSCULAR REEDUCATION: CPT

## 2021-01-11 NOTE — PROGRESS NOTES
Daily Note     Today's date: 2021  Patient name: Nicho Browne  : 1945  MRN: [id number]  Referring provider: Lázaro Conde MD  Dx:   Encounter Diagnosis     ICD-10-CM    1  Chronic pain of right knee  M25 561     G89 29                   Subjective: Patient states that she has noticed an improvement with pain while ambulating  Objective: See treatment diary below      Assessment: Tolerated treatment fair  Patient exhibited good technique with therapeutic exercises      Plan: Continue per plan of care        Precautions: DM II, Fall Risk      Manuals           PROM 10 10 8                                                 Neuro Re-Ed             Q Sets  10x :03 :05 x 10           SLS  5x :05 :05 x 5                                                                            Ther Ex             HEP             Bike 6' 6' 6'          TKE Blue :03 20  BTB :03 x 20           SLR 20 20 20           SL Hip Abd 20 10 10          Clamshells R :03 20 R :03 20 R x 20           LAQ/SAQ 1 5# 20 1 5# 20  1 5# x 20           SL Leg Press 35# 20 35# 20  40# x 20           Stand Hip 3 way  10 ea 10x                                                 Ther Activity                                       Gait Training                                       Modalities             CP prn

## 2021-01-14 ENCOUNTER — OFFICE VISIT (OUTPATIENT)
Dept: PHYSICAL THERAPY | Facility: CLINIC | Age: 76
End: 2021-01-14
Payer: COMMERCIAL

## 2021-01-14 DIAGNOSIS — M25.561 CHRONIC PAIN OF RIGHT KNEE: Primary | ICD-10-CM

## 2021-01-14 DIAGNOSIS — G89.29 CHRONIC PAIN OF RIGHT KNEE: Primary | ICD-10-CM

## 2021-01-14 PROCEDURE — 97140 MANUAL THERAPY 1/> REGIONS: CPT

## 2021-01-14 PROCEDURE — 97110 THERAPEUTIC EXERCISES: CPT

## 2021-01-14 NOTE — PROGRESS NOTES
Daily Note     Today's date: 2021  Patient name: Urban Evans  : 1945  MRN: [id number]  Referring provider: Jeyson Phillips MD  Dx:   Encounter Diagnosis     ICD-10-CM    1  Chronic pain of right knee  M25 561     G89 29        Start Time: 1430  Stop Time: 1506  Total time in clinic (min): 36 minutes    Subjective: Patient reports that she thinks she is getting better but was very sore following last session  Objective: See treatment diary below      Assessment: Tolerated treatment fair  No progressions this session due to lasting soreness  Painful with end range flexion  Patient exhibited good technique with therapeutic exercises      Plan: Continue per plan of care        Precautions: DM II, Fall Risk      Manuals          PROM 10 10 8 8'                                                Neuro Re-Ed             Q Sets  10x :03 :05 x 10  :05x10         SLS  5x :05 :05 x 5  :05x5                                                                          Ther Ex             HEP             Bike 6' 6' 6' 6'          TKE Blue :03 20  BTB :03 x 20  np         SLR 20 20 20  20         SL Hip Abd 20 10 10 10         Rosangela R :03 20 R :03 20 R x 20  R x 20          LAQ/SAQ 1 5# 20 1 5# 20  1 5# x 20  1 5# 20          SL Leg Press 35# 20 35# 20  40# x 20  np         Stand Hip 3 way  10 ea 10x 10x                                                Ther Activity                                       Gait Training                                       Modalities             CP prn

## 2021-01-18 ENCOUNTER — APPOINTMENT (OUTPATIENT)
Dept: PHYSICAL THERAPY | Facility: CLINIC | Age: 76
End: 2021-01-18
Payer: COMMERCIAL

## 2021-01-21 ENCOUNTER — OFFICE VISIT (OUTPATIENT)
Dept: PHYSICAL THERAPY | Facility: CLINIC | Age: 76
End: 2021-01-21
Payer: COMMERCIAL

## 2021-01-21 DIAGNOSIS — M25.561 CHRONIC PAIN OF RIGHT KNEE: Primary | ICD-10-CM

## 2021-01-21 DIAGNOSIS — G89.29 CHRONIC PAIN OF RIGHT KNEE: Primary | ICD-10-CM

## 2021-01-21 PROCEDURE — 97110 THERAPEUTIC EXERCISES: CPT

## 2021-01-21 PROCEDURE — 97140 MANUAL THERAPY 1/> REGIONS: CPT

## 2021-01-21 NOTE — PROGRESS NOTES
Daily Note     Today's date: 2021  Patient name: Tia Davies  : 1945  MRN: [id number]  Referring provider: Gissel Novak MD  Dx:   Encounter Diagnosis     ICD-10-CM    1  Chronic pain of right knee  M25 561     G89 29        Start Time: 1530  Stop Time: 1608  Total time in clinic (min): 38 minutes    Subjective: Patient reports that on  her R foot swelled and then her R knee swelled too  She did spend "a couple hours" on her feet making cookies on   Objective: See treatment diary below  Observation: pt does have swelling in bilateral ankles  Advised to contact PCP if swelling continues  Assessment: Tolerated treatment fair  Knee flexion limited by pain to grossly 80 degrees this session  Is painful with quad sets  No progressions due to soreness  Did regress program        Plan: Continue per plan of care        Precautions: DM II, Fall Risk      Manuals         PROM 10 10 8 8' 8'                                               Neuro Re-Ed             Q Sets  10x :03 :05 x 10  :05x10 :05 10        SLS  5x :05 :05 x 5  :05x5 :05 x5                                                                         Ther Ex             HEP             Bike 6' 6' 6' 6'  6'         TKE Blue :03 20  BTB :03 x 20  np         SLR 20 20 20  20 2x10        SL Hip Abd 20 10 10 10         Clamshells R :03 20 R :03 20 R x 20  R x 20  2x10        LAQ/SAQ 1 5# 20 1 5# 20  1 5# x 20  1 5# 20  20        SL Leg Press 35# 20 35# 20  40# x 20  np np        Stand Hip 3 way  10 ea 10x 10x np                                                Ther Activity                                       Gait Training                                       Modalities             CP prn

## 2021-01-25 ENCOUNTER — EVALUATION (OUTPATIENT)
Dept: PHYSICAL THERAPY | Facility: CLINIC | Age: 76
End: 2021-01-25
Payer: COMMERCIAL

## 2021-01-25 DIAGNOSIS — G89.29 CHRONIC PAIN OF RIGHT KNEE: Primary | ICD-10-CM

## 2021-01-25 DIAGNOSIS — M25.561 CHRONIC PAIN OF RIGHT KNEE: Primary | ICD-10-CM

## 2021-01-25 PROCEDURE — 97140 MANUAL THERAPY 1/> REGIONS: CPT | Performed by: PHYSICAL THERAPIST

## 2021-01-25 PROCEDURE — 97110 THERAPEUTIC EXERCISES: CPT | Performed by: PHYSICAL THERAPIST

## 2021-01-26 DIAGNOSIS — Z76.0 MEDICATION REFILL: ICD-10-CM

## 2021-01-28 ENCOUNTER — OFFICE VISIT (OUTPATIENT)
Dept: PHYSICAL THERAPY | Facility: CLINIC | Age: 76
End: 2021-01-28
Payer: COMMERCIAL

## 2021-01-28 DIAGNOSIS — M25.561 CHRONIC PAIN OF RIGHT KNEE: Primary | ICD-10-CM

## 2021-01-28 DIAGNOSIS — G89.29 CHRONIC PAIN OF RIGHT KNEE: Primary | ICD-10-CM

## 2021-01-28 PROCEDURE — 97110 THERAPEUTIC EXERCISES: CPT | Performed by: PHYSICAL THERAPIST

## 2021-01-28 PROCEDURE — 97140 MANUAL THERAPY 1/> REGIONS: CPT | Performed by: PHYSICAL THERAPIST

## 2021-01-28 NOTE — PROGRESS NOTES
Daily Note     Today's date: 2021  Patient name: Guru Townsend  : 1945  MRN: [id number]  Referring provider: Prosper Lloyd MD  Dx:   Encounter Diagnosis     ICD-10-CM    1  Chronic pain of right knee  M25 561     G89 29                   Subjective: Since last visit, patient has fallen, but did not injure herself      Objective: See treatment diary below      Assessment: Tolerated treatment fair  Patient Pt challenged by strenghtneing exercises      Plan: Continue per plan of care        Precautions: DM II, Fall Risk  Manuals       PROM 10 10 8 8' 8' 8' 8                                             Neuro Re-Ed             Q Sets  10x :03 :05 x 10  :05x10 :05 10 :05 10 :05 10      SLS  5x :05 :05 x 5  :05x5 :05 x5 :05 5 :05 10                                                                       Ther Ex             HEP             Bike 6' 6' 6' 6'  6'  6' 6'      TKE Blue :03 20  BTB :03 x 20  np   Blue :03 20      SLR 20 20 20  20 2x10 20x 1# 20      SL Hip Abd 20 10 10 10   1# 20      Clamshells R :03 20 R :03 20 R x 20  R x 20  2x10 20x 20      LAQ/SAQ 1 5# 20 1 5# 20  1 5# x 20  1 5# 20  20  1# 20      SL Leg Press 35# 20 35# 20  40# x 20  np np 50# 20 50# 20      Stand Hip 3 way  10 ea 10x 10x np   1# 10 ea                                             Ther Activity                                       Gait Training                                       Modalities             CP prn

## 2021-01-29 DIAGNOSIS — D64.9 ANEMIA: ICD-10-CM

## 2021-01-29 RX ORDER — FERROUS SULFATE TAB EC 324 MG (65 MG FE EQUIVALENT) 324 (65 FE) MG
324 TABLET DELAYED RESPONSE ORAL
Qty: 60 TABLET | Refills: 2 | Status: SHIPPED | OUTPATIENT
Start: 2021-01-29 | End: 2021-02-26

## 2021-01-30 DIAGNOSIS — K21.9 GASTROESOPHAGEAL REFLUX DISEASE WITHOUT ESOPHAGITIS: ICD-10-CM

## 2021-01-30 RX ORDER — PANTOPRAZOLE SODIUM 40 MG/1
TABLET, DELAYED RELEASE ORAL
Qty: 90 TABLET | Refills: 1 | Status: SHIPPED | OUTPATIENT
Start: 2021-01-30 | End: 2021-08-26

## 2021-02-01 ENCOUNTER — APPOINTMENT (OUTPATIENT)
Dept: PHYSICAL THERAPY | Facility: CLINIC | Age: 76
End: 2021-02-01
Payer: COMMERCIAL

## 2021-02-03 DIAGNOSIS — I63.9 CEREBROVASCULAR ACCIDENT (CVA), UNSPECIFIED MECHANISM (HCC): ICD-10-CM

## 2021-02-03 RX ORDER — CLOPIDOGREL BISULFATE 75 MG/1
75 TABLET ORAL DAILY
Qty: 90 TABLET | Refills: 0 | Status: SHIPPED | OUTPATIENT
Start: 2021-02-03 | End: 2021-04-28

## 2021-02-04 ENCOUNTER — APPOINTMENT (OUTPATIENT)
Dept: PHYSICAL THERAPY | Facility: CLINIC | Age: 76
End: 2021-02-04
Payer: COMMERCIAL

## 2021-02-08 ENCOUNTER — OFFICE VISIT (OUTPATIENT)
Dept: PHYSICAL THERAPY | Facility: CLINIC | Age: 76
End: 2021-02-08
Payer: COMMERCIAL

## 2021-02-08 DIAGNOSIS — M25.561 CHRONIC PAIN OF RIGHT KNEE: Primary | ICD-10-CM

## 2021-02-08 DIAGNOSIS — G89.29 CHRONIC PAIN OF RIGHT KNEE: Primary | ICD-10-CM

## 2021-02-08 PROCEDURE — 97140 MANUAL THERAPY 1/> REGIONS: CPT | Performed by: PHYSICAL THERAPIST

## 2021-02-08 PROCEDURE — 97110 THERAPEUTIC EXERCISES: CPT | Performed by: PHYSICAL THERAPIST

## 2021-02-08 NOTE — PROGRESS NOTES
Daily Note     Today's date: 2021  Patient name: Breanna Junior  : 1945  MRN: [id number]  Referring provider: Jennifer Latif MD  Dx: No diagnosis found  Subjective: No significant change since last visit      Objective: See treatment diary below      Assessment: Tolerated treatment well  Patient would benefit from continued PT      Plan: Continue per plan of care        Precautions: DM II, Fall Risk  Manuals      PROM 10 10 8 8' 8' 8' 8 8                                            Neuro Re-Ed             Q Sets  10x :03 :05 x 10  :05x10 :05 10 :05 10 :05 10 :05 10     SLS  5x :05 :05 x 5  :05x5 :05 x5 :05 5 :05 10 :05 10                                                                      Ther Ex             HEP             Bike 6' 6' 6' 6'  6'  6' 6' 6'     TKE Blue :03 20  BTB :03 x 20  np   Blue :03 20 Blue :03 20     SLR 20 20 20  20 2x10 20x 1# 20 1# 20     SL Hip Abd 20 10 10 10   1# 20 1# 20     Clamshells R :03 20 R :03 20 R x 20  R x 20  2x10 20x 20 R 20     LAQ/SAQ 1 5# 20 1 5# 20  1 5# x 20  1 5# 20  20  1# 20 1# 20     SL Leg Press 35# 20 35# 20  40# x 20  np np 50# 20 50# 20 55# 20     Stand Hip 3 way  10 ea 10x 10x np   1# 10 ea 1# 20                                            Ther Activity                                       Gait Training                                       Modalities             CP prn

## 2021-02-09 ENCOUNTER — TELEPHONE (OUTPATIENT)
Dept: GASTROENTEROLOGY | Facility: AMBULARY SURGERY CENTER | Age: 76
End: 2021-02-09

## 2021-02-11 ENCOUNTER — OFFICE VISIT (OUTPATIENT)
Dept: PHYSICAL THERAPY | Facility: CLINIC | Age: 76
End: 2021-02-11
Payer: COMMERCIAL

## 2021-02-11 DIAGNOSIS — G89.29 CHRONIC PAIN OF RIGHT KNEE: Primary | ICD-10-CM

## 2021-02-11 DIAGNOSIS — M25.561 CHRONIC PAIN OF RIGHT KNEE: Primary | ICD-10-CM

## 2021-02-11 PROCEDURE — 97110 THERAPEUTIC EXERCISES: CPT

## 2021-02-11 NOTE — PROGRESS NOTES
Daily Note     Today's date: 2021  Patient name: Claudia Arango  : 1945  MRN: [id number]  Referring provider: Maurice Junior MD  Dx:   Encounter Diagnosis     ICD-10-CM    1  Chronic pain of right knee  M25 561     G89 29                   Subjective: Patient reports her knee is feeling good today  Objective: See treatment diary below      Assessment: Patient has good tolerance for TE today, reports that she is able to do the stairs at home now  Plan: Continue per plan of care        Precautions: DM II, Fall Risk  Manuals     PROM 10 10 8 8' 8' 8' 8 8                                            Neuro Re-Ed             Q Sets  10x :03 :05 x 10  :05x10 :05 10 :05 10 :05 10 :05 10 :05x10    SLS  5x :05 :05 x 5  :05x5 :05 x5 :05 5 :05 10 :05 10 :05x10                                                                     Ther Ex             HEP             Bike 6' 6' 6' 6'  6'  6' 6' 6' 6'    TKE Blue :03 20  BTB :03 x 20  np   Blue :03 20 Blue :03 20 Blue :03x20    SLR 20 20 20  20 2x10 20x 1# 20 1# 20 1# x20    SL Hip Abd 20 10 10 10   1# 20 1# 20 1# x20    Clamshells R :03 20 R :03 20 R x 20  R x 20  2x10 20x 20 R 20 R 20    LAQ/SAQ 1 5# 20 1 5# 20  1 5# x 20  1 5# 20  20  1# 20 1# 20 1# x20    SL Leg Press 35# 20 35# 20  40# x 20  np np 50# 20 50# 20 55# 20     Stand Hip 3 way  10 ea 10x 10x np   1# 10 ea 1# 20 1# 2x10                                           Ther Activity                                       Gait Training                                       Modalities             CP prn

## 2021-02-12 DIAGNOSIS — Z23 ENCOUNTER FOR IMMUNIZATION: ICD-10-CM

## 2021-02-15 ENCOUNTER — OFFICE VISIT (OUTPATIENT)
Dept: PHYSICAL THERAPY | Facility: CLINIC | Age: 76
End: 2021-02-15
Payer: COMMERCIAL

## 2021-02-15 DIAGNOSIS — M25.561 CHRONIC PAIN OF RIGHT KNEE: Primary | ICD-10-CM

## 2021-02-15 DIAGNOSIS — G89.29 CHRONIC PAIN OF RIGHT KNEE: Primary | ICD-10-CM

## 2021-02-15 PROCEDURE — 97110 THERAPEUTIC EXERCISES: CPT | Performed by: PHYSICAL THERAPIST

## 2021-02-15 PROCEDURE — 97112 NEUROMUSCULAR REEDUCATION: CPT | Performed by: PHYSICAL THERAPIST

## 2021-02-15 NOTE — PROGRESS NOTES
Daily Note     Today's date: 2/15/2021  Patient name: Calderon Dasilva  : 1945  MRN: [id number]  Referring provider: Valentina Lebron MD  Dx:   Encounter Diagnosis     ICD-10-CM    1  Chronic pain of right knee  M25 561     G89 29                   Subjective: Patient reports she is continuing to do her exercises and is still a little sore today       Objective: See treatment diary below      Assessment: Patient has good tolerance for TE today, reports that she is able to do the stairs at home now  Plan: Continue per plan of care        Precautions: DM II, Fall Risk  Manuals 1/4 01/07 1/11 01/14 01/21 1/25 1/28 2/8 2/11 2/15   PROM 10 10 8 8' 8' 8' 8 8  8'                                          Neuro Re-Ed             Q Sets  10x :03 :05 x 10  :05x10 :05 10 :05 10 :05 10 :05 10 :05x10 :05 x10   SLS  5x :05 :05 x 5  :05x5 :05 x5 :05 5 :05 10 :05 10 :05x10 :05 x10                                                                    Ther Ex             HEP             Bike 6' 6' 6' 6'  6'  6' 6' 6' 6' 6'    TKE Blue :03 20  BTB :03 x 20  np   Blue :03 20 Blue :03 20 Blue :03x20 Blue :03 x20   SLR 20 20 20  20 2x10 20x 1# 20 1# 20 1# x20 1# 20x   SL Hip Abd 20 10 10 10   1# 20 1# 20 1# x20 1# 20x   Clamshells R :03 20 R :03 20 R x 20  R x 20  2x10 20x 20 R 20 R 20 R 20x   LAQ/SAQ 1 5# 20 1 5# 20  1 5# x 20  1 5# 20  20  1# 20 1# 20 1# x20 1# 20x   SL Leg Press 35# 20 35# 20  40# x 20  np np 50# 20 50# 20 55# 20  55# 20x   Stand Hip 3 way  10 ea 10x 10x np   1# 10 ea 1# 20 1# 2x10 1# 20x                                          Ther Activity                                       Gait Training                                       Modalities             CP prn

## 2021-02-18 ENCOUNTER — APPOINTMENT (OUTPATIENT)
Dept: PHYSICAL THERAPY | Facility: CLINIC | Age: 76
End: 2021-02-18
Payer: COMMERCIAL

## 2021-02-22 ENCOUNTER — APPOINTMENT (OUTPATIENT)
Dept: PHYSICAL THERAPY | Facility: CLINIC | Age: 76
End: 2021-02-22
Payer: COMMERCIAL

## 2021-02-23 ENCOUNTER — EVALUATION (OUTPATIENT)
Dept: PHYSICAL THERAPY | Facility: CLINIC | Age: 76
End: 2021-02-23
Payer: COMMERCIAL

## 2021-02-23 DIAGNOSIS — G89.29 CHRONIC PAIN OF RIGHT KNEE: Primary | ICD-10-CM

## 2021-02-23 DIAGNOSIS — M25.561 CHRONIC PAIN OF RIGHT KNEE: Primary | ICD-10-CM

## 2021-02-23 PROCEDURE — 97116 GAIT TRAINING THERAPY: CPT | Performed by: PHYSICAL THERAPIST

## 2021-02-23 PROCEDURE — 97140 MANUAL THERAPY 1/> REGIONS: CPT | Performed by: PHYSICAL THERAPIST

## 2021-02-23 PROCEDURE — 97110 THERAPEUTIC EXERCISES: CPT | Performed by: PHYSICAL THERAPIST

## 2021-02-23 NOTE — PROGRESS NOTES
PT ReEvaluation    Today's date: 2021  Patient name: Bhavik Yip  : 1945  MRN: [id number]  Referring provider: Gerhardt Stands, MD  Dx:   Encounter Diagnosis     ICD-10-CM    1  Chronic pain of right knee  M25 561     G89 29                   Assessment  Assessment details: Patient has been compliant with attending PT and home exercise program since initial eval   She  has made progress towards her goals and improvements in objective data since initial eval but is still limited compared to prior level of function  She continues with to have above listed impairments and would benefit from additional skilled PT to address these deficits to return to maximal level of function  She is making steady progress but still has functional weakness especially on stairs, tenderness, limited ROM and functional deficits  Thank you for this pleasant referral       Impairments: abnormal or restricted ROM, activity intolerance, impaired physical strength and pain with function  Understanding of Dx/Px/POC: good   Prognosis: good    Goals  ST-6 weeks  1  Patient to be independent with HEP - Met  2  Decrease pain at least 2 subjective levels  - Met      LT-12 weeks  1    Patient to voice comfort with self management of condition - Met  2   75% or > decreased pain  - Partially Met  3   75% or > decreased functional deficits  - Partially Met  4  Normalize AROM of all deficit planes  - Partially Met  5  Normalize strength  - Partilally Met  7  Patient to voice understanding of activities/positions to avoid  - Met  9    Normalize Gait- Partiially Met    Plan  Patient would benefit from: skilled PT  Referral necessary: No  Planned modality interventions: cryotherapy  Planned therapy interventions: IADL retraining, joint mobilization, manual therapy, motor coordination training, neuromuscular re-education, patient education, postural training, self care, strengthening, stretching, therapeutic activities, therapeutic exercise, home exercise program, flexibility, ADL training, balance and body mechanics training  Frequency: 2x week  Duration in weeks: 6  Treatment plan discussed with: patient        Subjective Evaluation    History of Present Illness  Onset date: few months ago  Mechanism of injury: Patient is feeling "a lot better"  She is more active and "sitting around less"  She infrequently ambulates stairs but feels safe when she does them  She is going for longer walks more as well includign to the grocery store  She wishes to continue therapy and to be able to walk stairs more frequently at home (her son limits her ability out of fear for safety)          Patient Goals: "Do everything I always did"    A lot better, doing more, sitting around less  Quality of life: good    Pain  At best pain ratin  At worst pain ratin  Location: posterior knee          Objective     Active Range of Motion     Right Knee   Flexion: 115 degrees with pain  Extension: 0 degrees     Passive Range of Motion     Right Knee   Flexion: 120 degrees with pain    Strength/Myotome Testing     Right Knee   Flexion: 4  Extension: 4-    General Comments:      Knee Comments  Gait: Improved- but some increased M/L sway remains  Steps: Step to pattern, UE handrail- safe/stable ascending; step to descending most steps  DL Squat: 80 (was 65)  SLS < 2 sec  (-) Ligamentous Testing  Hip MMT 4/5 t/o  (+) TTP t/o joint especially M/L joint lines, resolved at Patellar Grooves             Precautions: DM II, Fall Risk    Manuals 2/23 01/07 1/11 01/14 01/21 1/25 1/28 2/8 2/11 2/15   PROM 10 10 8 8' 8' 8' 8 8  8'                                          Neuro Re-Ed             Q Sets  10x :03 :05 x 10  :05x10 :05 10 :05 10 :05 10 :05 10 :05x10 :05 x10   SLS :05 10 5x :05 :05 x 5  :05x5 :05 x5 :05 5 :05 10 :05 10 :05x10 :05 x10                                                                    Ther Ex             HEP             Bike 6' 6' 6' 6'  6'  6' 6' 6' 6' 6'    TKE Blue :03 20  BTB :03 x 20  np   Blue :03 20 Blue :03 20 Blue :03x20 Blue :03 x20   SLR 20 1 5# 20 20  20 2x10 20x 1# 20 1# 20 1# x20 1# 20x   SL Hip Abd 20 1 5#  10 10 10   1# 20 1# 20 1# x20 1# 20x   Clamshells R :03 20 R :03 20 R x 20  R x 20  2x10 20x 20 R 20 R 20 R 20x   LAQ/SAQ 2# 20 1 5# 20  1 5# x 20  1 5# 20  20  1# 20 1# 20 1# x20 1# 20x   SL Leg Press 55# 20 35# 20  40# x 20  np np 50# 20 50# 20 55# 20  55# 20x   Stand Hip 3 way 2# 20 10 ea 10x 10x np   1# 10 ea 1# 20 1# 2x10 1# 20x   Squats 20                                      Ther Activity                                       Gait Training             Stairs 1FF                         Modalities             CP prn

## 2021-02-25 ENCOUNTER — OFFICE VISIT (OUTPATIENT)
Dept: PHYSICAL THERAPY | Facility: CLINIC | Age: 76
End: 2021-02-25
Payer: COMMERCIAL

## 2021-02-25 DIAGNOSIS — G89.29 CHRONIC PAIN OF RIGHT KNEE: Primary | ICD-10-CM

## 2021-02-25 DIAGNOSIS — M25.561 CHRONIC PAIN OF RIGHT KNEE: Primary | ICD-10-CM

## 2021-02-25 PROCEDURE — 97140 MANUAL THERAPY 1/> REGIONS: CPT

## 2021-02-25 PROCEDURE — 97110 THERAPEUTIC EXERCISES: CPT

## 2021-02-26 DIAGNOSIS — D64.9 ANEMIA: ICD-10-CM

## 2021-02-26 RX ORDER — FERROUS SULFATE TAB EC 324 MG (65 MG FE EQUIVALENT) 324 (65 FE) MG
324 TABLET DELAYED RESPONSE ORAL
Qty: 180 TABLET | Refills: 1 | Status: SHIPPED | OUTPATIENT
Start: 2021-02-26 | End: 2021-03-02

## 2021-02-27 LAB
ALBUMIN SERPL-MCNC: 4.3 G/DL (ref 3.7–4.7)
ALBUMIN/GLOB SERPL: 2.2 {RATIO} (ref 1.2–2.2)
ALP SERPL-CCNC: 132 IU/L (ref 39–117)
ALT SERPL-CCNC: 12 IU/L (ref 0–32)
AST SERPL-CCNC: 12 IU/L (ref 0–40)
BASOPHILS # BLD AUTO: 0 X10E3/UL (ref 0–0.2)
BASOPHILS NFR BLD AUTO: 1 %
BILIRUB SERPL-MCNC: 0.6 MG/DL (ref 0–1.2)
BUN SERPL-MCNC: 19 MG/DL (ref 8–27)
BUN/CREAT SERPL: 18 (ref 12–28)
CALCIUM SERPL-MCNC: 11.5 MG/DL (ref 8.7–10.3)
CHLORIDE SERPL-SCNC: 102 MMOL/L (ref 96–106)
CO2 SERPL-SCNC: 26 MMOL/L (ref 20–29)
CREAT SERPL-MCNC: 1.03 MG/DL (ref 0.57–1)
EOSINOPHIL # BLD AUTO: 0.4 X10E3/UL (ref 0–0.4)
EOSINOPHIL NFR BLD AUTO: 6 %
ERYTHROCYTE [DISTWIDTH] IN BLOOD BY AUTOMATED COUNT: 12.5 % (ref 11.7–15.4)
FERRITIN SERPL-MCNC: 142 NG/ML (ref 15–150)
GLOBULIN SER-MCNC: 2 G/DL (ref 1.5–4.5)
GLUCOSE SERPL-MCNC: 146 MG/DL (ref 65–99)
HBA1C MFR BLD: 6.3 % (ref 4.8–5.6)
HCT VFR BLD AUTO: 36.3 % (ref 34–46.6)
HGB BLD-MCNC: 11.9 G/DL (ref 11.1–15.9)
IMM GRANULOCYTES # BLD: 0 X10E3/UL (ref 0–0.1)
IMM GRANULOCYTES NFR BLD: 1 %
IRON SATN MFR SERPL: 29 % (ref 15–55)
IRON SERPL-MCNC: 82 UG/DL (ref 27–139)
LYMPHOCYTES # BLD AUTO: 1.5 X10E3/UL (ref 0.7–3.1)
LYMPHOCYTES NFR BLD AUTO: 24 %
MCH RBC QN AUTO: 31.6 PG (ref 26.6–33)
MCHC RBC AUTO-ENTMCNC: 32.8 G/DL (ref 31.5–35.7)
MCV RBC AUTO: 96 FL (ref 79–97)
MICRODELETION SYND BLD/T FISH: NORMAL
MONOCYTES # BLD AUTO: 0.5 X10E3/UL (ref 0.1–0.9)
MONOCYTES NFR BLD AUTO: 8 %
NEUTROPHILS # BLD AUTO: 3.8 X10E3/UL (ref 1.4–7)
NEUTROPHILS NFR BLD AUTO: 60 %
PLATELET # BLD AUTO: 375 X10E3/UL (ref 150–450)
POTASSIUM SERPL-SCNC: 4.7 MMOL/L (ref 3.5–5.2)
PROT SERPL-MCNC: 6.3 G/DL (ref 6–8.5)
RBC # BLD AUTO: 3.77 X10E6/UL (ref 3.77–5.28)
SL AMB EGFR AFRICAN AMERICAN: 61 ML/MIN/1.73
SL AMB EGFR NON AFRICAN AMERICAN: 53 ML/MIN/1.73
SODIUM SERPL-SCNC: 141 MMOL/L (ref 134–144)
TIBC SERPL-MCNC: 284 UG/DL (ref 250–450)
UIBC SERPL-MCNC: 202 UG/DL (ref 118–369)
WBC # BLD AUTO: 6.2 X10E3/UL (ref 3.4–10.8)

## 2021-03-01 ENCOUNTER — TELEPHONE (OUTPATIENT)
Dept: HEMATOLOGY ONCOLOGY | Facility: CLINIC | Age: 76
End: 2021-03-01

## 2021-03-01 ENCOUNTER — OFFICE VISIT (OUTPATIENT)
Dept: PHYSICAL THERAPY | Facility: CLINIC | Age: 76
End: 2021-03-01
Payer: COMMERCIAL

## 2021-03-01 DIAGNOSIS — G89.29 CHRONIC PAIN OF RIGHT KNEE: Primary | ICD-10-CM

## 2021-03-01 DIAGNOSIS — M25.561 CHRONIC PAIN OF RIGHT KNEE: Primary | ICD-10-CM

## 2021-03-01 PROCEDURE — 97140 MANUAL THERAPY 1/> REGIONS: CPT

## 2021-03-01 PROCEDURE — 97110 THERAPEUTIC EXERCISES: CPT

## 2021-03-01 NOTE — PROGRESS NOTES
Daily Note     Today's date: 3/1/2021  Patient name: Pricila Sullivan  : 1945  MRN: [id number]  Referring provider: Michael Bunn MD  Dx:   Encounter Diagnosis     ICD-10-CM    1  Chronic pain of right knee  M25 561     G89 29        Start Time: 1500  Stop Time: 1538  Total time in clinic (min): 38 minutes    Subjective: Pt states "The front of my knee feels better but I still get the muscle spasms in the back"      Objective: See treatment diary below  Step-to pattern for stair descent  Assessment: Tolerated treatment well  No increase in symptoms throughout  Patient demonstrated fatigue post treatment      Plan: Continue per plan of care        Precautions: DM II, Fall Risk    Manuals 2/23 02/25 03/01  01/21 1/25 1/28 2/8 2/11 2/15   PROM 10 10' 10'   8' 8' 8 8  8'                                          Neuro Re-Ed             Q Sets     :05 10 :05 10 :05 10 :05 10 :05x10 :05 x10   SLS :05 10 nv :05 10  :05 x5 :05 5 :05 10 :05 10 :05x10 :05 x10                                                                    Ther Ex             HEP             Bike 6' 6' 6'  6'  6' 6' 6' 6' 6'    TKE Blue :03 20 Blue :03 20  Blue 20 :03     Blue :03 20 Blue :03 20 Blue :03x20 Blue :03 x20   SLR 20 1 5# 20  1 5# 20 1 5#  2x10 20x 1# 20 1# 20 1# x20 1# 20x   SL Hip Abd 20 1 5#  20 1 5#  20 1 5#    1# 20 1# 20 1# x20 1# 20x   Clamshells R :03 20 R :03 20 R 20 :03  2x10 20x 20 R 20 R 20 R 20x   LAQ/SAQ 2# 20 2# 20  2# 20   20  1# 20 1# 20 1# x20 1# 20x   SL Leg Press 55# 20 nv 50# 20x  np 50# 20 50# 20 55# 20  55# 20x   Stand Hip 3 way 2# 20 2# 20  2# 20  np   1# 10 ea 1# 20 1# 2x10 1# 20x   Squats 20 20 20                                    Ther Activity                                       Gait Training             Stairs 1FF 1 FF 1 FF                       Modalities             CP prn

## 2021-03-01 NOTE — TELEPHONE ENCOUNTER
COVID Screening   Patient called in to go over screening questions     Response was Negative     Important Notes    Went over appointment details, patient voiced understanding

## 2021-03-02 ENCOUNTER — OFFICE VISIT (OUTPATIENT)
Dept: HEMATOLOGY ONCOLOGY | Facility: MEDICAL CENTER | Age: 76
End: 2021-03-02
Payer: COMMERCIAL

## 2021-03-02 ENCOUNTER — RA CDI HCC (OUTPATIENT)
Dept: OTHER | Facility: HOSPITAL | Age: 76
End: 2021-03-02

## 2021-03-02 VITALS
WEIGHT: 131.6 LBS | DIASTOLIC BLOOD PRESSURE: 78 MMHG | HEIGHT: 60 IN | OXYGEN SATURATION: 98 % | BODY MASS INDEX: 25.84 KG/M2 | TEMPERATURE: 96.8 F | HEART RATE: 99 BPM | SYSTOLIC BLOOD PRESSURE: 130 MMHG | RESPIRATION RATE: 18 BRPM

## 2021-03-02 DIAGNOSIS — D50.8 OTHER IRON DEFICIENCY ANEMIA: Primary | ICD-10-CM

## 2021-03-02 PROCEDURE — 99214 OFFICE O/P EST MOD 30 MIN: CPT | Performed by: PHYSICIAN ASSISTANT

## 2021-03-02 RX ORDER — FERROUS SULFATE TAB EC 324 MG (65 MG FE EQUIVALENT) 324 (65 FE) MG
324 TABLET DELAYED RESPONSE ORAL 3 TIMES WEEKLY
Qty: 30 TABLET | Refills: 1
Start: 2021-03-03 | End: 2021-03-02

## 2021-03-02 RX ORDER — FERROUS SULFATE TAB EC 324 MG (65 MG FE EQUIVALENT) 324 (65 FE) MG
324 TABLET DELAYED RESPONSE ORAL 3 TIMES WEEKLY
Qty: 30 TABLET | Refills: 1
Start: 2021-03-03 | End: 2021-07-01 | Stop reason: SDUPTHER

## 2021-03-02 NOTE — PROGRESS NOTES
[facility] [address] [address]  Hematology Ambulatory Follow-Up  Betzaida Funez, 1945, [id number]  3/2/2021    Assessment/Plan:    1  Other iron deficiency anemia    This is a 26-year-old female with history of iron deficiency anemia with precise etiology night quite fully understood  Patient underwent IV supplementation with excellent results  Patient's blood work continues to remain stable without significant decrease or change to hemoglobin  Patient will follow-up again in three months with blood work prior  The patient and her son are aware of symptoms of blood-loss anemia/iron deficiency anemia and will call the office if any concerns occur between this visit and the next  Etiology of iron deficiency anemia:  Patient has longstanding history of PPI therapy is likely inhibiting absorption of iron  Furthermore, there is an insult with an acute change in iron studies/ RBC indices  GI evaluation was negative for cause of iron deficiency including colonoscopy and endoscopy in June 2020  Patient's hemoglobin remains stable  In the future, we will consider capsule endoscopy if hemoglobin decreases  - CBC and differential; Future  - Iron Saturation %; Future  - TIBC; Future  - Iron; Future  - Ferritin; Future  - ferrous sulfate 324 (65 Fe) mg; Take 1 tablet (324 mg total) by mouth 3 (three) times a week  Dispense: 30 tablet; Refill: 1      The patient is scheduled for follow-up in approximately 3 months with Dr Loida Lyles  Patient voiced agreement and understanding to the above  Patient knows to call the Hematology/Oncology office with any questions and concerns regarding the above        Barrier(s) to care: None  The patient is able to self care   ------------------------------------------------------------------------------------------------------    Chief Complaint   Patient presents with    Follow-up     Iron Deficiency Anemia       History of present illness: This is a 66year old female with past medical history of diabetes well controlled, GERD on Protonix therapy, hypertension, hyperlipidemia who was found to be iron deficient in January 2020 when hospitalized after having a stroke      Patient was hospitalized after having altered mental status changes  Patient was back to mental status baseline after 16 hours  On admission, the patient had a hemoglobin of 7 3 grams/deciliter and was very microcytic with high RDW  Patient was transfused with 2 units of packed red blood cells in the emergency room  Patient also underwent GI consultation and had a colonoscopy as well as an endoscopy after discharge that did not demonstrate etiology of iron deficiency  Iron studies in January 2020 demonstrated iron saturation of 3% with ferritin of 6 ng/dL      At discharge patient was started on oral iron supplementation twice a day  Patient initially had a lot of constipation but after being on it for some time, this seemed to even now  Patient continue to have follow-up with GI physicians  Follow-up with fecal occult blood test was negative  Patient had a urinalysis completed during hospitalization in January 2020 but follow-up urinalysis has not been completed  Patient denies blood-loss vaginally      In Sept and Oct 2020, patient underwent treatment with IV Venofer 200 mg IV  Patient notes some issues with obtaining venous access but otherwise treatments went well      Follow-up blood work on 11/27 demonstrated hemoglobin of 11 9, platelet count of 194, ferritin of 246, TIBC normalized at 262  Interval history:  2/26/2021:  Ferritin = 142,   TIBC = 284, iron saturation 29%, hemoglobin=  11 9, platelet count 916, MCV 96, RDW 12 5     Feeling well no c/o    Review of Systems   Constitutional: Negative for appetite change, fatigue, fever and unexpected weight change  HENT: Negative for nosebleeds      Respiratory: Negative for cough, choking and shortness of breath  Negative hemoptysis  Cardiovascular: Negative for chest pain, palpitations and leg swelling  Gastrointestinal: Negative  Negative for abdominal distention, abdominal pain, anal bleeding, blood in stool, constipation, diarrhea, nausea and vomiting  Endocrine: Negative  Negative for cold intolerance  Genitourinary: Negative  Negative for hematuria, menstrual problem, vaginal bleeding, vaginal discharge and vaginal pain  Musculoskeletal: Negative  Negative for arthralgias, myalgias, neck pain and neck stiffness  Skin: Negative  Negative for color change, pallor and rash  Allergic/Immunologic: Negative  Negative for immunocompromised state  Neurological: Negative  Negative for weakness and headaches  Hematological: Negative for adenopathy  Does not bruise/bleed easily  All other systems reviewed and are negative        Patient Active Problem List   Diagnosis    Combined forms of age-related cataract of left eye    DMII (diabetes mellitus, type 2) ([facility] )    Hyperlipidemia    [facility]roke [facility]ce[facility]brum[facility] )    Anemia    Family history of tobacco abuse    History of tobacco abuse    Acute kidney injury superimposed on CKD ([facility] )    Arthritis    Hypertension    GERD (gastroesophageal reflux disease)    Iron deficiency anemia       Past Medical History:   Diagnosis Date    Anemia     Arthritis     Capsulitis of foot     Last assessed 07/29/13    Diabetes ([facility] )     type 2    Full dentures     Ganglion     Last assessed 07/29/13    Hearing decreased     Hyperlipidemia     Hypertension     Magnesium deficiency     Sciatica     Wears glasses        Past Surgical History:   Procedure Laterality Date    CATARACT EXTRACTION      COLONOSCOPY      DILATION AND CURETTAGE OF UTERUS      x 4    HAND SURGERY Left     fx repaired w/wrist bone    HYSTERECTOMY      left ovary remains    KNEE ARTHROSCOPY Left     LIPOMA RESECTION removed from back and left buttock    NJ XCAPSL CTRC RMVL INSJ IO LENS PROSTH W/O ECP Right 1/24/2019    Procedure: EXTRACTION EXTRACAPSULAR CATARACT PHACO INTRAOCULAR LENS (IOL); Surgeon: Marlon Tovar MD;  Location: [facility] MAIN OR;  Service: Ophthalmology    NJ XCAPSL CTRC RMVL INSJ IO LENS PROSTH W/O ECP Left 2/14/2019    Procedure: EXTRACTION EXTRACAPSULAR CATARACT PHACO INTRAOCULAR LENS (IOL);   Surgeon: Marlon Tovar MD;  Location: [facility] MAIN OR;  Service: Ophthalmology    TONSILLECTOMY      and adenoids    TUBAL LIGATION         Family History   Problem Relation Age of Onset    Leukemia Father     Early death Father 39        leukemia    Stomach cancer Maternal Grandfather     Diabetes Paternal Grandfather     Diabetes Sister         insulin dependent    Diabetes Brother         insulin    Diabetes Sister         insulin    Diabetes Sister         insulin       Social History     Socioeconomic History    Marital status:      Spouse name: None    Number of children: None    Years of education: None    Highest education level: None   Occupational History    None   Social Needs    Financial resource strain: None    Food insecurity     Worry: None     Inability: None    Transportation needs     Medical: None     Non-medical: None   Tobacco Use    Smoking status: Current Every Day Smoker     Packs/day: 0 50     Years: 35 00     Pack years: 17 50     Types: Cigarettes    Smokeless tobacco: Never Used   Substance and Sexual Activity    Alcohol use: Not Currently     Comment: Rarely    Drug use: No    Sexual activity: None   Lifestyle    Physical activity     Days per week: None     Minutes per session: None    Stress: None   Relationships    Social connections     Talks on phone: None     Gets together: None     Attends Orthodox service: None     Active member of club or organization: None     Attends meetings of clubs or organizations: None     Relationship status: None    Intimate partner violence     Fear of current or ex partner: None     Emotionally abused: None     Physically abused: None     Forced sexual activity: None   Other Topics Concern    None   Social History Narrative    None         Current Outpatient Medications:     atorvastatin (LIPITOR) 40 mg tablet, Take 1 tablet (40 mg total) by mouth daily, Disp: 90 tablet, Rfl: 1    clopidogrel (PLAVIX) 75 mg tablet, Take 1 tablet (75 mg total) by mouth daily, Disp: 90 tablet, Rfl: 0    [START ON 3/3/2021] ferrous sulfate 324 (65 Fe) mg, Take 1 tablet (324 mg total) by mouth 3 (three) times a week, Disp: 30 tablet, Rfl: 1    ibuprofen (MOTRIN) 200 mg tablet, Take 600 mg by mouth every 6 (six) hours as needed for mild pain, Disp: , Rfl:     magnesium oxide (MAG-OX) 400 mg tablet, Take 1 tablet (400 mg total) by mouth 2 (two) times a day, Disp: 60 tablet, Rfl: 4    metFORMIN (GLUCOPHAGE) 1000 MG tablet, Take 1 tablet (1,000 mg total) by mouth 2 (two) times a day, Disp: 180 tablet, Rfl: 3    pantoprazole (PROTONIX) 40 mg tablet, TAKE 1 TABLET BY MOUTH EVERY DAY, Disp: 90 tablet, Rfl: 1    quinapril (ACCUPRIL) 40 MG tablet, Take 1 tablet (40 mg total) by mouth daily at bedtime, Disp: 90 tablet, Rfl: 1    Allergies   Allergen Reactions    Aleve [Naproxen] Other (See Comments)     "weird" sensation entire body    Sulfa Antibiotics Rash       Objective:  /78 (BP Location: Right arm, Patient Position: Sitting, Cuff Size: Adult)   Pulse 99   Temp (!) 96 8 °F (36 °C)   Resp 18   Ht 5' (1 524 m)   Wt 59 7 kg (131 lb 9 6 oz)   SpO2 98%   BMI 25 70 kg/m²    Physical Exam  Constitutional:       General: She is not in acute distress  Appearance: She is well-developed  HENT:      Head: Normocephalic and atraumatic  Eyes:      General: No scleral icterus  Pupils: Pupils are equal, round, and reactive to light  Cardiovascular:      Rate and Rhythm: Normal rate and regular rhythm  Heart sounds:  No murmur  Pulmonary:      Effort: Pulmonary effort is normal  No respiratory distress  Skin:     General: Skin is warm  Coloration: Skin is not pale  Findings: No rash  Neurological:      Mental Status: She is alert and oriented to person, place, and time  Psychiatric:         Thought Content: Thought content normal        Result Review  Labs:  Lab Results   Component Value Date    WBC 6 2 02/26/2021    HGB 11 9 02/26/2021    HCT 36 3 02/26/2021    MCV 96 02/26/2021     02/26/2021     Lab Results   Component Value Date     11/22/2017    SODIUM 141 02/26/2021    K 4 7 02/26/2021     02/26/2021    CO2 26 02/26/2021    AGAP 7 01/10/2020    BUN 19 02/26/2021    CREATININE 1 03 (H) 02/26/2021    GLUC 146 (H) 02/26/2021    CALCIUM 9 0 01/10/2020    AST 12 02/26/2021    ALT 12 02/26/2021    ALKPHOS 60 01/10/2020    PROT 6 8 11/22/2017    TP 6 3 02/26/2021    BILITOT 0 3 11/22/2017    TBILI 0 6 02/26/2021    EGFR 40 01/10/2020       Please note: This report has been generated by a voice recognition software system  Therefore there may be syntax, spelling, and/or grammatical errors  Please call if you have any questions

## 2021-03-02 NOTE — PROGRESS NOTES
[place] [id number]  coding oppertunities             Chart reviewed, (number of) suggestions sent to provider: 4     Problem listed updated   Provider Accepted, (number of) suggestions accepted: 4              [place] [address]  coding oppertunities             Chart reviewed, (number of) suggestions sent to provider: 4                   E11 42 T2DM with diabetic polyneuropathy ([facility] )    E11 22, N18 31 T2DM with diabetic stage 3a CKD ([place] [address] )    D47 3 Thrombocytosis ([place] [address] )    Z86 73 Personal history, (TIA) and cerebral infarction without residual deficits      If this is correct, please document and assess at your next visit 3/9/21

## 2021-03-03 ENCOUNTER — TELEPHONE (OUTPATIENT)
Dept: FAMILY MEDICINE CLINIC | Facility: CLINIC | Age: 76
End: 2021-03-03

## 2021-03-03 PROBLEM — E11.22 TYPE 2 DIABETES MELLITUS WITH STAGE 3 CHRONIC KIDNEY DISEASE (HCC): Status: ACTIVE | Noted: 2021-03-03

## 2021-03-03 PROBLEM — N18.30 TYPE 2 DIABETES MELLITUS WITH STAGE 3 CHRONIC KIDNEY DISEASE (HCC): Status: ACTIVE | Noted: 2021-03-03

## 2021-03-03 PROBLEM — Z86.73 PERSONAL HISTORY OF TRANSIENT ISCHEMIC ATTACK (TIA), AND CEREBRAL INFARCTION WITHOUT RESIDUAL DEFICITS: Status: ACTIVE | Noted: 2020-01-09

## 2021-03-03 PROBLEM — D75.839 THROMBOCYTOSIS: Status: ACTIVE | Noted: 2021-03-03

## 2021-03-04 ENCOUNTER — OFFICE VISIT (OUTPATIENT)
Dept: PHYSICAL THERAPY | Facility: CLINIC | Age: 76
End: 2021-03-04
Payer: COMMERCIAL

## 2021-03-04 DIAGNOSIS — G89.29 CHRONIC PAIN OF RIGHT KNEE: Primary | ICD-10-CM

## 2021-03-04 DIAGNOSIS — M25.561 CHRONIC PAIN OF RIGHT KNEE: Primary | ICD-10-CM

## 2021-03-04 PROCEDURE — 97110 THERAPEUTIC EXERCISES: CPT

## 2021-03-04 PROCEDURE — 97140 MANUAL THERAPY 1/> REGIONS: CPT

## 2021-03-04 NOTE — PROGRESS NOTES
Daily Note     Today's date: 3/4/2021  Patient name: Corrine Mendez  : 1945  MRN: [id number]  Referring provider: Paulene Dubin, MD  Dx:   Encounter Diagnosis     ICD-10-CM    1  Chronic pain of right knee  M25 561     G89 29        Start Time: 1453  Stop Time: 1533  Total time in clinic (min): 40 minutes    Subjective: Pt states "I was able to walk around and water my plants, the one I need a step-stool for!"       Objective: See treatment diary below      Assessment: Tolerated treatment well  Better tolerance to interventions  Patient would benefit from continued PT      Plan: Continue per plan of care        Precautions: DM II, Fall Risk    Manuals 2/23 02/25 03/01 03/04  1/25 1/28 2/8 2/11 2/15   PROM 10 10' 10'  10  8' 8 8  8'                                          Neuro Re-Ed             Q Sets      :05 10 :05 10 :05 10 :05x10 :05 x10   SLS :05 10 nv :05 10 :05 10   :05 5 :05 10 :05 10 :05x10 :05 x10                                                                    Ther Ex             HEP             Bike 6' 6' 6' 6'   6' 6' 6' 6' 6'    TKE Blue :03 20 Blue :03 20  Blue 20 :03  Blue 20 :03    Blue :03 20 Blue :03 20 Blue :03x20 Blue :03 x20   SLR 20 1 5# 20  1 5# 20 1 5# 20 1 5#  20x 1# 20 1# 20 1# x20 1# 20x   SL Hip Abd 20 1 5#  20 1 5#  20 1 5# 20 1 5#   1# 20 1# 20 1# x20 1# 20x   Clamshells R :03 20 R :03 20 R 20 :03 R :03 20  20x 20 R 20 R 20 R 20x   LAQ/SAQ 2# 20 2# 20  2# 20  2# 20    1# 20 1# 20 1# x20 1# 20x   SL Leg Press 55# 20 nv 50# 20x 50# 20x  50# 20 50# 20 55# 20  55# 20x   Stand Hip 3 way 2# 20 2# 20  2# 20 2# 20    1# 10 ea 1# 20 1# 2x10 1# 20x   Squats 20 20 20 20                                   Ther Activity                                       Gait Training             Stairs 1FF 1 FF 1 FF 1 FF                      Modalities             CP prn

## 2021-03-08 ENCOUNTER — OFFICE VISIT (OUTPATIENT)
Dept: PHYSICAL THERAPY | Facility: CLINIC | Age: 76
End: 2021-03-08
Payer: COMMERCIAL

## 2021-03-08 DIAGNOSIS — M25.561 CHRONIC PAIN OF RIGHT KNEE: Primary | ICD-10-CM

## 2021-03-08 DIAGNOSIS — G89.29 CHRONIC PAIN OF RIGHT KNEE: Primary | ICD-10-CM

## 2021-03-08 PROCEDURE — 97116 GAIT TRAINING THERAPY: CPT | Performed by: PHYSICAL THERAPIST

## 2021-03-08 PROCEDURE — 97140 MANUAL THERAPY 1/> REGIONS: CPT | Performed by: PHYSICAL THERAPIST

## 2021-03-08 PROCEDURE — 97110 THERAPEUTIC EXERCISES: CPT | Performed by: PHYSICAL THERAPIST

## 2021-03-08 NOTE — PROGRESS NOTES
Daily Note     Today's date: 3/8/2021  Patient name: Cale Boothe  : 1945  MRN: [id number]  Referring provider: Yuri Robertson MD  Dx:   Encounter Diagnosis     ICD-10-CM    1  Chronic pain of right knee  M25 561     G89 29                   Subjective: Continues to feel a bit better each week  Doing more stairs at home, though son doesn't support her doing this and prefers she doesn't do them      Objective: See treatment diary below      Assessment: Tolerated treatment well  Patient demonstrates safe/stable form on steps (descending step to pattern)      Plan: Continue per plan of care        Precautions: DM II, Fall Risk    Manuals 2/23 02/25 03/01 03/04 3/8 1/25 1/28 2/8 2/11 2/15   PROM 10 10' 10'  10 10 8' 8 8  8'                                          Neuro Re-Ed             Q Sets      :05 10 :05 10 :05 10 :05x10 :05 x10   SLS :05 10 nv :05 10 :05 10  :05 10 :05 5 :05 10 :05 10 :05x10 :05 x10                                                                    Ther Ex             HEP             Bike 6' 6' 6' 6'  6' 6' 6' 6' 6' 6'    TKE Blue :03 20 Blue :03 20  Blue 20 :03  Blue 20 :03  Blue :03 20  Blue :03 20 Blue :03 20 Blue :03x20 Blue :03 x20   SLR 20 1 5# 20  1 5# 20 1 5# 20 1 5# 2# 20 20x 1# 20 1# 20 1# x20 1# 20x   SL Hip Abd 20 1 5#  20 1 5#  20 1 5# 20 1 5# 2# 20  1# 20 1# 20 1# x20 1# 20x   Clamshells R :03 20 R :03 20 R 20 :03 R :03 20 R :03 20 20x 20 R 20 R 20 R 20x   LAQ/SAQ 2# 20 2# 20  2# 20  2# 20  2# 20  1# 20 1# 20 1# x20 1# 20x   SL Leg Press 55# 20 nv 50# 20x 50# 20x 60# 20 50# 20 50# 20 55# 20  55# 20x   Stand Hip 3 way 2# 20 2# 20  2# 20 2# 20  2# 20  1# 10 ea 1# 20 1# 2x10 1# 20x   Squats 20 20 20 20 20                                  Ther Activity                                       Gait Training             Stairs 1FF 1 FF 1 FF 1 FF 2 FF                     Modalities             CP prn

## 2021-03-09 ENCOUNTER — OFFICE VISIT (OUTPATIENT)
Dept: FAMILY MEDICINE CLINIC | Facility: CLINIC | Age: 76
End: 2021-03-09
Payer: COMMERCIAL

## 2021-03-09 VITALS
RESPIRATION RATE: 16 BRPM | WEIGHT: 129 LBS | TEMPERATURE: 95.6 F | HEART RATE: 103 BPM | DIASTOLIC BLOOD PRESSURE: 66 MMHG | OXYGEN SATURATION: 97 % | HEIGHT: 60 IN | BODY MASS INDEX: 25.32 KG/M2 | SYSTOLIC BLOOD PRESSURE: 140 MMHG

## 2021-03-09 DIAGNOSIS — N18.9 ACUTE KIDNEY INJURY SUPERIMPOSED ON CKD (HCC): ICD-10-CM

## 2021-03-09 DIAGNOSIS — H91.93 DECREASED HEARING, BILATERAL: ICD-10-CM

## 2021-03-09 DIAGNOSIS — E11.42 TYPE 2 DIABETES MELLITUS WITH DIABETIC POLYNEUROPATHY, WITHOUT LONG-TERM CURRENT USE OF INSULIN (HCC): Primary | ICD-10-CM

## 2021-03-09 DIAGNOSIS — N17.9 ACUTE KIDNEY INJURY SUPERIMPOSED ON CKD (HCC): ICD-10-CM

## 2021-03-09 DIAGNOSIS — D50.8 OTHER IRON DEFICIENCY ANEMIA: ICD-10-CM

## 2021-03-09 DIAGNOSIS — D75.839 THROMBOCYTOSIS: ICD-10-CM

## 2021-03-09 DIAGNOSIS — I10 ESSENTIAL HYPERTENSION: ICD-10-CM

## 2021-03-09 DIAGNOSIS — K21.00 GASTROESOPHAGEAL REFLUX DISEASE WITH ESOPHAGITIS WITHOUT HEMORRHAGE: ICD-10-CM

## 2021-03-09 PROCEDURE — 1160F RVW MEDS BY RX/DR IN RCRD: CPT | Performed by: FAMILY MEDICINE

## 2021-03-09 PROCEDURE — 99214 OFFICE O/P EST MOD 30 MIN: CPT | Performed by: FAMILY MEDICINE

## 2021-03-09 PROCEDURE — 3078F DIAST BP <80 MM HG: CPT | Performed by: FAMILY MEDICINE

## 2021-03-09 PROCEDURE — 3077F SYST BP >= 140 MM HG: CPT | Performed by: FAMILY MEDICINE

## 2021-03-09 PROCEDURE — 4004F PT TOBACCO SCREEN RCVD TLK: CPT | Performed by: FAMILY MEDICINE

## 2021-03-09 NOTE — PROGRESS NOTES
Assessment/Plan:       Diagnoses and all orders for this visit:    Type 2 diabetes mellitus with diabetic polyneuropathy, without long-term current use of insulin ([facility] )  Comments:  controlled, continue metformin  counseled on diet and exercise  eye and foot exams are up to date  Thrombocytosis ([facility] )  Comments:  resolved  follows heme-onc     Decreased hearing, bilateral  -     Ambulatory referral to Audiology; Future    Essential hypertension  Comments:  continue quinapril  Acute kidney injury superimposed on CKD ([facility] )  Comments:  creatinine level has improved on recent blood work  Gastroesophageal reflux disease with esophagitis without hemorrhage  Comments:  continue PPI    Other iron deficiency anemia  Comments:  managed by heme-onc        Subjective:      Patient ID: Tia Davies is a 66 y o  female  HPI  Usha Loza presents today for follow up of chronic conditions including type II DM, hypertension, hyperlipidemia, GERD, arthritis, iron def anemia  She overall feels well  She has been receiving iron infusions for iron def anemia, managed by heme-onc  Her diabetes is well controlled on metformin  States that her blood sugars have been in range  Denies hypo or hyperglycemic symptoms  Has been compliant with her medications  Eye exam up to date  Tries to eat a diabetic diet  Blood pressures have been controlled  She is compliant with BP medication as well as statin for cholesterol  Pt has hx of CVA- compliant with Plavix  GERD controlled with pantoprazole  Arthritis controlled with ibuprofen  She does admit to some decreased hearing bilaterally which she would like a formal hearing evaluation for  Started a while ago, but progressively worsening       The following portions of the patient's history were reviewed and updated as appropriate: allergies, current medications, past family history, past medical history, past social history, past surgical history and problem list     Review of Systems   Constitutional: Negative for activity change, appetite change, chills, diaphoresis, fatigue and fever  HENT: Negative  Respiratory: Negative for cough, choking, chest tightness, shortness of breath and wheezing  Cardiovascular: Negative for chest pain, palpitations and leg swelling  Gastrointestinal: Negative for abdominal distention, abdominal pain, constipation, diarrhea, nausea and vomiting  Genitourinary: Negative for difficulty urinating, dyspareunia, dysuria, enuresis, flank pain, frequency, menstrual problem, pelvic pain and urgency  Musculoskeletal: Negative for arthralgias, back pain, gait problem, joint swelling, myalgias, neck pain and neck stiffness  Skin: Negative  Neurological: Negative for dizziness, tremors, syncope, facial asymmetry, weakness, light-headedness, numbness and headaches  Psychiatric/Behavioral: Negative  Objective:      /66   Pulse 103   Temp (!) 95 6 °F (35 3 °C)   Resp 16   Ht 5' (1 524 m)   Wt 58 5 kg (129 lb)   SpO2 97%   BMI 25 19 kg/m²          Physical Exam  Constitutional:       General: She is not in acute distress  Appearance: She is well-developed  She is not diaphoretic  HENT:      Head: Normocephalic and atraumatic  Neck:      Musculoskeletal: Normal range of motion and neck supple  Cardiovascular:      Rate and Rhythm: Normal rate and regular rhythm  Heart sounds: Normal heart sounds  No murmur  No friction rub  No gallop  Pulmonary:      Effort: Pulmonary effort is normal  No respiratory distress  Breath sounds: Normal breath sounds  No wheezing or rales  Chest:      Chest wall: No tenderness  Abdominal:      General: Bowel sounds are normal  There is no distension  Palpations: Abdomen is soft  There is no mass  Tenderness: There is no abdominal tenderness  There is no guarding or rebound  Musculoskeletal: Normal range of motion  General: No deformity     Skin:     General: Skin is warm and dry  Neurological:      Mental Status: She is alert and oriented to person, place, and time  Psychiatric:         Behavior: Behavior normal          Thought Content:  Thought content normal          Judgment: Judgment normal

## 2021-03-11 ENCOUNTER — OFFICE VISIT (OUTPATIENT)
Dept: PHYSICAL THERAPY | Facility: CLINIC | Age: 76
End: 2021-03-11
Payer: COMMERCIAL

## 2021-03-11 DIAGNOSIS — M25.561 CHRONIC PAIN OF RIGHT KNEE: Primary | ICD-10-CM

## 2021-03-11 DIAGNOSIS — G89.29 CHRONIC PAIN OF RIGHT KNEE: Primary | ICD-10-CM

## 2021-03-11 PROCEDURE — 97140 MANUAL THERAPY 1/> REGIONS: CPT

## 2021-03-11 PROCEDURE — 97110 THERAPEUTIC EXERCISES: CPT

## 2021-03-11 NOTE — PROGRESS NOTES
Daily Note     Today's date: 3/11/2021  Patient name: Breanna Junior  : 1945  MRN: [id number]  Referring provider: Jennifer Latif MD  Dx:   Encounter Diagnosis     ICD-10-CM    1  Chronic pain of right knee  M25 561     G89 29        Start Time: 1500  Stop Time: 1549  Total time in clinic (min): 49 minutes    Subjective: Pt reports that she was able to go to the store by herself and bend/reach for everything she needed  Objective: See treatment diary below      Assessment: Tolerated treatment well  Patient demonstrated good form with all TE  Plan: Pt will do HEP and follow up as needed        Precautions: DM II, Fall Risk    Manuals 2/23 02/25 03/01 03/04 3/8 03/11  2/8 2/11 2/15   PROM 10 10' 10'  10 10 10'   8  8'                                          Neuro Re-Ed             Q Sets        :05 10 :05x10 :05 x10   SLS :05 10 nv :05 10 :05 10  :05 10 :05 10  :05 10 :05x10 :05 x10                                                                    Ther Ex             HEP             Bike 6' 6' 6' 6'  6' 6'  6' 6' 6'    TKE Blue :03 20 Blue :03 20  Blue 20 :03  Blue 20 :03  Blue :03 20 Blue 20 :03  Blue :03 20 Blue :03x20 Blue :03 x20   SLR 20 1 5# 20  1 5# 20 1 5# 20 1 5# 2# 20 2# 20  1# 20 1# x20 1# 20x   SL Hip Abd 20 1 5#  20 1 5#  20 1 5# 20 1 5# 2# 20 2# 20   1# 20 1# x20 1# 20x   Clamshells R :03 20 R :03 20 R 20 :03 R :03 20 R :03 20 R :03 20   R 20 R 20 R 20x   LAQ/SAQ 2# 20 2# 20  2# 20  2# 20  2# 20 2# 20   1# 20 1# x20 1# 20x   SL Leg Press 55# 20 nv 50# 20x 50# 20x 60# 20 50# 20   55# 20  55# 20x   Stand Hip 3 way 2# 20 2# 20  2# 20 2# 20  2# 20 2# 20  1# 20 1# 2x10 1# 20x   Squats 20 20 20 20 20 20                                 Ther Activity                                       Gait Training             Stairs 1FF 1 FF 1 FF 1 FF 2 FF 2 FF                    Modalities             CP prn

## 2021-03-13 DIAGNOSIS — E78.5 HYPERLIPIDEMIA, UNSPECIFIED HYPERLIPIDEMIA TYPE: ICD-10-CM

## 2021-03-13 RX ORDER — ATORVASTATIN CALCIUM 40 MG/1
TABLET, FILM COATED ORAL
Qty: 90 TABLET | Refills: 3 | Status: SHIPPED | OUTPATIENT
Start: 2021-03-13 | End: 2021-06-11 | Stop reason: HOSPADM

## 2021-03-18 ENCOUNTER — OFFICE VISIT (OUTPATIENT)
Dept: AUDIOLOGY | Facility: CLINIC | Age: 76
End: 2021-03-18
Payer: COMMERCIAL

## 2021-03-18 DIAGNOSIS — H91.93 DECREASED HEARING, BILATERAL: ICD-10-CM

## 2021-03-18 DIAGNOSIS — H90.3 SENSORY HEARING LOSS, BILATERAL: Primary | ICD-10-CM

## 2021-03-18 PROCEDURE — 92557 COMPREHENSIVE HEARING TEST: CPT | Performed by: AUDIOLOGIST

## 2021-03-18 PROCEDURE — 92567 TYMPANOMETRY: CPT | Performed by: AUDIOLOGIST

## 2021-03-18 NOTE — PROGRESS NOTES
HEARING EVALUATION    Name:  Pricila Sullivan  :  1945  Age:  66 y o  Date of Evaluation: 21     History:  Gradual hearing loss   Reason for visit: Pricila Sullivan is being seen today at the request of Dr Sri Mckeon for an evaluation of hearing  Patient and her son Ed report difficulties hearing conversation intermittently, difficulty with the TV and phone  She recently got her first smart phone  No tinnitus or dizziness/imbalance is reported  She has a history of intermittent work related noise exposure  She has a history of ear infections, as a child, and reports "left hearing issues after having chicken pox"  See medical list and medication list for additional information  EVALUATION:    Otoscopic Evaluation:   Right Ear:  Scant cerumen, did not remove today    Left Ear: clear canal     Tympanometry:   Right: Type C - slight negative pressure  - 115     Left: Type A - normal middle ear pressure and compliance    Audiogram Results:  Mild sloping to severe SHL bilaterally with good word recognition scores, in quiet  *see attached audiogram    RECOMMENDATIONS:  1  Consider a Hearing aid trial  / discussed Diana Reddy RC (chestnut brown) 6 mm DB deepthi  Provided information to the patient and her son regarding the technology and price point  2  Annual hearing evaluations for monitoring purposes  PATIENT EDUCATION:   Discussed results and recommendations with the patient and her son  Questions were addressed and the patient was encouraged to contact our department should concerns arise      Anais Carver , CCC-A, [place]# [id number], Hearing Aid Dispenser, [place]# [id number]  Clinical Audiologist

## 2021-04-28 DIAGNOSIS — I63.9 CEREBROVASCULAR ACCIDENT (CVA), UNSPECIFIED MECHANISM (HCC): ICD-10-CM

## 2021-04-28 RX ORDER — CLOPIDOGREL BISULFATE 75 MG/1
TABLET ORAL
Qty: 90 TABLET | Refills: 0 | Status: SHIPPED | OUTPATIENT
Start: 2021-04-28 | End: 2021-07-22

## 2021-05-03 DIAGNOSIS — I10 ESSENTIAL HYPERTENSION: ICD-10-CM

## 2021-05-03 RX ORDER — QUINAPRIL 40 MG/1
TABLET ORAL
Qty: 90 TABLET | Refills: 3 | Status: SHIPPED | OUTPATIENT
Start: 2021-05-03 | End: 2022-05-02

## 2021-05-28 LAB
LEFT EYE DIABETIC RETINOPATHY: NORMAL
RIGHT EYE DIABETIC RETINOPATHY: NORMAL

## 2021-06-04 LAB
BASOPHILS # BLD AUTO: 0 X10E3/UL (ref 0–0.2)
BASOPHILS NFR BLD AUTO: 1 %
EOSINOPHIL # BLD AUTO: 0.2 X10E3/UL (ref 0–0.4)
EOSINOPHIL NFR BLD AUTO: 3 %
ERYTHROCYTE [DISTWIDTH] IN BLOOD BY AUTOMATED COUNT: 12.6 % (ref 11.7–15.4)
FERRITIN SERPL-MCNC: 24 NG/ML (ref 15–150)
HCT VFR BLD AUTO: 36 % (ref 34–46.6)
HGB BLD-MCNC: 11.5 G/DL (ref 11.1–15.9)
IMM GRANULOCYTES # BLD: 0 X10E3/UL (ref 0–0.1)
IMM GRANULOCYTES NFR BLD: 0 %
IRON SATN MFR SERPL: 20 % (ref 15–55)
IRON SERPL-MCNC: 65 UG/DL (ref 27–139)
LYMPHOCYTES # BLD AUTO: 1.9 X10E3/UL (ref 0.7–3.1)
LYMPHOCYTES NFR BLD AUTO: 25 %
MCH RBC QN AUTO: 29.9 PG (ref 26.6–33)
MCHC RBC AUTO-ENTMCNC: 31.9 G/DL (ref 31.5–35.7)
MCV RBC AUTO: 94 FL (ref 79–97)
MONOCYTES # BLD AUTO: 0.7 X10E3/UL (ref 0.1–0.9)
MONOCYTES NFR BLD AUTO: 9 %
NEUTROPHILS # BLD AUTO: 4.6 X10E3/UL (ref 1.4–7)
NEUTROPHILS NFR BLD AUTO: 62 %
PLATELET # BLD AUTO: 355 X10E3/UL (ref 150–450)
RBC # BLD AUTO: 3.84 X10E6/UL (ref 3.77–5.28)
TIBC SERPL-MCNC: 326 UG/DL (ref 250–450)
UIBC SERPL-MCNC: 261 UG/DL (ref 118–369)
WBC # BLD AUTO: 7.5 X10E3/UL (ref 3.4–10.8)

## 2021-06-06 ENCOUNTER — APPOINTMENT (EMERGENCY)
Dept: RADIOLOGY | Facility: HOSPITAL | Age: 76
DRG: 066 | End: 2021-06-06
Payer: COMMERCIAL

## 2021-06-06 ENCOUNTER — HOSPITAL ENCOUNTER (INPATIENT)
Facility: HOSPITAL | Age: 76
LOS: 5 days | Discharge: NURSING, REHAB OR LONG-TERM CARE | DRG: 066 | End: 2021-06-11
Attending: EMERGENCY MEDICINE | Admitting: ANESTHESIOLOGY
Payer: COMMERCIAL

## 2021-06-06 DIAGNOSIS — R42 VERTIGO: Primary | ICD-10-CM

## 2021-06-06 DIAGNOSIS — I63.9 STROKE (CEREBRUM) (HCC): ICD-10-CM

## 2021-06-06 DIAGNOSIS — R42 DIZZINESS OF UNKNOWN CAUSE: ICD-10-CM

## 2021-06-06 DIAGNOSIS — R77.8 ELEVATED TROPONIN: ICD-10-CM

## 2021-06-06 PROBLEM — I10 ESSENTIAL HYPERTENSION: Chronic | Status: ACTIVE | Noted: 2020-01-09

## 2021-06-06 PROBLEM — D50.9 IRON DEFICIENCY ANEMIA: Chronic | Status: ACTIVE | Noted: 2020-09-01

## 2021-06-06 PROBLEM — R79.89 TROPONIN LEVEL ELEVATED: Status: ACTIVE | Noted: 2021-06-06

## 2021-06-06 PROBLEM — R91.1 NODULE OF UPPER LOBE OF RIGHT LUNG: Status: ACTIVE | Noted: 2021-06-06

## 2021-06-06 PROBLEM — K21.9 GERD (GASTROESOPHAGEAL REFLUX DISEASE): Chronic | Status: ACTIVE | Noted: 2020-06-05

## 2021-06-06 PROBLEM — N18.9 ACUTE KIDNEY INJURY SUPERIMPOSED ON CKD (HCC): Status: RESOLVED | Noted: 2020-01-09 | Resolved: 2021-06-06

## 2021-06-06 PROBLEM — D75.839 THROMBOCYTOSIS: Status: RESOLVED | Noted: 2021-03-03 | Resolved: 2021-06-06

## 2021-06-06 PROBLEM — E04.1 THYROID NODULE GREATER THAN OR EQUAL TO 1.5 CM IN DIAMETER INCIDENTALLY NOTED ON IMAGING STUDY: Status: ACTIVE | Noted: 2021-06-06

## 2021-06-06 PROBLEM — E11.22 TYPE 2 DIABETES MELLITUS WITH STAGE 3 CHRONIC KIDNEY DISEASE (HCC): Chronic | Status: ACTIVE | Noted: 2021-03-03

## 2021-06-06 PROBLEM — Z87.891 HISTORY OF TOBACCO ABUSE: Chronic | Status: ACTIVE | Noted: 2020-01-09

## 2021-06-06 PROBLEM — N17.9 ACUTE KIDNEY INJURY SUPERIMPOSED ON CKD (HCC): Status: RESOLVED | Noted: 2020-01-09 | Resolved: 2021-06-06

## 2021-06-06 PROBLEM — N18.30 TYPE 2 DIABETES MELLITUS WITH STAGE 3 CHRONIC KIDNEY DISEASE (HCC): Chronic | Status: ACTIVE | Noted: 2021-03-03

## 2021-06-06 LAB
ALBUMIN SERPL BCP-MCNC: 3.3 G/DL (ref 3.5–5)
ALP SERPL-CCNC: 89 U/L (ref 46–116)
ALT SERPL W P-5'-P-CCNC: 26 U/L (ref 12–78)
ANION GAP SERPL CALCULATED.3IONS-SCNC: 8 MMOL/L (ref 4–13)
APTT PPP: 23 SECONDS (ref 23–37)
AST SERPL W P-5'-P-CCNC: 15 U/L (ref 5–45)
BACTERIA UR QL AUTO: ABNORMAL /HPF
BASOPHILS # BLD AUTO: 0.03 THOUSANDS/ΜL (ref 0–0.1)
BASOPHILS NFR BLD AUTO: 0 % (ref 0–1)
BILIRUB SERPL-MCNC: 0.56 MG/DL (ref 0.2–1)
BILIRUB UR QL STRIP: NEGATIVE
BUN SERPL-MCNC: 26 MG/DL (ref 5–25)
CALCIUM ALBUM COR SERPL-MCNC: 10.5 MG/DL (ref 8.3–10.1)
CALCIUM SERPL-MCNC: 9.9 MG/DL (ref 8.3–10.1)
CHLORIDE SERPL-SCNC: 104 MMOL/L (ref 100–108)
CLARITY UR: ABNORMAL
CO2 SERPL-SCNC: 29 MMOL/L (ref 21–32)
COLOR UR: YELLOW
CREAT SERPL-MCNC: 1.14 MG/DL (ref 0.6–1.3)
EOSINOPHIL # BLD AUTO: 0 THOUSAND/ΜL (ref 0–0.61)
EOSINOPHIL NFR BLD AUTO: 0 % (ref 0–6)
ERYTHROCYTE [DISTWIDTH] IN BLOOD BY AUTOMATED COUNT: 12.7 % (ref 11.6–15.1)
GFR SERPL CREATININE-BSD FRML MDRD: 46 ML/MIN/1.73SQ M
GLUCOSE SERPL-MCNC: 203 MG/DL (ref 65–140)
GLUCOSE SERPL-MCNC: 214 MG/DL (ref 65–140)
GLUCOSE SERPL-MCNC: 233 MG/DL (ref 65–140)
GLUCOSE UR STRIP-MCNC: ABNORMAL MG/DL
HCT VFR BLD AUTO: 34.7 % (ref 34.8–46.1)
HGB BLD-MCNC: 11 G/DL (ref 11.5–15.4)
HGB UR QL STRIP.AUTO: ABNORMAL
IMM GRANULOCYTES # BLD AUTO: 0.05 THOUSAND/UL (ref 0–0.2)
IMM GRANULOCYTES NFR BLD AUTO: 1 % (ref 0–2)
INR PPP: 0.89 (ref 0.84–1.19)
KETONES UR STRIP-MCNC: ABNORMAL MG/DL
LEUKOCYTE ESTERASE UR QL STRIP: NEGATIVE
LYMPHOCYTES # BLD AUTO: 0.38 THOUSANDS/ΜL (ref 0.6–4.47)
LYMPHOCYTES NFR BLD AUTO: 5 % (ref 14–44)
MCH RBC QN AUTO: 30.1 PG (ref 26.8–34.3)
MCHC RBC AUTO-ENTMCNC: 31.7 G/DL (ref 31.4–37.4)
MCV RBC AUTO: 95 FL (ref 82–98)
MONOCYTES # BLD AUTO: 0.25 THOUSAND/ΜL (ref 0.17–1.22)
MONOCYTES NFR BLD AUTO: 3 % (ref 4–12)
NEUTROPHILS # BLD AUTO: 7.63 THOUSANDS/ΜL (ref 1.85–7.62)
NEUTS SEG NFR BLD AUTO: 91 % (ref 43–75)
NITRITE UR QL STRIP: NEGATIVE
NON-SQ EPI CELLS URNS QL MICRO: ABNORMAL /HPF
NRBC BLD AUTO-RTO: 0 /100 WBCS
PH UR STRIP.AUTO: 7 [PH]
PLATELET # BLD AUTO: 291 THOUSANDS/UL (ref 149–390)
PMV BLD AUTO: 9.9 FL (ref 8.9–12.7)
POTASSIUM SERPL-SCNC: 4.5 MMOL/L (ref 3.5–5.3)
PROT SERPL-MCNC: 6.4 G/DL (ref 6.4–8.2)
PROT UR STRIP-MCNC: ABNORMAL MG/DL
PROTHROMBIN TIME: 11.9 SECONDS (ref 11.6–14.5)
RBC # BLD AUTO: 3.65 MILLION/UL (ref 3.81–5.12)
RBC #/AREA URNS AUTO: ABNORMAL /HPF
SODIUM SERPL-SCNC: 141 MMOL/L (ref 136–145)
SP GR UR STRIP.AUTO: 1.01 (ref 1–1.03)
TROPONIN I SERPL-MCNC: 0.14 NG/ML
TROPONIN I SERPL-MCNC: 0.29 NG/ML
UROBILINOGEN UR QL STRIP.AUTO: 0.2 E.U./DL
WBC # BLD AUTO: 8.34 THOUSAND/UL (ref 4.31–10.16)
WBC #/AREA URNS AUTO: ABNORMAL /HPF

## 2021-06-06 PROCEDURE — 99285 EMERGENCY DEPT VISIT HI MDM: CPT

## 2021-06-06 PROCEDURE — 81001 URINALYSIS AUTO W/SCOPE: CPT | Performed by: EMERGENCY MEDICINE

## 2021-06-06 PROCEDURE — G1004 CDSM NDSC: HCPCS

## 2021-06-06 PROCEDURE — 70496 CT ANGIOGRAPHY HEAD: CPT

## 2021-06-06 PROCEDURE — 87081 CULTURE SCREEN ONLY: CPT | Performed by: ANESTHESIOLOGY

## 2021-06-06 PROCEDURE — 85730 THROMBOPLASTIN TIME PARTIAL: CPT | Performed by: EMERGENCY MEDICINE

## 2021-06-06 PROCEDURE — 82948 REAGENT STRIP/BLOOD GLUCOSE: CPT

## 2021-06-06 PROCEDURE — 93005 ELECTROCARDIOGRAM TRACING: CPT

## 2021-06-06 PROCEDURE — 36415 COLL VENOUS BLD VENIPUNCTURE: CPT | Performed by: EMERGENCY MEDICINE

## 2021-06-06 PROCEDURE — 99291 CRITICAL CARE FIRST HOUR: CPT | Performed by: EMERGENCY MEDICINE

## 2021-06-06 PROCEDURE — 84484 ASSAY OF TROPONIN QUANT: CPT | Performed by: EMERGENCY MEDICINE

## 2021-06-06 PROCEDURE — 70498 CT ANGIOGRAPHY NECK: CPT

## 2021-06-06 PROCEDURE — 71045 X-RAY EXAM CHEST 1 VIEW: CPT

## 2021-06-06 PROCEDURE — 84484 ASSAY OF TROPONIN QUANT: CPT | Performed by: NURSE PRACTITIONER

## 2021-06-06 PROCEDURE — 85610 PROTHROMBIN TIME: CPT | Performed by: EMERGENCY MEDICINE

## 2021-06-06 PROCEDURE — 85025 COMPLETE CBC W/AUTO DIFF WBC: CPT | Performed by: EMERGENCY MEDICINE

## 2021-06-06 PROCEDURE — 99223 1ST HOSP IP/OBS HIGH 75: CPT | Performed by: NURSE PRACTITIONER

## 2021-06-06 PROCEDURE — 80053 COMPREHEN METABOLIC PANEL: CPT | Performed by: EMERGENCY MEDICINE

## 2021-06-06 RX ORDER — CLOPIDOGREL BISULFATE 75 MG/1
75 TABLET ORAL DAILY
Status: DISCONTINUED | OUTPATIENT
Start: 2021-06-07 | End: 2021-06-11 | Stop reason: HOSPADM

## 2021-06-06 RX ORDER — CLOPIDOGREL BISULFATE 75 MG/1
300 TABLET ORAL ONCE
Status: COMPLETED | OUTPATIENT
Start: 2021-06-06 | End: 2021-06-06

## 2021-06-06 RX ORDER — ASPIRIN 325 MG
325 TABLET ORAL ONCE
Status: COMPLETED | OUTPATIENT
Start: 2021-06-06 | End: 2021-06-06

## 2021-06-06 RX ORDER — ATORVASTATIN CALCIUM 40 MG/1
40 TABLET, FILM COATED ORAL EVERY EVENING
Status: DISCONTINUED | OUTPATIENT
Start: 2021-06-06 | End: 2021-06-06

## 2021-06-06 RX ORDER — ONDANSETRON 2 MG/ML
INJECTION INTRAMUSCULAR; INTRAVENOUS
Status: COMPLETED
Start: 2021-06-06 | End: 2021-06-06

## 2021-06-06 RX ORDER — ATORVASTATIN CALCIUM 40 MG/1
40 TABLET, FILM COATED ORAL
Status: DISCONTINUED | OUTPATIENT
Start: 2021-06-07 | End: 2021-06-06

## 2021-06-06 RX ORDER — PANTOPRAZOLE SODIUM 40 MG/1
40 TABLET, DELAYED RELEASE ORAL
Status: DISCONTINUED | OUTPATIENT
Start: 2021-06-07 | End: 2021-06-11 | Stop reason: HOSPADM

## 2021-06-06 RX ORDER — LISINOPRIL 20 MG/1
40 TABLET ORAL
Status: DISCONTINUED | OUTPATIENT
Start: 2021-06-06 | End: 2021-06-06

## 2021-06-06 RX ORDER — FERROUS SULFATE 325(65) MG
325 TABLET ORAL 3 TIMES WEEKLY
Status: DISCONTINUED | OUTPATIENT
Start: 2021-06-07 | End: 2021-06-11 | Stop reason: HOSPADM

## 2021-06-06 RX ORDER — ATORVASTATIN CALCIUM 80 MG/1
80 TABLET, FILM COATED ORAL EVERY EVENING
Status: DISCONTINUED | OUTPATIENT
Start: 2021-06-06 | End: 2021-06-11 | Stop reason: HOSPADM

## 2021-06-06 RX ORDER — SODIUM CHLORIDE, SODIUM GLUCONATE, SODIUM ACETATE, POTASSIUM CHLORIDE, MAGNESIUM CHLORIDE, SODIUM PHOSPHATE, DIBASIC, AND POTASSIUM PHOSPHATE .53; .5; .37; .037; .03; .012; .00082 G/100ML; G/100ML; G/100ML; G/100ML; G/100ML; G/100ML; G/100ML
50 INJECTION, SOLUTION INTRAVENOUS CONTINUOUS
Status: DISCONTINUED | OUTPATIENT
Start: 2021-06-06 | End: 2021-06-07

## 2021-06-06 RX ORDER — ASPIRIN 81 MG/1
81 TABLET, CHEWABLE ORAL DAILY
Status: DISCONTINUED | OUTPATIENT
Start: 2021-06-07 | End: 2021-06-11 | Stop reason: HOSPADM

## 2021-06-06 RX ORDER — LISINOPRIL 20 MG/1
40 TABLET ORAL
Status: DISCONTINUED | OUTPATIENT
Start: 2021-06-07 | End: 2021-06-11 | Stop reason: HOSPADM

## 2021-06-06 RX ADMIN — SODIUM CHLORIDE, SODIUM GLUCONATE, SODIUM ACETATE, POTASSIUM CHLORIDE, MAGNESIUM CHLORIDE, SODIUM PHOSPHATE, DIBASIC, AND POTASSIUM PHOSPHATE 50 ML/HR: .53; .5; .37; .037; .03; .012; .00082 INJECTION, SOLUTION INTRAVENOUS at 23:20

## 2021-06-06 RX ADMIN — ASPIRIN 325 MG: 325 TABLET ORAL at 21:43

## 2021-06-06 RX ADMIN — IOHEXOL 85 ML: 350 INJECTION, SOLUTION INTRAVENOUS at 20:03

## 2021-06-06 RX ADMIN — CLOPIDOGREL BISULFATE 300 MG: 75 TABLET ORAL at 23:19

## 2021-06-06 RX ADMIN — MAGNESIUM OXIDE TAB 400 MG (241.3 MG ELEMENTAL MG) 400 MG: 400 (241.3 MG) TAB at 23:28

## 2021-06-06 RX ADMIN — INSULIN LISPRO 2 UNITS: 100 INJECTION, SOLUTION INTRAVENOUS; SUBCUTANEOUS at 23:28

## 2021-06-06 RX ADMIN — ATORVASTATIN CALCIUM 80 MG: 80 TABLET ORAL at 23:19

## 2021-06-06 NOTE — ED PROVIDER NOTES
History  Chief Complaint   Patient presents with    Dizziness     Pt arrived via EMS from home with c/o dizziness, nausea, vomiting, diarrhea since this morning after having coffee  67 yo female c/o sudden onset severe spinning dizziness at 11 am   Has been persistent all day  Worse with any movement and can't walk   + associated nausea  She did vomit x one in ambulance  No head, chest, belly pain  Never had before  No trauma  History provided by:  Patient   used: No    Dizziness  Associated symptoms: nausea and vomiting    Associated symptoms: no chest pain, no diarrhea, no headaches and no shortness of breath        Prior to Admission Medications   Prescriptions Last Dose Informant Patient Reported?  Taking?   atorvastatin (LIPITOR) 40 mg tablet   No No   Sig: TAKE 1 TABLET BY MOUTH  DAILY   clopidogrel (PLAVIX) 75 mg tablet   No No   Sig: TAKE 1 TABLET BY MOUTH EVERY DAY   ferrous sulfate 324 (65 Fe) mg   No No   Sig: Take 1 tablet (324 mg total) by mouth 3 (three) times a week   ibuprofen (MOTRIN) 200 mg tablet  Self Yes No   Sig: Take 600 mg by mouth every 6 (six) hours as needed for mild pain   magnesium oxide (MAG-OX) 400 mg tablet  Self No No   Sig: Take 1 tablet (400 mg total) by mouth 2 (two) times a day   metFORMIN (GLUCOPHAGE) 1000 MG tablet  Self No No   Sig: Take 1 tablet (1,000 mg total) by mouth 2 (two) times a day   pantoprazole (PROTONIX) 40 mg tablet  Self No No   Sig: TAKE 1 TABLET BY MOUTH EVERY DAY   quinapril (ACCUPRIL) 40 MG tablet   No No   Sig: TAKE 1 TABLET BY MOUTH  DAILY AT BEDTIME      Facility-Administered Medications: None       Past Medical History:   Diagnosis Date    Anemia     Arthritis     Capsulitis of foot     Last assessed 07/29/13    Diabetes (HCC)     type 2    Full dentures     Ganglion     Last assessed 07/29/13    Hearing decreased     Hyperlipidemia     Hypertension     Magnesium deficiency     Sciatica     Wears glasses Past Surgical History:   Procedure Laterality Date    CATARACT EXTRACTION      COLONOSCOPY      DILATION AND CURETTAGE OF UTERUS      x 4    HAND SURGERY Left     fx repaired w/wrist bone    HYSTERECTOMY      left ovary remains    KNEE ARTHROSCOPY Left     LIPOMA RESECTION      removed from back and left buttock    MN XCAPSL CTRC RMVL INSJ IO LENS PROSTH W/O ECP Right 1/24/2019    Procedure: EXTRACTION EXTRACAPSULAR CATARACT PHACO INTRAOCULAR LENS (IOL); Surgeon: Girish Osborn MD;  Location: [facility] MAIN OR;  Service: Ophthalmology    MN XCAPSL CTRC RMVL INSJ IO LENS PROSTH W/O ECP Left 2/14/2019    Procedure: EXTRACTION EXTRACAPSULAR CATARACT PHACO INTRAOCULAR LENS (IOL); Surgeon: Girish Osborn MD;  Location: [facility] MAIN OR;  Service: Ophthalmology    TONSILLECTOMY      and adenoids    TUBAL LIGATION         Family History   Problem Relation Age of Onset    Leukemia Father     Early death Father 39        leukemia    Stomach cancer Maternal Grandfather     Diabetes Paternal Grandfather     Diabetes Sister         insulin dependent    Diabetes Brother         insulin    Diabetes Sister         insulin    Diabetes Sister         insulin     I have reviewed and agree with the history as documented  E-Cigarette/Vaping    E-Cigarette Use Never User      E-Cigarette/Vaping Substances    Nicotine No     THC No     CBD No     Flavoring No     Other No     Unknown No      Social History     Tobacco Use    Smoking status: Current Every Day Smoker     Packs/day: 0 50     Years: 35 00     Pack years: 17 50     Types: Cigarettes    Smokeless tobacco: Never Used   Substance Use Topics    Alcohol use: Not Currently     Comment: Rarely    Drug use: Never       Review of Systems   Constitutional: Negative  Negative for fever  HENT: Negative  Eyes: Negative  Respiratory: Negative  Negative for cough and shortness of breath  Cardiovascular: Negative  Negative for chest pain  Gastrointestinal: Positive for nausea and vomiting  Negative for abdominal pain and diarrhea  Genitourinary: Negative  Negative for dysuria and flank pain  Musculoskeletal: Positive for gait problem  Negative for back pain and myalgias  Skin: Negative  Negative for rash  Neurological: Positive for dizziness  Negative for headaches  Hematological: Does not bruise/bleed easily  Psychiatric/Behavioral: Negative  All other systems reviewed and are negative  Physical Exam  Physical Exam  Vitals signs and nursing note reviewed  Constitutional:       General: She is not in acute distress  Appearance: She is well-developed  She is ill-appearing  She is not toxic-appearing or diaphoretic  HENT:      Head: Normocephalic and atraumatic  Eyes:      Extraocular Movements: Extraocular movements intact  Conjunctiva/sclera: Conjunctivae normal       Pupils: Pupils are equal, round, and reactive to light  Neck:      Musculoskeletal: Normal range of motion and neck supple  Cardiovascular:      Rate and Rhythm: Normal rate and regular rhythm  Heart sounds: Normal heart sounds  No murmur  Pulmonary:      Effort: Pulmonary effort is normal  No respiratory distress  Breath sounds: Normal breath sounds  Abdominal:      General: Bowel sounds are normal  There is no distension  Palpations: Abdomen is soft  Tenderness: There is no abdominal tenderness  Musculoskeletal: Normal range of motion  General: No deformity  Right lower leg: No edema  Left lower leg: No edema  Skin:     General: Skin is warm and dry  Coloration: Skin is not pale  Findings: No rash  Neurological:      General: No focal deficit present  Mental Status: She is alert and oriented to person, place, and time  Cranial Nerves: No cranial nerve deficit  Motor: No weakness        Comments: Speech intact, no drift, no droop, finger to nose intact, heel down shin intact, motor intact   Psychiatric:         Mood and Affect: Mood normal          Behavior: Behavior normal          Vital Signs  ED Triage Vitals   Temperature Pulse Respirations Blood Pressure SpO2   06/06/21 1945 06/06/21 1945 06/06/21 1945 06/06/21 1947 06/06/21 1945   98 5 °F (36 9 °C) 70 18 (!) 182/77 96 %      Temp Source Heart Rate Source Patient Position - Orthostatic VS BP Location FiO2 (%)   06/06/21 1945 06/06/21 1945 06/06/21 1945 06/06/21 1945 --   Oral Monitor Lying Right arm       Pain Score       06/06/21 1945       No Pain           Vitals:    06/06/21 2000 06/06/21 2015 06/06/21 2030 06/06/21 2046   BP: 164/73 135/62 (!) 181/70 165/73   Pulse: 58 69 70 73   Patient Position - Orthostatic VS: Sitting Sitting Lying Sitting         Visual Acuity  Visual Acuity      Most Recent Value   L Pupil Size (mm)  3   R Pupil Size (mm)  3          ED Medications  Medications   aspirin tablet 325 mg (has no administration in time range)   atorvastatin (LIPITOR) tablet 40 mg (has no administration in time range)   ondansetron (ZOFRAN) 4 mg/2 mL injection **ADS Override Pull** (0 mg  Given to EMS 6/6/21 1951)   iohexol (OMNIPAQUE) 350 MG/ML injection (SINGLE-DOSE) 100 mL (85 mL Intravenous Given 6/6/21 2003)       Diagnostic Studies  Results Reviewed     Procedure Component Value Units Date/Time    CBC and differential [[id number]]  (Abnormal) Collected: 06/06/21 1950    Lab Status: Final result Specimen: Blood from Arm, Left Updated: 06/06/21 2020     WBC 8 34 Thousand/uL      RBC 3 65 Million/uL      Hemoglobin 11 0 g/dL      Hematocrit 34 7 %      MCV 95 fL      MCH 30 1 pg      MCHC 31 7 g/dL      RDW 12 7 %      MPV 9 9 fL      Platelets 796 Thousands/uL      nRBC 0 /100 WBCs      Neutrophils Relative 91 %      Immat GRANS % 1 %      Lymphocytes Relative 5 %      Monocytes Relative 3 %      Eosinophils Relative 0 %      Basophils Relative 0 %      Neutrophils Absolute 7 63 Thousands/µL      Immature Grans Absolute 0 05 Thousand/uL      Lymphocytes Absolute 0 38 Thousands/µL      Monocytes Absolute 0 25 Thousand/µL      Eosinophils Absolute 0 00 Thousand/µL      Basophils Absolute 0 03 Thousands/µL     Narrative: This is an appended report  These results have been appended to a previously verified report      Troponin I [[id number]]  (Abnormal) Collected: 06/06/21 1950    Lab Status: Final result Specimen: Blood from Arm, Left Updated: 06/06/21 2015     Troponin I 0 14 ng/mL     Comprehensive metabolic panel [[id number]]  (Abnormal) Collected: 06/06/21 1950    Lab Status: Final result Specimen: Blood from Arm, Left Updated: 06/06/21 2013     Sodium 141 mmol/L      Potassium 4 5 mmol/L      Chloride 104 mmol/L      CO2 29 mmol/L      ANION GAP 8 mmol/L      BUN 26 mg/dL      Creatinine 1 14 mg/dL      Glucose 233 mg/dL      Calcium 9 9 mg/dL      Corrected Calcium 10 5 mg/dL      AST 15 U/L      ALT 26 U/L      Alkaline Phosphatase 89 U/L      Total Protein 6 4 g/dL      Albumin 3 3 g/dL      Total Bilirubin 0 56 mg/dL      eGFR 46 ml/min/1 73sq m     Narrative:      [facility] guidelines for Chronic Kidney Disease (CKD):     Stage 1 with normal or high GFR (GFR > 90 mL/min/1 73 square meters)    Stage 2 Mild CKD (GFR = 60-89 mL/min/1 73 square meters)    Stage 3A Moderate CKD (GFR = 45-59 mL/min/1 73 square meters)    Stage 3B Moderate CKD (GFR = 30-44 mL/min/1 73 square meters)    Stage 4 Severe CKD (GFR = 15-29 mL/min/1 73 square meters)    Stage 5 End Stage CKD (GFR <15 mL/min/1 73 square meters)  Note: GFR calculation is accurate only with a steady state creatinine    Protime-INR [[id number]]  (Normal) Collected: 06/06/21 1950    Lab Status: Final result Specimen: Blood from Arm, Left Updated: 06/06/21 2006     Protime 11 9 seconds      INR 0 89    APTT [[id number]]  (Normal) Collected: 06/06/21 1950    Lab Status: Final result Specimen: Blood from Arm, Left Updated: 06/06/21 2006 PTT 23 seconds     Fingerstick Glucose (POCT) [[id number]]  (Abnormal) Collected: 06/06/21 1947    Lab Status: Final result Updated: 06/06/21 1948     POC Glucose 203 mg/dl     UA (URINE) with reflex to Scope [[id number]]     Lab Status: No result Specimen: Urine                  CTA head and neck with and without contrast   Final Result by Smitha Ching MD (06/06 2050)      67% stenosis of the right vertebral artery origin indicating moderate stenosis          60% stenosis of the left vertebral artery origin consistent with mild stenosis          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 (3:177-181)  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       Biophysical moderate emphysematous changes are noted         Right upper lobe 3 mm nodule (602: 1:15)  Based on current Fleischner Society 2017 Guidelines on incidental pulmonary nodule, no routine follow-up is needed if the patient is considered low risk for lung cancer  If the patient is considered high risk    for lung cancer, 12 month follow-up non-contrast chest CT is recommended  2 6 x 1 9 cm right thyroid lobe nodule  Incidental discovery of one or more thyroid nodule(s) measuring more than 1 5 cm and without suspicious features is noted in this patient who is above 28years old; according to guidelines published in the    February 2015 white paper on incidental thyroid nodules in the Journal of the [employer] of Radiology [facility]), further characterization with thyroid ultrasound is recommended  I personally discussed this study with Ghazal Denny on 9/4/4336 at 5:23 PM                Workstation performed: [id number]         CT stroke alert brain   Final Result by Smitha Ching MD (06/06 2050)      No acute intracranial abnormality           Findings were directly discussed with Ghazal Denny on 8/9/0864 7:96 PM       Workstation performed: [id number]         XR chest 1 view portable    (Results Pending)              Procedures  ECG 12 Lead Documentation Only    Date/Time: 6/6/2021 8:22 PM  Performed by: Memo Mcmullen MD  Authorized by: Memo Mcmullen MD     Indications / Diagnosis:  Stroke alert  ECG reviewed by me, the ED Provider: yes    Patient location:  ED  Previous ECG:     Previous ECG:  Unavailable  Interpretation:     Interpretation: abnormal    Rate:     ECG rate:  76    ECG rate assessment: normal    Rhythm:     Rhythm: sinus rhythm    Ectopy:     Ectopy: none    QRS:     QRS axis:  Left  Conduction:     Conduction: normal    ST segments:     ST segments:  Normal  T waves:     T waves: normal      CriticalCare Time  Performed by: Memo Mcmullen MD  Authorized by: Memo Mcmullen MD     Critical care provider statement:     Critical care time (minutes):  30    Critical care time was exclusive of:  Separately billable procedures and treating other patients    Critical care was necessary to treat or prevent imminent or life-threatening deterioration of the following conditions:  CNS failure or compromise    Critical care was time spent personally by me on the following activities:  Obtaining history from patient or surrogate, development of treatment plan with patient or surrogate, discussions with consultants, evaluation of patient's response to treatment, examination of patient, interpretation of cardiac output measurements, ordering and performing treatments and interventions, ordering and review of laboratory studies, ordering and review of radiographic studies, re-evaluation of patient's condition and review of old charts    I assumed direction of critical care for this patient from another provider in my specialty: no               ED Course                   First Filed Value   TPA Decision  Patient not a TPA candidate  Patient is not a candidate options  Unclear time of onset outside appropriate time window                                  MDM  Number of Diagnoses or Management Options  Elevated troponin:   [facility]   Vertigo: Diagnosis management comments: Stroke alert called for new onset vertigo  Discussed with Dr Arben Nava  Pt  Is out of the window for tPA  She is on Plavix  2047 - discussed with radiology and Dr Arben Nava again  NIH scale zero  Unclear if M2 branch occlusion acute as there are no prior studies but it doesn't really make sense with her vertigo  Since unclear, will admit to step down for closer monitoring  Troponin is mildly elevated but no acute EKG changes and no chest pain  Will admit for further evaluation, plavix/aspirin, high dose lipitor, MRI, follow troponins  Pt  And family advised  Disposition  Final diagnoses:   Vertigo   Stroke (cerebrum) ([facility])   Elevated troponin     Time reflects when diagnosis was documented in both MDM as applicable and the Disposition within this note     Time User Action Codes Description Comment    6/2/8682  7:77 PM Elias CHAVEZ Add [H55] Vertigo     1/8/2682  7:95 PM Elias CHAVEZ Add [H42 7] Stroke (cerebrum) ([facility] )     7/0/6021  8:86 PM Elias CHAVEZ Add [I39 6] Elevated troponin       ED Disposition     ED Disposition Condition Date/Time Comment    Admit Stable Sun Jun 6, 2021  8:52 PM Case was discussed with *Dr Arben Nava, hospitalist, ICU** and the patient's admission status was agreed to be Admission Status: inpatient status         Follow-up Information    None         Patient's Medications   Discharge Prescriptions    No medications on file     No discharge procedures on file      PDMP Review     None          ED Provider  Electronically Signed by           Patricia Reed MD  71/07/21 0751

## 2021-06-07 ENCOUNTER — TELEPHONE (OUTPATIENT)
Dept: HEMATOLOGY ONCOLOGY | Facility: CLINIC | Age: 76
End: 2021-06-07

## 2021-06-07 ENCOUNTER — APPOINTMENT (INPATIENT)
Dept: RADIOLOGY | Facility: HOSPITAL | Age: 76
DRG: 066 | End: 2021-06-07
Payer: COMMERCIAL

## 2021-06-07 LAB
ANION GAP SERPL CALCULATED.3IONS-SCNC: 8 MMOL/L (ref 4–13)
ATRIAL RATE: 76 BPM
BASOPHILS # BLD AUTO: 0.01 THOUSANDS/ΜL (ref 0–0.1)
BASOPHILS NFR BLD AUTO: 0 % (ref 0–1)
BUN SERPL-MCNC: 24 MG/DL (ref 5–25)
CALCIUM SERPL-MCNC: 10.1 MG/DL (ref 8.3–10.1)
CHLORIDE SERPL-SCNC: 104 MMOL/L (ref 100–108)
CHOLEST SERPL-MCNC: 152 MG/DL (ref 50–200)
CO2 SERPL-SCNC: 29 MMOL/L (ref 21–32)
CREAT SERPL-MCNC: 1.06 MG/DL (ref 0.6–1.3)
EOSINOPHIL # BLD AUTO: 0.02 THOUSAND/ΜL (ref 0–0.61)
EOSINOPHIL NFR BLD AUTO: 0 % (ref 0–6)
ERYTHROCYTE [DISTWIDTH] IN BLOOD BY AUTOMATED COUNT: 12.7 % (ref 11.6–15.1)
EST. AVERAGE GLUCOSE BLD GHB EST-MCNC: 160 MG/DL
GFR SERPL CREATININE-BSD FRML MDRD: 51 ML/MIN/1.73SQ M
GLUCOSE SERPL-MCNC: 128 MG/DL (ref 65–140)
GLUCOSE SERPL-MCNC: 148 MG/DL (ref 65–140)
GLUCOSE SERPL-MCNC: 198 MG/DL (ref 65–140)
GLUCOSE SERPL-MCNC: 243 MG/DL (ref 65–140)
HBA1C MFR BLD: 7.2 %
HCT VFR BLD AUTO: 36.6 % (ref 34.8–46.1)
HDLC SERPL-MCNC: 58 MG/DL
HGB BLD-MCNC: 11.9 G/DL (ref 11.5–15.4)
IMM GRANULOCYTES # BLD AUTO: 0.08 THOUSAND/UL (ref 0–0.2)
IMM GRANULOCYTES NFR BLD AUTO: 1 % (ref 0–2)
LDLC SERPL CALC-MCNC: 72 MG/DL (ref 0–100)
LYMPHOCYTES # BLD AUTO: 0.94 THOUSANDS/ΜL (ref 0.6–4.47)
LYMPHOCYTES NFR BLD AUTO: 12 % (ref 14–44)
MAGNESIUM SERPL-MCNC: 1.8 MG/DL (ref 1.6–2.6)
MCH RBC QN AUTO: 30.3 PG (ref 26.8–34.3)
MCHC RBC AUTO-ENTMCNC: 32.5 G/DL (ref 31.4–37.4)
MCV RBC AUTO: 93 FL (ref 82–98)
MONOCYTES # BLD AUTO: 0.67 THOUSAND/ΜL (ref 0.17–1.22)
MONOCYTES NFR BLD AUTO: 9 % (ref 4–12)
NEUTROPHILS # BLD AUTO: 6.2 THOUSANDS/ΜL (ref 1.85–7.62)
NEUTS SEG NFR BLD AUTO: 78 % (ref 43–75)
NRBC BLD AUTO-RTO: 0 /100 WBCS
P AXIS: 59 DEGREES
PHOSPHATE SERPL-MCNC: 4.1 MG/DL (ref 2.3–4.1)
PLATELET # BLD AUTO: 318 THOUSANDS/UL (ref 149–390)
PMV BLD AUTO: 9.9 FL (ref 8.9–12.7)
POTASSIUM SERPL-SCNC: 5.1 MMOL/L (ref 3.5–5.3)
PR INTERVAL: 150 MS
QRS AXIS: -35 DEGREES
QRSD INTERVAL: 86 MS
QT INTERVAL: 398 MS
QTC INTERVAL: 447 MS
RBC # BLD AUTO: 3.93 MILLION/UL (ref 3.81–5.12)
SODIUM SERPL-SCNC: 141 MMOL/L (ref 136–145)
T WAVE AXIS: 12 DEGREES
TRIGL SERPL-MCNC: 109 MG/DL
TROPONIN I SERPL-MCNC: 0.4 NG/ML
TROPONIN I SERPL-MCNC: 0.41 NG/ML
VENTRICULAR RATE: 76 BPM
WBC # BLD AUTO: 7.92 THOUSAND/UL (ref 4.31–10.16)

## 2021-06-07 PROCEDURE — 84100 ASSAY OF PHOSPHORUS: CPT | Performed by: NURSE PRACTITIONER

## 2021-06-07 PROCEDURE — 70551 MRI BRAIN STEM W/O DYE: CPT

## 2021-06-07 PROCEDURE — 99223 1ST HOSP IP/OBS HIGH 75: CPT | Performed by: PSYCHIATRY & NEUROLOGY

## 2021-06-07 PROCEDURE — 80048 BASIC METABOLIC PNL TOTAL CA: CPT | Performed by: NURSE PRACTITIONER

## 2021-06-07 PROCEDURE — 99232 SBSQ HOSP IP/OBS MODERATE 35: CPT | Performed by: INTERNAL MEDICINE

## 2021-06-07 PROCEDURE — 84484 ASSAY OF TROPONIN QUANT: CPT | Performed by: NURSE PRACTITIONER

## 2021-06-07 PROCEDURE — 83735 ASSAY OF MAGNESIUM: CPT | Performed by: NURSE PRACTITIONER

## 2021-06-07 PROCEDURE — 84484 ASSAY OF TROPONIN QUANT: CPT | Performed by: PHYSICIAN ASSISTANT

## 2021-06-07 PROCEDURE — 97110 THERAPEUTIC EXERCISES: CPT

## 2021-06-07 PROCEDURE — 83036 HEMOGLOBIN GLYCOSYLATED A1C: CPT | Performed by: NURSE PRACTITIONER

## 2021-06-07 PROCEDURE — G1004 CDSM NDSC: HCPCS

## 2021-06-07 PROCEDURE — 80061 LIPID PANEL: CPT | Performed by: NURSE PRACTITIONER

## 2021-06-07 PROCEDURE — 85025 COMPLETE CBC W/AUTO DIFF WBC: CPT | Performed by: NURSE PRACTITIONER

## 2021-06-07 PROCEDURE — 93010 ELECTROCARDIOGRAM REPORT: CPT | Performed by: INTERNAL MEDICINE

## 2021-06-07 PROCEDURE — 82948 REAGENT STRIP/BLOOD GLUCOSE: CPT

## 2021-06-07 PROCEDURE — 97163 PT EVAL HIGH COMPLEX 45 MIN: CPT

## 2021-06-07 RX ORDER — MAGNESIUM SULFATE HEPTAHYDRATE 40 MG/ML
2 INJECTION, SOLUTION INTRAVENOUS ONCE
Status: COMPLETED | OUTPATIENT
Start: 2021-06-07 | End: 2021-06-07

## 2021-06-07 RX ADMIN — FERROUS SULFATE TAB 325 MG (65 MG ELEMENTAL FE) 325 MG: 325 (65 FE) TAB at 08:26

## 2021-06-07 RX ADMIN — PANTOPRAZOLE SODIUM 40 MG: 40 TABLET, DELAYED RELEASE ORAL at 05:32

## 2021-06-07 RX ADMIN — CLOPIDOGREL BISULFATE 75 MG: 75 TABLET ORAL at 08:27

## 2021-06-07 RX ADMIN — ATORVASTATIN CALCIUM 80 MG: 80 TABLET ORAL at 17:13

## 2021-06-07 RX ADMIN — MAGNESIUM OXIDE TAB 400 MG (241.3 MG ELEMENTAL MG) 400 MG: 400 (241.3 MG) TAB at 17:13

## 2021-06-07 RX ADMIN — ASPIRIN 81 MG CHEWABLE TABLET 81 MG: 81 TABLET CHEWABLE at 08:27

## 2021-06-07 RX ADMIN — MAGNESIUM OXIDE TAB 400 MG (241.3 MG ELEMENTAL MG) 400 MG: 400 (241.3 MG) TAB at 08:27

## 2021-06-07 RX ADMIN — INSULIN LISPRO 1 UNITS: 100 INJECTION, SOLUTION INTRAVENOUS; SUBCUTANEOUS at 21:16

## 2021-06-07 RX ADMIN — LISINOPRIL 40 MG: 20 TABLET ORAL at 21:14

## 2021-06-07 RX ADMIN — MAGNESIUM SULFATE HEPTAHYDRATE 2 G: 40 INJECTION, SOLUTION INTRAVENOUS at 08:31

## 2021-06-07 RX ADMIN — ENOXAPARIN SODIUM 30 MG: 30 INJECTION SUBCUTANEOUS at 08:26

## 2021-06-07 RX ADMIN — INSULIN LISPRO 2 UNITS: 100 INJECTION, SOLUTION INTRAVENOUS; SUBCUTANEOUS at 16:28

## 2021-06-07 NOTE — TELEPHONE ENCOUNTER
I spoke with patient's son Manuela Perez and informed him that we will wait until tomorrow to cancel office visit to see if the patient will be discharged and able to make it  Manuela Mayai asked me what the patient was having done at the hospital as he is not able to be there and I instructed him to contact the hospital so they can answer his questions

## 2021-06-07 NOTE — H&P
Lonny 38 1945, 66 y o  female MRN: [id number]  Unit/Bed#: ICU 11 Encounter: [id number]  Primary Care Provider: Veronica Johnston MD   Date and time admitted to hospital: 6/6/2021  7:43 PM    * Dizziness of unknown cause  Assessment & Plan  · Prior hx CVA 1/2020 no deficits  · NIH= 0 in ED and remains 0  · States dizziness better since arrival but increases with head movement  · CTH negative but CTA shows 67% stenosis of the right vertebral artery origin indicating moderate stenosis  60% stenosis of the left vertebral artery origin consistent with mild stenosis  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     · Neuro c/s in ED for stroke alert does not feel occlusion is related, but requesting frequent neuro checks during the night  · MRI ordered  · Echo ordered  · Started ASA, cont Lipitor and Plavix  · Neuro checks per [facility] protocol for now    Troponin level elevated  Assessment & Plan  · No signs of ischemia, no c/o CP or SOB  · Will recheck at midnight    Nodule of upper lobe of right lung  Assessment & Plan  · Incidental finding in CT scan  · Will need f/u as outpt    Thyroid nodule greater than or equal to 1 5 cm in diameter incidentally noted on imaging study  Assessment & Plan  · Incidental finding on CT scan  · Will need outpt ultrasound per protocol measures 2 6 x 1 9 cm    Type 2 diabetes mellitus with stage 3 chronic kidney disease ([facility] [address] )  Assessment & Plan  Lab Results   Component Value Date    HGBA1C 6 3 (H) 02/26/2021       Recent Labs     06/06/21 1947   POCGLU 203*       Blood Sugar Average: Last 72 hrs:  (P) 203     · Hold home Metformin since received dye load for CTA, restart in 48 hours  · Start IVF @ 50 cc/hr since received dye, recheck Cr in am  · Start SSI coverage    Iron deficiency anemia  Assessment & Plan  · Cont home Iron therapy 325 mg 3 x per week M-W-F    GERD (gastroesophageal reflux disease)  Assessment & Plan  · Cont home Protonix    Essential hypertension  Assessment & Plan  · Resume home meds takes Accupril but autosub Lisinopril 40 mg QHS    History of tobacco abuse  Assessment & Plan  · No nicotine patch at this time  · Tobacco cessation upon discharge    Hyperlipidemia  Assessment & Plan  · Cont home Lipitor  · Recheck Lipid panel per stroke protocol      -------------------------------------------------------------------------------------------------------------  Chief Complaint:  Dizziness    History of Present Illness   Sammy Garzon is a 66 y o  female w/ pmhx of HTN, DM type 2, stage III CKD, HLD, tobacco abuse, JAMES, GERD, and prior CVA of 1/2020 with no prior deficits who presents with c/o dizziness and nausea since this morning around 11:00 a m  She reports the dizziness is worse with movement and nausea is worse as well  She denies any visual changes, weakness of extremities, or difficulty speaking  Stroke alert called in the ED but was not a candidate for tPA due to timing and NIH of 0  Neurology consulted and recommends frequent neuro checks throughout the night  History obtained from chart review and the patient   -------------------------------------------------------------------------------------------------------------  Dispo: Admit to Stepdown Level 1    Code Status: Level 1 - Full Code  --------------------------------------------------------------------------------------------------------------  Review of Systems   Constitutional: Negative  HENT: Negative  Eyes: Negative  Respiratory: Negative  Cardiovascular: Negative  Gastrointestinal: Negative  Genitourinary: Negative  Musculoskeletal: Negative  Skin: Negative  Neurological: Positive for dizziness  Psychiatric/Behavioral: Negative  All other systems reviewed and are negative  Physical Exam  Vitals signs and nursing note reviewed  Constitutional:       General: She is not in acute distress       Appearance: Normal appearance  She is normal weight  HENT:      Head: Normocephalic and atraumatic  Eyes:      General: No visual field deficit  Extraocular Movements: Extraocular movements intact  Right eye: No nystagmus  Left eye: No nystagmus  Pupils: Pupils are equal, round, and reactive to light  Neck:      Musculoskeletal: Normal range of motion  Cardiovascular:      Rate and Rhythm: Normal rate and regular rhythm  Pulses: Normal pulses  Heart sounds: Normal heart sounds  Pulmonary:      Effort: Pulmonary effort is normal       Breath sounds: Normal breath sounds  Abdominal:      General: Abdomen is flat  Bowel sounds are normal       Palpations: Abdomen is soft  Musculoskeletal: Normal range of motion  Right lower leg: No edema  Left lower leg: No edema  Skin:     General: Skin is warm and dry  Capillary Refill: Capillary refill takes less than 2 seconds  Neurological:      General: No focal deficit present  Mental Status: She is alert and oriented to person, place, and time  Mental status is at baseline  Cranial Nerves: No cranial nerve deficit, dysarthria or facial asymmetry  Sensory: No sensory deficit  Motor: No weakness or pronator drift  Coordination: Finger-Nose-Finger Test and Heel to [employer] normal    Psychiatric:         Mood and Affect: Mood normal          Behavior: Behavior normal          Thought Content:  Thought content normal        --------------------------------------------------------------------------------------------------------------  Vitals:   Vitals:    06/06/21 2046 06/06/21 2100 06/06/21 2130 06/06/21 2208   BP: 165/73 149/68 149/67 151/67   BP Location:  Right arm Right arm    Pulse: 73 70 72 69   Resp: 18 18 19 20   Temp:    (!) 97 2 °F (36 2 °C)   TempSrc:    Temporal   SpO2: 97% 96% 97% 95%   Weight:    55 2 kg (121 lb 11 1 oz)   Height:    5' (1 524 m)     Temp  Min: 97 2 °F (36 2 °C)  Max: 98 5 °F (36 9 °C)  IBW (Ideal Body Weight): 45 5 kg  Height: 5' (152 4 cm)  Body mass index is 23 77 kg/m²  Laboratory and Diagnostics:  Results from last 7 days   Lab Units 06/06/21 1950   WBC Thousand/uL 8 34   HEMOGLOBIN g/dL 11 0*   HEMATOCRIT % 34 7*   PLATELETS Thousands/uL 291   NEUTROS PCT % 91*   MONOS PCT % 3*     Results from last 7 days   Lab Units 06/06/21 1950   SODIUM mmol/L 141   POTASSIUM mmol/L 4 5   CHLORIDE mmol/L 104   CO2 mmol/L 29   ANION GAP mmol/L 8   BUN mg/dL 26*   CREATININE mg/dL 1 14   CALCIUM mg/dL 9 9   GLUCOSE RANDOM mg/dL 233*   ALT U/L 26   AST U/L 15   ALK PHOS U/L 89   ALBUMIN g/dL 3 3*   TOTAL BILIRUBIN mg/dL 0 56          Results from last 7 days   Lab Units 06/06/21 1950   INR  0 89   PTT seconds 23      Results from last 7 days   Lab Units 06/06/21 1950   TROPONIN I ng/mL 0 14*         ABG:    VBG:          Micro:        EKG: NSR  Imaging: I have personally reviewed pertinent reports  and I have personally reviewed pertinent films in PACS      Historical Information   Past Medical History:   Diagnosis Date    Anemia     Arthritis     Capsulitis of foot     Last assessed 07/29/13    Diabetes ([facility] [address] )     type 2    Full dentures     Ganglion     Last assessed 07/29/13    Hearing decreased     Hyperlipidemia     Hypertension     Magnesium deficiency     Sciatica     Wears glasses      Past Surgical History:   Procedure Laterality Date    CATARACT EXTRACTION      COLONOSCOPY      DILATION AND CURETTAGE OF UTERUS      x 4    HAND SURGERY Left     fx repaired w/wrist bone    HYSTERECTOMY      left ovary remains    KNEE ARTHROSCOPY Left     LIPOMA RESECTION      removed from back and left buttock    WA XCAPSL CTRC RMVL INSJ IO LENS PROSTH W/O ECP Right 1/24/2019    Procedure: EXTRACTION EXTRACAPSULAR CATARACT PHACO INTRAOCULAR LENS (IOL);   Surgeon: Chetan Wei MD;  Location: [facility] MAIN OR;  Service: Ophthalmology    WA XCAPSL CTRC RMVL INSJ IO LENS PROSTH W/O ECP Left 2/14/2019    Procedure: EXTRACTION EXTRACAPSULAR CATARACT PHACO INTRAOCULAR LENS (IOL);   Surgeon: Vernon Tristan MD;  Location[facility] MA[facility];  Service: Ophthalmology    TONSILLECTOMY      and adenoids    TUBAL LIGATION       Social History   Social History     Substance and Sexual Activity   Alcohol Use Not Currently    Comment: Rarely     Social History     Substance and Sexual Activity   Drug Use Never     Social History     Tobacco Use   Smoking Status Current Every Day Smoker    Packs/day: 0 50    Years: 35 00    Pack years: 17 50    Types: Cigarettes   Smokeless Tobacco Never Used     Exercise History: ambulatory  Family History:   Family History   Problem Relation Age of Onset    Leukemia Father     Early death Father 39        leukemia    Stomach cancer Maternal Grandfather     Diabetes Paternal Grandfather     Diabetes Sister         insulin dependent    Diabetes Brother         insulin    Diabetes Sister         insulin    Diabetes Sister         insulin     I have reviewed this patient's family history and commented on sigificant items within the HPI      Medications:  Current Facility-Administered Medications   Medication Dose Route Frequency    [START ON 6/7/2021] aspirin chewable tablet 81 mg  81 mg Oral Daily    atorvastatin (LIPITOR) tablet 40 mg  40 mg Oral QPM    [START ON 6/7/2021] clopidogrel (PLAVIX) tablet 75 mg  75 mg Oral Daily    [START ON 6/7/2021] enoxaparin (LOVENOX) subcutaneous injection 30 mg  30 mg Subcutaneous Daily    [START ON 6/7/2021] ferrous sulfate tablet 325 mg  325 mg Oral Once per day on Mon Wed Fri    [START ON 6/7/2021] insulin lispro (HumaLOG) 100 units/mL subcutaneous injection 1-5 Units  1-5 Units Subcutaneous TID AC    insulin lispro (HumaLOG) 100 units/mL subcutaneous injection 1-5 Units  1-5 Units Subcutaneous HS    lisinopril (ZESTRIL) tablet 40 mg  40 mg Oral HS    magnesium oxide (MAG-OX) tablet 400 mg  400 mg Oral BID    multi-electrolyte (ISOLYTE-S PH 7 4 equivalent) IV solution  50 mL/hr Intravenous Continuous    [START ON 6/7/2021] pantoprazole (PROTONIX) EC tablet 40 mg  40 mg Oral Early Morning     Home medications:  Prior to Admission Medications   Prescriptions Last Dose Informant Patient Reported? Taking?   atorvastatin (LIPITOR) 40 mg tablet 6/5/2021 at Unknown time  No Yes   Sig: TAKE 1 TABLET BY MOUTH  DAILY   clopidogrel (PLAVIX) 75 mg tablet 6/5/2021 at Unknown time  No Yes   Sig: TAKE 1 TABLET BY MOUTH EVERY DAY   ferrous sulfate 324 (65 Fe) mg   No No   Sig: Take 1 tablet (324 mg total) by mouth 3 (three) times a week   ibuprofen (MOTRIN) 200 mg tablet 6/5/2021 at Unknown time Self Yes Yes   Sig: Take 600 mg by mouth every 6 (six) hours as needed for mild pain   magnesium oxide (MAG-OX) 400 mg tablet 6/5/2021 at Unknown time Self No Yes   Sig: Take 1 tablet (400 mg total) by mouth 2 (two) times a day   metFORMIN (GLUCOPHAGE) 1000 MG tablet 6/5/2021 at Unknown time Self No Yes   Sig: Take 1 tablet (1,000 mg total) by mouth 2 (two) times a day   pantoprazole (PROTONIX) 40 mg tablet 6/5/2021 at Unknown time Self No Yes   Sig: TAKE 1 TABLET BY MOUTH EVERY DAY   quinapril (ACCUPRIL) 40 MG tablet 6/5/2021 at Unknown time  No Yes   Sig: TAKE 1 TABLET BY MOUTH  DAILY AT BEDTIME      Facility-Administered Medications: None     Allergies:   Allergies   Allergen Reactions    Aleve [Naproxen] Other (See Comments)     "weird" sensation entire body    Sulfa Antibiotics Rash       ------------------------------------------------------------------------------------------------------------  Advance Directive and Living Will:      Power of :    POLST:    ------------------------------------------------------------------------------------------------------------  Anticipated Length of Stay is > 2 midnights    Care Time Delivered:   No Critical Care time spent       ROLAND Larsen    Portions of the record may have been created with voice recognition software  Occasional wrong word or "sound a like" substitutions may have occurred due to the inherent limitations of voice recognition software    Read the chart carefully and recognize, using context, where substitutions have occurred

## 2021-06-07 NOTE — UTILIZATION REVIEW
Initial Clinical Review    Admission: Date/Time/Statement:   Admission Orders (From admission, onward)     Ordered        06/06/21 2112  Inpatient Admission  Once                   Orders Placed This Encounter   Procedures    Inpatient Admission     Standing Status:   Standing     Number of Occurrences:   1     Order Specific Question:   Level of Care     Answer:   Level 1 Stepdown [13]     Order Specific Question:   Estimated length of stay     Answer:   More than 2 Midnights     Order Specific Question:   Certification     Answer:   I certify that inpatient services are medically necessary for this patient for a duration of greater than two midnights  See H&P and MD Progress Notes for additional information about the patient's course of treatment  ED Arrival Information     Expected Arrival Acuity Means of Arrival Escorted By Service Admission Type    - 6/6/2021 19:41 Emergent Ambulance [address] Emergency    Arrival Complaint    Dehydration        Chief Complaint   Patient presents with    Dizziness     Pt arrived via EMS from home with c/o dizziness, nausea, vomiting, diarrhea since this morning after having coffee  Initial Presentation:    66 y o  female w/ pmhx of HTN, DM type 2, stage III CKD, HLD, tobacco abuse, JAMES, GERD, and prior CVA of 1/2020 with no prior deficits who presents with c/o dizziness and nausea since this morning around 11:00 a m  She reports the dizziness is worse with movement and nausea is worse as well  She denies any visual changes, weakness of extremities, or difficulty speaking  Stroke alert called in the ED but was not a candidate for tPA due to timing and NIH of 0  Neurology consulted and recommends frequent neuro checks throughout the night  Neuro eval negative  Admitted Inpatient to step down level 1   CTH negative but CTA shows 67% stenosis of the right vertebral artery origin indicating moderate stenosis   60% stenosis of the left vertebral artery origin consistent with mild stenosis  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  Neuro c/s in ED for stroke alert does not feel occlusion is related, but requesting frequent neuro checks during the night  MRI ordered   Echo ordered  Started ASA, cont Lipitor and Plavix  Neuro checks per [facility]oke protocol for now  Elevated Troponin  No signs of ischemia ,no c/o cp or sob  Will recheck at midnight  Nodule of upper lobe of right lung incidental fining in CT scan will need f/u outpatient  Thyroid nodule >1 5 cm incidental noted outpatient US  DM SSI    Date: 6/7/21   Day 2:   ICU HPI/24hr events: No acute events overnight; pt states she not dizzy at rest but becomes slightly dizzy with moving head side to side  Denies any nausea or vomiting overnight; denies any c/o of CP or SOB  NIH remains 0  Disposition: Transfer to [facility] with Telemetry   Code Status: Level 1 - Full Code    NEUROLOGY CONSULT     1  Acute Ischemic Middle Cerebellar Infarct  2  Chronic Lt GABO territory infarct  3  HTN  4  HLD  5  DM  6   Vertigo-sequela of Infarct  -Monitor on Telemetry  -Neuro Assessments per stroke pathway  -BASA daily- continue x 1 week then DC  -Plavix 75mg daily  -Lipitor 80mg daily-continue this dose on DC  -MRI of the Brain-independent read with acute ischemic middle cerebellar infarct  -Echo with shunt study  -PT/OT  -Speech   -Labs: Lipid and A1C  -OP Neurosurgical/Endovascular consult with Dr Chaka Ware re: Vertebral Artery stenosis  ED Triage Vitals   Temperature Pulse Respirations Blood Pressure SpO2   06/06/21 1945 06/06/21 1945 06/06/21 1945 06/06/21 1947 06/06/21 1945   98 5 °F (36 9 °C) 70 18 (!) 182/77 96 %      Temp Source Heart Rate Source Patient Position - Orthostatic VS BP Location FiO2 (%)   06/06/21 1945 06/06/21 1945 06/06/21 1945 06/06/21 1945 --   Oral Monitor Lying Right arm       Pain Score       06/06/21 1945       No Pain          Wt Readings from Last 1 Encounters:   06/07/21 56 2 kg (123 lb 14 4 oz) Additional Vital Signs:   06/07/21 0700  97 2 °F (36 2 °C)Abnormal   63  18  169/70  101  93 %  --  --   06/07/21 0600  --  62  17  161/69  99  93 %  --  --   06/07/21 0500  --  58  17  139/65  93  95 %  --  --   06/07/21 0400  98 2 °F (36 8 °C)  63  19  153/67  96  97 %  --  --   06/07/21 0300  --  64  18  140/63  90  95 %  --  --   06/07/21 0200  --  65  18  146/66  95  96 %  --  --   06/07/21 0100  --  72  20  175/76Abnormal   109  96 %  --  --   06/07/21 0000  --  66  25Abnormal   173/77Abnormal   110  95 %  --  --   06/06/21 2300  --  70  19  170/71  102  96 %  --  --   06/06/21 2208  97 2 °F (36 2 °C)Abnormal   69  20  151/67  96  95 %  --  --   06/06/21 2130  --  72  19  149/67  96  97 %  None (Room air)  Lying   06/06/21 2100  --  70  18  149/68  98  96 %  None (Room air)  Lying   06/06/21 2046  --  73  18  165/73  --  97 %  --  Sitting   06/06/21 2030  --  70  20  181/70Abnormal   --  97 %  --  Lying   06/06/21 20:15:06  --  69  18  135/62  --  96 %           Pertinent Labs/Diagnostic Test Results:   6/7 MRI  Small focus of diffusion restriction in the inferior cerebellar vermis indicative of an acute cerebellar lacunar infarction  2   Small chronic left frontal cortical infarction medially, stable from prior study indicative of small chronic anterior cerebral artery infarction  3   Trace, chronic microangiopathy elsewhere  4   Small amount of right mastoid air cell fluid/effusion  6/7 cxr No acute cardiopulmonary disease  6/6 CTA head neck 67% stenosis of the right vertebral artery origin indicating moderate stenosis      60% stenosis of the left vertebral artery origin consistent with mild stenosis      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 (3:177-181)  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   Biophysical moderate emphysematous changes are noted      Right upper lobe 3 mm nodule (602: 1:15)   Based on current Fleischner Society 2017 Guidelines on incidental pulmonary nodule, no routine follow-up is needed if the patient is considered low risk for lung cancer   If the patient is considered high risk   for lung cancer, 12 month follow-up non-contrast chest CT is recommended  2 6 x 1 9 cm right thyroid lobe nodule   Incidental discovery of one or more thyroid nodule(s) measuring more than 1 5 cm and without suspicious features is noted in this patient who is above 28years old; according to guidelines published in the   February 2015 white paper on incidental thyroid nodules in the Journal of the [employer] of Radiology [facility]), further characterization with thyroid ultrasound is recommended  6/6 CT brain No acute intracranial abnormality      Results from last 7 days   Lab Units 06/07/21 0531 06/06/21 1950 06/03/21  0953   WBC Thousand/uL 7 92 8 34  --    WHITE BLOOD CELL COUNT  x10E3/uL  --   --  7 5   HEMOGLOBIN g/dL 11 9 11 0*  --    HEMOGLOBIN  g/dL  --   --  11 5   HEMATOCRIT % 36 6 34 7*  --    HEMATOCRIT  %  --   --  36 0   PLATELETS Thousands/uL 318 291  --    PLATELETS  H49P6/DT  --   --  355   NEUTROS ABS Thousands/µL 6 20 7 63*  --    NEUTROS ABS   x10E3/uL  --   --  4 6     Results from last 7 days   Lab Units 06/07/21 0531 06/06/21 1950   SODIUM mmol/L 141 141   POTASSIUM mmol/L 5 1 4 5   CHLORIDE mmol/L 104 104   CO2 mmol/L 29 29   ANION GAP mmol/L 8 8   BUN mg/dL 24 26*   CREATININE mg/dL 1 06 1 14   EGFR ml/min/1 73sq m 51 46   CALCIUM mg/dL 10 1 9 9   MAGNESIUM mg/dL 1 8  --    PHOSPHORUS mg/dL 4 1  --      Results from last 7 days   Lab Units 06/06/21  1950   AST U/L 15   ALT U/L 26   ALK PHOS U/L 89   TOTAL PROTEIN g/dL 6 4   ALBUMIN g/dL 3 3*   TOTAL BILIRUBIN mg/dL 0 56     Results from last 7 days   Lab Units 06/06/21 2321 06/06/21 1947   POC GLUCOSE mg/dl 214* 203*     Results from last 7 days   Lab Units 06/07/21 0531 06/06/21 1950   GLUCOSE RANDOM mg/dL 128 233*     Results from last 7 days   Lab Units 06/07/21 0531 06/06/21  2315 06/06/21 1950   TROPONIN I ng/mL 0 41* 0 29* 0 14*     Results from last 7 days   Lab Units 06/06/21  1950   PROTIME seconds 11 9   INR  0 89   PTT seconds 23     Results from last 7 days   Lab Units 06/03/21  0953   FERRITIN ng/mL 24     Results from last 7 days   Lab Units 06/06/21  2315   CLARITY UA  Slightly Cloudy   COLOR UA  Yellow   SPEC GRAV UA  1 010   PH UA  7 0   GLUCOSE UA mg/dl 100 (1/10%)*   KETONES UA mg/dl Trace*   BLOOD UA  Moderate*   PROTEIN UA mg/dl 30 (1+)*   NITRITE UA  Negative   BILIRUBIN UA  Negative   UROBILINOGEN UA E U /dl 0 2   LEUKOCYTES UA  Negative   WBC UA /hpf 4-10*   RBC UA /hpf 4-10*   BACTERIA UA /hpf Innumerable*   EPITHELIAL CELLS WET PREP /hpf Moderate*     ED Treatment:   Medication Administration from 06/06/2021 1941 to 06/06/2021 2155       Date/Time Order Dose Route Action Action by Comments     06/06/2021 1951 ondansetron (ZOFRAN) 4 mg/2 mL injection **ADS Override Pull** 0 mg  Given to [address] [place] [address], [address], RN      06/06/2021 2003 iohexol (OMNIPAQUE) 350 MG/ML injection (SINGLE-DOSE) 100 mL 85 mL Intravenous Given Diane Hinojosa      06/06/2021 2143 aspirin tablet 325 mg 325 mg Oral Given Carline Perdue, RN         Past Medical History:   Diagnosis Date    Anemia     Arthritis     Capsulitis of foot     Last assessed 07/29/13    Diabetes ([address] )     type 2    Full dentures     Ganglion     Last assessed 07/29/13    Hearing decreased     Hyperlipidemia     Hypertension     Magnesium deficiency     Sciatica     Wears glasses      Present on Admission:   Dizziness of unknown cause   Hyperlipidemia   Essential hypertension   Iron deficiency anemia   Type 2 diabetes mellitus with stage 3 chronic kidney disease (HCC)   GERD (gastroesophageal reflux disease)   Nodule of upper lobe of right lung   Thyroid nodule greater than or equal to 1 5 cm in diameter incidentally noted on imaging study   Troponin level elevated      Admitting Diagnosis: Dizziness [R42]  Vertigo [R42]  [facility] [I63 9]  Elevated troponin [R77 8]  Age/Sex: 66 y o  female  Admission Orders:  icu  Tele mon  Neuro checks q1hr  Pt ot   Echo  Mri  Bmp cbc mrsa cx  Daily weight I/o  Scheduled Medications:  aspirin, 81 mg, Oral, Daily  atorvastatin, 80 mg, Oral, QPM  clopidogrel, 75 mg, Oral, Daily  enoxaparin, 30 mg, Subcutaneous, Daily  ferrous sulfate, 325 mg, Oral, Once per day on Mon Wed Fri  insulin lispro, 1-5 Units, Subcutaneous, TID AC  insulin lispro, 1-5 Units, Subcutaneous, HS  lisinopril, 40 mg, Oral, HS  magnesium oxide, 400 mg, Oral, BID  magnesium sulfate, 2 g, Intravenous, Once  pantoprazole, 40 mg, Oral, Early Morning      Continuous IV Infusions:     PRN Meds:       IP CONSULT TO CASE MANAGEMENT  IP CONSULT TO NEUROLOGY    Network Utilization Review Department  ATTENTION: Please call with any questions or concerns to [phone] and carefully listen to the prompts so that you are directed to the right person  All voicemails are confidential   Evangelista Pedroza all requests for admission clinical reviews, approved or denied determinations and any other requests to dedicated fax number below belonging to the campus where the patient is receiving treatment   List of dedicated fax numbers for the Facilities:  [address] DENIALS (Administrative/Medical Necessity) [phone]   [address] (Maternity/NICU/Pediatrics) [phone]   [address] [address] [facility] [address] [id number] [place] [address] Karlo Ochoa [phone] [facility] Remedios Velazco Doctor [phone]

## 2021-06-07 NOTE — ASSESSMENT & PLAN NOTE
· Still no signs of ischemia, no c/o CP or SOB  · Trop at midnight elevated to 0 29, repeat this am pending

## 2021-06-07 NOTE — ASSESSMENT & PLAN NOTE
Lab Results   Component Value Date    HGBA1C 6 3 (H) 02/26/2021       Recent Labs     06/06/21  1947 06/06/21  2321   POCGLU 203* 214*       Blood Sugar Average: Last 72 hrs:  (P) 208 5     · Hold home Metformin since received dye load for CTA, restart 6/9  · Cont IVF @ 50 cc/hr since received dye, recheck Cr pending this am  · Cont SSI coverage  · Repeat HgbA1C per stroke protocol

## 2021-06-07 NOTE — PHYSICAL THERAPY NOTE
PT EVALUATION     06/07/21 1510   Note Type   Note type Evaluation   Pain Assessment   Pain Assessment Tool Pain Assessment not indicated - pt denies pain   Home Living   Type of Home Apartment   Home Layout Two level; Able to live on main level with bedroom/bathroom; Laundry in basement;Stairs to enter with rails  (3-4 stairs to enter)   [address] [facility],[address]   Additional Comments patient not using an assistive device prior to admission   Prior Function   Level of [place] Independent with ADLs and functional mobility   Lives With Son   Receives Help From Family   ADL Assistance Independent   IADLs Independent   Comments patient independent doing laundry and driving as well as food shopping   Restrictions/Precautions   Other Precautions Chair Alarm; Bed Alarm; Fall Risk;Multiple lines  (in ICU)   General   Additional Pertinent History chart reviewed, patient admitted with dizziness new stroke findings  Patient seen in ICU this time   Family/Caregiver Present No   Cognition   Overall Cognitive Status WFL   Arousal/Participation Cooperative   Orientation Level Oriented X4   Following Commands Follows all commands and directions without difficulty   RLE Assessment   RLE Assessment   (ROM WFL, strength 3+/5)   LLE Assessment   LLE Assessment   (ROM WFL, strength 3+/5)   Coordination   Movements are Fluid and Coordinated 0   Coordination and Movement Description Decreased coordination and balance standing weight-bearing activities and transfers   Bed Mobility   Supine to Sit 5  Supervision   Sit to Supine 5  Supervision   Transfers   Sit to Stand 4  Minimal assistance   Additional items Assist x 1   Stand to Sit 4  Minimal assistance   Additional items Assist x 1   Ambulation/Elevation   Gait Assistance 3  Moderate assist   Additional items Assist x 1;Verbal cues; Tactile cues   Assistive Device   (none, hand hold assist utilized)   Distance 10 feet with change in direction, unsteady gait with decreased coordination noted, narrow base of support and shortened stride length    Balance   Static Sitting Fair   Dynamic Sitting Fair -   Static Standing Fair -   Dynamic Standing Poor +   Ambulatory Poor +   Activity Tolerance   Activity Tolerance Patient tolerated treatment well;Patient limited by fatigue   Nurse Made Aware yes   Assessment   Prognosis Good   Problem List Decreased strength;Decreased range of motion;Decreased endurance; Impaired balance;Decreased mobility; Decreased coordination   Assessment Patient seen for Physical Therapy evaluation  Patient admitted with Dizziness of unknown cause  Comorbidities affecting patient's physical performance include:HTN, DM type 2, stage III CKD, HLD, tobacco abuse, JAMES, GERD, and prior CVA of 1/2020 with no prior deficits    Personal factors affecting patient at time of initial evaluation include: stairs to enter home, inability to ambulate household distances, inability to navigate community distances, inability to navigate level surfaces without external assistance, inability to perform dynamic tasks in community, limited home support, inability to perform physical activity, inability to perform ADLS and inability to perform IADLS   Prior to admission, patient was independent with functional mobility without assistive device, independent with ADLS, independent with IADLS, living in a multi-level home, ambulating household distance, ambulating community distances and home with family assist   Please find objective findings from Physical Therapy assessment regarding body systems outlined above with impairments and limitations including weakness, decreased ROM, impaired balance, decreased endurance, impaired coordination, gait deviations, decreased activity tolerance, decreased functional mobility tolerance and fall risk    The Barthel Index was used as a functional outcome tool presenting with a score of 50 today indicating marked limitations of functional mobility and ADLS   Patient's clinical presentation is currently unstable/unpredictable as seen in patient's presentation of vital sign response, changing level of pain, increased fall risk, new onset of impairment of functional mobility, decreased endurance and new onset of weakness  Pt would benefit from continued Physical Therapy treatment to address deficits as defined above and maximize level of functional mobility  As demonstrated by objective findings, the assigned level of complexity for this evaluation is high  The patient's [facility] Basic Mobility Inpatient Short Form Raw Score is 16, Standardized Score is 38 32  A standardized score less than 42 9 suggests the patient may benefit from discharge to post-acute rehabilitation services  Please also refer to the recommendation of the Physical Therapist for safe discharge planning  Goals   Patient Goals To get back to independent   STG Expiration Date 06/14/21   Short Term Goal #1 Transfers and gait with roller walker with supervision   Short Term Goal #2 Gait endurance to functional household distances, strength bilateral lower extremities 4/5   LTG Expiration Date 06/21/21   Long Term Goal #1 Improved balance and coordination to good   Long Term Goal #2 Independent gait without assistive device, increase gait endurance to functional community distances all to meet patient goal of returning to normal activity   Plan   Treatment/Interventions ADL retraining;Functional transfer training;LE strengthening/ROM; Elevations; Therapeutic exercise; Endurance training;Patient/family training;Equipment eval/education; Bed mobility;Gait training; Compensatory technique education   PT Frequency Once a day   Recommendation   PT Discharge Recommendation Post acute rehabilitation services   [facility] Basic Mobility Inpatient   Turning in Bed Without Bedrails 4   Lying on Back to Sitting on Edge of Flat Bed 3   Moving Bed to Chair 3   Standing Up From Chair 3   Walk in Room 2   Climb 3-5 Stairs 1 Basic Mobility Inpatient Raw Score 16   Basic Mobility Standardized Score 38 32   Modified Michelle Scale   Modified Michelle Scale 4   Barthel Index   Feeding 10   Bathing 0   Grooming Score 0   Dressing Score 5   Bladder Score 10   Bowels Score 10   Toilet Use Score 5   Transfers (Bed/Chair) Score 10   Mobility (Level Surface) Score 0   Stairs Score 0   Barthel Index Score 48   Licensure   [place] License Number  Nina Taylor PT [id number]     PT TREATMENT  Time In:1455  Time Out:1510  Total Time: 15min      S:  Patient without pain complaints  O:  -bilateral lower extremity strengthening exercises completed sitting on bed edge without lumbar support:  Long arc quads, ankle pumps, hip flexion, heel to shin and hip abduction all 10 reps alternating activity for coordination training  -standing balance activity completed with sidestepping and backward walking well as standing and marching in place  A:  Patient cooperative and motivated to return to independent function and will benefit from continued physical therapy with progression as tolerated and neuro re-education  P:  Short-term rehab    Marisa Colón PT

## 2021-06-07 NOTE — ASSESSMENT & PLAN NOTE
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

## 2021-06-07 NOTE — TELEPHONE ENCOUNTER
Patients son, Alyssa Eagle, calling in to inform Dr Florence Unger that patient has been admitted into [facility] on Sunday  He would like a call back to discuss the follow up appt on 06/08/2021      Alyssa Eagle can be reached at [phone]

## 2021-06-07 NOTE — ASSESSMENT & PLAN NOTE
Lab Results   Component Value Date    HGBA1C 6 3 (H) 02/26/2021       Recent Labs     06/06/21 1947   POCGLU 203*       Blood Sugar Average: Last 72 hrs:  (P) 203     · Hold home Metformin since received dye load for CTA, restart in 48 hours  · Start IVF @ 50 cc/hr since received dye, recheck Cr in am  · Start SSI coverage

## 2021-06-07 NOTE — ED NOTES
ICU provider at bedside  Kendrick Nichole) family remains at bedside  Call bell within reach  Will continue to monitor       Jax Oliver RN  06/06/21 1658

## 2021-06-07 NOTE — QUICK NOTE
Senia Truong is a 65 yo F with PMH of stroke presents with cc of vertigo  She was LSN prior to 11am  She reports positional vertigo  There is associated nausea and vomiting  No other stroke like symptoms  Her NIHSS is 0  Patient was last seen by us in Jan of 2020 for left GABO ischemic stroke  TPA Decision: Patient not a TPA candidate  Unclear time of onset outside appropriate time window  Patient's CTA reveals b/l vert stenosis mild to moderate and right M2 occlusion  It's unclear how chronic this is  With her sx of vertigo, posterior circulation stroke would be expected if it's not peripheral  She is out of window for IV tPA  Her sxs are not significant for IR  Will monitor closely  neurochecks q2h  Permissive HTN  Aspirin and plavix load of 300mg  lipitor 80mg  MRI brain  TTE w/shunt  Official consult will follow      Reason for Consult / Principal Problem: stroke like sxs  Hx and PE limited by: none  Patient last known well: 11am  Stroke alert called: 7:45pm  Neurology time of arrival: n/a   Discussed case with ED at 7:45pm

## 2021-06-07 NOTE — ASSESSMENT & PLAN NOTE
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

## 2021-06-07 NOTE — CONSULTS
[facility] [address]   Neurology Initial Consult    Precious Camargo is a 66 y o  female  ICU 11/ICU 11          Information obtained from:   Chief Complaint   Patient presents with    Dizziness     Pt arrived via EMS from home with c/o dizziness, nausea, vomiting, diarrhea since this morning after having coffee  Assessment/Plan:      1  Acute Ischemic Middle Cerebellar Infarct  2  Chronic Lt GABO territory infarct  3  HTN  4  HLD  5  DM  6  Vertigo-sequela of Infarct    -Monitor on Telemetry  -Neuro Assessments per stroke pathway  -BASA daily- continue x 1 week then DC  -Plavix 75mg daily  -Lipitor 80mg daily-continue this dose on DC  -MRI of the Brain-independent read with acute ischemic middle cerebellar infarct  -Echo with shunt study  -PT/OT  -Speech   -Labs: Lipid and A1C  -OP Neurosurgical/Endovascular consult with Dr Travis Massey re: Vertebral Artery stenosis    Pt is a 64yo female with significant vascular risk factors with LDL and A1C out of goal range, significant atherosclerotic disease with R M2 occlusive disease and VA stenosis  Pt on Plavix and Lipitor 40 at home  Pt with sudden onset of Vertigo, worse with any movement  Pt was brought to the ED and stated on the stroke pathway  CT indicative of stenosis and occlusive disease, no acute infarct  Pt was admitted for further work up  Pt MRI independently read per this provider, noted Acute Ischemic Middle Cerebellar infarct and noted chronic Lt GABO infarct  Pt only on Plavix at home, however, due to significant athero will add BASA x 1 week then resume Plavix  Pt will remain on high dose statin and follow up OP with [place]/Endovascular surgery with Dr Travis Massey for further eval of the VA stenosis  Echo remains pending at this time  Precious Camargo will need follow up in 8-10 weeks with general attending or advance practitioner  She will not require outpatient neurological testing   Will be ordering OP referral to Dr Travis Massey in [place] for OP eval of stenosis  HPI:  Shanice Newman is a 64yo female with PMH DM, Anemia, HLD, HTN and CKD  Pt is a smoker  Pt reports that she woke on Sunday morning and was feeling fine  Noted she was up and got her coffee and going about her normal routine when she suddenly had onset of room spinning dizziness  Pt stated that she was not steady on her feet with this and she had difficulty with moving around  Pt stated that any time she tried to move, she was worsen and did eventually develop Nausea and Vomiting  Pt stated that she was unable to do anything  Pt noted that she called her family and she was brought to the ED  On arrival, pt /77 with ongoing gait disturbance, dizziness and nausea with vomiting  Stroke alert called and pt was taken for CT  CTH without abnl findings, CTA with B/L VA stenosis with occlusion noted to the Rt MCA in M2 territory  Pt also with mildly elevated troponin of 0 14  Pt received ASA and plavix load with high dose statin  Admitted under stroke pathway  Pt was not a candidate for tPA due to uncertain window of time  EndoVascular consulted and was not a candidate for intervention at this time  Noted that the occlusive disease is not likely etiology of her current symptoms  Pt admitted under the stroke pathway  Pt reports that she has no history of vertigo  She noted that her sister does but she never had any episodes  She is hard of hearing and had surgery on her Lt ear as a child due to chicken pox scarring but had no issues with vertigo  She is harder of hearing in the Rt ear than the left  Pt noted that she was unable to ambulate due to movement worsening her symptoms  She noted that eventually she did get nauseated and vomitous at home, she was unable to tolerate and she was brought to the ed  Pt denies any head trauma, ear or head pain, no headache or congestion  Not recent acute illness although feels she may have been dehydrated    Pt noted that the only change she had was a change in her DM medications and is now only taking the metformin  Pt reports that she had a stroke a year ago, but is not sure where the stroke was, stated that no one ever told her  It appears that pt had infarct to the [facility] territory from prior notation  Pt had declined Neuro follow up according to notations, stating that she had Neurology evaluations  Pt is on Plavix at home with atorvastatin 40mg  Pt is alert and oriented with fluent clear speech  She has good naming, repetition and comprehension  Pt RUE weaker than the left which appears to be baseline  Sensation and reflexes equal bilaterally  Pt gait is unsteady with stumbling mostly to the left with some upper body heaviness  Pt has no issues with EOM, prolonged gaze  No Nystagmus noted  Pt unable to perform heel toe walking and tandem gait deferred while in ICU  Due to fall risk  Pt denies any vertigo or dizziness with exam of position changes during this exam    MRI of Brain done, noted acute ischemic middle cerebellar infarct with chronic Lt GABO territory infarct  Pt will remain on DAPT x 1 week then resume her Plavix only  Will continue on high dose statin and refer OP to Endovascular/NS for further eval of VA stenosis  Echo pending  LDL and A1C outside of goal ranges, recommend working with PCP re: euglycemia and LDL reduction        Past Medical History:   Diagnosis Date    Anemia     Arthritis     Capsulitis of foot     Last assessed 07/29/13    Diabetes ([facility] [address] )     type 2    Full dentures     Ganglion     Last assessed 07/29/13    Hearing decreased     Hyperlipidemia     Hypertension     Magnesium deficiency     Sciatica     Wears glasses        Past Surgical History:   Procedure Laterality Date    CATARACT EXTRACTION      COLONOSCOPY      DILATION AND CURETTAGE OF UTERUS      x 4    HAND SURGERY Left     fx repaired w/wrist bone    HYSTERECTOMY      left ovary remains    KNEE ARTHROSCOPY Left     LIPOMA RESECTION      removed from back and left buttock    CO XCAPSL CTRC RMVL INSJ IO LENS PROSTH W/O ECP Right 1/24/2019    Procedure: EXTRACTION EXTRACAPSULAR CATARACT PHACO INTRAOCULAR LENS (IOL); Surgeon: Lachelle Wagoner MD;  Location: [facility] MAIN OR;  Service: Ophthalmology    CO XCAPSL CTRC RMVL INSJ IO LENS PROSTH W/O ECP Left 2/14/2019    Procedure: EXTRACTION EXTRACAPSULAR CATARACT PHACO INTRAOCULAR LENS (IOL);   Surgeon: Lachelle Wagoner MD;  Location: [facility] MAIN OR;  Service: Ophthalmology    TONSILLECTOMY      and adenoids    TUBAL LIGATION         Allergies   Allergen Reactions    Aleve [Naproxen] Other (See Comments)     "weird" sensation entire body    Sulfa Antibiotics Rash         Current Facility-Administered Medications:     aspirin chewable tablet 81 mg, 81 mg, Oral, Daily, ROLAND Lau, 81 mg at 06/07/21 0827    atorvastatin (LIPITOR) tablet 80 mg, 80 mg, Oral, QPM, ROLAND Lau, 80 mg at 06/06/21 2319    clopidogrel (PLAVIX) tablet 75 mg, 75 mg, Oral, Daily, ROLAND Lau, 75 mg at 06/07/21 0827    enoxaparin (LOVENOX) subcutaneous injection 30 mg, 30 mg, Subcutaneous, Daily, ROLAND Lau, 30 mg at 06/07/21 6755    ferrous sulfate tablet 325 mg, 325 mg, Oral, Once per day on Mon Wed Fri, ROLAND Lau, 325 mg at 06/07/21 5061    insulin lispro (HumaLOG) 100 units/mL subcutaneous injection 1-5 Units, 1-5 Units, Subcutaneous, TID AC **AND** Fingerstick Glucose (POCT), , , 4x Daily AC and at bedtime, ROLAND Lau    insulin lispro (HumaLOG) 100 units/mL subcutaneous injection 1-5 Units, 1-5 Units, Subcutaneous, HS, ROLAND Lau, 2 Units at 06/06/21 2328    lisinopril (ZESTRIL) tablet 40 mg, 40 mg, Oral, HS, ROLAND Lau    magnesium oxide (MAG-OX) tablet 400 mg, 400 mg, Oral, BID, ROLAND Lau, 400 mg at 06/07/21 0827    pantoprazole (PROTONIX) EC tablet 40 mg, 40 mg, Oral, Early Morning, Katerine Needles, CRNP, 40 mg at 06/07/21 0532    Social History     Socioeconomic History    Marital status:      Spouse name: Not on file    Number of children: Not on file    Years of education: Not on file    Highest education level: Not on file   Occupational History    Not on file   Social Needs    Financial resource strain: Not on file    Food insecurity     Worry: Not on file     Inability: Not on file   [employer] needs     Medical: Not on file     Non-medical: Not on file   Tobacco Use    Smoking status: Current Every Day Smoker     Packs/day: 0 50     Years: 35 00     Pack years: 17 50     Types: Cigarettes    Smokeless tobacco: Never Used   Substance and Sexual Activity    Alcohol use: Not Currently     Comment: Rarely    Drug use: Never    Sexual activity: Not on file   Lifestyle    Physical activity     Days per week: Not on file     Minutes per session: Not on file    Stress: Not on file   Relationships    Social connections     Talks on phone: Not on file     Gets together: Not on file     Attends Hindu service: Not on file     Active member of club or organization: Not on file     Attends meetings of clubs or organizations: Not on file     Relationship status: Not on file    Intimate partner violence     Fear of current or ex partner: Not on file     Emotionally abused: Not on file     Physically abused: Not on file     Forced sexual activity: Not on file   Other Topics Concern    Not on file   Social History Narrative    Not on file       Family History   Problem Relation Age of Onset    Leukemia Father     Early death Father 39        leukemia    Stomach cancer Maternal Grandfather     Diabetes Paternal Grandfather     Diabetes Sister         insulin dependent    Diabetes Brother         insulin    Diabetes Sister         insulin    Diabetes Sister         insulin         Review of systems:  Please see HPI for positive symptoms     Constitutional: No fever, no chills, no weight change  Ocular: No diplopia, no blurred vision, spots/zigzag lines  HEENT:  No sore throat, headache or congestion  COR:  No chest pain  No palpitations  Lungs:  no sob  GI:  no  nausea, no vomiting, no diarrhea, no constipation, no anorexia  :  No dysuria, frequency, or urgency  No hematuria  Musculoskeletal:  No joint pain or swelling   Skin:  No rash or itching  Psychiatric:  no anxiety, no depression  Endocrine:  No polyuria or polydipsia  Physical examination:  /57   Pulse 58   Temp 97 5 °F (36 4 °C) (Temporal)   Resp (!) 42   Ht 5' (1 524 m)   Wt 56 2 kg (123 lb 14 4 oz)   SpO2 (!) 88%   BMI 24 20 kg/m²     GENERAL APPEARANCE:  The patient is alert, oriented  HEENT:  Head is normocephalic  Pupils are equal and reactive  NECK:  Supple without lymphadenopathy  HEART:  Regular rate and rhythm  LUNGS:  No audible wheezing or stridor heard  ABDOMEN:  Soft, nontender, nondistended with good bowel sounds heard  EXTREMITIES:  Without cyanosis, clubbing or edema  Mental status: The patient is alert, attentive, and oriented  Speech is clear and fluent, good repetition, comprehension, and naming  Cranial nerves:  CN II: Visual fields are full to confrontation  Fundoscopic exam is normal with sharp discs and no vascular changes  Pupils are 3 mm and briskly reactive to light  CN III, IV, VI: At primary gaze, there is no eye deviation    -discomfort for extended gaze  CN V: Facial sensation is intact to pinprick in all 3 divisions bilaterally  Corneal responses are intact  CN VII: Face is symmetric with normal eye closure and smile  CN VIII: Hearing is normal to rubbing fingers  CN IX, X: Palate elevates symmetrically  Phonation is normal   CN XI: Head turning and shoulder shrug are intact  CN XII: Tongue is midline with normal movements and no atrophy  Motor: There is no pronator drift of out-stretched arms      Muscle bulk and tone are normal    RUE weakness, reports from old CVA (2020)  Muscle exam  Arm Right Left Leg Right Left   Deltoid 4+/5 5/5 Iliopsoas 5/5 5/5   Biceps 4+/5 5/5 Quads 5/5 5/5   Triceps 4+/5 5/5 Hamstrings 5/5 5/5   Wrist Extension 4+/5 5/5 Ankle Dorsi Flexion 5/5 5/5   Wrist Flexion 4+/5 5/5 Ankle Plantar Flexion 5/5 5/5        Reflexes    RJ BJ TJ KJ AJ Plantars Greenwood's   Right 2+ 2+ 2+ 2+ 2+ Downgoing Not present   Left 2+ 2+ 2+ 2+ 2+ Downgoing Not present      Sensory:  Light touch, Temperature, position sense, and vibration sense are intact in fingers and toes  Coordination:  Rapid alternating movements and fine finger movements are intact  There is +dysmetria on finger-to-nose and heel-knee-shin wnl  There are no abnormal or extraneous movements  Romberg mildly positive  Gait/Stance:  Normal casual gait  Deferred heel/toe walking and  tandem gait due to imbalance and connections to ICU monitoring for fall risk    Lab Results   Component Value Date    WBC 7 92 06/07/2021    HGB 11 9 06/07/2021    HCT 36 6 06/07/2021    MCV 93 06/07/2021     06/07/2021     Lab Results   Component Value Date    HGBA1C 7 2 (H) 06/07/2021     Lab Results   Component Value Date    ALT 26 06/06/2021    AST 15 06/06/2021    ALKPHOS 89 06/06/2021    BILITOT 0 3 11/22/2017     Lab Results   Component Value Date    GLUCOSE 172 (H) 11/22/2017    CALCIUM 10 1 06/07/2021     11/22/2017    K 5 1 06/07/2021    CO2 29 06/07/2021     06/07/2021    BUN 24 06/07/2021    CREATININE 1 06 06/07/2021     Chol 152  LDL 72    Review of reports and notes reveal:  Independent Interpretation of images or specimens:  Cta Head And Neck With And Without Contrast  Result Date: 6/6/2021  67% stenosis of the right vertebral artery origin indicating moderate stenosis    60% stenosis of the left vertebral artery origin consistent with mild stenosis      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 (3:177-181)   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 Biophysical moderate emphysematous changes are noted      Right upper lobe 3 mm nodule (602: 1:15)  Based on current Fleischner Society 2017 Guidelines on incidental pulmonary nodule, no routine follow-up is needed if the patient is considered low risk for lung cancer  If the patient is considered high risk for lung cancer, 12 month follow-up non-contrast chest CT is recommended  2 6 x 1 9 cm right thyroid lobe nodule  Incidental discovery of one or more thyroid nodule(s) measuring more than 1 5 cm and without suspicious features is noted in this patient who is above 28years old; according to guidelines published in the February 2015 white paper on incidental thyroid nodules in the Journal of the [address] [place] [address] of Radiology [place]), further characterization with thyroid ultrasound is recommended  I personally discussed this study with Gisela Naylor on 6/1/8218 at 1:06 PM  Workstation performed: [id number]     Xr Chest 1 View Portable  Result Date: 6/7/2021  No acute cardiopulmonary disease  Workstation performed: [id number]     Ct Stroke Alert Brain  Result Date: 6/6/2021  No acute intracranial abnormality  Findings were directly discussed with Gisela Naylor on 4/1/4202 5:28 PM  Workstation performed: [id number]       Thank you for this consult  Total time of encounter: 70 Minutes  More than 50% of time was spent in counseling and coordination of care of patient

## 2021-06-07 NOTE — PLAN OF CARE
Problem: Potential for Falls  Goal: Patient will remain free of falls  Description: INTERVENTIONS:  - Assess patient frequently for physical needs  -  Identify cognitive and physical deficits and behaviors that affect risk of falls    -  [place] fall precautions as indicated by assessment   - Educate patient/family on patient safety including physical limitations  - Instruct patient to call for assistance with activity based on assessment  - Modify environment to reduce risk of injury  - Consider OT/PT consult to assist with strengthening/mobility  Outcome: Progressing

## 2021-06-07 NOTE — ASSESSMENT & PLAN NOTE
· Incidental finding on CT scan  · Pt made aware she will need outpt ultrasound, measures 2 6 x 1 9 cm

## 2021-06-07 NOTE — PROGRESS NOTES
[address] 43  Progress Note - Irvin Sanchez 1945, 66 y o  female MRN: [id number]  Unit/Bed#: ICU 11 Encounter: [id number]  Primary Care Provider: Rubén Snider MD   Date and time admitted to hospital: 6/6/2021  7:43 PM    * Dizziness of unknown cause  Assessment & Plan  · Prior hx CVA 1/2020 no deficits  · NIH= 0 in ED and remains 0 this morning  · States dizziness better since arrival but still present with head movement less then on arrival  · CTH negative but CTA shows 67% stenosis of the right vertebral artery origin indicating moderate stenosis  60% stenosis of the left vertebral artery origin consistent with mild stenosis  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  · Neuro c/s official consult pending  · MRI ordered  · Echo ordered  · Cont ASA, Lipitor and Plavix   Plavix loaded w/ 300 mg last night per neuro, Lipitor increased to 80mg  · Neuro checks per sto protocol for now- should be ok to go to tele floor today    Troponin level elevated  Assessment & Plan  · Still no signs of ischemia, no c/o CP or SOB  · Trop at midnight elevated to 0 29, repeat this am pending    Nodule of upper lobe of right lung  Assessment & Plan  · Incidental finding on CT scan  · Pt made aware she will need f/u as outpt    Thyroid nodule greater than or equal to 1 5 cm in diameter incidentally noted on imaging study  Assessment & Plan  · Incidental finding on CT scan  · Pt made aware she will need outpt ultrasound, measures 2 6 x 1 9 cm    Type 2 diabetes mellitus with stage 3 chronic kidney disease ([facility] [address] )  Assessment & Plan  Lab Results   Component Value Date    HGBA1C 6 3 (H) 02/26/2021       Recent Labs     06/06/21 1947 06/06/21  2321   POCGLU 203* 214*       Blood Sugar Average: Last 72 hrs:  (P) 208 5     · Hold home Metformin since received dye load for CTA, restart 6/9  · Cont IVF @ 50 cc/hr since received dye, recheck Cr pending this am  · Cont SSI coverage  · Repeat HgbA1C per stroke protocol    Iron deficiency anemia  Assessment & Plan  · Cont home Iron therapy 325 mg 3 x per week     GERD (gastroesophageal reflux disease)  Assessment & Plan  · Cont home Protonix    Essential hypertension  Assessment & Plan  · Resume home meds, takes Accupril but autosub Lisinopril 40 mg QHS    History of tobacco abuse  Assessment & Plan  · Refused nicotine patch at this time  · Tobacco cessation upon discharge    Hyperlipidemia  Assessment & Plan  · Cont Lipitor, neuro increased dose to 80mg  · Recheck Lipid panel per stroke protocol this am      ----------------------------------------------------------------------------------------  HPI/24hr events: No acute events overnight; pt states she not dizzy at rest but becomes slightly dizzy with moving head side to side  Denies any nausea or vomiting overnight; denies any c/o of CP or SOB  NIH remains 0    Disposition: Transfer to [facility] with Telemetry   Code Status: Level 1 - Full Code  ---------------------------------------------------------------------------------------  SUBJECTIVE    Review of Systems   Constitutional: Negative  HENT: Negative  Eyes: Negative  Respiratory: Negative  Cardiovascular: Negative  Gastrointestinal: Negative  Genitourinary: Negative  Musculoskeletal: Negative  Skin: Negative  Neurological: Positive for dizziness  Psychiatric/Behavioral: Negative      All other systems reviewed and are negative       ---------------------------------------------------------------------------------------  OBJECTIVE    Vitals   Vitals:    21 0200 21 0300 21 0400 21 0532   BP: 146/66 140/63 153/67    Pulse: 65 64 63    Resp: 18 18 19    Temp:       TempSrc:       SpO2: 96% 95% 97%    Weight:    56 2 kg (123 lb 14 4 oz)   Height:         Temp (24hrs), Av 9 °F (36 6 °C), Min:97 2 °F (36 2 °C), Max:98 5 °F (36 9 °C)  Current: Temperature: (!) 97 2 °F (36 2 °C)          Respiratory:  SpO2: SpO2: 97 %, SpO2 Device: O2 Device: None (Room air)       Invasive/non-invasive ventilation settings   Respiratory    Lab Data (Last 4 hours)    None         O2/Vent Data (Last 4 hours)    None                Physical Exam  Vitals signs and nursing note reviewed  Constitutional:       General: She is not in acute distress  Appearance: Normal appearance  She is normal weight  HENT:      Head: Normocephalic and atraumatic  Eyes:      General: No visual field deficit  Extraocular Movements: Extraocular movements intact  Right eye: No nystagmus  Left eye: No nystagmus  Pupils: Pupils are equal, round, and reactive to light  Cardiovascular:      Rate and Rhythm: Normal rate and regular rhythm  Pulses: Normal pulses  Pulmonary:      Effort: Pulmonary effort is normal       Breath sounds: Normal breath sounds  Abdominal:      General: Abdomen is flat  Bowel sounds are normal  There is no distension  Palpations: Abdomen is soft  Tenderness: There is no abdominal tenderness  Musculoskeletal: Normal range of motion  Right lower leg: No edema  Left lower leg: No edema  Skin:     General: Skin is warm and dry  Capillary Refill: Capillary refill takes less than 2 seconds  Neurological:      General: No focal deficit present  Mental Status: She is alert and oriented to person, place, and time  Mental status is at baseline  Cranial Nerves: No cranial nerve deficit, dysarthria or facial asymmetry  Sensory: Sensation is intact  No sensory deficit  Motor: No weakness or pronator drift  Coordination: Finger-Nose-Finger Test and Heel to [employer] normal    Psychiatric:         Mood and Affect: Mood normal          Behavior: Behavior normal          Thought Content:  Thought content normal          Laboratory and Diagnostics:  Results from last 7 days   Lab Units 06/07/21  0531 06/06/21  1950   WBC Thousand/uL 7 92 8 34   HEMOGLOBIN g/dL 11 9 11 0* HEMATOCRIT % 36 6 34 7*   PLATELETS Thousands/uL 318 291   NEUTROS PCT % 78* 91*   MONOS PCT % 9 3*     Results from last 7 days   Lab Units 06/06/21  1950   SODIUM mmol/L 141   POTASSIUM mmol/L 4 5   CHLORIDE mmol/L 104   CO2 mmol/L 29   ANION GAP mmol/L 8   BUN mg/dL 26*   CREATININE mg/dL 1 14   CALCIUM mg/dL 9 9   GLUCOSE RANDOM mg/dL 233*   ALT U/L 26   AST U/L 15   ALK PHOS U/L 89   ALBUMIN g/dL 3 3*   TOTAL BILIRUBIN mg/dL 0 56          Results from last 7 days   Lab Units 06/06/21  1950   INR  0 89   PTT seconds 23      Results from last 7 days   Lab Units 06/06/21  2315 06/06/21  1950   TROPONIN I ng/mL 0 29* 0 14*         ABG:    VBG:          Micro        EKG: NSR  Imaging: I have personally reviewed pertinent reports  Intake and Output  I/O       06/05 0701 - 06/06 0700 06/06 0701 - 06/07 0700    Urine (mL/kg/hr)  510    Total Output  510    Net  -510                Height and Weights   Height: 5' (152 4 cm)  IBW (Ideal Body Weight): 45 5 kg  Body mass index is 24 2 kg/m²  Weight (last 2 days)     Date/Time   Weight    06/07/21 0532   56 2 (123 9)    06/06/21 2208   55 2 (121 69)                Nutrition       Diet Orders   (From admission, onward)             Start     Ordered    06/06/21 2227  Diet Cristian/CHO Controlled; Consistent Carbohydrate Diet Level 2 (5 carb servings/75 grams CHO/meal)  Diet effective now     Question Answer Comment   Diet Type Cristian/CHO Controlled    Cristian/CHO Controlled Consistent Carbohydrate Diet Level 2 (5 carb servings/75 grams CHO/meal)    RD to adjust diet per protocol?  Yes        06/06/21 2229                  Active Medications  Scheduled Meds:  Current Facility-Administered Medications   Medication Dose Route Frequency Provider Last Rate    aspirin  81 mg Oral Daily ROLAND Ling      atorvastatin  80 mg Oral QPM ROLAND Ling      clopidogrel  75 mg Oral Daily ROLAND Ling      enoxaparin  30 mg Subcutaneous Daily ROLAND Ling  ferrous sulfate  325 mg Oral Once per day on Mon Wed Fri ROLAND Radford      insulin lispro  1-5 Units Subcutaneous TID [facility] Vernadine ROLAND Capps      insulin lispro  1-5 Units Subcutaneous HS Vernadine ROLAND Capps      lisinopril  40 mg Oral HS Vernadine ROLAND Capps      magnesium Oxide  400 mg Oral BID Vernadine ROLAND Capps      multi-electrolyte  50 mL/hr Intravenous Continuous Vernadine ROLAND Capps 50 mL/hr (06/06/21 2320)    pantoprazole  40 mg Oral Early Morning VerROLAND Rolle       Continuous Infusions:  multi-electrolyte, 50 mL/hr, Last Rate: 50 mL/hr (06/06/21 2320)      PRN Meds:        Invasive Devices Review  Invasive Devices     Peripheral Intravenous Line            Peripheral IV 06/06/21 Left Antecubital less than 1 day    Peripheral IV 06/06/21 Right Antecubital less than 1 day                Rationale for remaining devices: N/A  ---------------------------------------------------------------------------------------  Advance Directive and Living Will:      Power of :    POLST:    ---------------------------------------------------------------------------------------  Care Time Delivered:   No Critical Care time spent       ROLAND Radford

## 2021-06-07 NOTE — INCIDENTAL FINDINGS
The following findings require follow up:  Radiographic finding   Finding:     Right upper lobe 3 mm nodule  Based on current Fleischner Society 2017 Guidelines on incidental pulmonary nodule, 12 month follow-up non-contrast chest CT is recommended      2 6 x 1 9 cm right thyroid lobe nodule  Incidental discovery of one or more thyroid nodule measuring more than 1 5 cm and without suspicious features is noted in this patient who is above 28years old; according to guidelines published in the February 2015 white paper on incidental thyroid nodules in the Journal of the [employer] of Radiology Alan Estrada), further characterization with thyroid ultrasound is recommended      Please notify the following clinician to assist with the follow up:   Dr Martir Rose

## 2021-06-08 ENCOUNTER — APPOINTMENT (INPATIENT)
Dept: NON INVASIVE DIAGNOSTICS | Facility: HOSPITAL | Age: 76
DRG: 066 | End: 2021-06-08
Payer: COMMERCIAL

## 2021-06-08 DIAGNOSIS — I65.03 STENOSIS OF BOTH VERTEBRAL ARTERIES: ICD-10-CM

## 2021-06-08 DIAGNOSIS — I63.213 CEREBRAL INFARCTION DUE TO BILATERAL STENOSIS OF VERTEBRAL ARTERIES (HCC): Primary | ICD-10-CM

## 2021-06-08 PROBLEM — I63.9 ACUTE ISCHEMIC STROKE (HCC): Status: ACTIVE | Noted: 2021-06-06

## 2021-06-08 LAB
ANION GAP SERPL CALCULATED.3IONS-SCNC: 6 MMOL/L (ref 4–13)
BUN SERPL-MCNC: 24 MG/DL (ref 5–25)
CALCIUM SERPL-MCNC: 9.4 MG/DL (ref 8.3–10.1)
CHLORIDE SERPL-SCNC: 105 MMOL/L (ref 100–108)
CO2 SERPL-SCNC: 30 MMOL/L (ref 21–32)
CREAT SERPL-MCNC: 1.26 MG/DL (ref 0.6–1.3)
ERYTHROCYTE [DISTWIDTH] IN BLOOD BY AUTOMATED COUNT: 13.1 % (ref 11.6–15.1)
GFR SERPL CREATININE-BSD FRML MDRD: 41 ML/MIN/1.73SQ M
GLUCOSE SERPL-MCNC: 163 MG/DL (ref 65–140)
GLUCOSE SERPL-MCNC: 255 MG/DL (ref 65–140)
GLUCOSE SERPL-MCNC: 284 MG/DL (ref 65–140)
GLUCOSE SERPL-MCNC: 325 MG/DL (ref 65–140)
HCT VFR BLD AUTO: 32.9 % (ref 34.8–46.1)
HGB BLD-MCNC: 10.6 G/DL (ref 11.5–15.4)
MCH RBC QN AUTO: 30.3 PG (ref 26.8–34.3)
MCHC RBC AUTO-ENTMCNC: 32.2 G/DL (ref 31.4–37.4)
MCV RBC AUTO: 94 FL (ref 82–98)
MRSA NOSE QL CULT: NORMAL
PLATELET # BLD AUTO: 292 THOUSANDS/UL (ref 149–390)
PMV BLD AUTO: 9.7 FL (ref 8.9–12.7)
POTASSIUM SERPL-SCNC: 4.2 MMOL/L (ref 3.5–5.3)
RBC # BLD AUTO: 3.5 MILLION/UL (ref 3.81–5.12)
SODIUM SERPL-SCNC: 141 MMOL/L (ref 136–145)
WBC # BLD AUTO: 6.68 THOUSAND/UL (ref 4.31–10.16)

## 2021-06-08 PROCEDURE — 82948 REAGENT STRIP/BLOOD GLUCOSE: CPT

## 2021-06-08 PROCEDURE — 80048 BASIC METABOLIC PNL TOTAL CA: CPT | Performed by: PHYSICIAN ASSISTANT

## 2021-06-08 PROCEDURE — 97110 THERAPEUTIC EXERCISES: CPT

## 2021-06-08 PROCEDURE — 85027 COMPLETE CBC AUTOMATED: CPT | Performed by: PHYSICIAN ASSISTANT

## 2021-06-08 PROCEDURE — 93306 TTE W/DOPPLER COMPLETE: CPT

## 2021-06-08 PROCEDURE — 97530 THERAPEUTIC ACTIVITIES: CPT

## 2021-06-08 PROCEDURE — 97167 OT EVAL HIGH COMPLEX 60 MIN: CPT

## 2021-06-08 PROCEDURE — 93306 TTE W/DOPPLER COMPLETE: CPT | Performed by: INTERNAL MEDICINE

## 2021-06-08 PROCEDURE — 97535 SELF CARE MNGMENT TRAINING: CPT

## 2021-06-08 PROCEDURE — 99232 SBSQ HOSP IP/OBS MODERATE 35: CPT | Performed by: INTERNAL MEDICINE

## 2021-06-08 PROCEDURE — 99233 SBSQ HOSP IP/OBS HIGH 50: CPT | Performed by: PSYCHIATRY & NEUROLOGY

## 2021-06-08 RX ADMIN — INSULIN LISPRO 1 UNITS: 100 INJECTION, SOLUTION INTRAVENOUS; SUBCUTANEOUS at 07:31

## 2021-06-08 RX ADMIN — PANTOPRAZOLE SODIUM 40 MG: 40 TABLET, DELAYED RELEASE ORAL at 05:22

## 2021-06-08 RX ADMIN — ASPIRIN 81 MG CHEWABLE TABLET 81 MG: 81 TABLET CHEWABLE at 09:02

## 2021-06-08 RX ADMIN — MAGNESIUM OXIDE TAB 400 MG (241.3 MG ELEMENTAL MG) 400 MG: 400 (241.3 MG) TAB at 17:31

## 2021-06-08 RX ADMIN — CLOPIDOGREL BISULFATE 75 MG: 75 TABLET ORAL at 09:02

## 2021-06-08 RX ADMIN — ENOXAPARIN SODIUM 30 MG: 30 INJECTION SUBCUTANEOUS at 09:01

## 2021-06-08 RX ADMIN — ATORVASTATIN CALCIUM 80 MG: 80 TABLET ORAL at 17:31

## 2021-06-08 RX ADMIN — INSULIN LISPRO 2 UNITS: 100 INJECTION, SOLUTION INTRAVENOUS; SUBCUTANEOUS at 21:22

## 2021-06-08 RX ADMIN — INSULIN LISPRO 3 UNITS: 100 INJECTION, SOLUTION INTRAVENOUS; SUBCUTANEOUS at 16:01

## 2021-06-08 RX ADMIN — MAGNESIUM OXIDE TAB 400 MG (241.3 MG ELEMENTAL MG) 400 MG: 400 (241.3 MG) TAB at 09:02

## 2021-06-08 RX ADMIN — LISINOPRIL 40 MG: 20 TABLET ORAL at 21:22

## 2021-06-08 RX ADMIN — INSULIN LISPRO 3 UNITS: 100 INJECTION, SOLUTION INTRAVENOUS; SUBCUTANEOUS at 11:26

## 2021-06-08 NOTE — OCCUPATIONAL THERAPY NOTE
OT EVALUATION       06/08/21 0935   Note Type   Note type Evaluation   Restrictions/Precautions   Other Precautions Chair Alarm; Bed Alarm; Fall Risk   Pain Assessment   Pain Assessment Tool Pain Assessment not indicated - pt denies pain   Home Living   Type of [address] Two level; Able to live on main level with bedroom/bathroom; Laundry in basement  (3-4 steps to enter )   Bathroom Shower/Tub Tub/shower unit   Bathroom Toilet Standard   Bathroom Equipment Grab bars in [facility] [address]   Prior Function   Level of [address] with ADLs and functional mobility   Lives With [employer] Help From Family   ADL Assistance Independent   IADLs Independent   Comments pt drives, does laundry in basement and grocery shops    ADL   Eating Assistance 7  [place] 5  [address] [facility] 3  Moderate Assistance   [address] [facility] 5  Supervision/Setup    [facility] 4  [address]  4  Minimal Assistance   Transfers   Sit to Stand 4  Minimal assistance   Additional items Assist x 1;Verbal cues   Stand to Sit 4  Minimal assistance   Additional items Assist x 1;Verbal cues   Functional Mobility   Functional Mobility 4  Minimal assistance   Additional Comments 20 feet   Additional items Rolling walker   Balance   Static Sitting Fair   Dynamic Sitting Fair -   Static Standing Fair -   Dynamic Standing Fair -   Activity Tolerance   Activity Tolerance Patient tolerated treatment well   Nurse Made Aware yes Florencia   RUE Assessment   RUE Assessment WFL  (grossly 4-/4/5 MMT)   LUE Assessment   LUE Assessment WFL  (Grossly 4/5 MMT)   Hand Function   Gross Motor Coordination Functional   Fine Motor Coordination Functional   Sensation   Light Touch No apparent deficits   Cognition   Overall Cognitive Status WFL   Arousal/Participation Cooperative   Attention Within functional limits   Orientation Level Oriented X4   Following Commands Follows all commands and directions without difficulty   Assessment   Limitation Decreased ADL status; Decreased UE strength;Decreased Safe judgement during ADL;Decreased endurance;Decreased high-level ADLs; Decreased self-care trans  (decreased balance and mobility )   Prognosis Good   Assessment Patient evaluated by Occupational Therapy  Patient admitted with Dizziness of unknown cause  The patients occupational profile, medical and therapy history includes a extensive additional review of physical, cognitive, or psychosocial history related to current functional performance  Comorbidities affecting functional mobility and ADLS include: anemia, arthritis, diabetes and hypertension and CVA  Prior to admission, patient was independent with functional mobility without assistive device, independent with ADLS and independent with IADLS  The evaluation identifies the following performance deficits: weakness, impaired balance, decreased endurance, increased fall risk, new onset of impairment of functional mobility, decreased ADLS, decreased IADLS, decreased activity tolerance, decreased safety awareness, impaired judgement and decreased strength, that result in activity limitations and/or participation restrictions  This evaluation requires clinical decision making of high complexity, because the patient presents with comorbidites that affect occupational performance and required significant modification of tasks or assistance with consideration of multiple treatment options  The Barthel Index was used as a functional outcome tool presenting with a score of 50, indicating marked limitations of functional mobility and ADLS  The patient's raw score on the -PAC Daily Activity inpatient short form is 18, standardized score is 38 66, less than 39 4  Patients at this level are likely to benefit from DC to post-acute rehabilitation services   Please refer to the recommendation of the Occupational Therapist for safe DC planning  Patient will benefit from skilled Occupational Therapy services to address above deficits and facilitate a safe return to prior level of function  Goals   Patient Goals to go home and be independent    STG Time Frame   (1-7 days)   Short Term Goal  Goals established to promote patient goal of to go home and be independent :  Patient will increase standing tolerance to 3 minutes during ADL task to decrease assistance level and decrease fall risk; Patient will increase bed mobility to independent in preparation for ADLS and transfers; Patient will increase functional mobility to and from bathroom with rolling walker with supervision to increase performance with ADLS and to use a toilet; Patient will tolerate 10 minutes of UE ROM/strengthening to increase general activity tolerance and performance in ADLS/IADLS; Patient will improve functional activity tolerance to 10 minutes of sustained functional tasks to increase participation in basic self-care and decrease assistance level;   Patient will increase dynamic sitting balance to fair to improve the ability to sit at edge of bed or on a chair for ADLS;  Patient will increase dynamic standing balance to fair to improve postural stability and decrease fall risk during standing ADLS and transfers       LTG Time Frame   (8-14 days)   Long Term Goal Patient will increase standing tolerance to 6 minutes during ADL task to decrease assistance level and decrease fall risk;  Patient will increase functional mobility to and from bathroom with rolling walker independently to increase performance with ADLS and to use a toilet; Patient will tolerate 20 minutes of UE ROM/strengthening to increase general activity tolerance and performance in ADLS/IADLS; Patient will improve functional activity tolerance to 20 minutes of sustained functional tasks to increase participation in basic self-care and decrease assistance level;   Patient will increase dynamic sitting balance to fair+ to improve the ability to sit at edge of bed or on a chair for ADLS;  Patient will increase dynamic standing balance to fair+ to improve postural stability and decrease fall risk during standing ADLS and transfers  Pt will score >/= 23/24 on AM-PAC Daily Activity Inpatient scale to promote safe independence with ADLs and functional mobility; Pt will score >/= 80/100 on Barthel Index in order to decrease caregiver assistance needed and increase ability to perform ADLs and functional mobility  Functional Transfer Goals   Pt Will Perform All Functional Transfers   (STG supervision LTG independent )   ADL Goals   Pt Will Perform Eating   (STG independent )   Pt Will Perform Grooming Standing at sink  (STG supervision LTG independent )   Pt Will Perform Bathing   (STG min assist LTG supervision )   Pt Will Perform UE Dressing   (STG independent )   Pt Will Perform LE Dressing   (STG supervision LTG independent )   Pt Will Perform Toileting   (STG supervision LTG independent )   Plan   Treatment Interventions ADL retraining;Functional transfer training;UE strengthening/ROM; Endurance training;Patient/family training;Equipment evaluation/education; Activityengagement; Compensatory technique education   Goal Expiration Date 06/22/21   OT Frequency 5x/wk   Additional Treatment Session   Start Time 0215   End Time 0935   Treatment Assessment S: patient denies pain O: Toilet transfer with min assist   Patient stood to wash hands at sink with min assist with setup and verbal cues for safety  Functional mobility 20 feet with RW with min assist and verbal cues for safety  Stand to sit supervision  Patient doffed/donned L sock seated with supervision  A: Tolerated well  Motivated to return to independent with ADLS and IADLS  Patient presents with unsteady gait now needing use of RW   P: Continued OT per plan of care  Recommend STR vs acute  Recommendation   OT Discharge Recommendation Post acute rehabilitation services   AM-PAC Daily Activity Inpatient   Lower Body Dressing 3   Bathing 2   Toileting 3   Upper Body Dressing 3   Grooming 3   Eating 4   Daily Activity Raw Score 18   Daily Activity Standardized Score (Calc for Raw Score >=11) 38 66   AM-PAC Applied Cognition Inpatient   Following a Speech/Presentation 4   Understanding Ordinary Conversation 4   Taking Medications 4   Remembering Where Things Are Placed or Put Away 4   Remembering List of 4-5 Errands 4   Taking Care of Complicated Tasks 4   Applied Cognition Raw Score 24   Applied Cognition Standardized Score 62 21   Barthel Index   Feeding 10   Bathing 0   Grooming Score 0   Dressing Score 5   Bladder Score 10   Bowels Score 10   Toilet Use Score 5   Transfers (Bed/Chair) Score 10   Mobility (Level Surface) Score 0   Stairs Score 0   Barthel Index Score 50   Modified Michelle Scale   Modified [place] Scale 4   Licensure   [place] License Number  Chloe Guo Prakash 85 OTR/L [id number]

## 2021-06-08 NOTE — ASSESSMENT & PLAN NOTE
Patient has history of CVA on 01/20  Patient presented with dizziness  CT of the head and neck showed 67% stenosis of the right vertebral artery and 60% stenosis of the left vertebral artery with occlusion of the right M2 branch  MRI of the brain showed small focus of diffusion restriction in inferior cerebellar vermis indicative of acute cerebellar lacunar infarction  Small chronic left frontal cortical infarction medially  Hemoglobin A1c 7 2  Continue aspirin, Lipitor and Plavix    Patient was loaded with Plavix 300 mg then continued on 75 milligram daily  2D echo with bubble study showed EF of 55-60% without any regional wall motion abnormalities, grade 1 diastolic dysfunction  Continue PT/OT  Patient will need placement in short-term rehabilitation

## 2021-06-08 NOTE — ASSESSMENT & PLAN NOTE
Patient had mild elevation of troponin and peak troponin was 0 29  2D echo showed no significant wall motion abnormalities  Outpatient follow-up with Cardiology for stress test

## 2021-06-08 NOTE — PROGRESS NOTES
Neurology Consult Follow Up      Shivani Tolliver is a 66 y o  female  ICU 11/ICU 11    [id number]        Assessment/Recommendations:    1  Acute Ischemic Middle Cerebellar Infarct  2  Chronic Lt GABO territory infarct  3  HTN  4  HLD  5  DM  6  Vertigo-sequela of Infarct  7  Tobacco Use     -Monitor on Telemetry  -Neuro Assessments per stroke pathway  -BASA daily- continue x 1 week then DC  -Plavix 75mg daily  -Lipitor 80mg daily-continue this dose on DC  -MRI of the Brain-independent read with acute ischemic middle cerebellar infarct  -Echo with shunt study   EF of 55-60%   Lt atrium mildly dilated, Rt atrium nml in size   No documentation of septal shunt study   -PT/OT-recommendations for acute rehab  -OP Neurosurgical/Endovascular consult with Dr Leron Kehr re: Vertebral Artery stenosis  -Assist with smoking cessation     Pt is a 64yo female with significant vascular risk factors with LDL and A1C out of goal range, significant atherosclerotic disease with R M2 occlusive disease and VA stenosis  Pt on Plavix and Lipitor 40 at home  Pt with sudden onset of Vertigo, worse with any movement  Pt was brought to the ED and stated on the stroke pathway  CT indicative of stenosis and occlusive disease, no acute infarct  Pt was admitted for further work up  Pt MRI independently read per this provider, noted Acute Ischemic Middle Cerebellar infarct and noted chronic Lt GABO infarct  Echo remains with mildly dilated Lt Atrium, no septal shunt study seen on findings  Pt only on Plavix at home, however, due to significant athero will add BASA x 1 week then resume Plavix  Pt will remain on high dose statin and follow up OP with [place]/Endovascular surgery with Dr Leron Kehr for further eval of the VA stenosis  Pt will benefit from assist for smoking cessation        Shivani Tolliver will need follow up in 8-10 weeks with general attending or advance practitioner  She will not require outpatient neurological testing   Will be ordering OP referral to Dr Yanet Byrnes in [place] for OP eval of stenosis         Chief Complaint:    Subjective:   Pt reports feeling a little be stronger today  Reports some imbalance with ambulation, required RW for balance and support but was able to get up and do ADLs and walk in her room a bit  No further reports of dizziness or vertigo        Past Medical History:   Diagnosis Date    Anemia     Arthritis     Capsulitis of foot     Last assessed 07/29/13    Diabetes ([facility] [address] )     type 2    Full dentures     Ganglion     Last assessed 07/29/13    Hearing decreased     Hyperlipidemia     Hypertension     Magnesium deficiency     Sciatica     Wears glasses      Social History     Socioeconomic History    Marital status:      Spouse name: Not on file    Number of children: Not on file    Years of education: Not on file    Highest education level: Not on file   Occupational History    Not on file   Social Needs    Financial resource strain: Not on file    Food insecurity     Worry: Not on file     Inability: Not on file    Transportation needs     Medical: Not on file     Non-medical: Not on file   Tobacco Use    Smoking status: Current Every Day Smoker     Packs/day: 0 50     Years: 35 00     Pack years: 17 50     Types: Cigarettes    Smokeless tobacco: Never Used   Substance and Sexual Activity    Alcohol use: Not Currently     Comment: Rarely    Drug use: Never    Sexual activity: Not on file   Lifestyle    Physical activity     Days per week: Not on file     Minutes per session: Not on file    Stress: Not on file   Relationships    Social connections     Talks on phone: Not on file     Gets together: Not on file     Attends Quaker service: Not on file     Active member of club or organization: Not on file     Attends meetings of clubs or organizations: Not on file     Relationship status: Not on file    Intimate partner violence     Fear of current or ex partner: Not on file Emotionally abused: Not on file     Physically abused: Not on file     Forced sexual activity: Not on file   Other Topics Concern    Not on file   Social History Narrative    Not on file     Family History   Problem Relation Age of Onset    Leukemia Father     Early death Father 39        leukemia    Stomach cancer Maternal Grandfather     Diabetes Paternal Grandfather     Diabetes Sister         insulin dependent    Diabetes Brother         insulin    Diabetes Sister         insulin    Diabetes Sister         insulin       ROS:  Please see HPI for positive symptoms  No fever, no chills, no weight change  Ocular: No diplopia, no blurred vision, spot/zigzag lines   HEENT:  No sore throat, headache or congestion  No neck pain  COR:  No chest pain  No palpitations  Lungs:  no sob  GI:  no  nausea, no vomiting, no diarrhea, no constipation, no anorexia  :  No dysuria, frequency, or urgency  No hematuria  Musculoskeletal:  No joint pain or swelling   +imbalance  Skin:  No rash or itching  Psychiatric:  no anxiety, no depression  Endocrine:  No polyuria or polydipsia  Objective:  /58 (BP Location: Right arm)   Pulse 58   Temp (!) 97 °F (36 1 °C) (Temporal)   Resp 20   Ht 5' (1 524 m)   Wt 56 kg (123 lb 7 3 oz)   SpO2 94%   BMI 24 11 kg/m²     General: alert   Mental status: oriented x3  Attention: normal  Knowledge: fair  Language and Speech: normal  Cranial nerves:   CN II: Visual fields are full to confrontation  Fundoscopic exam is normal with sharp discs and no vascular changes  Pupils are 3 mm and briskly reactive to light  CN III, IV, VI: At primary gaze, there is no eye deviation  CN V: Facial sensation is intact in all 3 divisions bilaterally  Corneal responses are intact  CN VII: Face is symmetrical, with normal eye closure and smile  CN VIII: Hearing is normal to rubbing fingers  CN IX, X: Palate elevates symmetrically   Phonation is normal   CN XI: Head turning and shoulder shrug are intact  CN XII: Tongue is mild rtward deviation with normal movements and no atrophy  Motor: There is no pronator drift of out-stretched arms  Muscle bulk and tone are normal       Muscle exam  Arm Right Left Leg Right Left   Deltoid 4+/5 5/5 Iliopsoas 5/5 5/5   Biceps 4+/5 5/5 Quads 5/5 5/5   Triceps 4+/5 5/5 Hamstrings 5/5 5/5   Wrist Extension 4+/5 5/5 Ankle Dorsi Flexion 5/5 5/5   Wrist Flexion 4+/5 5/5 Ankle Plantar Flexion 5/5 5/5       Sensory: normal to light touch, temperature, vibration  Proprioception intact  Gait: unsteady-requiring DME with RW  Coordination: finger to nose and heel to toe normal     Reflexes    RJ BJ TJ KJ AJ Plantars Greenwood's   Right 2+ 2+ 2+ 1+ 1+ Downgoing Not present   Left 2+ 2+ 2+ 1+ 1+ Downgoing Not present      Heart: Regular rate and rhythm  Lung:slight wheeze noted on exertion  Abd: soft, non-tender, non-distended with positive bowel sounds in all quads  Skin: dry and intact    Labs:      Lab Results   Component Value Date    WBC 6 68 06/08/2021    HGB 10 6 (L) 06/08/2021    HCT 32 9 (L) 06/08/2021    MCV 94 06/08/2021     06/08/2021     Lab Results   Component Value Date    HGBA1C 7 2 (H) 06/07/2021     Lab Results   Component Value Date    ALT 26 06/06/2021    AST 15 06/06/2021    ALKPHOS 89 06/06/2021    BILITOT 0 3 11/22/2017     Lab Results   Component Value Date    GLUCOSE 172 (H) 11/22/2017    CALCIUM 9 4 06/08/2021     11/22/2017    K 4 2 06/08/2021    CO2 30 06/08/2021     06/08/2021    BUN 24 06/08/2021    CREATININE 1 26 06/08/2021         Review of reports and notes reveal:   Independent review of films/reports:  Cta Head And Neck With And Without Contrast  Result Date: 6/6/2021  67% stenosis of the right vertebral artery origin indicating moderate stenosis    60% stenosis of the left vertebral artery origin consistent with mild stenosis    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 (3:177-181)   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 Biophysical moderate emphysematous changes are noted    Right upper lobe 3 mm nodule (602: 1:15)  Based on current Fleischner Society 2017 Guidelines on incidental pulmonary nodule, no routine follow-up is needed if the patient is considered low risk for lung cancer  If the patient is considered high risk for lung cancer, 12 month follow-up non-contrast chest CT is recommended  2 6 x 1 9 cm right thyroid lobe nodule  Incidental discovery of one or more thyroid nodule(s) measuring more than 1 5 cm and without suspicious features is noted in this patient who is above 28years old; according to guidelines published in the February 2015 white paper on incidental thyroid nodules in the Journal of the [employer] of Radiology Adis Muskrat), further characterization with thyroid ultrasound is recommended  I personally discussed this study with Diego Cazares on 7/4/1651 at 6:35 PM  Workstation performed: [id number]     Xr Chest 1 View Portable  Result Date: 6/7/2021  No acute cardiopulmonary disease  Workstation performed: [id number]     Mri Brain Wo Contrast  Result Date: 6/7/2021  1  Small focus of diffusion restriction in the inferior cerebellar vermis indicative of an acute cerebellar lacunar infarction  2   Small chronic left frontal cortical infarction medially, stable from prior study indicative of small chronic anterior cerebral artery infarction  3   Trace, chronic microangiopathy elsewhere  4   Small amount of right mastoid air cell fluid/effusion  Workstation performed: [id number]     Ct Stroke Alert Brain  Result Date: 6/6/2021  No acute intracranial abnormality  Findings were directly discussed with Diego Cazares on 9/6/5624 4:87 PM  Workstation performed: [id number]         Thank you for this consult      Total time of encounter:  40 min  More than 50% of the time was used in counseling and/or coordination of care  Extent of couseling and/or coordination of care

## 2021-06-08 NOTE — CASE MANAGEMENT
LOS: 2 GMLOS: 2  Bundle: NA  Readmission:  Unplanned Readmission Score: 8    CM Met with patient to introduce CM, review role, complete initial assessment and discuss DCP  Lives where: [place]  Lives with: Son  Home Type & Entry: [address] with 3-4STE  DME: RW  ADLs: Independent w/o AD for all self-care/home mgmt tasks  VNA history: [facility]  STR history: NA  Pharmacy Preference & Coverage: [facility] [address] [facility]  No reported issues with OOP costs  Mental Health/Drug/ETOH history:  Dialysis history: NA  POA/AD/LW: none reported, Son is Healthcare Rep  Income/Employment: FDC/Social Security  Transportation:  Drives self  PCP: Dr Garcia Molt: Son    DCP: Acute vs Sub-acute rehab    SW had indepth discussion with patient and her son regarding recommendation for post acute rehab and [place] [facility]  Pt #1 preference is to go to [facility]  If denied, would prefer [facility] referrals sent  Awaiting confirmation of ability to accept  No additional questions or concerns from pt or son at this time  Unit nurse made aware  CM reviewed discharge planning process including the following: identifying caregivers at home, preference for d/c planning needs,   availability of [facility] to [facility] program, availability of treatment team to discuss questions or concerns patient and/or family may have regarding diagnosis, plan of care, old or new medications and discharge planning   CM will continue to follow for care coordination and update assessment as appropriate

## 2021-06-08 NOTE — ASSESSMENT & PLAN NOTE
Lab Results   Component Value Date    HGBA1C 7 2 (H) 06/07/2021       Recent Labs     06/07/21  1621 06/07/21  2115 06/08/21  1112 06/08/21  1552   POCGLU 243* 198* 284* 325*       Blood Sugar Average: Last 72 hrs:  (P) 230 [id number]   Metformin has been on hold  Continue Humalog sliding scale with Accu-Cheks q a c   And hs

## 2021-06-08 NOTE — PHYSICAL THERAPY NOTE
PT TREATMENT     06/08/21 0915   PT Last Visit   PT Visit Date 06/08/21   Note Type   Note Type Treatment   Pain Assessment   Pain Assessment Tool 0-10   Pain Score No Pain   Restrictions/Precautions   Weight Bearing Precautions Per Order No   Other Precautions Chair Alarm; Bed Alarm;O2;Fall Risk   General   Chart Reviewed Yes   Family/Caregiver Present No   Cognition   Overall Cognitive Status WFL   Arousal/Participation Cooperative   Attention Within functional limits   Orientation Level Oriented X4   Following Commands Follows all commands and directions without difficulty   Subjective   Subjective "it feels so good to walk"   Bed Mobility   Supine to Sit 5  Supervision   Additional Comments to chair   Transfers   Sit to Stand 4  Minimal assistance   Additional items Assist x 1;Verbal cues   Stand to Sit 4  Minimal assistance   Additional items Assist x 1;Verbal cues   Stand pivot 4  Minimal assistance   Additional items Assist x 1;Verbal cues   Toilet transfer 4  Minimal assistance   Additional items Assist x 1;Verbal cues; Commode   Ambulation/Elevation   Gait Assistance 4  Minimal assist   Additional items Assist x 1;Verbal cues   Assistive Device Rolling walker   Distance at least 60 feet    Balance   Static Standing Fair  (with RW)   Dynamic Standing Fair  (with RW)   Ambulatory Fair  (with Rw)   Activity Tolerance   Activity Tolerance Patient tolerated treatment well   Nurse Made Aware yes: pt in chair with seat alarm activated  Exercises   Hip Flexion Sitting;20 reps;Bilateral   Knee AROM Long Arc Quad Sitting;20 reps;Bilateral   Ankle Pumps Sitting;20 reps;Bilateral  (heel toe raises)   Marching Standing;20 reps  (with hand held assist)   Balance training  fwd/bwd walking and side stepping l/r with hand held assist   Assessment   Prognosis Poor   Problem List Decreased strength;Decreased endurance; Impaired balance;Decreased mobility   Assessment Pt has made improvements with mobility and balance    Gait is steady with RW; initially lost balance when getting up from bed to transfer to commode, but regained with minimal assist ; will benefit from use of RW until strength returns  Cont  PT    The patient's AM-PAC Basic Mobility Inpatient Short Form Raw Score is 18, Standardized Score is 41 05  A standardized score less than 42 9 suggests the patient may benefit from discharge to post-acute rehabilitation services  Please also refer to the recommendation of the Physical Therapist for safe discharge planning  Goals   Patient Goals to get better and go home   Plan   Treatment/Interventions ADL retraining;Functional transfer training;LE strengthening/ROM; Therapeutic exercise; Endurance training;Patient/family training;Equipment eval/education; Bed mobility;Gait training;Spoke to nursing;OT   Progress Progressing toward goals   PT Frequency Once a day   Recommendation   PT Discharge Recommendation Post acute rehabilitation services   Additional Comments Pt will benefit from XU to return pt to her prior level of function  Continues with some balance concerns when not using walker for support  Expect this to improve with PT    AM-PAC Basic Mobility Inpatient   Turning in Bed Without Bedrails 4   Lying on Back to Sitting on Edge of Flat Bed 3   Moving Bed to Chair 3   Standing Up From Chair 3   Walk in Room 3   Climb 3-5 Stairs 2   Basic Mobility Inpatient Raw Score 18   Basic Mobility Standardized Score 07 39   Licensure   [place] License Number  Imani Chaconros Aguirre PT  [id number]

## 2021-06-08 NOTE — [facility] ADMISSION
Referral received for consideration of patient for inpatient acute rehab  Noted that patient continues with close BP/neuro monitoring, and we will continue to follow at this time

## 2021-06-08 NOTE — PROGRESS NOTES
[facility] 43  Progress Note - Antionette Mabel 1945, 66 y o  female MRN: [id number]  Unit/Bed#: ICU 11 Encounter: [id number]  Primary Care Provider: Jean Marie Kaur MD   Date and time admitted to hospital: 6/6/2021  7:43 PM    * Acute ischemic stroke [facility])  Assessment & Plan  Patient has history of CVA on 01/20  Patient presented with dizziness  CT of the head and neck showed 67% stenosis of the right vertebral artery and 60% stenosis of the left vertebral artery with occlusion of the right M2 branch  MRI of the brain showed small focus of diffusion restriction in inferior cerebellar vermis indicative of acute cerebellar lacunar infarction  Small chronic left frontal cortical infarction medially  Hemoglobin A1c 7 2  Continue aspirin, Lipitor and Plavix  Patient was loaded with Plavix 300 mg then continued on 75 milligram daily  2D echo with bubble study showed EF of 55-60% without any regional wall motion abnormalities, grade 1 diastolic dysfunction  Continue PT/OT  Patient will need placement in short-term rehabilitation      Troponin level elevated  Assessment & Plan  Patient had mild elevation of troponin and peak troponin was 0 29  2D echo showed no significant wall motion abnormalities  Outpatient follow-up with Cardiology for stress test    Type 2 diabetes mellitus with stage 3 chronic kidney disease [facility])  Assessment & Plan  Lab Results   Component Value Date    HGBA1C 7 2 (H) 06/07/2021       Recent Labs     06/07/21  1621 06/07/21  2115 06/08/21  1112 06/08/21  1552   POCGLU 243* 198* 284* 325*       Blood Sugar Average: Last 72 hrs:  (P) 230 [id number]   Metformin has been on hold  Continue Humalog sliding scale with Accu-Cheks q a c   And hs    Thyroid nodule greater than or equal to 1 5 cm in diameter incidentally noted on imaging study  Assessment & Plan  Patient will need outpatient thyroid ultrasound    Nodule of upper lobe of right lung  Assessment & Plan  Patient will need outpatient follow-up with repeat CT scan    Iron deficiency anemia  Assessment & Plan  Continue iron supplementation    GERD (gastroesophageal reflux disease)  Assessment & Plan  Continue Protonix    Essential hypertension  Assessment & Plan  Continue lisinopril    History of tobacco abuse  Assessment & Plan  Continue nicotine patch     Hyperlipidemia  Assessment & Plan  Continue Lipitor dose which was increased to 80 milligram p o  Daily  Cholesterol 152 and LDL 72        VTE Pharmacologic Prophylaxis:   Pharmacologic: Enoxaparin (Lovenox)  Mechanical VTE Prophylaxis in Place: Yes    Patient Centered Rounds: I have performed bedside rounds with nursing staff today  Discussions with Specialists or Other Care Team Provider: yes    Education and Discussions with Family / Patient: yes    Time Spent for Care: 45 minutes  More than 50% of total time spent on counseling and coordination of care as described above  Current Length of Stay: 2 day(s)    Current Patient Status: Inpatient   Certification Statement: The patient will continue to require additional inpatient hospital stay due to Acute stroke pending placement    Discharge Plan: STR    Code Status: Level 1 - Full Code      Subjective:   No acute events overnight per RN  Patient denies any chest pain, shortness of breath, weakness or dizziness  Objective:     Vitals:   Temp (24hrs), Av 7 °F (36 5 °C), Min:97 °F (36 1 °C), Max:98 4 °F (36 9 °C)    Temp:  [97 °F (36 1 °C)-98 4 °F (36 9 °C)] 98 4 °F (36 9 °C)  HR:  [58-64] 64  Resp:  [18-20] 20  BP: (103-132)/(49-62) 103/51  SpO2:  [94 %-98 %] 98 %  Body mass index is 24 11 kg/m²  Input and Output Summary (last 24 hours): Intake/Output Summary (Last 24 hours) at 2021 1903  Last data filed at 2021 1801  Gross per 24 hour   Intake 600 ml   Output 1285 ml   Net -685 ml       Physical Exam:     Physical Exam  Constitutional:       General: She is not in acute distress    HENT: Head: Normocephalic and atraumatic  Nose: Nose normal    Eyes:      Conjunctiva/sclera: Conjunctivae normal       Pupils: Pupils are equal, round, and reactive to light  Neck:      Musculoskeletal: Normal range of motion and neck supple  Cardiovascular:      Rate and Rhythm: Normal rate and regular rhythm  Heart sounds: Normal heart sounds  Pulmonary:      Effort: Pulmonary effort is normal  No respiratory distress  Breath sounds: Normal breath sounds  No wheezing or rales  Abdominal:      General: Bowel sounds are normal  There is no distension  Palpations: Abdomen is soft  Tenderness: There is no abdominal tenderness  There is no guarding or rebound  Skin:     General: Skin is warm and dry  Findings: No rash  Neurological:      Mental Status: She is alert  Cranial Nerves: No cranial nerve deficit  Additional Data:     Labs:    Results from last 7 days   Lab Units 06/08/21 0522 06/07/21  0531   WBC Thousand/uL 6 68 7 92   HEMOGLOBIN g/dL 10 6* 11 9   HEMATOCRIT % 32 9* 36 6   PLATELETS Thousands/uL 292 318   NEUTROS PCT %  --  78*   LYMPHS PCT %  --  12*   MONOS PCT %  --  9   EOS PCT %  --  0     Results from last 7 days   Lab Units 06/08/21 0522 06/06/21 1950   POTASSIUM mmol/L 4 2   < > 4 5   CHLORIDE mmol/L 105   < > 104   CO2 mmol/L 30   < > 29   BUN mg/dL 24   < > 26*   CREATININE mg/dL 1 26   < > 1 14   CALCIUM mg/dL 9 4   < > 9 9   ALK PHOS U/L  --   --  89   ALT U/L  --   --  26   AST U/L  --   --  15    < > = values in this interval not displayed  Results from last 7 days   Lab Units 06/06/21  1950   INR  0 89       * I Have Reviewed All Lab Data Listed Above  * Additional Pertinent Lab Tests Reviewed:  [facility] 64 Admission Reviewed    Imaging:    Imaging Reports Reviewed Today Include:  CT of the head and neck, MRI of the brain      Recent Cultures (last 7 days):           Last 24 Hours Medication List:   Current Facility-Administered Medications   Medication Dose Route Frequency Provider Last Rate    aspirin  81 mg Oral Daily Marcos Pittman PA-C      atorvastatin  80 mg Oral QPM Marcos Pittman PA-C      clopidogrel  75 mg Oral Daily Marcos Pittman PA-C      enoxaparin  30 mg Subcutaneous Daily Marcos Pittman PA-C      ferrous sulfate  325 mg Oral Once per day on Mon Wed Fri Maira Body ANTHONY Valerio      insulin lispro  1-5 Units Subcutaneous TID AC Mary Valerio PA-C      insulin lispro  1-5 Units Subcutaneous HS Marcos Pittman PA-C      lisinopril  40 mg Oral HS Mary Valerio PA-C      magnesium oxide  400 mg Oral BID Marcos Pittman PA-C      pantoprazole  40 mg Oral Early Morning Marcos Pittman PA-C          Today, Patient Was Seen By: Raven Griffin MD    ** Please Note: Dictation voice to text software may have been used in the creation of this document   **

## 2021-06-09 ENCOUNTER — APPOINTMENT (INPATIENT)
Dept: NON INVASIVE DIAGNOSTICS | Facility: HOSPITAL | Age: 76
DRG: 066 | End: 2021-06-09
Payer: COMMERCIAL

## 2021-06-09 LAB
GLUCOSE SERPL-MCNC: 181 MG/DL (ref 65–140)
GLUCOSE SERPL-MCNC: 205 MG/DL (ref 65–140)
GLUCOSE SERPL-MCNC: 213 MG/DL (ref 65–140)
GLUCOSE SERPL-MCNC: 330 MG/DL (ref 65–140)

## 2021-06-09 PROCEDURE — 97110 THERAPEUTIC EXERCISES: CPT

## 2021-06-09 PROCEDURE — 97116 GAIT TRAINING THERAPY: CPT

## 2021-06-09 PROCEDURE — 99232 SBSQ HOSP IP/OBS MODERATE 35: CPT | Performed by: INTERNAL MEDICINE

## 2021-06-09 PROCEDURE — 82948 REAGENT STRIP/BLOOD GLUCOSE: CPT

## 2021-06-09 RX ADMIN — INSULIN LISPRO 1 UNITS: 100 INJECTION, SOLUTION INTRAVENOUS; SUBCUTANEOUS at 16:02

## 2021-06-09 RX ADMIN — INSULIN LISPRO 2 UNITS: 100 INJECTION, SOLUTION INTRAVENOUS; SUBCUTANEOUS at 21:23

## 2021-06-09 RX ADMIN — FERROUS SULFATE TAB 325 MG (65 MG ELEMENTAL FE) 325 MG: 325 (65 FE) TAB at 08:03

## 2021-06-09 RX ADMIN — MAGNESIUM OXIDE TAB 400 MG (241.3 MG ELEMENTAL MG) 400 MG: 400 (241.3 MG) TAB at 17:16

## 2021-06-09 RX ADMIN — MAGNESIUM OXIDE TAB 400 MG (241.3 MG ELEMENTAL MG) 400 MG: 400 (241.3 MG) TAB at 08:03

## 2021-06-09 RX ADMIN — ASPIRIN 81 MG CHEWABLE TABLET 81 MG: 81 TABLET CHEWABLE at 08:03

## 2021-06-09 RX ADMIN — CLOPIDOGREL BISULFATE 75 MG: 75 TABLET ORAL at 08:03

## 2021-06-09 RX ADMIN — LISINOPRIL 40 MG: 20 TABLET ORAL at 21:23

## 2021-06-09 RX ADMIN — INSULIN LISPRO 1 UNITS: 100 INJECTION, SOLUTION INTRAVENOUS; SUBCUTANEOUS at 06:32

## 2021-06-09 RX ADMIN — ATORVASTATIN CALCIUM 80 MG: 80 TABLET ORAL at 17:16

## 2021-06-09 RX ADMIN — INSULIN LISPRO 4 UNITS: 100 INJECTION, SOLUTION INTRAVENOUS; SUBCUTANEOUS at 11:18

## 2021-06-09 RX ADMIN — ENOXAPARIN SODIUM 30 MG: 30 INJECTION SUBCUTANEOUS at 08:02

## 2021-06-09 RX ADMIN — PANTOPRAZOLE SODIUM 40 MG: 40 TABLET, DELAYED RELEASE ORAL at 05:37

## 2021-06-09 NOTE — QUICK NOTE
Initial NIH at time of stroke    NIHSS:  1a Level of Consciousness: 0 = Alert   1b  LOC Questions: 0 = Answers both correctly   1c  LOC Commands: 0 = Obeys both correctly   2  Best Gaze: 0 = Normal   3  Visual: 0 = No visual field loss   4  Facial Palsy: 0=Normal symmetric movement   5a  Motor Right Arm: 0=No drift, limb holds 90 (or 45) degrees for full 10 seconds   5b  Motor Left Arm: 0=No drift, limb holds 90 (or 45) degrees for full 10 seconds   6a  Motor Right Le=No drift, limb holds 90 (or 45) degrees for full 10 seconds   6b  Motor Left Le=No drift, limb holds 90 (or 45) degrees for full 10 seconds   7  Limb Ataxia:  0=Absent   8  Sensory: 0=Normal; no sensory loss   9  Best Language:  0=No aphasia, normal   10  Dysarthria: 0=Normal articulation   11  Extinction and Inattention (formerly Neglect): 0=No abnormality   Total Score: 0       NIH=0 for this stroke  Pt does have some mild weakness to RUE from previous stroke, however, no drift on pronation

## 2021-06-09 NOTE — CASE MANAGEMENT
SW advised by [facility] liaison that pt has been clinically accepted  Will need prior authorization  Per attending, pt to have bubble study today  Pending results pt will be medically cleared later today  ARC to submit for auth this afternoon  Patient made aware and agreeable to plan  Attending and unit nurse notified

## 2021-06-09 NOTE — ASSESSMENT & PLAN NOTE
Patient has history of CVA on 01/20  Patient presented with dizziness  CT of the head and neck showed 67% stenosis of the right vertebral artery and 60% stenosis of the left vertebral artery with occlusion of the right M2 branch  MRI of the brain showed small focus of diffusion restriction in inferior cerebellar vermis indicative of acute cerebellar lacunar infarction  Small chronic left frontal cortical infarction medially  Hemoglobin A1c 7 2  Continue aspirin, Lipitor and Plavix  Patient was loaded with Plavix 300 mg then continued on 75 milligram daily  2D echo showed EF of 55-60% without any regional wall motion abnormalities, grade 1 diastolic dysfunction  2D echo was without bubble study  Neurology discussed with Cardiology who reported that her no evidence of PFO or right-to-left shunt on the prior exam  Continue PT/OT  Pending placement in short-term rehabilitation

## 2021-06-09 NOTE — PHYSICAL THERAPY NOTE
PT TREATMENT     06/09/21 1445   PT Last Visit   PT Visit Date 06/09/21   Note Type   Note Type Treatment   Pain Assessment   Pain Assessment Tool Pain Assessment not indicated - pt denies pain   Restrictions/Precautions   Other Precautions Fall Risk;Bed Alarm; Chair Alarm   General   Chart Reviewed Yes   Subjective   Subjective It feels good to get up and move   Bed Mobility   Supine to Sit 5  Supervision   Transfers   Sit to Stand 4  Minimal assistance   Additional items Assist x 1;Verbal cues   Stand to Sit 4  Minimal assistance   Additional items Assist x 1;Verbal cues   Ambulation/Elevation   Gait pattern   (Min to mod unsteadiness without the RW, leans to left side)   Gait Assistance 4  Minimal assist   Additional items Assist x 1;Verbal cues; Tactile cues   Assistive Device Rolling walker;None   Distance 75 ft x 4 reps with Min assist/S with a RW with brief rest between each repetition; 75 ft x 2 reps with minimal handhold assist   Balance   Static Sitting Fair +   Static Standing Fair   Dynamic Standing Fair -   Ambulatory Fair -   Activity Tolerance   Activity Tolerance Patient tolerated treatment well;Patient limited by fatigue   Exercises   Hip Flexion 20 reps;Bilateral;Sitting  (Alternating)   Knee AROM Long Arc Quad 20 reps;Bilateral;Sitting  (Alternating)   Ankle Pumps 20 reps;Bilateral;Sitting  (Alternating)   Assessment   Assessment Pt is making steady progress with bed mobility, transfers, gait with and without the RW, gait endurance and balance  Pt with good tolerance to BLE exercises but is noted with mild incoordination with alternating the exercises  Pt will benefit from skilled physical therapy services to return her to her prior level of function and will benefit from rehab at discharge  Pt is noted with good motivation and participation in PT session, and is motivated to improve daily       The patient's AM-PAC Basic Mobility Inpatient Short Form Raw Score is 18, Standardized Score is 41 05  A standardized score less than 42 9 suggests the patient may benefit from discharge to post-acute rehabilitation services  Please also refer to the recommendation of the Physical Therapist for safe discharge planning  Plan   Treatment/Interventions ADL retraining;Functional transfer training;LE strengthening/ROM; Elevations; Therapeutic exercise; Endurance training;Patient/family training;Equipment eval/education; Bed mobility;Gait training; Compensatory technique education   PT Frequency Once a day   Recommendation   PT Discharge Recommendation Post acute rehabilitation services   AM-PAC Basic Mobility Inpatient   Turning in Bed Without Bedrails 3   Lying on Back to Sitting on Edge of Flat Bed 3   Moving Bed to Chair 3   Standing Up From Chair 3   Walk in Room 3   Climb 3-5 Stairs 3   Basic Mobility Inpatient Raw Score 18   Basic Mobility Standardized Score 89 77   Licensure   [place] License Number  Yovany Mcnulty PT [id number]

## 2021-06-09 NOTE — PROGRESS NOTES
Horacio 43  Progress Note - Barryolyn Males 1945, 66 y o  female MRN: [id number]  Unit/Bed#: ICU 11 Encounter: [id number]  Primary Care Provider: Bhupinder Machado MD   Date and time admitted to hospital: 6/6/2021  7:43 PM    * Acute ischemic stroke [facility])  Assessment & Plan  Patient has history of CVA on 01/20  Patient presented with dizziness  CT of the head and neck showed 67% stenosis of the right vertebral artery and 60% stenosis of the left vertebral artery with occlusion of the right M2 branch  MRI of the brain showed small focus of diffusion restriction in inferior cerebellar vermis indicative of acute cerebellar lacunar infarction  Small chronic left frontal cortical infarction medially  Hemoglobin A1c 7 2  Continue aspirin, Lipitor and Plavix  Patient was loaded with Plavix 300 mg then continued on 75 milligram daily  2D echo showed EF of 55-60% without any regional wall motion abnormalities, grade 1 diastolic dysfunction  2D echo was without bubble study  Neurology discussed with Cardiology who reported that her no evidence of PFO or right-to-left shunt on the prior exam  Continue PT/OT  Pending placement in short-term rehabilitation  Troponin level elevated  Assessment & Plan  Patient had mild elevation of troponin and peak troponin was 0 29  2D echo showed no significant wall motion abnormalities  Outpatient follow-up with Cardiology for stress test    Type 2 diabetes mellitus with stage 3 chronic kidney disease [facility])  Assessment & Plan  Lab Results   Component Value Date    HGBA1C 7 2 (H) 06/07/2021       Recent Labs     06/08/21  1552 06/08/21  2038 06/09/21  0611 06/09/21  1108   POCGLU 325* 255* 181* 330*       Blood Sugar Average: Last 72 hrs:  (P) 238 1   Metformin has been on hold  Continue Humalog sliding scale with Accu-Cheks q a c   And hs    Thyroid nodule greater than or equal to 1 5 cm in diameter incidentally noted on imaging study  Assessment & Plan  Patient will need outpatient thyroid ultrasound    Nodule of upper lobe of right lung  Assessment & Plan  Patient will need outpatient follow-up with repeat CT scan    Iron deficiency anemia  Assessment & Plan  Continue iron supplementation    GERD (gastroesophageal reflux disease)  Assessment & Plan  Continue Protonix    Essential hypertension  Assessment & Plan  Continue lisinopril    History of tobacco abuse  Assessment & Plan  Continue nicotine patch     Hyperlipidemia  Assessment & Plan  Continue Lipitor dose which was increased to 80 milligram p o  Daily  Cholesterol 152 and LDL 72        VTE Pharmacologic Prophylaxis:   Pharmacologic: Enoxaparin (Lovenox)  Mechanical VTE Prophylaxis in Place: Yes    Patient Centered Rounds: I have performed bedside rounds with nursing staff today  Discussions with Specialists or Other Care Team Provider: Yes Faraz Valle    Education and Discussions with Family / Patient: yes    Time Spent for Care: 45 minutes  More than 50% of total time spent on counseling and coordination of care as described above  Current Length of Stay: 3 day(s)    Current Patient Status: Inpatient   Certification Statement: The patient will continue to require additional inpatient hospital stay due to Acute stroke pending placement    Discharge Plan:  Acute rehabilitation    Code Status: Level 1 - Full Code      Subjective:   Patient is feeling better  Is able to do better with physical therapy  Patient denies any tingling or numbness, chest pain or shortness of breath  Objective:     Vitals:   Temp (24hrs), Av 9 °F (36 1 °C), Min:96 1 °F (35 6 °C), Max:97 5 °F (36 4 °C)    Temp:  [96 1 °F (35 6 °C)-97 5 °F (36 4 °C)] 97 °F (36 1 °C)  HR:  [55-67] 67  Resp:  [18-35] 18  BP: (113-128)/(53-75) 128/60  SpO2:  [93 %-96 %] 96 %  Body mass index is 24 8 kg/m²  Input and Output Summary (last 24 hours):        Intake/Output Summary (Last 24 hours) at 2021 0429  Last data filed at 6/9/2021 0801  Gross per 24 hour   Intake 360 ml   Output --   Net 360 ml       Physical Exam:     Physical Exam  Constitutional:       General: She is not in acute distress  HENT:      Head: Normocephalic and atraumatic  Nose: Nose normal    Eyes:      Conjunctiva/sclera: Conjunctivae normal       Pupils: Pupils are equal, round, and reactive to light  Neck:      Musculoskeletal: Normal range of motion and neck supple  Cardiovascular:      Rate and Rhythm: Normal rate and regular rhythm  Heart sounds: Normal heart sounds  Pulmonary:      Effort: Pulmonary effort is normal  No respiratory distress  Breath sounds: Normal breath sounds  No wheezing or rales  Abdominal:      General: Bowel sounds are normal  There is no distension  Palpations: Abdomen is soft  Tenderness: There is no abdominal tenderness  There is no guarding or rebound  Skin:     General: Skin is warm and dry  Findings: No rash  Neurological:      Mental Status: She is alert  Cranial Nerves: No cranial nerve deficit  Additional Data:     Labs:    Results from last 7 days   Lab Units 06/08/21  0522 06/07/21  0531   WBC Thousand/uL 6 68 7 92   HEMOGLOBIN g/dL 10 6* 11 9   HEMATOCRIT % 32 9* 36 6   PLATELETS Thousands/uL 292 318   NEUTROS PCT %  --  78*   LYMPHS PCT %  --  12*   MONOS PCT %  --  9   EOS PCT %  --  0     Results from last 7 days   Lab Units 06/08/21 0522 06/06/21 1950   POTASSIUM mmol/L 4 2   < > 4 5   CHLORIDE mmol/L 105   < > 104   CO2 mmol/L 30   < > 29   BUN mg/dL 24   < > 26*   CREATININE mg/dL 1 26   < > 1 14   CALCIUM mg/dL 9 4   < > 9 9   ALK PHOS U/L  --   --  89   ALT U/L  --   --  26   AST U/L  --   --  15    < > = values in this interval not displayed  Results from last 7 days   Lab Units 06/06/21  1950   INR  0 89       * I Have Reviewed All Lab Data Listed Above  * Additional Pertinent Lab Tests Reviewed:  Moise 64 Admission Reviewed        Recent Cultures (last 7 days):           Last 24 Hours Medication List:   Current Facility-Administered Medications   Medication Dose Route Frequency Provider Last Rate    aspirin  81 mg Oral Daily Fabio Montana PA-C      atorvastatin  80 mg Oral QPM Fabio Montana PA-C      clopidogrel  75 mg Oral Daily Fabio Montana PA-C      enoxaparin  30 mg Subcutaneous Daily Fabio Montana PA-C      ferrous sulfate  325 mg Oral Once per day on Mon Wed Fri Elke Valerio PA-C      insulin lispro  1-5 Units Subcutaneous TID AC Mary Valerio PA-C      insulin lispro  1-5 Units Subcutaneous HS Fabio Montana PA-C      lisinopril  40 mg Oral HS Mary Valerio PA-C      magnesium oxide  400 mg Oral BID Fabio Montana PA-C      pantoprazole  40 mg Oral Early Morning Fabio Montana PA-C          Today, Patient Was Seen By: Chelo Benitez MD    ** Please Note: Dictation voice to text software may have been used in the creation of this document   **

## 2021-06-09 NOTE — QUICK NOTE
Echo completed without shunt study  Reviewed with Cardiology, noted in comparison no significant changes from prior study in January of 2020    Per Cardiology, no shunt study was necessary as pt had no evidence of PFO or Rt to Left shunt on prior exam

## 2021-06-09 NOTE — ASSESSMENT & PLAN NOTE
Lab Results   Component Value Date    HGBA1C 7 2 (H) 06/07/2021       Recent Labs     06/08/21  1552 06/08/21 2038 06/09/21  0611 06/09/21  1108   POCGLU 325* 255* 181* 330*       Blood Sugar Average: Last 72 hrs:  (P) 238 1   Metformin has been on hold  Continue Humalog sliding scale with Accu-Cheks q a c   And hs

## 2021-06-09 NOTE — ARC ADMISSION
Patient's case was reviewed with [facility] physician, and patient is approved for inpatient acute rehab pending bubble study results and insurance authorization  Auth process will be initiated this afternoon  CM has been updated  Insurance authorization has been initiated, with pending ref #: [id number], and we await their determination at this time

## 2021-06-10 LAB
GLUCOSE SERPL-MCNC: 186 MG/DL (ref 65–140)
GLUCOSE SERPL-MCNC: 213 MG/DL (ref 65–140)
GLUCOSE SERPL-MCNC: 321 MG/DL (ref 65–140)
GLUCOSE SERPL-MCNC: 361 MG/DL (ref 65–140)

## 2021-06-10 PROCEDURE — 82948 REAGENT STRIP/BLOOD GLUCOSE: CPT

## 2021-06-10 PROCEDURE — 97535 SELF CARE MNGMENT TRAINING: CPT

## 2021-06-10 PROCEDURE — 97110 THERAPEUTIC EXERCISES: CPT

## 2021-06-10 PROCEDURE — 99232 SBSQ HOSP IP/OBS MODERATE 35: CPT | Performed by: INTERNAL MEDICINE

## 2021-06-10 RX ORDER — POLYETHYLENE GLYCOL 3350 17 G/17G
17 POWDER, FOR SOLUTION ORAL DAILY PRN
Status: DISCONTINUED | OUTPATIENT
Start: 2021-06-10 | End: 2021-06-11 | Stop reason: HOSPADM

## 2021-06-10 RX ADMIN — INSULIN LISPRO 2 UNITS: 100 INJECTION, SOLUTION INTRAVENOUS; SUBCUTANEOUS at 16:53

## 2021-06-10 RX ADMIN — INSULIN LISPRO 4 UNITS: 100 INJECTION, SOLUTION INTRAVENOUS; SUBCUTANEOUS at 12:06

## 2021-06-10 RX ADMIN — ENOXAPARIN SODIUM 30 MG: 30 INJECTION SUBCUTANEOUS at 08:43

## 2021-06-10 RX ADMIN — ASPIRIN 81 MG CHEWABLE TABLET 81 MG: 81 TABLET CHEWABLE at 08:43

## 2021-06-10 RX ADMIN — POLYETHYLENE GLYCOL 3350 17 G: 17 POWDER, FOR SOLUTION ORAL at 22:47

## 2021-06-10 RX ADMIN — LISINOPRIL 40 MG: 20 TABLET ORAL at 21:51

## 2021-06-10 RX ADMIN — MAGNESIUM OXIDE TAB 400 MG (241.3 MG ELEMENTAL MG) 400 MG: 400 (241.3 MG) TAB at 16:54

## 2021-06-10 RX ADMIN — INSULIN LISPRO 1 UNITS: 100 INJECTION, SOLUTION INTRAVENOUS; SUBCUTANEOUS at 08:44

## 2021-06-10 RX ADMIN — MAGNESIUM OXIDE TAB 400 MG (241.3 MG ELEMENTAL MG) 400 MG: 400 (241.3 MG) TAB at 08:43

## 2021-06-10 RX ADMIN — PANTOPRAZOLE SODIUM 40 MG: 40 TABLET, DELAYED RELEASE ORAL at 05:35

## 2021-06-10 RX ADMIN — CLOPIDOGREL BISULFATE 75 MG: 75 TABLET ORAL at 08:43

## 2021-06-10 RX ADMIN — ATORVASTATIN CALCIUM 80 MG: 80 TABLET ORAL at 16:54

## 2021-06-10 RX ADMIN — INSULIN LISPRO 3 UNITS: 100 INJECTION, SOLUTION INTRAVENOUS; SUBCUTANEOUS at 21:51

## 2021-06-10 NOTE — PROGRESS NOTES
Horacio 43  Progress Note - Alexis Adame 1945, 64 y o  female MRN: [id number]  Unit/Bed#: [address] [place] [address] Encounter: [id number]  Primary Care Provider: Phan Sheikh MD   Date and time admitted to hospital: 6/6/2021  7:43 PM    * Acute ischemic stroke [facility])  Assessment & Plan  Patient has history of CVA on 01/20  Patient presented with dizziness  CT of the head and neck showed 67% stenosis of the right vertebral artery and 60% stenosis of the left vertebral artery with occlusion of the right M2 branch  MRI of the brain showed small focus of diffusion restriction in inferior cerebellar vermis indicative of acute cerebellar lacunar infarction  Small chronic left frontal cortical infarction medially  Hemoglobin A1c 7 2  Continue aspirin, Lipitor and Plavix  Patient was loaded with Plavix 300 mg then continued on 75 milligram daily  2D echo showed EF of 55-60% without any regional wall motion abnormalities, grade 1 diastolic dysfunction  2D echo was without bubble study  Neurology discussed with Cardiology who reported that there is no evidence of PFO or right-to-left shunt on the prior exam  Continue PT/OT  Pending placement in short-term rehabilitation  Troponin level elevated  Assessment & Plan  Patient had mild elevation of troponin and peak troponin was 0 29  2D echo showed no significant wall motion abnormalities  Outpatient follow-up with Cardiology for stress test-discussed with son at bedside in detail    Type 2 diabetes mellitus with stage 3 chronic kidney disease [facility])  Assessment & Plan  Lab Results   Component Value Date    HGBA1C 7 2 (H) 06/07/2021       Recent Labs     06/09/21  2012 06/10/21  0739 06/10/21  1114 06/10/21  1610   POCGLU 213* 186* 361* 213*       Blood Sugar Average: Last 72 hrs:  (P) 241 [id number]   Metformin has been on hold  Continue Humalog sliding scale with Accu-Cheks q a c   And hs    Thyroid nodule greater than or equal to 1 5 cm in diameter incidentally noted on imaging study  Assessment & Plan  Patient will need outpatient thyroid ultrasound    Nodule of upper lobe of right lung  Assessment & Plan  Patient will need outpatient follow-up with repeat CT scan    Iron deficiency anemia  Assessment & Plan  Continue iron supplementation    GERD (gastroesophageal reflux disease)  Assessment & Plan  Continue Protonix    Essential hypertension  Assessment & Plan  Continue lisinopril    History of tobacco abuse  Assessment & Plan  Continue nicotine patch     Hyperlipidemia  Assessment & Plan  Continue Lipitor dose which was increased to 80 milligram p o  Daily  Cholesterol 152 and LDL 72        VTE Pharmacologic Prophylaxis:   Pharmacologic: Enoxaparin (Lovenox)  Mechanical VTE Prophylaxis in Place: Yes    Patient Centered Rounds: I have performed bedside rounds with nursing staff today  Discussions with Specialists or Other Care Team Provider: No    Education and Discussions with Family / Patient:  Discussed with son Shaina Gaona at bedside in detail    Time Spent for Care: 45 minutes  More than 50% of total time spent on counseling and coordination of care as described above  Current Length of Stay: 4 day(s)    Current Patient Status: Inpatient   Certification Statement: The patient will continue to require additional inpatient hospital stay due to Pending placement    Discharge Plan:  Short-term rehabilitation    Code Status: Level 1 - Full Code      Subjective:   Patient is feeling much better  Is able to ambulate with help of walker by herself without any support    Objective:     Vitals:   Temp (24hrs), Av 9 °F (36 6 °C), Min:97 7 °F (36 5 °C), Max:98 2 °F (36 8 °C)    Temp:  [97 7 °F (36 5 °C)-98 2 °F (36 8 °C)] 97 7 °F (36 5 °C)  HR:  [54-67] 62  Resp:  [16-19] 19  BP: (112-126)/(40-64) 121/51  SpO2:  [92 %-96 %] 96 %  Body mass index is 25 14 kg/m²  Input and Output Summary (last 24 hours):        Intake/Output Summary (Last 24 hours) at 6/10/2021 1710  Last data filed at 6/10/2021 0840  Gross per 24 hour   Intake 250 ml   Output --   Net 250 ml       Physical Exam:     Physical Exam  Constitutional:       General: She is not in acute distress  HENT:      Head: Normocephalic and atraumatic  Nose: Nose normal    Eyes:      Conjunctiva/sclera: Conjunctivae normal       Pupils: Pupils are equal, round, and reactive to light  Neck:      Musculoskeletal: Normal range of motion and neck supple  Cardiovascular:      Rate and Rhythm: Normal rate and regular rhythm  Heart sounds: Normal heart sounds  Pulmonary:      Effort: Pulmonary effort is normal  No respiratory distress  Breath sounds: Normal breath sounds  No wheezing or rales  Abdominal:      General: Bowel sounds are normal  There is no distension  Palpations: Abdomen is soft  Tenderness: There is no abdominal tenderness  There is no guarding or rebound  Skin:     General: Skin is warm and dry  Findings: No rash  Neurological:      Mental Status: She is alert  Cranial Nerves: No cranial nerve deficit  Additional Data:     Labs:    Results from last 7 days   Lab Units 06/08/21  0522 06/07/21  0531   WBC Thousand/uL 6 68 7 92   HEMOGLOBIN g/dL 10 6* 11 9   HEMATOCRIT % 32 9* 36 6   PLATELETS Thousands/uL 292 318   NEUTROS PCT %  --  78*   LYMPHS PCT %  --  12*   MONOS PCT %  --  9   EOS PCT %  --  0     Results from last 7 days   Lab Units 06/08/21  0522  06/06/21  1950   POTASSIUM mmol/L 4 2   < > 4 5   CHLORIDE mmol/L 105   < > 104   CO2 mmol/L 30   < > 29   BUN mg/dL 24   < > 26*   CREATININE mg/dL 1 26   < > 1 14   CALCIUM mg/dL 9 4   < > 9 9   ALK PHOS U/L  --   --  89   ALT U/L  --   --  26   AST U/L  --   --  15    < > = values in this interval not displayed  Results from last 7 days   Lab Units 06/06/21  1950   INR  0 89       * I Have Reviewed All Lab Data Listed Above  * Additional Pertinent Lab Tests Reviewed:  All Labs For Current Hospital Admission Reviewed        Recent Cultures (last 7 days):           Last 24 Hours Medication List:   Current Facility-Administered Medications   Medication Dose Route Frequency Provider Last Rate    aspirin  81 mg Oral Daily Xander Ramos PA-C      atorvastatin  80 mg Oral QPM Xander Ramos PA-C      clopidogrel  75 mg Oral Daily Xander Ramos PA-C      enoxaparin  30 mg Subcutaneous Daily Xander Ramos PA-C      ferrous sulfate  325 mg Oral Once per day on Mon Wed Fri [place] Stephanie Valerio PA-C      insulin lispro  1-5 Units Subcutaneous TID AC Mary Valerio PA-C      insulin lispro  1-5 Units Subcutaneous HS Xander Ramos PA-C      lisinopril  40 mg Oral HS Mary Valerio PA-C      magnesium oxide  400 mg Oral BID Xander Ramos PA-C      pantoprazole  40 mg Oral Early Morning Xander Ramos PA-C          Today, Patient Was Seen By: Betty Mays MD    ** Please Note: Dictation voice to text software may have been used in the creation of this document   **

## 2021-06-10 NOTE — CASE MANAGEMENT
PRATIMA sent message to Angie Quintana via [facility] for insurance auth update  Facility has not received auth yet  Will continue to follow

## 2021-06-10 NOTE — ARC ADMISSION
ARC liaison received phone call from patient's insurance company stating patient has been denied by medical director for inpatient acute rehab, as patient does not require ARC level of care  Peer to peer has been offered to be completed until 12pm tomorrow, 6/11, by attending physician calling [phone] with ref #: [id number]  CM has been updated

## 2021-06-10 NOTE — CASE MANAGEMENT
PRATIMA spoke with Daniella Torres at [facility] who states insurance denied acute rehab as patient does not meet acute level of care  Patient was accepted at [facility]  Son is agreeable to this placement as it was family second choice if denied acute  PRATIMA spoke with Emi Phoenix at [facility] who spoke with son and bed is available pending new auth       PRATIMA initiated a new authorization request  Pending auth number- [id number]    Clinicals faxed to [phone]

## 2021-06-10 NOTE — PHYSICAL THERAPY NOTE
PT TREATMENT     06/10/21 1455   Note Type   Note Type Treatment   Pain Assessment   Pain Assessment Tool Pain Assessment not indicated - pt denies pain   Restrictions/Precautions   Other Precautions Fall Risk   General   Chart Reviewed Yes   Subjective   Subjective "feeling better everyday"   Transfers   Sit to Stand 5  Supervision   Stand to Sit 5  Supervision   Stand pivot 5  Supervision   Ambulation/Elevation   Gait pattern   (unsteady veering L and R)   Gait Assistance 4  Minimal assist   Additional items   (cues for steering walker away from furniture )   Assistive Device Rolling walker   Distance 3x75 feet   Balance   Static Sitting Good   Dynamic Sitting Fair +   Static Standing Fair +   Dynamic Standing Fair   Ambulatory Fair -   Exercises   Neuro re-ed x 10 each standing without UE support with occasional min assist and seated rest breaks:  heel raises, marching, head turns R/L, up/down, lateral and A/P weight shifts  Pt stood with feet close, eyes closed x 20 seconds with minimal sway/no balance loss   Assessment   Prognosis Good   Problem List Decreased endurance;Decreased strength; Impaired balance;Decreased mobility   Assessment Pt demonstrates daily improvements in balance, coordination, function, and gait s/p cerebellar CVA  Pt is highly motivated and should continue to benefit from skilled PT  The patient's [facility] Basic Mobility Inpatient Short Form Raw Score is 20, Standardized Score is 43 99  A standardized score greater than 42 9 suggests the patient may benefit from discharge to home  Plan   Treatment/Interventions ADL retraining;Functional transfer training;LE strengthening/ROM; Elevations; Therapeutic exercise;Gait training;Bed mobility; Equipment eval/education;Patient/family training   Progress Progressing toward goals   Recommendation   PT Discharge Recommendation Post acute rehabilitation services   [facility] Basic Mobility Inpatient   Turning in Bed Without Bedrails 4   Lying on Back to Sitting on Edge of Flat Bed 4   Moving Bed to Chair 3   Standing Up From Chair 3   Walk in Room 3   Climb 3-5 Stairs 3   Basic Mobility Inpatient Raw Score 20   Basic Mobility Standardized Score 45 48   Licensure   [place] License Number  Ariel ETIENNE [id number]

## 2021-06-10 NOTE — ASSESSMENT & PLAN NOTE
Patient has history of CVA on 01/20  Patient presented with dizziness  CT of the head and neck showed 67% stenosis of the right vertebral artery and 60% stenosis of the left vertebral artery with occlusion of the right M2 branch  MRI of the brain showed small focus of diffusion restriction in inferior cerebellar vermis indicative of acute cerebellar lacunar infarction  Small chronic left frontal cortical infarction medially  Hemoglobin A1c 7 2  Continue aspirin, Lipitor and Plavix  Patient was loaded with Plavix 300 mg then continued on 75 milligram daily  2D echo showed EF of 55-60% without any regional wall motion abnormalities, grade 1 diastolic dysfunction  2D echo was without bubble study  Neurology discussed with Cardiology who reported that there is no evidence of PFO or right-to-left shunt on the prior exam  Continue PT/OT  Pending placement in short-term rehabilitation

## 2021-06-10 NOTE — ASSESSMENT & PLAN NOTE
Lab Results   Component Value Date    HGBA1C 7 2 (H) 06/07/2021       Recent Labs     06/09/21  2012 06/10/21  0739 06/10/21  1114 06/10/21  1610   POCGLU 213* 186* 361* 213*       Blood Sugar Average: Last 72 hrs:  (P) 241 [id number]   Metformin has been on hold  Continue Humalog sliding scale with Accu-Cheks q a c   And hs

## 2021-06-10 NOTE — ASSESSMENT & PLAN NOTE
Patient had mild elevation of troponin and peak troponin was 0 29  2D echo showed no significant wall motion abnormalities  Outpatient follow-up with Cardiology for stress test-discussed with son at bedside in detail

## 2021-06-10 NOTE — CASE MANAGEMENT
[facility] TCF's Admissions Coordinator had a telephone conversation with patient's son  Information on TCF services,  average length of stay, mandatory quarantine, visitation, and units current Covid status provided  Per patient's son, patient has not had the Covid vaccine

## 2021-06-10 NOTE — PLAN OF CARE
Problem: OCCUPATIONAL THERAPY ADULT  Goal: Performs self-care activities at highest level of function for planned discharge setting  See evaluation for individualized goals  Description:   Outcome: Progressing  Note: Limitation: Decreased ADL status, Decreased UE strength, Decreased Safe judgement during ADL, Decreased endurance, Decreased high-level ADLs, Decreased self-care trans(decreased balance and mobility )  Prognosis: Good  Assessment: Patient is motivated and cooperative  Patient is demonstrating progress towards OT goals  Patient with increased endurance to activiy today  Patient is improving with transfers, balance and ADLS  Patient will benefit from continued OT services to maximize functional perforamance with ADLS and IADLS  Patient did 4 loads of laundry in the basement prior to [address]         OT Discharge Recommendation: Post acute rehabilitation services

## 2021-06-11 ENCOUNTER — HOSPITAL ENCOUNTER (INPATIENT)
Facility: HOSPITAL | Age: 76
LOS: 7 days | Discharge: HOME | DRG: 057 | End: 2021-06-18
Attending: INTERNAL MEDICINE | Admitting: INTERNAL MEDICINE
Payer: COMMERCIAL

## 2021-06-11 VITALS
DIASTOLIC BLOOD PRESSURE: 43 MMHG | WEIGHT: 130.29 LBS | RESPIRATION RATE: 16 BRPM | TEMPERATURE: 97.3 F | HEIGHT: 60 IN | SYSTOLIC BLOOD PRESSURE: 126 MMHG | BODY MASS INDEX: 25.58 KG/M2 | OXYGEN SATURATION: 95 % | HEART RATE: 58 BPM

## 2021-06-11 DIAGNOSIS — I63.9 CEREBROVASCULAR ACCIDENT (CVA), UNSPECIFIED MECHANISM (HCC): Primary | ICD-10-CM

## 2021-06-11 LAB
GLUCOSE SERPL-MCNC: 217 MG/DL (ref 65–140)
GLUCOSE SERPL-MCNC: 224 MG/DL (ref 65–140)
GLUCOSE SERPL-MCNC: 311 MG/DL (ref 65–140)
GLUCOSE SERPL-MCNC: 325 MG/DL (ref 65–140)

## 2021-06-11 PROCEDURE — 97530 THERAPEUTIC ACTIVITIES: CPT

## 2021-06-11 PROCEDURE — 82948 REAGENT STRIP/BLOOD GLUCOSE: CPT

## 2021-06-11 PROCEDURE — 97110 THERAPEUTIC EXERCISES: CPT

## 2021-06-11 PROCEDURE — 99239 HOSP IP/OBS DSCHRG MGMT >30: CPT | Performed by: INTERNAL MEDICINE

## 2021-06-11 RX ORDER — CLOPIDOGREL BISULFATE 75 MG/1
75 TABLET ORAL DAILY
Status: DISCONTINUED | OUTPATIENT
Start: 2021-06-12 | End: 2021-06-18 | Stop reason: HOSPADM

## 2021-06-11 RX ORDER — POLYETHYLENE GLYCOL 3350 17 G/17G
17 POWDER, FOR SOLUTION ORAL DAILY PRN
Status: DISCONTINUED | OUTPATIENT
Start: 2021-06-11 | End: 2021-06-18 | Stop reason: HOSPADM

## 2021-06-11 RX ORDER — ASPIRIN 81 MG/1
81 TABLET, CHEWABLE ORAL DAILY
Status: DISCONTINUED | OUTPATIENT
Start: 2021-06-12 | End: 2021-06-13

## 2021-06-11 RX ORDER — LISINOPRIL 20 MG/1
40 TABLET ORAL
Status: DISCONTINUED | OUTPATIENT
Start: 2021-06-11 | End: 2021-06-18 | Stop reason: HOSPADM

## 2021-06-11 RX ORDER — FERROUS SULFATE 325(65) MG
325 TABLET ORAL 3 TIMES WEEKLY
Status: DISCONTINUED | OUTPATIENT
Start: 2021-06-14 | End: 2021-06-18 | Stop reason: HOSPADM

## 2021-06-11 RX ORDER — PANTOPRAZOLE SODIUM 40 MG/1
40 TABLET, DELAYED RELEASE ORAL
Status: DISCONTINUED | OUTPATIENT
Start: 2021-06-12 | End: 2021-06-18 | Stop reason: HOSPADM

## 2021-06-11 RX ORDER — ASPIRIN 81 MG/1
81 TABLET, CHEWABLE ORAL DAILY
Refills: 0
Start: 2021-06-12 | End: 2021-06-18 | Stop reason: HOSPADM

## 2021-06-11 RX ORDER — ATORVASTATIN CALCIUM 80 MG/1
80 TABLET, FILM COATED ORAL EVERY EVENING
Refills: 0
Start: 2021-06-11 | End: 2021-07-13 | Stop reason: SDUPTHER

## 2021-06-11 RX ORDER — ATORVASTATIN CALCIUM 80 MG/1
80 TABLET, FILM COATED ORAL EVERY EVENING
Status: DISCONTINUED | OUTPATIENT
Start: 2021-06-11 | End: 2021-06-18 | Stop reason: HOSPADM

## 2021-06-11 RX ADMIN — MAGNESIUM OXIDE TAB 400 MG (241.3 MG ELEMENTAL MG) 400 MG: 400 (241.3 MG) TAB at 18:07

## 2021-06-11 RX ADMIN — ASPIRIN 81 MG CHEWABLE TABLET 81 MG: 81 TABLET CHEWABLE at 09:17

## 2021-06-11 RX ADMIN — ATORVASTATIN CALCIUM 80 MG: 80 TABLET, FILM COATED ORAL at 18:07

## 2021-06-11 RX ADMIN — INSULIN LISPRO 2 UNITS: 100 INJECTION, SOLUTION INTRAVENOUS; SUBCUTANEOUS at 08:10

## 2021-06-11 RX ADMIN — CLOPIDOGREL BISULFATE 75 MG: 75 TABLET ORAL at 09:18

## 2021-06-11 RX ADMIN — PANTOPRAZOLE SODIUM 40 MG: 40 TABLET, DELAYED RELEASE ORAL at 05:20

## 2021-06-11 RX ADMIN — INSULIN LISPRO 3 UNITS: 100 INJECTION, SOLUTION INTRAVENOUS; SUBCUTANEOUS at 11:33

## 2021-06-11 RX ADMIN — INSULIN LISPRO 3 UNITS: 100 INJECTION, SOLUTION INTRAVENOUS; SUBCUTANEOUS at 22:11

## 2021-06-11 RX ADMIN — ENOXAPARIN SODIUM 30 MG: 30 INJECTION SUBCUTANEOUS at 09:17

## 2021-06-11 RX ADMIN — FERROUS SULFATE TAB 325 MG (65 MG ELEMENTAL FE) 325 MG: 325 (65 FE) TAB at 09:17

## 2021-06-11 RX ADMIN — MAGNESIUM OXIDE TAB 400 MG (241.3 MG ELEMENTAL MG) 400 MG: 400 (241.3 MG) TAB at 09:17

## 2021-06-11 NOTE — PLAN OF CARE
Problem: Potential for Falls  Goal: Patient will remain free of falls  Description: INTERVENTIONS:  - Assess patient frequently for physical needs  -  Identify cognitive and physical deficits and behaviors that affect risk of falls  -  [place] fall precautions as indicated by assessment   - Educate patient/family on patient safety including physical limitations  - Instruct patient to call for assistance with activity based on assessment  - Modify environment to reduce risk of injury  - Consider OT/PT consult to assist with strengthening/mobility  Outcome: Progressing     Problem: SAFETY ADULT  Goal: Patient will remain free of falls  Description: INTERVENTIONS:  - Assess patient frequently for physical needs  -  Identify cognitive and physical deficits and behaviors that affect risk of falls    -  [place] fall precautions as indicated by assessment   - Educate patient/family on patient safety including physical limitations  - Instruct patient to call for assistance with activity based on assessment  - Modify environment to reduce risk of injury  - Consider OT/PT consult to assist with strengthening/mobility  Outcome: Progressing  Goal: Maintain or return to baseline ADL function  Description: INTERVENTIONS:  -  Assess patient's ability to carry out ADLs; assess patient's baseline for ADL function and identify physical deficits which impact ability to perform ADLs (bathing, care of mouth/teeth, toileting, grooming, dressing, etc )  - Assess/evaluate cause of self-care deficits   - Assess range of motion  - Assess patient's mobility; develop plan if impaired  - Assess patient's need for assistive devices and provide as appropriate  - Encourage maximum independence but intervene and supervise when necessary  - Involve family in performance of ADLs  - Assess for home care needs following discharge   - Consider OT consult to assist with ADL evaluation and planning for discharge  - Provide patient education as appropriate  Outcome: Progressing  Goal: Maintain or return mobility status to optimal level  Description: INTERVENTIONS:  - Assess patient's baseline mobility status (ambulation, transfers, stairs, etc )    - Identify cognitive and physical deficits and behaviors that affect mobility  - Identify mobility aids required to assist with transfers and/or ambulation (gait belt, sit-to-stand, lift, walker, cane, etc )  - [place] fall precautions as indicated by assessment  - Record patient progress and toleration of activity level on Mobility SBAR; progress patient to next Phase/Stage  - Instruct patient to call for assistance with activity based on assessment  - Consider rehabilitation consult to assist with strengthening/weightbearing, etc   Outcome: Progressing     Problem: DISCHARGE PLANNING  Goal: Discharge to home or other facility with appropriate resources  Description: INTERVENTIONS:  - Identify barriers to discharge w/patient and caregiver  - Arrange for needed discharge resources and transportation as appropriate  - Identify discharge learning needs (meds, wound care, etc )  - Arrange for interpretive services to assist at discharge as needed  - Refer to Case Management Department for coordinating discharge planning if the patient needs post-hospital services based on physician/advanced practitioner order or complex needs related to functional status, cognitive ability, or social support system  Outcome: Progressing     Problem: Neurological Deficit  Goal: Neurological status is stable or improving  Description: Interventions:  - Monitor and assess patient's level of consciousness, motor function, sensory function, and level of assistance needed for ADLs  - Monitor and report changes from baseline  Collaborate with interdisciplinary team to initiate plan and implement interventions as ordered  - Provide and maintain a safe environment  - Consider seizure precautions  - Consider fall precautions    - Consider aspiration precautions  - Consider bleeding precautions  Outcome: Progressing     Problem: Activity Intolerance/Impaired Mobility  Goal: Mobility/activity is maintained at optimum level for patient  Description: Interventions:  - Assess and monitor patient  barriers to mobility and need for assistive/adaptive devices  - Assess patient's emotional response to limitations  - Collaborate with interdisciplinary team and initiate plans and interventions as ordered  - Encourage independent activity per ability   - Maintain proper body alignment  - Perform active/passive rom as tolerated/ordered    - Plan activities to conserve energy   - Turn patient as appropriate  Outcome: Progressing     Problem: Prexisting or High Potential for Compromised Skin Integrity  Goal: Skin integrity is maintained or improved  Description: INTERVENTIONS:  - Identify patients at risk for skin breakdown  - Assess and monitor skin integrity  - Assess and monitor nutrition and hydration status  - Monitor labs   - Assess for incontinence   - Turn and reposition patient  - Assist with mobility/ambulation  - Relieve pressure over bony prominences  - Avoid friction and shearing  - Provide appropriate hygiene as needed including keeping skin clean and dry  - Evaluate need for skin moisturizer/barrier cream  - Collaborate with interdisciplinary team   - Patient/family teaching  - Consider wound care consult   Outcome: Progressing

## 2021-06-11 NOTE — DISCHARGE SUMMARY
Horacio 43  Discharge- Rosio Hendrickson 1945, 64 y o  female MRN: [id number]  Unit/Bed#: [address] [place] [address] Encounter: [id number]  Primary Care Provider: Shelby Herman MD   Date and time admitted to hospital: 6/6/2021  7:43 PM    * Acute ischemic stroke [facility])  Assessment & Plan  Patient has history of CVA on 01/20  Patient presented with dizziness  CT of the head and neck showed 67% stenosis of the right vertebral artery and 60% stenosis of the left vertebral artery with occlusion of the right M2 branch  MRI of the brain showed small focus of diffusion restriction in inferior cerebellar vermis indicative of acute cerebellar lacunar infarction  Small chronic left frontal cortical infarction medially  Hemoglobin A1c 7 2  Continue aspirin, Lipitor and Plavix  Patient was loaded with Plavix 300 mg then continued on 75 milligram daily  Continue aspirin for 1 week following which it can be discontinued  2D echo showed EF of 55-60% without any regional wall motion abnormalities, grade 1 diastolic dysfunction  2D echo was without bubble study    Neurology discussed with Cardiology who reported that there is no evidence of PFO or right-to-left shunt on the prior exam  Continue PT/OT  Patient to be discharged to short-term rehabilitation  Patient will follow-up outpatient with Dr Kacey Keita neurosurgery regarding basilar stenosis      Troponin level elevated  Assessment & Plan  Patient had mild elevation of troponin and peak troponin was 0 29  2D echo showed no significant wall motion abnormalities  Outpatient follow-up with Cardiology for stress test-discussed with son at bedside in detail    Type 2 diabetes mellitus with stage 3 chronic kidney disease [facility])  Assessment & Plan  Lab Results   Component Value Date    HGBA1C 7 2 (H) 06/07/2021       Recent Labs     06/10/21  1610 06/10/21  2147 06/11/21  0737 06/11/21  1102   POCGLU 213* 321* 217* 311*       Blood Sugar Average: Last 72 hrs:  (P) 045 [id number]   Resume metformin upon discharge    Thyroid nodule greater than or equal to 1 5 cm in diameter incidentally noted on imaging study  Assessment & Plan  Patient will need outpatient thyroid ultrasound    Nodule of upper lobe of right lung  Assessment & Plan  Patient will need outpatient follow-up with repeat CT scan    Iron deficiency anemia  Assessment & Plan  Continue iron supplementation    GERD (gastroesophageal reflux disease)  Assessment & Plan  Continue Protonix    Essential hypertension  Assessment & Plan  Continue quinapril    History of tobacco abuse  Assessment & Plan  Continue nicotine patch     Hyperlipidemia  Assessment & Plan  Continue Lipitor dose which was increased to 80 milligram p o  Daily  Cholesterol 152 and LDL 72        Discharging Physician / Practitioner: Maddy Reveles MD  PCP: Wilner Hunt MD  Admission Date:   Admission Orders (From admission, onward)     Ordered        06/06/21 2112  Inpatient Admission  Once                   Discharge Date: 06/11/21    Resolved Problems  Date Reviewed: 6/11/2021    None          Consultations During Hospital Stay:  · Neurology    Procedures Performed:   · 2D echo showed EF of 50 for 60% without any regional wall motion abnormalities, grade 1 diastolic dysfunction       Outpatient Tests Requested:  · Follow-up with PCP regarding thyroid ultrasound repeat CT scan in 3 months  Follow-up with Neurosurgery    Complications:  None    Reason for Admission:  Dizziness    Hospital Course:     Tex Bolivar is a 64 y o  female patient with past medical history hypertension, diabetes, stage III CKD, hyperlipidemia, tobacco abuse, anemia, prior CVA who originally presented to the hospital on 6/6/2021 due to dizziness and nausea  Patient was not a candidate for tPA due to timing and NIH stroke scale of 0  Patient was initially admitted to step-down unit    CT scan of the head and neck showed stenosis and 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  Later patient had MRI of the brain which showed acute ischemic stroke in the cerebellar vermis and small chronic left frontal cortical infarction  Patient also had 2D echo which was unremarkable  Patient was started on dual antiplatelet therapy with aspirin and Plavix after receiving Plavix load  Patient was doing well with PT/OT  Patient also had mild elevation of troponin but Echo showed no significant wall motion abnormalities and patient did not have any chest pain  Patient continued remained stable and will be discharged to short-term rehabilitation for continued PT  Please see above list of diagnoses and related plan for additional information  Condition at Discharge: stable     Discharge Day Visit / Exam:     Subjective:  Patient is feeling tired today as she did not sleep well last night  Otherwise denies any weakness, tingling or numbness  Patient continues to ambulate with help of a walker independently  Vitals: Blood Pressure: (!) 126/43 (06/11/21 0912)  Pulse: 58 (06/11/21 0912)  Temperature: (!) 97 3 °F (36 3 °C) (06/11/21 0912)  Temp Source: Oral (06/11/21 0912)  Respirations: 16 (06/11/21 0912)  Height: 5' (152 4 cm) (06/09/21 1837)  Weight - Scale: 59 1 kg (130 lb 4 7 oz) (06/11/21 0600)  SpO2: 95 % (06/11/21 0912)  Exam:   Physical Exam  Constitutional:       Appearance: Normal appearance  HENT:      Head: Normocephalic and atraumatic  Nose: Nose normal       Mouth/Throat:      Mouth: Mucous membranes are moist       Pharynx: Oropharynx is clear  Eyes:      Extraocular Movements: Extraocular movements intact  Pupils: Pupils are equal, round, and reactive to light  Neck:      Musculoskeletal: Normal range of motion and neck supple  Cardiovascular:      Rate and Rhythm: Normal rate and regular rhythm  Pulmonary:      Effort: Pulmonary effort is normal       Breath sounds: Normal breath sounds     Abdominal:      General: Bowel sounds are normal  There is no distension  Palpations: Abdomen is soft  Tenderness: There is no abdominal tenderness  Musculoskeletal:         General: No swelling  Skin:     General: Skin is warm and dry  Neurological:      General: No focal deficit present  Mental Status: She is alert  Discussion with Family: Son Max Newton    Discharge instructions/Information to patient and family:   See after visit summary for information provided to patient and family  Provisions for Follow-Up Care:  See after visit summary for information related to follow-up care and any pertinent home health orders  Disposition:     [facility]        Planned Readmission: No     Discharge Statement:  I spent 35 minutes discharging the patient  This time was spent on the day of discharge  I had direct contact with the patient on the day of discharge  Greater than 50% of the total time was spent examining patient, answering all patient questions, arranging and discussing plan of care with patient as well as directly providing post-discharge instructions  Additional time then spent on discharge activities  Discharge Medications:  See after visit summary for reconciled discharge medications provided to patient and family        ** Please Note: This note has been constructed using a voice recognition system **

## 2021-06-11 NOTE — ASSESSMENT & PLAN NOTE
Patient has history of CVA on 01/20  Patient presented with dizziness  CT of the head and neck showed 67% stenosis of the right vertebral artery and 60% stenosis of the left vertebral artery with occlusion of the right M2 branch  MRI of the brain showed small focus of diffusion restriction in inferior cerebellar vermis indicative of acute cerebellar lacunar infarction  Small chronic left frontal cortical infarction medially  Hemoglobin A1c 7 2  Continue aspirin, Lipitor and Plavix  Patient was loaded with Plavix 300 mg then continued on 75 milligram daily  Continue aspirin for 1 week following which it can be discontinued  2D echo showed EF of 55-60% without any regional wall motion abnormalities, grade 1 diastolic dysfunction  2D echo was without bubble study    Neurology discussed with Cardiology who reported that there is no evidence of PFO or right-to-left shunt on the prior exam  Continue PT/OT  Patient to be discharged to short-term rehabilitation  Patient will follow-up outpatient with Dr Lorenzana Life neurosurgery regarding basilar stenosis

## 2021-06-11 NOTE — ASSESSMENT & PLAN NOTE
Lab Results   Component Value Date    HGBA1C 7 2 (H) 06/07/2021       Recent Labs     06/10/21  1610 06/10/21  2147 06/11/21  0737 06/11/21  1102   POCGLU 213* 321* 217* 311*       Blood Sugar Average: Last 72 hrs:  (P) 261 [id number]   Resume metformin upon discharge

## 2021-06-11 NOTE — NURSING NOTE
The patient is discharged to [facility] for subacute rehab in no distress, pt's son at the bedside, given phone number of STR facility to him to follow  The patient is in no distress at time of discharge, accompanied by [facility]

## 2021-06-11 NOTE — PHYSICAL THERAPY NOTE
PT TREATMENT     06/11/21 1025   PT Last Visit   PT Visit Date 06/11/21   Note Type   Note Type Treatment   Pain Assessment   Pain Assessment Tool 0-10   Pain Score No Pain   Restrictions/Precautions   Weight Bearing Precautions Per Order No   Other Precautions Chair Alarm; Bed Alarm; Fall Risk   General   Chart Reviewed Yes   Family/Caregiver Present No   Cognition   Overall Cognitive Status WFL   Arousal/Participation Cooperative   Attention Within functional limits   Following Commands Follows all commands and directions without difficulty   Subjective   Subjective Hopes to go to rehab soon  Bed Mobility   Supine to Sit 5  Supervision   Additional Comments to chair   Transfers   Sit to Stand 5  Supervision   Additional items Verbal cues; Assist x 1   Stand to Sit 5  Supervision   Additional items Verbal cues   Ambulation/Elevation   Gait pattern   (unsteady without AD)   Gait Assistance 4  Minimal assist   Additional items   (verbal cues to navigate around objects in hallway)   Assistive Device Rolling walker   Distance 3 x 75 feet   Stair Management Assistance 5  Supervision   Additional items Verbal cues   Stair Management Technique Two rails; Alternating pattern   Number of Stairs 3   Balance   Static Sitting Good   Dynamic Sitting Fair +   Static Standing Fair +   Dynamic Standing Fair   Ambulatory Fair -   Activity Tolerance   Activity Tolerance Patient tolerated treatment well   Nurse Made Aware yes: Rachell Weiss: pt in chair with seat alarm activated   Exercises   Hip Flexion Sitting;20 reps;Bilateral   Knee AROM Long Arc Quad Sitting;20 reps;Bilateral   Ankle Pumps Sitting;20 reps;Bilateral   Neuro re-ed sidestepping r/l, fwd/bwd walking, walking with head turns r/l and then walking with looking up and down, stop/starts upon command   Assessment   Prognosis Good   Problem List Decreased strength;Decreased endurance; Impaired balance;Decreased mobility; Decreased coordination   Assessment Pt is very motivated  Doing well with functional mobilty, but concerns with balance with all wgt bearing activities  Walked with and without AD   Will benefit from STR   Goals   Patient Goals to go to rehab   Plan   Treatment/Interventions Functional transfer training;LE strengthening/ROM; Elevations; Therapeutic exercise; Endurance training;Patient/family training;Equipment eval/education; Bed mobility;Gait training;Spoke to nursing;Spoke to case management;OT   Progress Progressing toward goals   PT Frequency Once a day   Recommendation   PT Discharge Recommendation Post acute rehabilitation services   AM-PAC Basic Mobility Inpatient   Turning in Bed Without Bedrails 4   Lying on Back to Sitting on Edge of Flat Bed 4   Moving Bed to Chair 3   Standing Up From Chair 3   Walk in Room 3   Climb 3-5 Stairs 3   Basic Mobility Inpatient Raw Score 20   Basic Mobility Standardized Score 55 45   Licensure   [place] License Number  Ermelinda Ferris PT  [id number]

## 2021-06-11 NOTE — CASE MANAGEMENT
Transport arranged at 3:00 pm today with Rob DENG to [facility] for STR  Nurse, family, facility aware

## 2021-06-11 NOTE — CASE MANAGEMENT
SW received a call from [employer] at [address] [place] [address]  with Gilma Battles approval     Patient approved 6/11-6/15  NRD 6/16  Follow up with [facility]  Phone- [phone]  Fax- [phone]    Auth Number  [id number]    Sheree Duarte at [facility] aware that Gilma Batdeboras is received and confirmed bed availability today  Pending transport time

## 2021-06-12 LAB
GLUCOSE SERPL-MCNC: 228 MG/DL (ref 65–140)
GLUCOSE SERPL-MCNC: 277 MG/DL (ref 65–140)
GLUCOSE SERPL-MCNC: 284 MG/DL (ref 65–140)
GLUCOSE SERPL-MCNC: 292 MG/DL (ref 65–140)

## 2021-06-12 PROCEDURE — 97167 OT EVAL HIGH COMPLEX 60 MIN: CPT

## 2021-06-12 PROCEDURE — 97535 SELF CARE MNGMENT TRAINING: CPT

## 2021-06-12 PROCEDURE — 97163 PT EVAL HIGH COMPLEX 45 MIN: CPT

## 2021-06-12 PROCEDURE — 82948 REAGENT STRIP/BLOOD GLUCOSE: CPT

## 2021-06-12 PROCEDURE — 99306 1ST NF CARE HIGH MDM 50: CPT | Performed by: INTERNAL MEDICINE

## 2021-06-12 RX ADMIN — MAGNESIUM OXIDE TAB 400 MG (241.3 MG ELEMENTAL MG) 400 MG: 400 (241.3 MG) TAB at 08:31

## 2021-06-12 RX ADMIN — PANTOPRAZOLE SODIUM 40 MG: 40 TABLET, DELAYED RELEASE ORAL at 06:49

## 2021-06-12 RX ADMIN — LISINOPRIL 40 MG: 20 TABLET ORAL at 21:23

## 2021-06-12 RX ADMIN — CLOPIDOGREL BISULFATE 75 MG: 75 TABLET, FILM COATED ORAL at 08:31

## 2021-06-12 RX ADMIN — INSULIN LISPRO 3 UNITS: 100 INJECTION, SOLUTION INTRAVENOUS; SUBCUTANEOUS at 21:22

## 2021-06-12 RX ADMIN — INSULIN LISPRO 3 UNITS: 100 INJECTION, SOLUTION INTRAVENOUS; SUBCUTANEOUS at 18:47

## 2021-06-12 RX ADMIN — ASPIRIN 81 MG CHEWABLE TABLET 81 MG: 81 TABLET CHEWABLE at 08:31

## 2021-06-12 RX ADMIN — ATORVASTATIN CALCIUM 80 MG: 80 TABLET, FILM COATED ORAL at 17:02

## 2021-06-12 RX ADMIN — ENOXAPARIN SODIUM 30 MG: 30 INJECTION SUBCUTANEOUS at 08:31

## 2021-06-12 RX ADMIN — MAGNESIUM OXIDE TAB 400 MG (241.3 MG ELEMENTAL MG) 400 MG: 400 (241.3 MG) TAB at 17:02

## 2021-06-12 NOTE — NURSING NOTE
Pt is alert/oriented, able to ambulate well with therapy and no assistive device  When in room she does ring call bell appropriately and uses FWW to and from bathroom  Denies pain  Vitals WNL   Cont of B and B

## 2021-06-12 NOTE — OCCUPATIONAL THERAPY NOTE
Occupational Therapy Evaluation and Treatment      Patient Name: Leyla Yates  MBHKP'Y Date: 6/12/2021  Problem List  Principal Problem:    CVA (cerebral vascular accident) [facility])    Past Medical History  Past Medical History:   Diagnosis Date    Anemia     Arthritis     Capsulitis of foot     Last assessed 07/29/13    Diabetes ([address] )     type 2    Full dentures     Ganglion     Last assessed 07/29/13    Hearing decreased     Hyperlipidemia     Hypertension     Magnesium deficiency     Sciatica     Wears glasses      Past Surgical History  Past Surgical History:   Procedure Laterality Date    CATARACT EXTRACTION      COLONOSCOPY      DILATION AND CURETTAGE OF UTERUS      x 4    HAND SURGERY Left     fx repaired w/wrist bone    HYSTERECTOMY      left ovary remains    KNEE ARTHROSCOPY Left     LIPOMA RESECTION      removed from back and left buttock    NJ XCAPSL CTRC RMVL INSJ IO LENS PROSTH W/O ECP Right 1/24/2019    Procedure: EXTRACTION EXTRACAPSULAR CATARACT PHACO INTRAOCULAR LENS (IOL); Surgeon: Narayan Martines MD;  Location: [facility] MAIN OR;  Service: Ophthalmology    NJ XCAPSL CTRC RMVL INSJ IO LENS PROSTH W/O ECP Left 2/14/2019    Procedure: EXTRACTION EXTRACAPSULAR CATARACT PHACO INTRAOCULAR LENS (IOL);   Surgeon: Narayan Martines MD;  Location: [facility] MAIN OR;  Service: Ophthalmology    TONSILLECTOMY      and adenoids    TUBAL LIGATION        Time- 0691-1358 15 min eval 10 min txt   Vitals- stable   Standardized Assessment- Barthel Index 65/100  Home Evaluation (virtual)- will obtain photos as needed  Family/Caregiver Training- to initiate as needed    Additional Comments-  Pt remains in room with all needs at end of session      Assessment:        06/12/21 1140   OT Last Visit   OT Visit Date 06/12/21   Note Type   Note type Evaluation/Treatment    Restrictions/Precautions   Weight Bearing Precautions Per Order No   Other Precautions Fall Risk   Pain Assessment   Pain Assessment Tool 0-10 Pain Score No Pain   Home Living   Type of Home House   Home Layout Two level; Able to live on main level with bedroom/bathroom;Stairs to enter with rails; Performs ADLs on one level  (4 PEDRITO BL HR; FF to 2nd floor, no HR; FF to basement 1 HR )   Bathroom Shower/Tub Tub/shower unit   Bathroom Toilet Standard   Bathroom Equipment Grab bars in shower   [facility]   Prior Function   Level of [address] with ADLs and functional mobility   Lives With Son   Receives Help From Family   ADL Assistance Independent   IADLs Independent   Comments pta pt independent with ADLs, IADLs (including pet care), and mobility with no AD; (+) drives    Lifestyle   Reciprocal Relationships supportive family    Psychosocial   Psychosocial (WDL) WDL   Subjective   Subjective "I dont need the walker"    ADL   Eating Assistance 7  Independent   Grooming Assistance 6  Modified Independent   UB Bathing Assistance 6  Modified Independent   LB Bathing Assistance 5  Supervision/Setup   UB Dressing Assistance 6  Modified independent   LB Dressing Assistance 5  Supervision/Setup   Toileting Assistance  5  Supervision/Setup   Bed Mobility   Supine to Sit 6  Modified independent   Additional items Increased time required   Sit to Supine 6  Modified independent   Additional items Increased time required   Transfers   Sit to Stand 5  Supervision   Additional items Armrests; Increased time required   Stand to Sit 5  Supervision   Additional items Armrests; Increased time required   Stand pivot 5  Supervision   Additional items Armrests; Increased time required   Toilet transfer 5  Supervision   Additional items Armrests; Increased time required   Additional Comments with and without RW   Functional Mobility   Functional Mobility 5  Supervision   Additional Comments household distances ~200 ft with and without RW    Balance   Static Sitting Good   Dynamic Sitting Fair +   Static Standing Fair + Dynamic Standing Fair   Ambulatory Fair   Activity Tolerance   Activity Tolerance Patient tolerated treatment well   Nurse Made Aware Spoke to RN    RUE Assessment   RUE Assessment WFL  (4/5 )   LUE Assessment   LUE Assessment WFL  (4/5 )   Hand Function   Gross Motor Coordination Functional   Fine Motor Coordination Functional   Sensation   Light Touch No apparent deficits   Additional Comments denies numbness/tingling    Proprioception   Proprioception No apparent deficits   Vision-Basic Assessment   Current Vision Wears glasses all the time   Vision - Complex Assessment   Ocular Range of Motion [facility]   Tracking Able to track stimulus in all quads without difficulty   Perception   Inattention/Neglect Appears intact   Cognition   [facility]   Arousal/Participation Alert; Cooperative   Attention Within functional limits   Orientation Level Oriented X4   Memory Within functional limits   Following Commands Follows all commands and directions without difficulty   Comments motivated for therapy with good insight to deficits    Assessment   Limitation Decreased ADL status; Decreased UE ROM; Decreased UE strength;Decreased endurance;Decreased high-level ADLs  (balance deficits )   Prognosis Good   Assessment Pt is a 64 y o  female seen for OT evaluation s/p admit to [facility] on 6/11/2021 w/ CVA (cerebral vascular accident) ([facility] [address] )  CT of the head and neck showed 67% stenosis of the right vertebral artery and 60% stenosis of the left vertebral artery with occlusion of the right M2 branch  MRI of the brain showed small focus of diffusion restriction in inferior cerebellar vermis indicative of acute cerebellar lacunar infarction  OT completed an extensive review of pt's medical and social history  See PMH in pt chart  Personal factors affecting pt at time of IE include:steps to enter environment and difficulty performing IADLS    As per pt report, pta living with son and granddaughter in [address] [place] [address] with [facility], 1st floor setup bed/bath  FF to basement where laundry room is located  Independent with ADLs/IADLs and mobility with no AD  (+) drives  Upon evaluation, pt requires S with transfers and mobility with and without RW  Exhibits decreased strength to B/L UE's, equal in comparison  No change in vision  Intact finger-to-nose  Denies impaired sensation  Impaired balance limiting optimal performance at this time  Pt currently  Requires  S LB ADLs, and S toileting  2* the following deficits impacting occupational performance  Pt presents to OT below baseline due to the following performance deficits: weakness, decreased strength, decreased balance and decreased tolerance  Pt to benefit from continued skilled OT services while in the hospital to address deficits as defined above and maximize level of functional independence w ADL's and functional mobility  Occupational performance areas to address include: grooming, bathing/shower, toilet hygiene, dressing, functional mobility, community mobility, clothing management, household maintenance and care of pets  Pt with score of 65/100 on the Barthel Index  Based on OT evaluation, assessment(s), performance deficits listed, and current level of function, pt identified as a HIGH  complexity evaluation  From OT standpoint, recommendation at time of d/c would be home with no rehab needs  Goals   Patient Goals to go home safely    LTG Time Frame   (1 week )   Plan   Treatment Interventions ADL retraining;Functional transfer training;UE strengthening/ROM; Endurance training;Patient/family training;Equipment evaluation/education; Neuromuscular reeducation; Energy conservation; Activityengagement   Goal Expiration Date 06/19/21   OT Treatment Day 1   OT Frequency 5x/wk   Additional Treatment Session   Start Time 9320   End Time 2024   Treatment Assessment Occupational Therapy treatment following evaluation focused on progressing independence with functional transfers/mobility with emphasis on safety and fall prevention  Initial ambulatory trial performed with RW, progressed to all mobility without AD, supervision with 2-3 minor LOB that pt was safely able to self correct  Minimal challenge to dynamic standing balance today, tolerated well  Simulated tub transfer per home setup, supervision with use of lateral GB  Pt limited by weakness, endurance and static/dynamic standing balance   Pt educated on POC/purpose/benefits of OT/PT while on TCF, safe functional transfer techniques with appropriate body mechanics, fall prevention and safe discharge planning/ safety at home   Pt would benefit from continued OT sessions to further address above deficits to maximize independence      Additional Treatment Day 1   Recommendation   OT Discharge Recommendation No rehabilitation needs  (OP PT for balance training )   Barthel Index   Feeding 10   Bathing 0   Grooming Score 5   Dressing Score 5   Bladder Score 10   Bowels Score 10   Toilet Use Score 5   Transfers (Bed/Chair) Score 10   Mobility (Level Surface) Score 10   Stairs Score 0   Barthel Index Score 65     Goals LTG achieved within 1 week  Performance at Initial Evaluation 6/12/2021  Current Performance (last date completed)   Grooming while standing at sink Carissa/I MI seated     ADL transfers  Carissa/I Supervision    Bathroom mobility with appropriate AD Carissa/I Supervision    LB ADL, AE PRN Carissa/I Supervision    Toileting/clothing management and hygiene Carissa/I Supervision    Dynamic standing balance for increased safety when completing purposeful tasks F+ F    Increase standing tolerance for inc'd safety with standing purposeful tasks >10 min  3-4 min     Participate in therex 1-3x/week for inc'd overall stamina/activity tolerance for purposeful tasks To be completed      Tub/shower   Carissa/I Supervision (12")    Item retrieval for inc'd independence and safety negotiating home environment Carissa/I     Household management/Pet care (FF to basement where laundry is located) Carissa/I           [employer], MS, OTR/L

## 2021-06-12 NOTE — H&P
[facility]    H&P- [facility] [place] 1945, 64 y o  female MRN: [id number]  Unit/Bed#: -01 Encounter: [id number]  Primary Care Provider: Bruno Vazquez MD   Date and time admitted to hospital: 6/11/2021  4:05 PM    Troponin level elevated  Assessment & Plan  Patient had mild elevation of troponin and peak troponin was 0 29  2D echo showed no significant wall motion abnormalities  Outpatient follow-up with Cardiology for stress test-discussed with son at bedside in detail    Thyroid nodule greater than or equal to 1 5 cm in diameter incidentally noted on imaging study  Assessment & Plan  Patient will need outpatient thyroid ultrasound       Nodule of upper lobe of right lung  Assessment & Plan  Patient will need outpatient follow-up with repeat CT scan    Type 2 diabetes mellitus with stage 3 chronic kidney disease ([facility] [address] )  Assessment & Plan    Lab Results   Component Value Date    HGBA1C 7 2 (H) 06/07/2021       Continue metformin    Iron deficiency anemia  Assessment & Plan  Continue iron supplementation    GERD (gastroesophageal reflux disease)  Assessment & Plan  Continue Protonix    Essential hypertension  Assessment & Plan  On home quinapril-continue lisinopril while inpatient    Hyperlipidemia  Assessment & Plan  Continue Lipitor dose which was increased to 80 milligram p o  Daily  Cholesterol 152 and LDL 72       * CVA (cerebral vascular accident) [facility])  Assessment & Plan  Patient has history of CVA on 01/20  Presented previous hospitalization with dizziness  CT of the head and neck showed 67% stenosis of the right vertebral artery and 60% stenosis of the left vertebral artery with occlusion of the right M2 branch  MRI of the brain showed small focus of diffusion restriction in inferior cerebellar vermis indicative of acute cerebellar lacunar infarction  Small chronic left frontal cortical infarction medially  Hemoglobin A1c 7 2  Continue aspirin, Lipitor and Plavix  Patient was loaded with Plavix 300 mg then continued on 75 milligram daily  Continue aspirin for 1 week following which it can be discontinue last day 6/13/2021  2D echo showed EF of 55-60% without any regional wall motion abnormalities, grade 1 diastolic dysfunction  2D echo was without bubble study  Neurology discussed with Cardiology who reported that there is no evidence of PFO or right-to-left shunt on the prior exam  Consult PT/OT  Patient will follow-up outpatient with Dr Kacey Keita neurosurgery regarding basilar stenosis      VTE Prophylaxis: Enoxaparin (Lovenox)  / sequential compression device   Code Status:  Level 1  POLST: POLST form is not discussed and not completed at this time  Discussion with family: none    Anticipated Length of Stay:  Patient will be admitted on an SNF Short Term Inpatient basis with an anticipated length of stay of  more than 2 midnights  Justification for Hospital Stay: rehab     Total Time for Visit, including Counseling / Coordination of Care: 60 minutes  Greater than 50% of this total time spent on direct patient counseling and coordination of care  Chief Complaint:  Ambulatory dysfunction    History of Present Illness:    Rosio Hendrickson is a 64 y o  female with past medical history of prior CVA, thyroid nodule, type 2 diabetes, iron deficiency anemia, GERD, hyperlipidemia who presents with ambulatory dysfunction deconditioning following a recent admission for dizziness and nausea  Patient was a stroke alert however was not a candidate for tPA at that time due to in nature scale being 0  Patient received a CT scan of the head and neck which showed 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 the and MRI later confirmed acute ischemic stroke in the cerebellar vermis and small chronic left frontal cortical infarction  She was loaded with aspirin and Plavix and continued on Plavix    She is to be admitted to the rehab for acute physical therapy management  Patient currently remains stable  Review of Systems:    Review of Systems   Constitutional: Negative for activity change, chills and fever  Eyes: Negative for visual disturbance  Respiratory: Negative for cough, chest tightness and shortness of breath  Cardiovascular: Negative for chest pain and leg swelling  Gastrointestinal: Negative for abdominal pain, constipation, diarrhea, nausea and vomiting  Endocrine: Negative for cold intolerance and heat intolerance  Genitourinary: Negative for difficulty urinating  Musculoskeletal: Positive for gait problem  Skin: Negative for rash  Allergic/Immunologic: Negative  Neurological: Negative for dizziness, weakness and light-headedness  Hematological: Negative  Psychiatric/Behavioral: Negative  All other systems reviewed and are negative  Past Medical and Surgical History:     Past Medical History:   Diagnosis Date    Anemia     Arthritis     Capsulitis of foot     Last assessed 07/29/13    Diabetes ([address] )     type 2    Full dentures     Ganglion     Last assessed 07/29/13    Hearing decreased     Hyperlipidemia     Hypertension     Magnesium deficiency     Sciatica     Wears glasses        Past Surgical History:   Procedure Laterality Date    CATARACT EXTRACTION      COLONOSCOPY      DILATION AND CURETTAGE OF UTERUS      x 4    HAND SURGERY Left     fx repaired w/wrist bone    HYSTERECTOMY      left ovary remains    KNEE ARTHROSCOPY Left     LIPOMA RESECTION      removed from back and left buttock    WI XCAPSL CTRC RMVL INSJ IO LENS PROSTH W/O ECP Right 1/24/2019    Procedure: EXTRACTION EXTRACAPSULAR CATARACT PHACO INTRAOCULAR LENS (IOL); Surgeon: Kevyn Moser MD;  Location: [facility] MAIN OR;  Service: Ophthalmology    WI XCAPSL CTRC RMVL INSJ IO LENS PROSTH W/O ECP Left 2/14/2019    Procedure: EXTRACTION EXTRACAPSULAR CATARACT PHACO INTRAOCULAR LENS (IOL);   Surgeon: Kevyn Moser MD;  Location: [facility] MAIN OR;  Service: Ophthalmology    TONSILLECTOMY      and adenoids    TUBAL LIGATION         Meds/Allergies:    Prior to Admission medications    Medication Sig Start Date End Date Taking? Authorizing Provider   aspirin 81 mg chewable tablet Chew 1 tablet (81 mg total) daily for 7 days 6/12/21 6/19/21 Yes Shakira Hannah MD   atorvastatin (LIPITOR) 80 mg tablet Take 1 tablet (80 mg total) by mouth every evening 6/11/21  Yes Shakira Hannah MD   clopidogrel (PLAVIX) 75 mg tablet TAKE 1 TABLET BY MOUTH EVERY DAY 4/28/21  Yes Bruno Vazquez MD   magnesium oxide (MAG-OX) 400 mg tablet Take 1 tablet (400 mg total) by mouth 2 (two) times a day 7/13/20  Yes Bruno Vazquez MD   metFORMIN (GLUCOPHAGE) 1000 MG tablet Take 1 tablet (1,000 mg total) by mouth 2 (two) times a day 10/2/20  Yes Bruno Vazquez MD   pantoprazole (PROTONIX) 40 mg tablet TAKE 1 TABLET BY MOUTH EVERY DAY 1/30/21  Yes Cherylene Needy, MD   ferrous sulfate 324 (65 Fe) mg Take 1 tablet (324 mg total) by mouth 3 (three) times a week 3/3/21 6/1/21  Dennie Ka L Fulmore, PA-C   ibuprofen (MOTRIN) 200 mg tablet Take 600 mg by mouth every 6 (six) hours as needed for mild pain    Historical Provider, MD   quinapril (ACCUPRIL) 40 MG tablet TAKE 1 TABLET BY MOUTH  DAILY AT BEDTIME 5/3/21   Bruno Vazquez MD   ferrous sulfate 324 (65 Fe) mg TAKE 1 TABLET (324 MG TOTAL) BY MOUTH 2 (TWO) TIMES A DAY BEFORE MEALS 12/31/20   Julien Suazo, DO     I have reviewed home medications using allscripts  Allergies:    Allergies   Allergen Reactions    Aleve [Naproxen] Other (See Comments)     "weird" sensation entire body    Sulfa Antibiotics Rash       Social History:     Marital Status:    Occupation:  Retired  Patient Pre-hospital Living Situation:  Lives with son  Patient Pre-hospital Level of Mobility:  Move well  Patient Pre-hospital Diet Restrictions:  Diabetic  Substance Use History:   Social History     Substance and Sexual Activity   Alcohol Use Not Currently    Comment: Rarely     Social History     Tobacco Use   Smoking Status Current Every Day Smoker    Packs/day: 0 50    Years: 35 00    Pack years: 17 50    Types: Cigarettes   Smokeless Tobacco Never Used     Social History     Substance and Sexual Activity   Drug Use Never       Family History:    Family History   Problem Relation Age of Onset    Leukemia Father     Early death Father 39        leukemia    Stomach cancer Maternal Grandfather     Diabetes Paternal Grandfather     Diabetes Sister         insulin dependent    Diabetes Brother         insulin    Diabetes Sister         insulin    Diabetes Sister         insulin       Physical Exam:     Vitals:   Blood Pressure: 134/57 (06/13/21 0754)  Pulse: 56 (06/13/21 0754)  Temperature: (!) 96 8 °F (36 °C) (06/13/21 0754)  Temp Source: Temporal (06/13/21 0754)  Respirations: 20 (06/13/21 0754)  Height: 5' (152 4 cm) (06/11/21 1625)  Weight - Scale: 60 3 kg (132 lb 15 oz) (06/13/21 0541)  SpO2: 96 % (06/13/21 0754)    Physical Exam  Constitutional:       General: She is not in acute distress  Appearance: Normal appearance  HENT:      Head: Normocephalic and atraumatic  Eyes:      General:         Right eye: No discharge  Left eye: No discharge  Cardiovascular:      Rate and Rhythm: Normal rate and regular rhythm  Pulses: Normal pulses  Heart sounds: No murmur heard  Pulmonary:      Effort: Pulmonary effort is normal  No respiratory distress  Breath sounds: Normal breath sounds  No wheezing  Musculoskeletal:      Right lower leg: No edema  Left lower leg: No edema  Skin:     General: Skin is warm and dry  Neurological:      General: No focal deficit present  Mental Status: She is alert and oriented to person, place, and time  Mental status is at baseline  Psychiatric:         Mood and Affect: Mood normal          Additional Data:     Lab Results: I have personally reviewed pertinent reports  Results from last 7 days   Lab Units 06/08/21  0522 06/07/21  0531   WBC Thousand/uL 6 68 7 92   HEMOGLOBIN g/dL 10 6* 11 9   HEMATOCRIT % 32 9* 36 6   PLATELETS Thousands/uL 292 318   NEUTROS PCT %  --  78*   LYMPHS PCT %  --  12*   MONOS PCT %  --  9   EOS PCT %  --  0     Results from last 7 days   Lab Units 06/08/21  0522 06/06/21  1950   SODIUM mmol/L 141 141   POTASSIUM mmol/L 4 2 4 5   CHLORIDE mmol/L 105 104   CO2 mmol/L 30 29   BUN mg/dL 24 26*   CREATININE mg/dL 1 26 1 14   ANION GAP mmol/L 6 8   CALCIUM mg/dL 9 4 9 9   ALBUMIN g/dL  --  3 3*   TOTAL BILIRUBIN mg/dL  --  0 56   ALK PHOS U/L  --  89   ALT U/L  --  26   AST U/L  --  15   GLUCOSE RANDOM mg/dL 163* 233*     Results from last 7 days   Lab Units 06/06/21  1950   INR  0 89     Results from last 7 days   Lab Units 06/13/21  1132 06/13/21  0611 06/12/21 2011 06/12/21  1620 06/12/21  1117 06/12/21  0611 06/11/21 2009 06/11/21  1728 06/11/21  1102 06/11/21  0737 06/10/21  2147 06/10/21  1610   POC GLUCOSE mg/dl 351* 229* 284* 277* 292* 228* 325* 224* 311* 217* 321* 213*     Results from last 7 days   Lab Units 06/07/21  0531   HEMOGLOBIN A1C % 7 2*           Imaging: I have personally reviewed pertinent reports  No orders to display       EKG, Pathology, and Other Studies Reviewed on Admission:   · EKG: none    Allscripts / Epic Records Reviewed: Yes     ** Please Note: This note has been constructed using a voice recognition system   **

## 2021-06-12 NOTE — PLAN OF CARE
Problem: OCCUPATIONAL THERAPY ADULT  Goal: Performs self-care activities at highest level of function for planned discharge setting  See evaluation for individualized goals  Description: Treatment Interventions: ADL retraining, Functional transfer training, UE strengthening/ROM, Endurance training, Patient/family training, Equipment evaluation/education, Neuromuscular reeducation, Energy conservation, Activityengagement          See flowsheet documentation for full assessment, interventions and recommendations     Outcome: Progressing  Note: Limitation: Decreased ADL status, Decreased UE ROM, Decreased UE strength, Decreased endurance, Decreased high-level ADLs(balance deficits )  Prognosis: Good  Assessment: see note      OT Discharge Recommendation: No rehabilitation needs(OP PT for balance training )

## 2021-06-13 LAB
GLUCOSE SERPL-MCNC: 211 MG/DL (ref 65–140)
GLUCOSE SERPL-MCNC: 224 MG/DL (ref 65–140)
GLUCOSE SERPL-MCNC: 229 MG/DL (ref 65–140)
GLUCOSE SERPL-MCNC: 351 MG/DL (ref 65–140)
SARS-COV-2 RNA RESP QL NAA+PROBE: NEGATIVE

## 2021-06-13 PROCEDURE — U0003 INFECTIOUS AGENT DETECTION BY NUCLEIC ACID (DNA OR RNA); SEVERE ACUTE RESPIRATORY SYNDROME CORONAVIRUS 2 (SARS-COV-2) (CORONAVIRUS DISEASE [COVID-19]), AMPLIFIED PROBE TECHNIQUE, MAKING USE OF HIGH THROUGHPUT TECHNOLOGIES AS DESCRIBED BY CMS-2020-01-R: HCPCS | Performed by: INTERNAL MEDICINE

## 2021-06-13 PROCEDURE — 82948 REAGENT STRIP/BLOOD GLUCOSE: CPT

## 2021-06-13 PROCEDURE — U0005 INFEC AGEN DETEC AMPLI PROBE: HCPCS | Performed by: INTERNAL MEDICINE

## 2021-06-13 RX ADMIN — INSULIN LISPRO 3 UNITS: 100 INJECTION, SOLUTION INTRAVENOUS; SUBCUTANEOUS at 08:51

## 2021-06-13 RX ADMIN — METFORMIN HYDROCHLORIDE 1000 MG: 500 TABLET ORAL at 18:08

## 2021-06-13 RX ADMIN — TUBERCULIN PURIFIED PROTEIN DERIVATIVE 5 UNITS: 5 INJECTION, SOLUTION INTRADERMAL at 12:50

## 2021-06-13 RX ADMIN — INSULIN LISPRO 2 UNITS: 100 INJECTION, SOLUTION INTRAVENOUS; SUBCUTANEOUS at 18:08

## 2021-06-13 RX ADMIN — INSULIN LISPRO 4 UNITS: 100 INJECTION, SOLUTION INTRAVENOUS; SUBCUTANEOUS at 12:49

## 2021-06-13 RX ADMIN — CLOPIDOGREL BISULFATE 75 MG: 75 TABLET, FILM COATED ORAL at 08:51

## 2021-06-13 RX ADMIN — INSULIN LISPRO 2 UNITS: 100 INJECTION, SOLUTION INTRAVENOUS; SUBCUTANEOUS at 21:38

## 2021-06-13 RX ADMIN — ENOXAPARIN SODIUM 30 MG: 30 INJECTION SUBCUTANEOUS at 08:51

## 2021-06-13 RX ADMIN — ASPIRIN 81 MG CHEWABLE TABLET 81 MG: 81 TABLET CHEWABLE at 08:51

## 2021-06-13 RX ADMIN — ATORVASTATIN CALCIUM 80 MG: 80 TABLET, FILM COATED ORAL at 18:08

## 2021-06-13 RX ADMIN — PANTOPRAZOLE SODIUM 40 MG: 40 TABLET, DELAYED RELEASE ORAL at 05:50

## 2021-06-13 RX ADMIN — MAGNESIUM OXIDE TAB 400 MG (241.3 MG ELEMENTAL MG) 400 MG: 400 (241.3 MG) TAB at 08:51

## 2021-06-13 RX ADMIN — MAGNESIUM OXIDE TAB 400 MG (241.3 MG ELEMENTAL MG) 400 MG: 400 (241.3 MG) TAB at 18:08

## 2021-06-13 NOTE — PHYSICAL THERAPY NOTE
Physical Therapy Evaluation: 25 minutes (16:10 to 16:35)    Patient's Name: Lendel Chamber    Admitting Diagnosis  CVA (cerebral vascular accident) [facility]) [I63 9]    Problem List  Patient Active Problem List   Diagnosis    Combined forms of age-related cataract of left eye    Type 2 diabetes mellitus with diabetic polyneuropathy ([facility] [address] )    Hyperlipidemia    Personal history of transient ischemic attack (TIA), and cerebral infarction without residual deficits    Family history of tobacco abuse    History of tobacco abuse    Arthritis    Essential hypertension    GERD (gastroesophageal reflux disease)    Iron deficiency anemia    Type 2 diabetes mellitus with stage 3 chronic kidney disease ([address] )    CVA (cerebral vascular accident) ([facility] [address] )    Nodule of upper lobe of right lung    Thyroid nodule greater than or equal to 1 5 cm in diameter incidentally noted on imaging study    Troponin level elevated       Past Medical History  Past Medical History:   Diagnosis Date    Anemia     Arthritis     Capsulitis of foot     Last assessed 07/29/13    Diabetes ([facility] [address] )     type 2    Full dentures     Ganglion     Last assessed 07/29/13    Hearing decreased     Hyperlipidemia     Hypertension     Magnesium deficiency     Sciatica     Wears glasses        Past Surgical History  Past Surgical History:   Procedure Laterality Date    CATARACT EXTRACTION      COLONOSCOPY      DILATION AND CURETTAGE OF UTERUS      x 4    HAND SURGERY Left     fx repaired w/wrist bone    HYSTERECTOMY      left ovary remains    KNEE ARTHROSCOPY Left     LIPOMA RESECTION      removed from back and left buttock    MN XCAPSL CTRC RMVL INSJ IO LENS PROSTH W/O ECP Right 1/24/2019    Procedure: EXTRACTION EXTRACAPSULAR CATARACT PHACO INTRAOCULAR LENS (IOL);   Surgeon: Leonela Galarza MD;  [facility] MAIN OR;  Service: Ophthalmology    MN XCAPSL CTRC RMVL INSJ IO LENS PROSTH W/O ECP Left 2/14/2019    Procedure: EXTRACTION EXTRACAPSULAR CATARACT PHACO INTRAOCULAR LENS (IOL); Surgeon: Nikunj Murry MD;  Location: [facility] MAIN OR;  Service: Ophthalmology    TONSILLECTOMY      and adenoids    TUBAL LIGATION            06/12/21 1610   PT Last Visit   PT Visit Date 06/12/21   Note Type   Note type Evaluation   Pain Assessment   Pain Assessment Tool Pain Assessment not indicated - pt denies pain   Home Living   Type of Home House  (4 PEDRITO BHR's (far apart) + doorstep)   Home Layout Two level; Able to live on main level with bedroom/bathroom;Stairs to enter with rails;Stairs to enter without rails  (Bed/bathroom 1st floor)   Bathroom Shower/Tub Tub/shower unit  (1st floor with grab bars)   Bathroom Toilet Standard   Bathroom Equipment Grab bars in [place] [id number] Other (Comment)  (RW)   Additional Comments Pt lives in a 2 story home and has a first floor set-up with bedroom and full bathroom  There is a full flight of steps to 2nd floor without HR's  Granddaughter's bedroom and the cats sleep on that floor  Pt infrequently goes upstairs to fill the cat feeder  Son and granddaughter will now do this  Prior Function   Level of [place] Independent with ADLs and functional mobility   Lives With Family  (Son and adult granddaughter live with pt, in her home   Receives Help From Family   ADL Assistance Independent   IADLs Independent  (Did laundry in basement and grocery shopped)   Comments Prior to admission, patient was independent with functional mobility, ambulated household distances with no assistive device and community distances with family assist   Pt is a +      Restrictions/Precautions   Weight Bearing Precautions Per Order No   Other Precautions Fall Risk;Visual impairment;Hard of hearing   General   Family/Caregiver Present No   Cognition   Arousal/Participation Cooperative   Attention Within functional limits   Orientation Level Oriented X4   Memory Within functional limits Following Commands Follows all commands and directions without difficulty   Light Touch   RLE Light Touch Grossly intact   LLE Light Touch Impaired  (Difficulty distinguishing between 2 and 3 toes)   Bed Mobility   Rolling R 7  Independent   Additional items   (Decreased time and effort)   Rolling L 7  Independent   Additional items   (Decreased time and effort)   Supine to Sit 6  Modified independent   Additional items Increased time required   Sit to Supine 6  Modified independent   Additional items Increased time required   Additional Comments HOB flat, no rail   Transfers   Sit to Stand 5  Supervision   Additional items Increased time required   Stand to Sit 5  Supervision   Additional items Increased time required   Stand pivot 5  Supervision  (SPT w/ SPC, SPT with no AD)   Additional items Increased time required  (Cues for [address] management)   Additional Comments Transfers: EOB, recliner chair, unsecured chair   Ambulation/Elevation   Gait pattern   (No LOB (but slight undsteadiness w/ no AD))   Gait Assistance 5  Supervision  (With [address] and no AD)   Assistive Device SPC; None   Distance With no AD, then with SPC-> 15 feet, 25 feet, 85 feet -> negotiated 5 turns/obstacles during 85 feet gait trials   (Decreased arm swing during gait)     Vitals-> After ambulating with no AD for 85 feet (seated):   /65, HR 60, SPO2 (RA) 96%     Stair Management Assistance   (Close S)   Additional items Verbal cues; Increased time required  (Cues for step to; but pt performed step over step)   Stair Management Technique Alternating pattern; One rail R;Foreward;Reciprocal  (Declined use of cane on steps)   Number of Stairs 4  (Slightly unsteady)   Curbs Doorstep Simulation (6 inch curb step placed in doorway)-> pt negotiated curb step with hand on door jamb for support with CG A-> No LOB; but slightly unsteady  (Performed 2 x)   Balance   Static Sitting Good   Dynamic Sitting Fair +   Static Standing Fair +  (With SPC and no AD)   Dynamic Standing Fair  (With SPC and no AD)   Ambulatory Fair  (With SPC and no AD)   Endurance Deficit   Endurance Deficit Yes  (Decreased endurance for activity)   Activity Tolerance   Activity Tolerance Patient tolerated treatment well   Assessment   Prognosis Good   Problem List Decreased strength;Decreased range of motion;Decreased endurance; Impaired balance;Decreased coordination;Decreased mobility; Impaired vision; Impaired hearing; Impaired judgement   Assessment Pt is a 64 y o  female seen for PT evaluation s/p admit to [facility] ([facility]) on 6/11/2021 with nausea and dizziness of unknown cause  After work-up pt found to have acute cerebellar CVA (cerebral vascular accident) ([facility] [address] )  CT of the head and neck showed 67% stenosis of the right vertebral artery and 60% stenosis of the left vertebral artery with occlusion of the right M2 branch  MRI of the brain showed small focus of diffusion restriction in inferior cerebellar vermis indicative of acute cerebellar lacunar infarction  PT completed an extensive review of pt's medical and social history  Comorbidities affecting patient's physical performance include: HTN, DM type 2, thyroid nodule (recent incidental finding on imaging study; recommending thyroid ultrasound), CKD stage 3, HLD, tobacco abuse, JAMES, GERD, nodule of upper lobe of right lung, and prior CVA of 1/2020 (with no prior deficits)  In summary, guiding factors including patient history, examination of body sytem(s), clinical presentation and clinical decision making were considered  Pt presents with comorbid conditions that impact function, comorbid conditions that may limit ability to progress, context of current functional limitations as compared to the prior level of function, impaired prior level of function, limited physical/social support, participation restrictions, with living environment deficits, and advanced age   Pt also presents with impaired: safety (slightly impulsive at times), skin condition, vision,  hearing ( decreased hearing in R ear), vital sign (low heart rate; post 85 feet ambulation trial), sensation, BLE MMT strength, functional strength, endurance for activity, sit and stand balance, bed mobility, transfers, and gait abilities  Functional mobility activities performed with no AD and a SPC  Slight unsteadiness assessed with no AD  Pt however is disinterested in using a cane at this time; stating " unless I absolutely have to"  May benefit from Standardized Balance Testing, to determine appropriateness of using an AD at this time  Pt in agreement with recommendation  Clinical presentation is with unstable and unpredictable characteristics  The assigned level of complexity is: High  Pt will benefit from skilled PT tx intervention to maximize safe mobility in prep for discharge  Barriers to Discharge Decreased caregiver support; Inaccessible home environment  (Below functional baseline)   Goals   Patient Goals " Walk without the cane"   LTG Expiration Date 06/23/21   Long Term Goal #1 See grid   PT Treatment Day 0   Plan   Treatment/Interventions ADL retraining;Functional transfer training;LE strengthening/ROM; Elevations; Therapeutic exercise; Endurance training;Cognitive reorientation;Patient/family training;Equipment eval/education; Bed mobility; Compensatory technique education;Gait training;Continued evaluation;Spoke to MD;Spoke to nursing;Spoke to case management;Spoke to advanced practitioner;OT;Family   PT Frequency   (6x/week for 1 5 weeks)   Recommendation   PT Discharge Recommendation Home with outpatient rehabilitation   Equipment Recommended   (To be determined)   Barthel Index   Feeding 10   Bathing 0   Grooming Score 5   Dressing Score 5   Bladder Score 10   Bowels Score 10   Toilet Use Score 5   Transfers (Bed/Chair) Score 10   Mobility (Level Surface) Score 10   Stairs Score 5   Barthel Index Score 70       Goals LTG 's achieved   within 1 5 weeks   Performance at Initial Evaluation (6/12/21) Current Performance   (last date completed)   Supine to sit transitions to increase ease of transfer, allow pt to get out of bed, and decrease caregiver burden   MOD I  HOB flat, no rails Supervision  HOB flat, no rails 6/12   Sit to supine transitions to increase ease of transfer, allow pt to get back into bed, and decrease caregiver burden   MOD I  HOB flat, no rails Supervision  HOB flat, no rails 6/12   Functional transfers to facilitate safe return to previous living environment     MOD I Sit <->stand S  SPT with SPC S  SPT with no AD S 6/12   Ambulation with No AD vs most appropriate AD, > or = 150 feet (for safe household distance ambulation)   MOD I Amb with SPC for 85 feet with  S    Amb with no AD for 85 feet with S   6/12   Ascend/descend 4 steps with right or left handrail, with device prn ( for safe access to previous living environment)    Pt has 4 PEDRITO home with BHR's far apart   MOD I Supervision  Negotiated 4 steps with RHR up 6/12   Ascend/descend a curb step, using appropriate AD and method, no handrails ( for safe access into and out of home via door step)   MOD I CG A  Performed curb step w/ hand on door jamb   No LOB; but slightly unsteady   6/12                     BLE MMT Strength  Bilateral hips flexion:3/5  Bilateral knees /:4-/5  Bilateral ankles DF/PF: 4-/5     Emily Sigala, PT

## 2021-06-13 NOTE — ASSESSMENT & PLAN NOTE
Continue Lipitor dose which was increased to 80 milligram p o   Daily  Cholesterol 152 and LDL 72

## 2021-06-13 NOTE — ASSESSMENT & PLAN NOTE
Patient has history of CVA on 01/20  Presented previous hospitalization with dizziness  CT of the head and neck showed 67% stenosis of the right vertebral artery and 60% stenosis of the left vertebral artery with occlusion of the right M2 branch  MRI of the brain showed small focus of diffusion restriction in inferior cerebellar vermis indicative of acute cerebellar lacunar infarction  Small chronic left frontal cortical infarction medially  Hemoglobin A1c 7 2  Continue aspirin, Lipitor and Plavix  Patient was loaded with Plavix 300 mg then continued on 75 milligram daily  Continue aspirin for 1 week following which it can be discontinue last day 6/13/2021  2D echo showed EF of 55-60% without any regional wall motion abnormalities, grade 1 diastolic dysfunction  2D echo was without bubble study    Neurology discussed with Cardiology who reported that there is no evidence of PFO or right-to-left shunt on the prior exam  Consult PT/OT  Patient will follow-up outpatient with Dr Swati Jimenes neurosurgery regarding basilar stenosis

## 2021-06-13 NOTE — PLAN OF CARE
Problem: PHYSICAL THERAPY ADULT  Goal: Performs mobility at highest level of function for planned discharge setting  See evaluation for individualized goals  Description: Treatment/Interventions: ADL retraining, Functional transfer training, LE strengthening/ROM, Elevations, Therapeutic exercise, Endurance training, Cognitive reorientation, Patient/family training, Equipment eval/education, Bed mobility, Compensatory technique education, Gait training, Continued evaluation, Spoke to MD, Spoke to nursing, Spoke to case management, Spoke to advanced practitioner, OT, Family  Equipment Recommended:  (To be determined)       See flowsheet documentation for full assessment, interventions and recommendations  6/13/2021 1820 by Salena Kaplan PT  Note: Prognosis: Good  Problem List: Decreased strength, Decreased range of motion, Decreased endurance, Impaired balance, Decreased coordination, Decreased mobility, Impaired vision, Impaired hearing, Impaired judgement  Assessment: Pt is a 64 y o  female seen for PT evaluation s/p admit to [facility] (TCF Unit) on 6/11/2021 with nausea and dizziness of unknown cause  After work-up pt found to have acute cerebellar CVA (cerebral vascular accident) ([facility] [address] )  CT of the head and neck showed 67% stenosis of the right vertebral artery and 60% stenosis of the left vertebral artery with occlusion of the right M2 branch  MRI of the brain showed small focus of diffusion restriction in inferior cerebellar vermis indicative of acute cerebellar lacunar infarction  PT completed an extensive review of pt's medical and social history  Comorbidities affecting patient's physical performance include: HTN, DM type 2, thyroid nodule (recent incidental finding on imaging study; recommending thyroid ultrasound), CKD stage 3, HLD, tobacco abuse, JAMES, GERD, and prior CVA of 1/2020 (with no prior deficits)  In summary, guiding factors including patient history, examination of body sytem(s), clinical presentation and clinical decision making were considered  Pt presents with comorbid conditions that impact function, comorbid conditions that may limit ability to progress, context of current functional limitations as compared to the prior level of function, impaired prior level of function, limited physical/social support, participation restrictions, with living environment deficits, and advanced age  Pt also presents with impaired: safety (slightly impulsive at times), skin condition, vision,  hearing ( decreased hearing in R ear), vital sign (low heart rate; post 85 feet ambulation trial), BLE MMT strength, functional strength, endurance for activity, sit and stand balance, bed mobility, transfers, and gait abilities  Functional mobility activities performed with no AD and a SPC  Slight unsteadiness assessed with no AD  Pt however is disinterested in using a cane at this time; stating " unless I absolutely have to"  May benefit from Standardized Balance Testing, to determine appropriateness of using an AD at this time  Pt in agreement with recommendation  Clinical presentation is with unstable and unpredictable characteristics  The assigned level of complexity is: High  Pt will benefit from skilled PT tx intervention to maximize safe mobility in prep for discharge  Barriers to Discharge: Decreased caregiver support, Inaccessible home environment (Below functional baseline)        PT Discharge Recommendation: Home with outpatient rehabilitation          See flowsheet documentation for full assessment       6/13/2021 1819 by Donny Dominique PT  Outcome: Progressing  Note: Prognosis: Good  Problem List: Decreased strength, Decreased range of motion, Decreased endurance, Impaired balance, Decreased coordination, Decreased mobility, Impaired vision, Impaired hearing, Impaired judgement  Assessment: Pt is a 64 y o  female seen for PT evaluation s/p admit to [facility] (TCF Unit) on 6/11/2021 with nausea and dizziness of unknown cause  After work-up pt found to have acute cerebellar CVA (cerebral vascular accident) ([facility] [address] )  CT of the head and neck showed 67% stenosis of the right vertebral artery and 60% stenosis of the left vertebral artery with occlusion of the right M2 branch  MRI of the brain showed small focus of diffusion restriction in inferior cerebellar vermis indicative of acute cerebellar lacunar infarction  PT completed an extensive review of pt's medical and social history  Comorbidities affecting patient's physical performance include: HTN, DM type 2, thyroid nodule (recent incidental finding on imaging study; recommending thyroid ultrasound), CKD stage 3, HLD, tobacco abuse, JAMES, GERD, and prior CVA of 1/2020 (with no prior deficits)  In summary, guiding factors including patient history, examination of body sytem(s), clinical presentation and clinical decision making were considered  Pt presents with comorbid conditions that impact function, comorbid conditions that may limit ability to progress, context of current functional limitations as compared to the prior level of function, impaired prior level of function, limited physical/social support, participation restrictions, with living environment deficits, and advanced age  Pt also presents with impaired: safety (slightly impulsive at times), skin condition, vision,  hearing ( decreased hearing in R ear), vital sign (low heart rate; post 85 feet ambulation trial), BLE MMT strength, functional strength, endurance for activity, sit and stand balance, bed mobility, transfers, and gait abilities  Functional mobility activities performed with no AD and a SPC  Slight unsteadiness assessed with no AD  Pt however is disinterested in using a cane at this time; stating " unless I absolutely have to"  May benefit from Standardized Balance Testing, to determine appropriateness of using an AD at this time  Pt in agreement with recommendation  Clinical presentation is with unstable and unpredictable characteristics  The assigned level of complexity is: High  Pt will benefit from skilled PT tx intervention to maximize safe mobility in prep for discharge  Barriers to Discharge: Decreased caregiver support, Inaccessible home environment (Below functional baseline)        PT Discharge Recommendation: Home with outpatient rehabilitation          See flowsheet documentation for full assessment

## 2021-06-13 NOTE — PROGRESS NOTES
Medication Regimen Review (MRR)    To promote positive health outcomes and reduce adverse consequences the patient's medication therapy has been reviewed by a pharmacist for the following potential problems:   1   documented indication and therapeutic benefits  2   appropriate dose, frequency, route, and duration of therapy  3   medication interactions, side effects, and allergies  4   medication or transcription errors  Medications are also reviewed for appropriate monitoring, duplicate therapy, and dose reduction  Based on the review please see the following recommendations  Patient information:    The patient is 64 y o  admitted for diabetes, anemia, hypertension, GERD, hyperlipidemia, and aftercare following acute ischemic stroke  Wt Readings from Last 1 Encounters:   06/13/21 60 3 kg (132 lb 15 oz)       Lab Results   Component Value Date    GLUCOSE 172 (H) 11/22/2017    CALCIUM 9 4 06/08/2021     11/22/2017    K 4 2 06/08/2021    CO2 30 06/08/2021     06/08/2021    BUN 24 06/08/2021    CREATININE 1 26 06/08/2021         Recommendations: There are no recommendations from the pharmacy department at this time      Van Gore, Pharmacist  [phone]

## 2021-06-14 LAB
GLUCOSE SERPL-MCNC: 177 MG/DL (ref 65–140)
GLUCOSE SERPL-MCNC: 196 MG/DL (ref 65–140)
GLUCOSE SERPL-MCNC: 216 MG/DL (ref 65–140)
GLUCOSE SERPL-MCNC: 257 MG/DL (ref 65–140)

## 2021-06-14 PROCEDURE — 97535 SELF CARE MNGMENT TRAINING: CPT

## 2021-06-14 PROCEDURE — 82948 REAGENT STRIP/BLOOD GLUCOSE: CPT

## 2021-06-14 PROCEDURE — 97110 THERAPEUTIC EXERCISES: CPT

## 2021-06-14 PROCEDURE — 97112 NEUROMUSCULAR REEDUCATION: CPT

## 2021-06-14 PROCEDURE — 97530 THERAPEUTIC ACTIVITIES: CPT

## 2021-06-14 RX ORDER — MAGNESIUM OXIDE 400 MG/1
TABLET ORAL
Qty: 180 TABLET | Refills: 3 | Status: SHIPPED | OUTPATIENT
Start: 2021-06-14 | End: 2022-07-05

## 2021-06-14 RX ADMIN — METFORMIN HYDROCHLORIDE 1000 MG: 500 TABLET ORAL at 08:11

## 2021-06-14 RX ADMIN — MAGNESIUM OXIDE TAB 400 MG (241.3 MG ELEMENTAL MG) 400 MG: 400 (241.3 MG) TAB at 08:11

## 2021-06-14 RX ADMIN — FERROUS SULFATE TAB 325 MG (65 MG ELEMENTAL FE) 325 MG: 325 (65 FE) TAB at 08:11

## 2021-06-14 RX ADMIN — ENOXAPARIN SODIUM 30 MG: 30 INJECTION SUBCUTANEOUS at 08:12

## 2021-06-14 RX ADMIN — CLOPIDOGREL BISULFATE 75 MG: 75 TABLET, FILM COATED ORAL at 08:11

## 2021-06-14 RX ADMIN — LISINOPRIL 40 MG: 20 TABLET ORAL at 21:18

## 2021-06-14 RX ADMIN — INSULIN LISPRO 2 UNITS: 100 INJECTION, SOLUTION INTRAVENOUS; SUBCUTANEOUS at 12:15

## 2021-06-14 RX ADMIN — PANTOPRAZOLE SODIUM 40 MG: 40 TABLET, DELAYED RELEASE ORAL at 06:13

## 2021-06-14 RX ADMIN — METFORMIN HYDROCHLORIDE 1000 MG: 500 TABLET ORAL at 16:35

## 2021-06-14 RX ADMIN — INSULIN LISPRO 2 UNITS: 100 INJECTION, SOLUTION INTRAVENOUS; SUBCUTANEOUS at 08:12

## 2021-06-14 RX ADMIN — ATORVASTATIN CALCIUM 80 MG: 80 TABLET, FILM COATED ORAL at 17:28

## 2021-06-14 RX ADMIN — INSULIN LISPRO 1 UNITS: 100 INJECTION, SOLUTION INTRAVENOUS; SUBCUTANEOUS at 21:36

## 2021-06-14 RX ADMIN — INSULIN LISPRO 1 UNITS: 100 INJECTION, SOLUTION INTRAVENOUS; SUBCUTANEOUS at 17:28

## 2021-06-14 RX ADMIN — MAGNESIUM OXIDE TAB 400 MG (241.3 MG ELEMENTAL MG) 400 MG: 400 (241.3 MG) TAB at 17:28

## 2021-06-14 NOTE — PHYSICAL THERAPY NOTE
PT TREATMENT  [id number] (25 MINUTES)       06/14/21 4534   PT Last Visit   PT Visit Date 06/14/21   Note Type   Note Type Treatment   Pain Assessment   Pain Assessment Tool 0-10   Pain Score No Pain   Restrictions/Precautions   Other Precautions Airborne/isolation;Contact/isolation   General   Chart Reviewed Yes   Response to Previous Treatment Patient with no complaints from previous session  Family/Caregiver Present No   Cognition   Overall Cognitive Status WFL   Arousal/Participation Alert   Attention Within functional limits   Orientation Level Oriented X4   Memory Within functional limits   Following Commands Follows all commands and directions without difficulty   Subjective   Subjective "I WANT TO BE ABLE TO DO THINGS ON MY OWN WITHOUT RINGING FOR THE NURSE"   Bed Mobility   Supine to Sit 7  Independent   Sit to Supine 7  Independent   Transfers   Sit to Stand 7  Independent   Stand to Sit 7  Independent   Additional Comments PATIENT PERFORMED > 10 SIT<-->STAND TRANSFERS FROM CHAIR (W/ AND W/O USE OF ARM RESTS) INDEPENDENTLY THROUGHTOUT TREATMENT SESSION  Ambulation/Elevation   Gait pattern WNL   Gait Assistance 7  Independent   Assistive Device None   Distance 5 FEET X 2 + 10 FEET X2 + 10 FEET X 2 (<--NORMAL WALKING)   ALSO AMBULATED PERFORMING DYANMIC BALANCE TASKS (APPROX 60 MORE FEET)    Balance   Static Sitting Normal   Dynamic Sitting Normal   Static Standing Good   Dynamic Standing Good   Ambulatory Fair   Higher level balance Tandem   Higher level balance rep range   (PLEASE SEE "NEURO ED" SECTION OF NOTE )   Endurance Deficit   Endurance Deficit Yes   Endurance Deficit Description REPORTS FATIGUE IN B/L LE- HAS NOT BEEN AMBULATING [facility]    Activity Tolerance   Activity Tolerance Patient tolerated treatment well   Nurse Made Aware F/U WITH RN POST TREAT- AWARE THAT PT IS RECOMMENDING PATIENT MOBILIE INDEPENDENLTY IN ROOM AT THIS TIME    Exercises   Neuro re-ed TO ASSESS FOR FALLS RISK PATIENT PARTICIPATED IN TIMED UP AND GO (TUG) W/O USE OF AD AND SCORED 13 SECONDS 1ST ATTEMPT AND 11 SECONDS 2ND ATTEMPT (>13 5 SECONDS INDICATIVE OF FALLS RISK)  SHE ALSO PERFORMED THE 5 TIME SIT TO STAND W/O USE OF ARMS ON ARMRESTS IN 11 SECONDS (>15 SECONDS INDICATIVE OF FALLS RISK)  SHE PARTICIPATED IN THE FOLLOWING STANDING BALANCE ACTIVITIES: FEET TOGETHER WITH EYES OPENED MAITAINED 30 SECONDS W/O LOB, STANDING WITH FEET TOGETHER AND EYES CLOSED MAINTAINED X 30 SECONDS W/ INCREASED LATERAL SWAY HOWEVER NO LOB, STANDING WITH R FOOT IN TANDEM STANCE WITH EYES OPENED MAINTAINED X 12 SECONDS BUT THEN LOB W/ SIDE STEP, AND TANDEM STAND WITH L LEG IN FRONT X 9 SECONDS THEN HAD LOB W/ SIDE STEP  DYNAMIC BALANCE TASKS: AMBULATED 20 FEET SIDE STEPPING W/O LOB, AMBULATED 20 FEET BACKWARDS W/O LOB HOWEVER GAIT SPEED REDUCED, AND AMBULATED 20 FEET TANDEM STYLE REQUIRING CONTACT GUARD ASSISTANCE FOR LOB  PATIENT ALSO DEMONSTRATED ABILITY TO WALK 20 FEET AND NEGOTIATE TURNS WHILE HOLDING HANDFUL OF ITEMS WITHOUT DIFFICULTY  ABLE TO PICK ITEM OFF FROM FLOOR W/O LOB  Assessment   Prognosis Good   Problem List Impaired balance;Decreased mobility; Decreased strength   Assessment PT INITIATED TREATMENT SESSION IN ORDER TO ASSIST PATIENT IN ACHIEVING GOALS TO IMPROVE TRANSFERS, AMBULATION, AND BALANCE  PATIENT RECEIVED OOB IN CHAIR  DEMONSTRATED ABILITY TO PERFORM SIT<-->STAND TRANSFERS AND AMBULATION W/O USE OF AD INDEPENDENTLY  AMBULATED HOUSEHOLD DISTANCE  PATIENT PARTICIPATED IN DYNAMIC STANDING AND AMBULATING BALANCE ACTIVITIES  DID REQUIRE CONTACT GUARD ASSISTANCE FOR DYNAMIC AMBULATORY TASKS (IE  TANDEM WALKING)  BASED ON FALLS RISK ASSESSMENTS (TUG AND 5 TIME SIT TO STAND) PATIENT IS NOT A FALLS RISK)  IN FUTURE SESSIONS PLAN TO INCREASE PATIENT PARTICIPATION IN COMMUNITY LEVEL AMBULATION AND STAIRS  PT D/C RECOMMENDATION REMAINS FOR REHAB   PATIENT WILL BENEFIT FROM CONTINUED SKILLED PT THIS ADMISSION TO ACHIEVE MAXIMAL FUNCTION AND SAFETY  Goals   Patient Goals "I FEEL NORMAL JUST MORE TIRED"   LTG Expiration Date 06/23/21   Long Term Goal #1 SEE GRID   Plan   Treatment/Interventions LE strengthening/ROM; Elevations; Therapeutic exercise; Endurance training;Equipment eval/education; Bed mobility;Gait training;OT;Spoke to nursing   Progress Progressing toward goals   PT Frequency   (6X/WEEK FOR 1 5 WEEKS)   Recommendation   PT Discharge Recommendation Home with outpatient rehabilitation   Equipment Recommended   (NONE)        Goals LTG 's achieved   within 1 5 weeks    Performance at Initial Evaluation (6/12/21) Current Performance   (last date completed)   Supine to sit transitions to increase ease of transfer, allow pt to get out of bed, and decrease caregiver burden    MOD I  HOB flat, no rails Supervision  HOB flat, no rails 6/12   Sit to supine transitions to increase ease of transfer, allow pt to get back into bed, and decrease caregiver burden    MOD I  HOB flat, no rails Supervision  HOB flat, no rails 6/12   Functional transfers to facilitate safe return to previous living environment     MOD I Sit <->stand S  SPT with SPC S  SPT with no AD S 6/13   Ambulation with No AD vs most appropriate AD, > or = 150 feet (for safe household distance ambulation)    MOD I Amb with SPC for 85 feet with  S     Amb with no AD for 85 feet with S    6/13   Ascend/descend 4 steps with right or left handrail, with device prn ( for safe access to previous living environment)     Pt has 4 PEDRITO home with BHR's far apart    MOD I Supervision  Negotiated 4 steps with RHR up 6/12   Ascend/descend a curb step, using appropriate AD and method, no handrails ( for safe access into and out of home via door step)    MOD I CG A  Performed curb step w/ hand on door jamb   No LOB; but slightly unsteady    6/12     MOHAN DAMON PT, DPT

## 2021-06-14 NOTE — OCCUPATIONAL THERAPY NOTE
Occupational Therapy Treatment Note    Name:  Julia Baugh   MRN:   [id number]  Age:     64 y o  Patient Active Problem List   Diagnosis    Combined forms of age-related cataract of left eye    Type 2 diabetes mellitus with diabetic polyneuropathy ([facility] )    Hyperlipidemia    Personal history of transient ischemic attack (TIA), and cerebral infarction without residual deficits    Family history of tobacco abuse    History of tobacco abuse    Arthritis    Essential hypertension    GERD (gastroesophageal reflux disease)    Iron deficiency anemia    Type 2 diabetes mellitus with stage 3 chronic kidney disease ([facility]    CVA (cerebral vascular accident) ([facility] )    Nodule of upper lobe of right lung    Thyroid nodule greater than or equal to 1 5 cm in diameter incidentally noted on imaging study    Troponin level elevated     CVA (cerebral vascular accident) ([facility] ) [I63 9]      Subjective/Goals: "to get back to my home"    Vitals: stable     Pain: no pain     Treatment Time: (972-0277) 53 min    Cognition: A&Ox4    Precautions: fall risk    EVALUATION information:   OT Goal expiration date: 6/19/21   Treatment day: 2   Discharge recommendation: no rehab needs (OP PT for balance training)   HOME SET UP:  o House- 2 level (able to live on main level with bed/bath)  o 4 PEDRITO BL HR and FF to 2nd floor + basement  o Tub/shower unit + GB   o Lives wih son and assist from family    Patient education: Patient ed on safety and fall prevention  Patient also demonstrating good awareness to rest needs and self pacing with session-- ed to continue to encourage patient to remain cautious of her limitations and awareness to symptoms when "not feeling well"  Additional comments: Pt is a 64 y o  female seen for OT evaluation s/p admit to [facility] on 6/11/2021 w/ CVA (cerebral vascular accident) ([facility] )   CT of the head and neck showed 67% stenosis of the right vertebral artery and 60% stenosis of the left vertebral artery with occlusion of the right M2 branch  MRI of the brain showed small focus of diffusion restriction in inferior cerebellar vermis indicative of acute cerebellar lacunar infarction  Assessment/Additional session details:  Patient seen this date for OT with focus on goals as set by OTR  Patient plans are to d/c home with family however she was very independent PTA  Patient states her big goal is to get to a senior living party at the end of the month and although she makes something to take she is also planning to make "not as much"-- demonstrating good awareness to needs & personal limitations  Patient completed mobility in room without AD and no present issues noted  Patient washed and dressed self without assist this AM per patient report with nursing confirmation  Patient requesting an independent sign with ed on process to obtain sign while on unit-- patient understanding and receptive  Overall patient does very well  Continue OT at this time to max tolerance and safety with goals below  At end of session patient remains in room with all needs within reach        Goals LTG achieved within 1 week  Performance at Initial Evaluation 6/12/2021  Current Performance (last date completed)   Grooming while standing at sink Carissa/I  6/14 MI seated  6/14 MI    ADL transfers  Carissa/I  6/14 Supervision  6/14 STS and SPT MI (good safety noted)   Bathroom mobility with appropriate AD Carissa/I  6/14 Supervision  6/14 MI without AD   LB ADL, AE PRN Carissa/I  6/14 Supervision  6/14 MI   Toileting/clothing management and hygiene Carissa/I  6/14 Supervision  6/14 MI   Dynamic standing balance for increased safety when completing purposeful tasks F+ F  6/14 Fair/Fair+   Increase standing tolerance for inc'd safety with standing purposeful tasks >10 min   6/14 3-4 min   6/14 10+ min with tasks in room    Participate in therex 1-3x/week for inc'd overall stamina/activity tolerance for purposeful tasks To be completed   6/14 6/14 2# weighted ball in all planes while standing 2x10    Tub/shower   Carissa/I Supervision (12")     Item retrieval for inc'd independence and safety negotiating home environment Carissa/I    6/14 Supervision without LOB and good safety-- no AD   Household management/Pet care (FF to basement where laundry is located) Carissa/I  6/14 6/14 simulated MI pet care     Andre Lloyd  6/14/2021

## 2021-06-14 NOTE — UTILIZATION REVIEW
Notification of Discharge   This is a Notification of Discharge from our facility [address]  Please be advised that this patient has been discharge from our facility  Below you will find the admission and discharge date and time including the patients disposition  UTILIZATION REVIEW CONTACT:  Sergio Fowler  Utilization   Network Utilization Review Department  Phone: [phone] x carefully listen to the prompts  All voicemails are confidential   Email: [email or web address]     PHYSICIAN ADVISORY SERVICES:  FOR HOOS-ID-EAEJ REVIEW - MEDICAL NECESSITY DENIAL  Phone: [phone]  Fax: [phone]  Email: [email or web address]"     PRESENTATION DATE: 6/6/2021  7:43 PM  OBERVATION ADMISSION DATE:   INPATIENT ADMISSION DATE: 6/6/21  9:12 PM   DISCHARGE DATE: 6/11/2021  3:15 PM  DISPOSITION: Released to SNF/TCU/SNU Facility Released to SNF/TCU/SNU Facility      IMPORTANT INFORMATION:  Send all requests for admission clinical reviews, approved or denied determinations and any other requests to dedicated fax number below belonging to the campus where the patient is receiving treatment   List of dedicated fax numbers:  [address] [place] [address] DENIALS (Administrative/Medical Necessity) [phone]   [address] [place] [address] (Maternity/NICU/Pediatrics) [phone]   Marko Muñoz [phone]   [place] Lizz [phone]   Eddie De La Cruz [phone]   [facility] [address] [facility] [phone]   [facility]  [phone]   [address] [place] [address], [address]  [address] [facility] [address] [facility] [phone]

## 2021-06-14 NOTE — PLAN OF CARE
Problem: OCCUPATIONAL THERAPY ADULT  Goal: Performs self-care activities at highest level of function for planned discharge setting  See evaluation for individualized goals  Description: Treatment Interventions: ADL retraining, Functional transfer training, UE strengthening/ROM, Endurance training, Patient/family training, Equipment evaluation/education, Neuromuscular reeducation, Energy conservation, Activityengagement          See flowsheet documentation for full assessment, interventions and recommendations     Outcome: Progressing  Note: Limitation: Decreased ADL status, Decreased UE ROM, Decreased UE strength, Decreased endurance, Decreased high-level ADLs (balance deficits )  Prognosis: Good  Assessment: see note      OT Discharge Recommendation: No rehabilitation needs (OP PT for balance training )

## 2021-06-14 NOTE — PLAN OF CARE
Problem: PHYSICAL THERAPY ADULT  Goal: Performs mobility at highest level of function for planned discharge setting  See evaluation for individualized goals  Description: Treatment/Interventions: ADL retraining, Functional transfer training, LE strengthening/ROM, Elevations, Therapeutic exercise, Endurance training, Cognitive reorientation, Patient/family training, Equipment eval/education, Bed mobility, Compensatory technique education, Gait training, Continued evaluation, Spoke to MD, Spoke to nursing, Spoke to case management, Spoke to advanced practitioner, OT, Family  Equipment Recommended:  (To be determined)       See flowsheet documentation for full assessment, interventions and recommendations  Outcome: Progressing  Note: Prognosis: Good  Problem List: Impaired balance, Decreased mobility, Decreased strength  Assessment: PT INITIATED TREATMENT SESSION IN ORDER TO ASSIST PATIENT IN ACHIEVING GOALS TO IMPROVE TRANSFERS, AMBULATION, AND BALANCE  PATIENT RECEIVED OOB IN CHAIR  DEMONSTRATED ABILITY TO PERFORM SIT<-->STAND TRANSFERS AND AMBULATION W/O USE OF AD INDEPENDENTLY  AMBULATED HOUSEHOLD DISTANCE  PATIENT PARTICIPATED IN DYNAMIC STANDING AND AMBULATING BALANCE ACTIVITIES  DID REQUIRE CONTACT GUARD ASSISTANCE FOR DYNAMIC AMBULATORY TASKS (IE  TANDEM WALKING)  BASED ON FALLS RISK ASSESSMENTS (TUG AND 5 TIME SIT TO STAND) PATIENT IS NOT A FALLS RISK)  IN FUTURE SESSIONS PLAN TO INCREASE PATIENT PARTICIPATION IN COMMUNITY LEVEL AMBULATION AND STAIRS  PT D/C RECOMMENDATION REMAINS FOR REHAB  PATIENT WILL BENEFIT FROM CONTINUED SKILLED PT THIS ADMISSION TO ACHIEVE MAXIMAL FUNCTION AND SAFETY  Barriers to Discharge: Decreased caregiver support, Inaccessible home environment (Below functional baseline)        PT Discharge Recommendation: Home with outpatient rehabilitation          See flowsheet documentation for full assessment

## 2021-06-15 LAB
GLUCOSE SERPL-MCNC: 170 MG/DL (ref 65–140)
GLUCOSE SERPL-MCNC: 182 MG/DL (ref 65–140)
GLUCOSE SERPL-MCNC: 223 MG/DL (ref 65–140)
GLUCOSE SERPL-MCNC: 275 MG/DL (ref 65–140)

## 2021-06-15 PROCEDURE — 97530 THERAPEUTIC ACTIVITIES: CPT

## 2021-06-15 PROCEDURE — 97110 THERAPEUTIC EXERCISES: CPT

## 2021-06-15 PROCEDURE — 97116 GAIT TRAINING THERAPY: CPT

## 2021-06-15 PROCEDURE — 82948 REAGENT STRIP/BLOOD GLUCOSE: CPT

## 2021-06-15 RX ADMIN — ENOXAPARIN SODIUM 30 MG: 30 INJECTION SUBCUTANEOUS at 08:32

## 2021-06-15 RX ADMIN — ATORVASTATIN CALCIUM 80 MG: 80 TABLET, FILM COATED ORAL at 17:06

## 2021-06-15 RX ADMIN — INSULIN LISPRO 1 UNITS: 100 INJECTION, SOLUTION INTRAVENOUS; SUBCUTANEOUS at 17:06

## 2021-06-15 RX ADMIN — MAGNESIUM OXIDE TAB 400 MG (241.3 MG ELEMENTAL MG) 400 MG: 400 (241.3 MG) TAB at 08:32

## 2021-06-15 RX ADMIN — PANTOPRAZOLE SODIUM 40 MG: 40 TABLET, DELAYED RELEASE ORAL at 05:55

## 2021-06-15 RX ADMIN — METFORMIN HYDROCHLORIDE 1000 MG: 500 TABLET ORAL at 07:55

## 2021-06-15 RX ADMIN — METFORMIN HYDROCHLORIDE 1000 MG: 500 TABLET ORAL at 17:06

## 2021-06-15 RX ADMIN — INSULIN LISPRO 2 UNITS: 100 INJECTION, SOLUTION INTRAVENOUS; SUBCUTANEOUS at 07:55

## 2021-06-15 RX ADMIN — INSULIN LISPRO 3 UNITS: 100 INJECTION, SOLUTION INTRAVENOUS; SUBCUTANEOUS at 12:20

## 2021-06-15 RX ADMIN — MAGNESIUM OXIDE TAB 400 MG (241.3 MG ELEMENTAL MG) 400 MG: 400 (241.3 MG) TAB at 17:07

## 2021-06-15 RX ADMIN — CLOPIDOGREL BISULFATE 75 MG: 75 TABLET, FILM COATED ORAL at 08:32

## 2021-06-15 NOTE — PHYSICAL THERAPY NOTE
40 minute treatment       06/15/21 1120   PT Last Visit   PT Visit Date 06/15/21   Note Type   Note Type Treatment   Pain Assessment   Pain Assessment Tool Pain Assessment not indicated - pt denies pain   Pain Score No Pain   Restrictions/Precautions   Weight Bearing Precautions Per Order No   Other Precautions Contact/isolation   General   Chart Reviewed Yes   Response to Previous Treatment Patient with no complaints from previous session  Family/Caregiver Present No   Cognition   Overall Cognitive Status WFL   Following Commands Follows all commands and directions without difficulty   Subjective   Subjective Pt reports she is amb within room herself   Transfers   Sit to Stand 7  Independent   Stand to Sit 7  Independent   Stand pivot 6  Modified independent   Ambulation/Elevation   Gait Assistance   (Independentfor 15'; Supervision for 50')   Assistive Device None   Distance 50' x 2   Stair Management Assistance   (CS)   Stair Management Technique One rail R;Alternating pattern; Step to pattern; Foreward  (alternating step up; step to down)   Number of Stairs 4;  1 curb step at door frame w/ Superversion  performed 2x   Balance   Ambulatory Fair   Activity Tolerance   Activity Tolerance Patient tolerated treatment well   Exercises   Hamstring Sets   (ham curls w/ tband x 30 reps)   Hip Abduction   (w/ tband x 30)   Hip Adduction   (ball squeeze x 30)   Balance training  ball toss at wall x 2 minutes w/ Supervision; repeated sit to stand x 10; cone taps w/ U/L support x 10 each; cone weaving; picking cones up from floor w/ SUpervision; tandem walking w/ CS; backwards walking   Assessment   Prognosis Good   Problem List Impaired balance;Decreased mobility; Decreased strength   Assessment Pt seen for PT treatment session  Pt  agreeable to PT  Pts gait is steady w/o AD  Pt did well on stairs, and with balance activities  1 slight LOB as pt was trying to step over a cone, instead of walking around it       Barriers to Discharge Decreased caregiver support; Inaccessible home environment   Goals   Patient Goals none stated   LTG Expiration Date 06/23/21   Plan   Treatment/Interventions   (COntinue per plan of care)   Progress Progressing toward goals   PT Frequency   (6x/week for 1 5 weeks)     Goals LTG 's achieved   within 1 5 weeks    Performance at Initial Evaluation (6/12/21) Current Performance   (last date completed)   Supine to sit transitions to increase ease of transfer, allow pt to get out of bed, and decrease caregiver burden    MOD I  HOB flat, no rails Supervision  HOB flat, no rails 6/12   Sit to supine transitions to increase ease of transfer, allow pt to get back into bed, and decrease caregiver burden    MOD I  HOB flat, no rails Supervision  HOB flat, no rails 6/12   Functional transfers to facilitate safe return to previous living environment     MOD I Sit <->stand S  SPT with SPC S  SPT with no AD S 6/15   Ambulation with No AD vs most appropriate AD, > or = 150 feet (for safe household distance ambulation)    MOD I Amb with SPC for 85 feet with  S     Amb with no AD for 85 feet with S    6/15   Ascend/descend 4 steps with right or left handrail, with device prn ( for safe access to previous living environment)     Pt has 4 PEDRITO home with BHR's far apart    MOD I Supervision  Negotiated 4 steps with RHR up 6/15   Ascend/descend a curb step, using appropriate AD and method, no handrails ( for safe access into and out of home via door step)    MOD I CG A  Performed curb step w/ hand on door jamb   No LOB; but slightly unsteady    6/15     Rose Mata, PTA

## 2021-06-15 NOTE — OCCUPATIONAL THERAPY NOTE
Occupational Therapy Treatment Note     Name:  Oanh Reed   MRN:   [id number]  Age:     64 y o  Patient Active Problem List   Diagnosis    Combined forms of age-related cataract of left eye    Type 2 diabetes mellitus with diabetic polyneuropathy ([facility] )    Hyperlipidemia    Personal history of transient ischemic attack (TIA), and cerebral infarction without residual deficits    Family history of tobacco abuse    History of tobacco abuse    Arthritis    Essential hypertension    GERD (gastroesophageal reflux disease)    Iron deficiency anemia    [facility] with [facility] 3 [facility]    CVA (cerebral vascular accident) ([facility] )    Nodule of upper lobe of right lung    Thyroid nodule greater than or equal to 1 5 cm in diameter incidentally noted on imaging study    Troponin level elevated      CVA (cerebral vascular accident) ([facility] ) [I63 9]        Subjective/Goals: "I think I am doing good"-- when asked of concerns for home she indicated feeling she was ready for a d/c and agreeable to ed for OP therapy for balance      Vitals: stable     Pain: no reported pain      Treatment Time: (860-2310) 55 minutes     Cognition: A&Ox4     Precautions: fall risk     EVALUATION information:  · OT Goal expiration date: 6/19/21  · Treatment day: 3  · Discharge recommendation: no rehab needs (OP PT for balance training)  · HOME SET UP:  ? House- 2 level (able to live on main level with bed/bath)  ? 4 PEDRITO BL HR and FF to 2nd floor + basement  ? Tub/shower unit + GB   ? Lives wih son and assist from family     Patient education: patient ed on safety and importance to slowing pace along with taking more caution with turns teri when fatigued  Patient without LOB with all functional tasks presented  Ed on recommendations for OP therapy vs home to further address higher level balance challenges        Additional comments: Pt is a 64 y  o  female seen for OT evaluation s/p admit to [facility] [place] 6/11/2021 w/ CVA (cerebral vascular accident) ([facility])  CT of the head and neck showed 67% stenosis of the right vertebral artery and 60% stenosis of the left vertebral artery with occlusion of the right M2 branch  MRI of the brain showed small focus of diffusion restriction in inferior cerebellar vermis indicative of acute cerebellar lacunar infarction      Assessment/Additional session details: Patient seen this date for OT with focus on goals as set by OTR  Patient agreeable to skilled OT session with focus on ADL's (bathing, dressing, toileting), Transfers (STS, SPT), Standing tolerance/balance, Strength/ROM/HEP, Tub/shower transfers, Home management, Activity tolerance, Education on safety, fall prevention and energy conservation techniques, Item retrieval/safe transport with DME ed and Bathroom/kitchen/home mobility  Barriers to treatment include higher level  balance  and standing tolerance for higher level IADL tasks  Family to complete IADL's with patient plans to manage her own shopping along with pet management  Patient educated on safe functional transfers techniques, the appropriate use of AE/DME to improve functional performance, and activity modification techniques for energy conservation  Plans for d/c are home with home OT and family support    At end of session patient remains in room seated at recliner with all needs within reach          Goals LTG achieved within 1 week  Performance at Initial Evaluation 6/12/2021  Current Performance (last date completed)   Grooming while standing at sink Carissa/I  6/14, 6/15 MI seated  6/15 MI    ADL transfers  Carissa/I  6/14, 6/15 Supervision  6/15 STS and SPT MI (good safety noted)   Bathroom mobility with appropriate AD Carissa/I  6/14, 6/15 Supervision  6/15 MI without AD   LB ADL, AE PRN Carissa/I  6/14, 6/15 Supervision  6/15 MI   Toileting/clothing management and hygiene Carissa/I  6/14, 6/15 Supervision  6/15 MI   Dynamic standing balance for increased safety when completing purposeful tasks F+  6/15 F 6/15 Fair+   6/14 Fair/Fair+   Increase standing tolerance for inc'd safety with standing purposeful tasks >10 min   6/14, 6/15 3-4 min   6/15 10+ min with tasks in room    Participate in therex 1-3x/week for inc'd overall stamina/activity tolerance for purposeful tasks To be completed   6/14, 6/15    6/15 3# hand weight in all planes while standing 2x10    Tub/shower   Carissa/I  6/15 Supervision (12")  6/15 MI   Item retrieval for inc'd independence and safety negotiating home environment Carissa/I  6/15   6/15 MI   6/14 Supervision without LOB and good safety-- no AD   Household management/Pet care (FF to basement where laundry is located) Carissa/I  6/14, 6/15   6/15 MI simulated in room   6/14 simulated MI pet care      Heraclio Kim  6/15/2021

## 2021-06-15 NOTE — PLAN OF CARE
Problem: PHYSICAL THERAPY ADULT  Goal: Performs mobility at highest level of function for planned discharge setting  See evaluation for individualized goals  Description: Treatment/Interventions: ADL retraining, Functional transfer training, LE strengthening/ROM, Elevations, Therapeutic exercise, Endurance training, Cognitive reorientation, Patient/family training, Equipment eval/education, Bed mobility, Compensatory technique education, Gait training, Continued evaluation, Spoke to MD, Spoke to nursing, Spoke to case management, Spoke to advanced practitioner, OT, Family  Equipment Recommended:  (To be determined)       See flowsheet documentation for full assessment, interventions and recommendations  Outcome: Progressing  Note: Prognosis: Good  Problem List: Impaired balance, Decreased mobility, Decreased strength  Assessment: Pt seen for PT treatment session  Pt  agreeable to PT  Pts gait is steady w/o AD  Pt did well on stairs, and with balance activities  1 slight LOB as pt was trying to step over a cone, instead of walking around it  Barriers to Discharge: Decreased caregiver support, Inaccessible home environment        PT Discharge Recommendation: Home with outpatient rehabilitation          See flowsheet documentation for full assessment

## 2021-06-15 NOTE — CASE MANAGEMENT
PRATIMA met with patient to discuss admission paperwork and to complete general assessment  Patient presented as alert during conversation  Patient reported that she has 4 steps to enter his 2 story home  Patient lives with her son in 2 story home  Patient reportedly independent with ADLS, grocery, and driving  [facility] [place]  Patient in agreement with [facility] meeting with luis daniel Pelletier  PRATIMA contacted [place] and scheduled [facility] meeting for 6/16 at 10:30am  PRATIMA will follow

## 2021-06-16 LAB
GLUCOSE SERPL-MCNC: 179 MG/DL (ref 65–140)
GLUCOSE SERPL-MCNC: 188 MG/DL (ref 65–140)
GLUCOSE SERPL-MCNC: 189 MG/DL (ref 65–140)
GLUCOSE SERPL-MCNC: 253 MG/DL (ref 65–140)
SARS-COV-2 RNA RESP QL NAA+PROBE: NEGATIVE

## 2021-06-16 PROCEDURE — U0005 INFEC AGEN DETEC AMPLI PROBE: HCPCS | Performed by: INTERNAL MEDICINE

## 2021-06-16 PROCEDURE — U0003 INFECTIOUS AGENT DETECTION BY NUCLEIC ACID (DNA OR RNA); SEVERE ACUTE RESPIRATORY SYNDROME CORONAVIRUS 2 (SARS-COV-2) (CORONAVIRUS DISEASE [COVID-19]), AMPLIFIED PROBE TECHNIQUE, MAKING USE OF HIGH THROUGHPUT TECHNOLOGIES AS DESCRIBED BY CMS-2020-01-R: HCPCS | Performed by: INTERNAL MEDICINE

## 2021-06-16 PROCEDURE — 97116 GAIT TRAINING THERAPY: CPT

## 2021-06-16 PROCEDURE — 97530 THERAPEUTIC ACTIVITIES: CPT

## 2021-06-16 PROCEDURE — 97535 SELF CARE MNGMENT TRAINING: CPT

## 2021-06-16 PROCEDURE — 82948 REAGENT STRIP/BLOOD GLUCOSE: CPT

## 2021-06-16 RX ADMIN — MAGNESIUM OXIDE TAB 400 MG (241.3 MG ELEMENTAL MG) 400 MG: 400 (241.3 MG) TAB at 17:04

## 2021-06-16 RX ADMIN — ENOXAPARIN SODIUM 30 MG: 30 INJECTION SUBCUTANEOUS at 09:18

## 2021-06-16 RX ADMIN — FERROUS SULFATE TAB 325 MG (65 MG ELEMENTAL FE) 325 MG: 325 (65 FE) TAB at 09:19

## 2021-06-16 RX ADMIN — ATORVASTATIN CALCIUM 80 MG: 80 TABLET, FILM COATED ORAL at 17:04

## 2021-06-16 RX ADMIN — INSULIN LISPRO 2 UNITS: 100 INJECTION, SOLUTION INTRAVENOUS; SUBCUTANEOUS at 13:36

## 2021-06-16 RX ADMIN — INSULIN LISPRO 1 UNITS: 100 INJECTION, SOLUTION INTRAVENOUS; SUBCUTANEOUS at 09:20

## 2021-06-16 RX ADMIN — MAGNESIUM OXIDE TAB 400 MG (241.3 MG ELEMENTAL MG) 400 MG: 400 (241.3 MG) TAB at 09:19

## 2021-06-16 RX ADMIN — INSULIN LISPRO 1 UNITS: 100 INJECTION, SOLUTION INTRAVENOUS; SUBCUTANEOUS at 18:30

## 2021-06-16 RX ADMIN — METFORMIN HYDROCHLORIDE 1000 MG: 500 TABLET ORAL at 17:04

## 2021-06-16 RX ADMIN — PANTOPRAZOLE SODIUM 40 MG: 40 TABLET, DELAYED RELEASE ORAL at 05:48

## 2021-06-16 RX ADMIN — METFORMIN HYDROCHLORIDE 1000 MG: 500 TABLET ORAL at 09:19

## 2021-06-16 RX ADMIN — CLOPIDOGREL BISULFATE 75 MG: 75 TABLET, FILM COATED ORAL at 09:19

## 2021-06-16 RX ADMIN — INSULIN LISPRO 1 UNITS: 100 INJECTION, SOLUTION INTRAVENOUS; SUBCUTANEOUS at 22:15

## 2021-06-16 NOTE — OCCUPATIONAL THERAPY NOTE
Occupational Therapy Treatment Note     Emily Howe  CHC:   [id number]  Age:     76 y o         Patient Active Problem List   Diagnosis    Combined forms of age-related cataract of left eye    Type 2 diabetes mellitus with diabetic polyneuropathy ([facility]    Hyperlipidemia    Personal history of transient ischemic attack (TIA), and cerebral infarction without residual deficits    Family history of tobacco abuse    History of tobacco abuse    Arthritis    Essential hypertension    GERD (gastroesophageal reflux disease)    Iron deficiency anemia    Type 2 diabetes mellitus with stage 3 chronic kidney disease ([facility] )    [facility]    Nodule of upper lobe of right lung    Thyroid nodule greater than or equal to 1 5 cm in diameter incidentally noted on imaging study    Troponin level elevated      [facility]A [facility] [I63 9]        Subjective/Goals: "to make dinner for my son on Sunday"     Vitals: stable     Pain:  no reports of pain      Treatment Time: (4193-6520) 54 minutes (27 min co treatment + 27 min 1:! Including Art Black)   * Patient seen for a partial co-treatment to focus on higher level balance challenges including management of fall recovery/preventio-- both PT and OT addressed goals separately     Cognition: A&Ox4     Precautions: fall risk     EVALUATION information:  · OT Goal expiration date: 6/19/21  · Treatment day: 4  · Discharge recommendation: no rehab needs (OP PT for balance training)  · HOME SET UP:  ? House- 2 level (able to live on main level with bed/bath)  ? 4 PEDRITO BL HR and FF to 2nd floor + basement  ? Tub/shower unit + GB   ? Lives wih son and assist from family     Patient education: ed on self pacing, energy conservation, fall prevention, and general safety awareness     Additional comments: Pt is a 64 y  o  female seen for OT evaluation s/p admit to [address] 6/11/2021 w/ CVA [facility]cerebral vascular accident) ([facility])  CT of the head and neck showed 67% stenosis of the right vertebral artery and 60% stenosis of the left vertebral artery with occlusion of the right M2 branch  MRI of the brain showed small focus of diffusion restriction in inferior cerebellar vermis indicative of acute cerebellar lacunar infarction      Assessment/Additional session details: Patient seen for [facility] and a co-treatment with PT to focus on higher level balance challenges including fall prevention and recovery  Patient continues to achieve goals set at this time with plans to d/c with OP therapy for balance  Son's concerns stemmed more on weight with recent weight loss and lack of social engagement-- SW discussed options  Plans for d/c this weekend with OT to continue at this time to max safety and consistent management of goals below    At end of session patient remains with SW and DON at [facility] with son via phone        Goals LTG achieved within 1 week  Performance at Initial Evaluation 6/12/2021  Current Performance (last date completed)   Grooming while standing at sink Carissa/I  6/14, 6/15, 6/16 MI seated  6/16 MI    ADL transfers  Carissa/I  6/14, 6/15, 6/16 Supervision  6/16 STS and SPT MI (good safety noted)   Bathroom mobility with appropriate AD Carissa/I  6/14, 6/15, 6/16 Supervision  6/16 MI without AD   LB ADL, AE PRN Carissa/I  6/14, 6/15, 6/16 Supervision  6/16 MI   Toileting/clothing management and hygiene Carissa/I  6/14, 6/15, 6/16 Supervision  6/16 MI   Dynamic standing balance for increased safety when completing purposeful tasks F+  6/15, 6/16 F 6/16 Fair+   6/14 Fair/Fair+   Increase standing tolerance for inc'd safety with standing purposeful tasks >10 min   6/14, 6/15, 6/16 3-4 min   6/16 10+ min with tasks in room    Participate in therex 1-3x/week for inc'd overall stamina/activity tolerance for purposeful tasks To be completed   6/14, 6/15    6/15 3# hand weight in all planes while standing 2x10    Tub/shower   Carissa/I  6/15, 6/16 Supervision (12")  6/16 MI Item retrieval for inc'd independence and safety negotiating home environment Carissa/I  6/15, 6/16 6/16 MI   6/14 Supervision without LOB and good safety-- no AD   Household management/Pet care (FF to basement where laundry is located) Carissa/I  6/14, 6/15, 6/16 6/16 MI simulated in room   6/14 simulated MI pet care      Marycarmen Brian  6/16/2021

## 2021-06-16 NOTE — CASE MANAGEMENT
[facility] meeting scheduled for today at 10:30am  Meeting attended by patient, OT, RHONDA, PRATIMA and patient's son Kasi Villarreal via phone  Per therapy, patient is doing well and ambulating independently  Patient is also dressing and bathing herself independently  Rehab PT/OT is recommending that patient dc with outpatient therapy to help with balancing  Patient and son in agreement  Son is concerned about patient's weight and socialization  DON reviewed weight and possible contributions to weight loss  Patient commented that she was very stressed last year and that no medication changes occurred at this time  Patient reports that she did not have an appetite prior, but since being at rehab, her appetite is coming back  As for socialization, patient did not seem as concerned as son, but in agreement with information in regards to home aide and adult day programs  PRATIMA did advise that patient's unwillingness to get COVID vaccine, but hinder going to these programs  DON reviewed COVID refusal letter  DON also provided patient with information about COVID vaccine  Son and patient in agreement with dc home on 6/18 at noon  [facility]   [facility]IMA [facility] programs to assist with socialization

## 2021-06-16 NOTE — UTILIZATION REVIEW
DATE:  6/16/21        AUTH#  [id number]          MEDICAL:      Taylor Rehman MD   Physician   Hospitalist   H&P      Signed   Date of Service:  6/12/2021  6:22 PM               [facility]     H&P- Alejandra Montero 1945, 64 y o  female MRN: [id number]  Unit/Bed#: -01 Encounter: [id number]  Primary Care Provider: Shannan Jiang MD   Date and time admitted to hospital: 6/11/2021  4:05 PM     Troponin level elevated  Assessment & Plan  Patient had mild elevation of troponin and peak troponin was 0 29  2D echo showed no significant wall motion abnormalities  Outpatient follow-up with Cardiology for stress test-discussed with son at bedside in detail     Thyroid nodule greater than or equal to 1 5 cm in diameter incidentally noted on imaging study  Assessment & Plan  Patient will need outpatient thyroid ultrasound        Nodule of upper lobe of right lung  Assessment & Plan  Patient will need outpatient follow-up with repeat CT scan     Type 2 diabetes mellitus with stage 3 chronic kidney disease (HCC)  Assessment & Plan           Lab Results   Component Value Date     HGBA1C 7 2 (H) 06/07/2021         Continue metformin     Iron deficiency anemia  Assessment & Plan  Continue iron supplementation     GERD (gastroesophageal reflux disease)  Assessment & Plan  Continue Protonix     Essential hypertension  Assessment & Plan  On home quinapril-continue lisinopril while inpatient     Hyperlipidemia  Assessment & Plan  Continue Lipitor dose which was increased to 80 milligram p o  Daily  Cholesterol 152 and LDL 72        * CVA (cerebral vascular accident) [facility])  Assessment & Plan  Patient has history of CVA on 01/20    Presented previous hospitalization with dizziness  CT of the head and neck showed 67% stenosis of the right vertebral artery and 60% stenosis of the left vertebral artery with occlusion of the right M2 branch  MRI of the brain showed small focus of diffusion restriction in inferior cerebellar vermis indicative of acute cerebellar lacunar infarction  Small chronic left frontal cortical infarction medially  Hemoglobin A1c 7 2  Continue aspirin, Lipitor and Plavix  Patient was loaded with Plavix 300 mg then continued on 75 milligram daily  Continue aspirin for 1 week following which it can be discontinue last day 6/13/2021  2D echo showed EF of 55-60% without any regional wall motion abnormalities, grade 1 diastolic dysfunction  2D echo was without bubble study  Neurology discussed with Cardiology who reported that there is no evidence of PFO or right-to-left shunt on the prior exam  Consult PT/OT  Patient will follow-up outpatient with Dr Sanam Anglin neurosurgery regarding basilar stenosis        VTE Prophylaxis: Enoxaparin (Lovenox)  / sequential compression device   Code Status:  Level 1  POLST: POLST form is not discussed and not completed at this time  Discussion with family: none     Anticipated Length of Stay:  Patient will be admitted on an SNF Short Term Inpatient basis with an anticipated length of stay of  more than 2 midnights  Justification for Hospital Stay: rehab      Total Time for Visit, including Counseling / Coordination of Care: 60 minutes  Greater than 50% of this total time spent on direct patient counseling and coordination of care      Chief Complaint:  Ambulatory dysfunction     History of Present Illness:     Gris Calabrese is a 64 y o  female with past medical history of prior CVA, thyroid nodule, type 2 diabetes, iron deficiency anemia, GERD, hyperlipidemia who presents with ambulatory dysfunction deconditioning following a recent admission for dizziness and nausea  Patient was a stroke alert however was not a candidate for tPA at that time due to in nature scale being 0   Patient received a CT scan of the head and neck which showed 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 the and MRI later confirmed acute ischemic stroke in the cerebellar vermis and small chronic left frontal cortical infarction  She was loaded with aspirin and Plavix and continued on Plavix  She is to be admitted to the rehab for acute physical therapy management  Patient currently remains stable       Review of Systems:     Review of Systems   Constitutional: Negative for activity change, chills and fever  Eyes: Negative for visual disturbance  Respiratory: Negative for cough, chest tightness and shortness of breath  Cardiovascular: Negative for chest pain and leg swelling  Gastrointestinal: Negative for abdominal pain, constipation, diarrhea, nausea and vomiting  Endocrine: Negative for cold intolerance and heat intolerance  Genitourinary: Negative for difficulty urinating  Musculoskeletal: Positive for gait problem  Skin: Negative for rash  Allergic/Immunologic: Negative  Neurological: Negative for dizziness, weakness and light-headedness  Hematological: Negative  Psychiatric/Behavioral: Negative      All other systems reviewed and are negative         Past Medical and Surgical History:      Medical History   Past Medical History:   Diagnosis Date    Anemia      Arthritis      Capsulitis of foot       Last assessed 07/29/13    Diabetes ([address] )       type 2    Full dentures      Ganglion       Last assessed 07/29/13    Hearing decreased      Hyperlipidemia      Hypertension      Magnesium deficiency      Sciatica      Wears glasses              Surgical History         Past Surgical History:   Procedure Laterality Date    CATARACT EXTRACTION        COLONOSCOPY        DILATION AND CURETTAGE OF UTERUS         x 4    HAND SURGERY Left       fx repaired w/wrist bone    HYSTERECTOMY         left ovary remains    KNEE ARTHROSCOPY Left      LIPOMA RESECTION         removed from back and left buttock    UT XCAPSL CTRC RMVL INSJ IO LENS PROSTH W/O ECP Right 1/24/2019     Procedure: EXTRACTION EXTRACAPSULAR CATARACT PHACO INTRAOCULAR LENS (IOL); Surgeon: Rita Herzog MD;  Location: [facility] MAIN OR;  Service: Ophthalmology    NE XCAPSL CTRC RMVL INSJ IO LENS PROSTH W/O ECP Left 2/14/2019     Procedure: EXTRACTION EXTRACAPSULAR CATARACT PHACO INTRAOCULAR LENS (IOL); Surgeon: Rita Herzog MD;  Location: [facility] MAIN OR;  Service: Ophthalmology    TONSILLECTOMY         and adenoids    TUBAL LIGATION                Meds/Allergies:             Prior to Admission medications    Medication Sig Start Date End Date Taking? Authorizing Provider   aspirin 81 mg chewable tablet Chew 1 tablet (81 mg total) daily for 7 days 6/12/21 6/19/21 Yes Gino Massey MD   atorvastatin (LIPITOR) 80 mg tablet Take 1 tablet (80 mg total) by mouth every evening 6/11/21   Yes Gino Massey MD   clopidogrel (PLAVIX) 75 mg tablet TAKE 1 TABLET BY MOUTH EVERY DAY 4/28/21   Yes Gasper Bernheim, MD   magnesium oxide (MAG-OX) 400 mg tablet Take 1 tablet (400 mg total) by mouth 2 (two) times a day 7/13/20   Yes Gasper Bernheim, MD   metFORMIN (GLUCOPHAGE) 1000 MG tablet Take 1 tablet (1,000 mg total) by mouth 2 (two) times a day 10/2/20   Yes Gasper Bernheim, MD   pantoprazole (PROTONIX) 40 mg tablet TAKE 1 TABLET BY MOUTH EVERY DAY 1/30/21   Yes Susan Alicea MD   ferrous sulfate 324 (65 Fe) mg Take 1 tablet (324 mg total) by mouth 3 (three) times a week 3/3/21 6/1/21   Yoon Gould PA-C   ibuprofen (MOTRIN) 200 mg tablet Take 600 mg by mouth every 6 (six) hours as needed for mild pain       Historical Provider, MD   quinapril (ACCUPRIL) 40 MG tablet TAKE 1 TABLET BY MOUTH  DAILY AT BEDTIME 5/3/21     Gasper Bernheim, MD   ferrous sulfate 324 (65 Fe) mg TAKE 1 TABLET (324 MG TOTAL) BY MOUTH 2 (TWO) TIMES A DAY BEFORE MEALS 12/31/20     Cleve Casey, DO      I have reviewed home medications using allscripts      Allergies:    Allergies   Allergen Reactions    Aleve [Naproxen] Other (See Comments)       "weird" sensation entire body    Sulfa Antibiotics Rash         Social History:     Marital Status:    Occupation:  Retired  Patient Pre-hospital Living Situation:  Lives with son  Patient Pre-hospital Level of Mobility:  Move well  Patient Pre-hospital Diet Restrictions:  Diabetic  Substance Use History:   Social History           Substance and Sexual Activity   Alcohol Use Not Currently     Comment: Rarely      Social History           Tobacco Use   Smoking Status Current Every Day Smoker    Packs/day: 0 50    Years: 35 00    Pack years: 17 50    Types: Cigarettes   Smokeless Tobacco Never Used      Social History          Substance and Sexual Activity   Drug Use Never         Family History:           Family History   Problem Relation Age of Onset    Leukemia Father      Early death Father 39         leukemia    Stomach cancer Maternal Grandfather      Diabetes Paternal Grandfather      Diabetes Sister           insulin dependent    Diabetes Brother           insulin    Diabetes Sister           insulin    Diabetes Sister           insulin         Physical Exam:      Vitals:   Blood Pressure: 134/57 (06/13/21 0754)  Pulse: 56 (06/13/21 0754)  Temperature: (!) 96 8 °F (36 °C) (06/13/21 0754)  Temp Source: Temporal (06/13/21 0754)  Respirations: 20 (06/13/21 0754)  Height: 5' (152 4 cm) (06/11/21 1625)  Weight - Scale: 60 3 kg (132 lb 15 oz) (06/13/21 0541)  SpO2: 96 % (06/13/21 0754)     Physical Exam  Constitutional:       General: She is not in acute distress  Appearance: Normal appearance  HENT:      Head: Normocephalic and atraumatic  Eyes:      General:         Right eye: No discharge  Left eye: No discharge  Cardiovascular:      Rate and Rhythm: Normal rate and regular rhythm  Pulses: Normal pulses  Heart sounds: No murmur heard  Pulmonary:      Effort: Pulmonary effort is normal  No respiratory distress  Breath sounds: Normal breath sounds  No wheezing  Musculoskeletal:      Right lower leg: No edema  Left lower leg: No edema  Skin:     General: Skin is warm and dry  Neurological:      General: No focal deficit present  Mental Status: She is alert and oriented to person, place, and time  Mental status is at baseline  Psychiatric:         Mood and Affect: Mood normal             Additional Data:      Lab Results: I have personally reviewed pertinent reports              Results from last 7 days   Lab Units 06/08/21  0522 06/07/21  0531   WBC Thousand/uL 6 68 7 92   HEMOGLOBIN g/dL 10 6* 11 9   HEMATOCRIT % 32 9* 36 6   PLATELETS Thousands/uL 292 318   NEUTROS PCT %  --  78*   LYMPHS PCT %  --  12*   MONOS PCT %  --  9   EOS PCT %  --  0            Results from last 7 days   Lab Units 06/08/21  0522 06/06/21  1950   SODIUM mmol/L 141 141   POTASSIUM mmol/L 4 2 4 5   CHLORIDE mmol/L 105 104   CO2 mmol/L 30 29   BUN mg/dL 24 26*   CREATININE mg/dL 1 26 1 14   ANION GAP mmol/L 6 8   CALCIUM mg/dL 9 4 9 9   ALBUMIN g/dL  --  3 3*   TOTAL BILIRUBIN mg/dL  --  0 56   ALK PHOS U/L  --  89   ALT U/L  --  26   AST U/L  --  15   GLUCOSE RANDOM mg/dL 163* 233*           Results from last 7 days   Lab Units 06/06/21  1950   INR   0 89                      Results from last 7 days   Lab Units 06/13/21  1132 06/13/21  0611 06/12/21 2011 06/12/21  1620 06/12/21  1117 06/12/21  0611 06/11/21 2009 06/11/21  1728 06/11/21  1102 06/11/21  0737 06/10/21  2147 06/10/21  1610   POC GLUCOSE mg/dl 351* 229* 284* 277* 292* 228* 325* 224* 311* 217* 321* 213*           Results from last 7 days   Lab Units 06/07/21  0531   HEMOGLOBIN A1C % 7 2*             Imaging: I have personally reviewed pertinent reports        No orders to display         EKG, Pathology, and Other Studies Reviewed on Admission:   · EKG: none     Allscripts / Epic Records Reviewed: Yes      ** Please Note: This note has been constructed using a voice recognition system   **                      PT NOTES:      Natalie Larsen PTA   Physical Therapist Assistant   Specialty:  Physical Therapy   Physical Therapy Note      Signed   Date of Service:  6/15/2021 12:00 PM               Signed             40 minute treatment          06/15/21 1120   PT Last Visit   PT Visit Date 06/15/21   Note Type   Note Type Treatment   Pain Assessment   Pain Assessment Tool Pain Assessment not indicated - pt denies pain   Pain Score No Pain   Restrictions/Precautions   Weight Bearing Precautions Per Order No   Other Precautions Contact/isolation   General   Chart Reviewed Yes   Response to Previous Treatment Patient with no complaints from previous session  Family/Caregiver Present No   Cognition   Overall Cognitive Status WFL   Following Commands Follows all commands and directions without difficulty   Subjective   Subjective Pt reports she is amb within room herself   Transfers   Sit to Stand 7  Independent   Stand to Sit 7  Independent   Stand pivot 6  Modified independent   Ambulation/Elevation   Gait Assistance    (Independentfor 15'; Supervision for 50')   Assistive Device None   Distance 50' x 2   Stair Management Assistance    (CS)   Stair Management Technique One rail R;Alternating pattern; Step to pattern; Foreward  (alternating step up; step to down)   Number of Stairs 4;  1 curb step at door frame w/ Superversion  performed 2x   Balance   Ambulatory Fair   Activity Tolerance   Activity Tolerance Patient tolerated treatment well   Exercises   Hamstring Sets    (ham curls w/ tband x 30 reps)   Hip Abduction    (w/ tband x 30)   Hip Adduction    (ball squeeze x 30)   Balance training  ball toss at wall x 2 minutes w/ Supervision; repeated sit to stand x 10; cone taps w/ U/L support x 10 each; cone weaving; picking cones up from floor w/ SUpervision; tandem walking w/ CS; backwards walking   Assessment   Prognosis Good   Problem List Impaired balance;Decreased mobility; Decreased strength   Assessment Pt seen for PT treatment session  Pt  agreeable to PT  Pts gait is steady w/o AD  Pt did well on stairs, and with balance activities  1 slight LOB as pt was trying to step over a cone, instead of walking around it  Barriers to Discharge Decreased caregiver support; Inaccessible home environment   Goals   Patient Goals none stated   LTG Expiration Date 06/23/21   Plan   Treatment/Interventions    (COntinue per plan of care)   Progress Progressing toward goals   PT Frequency    (6x/week for 1 5 weeks)      Goals LTG 's achieved   within 1 5 weeks    Performance at Initial Evaluation (6/12/21) Current Performance   (last date completed)   Supine to sit transitions to increase ease of transfer, allow pt to get out of bed, and decrease caregiver burden    MOD I  HOB flat, no rails Supervision  HOB flat, no rails 6/12   Sit to supine transitions to increase ease of transfer, allow pt to get back into bed, and decrease caregiver burden    MOD I  HOB flat, no rails Supervision  HOB flat, no rails 6/12   Functional transfers to facilitate safe return to previous living environment     MOD I Sit <->stand S  SPT with SPC S  SPT with no AD S 6/15   Ambulation with No AD vs most appropriate AD, > or = 150 feet (for safe household distance ambulation)    MOD I Amb with SPC for 85 feet with  S     Amb with no AD for 85 feet with S    6/15   Ascend/descend 4 steps with right or left handrail, with device prn ( for safe access to previous living environment)     Pt has 4 PEDRITO home with BHR's far apart    MOD I Supervision  Negotiated 4 steps with RHR up 6/15   Ascend/descend a curb step, using appropriate AD and method, no handrails ( for safe access into and out of home via door step)    MOD I CG A  Performed curb step w/ hand on door jamb   No LOB; but slightly unsteady    6/15      Rometta Ser, PTA                          OT NOTES:        Mu Murcia   Occupational Therapy Assistant   Specialty:  Occupational Therapy   Occupational Therapy Note      Signed   Date of Service:  6/16/2021 10:59 AM               Signed                Occupational Therapy Treatment Note     Name:  Carla Atkinson   WYQ:   [id number]  Age:     76 y o         Patient Active Problem List   Diagnosis    Combined forms of age-related cataract of left eye    Type 2 diabetes mellitus with diabetic polyneuropathy ([facility] [address]    Hyperlipidemia    Personal history of transient ischemic attack (TIA), and cerebral infarction without residual deficits    Family history of tobacco abuse    History of tobacco abuse    Arthritis    Essential hypertension    GERD (gastroesophageal reflux disease)    Iron deficiency anemia    Type 2 diabetes mellitus with stage 3 chronic kidney disease ([facility] [address] )    [facility]    Nodule of upper lobe of right lung    Thyroid nodule greater than or equal to 1 5 cm in diameter incidentally noted on imaging study    Troponin level elevated      [facility] [I63 9]        Subjective/Goals: "to make dinner for my son on Sunday"     Vitals: stable     Pain:  no reports of pain      Treatment Time: (2516-8446) 54 minutes (27 min co treatment + 27 min 1:! [facility]cluding [facility])            * Patient seen for a partial co-treatment to focus on higher level balance challenges including management of fall recovery/preventio-- both PT and OT addressed goals separately      Cognition: A&Ox4     Precautions: fall risk     EVALUATION information:  · OT Goal expiration date: 6/19/21  · Treatment day: 4  · Discharge recommendation: no rehab needs (OP PT for balance training)  · HOME SET UP:  ? House- 2 level (able to live on main level with bed/bath)  ? 4 PEDRITO BL HR and FF to 2nd floor + basement  ? Tub/shower unit + GB   ? Lives wih son and assist from family     Patient education: ed on self pacing, energy conservation, fall prevention, and general safety awareness     Additional comments: Pt is a 64 y  o  female seen for OT evaluation s/p admit to [address] 6/11/2021 w/ CVA (cerebral vascular accident) (HCC)  CT of the head and neck showed 67% stenosis of the right vertebral artery and 60% stenosis of the left vertebral artery with occlusion of the right M2 branch  MRI of the brain showed small focus of diffusion restriction in inferior cerebellar vermis indicative of acute cerebellar lacunar infarction      Assessment/Additional session details: Patient seen for [facility] and a co-treatment with PT to focus on higher level balance challenges including fall prevention and recovery  Patient continues to achieve goals set at this time with plans to d/c with OP therapy for balance  Son's concerns stemmed more on weight with recent weight loss and lack of social engagement-- SW discussed options  Plans for d/c this weekend with OT to continue at this time to max safety and consistent management of goals below    At end of session patient remains with PRATIMA and RHONDA at [facility] with son via phone        Goals LTG achieved within 1 week  Performance at Initial Evaluation 6/12/2021  Current Performance (last date completed)   Grooming while standing at sink Carissa/I  6/14, 6/15, 6/16 MI seated  6/16 MI    ADL transfers  Carissa/I  6/14, 6/15, 6/16 Supervision  6/16 STS and SPT MI (good safety noted)   Bathroom mobility with appropriate AD Carissa/I  6/14, 6/15, 6/16 Supervision  6/16 MI without AD   LB ADL, AE PRN Carissa/I  6/14, 6/15, 6/16 Supervision  6/16 MI   Toileting/clothing management and hygiene Carissa/I  6/14, 6/15, 6/16 Supervision  6/16 MI   Dynamic standing balance for increased safety when completing purposeful tasks F+  6/15, 6/16 F 6/16 Fair+   6/14 Fair/Fair+   Increase standing tolerance for inc'd safety with standing purposeful tasks >10 min   6/14, 6/15, 6/16 3-4 min   6/16 10+ min with tasks in room    Participate in therex 1-3x/week for inc'd overall stamina/activity tolerance for purposeful tasks To be completed   6/14, 6/15    6/15 3# hand weight in all planes while standing 2x10    Tub/shower   Carissa/I  6/15, 6/16 Supervision (12")  6/16 MI   Item retrieval for inc'd independence and safety negotiating home environment Carissa/I  6/15, 6/16 6/16 MI   6/14 Supervision without LOB and good safety-- no AD   Household management/Pet care (FF to basement where laundry is located) Carissa/I  6/14, 6/15, 6/16 6/16 MI simulated in room   6/14 simulated MI pet care      Suly García  6/16/2021                       DISCHARGE PLANNING:         JAMAAL Gonzalez      Specialty:  Care Coordination   Case Management       Addendum   Date of Service:  6/16/2021 11:37 AM                    [facility] meeting scheduled for today at 10:30am  Meeting attended by patient, OT, RHONDA, PRATIMA and patient's son Dolly Rubi via phone  Per therapy, patient is doing well and ambulating independently  Patient is also dressing and bathing herself independently  Rehab PT/OT is recommending that patient dc with outpatient therapy to help with balancing  Patient and son in agreement       Son is concerned about patient's weight and socialization  DON reviewed weight and possible contributions to weight loss  Patient commented that she was very stressed last year and that no medication changes occurred at this time  Patient reports that she did not have an appetite prior, but since being at rehab, her appetite is coming back  As for socialization, patient did not seem as concerned as son, but in agreement with information in regards to home aide and adult day programs  PRATIMA did advise that patient's unwillingness to get COVID vaccine, but hinder going to these programs  DON reviewed COVID refusal letter  DON also provided patient with information about COVID vaccine       Son and patient in agreement with dc home on 6/18 at noon   Patient confirmed that pharmacy of choice is [facility]   PRATIMA provided print out information obtained by [facility] in regards to Adult day programs to assist with socialization         Revision History

## 2021-06-16 NOTE — CASE MANAGEMENT
During [facility] we had a lengthy discussion with the pt and the son, Sergey Tejada re: getting Covid vaccine  The is vaccinated  However, the pt has declined because of fear of sever side effects  She sated that she has known at least two people that received the vaccine and had severe rx resulting in ICU stays  She was provided with the EAU and FAQ form  We also reviewed the most common side effects, risks and benefits  She kindly declined and will discuss with her PCP  Attestation form signed

## 2021-06-17 LAB
GLUCOSE SERPL-MCNC: 129 MG/DL (ref 65–140)
GLUCOSE SERPL-MCNC: 185 MG/DL (ref 65–140)
GLUCOSE SERPL-MCNC: 195 MG/DL (ref 65–140)
GLUCOSE SERPL-MCNC: 286 MG/DL (ref 65–140)

## 2021-06-17 PROCEDURE — 82948 REAGENT STRIP/BLOOD GLUCOSE: CPT

## 2021-06-17 PROCEDURE — 97530 THERAPEUTIC ACTIVITIES: CPT

## 2021-06-17 PROCEDURE — 97116 GAIT TRAINING THERAPY: CPT

## 2021-06-17 RX ADMIN — LISINOPRIL 40 MG: 20 TABLET ORAL at 22:07

## 2021-06-17 RX ADMIN — MAGNESIUM OXIDE TAB 400 MG (241.3 MG ELEMENTAL MG) 400 MG: 400 (241.3 MG) TAB at 10:10

## 2021-06-17 RX ADMIN — CLOPIDOGREL BISULFATE 75 MG: 75 TABLET, FILM COATED ORAL at 10:11

## 2021-06-17 RX ADMIN — ATORVASTATIN CALCIUM 80 MG: 80 TABLET, FILM COATED ORAL at 17:16

## 2021-06-17 RX ADMIN — METFORMIN HYDROCHLORIDE 1000 MG: 500 TABLET ORAL at 10:10

## 2021-06-17 RX ADMIN — ENOXAPARIN SODIUM 30 MG: 30 INJECTION SUBCUTANEOUS at 10:11

## 2021-06-17 RX ADMIN — INSULIN LISPRO 3 UNITS: 100 INJECTION, SOLUTION INTRAVENOUS; SUBCUTANEOUS at 12:45

## 2021-06-17 RX ADMIN — MAGNESIUM OXIDE TAB 400 MG (241.3 MG ELEMENTAL MG) 400 MG: 400 (241.3 MG) TAB at 17:16

## 2021-06-17 RX ADMIN — PANTOPRAZOLE SODIUM 40 MG: 40 TABLET, DELAYED RELEASE ORAL at 05:42

## 2021-06-17 RX ADMIN — METFORMIN HYDROCHLORIDE 1000 MG: 500 TABLET ORAL at 17:16

## 2021-06-17 NOTE — OCCUPATIONAL THERAPY NOTE
OCCUPATIONAL THERAPY TREATMENT AND DISCHARGE SUMMARY    TIME: 4896-0008 40 min   VITALS: 106/67 stable   PAIN: no pain   COGNITION: WFL  PRECAUTIONS: none     EXPIRATION DATE: 6/19/21  TREATMENT DAY: 5    ASSESSMENT:    Pt pleasant and cooperative during session  Successfully utilizes independent sign within room  Completed AM ADL with nursing independently  Consistently Ind  With all transfers and mobility with no AD  Simulated daily routine with bed mobility, bathroom mobility, and multiple STS from household surfaces - MI  At length, discussed progression in OT while on TCF and achievement of POC goals  Pt made significant gains in OT since initial evaluation  Pt educated on  safe functional transfer techniques with appropriate body mechanics, fall prevention, safety with ambulation and in-home navigation addressing environmental barriers and fall risks and safe discharge planning/ safety at home   Pt ready for d/c when medically able  Pt seated in recliner with call bell in reach to conclude session  DISPOSITION: Home with social support as needed; OP PT    AE/DME RECOMMENDATIONS: none needed       DISCHARGE SUMMARY: Pt discharged from OT caseload effective 6/17/2021, anticipated d/c home tomorrow  Participated in a total of 5 OT sessions  Pt achieved 11/11 goals per POC  Remains limited by weakness and static/dynamic standing balance    Pt is safe to return home with social support as needed  Pt denies questions/concerns for OT at this time  No further concerns noted       Goals LTG achieved within 1 week  Performance at Initial Evaluation 6/12/2021  Current Performance (last date completed)   Grooming while standing at sink Carissa/I  MET MI seated  6/16 MI    ADL transfers  Carissa/I  MET Supervision 6/17 Independent    6/16 STS and SPT MI (good safety noted)   Bathroom mobility with appropriate AD Carissa/I  MET Supervision 6/17 Independent    6/16 MI without AD   LB ADL, AE PRN Carissa/I  MET Supervision  6/16 MI   Toileting/clothing management and hygiene Carissa/I  MET Supervision  6/16 MI   Dynamic standing balance for increased safety when completing purposeful tasks F+  MET F 6/17 Fair+   6/14 Fair/Fair+   Increase standing tolerance for inc'd safety with standing purposeful tasks >10 min   MET 3-4 min  6/17 >10 min  6/16 10+ min with tasks in room    Participate in therex 1-3x/week for inc'd overall stamina/activity tolerance for purposeful tasks To be completed   MET    6/15 3# hand weight in all planes while standing 2x10    Tub/shower   Carissa/I  MET Supervision (12") 6/17 MI   Item retrieval for inc'd independence and safety negotiating home environment Carissa/I  MET   6/17 Independent    6/14 Supervision without LOB and good safety-- no AD   Household management/Pet care (FF to basement where laundry is located) Carissa/I  MET   6/17 Independent  - denied need to negotiate FF, son to complete laundry   6/16 MI simulated in room   6/14 simulated MI pet care     [facility], MS, OTR/L

## 2021-06-17 NOTE — PHYSICAL THERAPY NOTE
Physical Therapy Daily Treatment Note and Discharge Summary         06/17/21: 70 minutes (8:41 to 9:51)   PT Last Visit   PT Visit Date 06/17/21   Note Type   Note Type Treatment  (+ Discharge Summary)   Pain Assessment   Pain Assessment Tool Pain Assessment not indicated - pt denies pain   Pain Score No Pain   Pain Location/Orientation Location: Generalized   Pain Onset/Description Onset: Sudden   Patient's Stated Pain Goal No pain   Hospital Pain Intervention(s) Medication (See MAR); Repositioned   Restrictions/Precautions   Weight Bearing Precautions Per Order No   Other Precautions Visual impairment;Hard of hearing   General   Chart Reviewed Yes   Response to Previous Treatment Patient with no complaints from previous session  Family/Caregiver Present No   Cognition   Overall Cognitive Status WFL   Arousal/Participation Cooperative   Attention Within functional limits   Orientation Level Oriented X4   Memory Within functional limits   Following Commands Follows all commands and directions without difficulty   Bed Mobility   Rolling R 7  Independent   Rolling L 7  Independent   Supine to Sit 7  Independent   Sit to Supine 7  Independent   Additional Comments HOB flat, no rails, performed on both sides of bed   Transfers   Sit to Stand 7  Independent   Stand to Sit 7  Independent   Stand pivot 7  Independent   Toilet transfer 7  Independent   Car transfer 7  Independent  (Performed simulated car transfer on a tub transfer bench)   Additional items   (Managed BLE's over simulated car wall w/o difficulty)   Additional Comments Transfers: recliner, EOB, toilet, low unsecured chairs,   Ambulation/Elevation   Gait pattern   (Decreased bilateral arm swing, slow cadenec)   Gait Assistance 6  Modified independent   Additional items   (Safe and steady, no LOB)   Assistive Device None   Distance 30 feet x 2, 65 feet, 180 feet, 225 feet -> negotiated 5 turns/obstacles during 225 gait trial with slow steady carlie  Ambulated on carpet with no AD (to simulate an uneven surface), for  60 feet with   Stair Management Assistance 6  Modified independent   Number of Stairs 19  (4 with RHR,  4 with LHR, then 11 with RHR)   Curbs Door step Simulation (6 inch curb step placed in doorway)-> pt slowly negotiated curb step with hand on door jamb for support with MOD I -> No LOB  (Performed 2 trials)   Balance   Static Sitting Normal   Dynamic Sitting Normal   Static Standing Good  (Unsupported)   Dynamic Standing   (Good (-) unsupported)   Ambulatory   (Good (-) Unsupported)   Higher level balance   (See Neuro Re-ed section below for standardized balance tests)   Endurance Deficit   Endurance Deficit Yes  (Provided with therapeutic rest breaks as needed)   Activity Tolerance   Activity Tolerance Patient tolerated treatment well   Exercises   Neuro re-ed Jaskaran Chadwickler Balance Test: 51/56 (Good Balance); Timed Up and Go Test (without AD): 9 77, 7 53, 7 47) -> Mean Score: 8 25 seconds  A score of <  or = 10 seconds is normal and indicative of a low fall risk   Assessment   Prognosis Guarded   Problem List Decreased strength;Decreased range of motion;Decreased endurance; Impaired balance;Decreased mobility; Impaired vision; Impaired hearing;Decreased skin integrity   Assessment Pt is discharged from skilled PT effective today, 6/17/21  Pt remains on TCF Unit,  with plans to return to home with her son and adult granddaughter tomorrow  Since 6/12/21 PT Evaluation, pt was seen for 4/4 skilled PT tx sessions for therex, theract, and gait/elevation training  Pt with excellent progress in skilled PT, this past week  Improvement assessed with: functional strength, balance, activity tolerance, safety, bed mobility, transfers, and gait/elevations  All LTG's achieved or surpassed  Please see grid below and flowsheet, for details on pt's functional mobility status at discharge  Barriers to Discharge Decreased caregiver support; Inaccessible home environment Goals   Patient Goals "Go home and take a good shower"   LTG Expiration Date 06/23/21   Long Term Goal #1 See grid   PT Treatment Day 4   Plan   Treatment/Interventions ADL retraining;Functional transfer training;LE strengthening/ROM; Elevations; Therapeutic exercise; Endurance training;Patient/family training;Cognitive reorientation;Equipment eval/education;Gait training;Bed mobility; Compensatory technique education;Continued evaluation;Spoke to MD;Spoke to nursing;Spoke to advanced practitioner;Spoke to case management;OT;Family   Progress Discontinue PT   PT Frequency Other (Comment)  (6x/week for 1 5 weeks)   Recommendation   PT Discharge Recommendation Home with outpatient rehabilitation   Equipment Recommended   (No DME indicated )   PT - OK to Discharge Yes       Goals LTG 's achieved   within 1 5 weeks    Performance at Initial Evaluation (6/12/21) Current Performance   (last date completed)   Supine to sit transitions to increase ease of transfer, allow pt to get out of bed, and decrease caregiver burden    MOD I  HOB flat, no rails Supervision  HOB flat, no rails 6/17   Sit to supine transitions to increase ease of transfer, allow pt to get back into bed, and decrease caregiver burden    MOD I  HOB flat, no rails Supervision  HOB flat, no rails 6/17   Functional transfers to facilitate safe return to previous living environment     MOD I Sit <->stand S  SPT with SPC S  SPT with no AD S 6/17   Ambulation with No AD vs most appropriate AD, > or = 150 feet (for safe household distance ambulation)    MOD I Amb with SPC for 85 feet with  S     Amb with no AD for 85 feet with S    6/17   Ascend/descend 4 steps with right or left handrail, with device prn ( for safe access to previous living environment)     Pt has 4 PEDRITO home with BHR's far apart    MOD I Supervision  Negotiated 4 steps with RHR up 6/17   Ascend/descend a curb step, using appropriate AD and method, no handrails ( for safe access into and out of home via door step)    MOD I CG A  Performed curb step w/ hand on door jamb   No LOB; but slightly unsteady    6/17

## 2021-06-17 NOTE — PLAN OF CARE
Problem: PHYSICAL THERAPY ADULT  Goal: Performs mobility at highest level of function for planned discharge setting  See evaluation for individualized goals  Description: Treatment/Interventions: ADL retraining, Functional transfer training, LE strengthening/ROM, Elevations, Therapeutic exercise, Endurance training, Cognitive reorientation, Patient/family training, Equipment eval/education, Bed mobility, Compensatory technique education, Gait training, Continued evaluation, Spoke to MD, Spoke to nursing, Spoke to case management, Spoke to advanced practitioner, OT, Family  Equipment Recommended:  (To be determined)       See flowsheet documentation for full assessment, interventions and recommendations  6/17/2021 1418 by Lubna Harrison PT  Outcome: Adequate for Discharge  Note: Prognosis: Guarded  Problem List: Decreased strength, Decreased range of motion, Decreased endurance, Impaired balance, Decreased mobility, Impaired vision, Impaired hearing, Decreased skin integrity  Assessment: Pt is discharged from skilled PT effective today, 6/17/21  Pt remains on TCF Unit,  with plans to return to home with her son and adult granddaughter tomorrow  Since 6/12/21 PT Evaluation, pt was seen for 4/4 skilled PT tx sessions for therex, theract, and gait/elevation training  Pt with excellent progress in skilled PT, this past week  Improvement assessed with: functional strength, balance, activity tolerance, safety, bed mobility, transfers, and gait/elevations  All LTG's achieved or surpassed  Please see grid below and flowsheet, for details on pt's functional mobility status at discharge  Barriers to Discharge: Decreased caregiver support, Inaccessible home environment        PT Discharge Recommendation: Home with outpatient rehabilitation     PT - OK to Discharge: Yes    See flowsheet documentation for full assessment       6/17/2021 0836 by Lubna Harrison PT  Outcome: Progressing  Note: Prognosis: Excellent  Problem List: Decreased strength, Impaired balance, Decreased endurance, Decreased mobility, Impaired vision, Impaired hearing, Decreased skin integrity  Assessment: Pt agreeable to participate in theract and gait /elevation training; during niall's PT tx session  Pt reported that she has 11 steps with RHR up from basement laundry to first floor  Pt's family will be doing laundry upon return to home; but feels that she is now ready to start practicing more steps in therapy  Pt demonstrated ability to negotiate 11 steps with RHR up in stairwell, with MOD I   In conjunction with OT, pt participated in high level balance activities ~ 8  minutes without LOB  Also demonstrated ability to perform floor <->stand transfers, holding chair for support upon standing with MOD I  Pt very fatigued by end of session and noted to to have some unsteadiness; which resolved after therapeutic rest break  Pt has achieved or surpassed all of her LTG's and is ready for safe discharge home  A Care Conference is scheduled at 10:30 this morning to discuss d/c planning and needs  Barriers to Discharge: Decreased caregiver support, Inaccessible home environment        PT Discharge Recommendation: Home with outpatient rehabilitation          See flowsheet documentation for full assessment

## 2021-06-17 NOTE — PHYSICAL THERAPY NOTE
Physical Therapy Daily Treatment Note         06/16/21                 Total Treatment Time: 57 minutes (9:35 to 10:32)     Individual treatment session: 30 minutes: 9: 35 to 10:05     Co-Treatment Session: 27 minutes (10:05 ti 10:32) -> Performed in conjunction with OT to focus on higher level balance challenges; including fall prevention and recovery  Both PT and OT goals separately addressed throughout session       PT Last Visit   PT Visit Date 06/16/21   Note Type   Note Type Treatment   Pain Assessment   Pain Assessment Tool Pain Assessment not indicated - pt denies pain   Restrictions/Precautions   Weight Bearing Precautions Per Order No   Other Precautions Visual impairment;Hard of hearing   General   Chart Reviewed Yes   Response to Previous Treatment Patient with no complaints from previous session  Family/Caregiver Present No   Bed Mobility   Rolling R 7  Independent   Rolling L 7  Independent   Supine to Sit 7  Independent   Sit to Supine 7  Independent   Additional Comments Bed Mobility: HOB flat, no rails  (Decreased time and effort)   Transfers   Sit to Stand 7  Independent   Additional items   (Decreased time and effort)   Stand to Sit 7  Independent   Additional items   (Decreased time and effort)   Stand pivot 7  Independent  (SPT with no AD)   Additional items   (Decreased time and effort)   Toilet transfer 7  Independent  (Sit <->stand and SPT no AD)   Additional items Standard toilet  (Good bathroom safety)   Car transfer 6  Modified independent  (Simulated car transfer on shower bench)   Additional items Increased time required  (Managed BLE's over simulated car wall w/o difficulty)   Additional Comments Transfers: EOB, chair, toilet, shower bench, low unsecured chair;  Safe and steady during all transfers  (Floor<->stand transfer w/MOD I)   Ambulation/Elevation   Gait pattern   (Decreased bilateral arm swing)   Gait Assistance 6  Modified independent   Assistive Device None   Distance 30 feet x 2, 65 feet, 175 feet, 225 feet -> negotiated 5 turns/obstacles during 225 gait trial  (slow steady carlie)   Stair Management Assistance 6  Modified independent   Number of Stairs 19  (4 with RHR,  4 with LHR, then 11 with RHR)   Curbs Doorstep Simulation (6 inch curb step placed in doorway)-> pt negotiated curb step with hand on door jamb for support with MOD I-> No LOB; but slightly unsteady   Balance   Static Sitting Normal   Dynamic Sitting Normal   Static Standing   (Good )   Dynamic Standing Fair +  (Unsupported: Ball catch, throw at varying speeds ) and S for safety    Good (-)->picked objects off floor with hand and MOD I       Ambulatory Fair +  (Unsupported)   Endurance Deficit   Endurance Deficit Yes  (Provided with therapeutic rest breaks between activities)   Activity Tolerance   Activity Tolerance Patient tolerated treatment well   Assessment   Prognosis Excellent   Problem List Decreased strength; Impaired balance;Decreased endurance;Decreased mobility; Impaired vision; Impaired hearing;Decreased skin integrity   Assessment Pt agreeable to participate in theract and gait /elevation training; during niall's PT tx session  Pt reported that she has 11 steps with RHR up from basement laundry to first floor  Pt's family will be doing laundry upon return to home; but feels that she is now ready to start practicing more steps in therapy  Pt demonstrated ability to negotiate 11 steps with RHR up in stairwell, with MOD I   In conjunction with OT, pt participated in high level balance activities ~ 8  minutes without LOB  Also demonstrated ability to perform floor <->stand transfers, holding chair for support upon standing with MOD I  Pt very fatigued by end of session and noted to to have some unsteadiness; which resolved after therapeutic rest break  Pt has achieved or surpassed all of her LTG's and is ready for safe discharge home   A Care Conference is scheduled at 10:30 this morning to discuss d/c planning and needs  Barriers to Discharge Decreased caregiver support; Inaccessible home environment   Goals   Patient Goals "I'd like to go home this week"   LTG Expiration Date 06/23/21   Long Term Goal #1 See grid   PT Treatment Day 3   Plan   Treatment/Interventions ADL retraining;Functional transfer training;LE strengthening/ROM; Elevations; Therapeutic exercise; Endurance training;Cognitive reorientation;Patient/family training;Equipment eval/education; Bed mobility;Spoke to MD;Continued evaluation; Compensatory technique education;Gait training;Spoke to nursing;Spoke to case management;Spoke to advanced practitioner;OT;Family   Progress Improving as expected   PT Frequency   (6x/week for 1 5 weeks)   Recommendation   PT Discharge Recommendation Home with outpatient rehabilitation         Goals LTG 's achieved   within 1 5 weeks    Performance at Initial Evaluation (6/12/21) Current Performance   (last date completed)   Supine to sit transitions to increase ease of transfer, allow pt to get out of bed, and decrease caregiver burden    MOD I  HOB flat, no rails Supervision  HOB flat, no rails 6/16   Sit to supine transitions to increase ease of transfer, allow pt to get back into bed, and decrease caregiver burden    MOD I  HOB flat, no rails Supervision  HOB flat, no rails 6/16   Functional transfers to facilitate safe return to previous living environment     MOD I Sit <->stand S  SPT with SPC S  SPT with no AD S 6/16   Ambulation with No AD vs most appropriate AD, > or = 150 feet (for safe household distance ambulation)    MOD I Amb with SPC for 85 feet with  S     Amb with no AD for 85 feet with S    6/16   Ascend/descend 4 steps with right or left handrail, with device prn ( for safe access to previous living environment)     Pt has 4 PEDRITO home with BHR's far apart    MOD I Supervision  Negotiated 4 steps with RHR up 6/16   Ascend/descend a curb step, using appropriate AD and method, no handrails ( for safe access into and out of home via door step)    MOD I CG A  Performed curb step w/ hand on door jamb   No LOB; but slightly unsteady    6/16     Vitals  After ambulating 180 feet with no AD; followed by elevation training on steps (seated): /54, HR 63, SPO2 (RA) 97%    After balance challenges in stand for ~ 8 minutes (seated): /53, HR 62, SPO2 (RA) 97%

## 2021-06-17 NOTE — PLAN OF CARE
Problem: OCCUPATIONAL THERAPY ADULT  Goal: Performs self-care activities at highest level of function for planned discharge setting  See evaluation for individualized goals  Description: Treatment Interventions: ADL retraining, Functional transfer training, UE strengthening/ROM, Endurance training, Patient/family training, Equipment evaluation/education, Neuromuscular reeducation, Energy conservation, Activityengagement          See flowsheet documentation for full assessment, interventions and recommendations     Outcome: Completed  Note: Limitation: Decreased ADL status, Decreased UE ROM, Decreased UE strength, Decreased endurance, Decreased high-level ADLs (balance deficits )  Prognosis: Good  Assessment: see note      OT Discharge Recommendation: No rehabilitation needs (OP PT for balance training )

## 2021-06-17 NOTE — PLAN OF CARE
Problem: PHYSICAL THERAPY ADULT  Goal: Performs mobility at highest level of function for planned discharge setting  See evaluation for individualized goals  Description: Treatment/Interventions: ADL retraining, Functional transfer training, LE strengthening/ROM, Elevations, Therapeutic exercise, Endurance training, Cognitive reorientation, Patient/family training, Equipment eval/education, Bed mobility, Compensatory technique education, Gait training, Continued evaluation, Spoke to MD, Spoke to nursing, Spoke to case management, Spoke to advanced practitioner, OT, Family  Equipment Recommended:  (To be determined)       See flowsheet documentation for full assessment, interventions and recommendations  Outcome: Progressing  Note: Prognosis: Excellent  Problem List: Decreased strength, Impaired balance, Decreased endurance, Decreased mobility, Impaired vision, Impaired hearing, Decreased skin integrity  Assessment: Pt agreeable to participate in theract and gait /elevation training; during niall's PT tx session  Pt reported that she has 11 steps with RHR up from basement laundry to first floor  Pt's family will be doing laundry upon return to home; but feels that she is now ready to start practicing more steps in therapy  Pt demonstrated ability to negotiate 11 steps with RHR up in [facility], with MOD I   In conjunction with OT, pt participated in high level balance activities ~ 8  minutes without LOB  Also demonstrated ability to perform floor <->stand transfers, holding chair for support upon standing with MOD I  Pt very fatigued by end of session and noted to to have some unsteadiness; which resolved after therapeutic rest break  Pt has achieved or surpassed all of her LTG's and is ready for safe discharge home  A Care Conference is scheduled at 10:30 this morning to discuss d/c planning and needs    Barriers to Discharge: Decreased caregiver support, Inaccessible home environment        PT Discharge Recommendation: Home with outpatient rehabilitation          See flowsheet documentation for full assessment

## 2021-06-18 ENCOUNTER — TELEPHONE (OUTPATIENT)
Dept: FAMILY MEDICINE CLINIC | Facility: CLINIC | Age: 76
End: 2021-06-18

## 2021-06-18 ENCOUNTER — RA CDI HCC (OUTPATIENT)
Dept: OTHER | Facility: HOSPITAL | Age: 76
End: 2021-06-18

## 2021-06-18 VITALS
RESPIRATION RATE: 18 BRPM | SYSTOLIC BLOOD PRESSURE: 101 MMHG | BODY MASS INDEX: 24.93 KG/M2 | DIASTOLIC BLOOD PRESSURE: 49 MMHG | TEMPERATURE: 97.8 F | HEIGHT: 60 IN | OXYGEN SATURATION: 98 % | HEART RATE: 62 BPM | WEIGHT: 126.98 LBS

## 2021-06-18 DIAGNOSIS — Z86.73 PERSONAL HISTORY OF TRANSIENT ISCHEMIC ATTACK (TIA), AND CEREBRAL INFARCTION WITHOUT RESIDUAL DEFICITS: Primary | ICD-10-CM

## 2021-06-18 LAB
GLUCOSE SERPL-MCNC: 186 MG/DL (ref 65–140)
GLUCOSE SERPL-MCNC: 267 MG/DL (ref 65–140)

## 2021-06-18 PROCEDURE — 99315 NF DSCHRG MGMT 30 MIN/LESS: CPT | Performed by: INTERNAL MEDICINE

## 2021-06-18 PROCEDURE — 82948 REAGENT STRIP/BLOOD GLUCOSE: CPT

## 2021-06-18 RX ADMIN — ENOXAPARIN SODIUM 30 MG: 30 INJECTION SUBCUTANEOUS at 08:05

## 2021-06-18 RX ADMIN — MAGNESIUM OXIDE TAB 400 MG (241.3 MG ELEMENTAL MG) 400 MG: 400 (241.3 MG) TAB at 08:05

## 2021-06-18 RX ADMIN — INSULIN LISPRO 1 UNITS: 100 INJECTION, SOLUTION INTRAVENOUS; SUBCUTANEOUS at 08:05

## 2021-06-18 RX ADMIN — METFORMIN HYDROCHLORIDE 1000 MG: 500 TABLET ORAL at 08:05

## 2021-06-18 RX ADMIN — CLOPIDOGREL BISULFATE 75 MG: 75 TABLET, FILM COATED ORAL at 08:05

## 2021-06-18 RX ADMIN — PANTOPRAZOLE SODIUM 40 MG: 40 TABLET, DELAYED RELEASE ORAL at 06:02

## 2021-06-18 RX ADMIN — FERROUS SULFATE TAB 325 MG (65 MG ELEMENTAL FE) 325 MG: 325 (65 FE) TAB at 08:05

## 2021-06-18 NOTE — TELEPHONE ENCOUNTER
Please have her stop the Aspirin  They kept her on it in the beginning while the clopidogrel was kicking in  Let him know the clopidogrel  is stronger than the aspirin    Thank you,  Rosi Bhandari, DO

## 2021-06-18 NOTE — TELEPHONE ENCOUNTER
Discharged today from [address] [place], [place] [address]ab and he wants to know what if any are there medications she is supposed to be taking  He has her discharge papers but he doesn't understand them  He just wants to make sure she has the right medications today

## 2021-06-18 NOTE — CASE MANAGEMENT
PRATIMA continues to work on [facility] [employer]  PRATIMA confirmed that patient will dc home today at noon  Son to provide transport  [address] and LTC letter placed in chart and dc folder

## 2021-06-18 NOTE — PROGRESS NOTES
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

## 2021-06-18 NOTE — ASSESSMENT & PLAN NOTE
Patient has history of CVA on 01/20  Presented previous hospitalization with dizziness  CT of the head and neck showed 67% stenosis of the right vertebral artery and 60% stenosis of the left vertebral artery with occlusion of the right M2 branch  MRI of the brain showed small focus of diffusion restriction in inferior cerebellar vermis indicative of acute cerebellar lacunar infarction  Small chronic left frontal cortical infarction medially  Hemoglobin A1c 7 2  Continue aspirin, Lipitor and Plavix  Patient was loaded with Plavix 300 mg then continued on 75 milligram daily  Continue aspirin for 1 week following which it can be discontinue last day 6/13/2021   2D echo showed EF of 55-60% without any regional wall motion abnormalities, grade 1 diastolic dysfunction  Neurology discussed with Cardiology who reported that there is no evidence of PFO or right-to-left shunt on the prior exam        · Progressing well with PT/OT  · Supportive care  · Patient will follow-up outpatient with Dr Conrad Horn neurosurgery regarding basilar stenosis    · Plan for discharge home today with outpatient PT

## 2021-06-18 NOTE — ASSESSMENT & PLAN NOTE
On home quinapril-continue lisinopril while admitted to rehab  Full range of motion of upper and lower extremities, no joint tenderness/swelling.

## 2021-06-18 NOTE — DISCHARGE SUMMARY
[facility]  Discharge- Winnie Stafford 1945, 64 y o  female MRN: [id number]  Unit/Bed#: -01 Encounter: [id number]  Primary Care Provider: Gasper Bernheim, MD   Date and time admitted to hospital: 6/11/2021  4:05 PM    * CVA (cerebral vascular accident) [facility])  Assessment & Plan  Patient has history of CVA on 01/20  Presented previous hospitalization with dizziness  CT of the head and neck showed 67% stenosis of the right vertebral artery and 60% stenosis of the left vertebral artery with occlusion of the right M2 branch  MRI of the brain showed small focus of diffusion restriction in inferior cerebellar vermis indicative of acute cerebellar lacunar infarction  Small chronic left frontal cortical infarction medially  Hemoglobin A1c 7 2  Continue aspirin, Lipitor and Plavix  Patient was loaded with Plavix 300 mg then continued on 75 milligram daily  Continue aspirin for 1 week following which it can be discontinue last day 6/13/2021   2D echo showed EF of 55-60% without any regional wall motion abnormalities, grade 1 diastolic dysfunction  Neurology discussed with Cardiology who reported that there is no evidence of PFO or right-to-left shunt on the prior exam        · Progressing well with PT/OT  · Supportive care  · Patient will follow-up outpatient with Dr Benna Lefort neurosurgery regarding basilar stenosis  · Plan for discharge home today with outpatient PT      Thyroid nodule greater than or equal to 1 5 cm in diameter incidentally noted on imaging study  Assessment & Plan  Patient will need outpatient thyroid ultrasound  To be followed with PCP        Nodule of upper lobe of right lung  Assessment & Plan  Patient will need outpatient follow-up with repeat CT scan  Type 2 diabetes mellitus with stage 3 chronic kidney disease [facility])  Assessment & Plan    Lab Results   Component Value Date    HGBA1C 7 2 (H) 06/07/2021       Continue metformin home regimen      Iron deficiency anemia  Assessment & Plan  Continue iron supplementation  GERD (gastroesophageal reflux disease)  Assessment & Plan  Continue Protonix    Essential hypertension  Assessment & Plan  On home quinapril-continue lisinopril while admitted to rehab  Hyperlipidemia  Assessment & Plan  Continue Lipitor  80 milligram p o  Daily        Discharging Physician / Practitioner: Hyacinth Alvarez MD  PCP: Wilner Hunt MD  Admission Date:   Admission Orders (From admission, onward)     Ordered        06/12/21 Miguel Taylor 1  Admit Patient to  Once                   Discharge Date: 06/18/21    Medical Problems     Resolved Problems  Date Reviewed: 6/18/2021    None                Consultations During Hospital Stay:  · None    Procedures Performed:   · None    Significant Findings / Test Results:   · None    Incidental Findings:   · None     Test Results Pending at Discharge (will require follow up): · None     Outpatient Tests Requested:  · None    Complications:  None    Reason for Admission:  Ambulatory dysfunction  Hospital Course:     Tex Bolivar is a 64 y o  female patient who originally presented to the hospital on 6/11/2021 due to weakness and deconditioning following recent hospitalization for acute ischemic stroke  She progressed well with PT and OT and will be discharged with outpatient physical therapy to help with balance training   All questions and concerns addressed patient was in agreement with discharge plan  Please see above list of diagnoses and related plan for additional information  Condition at Discharge: fair     Discharge Day Visit / Exam:     Subjective:  Patient seen and examined on 06/17/2021  She is sitting up comfortable has no concern to go home and her son will pick her up    Vitals: Blood Pressure: (!) 101/49 (06/18/21 0659)  Pulse: 62 (06/18/21 0659)  Temperature: 97 8 °F (36 6 °C) (06/18/21 0659)  Temp Source: Temporal (06/18/21 0659)  Respirations: 18 (06/18/21 0893)  Height: 5' (152 4 cm) (06/11/21 1625)  Weight - Scale: 57 6 kg (126 lb 15 8 oz) (06/18/21 0547)  SpO2: 98 % (06/18/21 0659)  Exam:   Physical Exam  Vitals reviewed  Constitutional:       General: She is not in acute distress  Appearance: She is not ill-appearing  HENT:      Nose: No rhinorrhea  Eyes:      General:         Right eye: No discharge  Left eye: No discharge  Cardiovascular:      Rate and Rhythm: Normal rate and regular rhythm  Heart sounds: Normal heart sounds  No murmur heard  Pulmonary:      Effort: Pulmonary effort is normal  No respiratory distress  Breath sounds: No wheezing  Abdominal:      General: Bowel sounds are normal  There is no distension  Palpations: Abdomen is soft  Tenderness: There is no abdominal tenderness  Neurological:      Mental Status: She is alert and oriented to person, place, and time  Motor: No weakness  Psychiatric:         Behavior: Behavior normal          Discussion with Family:  Discussed with patient at bedside  Discharge instructions/Information to patient and family:   See after visit summary for information provided to patient and family  Provisions for Follow-Up Care:  See after visit summary for information related to follow-up care and any pertinent home health orders  Disposition:     Home    For Discharges to [facility] SNF:   · Not Applicable to this Patient - Not Applicable to this Patient    Planned Readmission:  None     Discharge Statement:  I spent 15 minutes discharging the patient  This time was spent on the day of discharge  I had direct contact with the patient on the day of discharge  Greater than 50% of the total time was spent examining patient, answering all patient questions, arranging and discussing plan of care with patient as well as directly providing post-discharge instructions  Additional time then spent on discharge activities      Discharge Medications:  See after visit summary for reconciled discharge medications provided to patient and family        ** Please Note: This note has been constructed using a voice recognition system **

## 2021-06-18 NOTE — TELEPHONE ENCOUNTER
Spoke with patients son Krysta Witt, informed him about the medications  There was an order placed for ambulatory pt, pt would like [facility] to evaluate pt  Can an order be placed for them to reach out to Krysta Witt    [facility]

## 2021-06-18 NOTE — DISCHARGE INSTRUCTIONS
Ischemic Stroke   AMBULATORY CARE:   An ischemic stroke  occurs when blood flow to a part of your brain is blocked  The blockage is usually caused by a blood clot that gets stuck in a narrow blood vessel  When oxygen cannot get to an area of the brain, tissue in that area may get damaged  The damage can cause loss of body functions controlled by that area of the brain  A stroke is a medical emergency that needs immediate treatment  Most medicines and treatments work best the sooner they are given  Warning signs of a stroke: The words BE FAST can help you remember and recognize warning signs of a stroke:  · B = Balance:  Sudden loss of balance    · E = Eyes:  Loss of vision in one or both eyes    · F = Face:  Face droops on one side    · A = Arms:  Arm drops when both arms are raised    · S = Speech:  Speech is slurred or sounds different    · T = Time:  Time to get help immediately     Signs and symptoms  may begin suddenly and worsen quickly  Any of the following may appear minutes or hours after a stroke:  · Severe headache    · Loss of vision in one or both eyes    · Numbness, tingling, weakness, or paralysis on one side of your body    · Trouble walking or speaking    · Dizziness, confusion, or fainting    Call your local emergency number (911 in the [address] [place] [address],[address]) or have someone else call if:   · You have any of the following signs of a stroke:      ? Numbness or drooping on one side of your face     ? Weakness in an arm or leg    ? Confusion or difficulty speaking    ? Dizziness, a severe headache, or vision loss       · You have a seizure  · You have chest pain or shortness of breath  Seek care immediately if:   · Your arm or leg feels warm, tender, and painful  It may look swollen and red  · You have loss of balance or coordination  · You have double vision or vision loss  · You have unusual or heavy bleeding      Call your doctor if:   · Your blood pressure is higher or lower than you were told it should be  · You have questions or concerns about your condition or care  Treatment  will be based on a plan of care created by you and your healthcare providers  The plan may include any of the following:  · Thrombolytics  help break apart clots  A medicine called tissue Plasminogen Activator (tPA) may be used to dissolve clots  tPA works best if given within 3 hours of an ischemic stroke  tPA can improve recovery within 3 months, but it can increase the risk for brain bleeding or other life-threatening health problems  Your healthcare provider will talk to you or family members about all risks and benefits of tPA  · Antiplatelets , such as aspirin, help prevent blood clots  Take your antiplatelet medicine exactly as directed  These medicines make it more likely for you to bleed or bruise  If you are told to take aspirin, do not take acetaminophen or ibuprofen instead  · Blood thinners  help prevent blood clots  Clots can cause strokes, heart attacks, and death  The following are general safety guidelines to follow while you are taking a blood thinner:    ? Watch for bleeding and bruising while you take blood thinners  Watch for bleeding from your gums or nose  Watch for blood in your urine and bowel movements  Use a soft washcloth on your skin, and a soft toothbrush to brush your teeth  This can keep your skin and gums from bleeding  If you shave, use an electric shaver  Do not play contact sports  ? Tell your dentist and other healthcare providers that you take a blood thinner  Wear a bracelet or necklace that says you take this medicine  ? Do not start or stop any other medicines unless your healthcare provider tells you to  Many medicines cannot be used with blood thinners  ? Take your blood thinner exactly as prescribed by your healthcare provider  Do not skip does or take less than prescribed   Tell your provider right away if you forget to take your blood thinner, or if you take too much     ? Warfarin  is a blood thinner that you may need to take  The following are things you should be aware of if you take warfarin:     § Foods and medicines can affect the amount of warfarin in your blood  Do not make major changes to your diet while you take warfarin  Warfarin works best when you eat about the same amount of vitamin K every day  Vitamin K is found in green leafy vegetables and certain other foods  Ask for more information about what to eat when you are taking warfarin  § You will need to see your healthcare provider for follow-up visits when you are on warfarin  You will need regular blood tests  These tests are used to decide how much medicine you need  · Medicines  may be given to treat other health conditions such as high cholesterol, high blood pressure, or diabetes  · Surgery  may be needed to remove the clot  You may also need surgery to widen arteries or to place a filter into a blood vessel  This surgery helps to improve blood flow and prevent clots  Recovery testing: Your healthcare provider will test your recovery 90 days (3 months) after your stroke  This may be done over the phone or in person  Your provider will ask how well you can do the activities you did before the stroke  He or she will also ask how well you can do your daily activities without help  Your provider may make recommendations for you based on your test  For example, you may need someone to help you walk safely  You may also need help with daily activities, such as getting dressed  Based on your answers, your provider may do this test again over time  Manage an ischemic stroke:   · Go to stroke rehabilitation (rehab) if directed  Rehab is a program run by specialists who will help you recover abilities you may have lost  Specialists include physical, occupational, and speech therapists  Physical therapists help you gain strength or keep your balance   Occupational therapists teach you new ways to do daily activities, such as getting dressed  Your therapy may include movements for everyday activities  An example is being able to raise yourself from a chair  A speech therapist helps you improve your ability to talk and swallow  · Wear pressure stockings as directed  Pressure stockings help keep blood from pooling in your leg veins  Your healthcare provider can prescribe stockings that are right for you  Do not buy over-the-counter pressure stockings unless your healthcare provider says it is okay  They may not fit correctly or may have elastic that cuts off your circulation  Ask your healthcare provider when to start wearing pressure stockings and how long to wear them each day  · Make your home safe  Remove anything you might trip over  Tape electrical cords down  Keep paths clear throughout your home  Make sure your home is well lit  Put nonslip materials on surfaces that might be slippery  An example is your bathtub or shower floor  A cane or walker may help you keep your balance as you walk  What you can do to prevent another stroke:   · Manage health conditions  A condition such as diabetes can increase your risk for a stroke  Control your blood sugar level if you have hyperglycemia or diabetes  Take your prescribed medicines and check your blood sugar level as directed  · Check your blood pressure as directed  High blood pressure can increase your risk for a stroke  If you have high blood pressure, follow your healthcare provider's directions for controlling it  · Do not use nicotine products or illegal drugs  Nicotine and other chemicals in cigarettes and cigars can cause blood vessel damage  Nicotine and illegal drugs both increase your risk for a stroke  Ask your healthcare provider for information if you currently smoke or use drugs and need help to quit  E- cigarettes or smokeless tobacco still contain nicotine   Talk to your healthcare provider before you use these products  · Talk to your healthcare provider about alcohol  Alcohol can raise your blood pressure  The recommended limit is 2 drinks in a day for men and 1 drink in a day for women  Do not binge drink or save a week's worth of alcohol to drink in 1 or 2 days  Limit weekly amounts as directed by your provider  · Eat a variety of healthy foods  Healthy foods include whole-grain breads, low-fat dairy products, beans, lean meats, and fish  Eat at least 5 servings of fruits and vegetables each day  Choose foods that are low in fat, cholesterol, salt, and sugar  Eat foods that are high in potassium, such as potatoes and bananas  A dietitian can help you create healthy meal plans  · Maintain a healthy weight  Ask your healthcare provider how much you should weigh  Ask him or her to help you create a weight loss plan if you are overweight  He or she can help you create small goals if you have a lot of weight to lose  · Exercise as directed  Exercise can lower your blood pressure, cholesterol, weight, and blood sugar levels  Healthcare providers will help you create exercise goals  They can also help you make a plan to reach your goals  For example, you can break exercise into 10 minute periods, 3 times in the day  Find an exercise that you enjoy  This will make it easier for you to reach your exercise goals  · Manage stress  Stress can raise your blood pressure  Find new ways to relax, such as deep breathing or listening to music  · Talk to your healthcare provider about risk factors for women  Birth control pills increase your risk, especially if you are older than 35 or smoke cigarettes  Talk to your healthcare provider about other forms of contraception  Estrogen levels drop during menopause  Low estrogen levels may increase your risk for stroke  Talk to your healthcare provider about hormone replacement therapy to reduce your risk for a stroke      What you need to know about depression after a stroke:  Talk to your healthcare provider if you have depression that continues or is getting worse  Your provider may be able to help treat your depression  Your provider can also recommend support groups for you to join  A support group is a place to talk with others who have had a stroke  It may also help to talk to friends and family members about how you are feeling  Tell your family and friends to let your healthcare provider know if they see any signs of depression:  · Extreme sadness    · Avoiding social interaction with family or friends    · A lack of interest in things you once enjoyed    · Irritability    · Trouble sleeping    · Low energy levels    · A change in eating habits or sudden weight gain or loss    Follow up with your doctor or neurologist as directed: You may need to come in over time for brain function or blood tests  Your provider may refer you to a specialist, or to other kinds of care  An example is palliative (comfort) care  Your provider can also give information about respite care to anyone who helps care for you  Respite care is a service to help caregivers take a break or get more rest  Write down your questions so you remember to ask them during your visits  For support and more information:   · National Stroke Association  [address] , [place] [address]  Phone: 2- 563 - 252-1980  Web Address: [employer] [address] Information is for End User's use only and may not be sold, redistributed or otherwise used for commercial purposes  All illustrations and images included in CareNotes® are the copyrighted property of A D A M , Inc  or [facility] David Dowell   The above information is an  only  It is not intended as medical advice for individual conditions or treatments  Talk to your doctor, nurse or pharmacist before following any medical regimen to see if it is safe and effective for you

## 2021-06-18 NOTE — ASSESSMENT & PLAN NOTE
Lab Results   Component Value Date    HGBA1C 7 2 (H) 06/07/2021       Continue metformin home regimen

## 2021-06-18 NOTE — TELEPHONE ENCOUNTER
I spoke with Juhi Pelletier regarding pts medication list from her discharge summary  I went over the medication she should currently be taking  Nothing has changed with those prescriptions  However, on the AVS it states that pt is to stop taking her aspirin 81 mg and ibuprofen 200 mg  Per Juhi Pelletier the hospital told him that she should be taking the aspirin  Pt has a follow up appointment with Dr Julien Weeks on Monday but he would like to know how to give her the medications for the weekend  Since Dr Julien Weeks is not in the office until Monday can you please look into this so we can let Juhi Pelletier know how the pt needs to take her meds  Thank you   Fatmata Andrade LPN

## 2021-06-21 ENCOUNTER — OFFICE VISIT (OUTPATIENT)
Dept: FAMILY MEDICINE CLINIC | Facility: CLINIC | Age: 76
End: 2021-06-21
Payer: COMMERCIAL

## 2021-06-21 VITALS
TEMPERATURE: 96.2 F | BODY MASS INDEX: 25.32 KG/M2 | HEART RATE: 60 BPM | HEIGHT: 60 IN | DIASTOLIC BLOOD PRESSURE: 45 MMHG | RESPIRATION RATE: 18 BRPM | SYSTOLIC BLOOD PRESSURE: 100 MMHG | WEIGHT: 129 LBS | OXYGEN SATURATION: 98 %

## 2021-06-21 DIAGNOSIS — N17.9 AKI (ACUTE KIDNEY INJURY) (HCC): ICD-10-CM

## 2021-06-21 DIAGNOSIS — R77.8 TROPONIN LEVEL ELEVATED: ICD-10-CM

## 2021-06-21 DIAGNOSIS — E11.22 TYPE 2 DIABETES MELLITUS WITH STAGE 3 CHRONIC KIDNEY DISEASE, UNSPECIFIED WHETHER LONG TERM INSULIN USE, UNSPECIFIED WHETHER STAGE 3A OR 3B CKD (HCC): ICD-10-CM

## 2021-06-21 DIAGNOSIS — R91.1 LUNG NODULE: ICD-10-CM

## 2021-06-21 DIAGNOSIS — N18.30 TYPE 2 DIABETES MELLITUS WITH STAGE 3 CHRONIC KIDNEY DISEASE, UNSPECIFIED WHETHER LONG TERM INSULIN USE, UNSPECIFIED WHETHER STAGE 3A OR 3B CKD (HCC): ICD-10-CM

## 2021-06-21 DIAGNOSIS — Z71.89 COMPLEX CARE COORDINATION: Primary | ICD-10-CM

## 2021-06-21 DIAGNOSIS — E04.1 THYROID NODULE GREATER THAN OR EQUAL TO 1.5 CM IN DIAMETER INCIDENTALLY NOTED ON IMAGING STUDY: ICD-10-CM

## 2021-06-21 DIAGNOSIS — E78.5 HYPERLIPIDEMIA, UNSPECIFIED HYPERLIPIDEMIA TYPE: ICD-10-CM

## 2021-06-21 DIAGNOSIS — I63.9 CEREBROVASCULAR ACCIDENT (CVA), UNSPECIFIED MECHANISM (HCC): Primary | ICD-10-CM

## 2021-06-21 DIAGNOSIS — I10 ESSENTIAL HYPERTENSION: ICD-10-CM

## 2021-06-21 PROCEDURE — 1111F DSCHRG MED/CURRENT MED MERGE: CPT | Performed by: FAMILY MEDICINE

## 2021-06-21 PROCEDURE — 99214 OFFICE O/P EST MOD 30 MIN: CPT | Performed by: FAMILY MEDICINE

## 2021-06-21 NOTE — PATIENT INSTRUCTIONS
1) make appointment with Dr Kacey Keita neurosurgery regarding basilar stenosis  2) make appointment with cardiologist for stress test    3) make appointment for thyroid ultrasound to follow up thyroid nodule  4) get CT chest done to follow up lung nodule in 6 months - around 12/21/2021    5) get blood work done to follow up kidney function  6) see PCP in September for appointment  7) Continue physical therapy

## 2021-06-21 NOTE — PROGRESS NOTES
Assessment/Plan:       Diagnoses and all orders for this visit:    Cerebrovascular accident (CVA), unspecified mechanism ([facility] [address] )  Comments:  she is doing well  continue PT/OT  She will follow up with neurosurgery  Thyroid nodule greater than or equal to 1 5 cm in diameter incidentally noted on imaging study  -     US thyroid; Future    Lung nodule  -     CT chest wo contrast; Future    ALEXANDREA (acute kidney injury) ([facility] [address] )  Comments:  blood work pending  Hyperlipidemia, unspecified hyperlipidemia type  Comments:  continue high dose statin  Essential hypertension  Comments:  BP on the lower end  She is asymptomatic  She will follow up with cardiology  Troponin level elevated  Comments: This was noted in the hospital  She was advised to get outpatient stress test    Orders:  -     Ambulatory referral to Cardiology; Future        Subjective:      Patient ID: Salomón Plaza is a 64 y o  female  HPI     Gsiele Last presents today for hospital/STR discharge follow up after being admitted at the hospital from 6/6-6/11/21 followed by STR from 6/11 to 6/18/21  She was hospitalized for acute ischemic stroke  CT of the head and neck showed 67% stenosis of the right vertebral artery and 60% stenosis of the left vertebral artery with occlusion of the right M2 branch  MRI of the brain showed small focus of diffusion restriction in inferior cerebellar vermis indicative of acute cerebellar lacunar infarction  Small chronic left frontal cortical infarction medially  She was given aspirin, high dose statin and plavix  The aspirin was later discontinued  She is compliant with current medications  2D echo showed EF of 55-60% without any regional wall motion abnormalities, grade 1 diastolic dysfunction  2D echo was without bubble study  Neurology discussed with Cardiology who reported that there is no evidence of PFO or right-to-left shunt on the prior exam  She was advised to get stress test done outpatient     She will be following up with neurosurgery- Dr Swati Jimenes  She just started home PT/OT  States she is doing great  She denies any constitutional symptoms  While in the hospital it was noted that she had a thyroid nodule and lung nodule on CTA head/neck  She will get follow up imaging for both  She is a smoker, but has not smoked since before hospitalization  She had a BMP ordered to follow up on ALEXANDREA/CKD which she has not done yet  The following portions of the patient's history were reviewed and updated as appropriate: allergies, current medications, past family history, past medical history, past social history, past surgical history and problem list     Review of Systems   Constitutional: Negative  HENT: Negative  Eyes: Negative  Respiratory: Negative  Cardiovascular: Negative  Gastrointestinal: Negative  Endocrine: Negative  Genitourinary: Negative  Musculoskeletal: Negative  Skin: Negative  Allergic/Immunologic: Negative  Neurological: Negative  Hematological: Negative  Psychiatric/Behavioral: Negative  Objective:      BP (!) 90/38   Pulse 60   Temp (!) 96 2 °F (35 7 °C)   Resp 18   Ht 5' (1 524 m)   Wt 58 5 kg (129 lb)   SpO2 98%   BMI 25 19 kg/m²          Physical Exam  Constitutional:       General: She is not in acute distress  Appearance: Normal appearance  She is well-developed  She is not diaphoretic  HENT:      Head: Normocephalic and atraumatic  Eyes:      General: No scleral icterus  Right eye: No discharge  Left eye: No discharge  Extraocular Movements: Extraocular movements intact  Conjunctiva/sclera: Conjunctivae normal       Pupils: Pupils are equal, round, and reactive to light  Cardiovascular:      Rate and Rhythm: Normal rate and regular rhythm  Heart sounds: Normal heart sounds  No murmur heard  No friction rub  No gallop  Pulmonary:      Effort: Pulmonary effort is normal  No respiratory distress  Breath sounds: Normal breath sounds  No wheezing or rales  Chest:      Chest wall: No tenderness  Abdominal:      General: Bowel sounds are normal  There is no distension  Palpations: Abdomen is soft  There is no mass  Tenderness: There is no abdominal tenderness  There is no guarding or rebound  Musculoskeletal:         General: No deformity  Normal range of motion  Cervical back: Normal range of motion and neck supple  Skin:     General: Skin is warm and dry  Findings: No erythema or rash  Neurological:      Mental Status: She is alert and oriented to person, place, and time  Mental status is at baseline  Psychiatric:         Behavior: Behavior normal          Thought Content:  Thought content normal          Judgment: Judgment normal

## 2021-06-23 ENCOUNTER — PATIENT OUTREACH (OUTPATIENT)
Dept: CASE MANAGEMENT | Facility: OTHER | Age: 76
End: 2021-06-23

## 2021-06-23 NOTE — PROGRESS NOTES
Patient is a SNF/ALEXANDREA pathway referral   She was discharged from [address] [place] [address] to home with TANA RUIZ for PT  PT evaluated her on 6/21/21 & found her to be independent with transfers & ambulation, no assistive devices needed  They recommend she progresses with outpatient PT if her PCP recommends  BMP was ordered, but is still pending  Called the patient on preferred mobile number listed  It is for her son Serafin Gamez  He recommends talking to the patient directly, but does state he will be taking her for blood work later this week  She will also resume outpatient PT at [facility]  Spoke to the patient & introduced care management  She agrees to continued outreach  Educated in monitoring for edema, monitoring BP, monitoring weight, monitoring temp & when to notify a provider  Educated to avoid NSAIDs with teach back  Educated on diet, stroke prevention, & signs of stroke with teach back  Patient states she has not smoked in 2 weeks & feels motivated not to start again  She declines additional support at this time  Patient agrees to continued care management outreach

## 2021-06-24 ENCOUNTER — HOSPITAL ENCOUNTER (OUTPATIENT)
Dept: ULTRASOUND IMAGING | Facility: HOSPITAL | Age: 76
Discharge: HOME | End: 2021-06-24
Attending: FAMILY MEDICINE
Payer: COMMERCIAL

## 2021-06-24 DIAGNOSIS — E04.1 THYROID NODULE GREATER THAN OR EQUAL TO 1.5 CM IN DIAMETER INCIDENTALLY NOTED ON IMAGING STUDY: ICD-10-CM

## 2021-06-24 PROCEDURE — 76536 US EXAM OF HEAD AND NECK: CPT

## 2021-06-26 LAB
BUN SERPL-MCNC: 26 MG/DL (ref 8–27)
BUN/CREAT SERPL: 21 (ref 12–28)
CALCIUM SERPL-MCNC: 10.7 MG/DL (ref 8.7–10.3)
CHLORIDE SERPL-SCNC: 103 MMOL/L (ref 96–106)
CO2 SERPL-SCNC: 24 MMOL/L (ref 20–29)
CREAT SERPL-MCNC: 1.26 MG/DL (ref 0.57–1)
GLUCOSE SERPL-MCNC: 195 MG/DL (ref 65–99)
MICRODELETION SYND BLD/T FISH: NORMAL
POTASSIUM SERPL-SCNC: 4.9 MMOL/L (ref 3.5–5.2)
SL AMB EGFR AFRICAN AMERICAN: 47 ML/MIN/1.73
SL AMB EGFR NON AFRICAN AMERICAN: 41 ML/MIN/1.73
SODIUM SERPL-SCNC: 142 MMOL/L (ref 134–144)

## 2021-07-01 ENCOUNTER — OFFICE VISIT (OUTPATIENT)
Dept: HEMATOLOGY ONCOLOGY | Facility: MEDICAL CENTER | Age: 76
End: 2021-07-01
Payer: COMMERCIAL

## 2021-07-01 ENCOUNTER — TELEPHONE (OUTPATIENT)
Dept: HEMATOLOGY ONCOLOGY | Facility: MEDICAL CENTER | Age: 76
End: 2021-07-01

## 2021-07-01 VITALS
HEART RATE: 71 BPM | DIASTOLIC BLOOD PRESSURE: 66 MMHG | WEIGHT: 129 LBS | RESPIRATION RATE: 16 BRPM | OXYGEN SATURATION: 98 % | HEIGHT: 60 IN | SYSTOLIC BLOOD PRESSURE: 102 MMHG | BODY MASS INDEX: 25.32 KG/M2 | TEMPERATURE: 98.4 F

## 2021-07-01 DIAGNOSIS — E04.1 THYROID NODULE: ICD-10-CM

## 2021-07-01 DIAGNOSIS — Z86.73 PERSONAL HISTORY OF TRANSIENT ISCHEMIC ATTACK (TIA), AND CEREBRAL INFARCTION WITHOUT RESIDUAL DEFICITS: ICD-10-CM

## 2021-07-01 DIAGNOSIS — Z87.891 HISTORY OF TOBACCO ABUSE: ICD-10-CM

## 2021-07-01 DIAGNOSIS — R91.1 SOLITARY PULMONARY NODULE: ICD-10-CM

## 2021-07-01 DIAGNOSIS — I63.9 CEREBROVASCULAR ACCIDENT (CVA), UNSPECIFIED MECHANISM (HCC): ICD-10-CM

## 2021-07-01 DIAGNOSIS — D50.8 OTHER IRON DEFICIENCY ANEMIA: Primary | ICD-10-CM

## 2021-07-01 PROCEDURE — 3074F SYST BP LT 130 MM HG: CPT | Performed by: PHYSICIAN ASSISTANT

## 2021-07-01 PROCEDURE — 1160F RVW MEDS BY RX/DR IN RCRD: CPT | Performed by: PHYSICIAN ASSISTANT

## 2021-07-01 PROCEDURE — 99215 OFFICE O/P EST HI 40 MIN: CPT | Performed by: PHYSICIAN ASSISTANT

## 2021-07-01 PROCEDURE — 1036F TOBACCO NON-USER: CPT | Performed by: PHYSICIAN ASSISTANT

## 2021-07-01 RX ORDER — FERROUS SULFATE TAB EC 324 MG (65 MG FE EQUIVALENT) 324 (65 FE) MG
324 TABLET DELAYED RESPONSE ORAL DAILY
Qty: 30 TABLET | Refills: 1
Start: 2021-07-01 | End: 2021-09-09 | Stop reason: SDUPTHER

## 2021-07-01 NOTE — TELEPHONE ENCOUNTER
----- Message from Timi Martinez PA-C sent at 7/1/2021  2:22 PM EDT -----  Thyroid U/S not read- could you call and request

## 2021-07-01 NOTE — PROGRESS NOTES
[facility] [address] [address]  Hematology Ambulatory Follow-Up  Marcos Hall, 1945, [id number]  7/1/2021    Assessment/Plan:    1  Other iron deficiency anemia  Etiology for iron deficiency is unclear  However, the patient's diet is not very high in iron  Patient's labs have been followed over the past several months  Patient's ferritin has dropped to 24 ng/dL  Patient states that she is feeling okay, better since discharge from the hospital approximately one month ago  At this time, patient is not symptomatic iron deficiency  Patient's hemoglobin levels acceptable  We discussed follow-up in three months with blood work beforehand  If patient develops symptoms of iron deficiency, patient should follow-up in this office sooner  Patient understands call the office  In the meantime, we will increase the amount of oral iron could daily  Patient understands that if she becomes constipated with this, she is also to call the office so that we can start her on IV medication  Patient and her son voiced understanding and agreement the above  - CBC and differential; Future  - Comprehensive metabolic panel; Future  - Iron Panel (Includes Ferritin, Iron Sat%, Iron, and TIBC); Future  - ferrous sulfate 324 (65 Fe) mg; Take 1 tablet (324 mg total) by mouth daily  Dispense: 30 tablet; Refill: 1    2  Thyroid nodule  2 4 cm Nodule noted on CT Neck - U/S follow up pending  PCP follow up  3  Cerebrovascular accident (CVA), unspecified mechanism ([address] )  4  Personal history of transient ischemic attack (TIA), and cerebral infarction without residual deficits  Recent CVA, pt on plavix  5   Pulmonary Nodule, 3mm  6   Hx of tobacco use  CT follow up scheduled for 12/2021  Quit Tobacco on 6/6/2021  The patient is scheduled for follow-up in approximately 3 months     Patient voiced agreement and understanding to the above  Patient knows to call the Hematology/Oncology office with any questions and concerns regarding the above  Barrier(s) to care: None  The patient is able to self care   ------------------------------------------------------------------------------------------------------    Chief Complaint   Patient presents with    Follow-up       History of present illness: This is a 66year old female with past medical history of diabetes well controlled, GERD on Protonix therapy, hypertension, hyperlipidemia who was found to be iron deficient in January 2020 when hospitalized after having a stroke      Patient was hospitalized after having altered mental status changes   Patient was back to mental status baseline after 16 hours   On admission, the patient had a hemoglobin of 7 3 grams/deciliter and was very microcytic with high RDW   Patient was transfused with 2 units of packed red blood cells in the emergency room   Patient also underwent GI consultation and had a colonoscopy as well as an endoscopy after discharge that did not demonstrate etiology of iron deficiency   Iron studies in January 2020 demonstrated iron saturation of 3% with ferritin of 6 ng/dL      At discharge patient was started on oral iron supplementation twice a day   Patient initially had a lot of constipation but after being on it for some time, this seemed to even now   Patient continue to have follow-up with GI physicians   Follow-up with fecal occult blood test was negative   Patient had a urinalysis completed during hospitalization in January 2020 but follow-up urinalysis has not been completed   Patient denies blood-loss vaginally      In Sept and Oct 2020, patient underwent treatment with IV Venofer 200 mg IV   Patient notes some issues with obtaining venous access but otherwise treatments went well      Follow-up blood work on 11/27 demonstrated hemoglobin of 11 9, platelet count of 300, ferritin of 246, TIBC normalized at 262        2/26/2021:  Ferritin = 142,   TIBC = 284, iron saturation 29%, hemoglobin=  11 9, platelet count 248, MCV 96, RDW 12 5     6/3/2021 Ferritin = 24,   TIBC = 326, iron saturation 20%, hemoglobin=  11 5, platelet count 639, MCV 94, RDW 12 6     Interval history:  07/01/21:  Recent CVA 6/6/2021- on plavix  No blood loss noted from bowel, bladder  Diet devoid of iron rich veg, meat       Review of Systems   Constitutional: Negative for appetite change, fatigue, fever and unexpected weight change  HENT: Negative for nosebleeds  Respiratory: Negative for cough, choking and shortness of breath  Negative hemoptysis  Cardiovascular: Negative for chest pain, palpitations and leg swelling  Gastrointestinal: Negative  Negative for abdominal distention, abdominal pain, anal bleeding, blood in stool, constipation, diarrhea, nausea and vomiting  Endocrine: Negative  Negative for cold intolerance  Genitourinary: Negative  Negative for hematuria, menstrual problem, vaginal bleeding, vaginal discharge and vaginal pain  Musculoskeletal: Negative  Negative for arthralgias, myalgias, neck pain and neck stiffness  Skin: Negative  Negative for color change, pallor and rash  Allergic/Immunologic: Negative  Negative for immunocompromised state  Neurological: Negative  Negative for weakness and headaches  Hematological: Negative for adenopathy  Does not bruise/bleed easily  All other systems reviewed and are negative        Patient Active Problem List   Diagnosis    Combined forms of age-related cataract of left eye    Type 2 diabetes mellitus with diabetic polyneuropathy ([facility] [address] )    Hyperlipidemia    Personal history of transient ischemic attack (TIA), and cerebral infarction without residual deficits    Family history of tobacco abuse    History of tobacco abuse    Arthritis    Essential hypertension    GERD (gastroesophageal reflux disease)    Iron deficiency anemia    Type 2 diabetes mellitus with stage 3 chronic kidney disease ([facility] [address] )    CVA (cerebral vascular accident) ([facility] [address] )    Nodule of upper lobe of right lung    Thyroid nodule greater than or equal to 1 5 cm in diameter incidentally noted on imaging study    Troponin level elevated       Past Medical History:   Diagnosis Date    Anemia     Arthritis     Capsulitis of foot     Last assessed 07/29/13    Diabetes ([facility] [address] )     type 2    Full dentures     Ganglion     Last assessed 07/29/13    Hearing decreased     Hyperlipidemia     Hypertension     Magnesium deficiency     Sciatica     Wears glasses        Past Surgical History:   Procedure Laterality Date    CATARACT EXTRACTION      COLONOSCOPY      DILATION AND CURETTAGE OF UTERUS      x 4    HAND SURGERY Left     fx repaired w/wrist bone    HYSTERECTOMY      left ovary remains    KNEE ARTHROSCOPY Left     LIPOMA RESECTION      removed from back and left buttock    WV XCAPSL CTRC RMVL INSJ IO LENS PROSTH W/O ECP Right 1/24/2019    Procedure: EXTRACTION EXTRACAPSULAR CATARACT PHACO INTRAOCULAR LENS (IOL); Surgeon: Mor Khan MD;  Location: [facility] MAIN OR;  Service: Ophthalmology    WV XCAPSL CTRC RMVL INSJ IO LENS PROSTH W/O ECP Left 2/14/2019    Procedure: EXTRACTION EXTRACAPSULAR CATARACT PHACO INTRAOCULAR LENS (IOL);   Surgeon: Mor Khan MD;  Location: [facility] MAIN OR;  Service: Ophthalmology    TONSILLECTOMY      and adenoids    TUBAL LIGATION         Family History   Problem Relation Age of Onset    Leukemia Father     Early death Father 39        leukemia    Stomach cancer Maternal Grandfather     Diabetes Paternal Grandfather     Diabetes Sister         insulin dependent    Diabetes Brother         insulin    Diabetes Sister         insulin    Diabetes Sister         insulin       Social History     Socioeconomic History    Marital status:      Spouse name: None    Number of children: None    Years of education: None    Highest education level: None   Occupational History    None   Tobacco Use    Smoking status: Former Smoker     Packs/day: 0 50     Years: 35 00     Pack years: 17 50     Types: Cigarettes     Quit date: 2021     Years since quittin 0    Smokeless tobacco: Never Used   Vaping Use    Vaping Use: Never used   Substance and Sexual Activity    Alcohol use: Not Currently     Comment: Rarely    Drug use: Never    Sexual activity: Not Currently   Other Topics Concern    None   Social History Narrative    None     Social Determinants of Health     Financial Resource Strain: Low Risk     Difficulty of Paying Living Expenses: Not hard at all   Food Insecurity: No Food Insecurity    Worried About Running Out of Food in the Last Year: Never true    Yadi of Food in the Last Year: Never true   Transportation Needs: No Transportation Needs    Lack of Transportation (Medical): No    Lack of Transportation (Non-Medical): No   Physical Activity: Inactive    Days of Exercise per Week: 0 days    Minutes of Exercise per Session: 0 min   Stress: No Stress Concern Present    Feeling of Stress :  Only a little   Social Connections: Socially Isolated    Frequency of Communication with Friends and Family: More than three times a week    Frequency of Social Gatherings with Friends and Family: More than three times a week    Attends Confucianism Services: Never    Active Member of Clubs or Organizations: No    Attends Club or Organization Meetings: Never    Marital Status:    Intimate Partner Violence: Not At Risk    Fear of Current or Ex-Partner: No    Emotionally Abused: No    Physically Abused: No    Sexually Abused: No         Current Outpatient Medications:     atorvastatin (LIPITOR) 80 mg tablet, Take 1 tablet (80 mg total) by mouth every evening, Disp:  , Rfl: 0    clopidogrel (PLAVIX) 75 mg tablet, TAKE 1 TABLET BY MOUTH EVERY DAY, Disp: 90 tablet, Rfl: 0    ferrous sulfate 324 (65 Fe) mg, Take 1 tablet (324 mg total) by mouth daily, Disp: 30 tablet, Rfl: 1    magnesium oxide (MAG-OX) 400 mg tablet, TAKE 1 TABLET BY MOUTH TWICE A DAY, Disp: 180 tablet, Rfl: 3    metFORMIN (GLUCOPHAGE) 1000 MG tablet, Take 1 tablet (1,000 mg total) by mouth 2 (two) times a day, Disp: 180 tablet, Rfl: 3    pantoprazole (PROTONIX) 40 mg tablet, TAKE 1 TABLET BY MOUTH EVERY DAY, Disp: 90 tablet, Rfl: 1    quinapril (ACCUPRIL) 40 MG tablet, TAKE 1 TABLET BY MOUTH  DAILY AT BEDTIME, Disp: 90 tablet, Rfl: 3    Allergies   Allergen Reactions    Aleve [Naproxen] Other (See Comments)     "weird" sensation entire body    Sulfa Antibiotics Rash       Objective:  /66 (BP Location: Right arm, Patient Position: Sitting, Cuff Size: Adult)   Pulse 71   Temp 98 4 °F (36 9 °C)   Resp 16   Ht 5' (1 524 m)   Wt 58 5 kg (129 lb)   SpO2 98%   BMI 25 19 kg/m²    Physical Exam  Constitutional:       General: She is not in acute distress  Appearance: She is well-developed  HENT:      Head: Normocephalic and atraumatic  Eyes:      General: No scleral icterus  Pupils: Pupils are equal, round, and reactive to light  Cardiovascular:      Rate and Rhythm: Normal rate and regular rhythm  Heart sounds: No murmur heard  Pulmonary:      Effort: Pulmonary effort is normal  No respiratory distress  Skin:     General: Skin is warm  Coloration: Skin is not pale  Findings: No rash  Neurological:      Mental Status: She is alert and oriented to person, place, and time  Psychiatric:         Thought Content:  Thought content normal          Result Review  Labs:  Lab Results   Component Value Date    IRON 65 06/03/2021    TIBC 326 06/03/2021    FERRITIN 24 06/03/2021     Lab Results   Component Value Date    WBC 6 68 06/08/2021    HGB 10 6 (L) 06/08/2021    HCT 32 9 (L) 06/08/2021    MCV 94 06/08/2021     06/08/2021     Lab Results   Component Value Date     11/22/2017    SODIUM 142 06/25/2021    K 4 9 2021     2021    CO2 24 2021    AGAP 6 2021    BUN 26 2021    CREATININE 1 26 (H) 2021    GLUC 195 (H) 2021    CALCIUM 9 4 2021    AST 15 2021    ALT 26 2021    ALKPHOS 89 2021    PROT 6 8 2017    TP 6 4 2021    BILITOT 0 3 2017    TBILI 0 56 2021    EGFR 41 2021         Imaging:   Echo complete with contrast if indicated  [address]  [address] [facility] [address]  [phone]    Transthoracic Echocardiogram  2D, M-mode, Doppler, and Color Doppler    Study date:  2021    Patient: Zee Osorio  MR number: [id number]  Account number: [de-identified]  : 1945  Age: 66 years  Gender: Female  Status: Inpatient  Location: Bedside  Height: 60 in  Weight: 122 8 lb  BP: 132/ 61 mmHg    Indications: CVA    Diagnoses: I63 30 - Cerebral infarction due to thrombosis of unspecified cerebral artery    Sonographer:  SAMM Fagan  Primary Physician:  Melba Fothergill, MD  Referring Physician:  ROLAND Akbar  Group:  [facility]  Interpreting Physician:  DO BENJY Villarreal    LEFT VENTRICLE:  Systolic function was normal by visual assessment  Ejection fraction was estimated in the range of 55 % to 60 %  There were no regional wall motion abnormalities  There was mild concentric hypertrophy  Doppler parameters were consistent with abnormal left ventricular relaxation (grade 1 diastolic dysfunction)  MITRAL VALVE:  There was mild regurgitation  AORTIC VALVE:  There was no evidence for stenosis  There was no regurgitation  TRICUSPID VALVE:  There was trace regurgitation  The tricuspid jet envelope definition was inadequate for estimation of RV systolic pressure  COMPARISONS:  There has been no significant interval change  Bubble study was negative on prior study   Comparison was made with the previous study of 14-Jan-2020  HISTORY: PRIOR HISTORY: CVA,Hyperlipidemia,HTN,Diabetes,Tobacco abuse,Gastroesophageal reflux disease  PROCEDURE: The procedure was performed at the bedside  This was a routine study  The transthoracic approach was used  The study included complete 2D imaging, M-mode, complete spectral Doppler, and color Doppler  The heart rate was 58 bpm,  at the start of the study  This was a technically difficult study  LEFT VENTRICLE: Size was normal  Systolic function was normal by visual assessment  Ejection fraction was estimated in the range of 55 % to 60 %  There were no regional wall motion abnormalities  There was mild concentric hypertrophy  DOPPLER: Doppler parameters were consistent with abnormal left ventricular relaxation (grade 1 diastolic dysfunction)  RIGHT VENTRICLE: The size was normal  Systolic function was normal  DOPPLER: Systolic pressure was within the normal range  LEFT ATRIUM: The atrium was mildly dilated  RIGHT ATRIUM: Size was normal     MITRAL VALVE: Valve structure was normal  There was normal leaflet separation  No echocardiographic evidence for prolapse  DOPPLER: The transmitral velocity was within the normal range  There was no evidence for stenosis  There was mild  regurgitation  AORTIC VALVE: The valve was trileaflet  Leaflets exhibited normal thickness and normal cuspal separation  DOPPLER: Transaortic velocity was within the normal range  There was no evidence for stenosis  There was no regurgitation  TRICUSPID VALVE: The valve structure was normal  There was normal leaflet separation  DOPPLER: The transtricuspid velocity was within the normal range  There was trace regurgitation  The tricuspid jet envelope definition was inadequate for  estimation of RV systolic pressure  PULMONIC VALVE: Leaflets exhibited normal thickness, no calcification, and normal cuspal separation  DOPPLER: The transpulmonic velocity was within the normal range   There was no regurgitation  PERICARDIUM: There was no thickening  There was no pericardial effusion  AORTA: The root exhibited normal size  PULMONARY ARTERY: The size was normal  The morphology appeared normal     SYSTEM MEASUREMENT TABLES    2D  EF (Teich): 59 91 %  %FS: 31 41 %  Ao Diam: 3 1 cm  EDV(Teich): 67 18 ml  ESV(Teich): 26 93 ml  IVSd: 1 12 cm  LA Area: 18 cm2  LA Diam: 3 39 cm  LVEDV MOD A4C: 96 62 ml  LVEF MOD A4C: 44 51 %  LVESV MOD A4C: 53 62 ml  LVIDd: 3 93 cm  LVIDs: 2 7 cm  LVLd A4C: 7 52 cm  LVLs A4C: 5 74 cm  LVOT Diam: 1 96 cm  LVPWd: 1 07 cm  RA Area: 11 58 cm2  RVIDd: 2 81 cm  SV (Teich): 40 25 ml  SV MOD A4C: 43 ml    CW  AV Vmax: 1 62 m/s  AV maxPG: 10 53 mmHg    MM  TAPSE: 2 02 cm    PW  MV E/A Ratio: 0 62  E' Sept: 0 06 m/s  E/E' Sept: 11 84  LVOT Vmax: 1 19 m/s  LVOT maxP 66 mmHg  MV A Boni: 1 15 m/s  MV Dec [place]: 2 21 m/s2  MV DecT: 324 92 ms  MV E Boni: 0 72 m/s  MV PHT: 94 23 ms  MVA By PHT: 2 33 cm2    [employer] Accredited Echocardiography Laboratory    Prepared and electronically signed by    Brittany Ochoa DO  Signed 2021 11:33:19      MRI Brain Reviewed  Please note: This report has been generated by a voice recognition software system  Therefore there may be syntax, spelling, and/or grammatical errors  Please call if you have any questions

## 2021-07-02 DIAGNOSIS — N17.9 AKI (ACUTE KIDNEY INJURY) (HCC): ICD-10-CM

## 2021-07-02 DIAGNOSIS — E04.1 THYROID NODULE GREATER THAN OR EQUAL TO 1.5 CM IN DIAMETER INCIDENTALLY NOTED ON IMAGING STUDY: Primary | ICD-10-CM

## 2021-07-02 DIAGNOSIS — E83.52 HYPERCALCEMIA: ICD-10-CM

## 2021-07-06 ENCOUNTER — TELEPHONE (OUTPATIENT)
Dept: NEUROLOGY | Facility: CLINIC | Age: 76
End: 2021-07-06

## 2021-07-06 NOTE — TELEPHONE ENCOUNTER
Called patient to schedule her hospital follow up  Spoke with patient's  and scheduled 09/09 with Dr Dillan Perkins in the [address] [place] [address] office  Did offer sooner in [facility] but he preferred [address] [place], [place] [address]W/Acute ischemic stroke/[employer]    Notes from chart:  Olesya Sabillon will need follow up in 8-10 weeks with general attending or advance practitioner  She will not require outpatient neurological testing  Will be ordering OP referral to Dr Talha Berger in [place] for OP eval of stenosis

## 2021-07-13 DIAGNOSIS — I63.9 STROKE (CEREBRUM) (HCC): ICD-10-CM

## 2021-07-13 LAB
BUN SERPL-MCNC: 24 MG/DL (ref 8–27)
BUN/CREAT SERPL: 19 (ref 12–28)
CALCIUM SERPL-MCNC: 10.9 MG/DL (ref 8.7–10.3)
CHLORIDE SERPL-SCNC: 104 MMOL/L (ref 96–106)
CO2 SERPL-SCNC: 23 MMOL/L (ref 20–29)
CREAT SERPL-MCNC: 1.28 MG/DL (ref 0.57–1)
GLUCOSE SERPL-MCNC: 177 MG/DL (ref 65–99)
MICRODELETION SYND BLD/T FISH: NORMAL
POTASSIUM SERPL-SCNC: 4.9 MMOL/L (ref 3.5–5.2)
SL AMB EGFR AFRICAN AMERICAN: 46 ML/MIN/1.73
SL AMB EGFR NON AFRICAN AMERICAN: 40 ML/MIN/1.73
SODIUM SERPL-SCNC: 141 MMOL/L (ref 134–144)

## 2021-07-13 RX ORDER — ATORVASTATIN CALCIUM 80 MG/1
80 TABLET, FILM COATED ORAL EVERY EVENING
Refills: 0
Start: 2021-07-13 | End: 2021-07-14 | Stop reason: SDUPTHER

## 2021-07-14 DIAGNOSIS — I63.9 STROKE (CEREBRUM) (HCC): ICD-10-CM

## 2021-07-14 RX ORDER — ATORVASTATIN CALCIUM 80 MG/1
80 TABLET, FILM COATED ORAL EVERY EVENING
Qty: 90 TABLET | Refills: 0 | Status: SHIPPED | OUTPATIENT
Start: 2021-07-14 | End: 2021-09-20

## 2021-07-14 NOTE — TELEPHONE ENCOUNTER
atorvastatin (LIPITOR) 80 mg tablet     Patient needs this medication to go to [facility]   This was sent to print    Can we please send to pharmacy    Thank you

## 2021-07-19 NOTE — NURSING NOTE
Call placed to patient to discuss upcoming appointment at [place] [address] radiology department and complete consultation with patient  Patient is having thyroid biopsy with US guidance  Reviewed patient's allergies, current anticoagulant medication of plavix present per patient but not required to stop per periprocedural management of coagulation status and hemostasis risk in percutaneous image guided procedure guidelines, also discussed the pre and post procedure expectations  Reminded patient of location and time expected for procedure, Patient expressed understanding by verbalizing and repeating instructions

## 2021-07-22 DIAGNOSIS — I63.9 CEREBROVASCULAR ACCIDENT (CVA), UNSPECIFIED MECHANISM (HCC): ICD-10-CM

## 2021-07-22 PROBLEM — I65.03 VERTEBRAL ARTERY STENOSIS, BILATERAL: Status: ACTIVE | Noted: 2021-07-22

## 2021-07-22 RX ORDER — CLOPIDOGREL BISULFATE 75 MG/1
TABLET ORAL
Qty: 90 TABLET | Refills: 0 | Status: SHIPPED | OUTPATIENT
Start: 2021-07-22 | End: 2021-09-13

## 2021-07-26 ENCOUNTER — HOSPITAL ENCOUNTER (OUTPATIENT)
Dept: RADIOLOGY | Facility: HOSPITAL | Age: 76
Discharge: HOME | End: 2021-07-26
Admitting: FAMILY MEDICINE
Payer: COMMERCIAL

## 2021-07-26 DIAGNOSIS — E04.1 THYROID NODULE GREATER THAN OR EQUAL TO 1.5 CM IN DIAMETER INCIDENTALLY NOTED ON IMAGING STUDY: ICD-10-CM

## 2021-07-26 PROCEDURE — 88173 CYTOPATH EVAL FNA REPORT: CPT | Performed by: PATHOLOGY

## 2021-07-26 PROCEDURE — 88172 CYTP DX EVAL FNA 1ST EA SITE: CPT | Performed by: PATHOLOGY

## 2021-07-26 PROCEDURE — 88333 PATH CONSLTJ SURG CYTO XM 1: CPT | Performed by: PATHOLOGY

## 2021-07-26 PROCEDURE — 10005 FNA BX W/US GDN 1ST LES: CPT

## 2021-07-26 RX ORDER — LIDOCAINE HYDROCHLORIDE 10 MG/ML
2 INJECTION, SOLUTION EPIDURAL; INFILTRATION; INTRACAUDAL; PERINEURAL ONCE
Status: COMPLETED | OUTPATIENT
Start: 2021-07-26 | End: 2021-07-26

## 2021-07-26 RX ADMIN — LIDOCAINE HYDROCHLORIDE 2 ML: 10 INJECTION, SOLUTION EPIDURAL; INFILTRATION; INTRACAUDAL; PERINEURAL at 09:48

## 2021-07-27 ENCOUNTER — TELEPHONE (OUTPATIENT)
Dept: FAMILY MEDICINE CLINIC | Facility: CLINIC | Age: 76
End: 2021-07-27

## 2021-07-27 NOTE — TELEPHONE ENCOUNTER
DR AGUILAR    Patient called and said she is calling you back 
How Severe Is Your Rash?: moderate
I sent [employer] message as well, but please let pt know that her thyroid biopsy is benign and showed no evidence of cancer  If she has any questions, I can call her 
Patient advised of thyroid biopsy results, no further action needed    Yennifer TIFFANY Herbert
Is This A New Presentation, Or A Follow-Up?: Rash

## 2021-08-03 ENCOUNTER — PATIENT OUTREACH (OUTPATIENT)
Dept: CASE MANAGEMENT | Facility: OTHER | Age: 76
End: 2021-08-03

## 2021-08-03 NOTE — PROGRESS NOTES
Spoke to the patient's son, Neftaly Krishnan  He states concern because the patient resumed smoking, although she states a desire to quit  They tried nicotine patches, but they made her nauseated  He plans on discussing this with her PCP & may call to get a sooner appointment  He is also concerned because she has been unsteady on her feet at home  He would like for her to have therapy, but is unsure if she will agree to this  He recommends contacting the patient directly to discuss the above  Left a voicemail, call back number provided  Will refer to  smoking cessation program & [employer] if patient agrees

## 2021-08-05 ENCOUNTER — PATIENT OUTREACH (OUTPATIENT)
Dept: CASE MANAGEMENT | Facility: OTHER | Age: 76
End: 2021-08-05

## 2021-08-05 NOTE — PROGRESS NOTES
Patient called back  She would be interested in going to outpatient PT to work on her balance  She also tires quickly when she is out  She has a walker which she doesn't use, but feels a cane would be more appropriate  She states a desire to cut back on her smoking with an eventual goal of quitting completely  She is down to 4-5 cigarettes/day from smoking 1 pack/day  She knows the risks of tobacco use & would like additional support  She will contact her PCP to request a sooner appointment to discuss smoking cessation aids & an outpatient PT order      Referral made to [facility] smoking cessation program

## 2021-08-11 ENCOUNTER — TELEPHONE (OUTPATIENT)
Dept: FAMILY MEDICINE CLINIC | Facility: CLINIC | Age: 76
End: 2021-08-11

## 2021-08-11 DIAGNOSIS — R26.81 UNSTEADY GAIT: ICD-10-CM

## 2021-08-11 DIAGNOSIS — I63.9 CEREBROVASCULAR ACCIDENT (CVA), UNSPECIFIED MECHANISM (HCC): Primary | ICD-10-CM

## 2021-08-11 NOTE — TELEPHONE ENCOUNTER
Spoke to patients son Cynthia Found  He is requesting an order for PT due to weakness post stroke  Unsteady gait  Please add order     Aaron Gaines MA

## 2021-08-17 ENCOUNTER — CONSULT (OUTPATIENT)
Dept: CARDIOLOGY CLINIC | Facility: CLINIC | Age: 76
End: 2021-08-17
Payer: COMMERCIAL

## 2021-08-17 VITALS
HEART RATE: 67 BPM | HEIGHT: 60 IN | DIASTOLIC BLOOD PRESSURE: 60 MMHG | SYSTOLIC BLOOD PRESSURE: 122 MMHG | OXYGEN SATURATION: 99 % | BODY MASS INDEX: 23.95 KG/M2 | WEIGHT: 122 LBS

## 2021-08-17 DIAGNOSIS — I63.9 CEREBROVASCULAR ACCIDENT (CVA), UNSPECIFIED MECHANISM (HCC): ICD-10-CM

## 2021-08-17 DIAGNOSIS — E78.5 HYPERLIPIDEMIA, UNSPECIFIED HYPERLIPIDEMIA TYPE: Chronic | ICD-10-CM

## 2021-08-17 DIAGNOSIS — N18.30 TYPE 2 DIABETES MELLITUS WITH STAGE 3 CHRONIC KIDNEY DISEASE, UNSPECIFIED WHETHER LONG TERM INSULIN USE, UNSPECIFIED WHETHER STAGE 3A OR 3B CKD (HCC): Chronic | ICD-10-CM

## 2021-08-17 DIAGNOSIS — I10 ESSENTIAL HYPERTENSION: Primary | Chronic | ICD-10-CM

## 2021-08-17 DIAGNOSIS — E11.22 TYPE 2 DIABETES MELLITUS WITH STAGE 3 CHRONIC KIDNEY DISEASE, UNSPECIFIED WHETHER LONG TERM INSULIN USE, UNSPECIFIED WHETHER STAGE 3A OR 3B CKD (HCC): Chronic | ICD-10-CM

## 2021-08-17 DIAGNOSIS — Z72.0 TOBACCO ABUSE: ICD-10-CM

## 2021-08-17 DIAGNOSIS — R77.8 TROPONIN LEVEL ELEVATED: ICD-10-CM

## 2021-08-17 DIAGNOSIS — I65.03 VERTEBRAL ARTERY STENOSIS, BILATERAL: ICD-10-CM

## 2021-08-17 PROCEDURE — 4004F PT TOBACCO SCREEN RCVD TLK: CPT | Performed by: INTERNAL MEDICINE

## 2021-08-17 PROCEDURE — 93000 ELECTROCARDIOGRAM COMPLETE: CPT | Performed by: INTERNAL MEDICINE

## 2021-08-17 PROCEDURE — 1160F RVW MEDS BY RX/DR IN RCRD: CPT | Performed by: INTERNAL MEDICINE

## 2021-08-17 PROCEDURE — 99204 OFFICE O/P NEW MOD 45 MIN: CPT | Performed by: INTERNAL MEDICINE

## 2021-08-17 NOTE — PROGRESS NOTES
[facility]  Office Consultation  Jose R Mcmhaon 64 y o  female MRN: [id number]        Problems    1  Essential hypertension     2  Troponin level elevated  Ambulatory referral to Cardiology    AMB extended holter monitor    NM myocardial perfusion spect (rx stress and/or rest)    This was noted in the hospital  She was advised to get outpatient stress test     3  Hyperlipidemia, unspecified hyperlipidemia type     4  Cerebrovascular accident (CVA), unspecified mechanism ([facility] )  AMB extended holter monitor   5  Tobacco abuse     6   Type 2 diabetes mellitus with stage 3 chronic kidney disease, unspecified whether long term insulin use, unspecified whether stage 3a or 3b CKD ([facility] )     7  Vertebral artery stenosis, bilateral         Impression:    66-year-old woman referred to me because of elevated troponin during her recent hospitalization for her 2nd stroke in just under 2 years, deemed to be of vascular origin by Neurology, with clear evidence of vertebral disease, smoker, diabetic, age with hyperlipidemia     Elevated troponin  o Nonspecific troponin elevation in the setting of a stroke, peak of 6 80, certainly could be due to her stroke but with her multiple risk factors, assessing for CAD is certainly necessary   Hypercholesterolemia  o Optimized to high-intensity 80 mg atorvastatin after recent 2nd stroke 6/21   Hypertension  o Well controlled on quinapril   Tobacco use  o Tobacco cessation counseling provided today  o She is unable to use Nicoderm and even the lowest dose due to significant nausea  o I discussed with her that she has CT evidence of apical emphysematous changes, and some very mild wheezing on exam today   Stroke  o 1st stroke 1/20  o 2nd stroke 6/21 while on Plavix  o Both deemed ischemic, and likely of vascular origin by Neurology based on CTA  o Atorvastatin up titrated  o Negative bubble study 1/20  o Echo otherwise normal with no significant valvular disease   Diabetes  o Under reasonable control at 7 2, but previously lower at 6 3    Plan     ZIO patch to rule out any possible AFib as a cause of her strokes   Lexiscan Myoview stress test considering elevated troponins and multiple risk factors   As needed follow-up in my office depending on the results of the above studies   Urged her to continue discussing with her PCP regarding tobacco cessation      Reason for Consult / Principal Problem:  Elevated troponin    HPI: Collette Ibrahim is a 64y o  year old female with multiple cardiovascular risk factors, including smoking, type 2 diabetes, hypertension, dyslipidemia, who presents for referral by her PCP for the elevated troponin noted upon recent hospitalization, with normal echo  She had a stroke, it occurred on Plavix, she had previously had a stroke 1/20, different vascular territory, with notable vertebral disease, and per Neurology likely related to ischemic vascular cause  She has mild right leg weakness  She has mild memory deficit, but otherwise feels quite well  Atorvastatin was maximized to 80 mg  Her blood pressure is excellent, but she still smokes  She has no chest pain or chest pressure, but has a mildly abnormal EKG with LVH and anterior fascicular block  There are no ischemic changes  Her peak troponin was 0 41 during recent hospitalizations in the absence of any clear ischemic changes on EKG there  She previously try to quit smoking with Nicoderm, but had significant nausea with a 7 mg patch  She is contemplative of quitting        Review of Systems   Constitutional: Negative  HENT: Negative  Eyes: Negative  Respiratory: Negative  Cardiovascular: Negative  Gastrointestinal: Negative  Endocrine: Negative  Genitourinary: Negative  Musculoskeletal: Negative  Skin: Negative  Neurological: Positive for weakness  Hematological: Negative  Psychiatric/Behavioral: Negative            Past Medical History: Diagnosis Date    Anemia     Arthritis     Capsulitis of foot     Last assessed 13    Diabetes ([address] )     type 2    Full dentures     Ganglion     Last assessed 13    Hearing decreased     Hyperlipidemia     Hypertension     Magnesium deficiency     Sciatica     Wears glasses      Past Surgical History:   Procedure Laterality Date    CATARACT EXTRACTION      COLONOSCOPY      DILATION AND CURETTAGE OF UTERUS      x 4    HAND SURGERY Left     fx repaired w/wrist bone    HYSTERECTOMY      left ovary remains    KNEE ARTHROSCOPY Left     LIPOMA RESECTION      removed from back and left buttock    MS XCAPSL CTRC RMVL INSJ IO LENS PROSTH W/O ECP Right 2019    Procedure: EXTRACTION EXTRACAPSULAR CATARACT PHACO INTRAOCULAR LENS (IOL); Surgeon: Aria Avila MD;  Location: [facility] MAIN OR;  Service: Ophthalmology    MS XCAPSL CTRC RMVL INSJ IO LENS PROSTH W/O ECP Left 2019    Procedure: EXTRACTION EXTRACAPSULAR CATARACT PHACO INTRAOCULAR LENS (IOL);   Surgeon: Aria Avila MD;  Location: [facility] OR;  Service: Ophthalmology    TONSILLECTOMY      and adenoids    TUBAL LIGATION      US GUIDED THYROID BIOPSY  2021     Social History     Substance and Sexual Activity   Alcohol Use Not Currently    Comment: Rarely     Social History     Substance and Sexual Activity   Drug Use Never     Social History     Tobacco Use   Smoking Status Current Every Day Smoker    Packs/day: 0 25    Years: 35 00    Pack years: 8 75    Types: Cigarettes    Last attempt to quit: 2021    Years since quittin 2   Smokeless Tobacco Never Used     Family History   Problem Relation Age of Onset    Leukemia Father     Early death Father 39        leukemia    Stomach cancer Maternal Grandfather     Diabetes Paternal Grandfather     Diabetes Sister         insulin dependent    Diabetes Brother         insulin    Diabetes Sister         insulin    Diabetes Sister         insulin Allergies: Allergies   Allergen Reactions    Aleve [Naproxen] Other (See Comments)     "weird" sensation entire body    Sulfa Antibiotics Rash       Medications:     Current Outpatient Medications:     atorvastatin (LIPITOR) 80 mg tablet, Take 1 tablet (80 mg total) by mouth every evening, Disp: 90 tablet, Rfl: 0    clopidogrel (PLAVIX) 75 mg tablet, TAKE 1 TABLET BY MOUTH EVERY DAY, Disp: 90 tablet, Rfl: 0    ferrous sulfate 324 (65 Fe) mg, Take 1 tablet (324 mg total) by mouth daily, Disp: 30 tablet, Rfl: 1    magnesium oxide (MAG-OX) 400 mg tablet, TAKE 1 TABLET BY MOUTH TWICE A DAY, Disp: 180 tablet, Rfl: 3    metFORMIN (GLUCOPHAGE) 1000 MG tablet, Take 1 tablet (1,000 mg total) by mouth 2 (two) times a day, Disp: 180 tablet, Rfl: 3    pantoprazole (PROTONIX) 40 mg tablet, TAKE 1 TABLET BY MOUTH EVERY DAY, Disp: 90 tablet, Rfl: 1    quinapril (ACCUPRIL) 40 MG tablet, TAKE 1 TABLET BY MOUTH  DAILY AT BEDTIME, Disp: 90 tablet, Rfl: 3      Vitals:    08/17/21 0849   BP: 122/60   Pulse: 67   SpO2: 99%     Weight (last 2 days)     Date/Time   Weight    08/17/21 0849   55 3 (122)            Physical Exam  Constitutional:       General: She is not in acute distress  Appearance: She is well-developed  She is not diaphoretic  HENT:      Head: Normocephalic and atraumatic  Eyes:      General: No scleral icterus  Conjunctiva/sclera: Conjunctivae normal       Pupils: Pupils are equal, round, and reactive to light  Neck:      Thyroid: No thyromegaly  Vascular: No JVD  Trachea: No tracheal deviation  Cardiovascular:      Rate and Rhythm: Normal rate and regular rhythm  Heart sounds: Normal heart sounds  No murmur heard  No friction rub  No gallop  Pulmonary:      Effort: Pulmonary effort is normal  No respiratory distress  Breath sounds: Normal breath sounds  No wheezing or rales  Abdominal:      General: Bowel sounds are normal  There is no distension  Palpations: Abdomen is soft  Tenderness: There is no abdominal tenderness  Musculoskeletal:         General: No tenderness or deformity  Normal range of motion  Cervical back: Normal range of motion and neck supple  Right lower leg: No edema  Left lower leg: No edema  Skin:     General: Skin is warm and dry  Findings: No erythema or rash  Neurological:      Mental Status: She is alert and oriented to person, place, and time  Cranial Nerves: No cranial nerve deficit  Psychiatric:         Judgment: Judgment normal            Laboratory Studies:    Laboratory studies personally reviewed    Cardiac testing:     EKG reviewed personally:   Results for orders placed or performed in visit on 08/17/21   POCT ECG    Impression    Sinus rhythm, anterior fascicular block         Echocardiogram:  1/20-normal, negative bubble study  6/21-normal    Stress test:      Holter:      Cardiac catheterization:        Farnaz Connors MD    Portions of the record may have been created with voice recognition software  Occasional wrong word or "sound a like" substitutions may have occurred due to the inherent limitations of voice recognition software  Read the chart carefully and recognize, using context, where substitutions have occurred

## 2021-08-17 NOTE — PATIENT INSTRUCTIONS
[address] YOU HAVE SOME EVIDENCE OF EMPHYSEMA ON YOUR CT SCAN  I ordered a stress test and heart monitor

## 2021-08-19 ENCOUNTER — TELEPHONE (OUTPATIENT)
Dept: FAMILY MEDICINE CLINIC | Facility: CLINIC | Age: 76
End: 2021-08-19

## 2021-08-19 ENCOUNTER — EVALUATION (OUTPATIENT)
Dept: PHYSICAL THERAPY | Facility: CLINIC | Age: 76
End: 2021-08-19
Payer: COMMERCIAL

## 2021-08-19 DIAGNOSIS — R26.81 UNSTEADY GAIT: ICD-10-CM

## 2021-08-19 DIAGNOSIS — I63.9 CEREBROVASCULAR ACCIDENT (CVA), UNSPECIFIED MECHANISM (HCC): ICD-10-CM

## 2021-08-19 PROCEDURE — 97162 PT EVAL MOD COMPLEX 30 MIN: CPT | Performed by: PHYSICAL THERAPIST

## 2021-08-19 PROCEDURE — 97110 THERAPEUTIC EXERCISES: CPT | Performed by: PHYSICAL THERAPIST

## 2021-08-19 NOTE — PROGRESS NOTES
PT Evaluation     Today's date: 2021  Patient name: Marc Escobedo  : 1945  MRN: [id number]  Referring provider: Florence Brown MD  Dx:   Encounter Diagnosis     ICD-10-CM    1  Cerebrovascular accident (CVA), unspecified mechanism ([facility] )  I63 9 Ambulatory referral to Physical Therapy   2  Unsteady gait  R26 81 Ambulatory referral to Physical Therapy                  Assessment  Assessment details: Marc Escobedo is a 64 y o  female with fatigue, gait and balance dysfunction due to a  Cerebrovascular accident (CVA)  She presents with decreased strength, decreased ROM, and ambulatory dysfunction  Due to these impairments, Patient has difficulty performing a/iadls, recreational activities and engaging in social activities  Patient's clinical presentation is consistent with their referring diagnosis  Patient would benefit from skilled physical therapy to address their aforementioned impairments, improve their level of function and to improve their overall quality of life   has been given a home exercise program and is in agreement with the plan of care  Thank you for your referral   Impairments: abnormal coordination, abnormal gait, impaired balance, lacks appropriate home exercise program and safety issue    Symptom irritability: lowBarriers to therapy: Multiple infarct  Patient with anemia  Understanding of Dx/Px/POC: excellent  Goals  ST Goals - 2-4 weeks  1  Patient will ambulate independently with SPC and no LOB in 4 weeks  2  Patient will improve her TUG by 3 seconds in 4 weeks  3  Patient will perform IADLs without LOB  in 2 weeks  4  Patient will increase strength by 25% in 4 weeks  5  Patient will improve her DGI by 3 points in 4 weeks    LT Goals - Discharge  1  Patient will improve FOTO score to maximum stated or greater by discharge  2  Patient will return to preferred recreational activity without significant pain increase by discharge   3    Patient will return to all house work related activities without pain by discharge     Plan  Patient would benefit from: skilled physical therapy  Planned therapy interventions: therapeutic training, therapeutic exercise, therapeutic activities, stretching, strengthening, gait training, balance and home exercise program  Frequency: 2x week  Duration in visits: 20  Duration in weeks: 10  Plan of Care beginning date: 2021  Plan of Care expiration date: 2021  Treatment plan discussed with: patient        Subjective Evaluation    History of Present Illness  Mechanism of injury: Patient reports that she has had 2 CVs   One was in 2020 and more recently in 2021  Patient reports that with the  CVA she experience dizziness and had LOB  She was in [facility] x 1 week each as per patient  She had one visit with a HHCPT and was determined to not be appropriate for services  CC:  She reports that she has weakness in the arms and legs  She also reports a LOB which is not related to any dizziness  She denies falling  She reports that when her son is not home she "does things she is not supposed to" meaning climbing the stairs at home  Function:  She is not going up and down the stairs too often  She is not doing her laundry or grocery shopping as her son has taken over these tasks  She states that she has driven  She typically would work in the garden or in the kitchen              Recurrent probem    Quality of life: excellent    Pain  Current pain ratin  At best pain ratin  At worst pain ratin    Social Support  Steps to enter house: yes  Stairs in house: yes   Lives in: multiple-level home  Lives with: adult children    Employment status: not working  Patient Goals  Patient goals for therapy: independence with ADLs/IADLs, return to sport/leisure activities and improved balance          Objective     Neurological Testing     Additional Neurological Details  DTRs:  L>R patellar    Strength/Myotome Testing     Left Hip   Planes of Motion   Flexion: 4  Abduction: 4    Right Hip   Planes of Motion   Flexion: 4  Abduction: 4    Left Knee   Flexion: 5  Extension: 4    Right Knee   Flexion: 5  Extension: 4    Left Ankle/Foot   Dorsiflexion: 5    Right Ankle/Foot   Dorsiflexion: 5    Ambulation   Weight-Bearing Status   Weight-Bearing Status (Left): full weight bearing   Weight-Bearing Status (Right): full weight-bearing      Functional Assessment        Single Leg Stance - Eyes Open   Left  Trial 1: 3 seconds  Trial 2: 3 seconds  Trial 3: 3 seconds  Average: 3 seconds     Right  Trial 1: 5 seconds  Trial 2: 6 seconds    Single Leg Stance - Eyes Closed   Left  Trial 1: 10 seconds    Comments  EO feet together:  10 sec  EC feet together:  10 sec    5 x Sit to Stand:19 5 seconds:    Neuro Exam:     Sensation   Light touch LE: left WNL and right WNL    Coordination   Heel to shin: left WNL and right WNL  Finger to nose: left WNL and right WNL  Rapid alternating movements: UE WNL    Transfers Sit to stand: independent Into the car: independent Out of the car: independent     Functional outcomes   Functional outcome gait comment: TU 09 sec    DGI:                 Precautions: FALL RISK;  HTN,  IDDM, HX of CVA x 2      Manuals                                                                 Neuro Re-Ed             SLS             DLS EO             DLS EC             Tandem stance             Side Step             Backward walk                          Ther Ex                          Bike             Seated Hip flexion             Seated Knee Ext             Hip ADD iso             Hip ABD iso                                       Ther Activity                                       Gait Training             Ambulate with [facility] instruction 5'                         Modalities Detail Level: Detailed Detail Level: Zone

## 2021-08-19 NOTE — TELEPHONE ENCOUNTER
PT advised patient to call PCP to get an order for a cane  I asked her son, Juan Yoo if there is anything specific and he said just a cane for mobility  [employer]       Any questions, please call  [phone] son, Juan Yoo

## 2021-08-23 ENCOUNTER — OFFICE VISIT (OUTPATIENT)
Dept: PHYSICAL THERAPY | Facility: CLINIC | Age: 76
End: 2021-08-23
Payer: COMMERCIAL

## 2021-08-23 DIAGNOSIS — R26.81 UNSTEADY GAIT: Primary | ICD-10-CM

## 2021-08-23 PROCEDURE — 97112 NEUROMUSCULAR REEDUCATION: CPT | Performed by: PHYSICAL THERAPIST

## 2021-08-23 PROCEDURE — 97110 THERAPEUTIC EXERCISES: CPT | Performed by: PHYSICAL THERAPIST

## 2021-08-23 NOTE — PROGRESS NOTES
Daily Note     Today's date: 2021  Patient name: Jen Hyde  : 1945  MRN: [id number]  Referring provider: Javi Machado MD  Dx:   Encounter Diagnosis     ICD-10-CM    1  Unsteady gait  R26 81                   Subjective: Patient presents with no AD  Patient reports with halter monitor       Objective: See treatment diary below      Assessment: Tolerated treatment well  Patient would benefit from continued PT      Plan: Continue per plan of care        Precautions: FALL RISK;  HTN,  IDDM, HX of CVA x 2      Manuals                                                                Neuro Re-Ed             SLS             DLS EO foam  5 x :20           DLS EC foam  4 x :20           Tandem stance  4 x :05           Side Step  4 laps           Backward walk  4 laps                        Ther Ex                          Bike  5'           Seated Hip flexion  1# 10           Seated Knee Ext  1# x 10           Hip ADD iso  x10           Hip ABD iso  x10                                     Ther Activity                                       Gait Training             Ambulate with [facility] instruction 5'                         Modalities

## 2021-08-25 DIAGNOSIS — K21.9 GASTROESOPHAGEAL REFLUX DISEASE WITHOUT ESOPHAGITIS: ICD-10-CM

## 2021-08-26 RX ORDER — PANTOPRAZOLE SODIUM 40 MG/1
TABLET, DELAYED RELEASE ORAL
Qty: 90 TABLET | Refills: 1 | Status: SHIPPED | OUTPATIENT
Start: 2021-08-26 | End: 2022-01-03

## 2021-08-30 ENCOUNTER — APPOINTMENT (OUTPATIENT)
Dept: PHYSICAL THERAPY | Facility: CLINIC | Age: 76
End: 2021-08-30
Payer: COMMERCIAL

## 2021-09-01 ENCOUNTER — OFFICE VISIT (OUTPATIENT)
Dept: PHYSICAL THERAPY | Facility: CLINIC | Age: 76
End: 2021-09-01
Payer: COMMERCIAL

## 2021-09-01 DIAGNOSIS — R26.81 UNSTEADY GAIT: Primary | ICD-10-CM

## 2021-09-01 PROCEDURE — 97112 NEUROMUSCULAR REEDUCATION: CPT | Performed by: PHYSICAL THERAPIST

## 2021-09-01 PROCEDURE — 97110 THERAPEUTIC EXERCISES: CPT | Performed by: PHYSICAL THERAPIST

## 2021-09-01 NOTE — PROGRESS NOTES
Daily Note     Today's date: 2021  Patient name: Solis Colindres  : 1945  MRN: [id number]  Referring provider: Salud Brown MD  Dx:   Encounter Diagnosis     ICD-10-CM    1  Unsteady gait  R26 81                   Subjective:  Patient presents with a SPC today  She notes fatigue persists  Objective: See treatment diary below      Assessment: Tolerated treatment well  Patient would benefit from continued PT      Plan: Continue per plan of care        Precautions: FALL RISK;  HTN,  IDDM, HX of CVA x 2      Manuals                                                               Neuro Re-Ed             SLS             DLS EO foam  5 x :20 5 x :20          DLS EC foam  4 x :20 4 x :20          Tandem stance  4 x :05           Side Step  4 laps 1# x 4 laps          Backward walk  4 laps 4 laps          [employer]   nv          Ther Ex                          Bike  5' 5'          Seated Hip flexion  1# 10 1# x 10          Seated Knee Ext  1# x 10 1# x 10          Hip ADD iso  x10 10 x :05          Hip ABD iso  x10 10 x :05          Sit to stand   1x7; 1 x 5                       Ther Activity             Bicep curls   3#                       Gait Training             Ambulate with SPC instruction 5'  2 laps                       Modalities

## 2021-09-03 ENCOUNTER — OFFICE VISIT (OUTPATIENT)
Dept: NEUROSURGERY | Facility: CLINIC | Age: 76
End: 2021-09-03
Payer: COMMERCIAL

## 2021-09-03 ENCOUNTER — RA CDI HCC (OUTPATIENT)
Dept: OTHER | Facility: HOSPITAL | Age: 76
End: 2021-09-03

## 2021-09-03 VITALS
SYSTOLIC BLOOD PRESSURE: 122 MMHG | HEIGHT: 60 IN | BODY MASS INDEX: 23.95 KG/M2 | HEART RATE: 83 BPM | WEIGHT: 122 LBS | TEMPERATURE: 97.3 F | DIASTOLIC BLOOD PRESSURE: 52 MMHG | RESPIRATION RATE: 16 BRPM

## 2021-09-03 DIAGNOSIS — I65.03 VERTEBRAL ARTERY STENOSIS, BILATERAL: Primary | ICD-10-CM

## 2021-09-03 DIAGNOSIS — I65.03 STENOSIS OF BOTH VERTEBRAL ARTERIES: ICD-10-CM

## 2021-09-03 PROCEDURE — 99203 OFFICE O/P NEW LOW 30 MIN: CPT | Performed by: PHYSICIAN ASSISTANT

## 2021-09-03 NOTE — PATIENT INSTRUCTIONS
Neurosurgery Instructions  · At this time, we do not anticipate any neurosurgical intervention  Follow up with us on as needed basis  · Continue follow up with neurology  · Continue follow up with your primary care provider for general health and we recommend healthy choices  · Please contact us with any questions or concerns

## 2021-09-03 NOTE — PROGRESS NOTES
difficulty and concentration  She reports that these symptoms have slightly improved  Pt reports with the 2nd stroke she had dizziness, gait instability, nausea and vomiting  Pt reports improvement in these sx and at this time pt denies dizziness, lightheadedness  She reports she recently started physical therapy  Pt reports she occasionally gets headaches  Pt reports she is on Plavix 75 mg daily for hx of CVA  Pt was accompanied by her son during this visit  Pt reports she has been cutting down on her cigarette smoking and has about 5-10 cigarettes per day  REVIEW OF SYSTEMS    Review of Systems   Constitutional: Positive for appetite change (comes and goes) and fatigue (at times)  HENT: Positive for sinus pressure (sinus pressure HA)  Eyes: Negative  Respiratory: Negative  Cardiovascular: Negative  Gastrointestinal: Negative  Endocrine: Negative  Genitourinary: Negative  Musculoskeletal: Positive for gait problem (ambulating today with cane)  Skin: Negative  Allergic/Immunologic: Negative  Neurological: Positive for weakness (right U&L E, currently in PT)  Stroke affecting whole body, Hx of 2 strokes   Hematological: Bruises/bleeds easily (plavix 75)  Psychiatric/Behavioral: Positive for decreased concentration (at times)           Meds/Allergies     Current Outpatient Medications   Medication Sig Dispense Refill    atorvastatin (LIPITOR) 80 mg tablet Take 1 tablet (80 mg total) by mouth every evening 90 tablet 0    clopidogrel (PLAVIX) 75 mg tablet TAKE 1 TABLET BY MOUTH EVERY DAY 90 tablet 0    ferrous sulfate 324 (65 Fe) mg Take 1 tablet (324 mg total) by mouth daily 30 tablet 1    magnesium oxide (MAG-OX) 400 mg tablet TAKE 1 TABLET BY MOUTH TWICE A  tablet 3    metFORMIN (GLUCOPHAGE) 1000 MG tablet Take 1 tablet (1,000 mg total) by mouth 2 (two) times a day 180 tablet 3    pantoprazole (PROTONIX) 40 mg tablet TAKE 1 TABLET BY MOUTH EVERY DAY 90 tablet 1    quinapril (ACCUPRIL) 40 MG tablet TAKE 1 TABLET BY MOUTH  DAILY AT BEDTIME 90 tablet 3     No current facility-administered medications for this visit  Allergies   Allergen Reactions    Aleve [Naproxen] Other (See Comments)     "weird" sensation entire body    Sulfa Antibiotics Rash       PAST HISTORY    Past Medical History:   Diagnosis Date    Anemia     Arthritis     Capsulitis of foot     Last assessed 13    Diabetes ([address] )     type 2    Full dentures     Ganglion     Last assessed 13    Hearing decreased     Hyperlipidemia     Hypertension     Magnesium deficiency     Sciatica     Wears glasses        Past Surgical History:   Procedure Laterality Date    CATARACT EXTRACTION      COLONOSCOPY      DILATION AND CURETTAGE OF UTERUS      x 4    HAND SURGERY Left     fx repaired w/wrist bone    HYSTERECTOMY      left ovary remains    KNEE ARTHROSCOPY Left     LIPOMA RESECTION      removed from back and left buttock    W[place] XCAPSL CTRC RMVL INSJ IO LENS PROSTH W/O ECP Right 2019    Procedure: EXTRACTION EXTRACAPSULAR CATARACT PHACO INTRAOCULAR LENS (IOL); Surgeon: Aniceto Scott MD;  Location: [facility] MAIN OR;  Service: Ophthalmology    [place] XCAPSL CTRC RMVL INSJ IO LENS PROSTH W/O ECP Left 2019    Procedure: EXTRACTION EXTRACAPSULAR CATARACT PHACO INTRAOCULAR LENS (IOL);   Surgeon: Aniceto Scott MD;  Location: [facility] MAIN OR;  Service: Ophthalmology    TONSILLECTOMY      and adenoids    TUBAL LIGATION      US GUIDED THYROID BIOPSY  2021       Social History     Tobacco Use    Smoking status: Current Every Day Smoker     Packs/day: 0 25     Years: 35 00     Pack years: 8 75     Types: Cigarettes     Last attempt to quit: 2021     Years since quittin 2    Smokeless tobacco: Never Used   Vaping Use    Vaping Use: Never used   Substance Use Topics    Alcohol use: Not Currently     Comment: Rarely    Drug use: Never       Family History Problem Relation Age of Onset    Leukemia Father     Early death Father 39        leukemia    Stomach cancer Maternal Grandfather     Diabetes Paternal Grandfather     Diabetes Sister         insulin dependent    Diabetes Brother         insulin    Diabetes Sister         insulin    Diabetes Sister         insulin         Above history personally reviewed  EXAM    Vitals:Blood pressure 122/52, pulse 83, temperature (!) 97 3 °F (36 3 °C), temperature source Tympanic, resp  rate 16, height 5' (1 524 m), weight 55 3 kg (122 lb)  ,Body mass index is 23 83 kg/m²  Physical Exam  Constitutional:       General: She is not in acute distress  Appearance: She is well-developed  HENT:      Head: Normocephalic and atraumatic  Eyes:      Pupils: Pupils are equal, round, and reactive to light  Neck:      Trachea: No tracheal deviation  Cardiovascular:      Rate and Rhythm: Normal rate  Pulmonary:      Effort: Pulmonary effort is normal    Abdominal:      Palpations: Abdomen is soft  Tenderness: There is no abdominal tenderness  There is no guarding  Musculoskeletal:         General: Normal range of motion  Cervical back: Neck supple  Skin:     General: Skin is warm and dry  Coloration: Skin is not pale  Findings: No rash  Neurological:      Mental Status: She is alert and oriented to person, place, and time  Comments: GCS 15, Awake, Alert, Oriented x 3    Motor: DOYLE, strength BUE 4/5, BLE 4/5  Sensation:  intact to LT/PP X 4     Reflexes: 2+ and symmetric, no rodgers's or clonus     Coordination: no drift bilateral upper extremities  Psychiatric:         Speech: Speech normal          Behavior: Behavior normal          Neurologic Exam     Mental Status   Oriented to person, place, and time  Attention: normal  Concentration: normal    Speech: speech is normal   Level of consciousness: alert  Knowledge: good  Normal comprehension       Cranial Nerves Cranial nerves II through XII intact  CN III, IV, VI   Pupils are equal, round, and reactive to light  Motor Exam   Muscle bulk: normal  Overall muscle tone: normal        MEDICAL DECISION MAKING    Imaging Studies:       I have personally reviewed pertinent reports     and I have personally reviewed pertinent films in PACS

## 2021-09-03 NOTE — ASSESSMENT & PLAN NOTE
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

## 2021-09-03 NOTE — PROGRESS NOTES
[facility]  coding opportunities       Chart reviewed, no opportunity found: CHART REVIEWED, NO OPPORTUNITY FOUND                        Patients insurance company: [facility] [address]  (Medicare Advantage and Commercial)

## 2021-09-07 ENCOUNTER — OFFICE VISIT (OUTPATIENT)
Dept: PHYSICAL THERAPY | Facility: CLINIC | Age: 76
End: 2021-09-07
Payer: COMMERCIAL

## 2021-09-07 DIAGNOSIS — I63.9 CEREBROVASCULAR ACCIDENT (CVA), UNSPECIFIED MECHANISM (HCC): ICD-10-CM

## 2021-09-07 DIAGNOSIS — R26.81 UNSTEADY GAIT: Primary | ICD-10-CM

## 2021-09-07 PROCEDURE — 97112 NEUROMUSCULAR REEDUCATION: CPT

## 2021-09-07 PROCEDURE — 97110 THERAPEUTIC EXERCISES: CPT

## 2021-09-07 NOTE — PROGRESS NOTES
Daily Note     Today's date: 2021  Patient name: Latasha Moreland  : 1945  MRN: [id number]  Referring provider: Cherry Felder MD  Dx: No diagnosis found  Subjective: Patient states that she fell last night when trying to get to her bathroom with the lights off   Patient able to independently recover and no complaints today  Objective: See treatment diary below      Assessment: Tolerated treatment well  Patient exhibited good technique with therapeutic exercises      Plan: Continue per plan of care        Precautions: FALL RISK;  HTN,  IDDM, HX of CVA x 2      Manuals                                                              Neuro Re-Ed             SLS             DLS EO foam  5 x :20 5 x :20 4 x 20"         DLS EC foam  4 x :20 4 x :20 4 x 20"         Tandem stance  4 x :05           Side Step  4 laps 1# x 4 laps 1# 4 laps          Backward walk  4 laps 4 laps 4 laps          Biodex   nv          Ther Ex                          Bike  5' 5' 5         Seated Hip flexion  1# 10 1# x 10 1# x 10          Seated Knee Ext  1# x 10 1# x 10 1# 10x         Hip ADD iso  x10 10 x :05 5" x 10         Hip ABD iso  x10 10 x :05 5"x 10          Sit to stand   1x7; 1 x 5 2 x 5                       Ther Activity             Bicep curls   3# 3# 2 x 10                       Gait Training             Ambulate with SPC instruction 5'  2 laps                       Modalities

## 2021-09-09 ENCOUNTER — OFFICE VISIT (OUTPATIENT)
Dept: FAMILY MEDICINE CLINIC | Facility: CLINIC | Age: 76
End: 2021-09-09
Payer: COMMERCIAL

## 2021-09-09 ENCOUNTER — OFFICE VISIT (OUTPATIENT)
Dept: NEUROLOGY | Facility: CLINIC | Age: 76
End: 2021-09-09
Payer: COMMERCIAL

## 2021-09-09 ENCOUNTER — OFFICE VISIT (OUTPATIENT)
Dept: PHYSICAL THERAPY | Facility: CLINIC | Age: 76
End: 2021-09-09
Payer: COMMERCIAL

## 2021-09-09 VITALS
HEART RATE: 73 BPM | TEMPERATURE: 96.1 F | BODY MASS INDEX: 23.36 KG/M2 | DIASTOLIC BLOOD PRESSURE: 49 MMHG | HEIGHT: 60 IN | SYSTOLIC BLOOD PRESSURE: 100 MMHG | OXYGEN SATURATION: 98 % | RESPIRATION RATE: 18 BRPM | WEIGHT: 119 LBS

## 2021-09-09 VITALS
HEART RATE: 71 BPM | WEIGHT: 120 LBS | DIASTOLIC BLOOD PRESSURE: 55 MMHG | SYSTOLIC BLOOD PRESSURE: 99 MMHG | BODY MASS INDEX: 23.56 KG/M2 | HEIGHT: 60 IN

## 2021-09-09 DIAGNOSIS — N18.30 TYPE 2 DIABETES MELLITUS WITH STAGE 3 CHRONIC KIDNEY DISEASE, UNSPECIFIED WHETHER LONG TERM INSULIN USE, UNSPECIFIED WHETHER STAGE 3A OR 3B CKD (HCC): ICD-10-CM

## 2021-09-09 DIAGNOSIS — R77.8 TROPONIN LEVEL ELEVATED: ICD-10-CM

## 2021-09-09 DIAGNOSIS — I63.9 CEREBROVASCULAR ACCIDENT (CVA), UNSPECIFIED MECHANISM (HCC): Primary | ICD-10-CM

## 2021-09-09 DIAGNOSIS — D50.8 OTHER IRON DEFICIENCY ANEMIA: ICD-10-CM

## 2021-09-09 DIAGNOSIS — I65.03 VERTEBRAL ARTERY STENOSIS, BILATERAL: ICD-10-CM

## 2021-09-09 DIAGNOSIS — E11.22 TYPE 2 DIABETES MELLITUS WITH STAGE 3 CHRONIC KIDNEY DISEASE, UNSPECIFIED WHETHER LONG TERM INSULIN USE, UNSPECIFIED WHETHER STAGE 3A OR 3B CKD (HCC): ICD-10-CM

## 2021-09-09 DIAGNOSIS — R63.4 WEIGHT LOSS: ICD-10-CM

## 2021-09-09 DIAGNOSIS — I10 ESSENTIAL HYPERTENSION: Chronic | ICD-10-CM

## 2021-09-09 DIAGNOSIS — E78.5 HYPERLIPIDEMIA, UNSPECIFIED HYPERLIPIDEMIA TYPE: Chronic | ICD-10-CM

## 2021-09-09 DIAGNOSIS — E11.42 TYPE 2 DIABETES MELLITUS WITH DIABETIC POLYNEUROPATHY, WITHOUT LONG-TERM CURRENT USE OF INSULIN (HCC): Primary | ICD-10-CM

## 2021-09-09 DIAGNOSIS — Z11.59 ENCOUNTER FOR HEPATITIS C SCREENING TEST FOR LOW RISK PATIENT: ICD-10-CM

## 2021-09-09 DIAGNOSIS — Z72.0 TOBACCO ABUSE: ICD-10-CM

## 2021-09-09 DIAGNOSIS — I63.9 CEREBROVASCULAR ACCIDENT (CVA), UNSPECIFIED MECHANISM (HCC): ICD-10-CM

## 2021-09-09 DIAGNOSIS — R26.81 UNSTEADY GAIT: Primary | ICD-10-CM

## 2021-09-09 PROCEDURE — 97112 NEUROMUSCULAR REEDUCATION: CPT

## 2021-09-09 PROCEDURE — 99214 OFFICE O/P EST MOD 30 MIN: CPT | Performed by: FAMILY MEDICINE

## 2021-09-09 PROCEDURE — 97110 THERAPEUTIC EXERCISES: CPT

## 2021-09-09 PROCEDURE — 99203 OFFICE O/P NEW LOW 30 MIN: CPT | Performed by: PSYCHIATRY & NEUROLOGY

## 2021-09-09 RX ORDER — FERROUS SULFATE TAB EC 324 MG (65 MG FE EQUIVALENT) 324 (65 FE) MG
324 TABLET DELAYED RESPONSE ORAL DAILY
Qty: 30 TABLET | Refills: 1
Start: 2021-09-09 | End: 2022-03-28

## 2021-09-09 NOTE — PROGRESS NOTES
Patient ID: Lien Sampson is a 64 y o  female  Assessment/Plan:    CVA (cerebral vascular accident) [facility])  Patient is a 64year old woman with PMHx of Chronic Left GABO CVA admitted previously 1/2020, T2DM, HLD, HTN, Anemia presenting for hospital followup for a Acute ischemic stroke of the Middle Cerebellar vermis  Patient was admitted to [address] [place] [address] 6/6/2021 for positional dizziness, balance dysfunction and nausea  NIHSS at the admission was 0 and patient was out of window for tPA  Patient was placed on dual antiplatelet therapy and patient was stopped on aspirin after 1 week and continued on plavix, which patient has been compliant  Patient no longer has dizziness and has been having improvement in balance, ambulation, and weakness with physical therapy  Reports no side effects from the plavix and continued atorvastatin 80mg daily  8/17, patient was placed on zio patch by cardiology  Patient visited neurosurgery 9/3 and neurosurgery recommended medical management over surgical interventions  Patient has had no falls, no new numbness, no new weakness, no visual disturbances, no sudden visual loss, no head trauma, no dyarthria, no dysphagia since the hospital visit  Hem A1C 6/7/2021- 7 2%  LDL 6/7/2021  72  CTA HEAD AND NECK with and without contrast 06/06/2021: 67% stenosis of the right vertebral artery origin indicating moderate stenosis  60% stenosis of the left vertebral artery origin consistent with mild stenosis  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     Echocardiogram 6/7/2021- EF 60%, no PFOs and no right to left shunt    MRI of the brain 6/7/2021 showed small focus of diffusion restriction in inferior cerebellar vermis indicative of acute cerebellar lacunar infarction    Small chronic left frontal cortical infarction medially    Impression: Patient's Acute ischemic stroke of the Middle Cerebellar vermis is most likely atheroembolic in nature with most probably origin from the vertebral artery stenosis as reflected on CTA head and neck  Seems less likely cardioembolic as Echocardiogram is negative for cardiac abnormalities that would lead to a stroke  Patient has a zio patch for paroxysmal Afib ruleout  Plan:  Continue physical therapy  Continue Atorvastatin (Lipitor) 80mg daily  Continue plavix daily  We recommend weaning down smoking as much as possible; it is best to stop smoking altogether to prevent future strokes  Go to ED if new numbness, new weakness, new walking problems, sudden visual loss  Followup with office if needed    Vertebral artery stenosis, bilateral  Continuing medical management (plavix 75mg daily); Neurosurgery holding from surgical interventions for patient  Diagnoses and all orders for this visit:    Cerebrovascular accident (CVA), unspecified mechanism ([facility] [address] )    Vertebral artery stenosis, bilateral           Subjective:    Patient is a 64year old woman with PMHx of Chronic Left GABO CVA admitted previously 1/2020, T2DM, HLD, HTN, Anemia presenting for hospital followup for a Acute ischemic stroke of the Middle Cerebellar area  Patient was admitted to [facility] 6/6/2021 for dizziness (worsened with movement) and nausea  NIHSS at the admission was 0 and patient was out of window for tPA  Patient on CTA found to have Right M2 occlusion and bilateral vertebral stenosis  Patient was placed on dual antiplatelet therapy and complete aspirin in 1 week while continuing plavix  Echo showed no PFO and no right to left shunt  Patient placed on atorvastatin 80mg daily  Since hospital admission, balance issues have been improving moderate improvement 5/10 with physical therapy  Patient is able to sleep around 6-7 hours of sleep per night  Patient states that plavix have no side effects and no bleeding episodes  Patient is tolerating Lipitor well with no side effects and no muscle aches/pain   Patient visited cardiology earlier and that they are pending zio patch results, patient is awaiting nuclear stress test in October  Neurosurgery was visited 9/3 with no thoughts on neurosurgical interventions at that appointment with continued medical therapy  Right sided weakness of both legs and arms are also improving with physical therapy; Patient is able to ambulate independently with short distances when testing with physical therapy; patient does need to use a cane to walk normally as of this moment  Patient has no numbness, no speech problems, no dysphagia, improving balance, improving walking uses cane but can walk independently  Patient has No visual disturbances, no sudden visual loss, no passing out  The following portions of the patient's history were reviewed and updated as appropriate: She  has a past medical history of Anemia, Arthritis, Capsulitis of foot, Diabetes ([address] ), Full dentures, Ganglion, Hearing decreased, Hyperlipidemia, Hypertension, Magnesium deficiency, Sciatica, and Wears glasses    She   Patient Active Problem List    Diagnosis Date Noted    Vertebral artery stenosis, bilateral 07/22/2021    CVA (cerebral vascular accident) ([address] ) 06/06/2021    Nodule of upper lobe of right lung 06/06/2021    Thyroid nodule greater than or equal to 1 5 cm in diameter incidentally noted on imaging study 06/06/2021    Troponin level elevated 06/06/2021    Type 2 diabetes mellitus with stage 3 chronic kidney disease ([address] ) 03/03/2021    Iron deficiency anemia 09/01/2020    GERD (gastroesophageal reflux disease) 06/05/2020    Personal history of transient ischemic attack (TIA), and cerebral infarction without residual deficits 01/09/2020    Family history of tobacco abuse 01/09/2020    Tobacco abuse 01/09/2020    Arthritis 01/09/2020    Essential hypertension 01/09/2020    Combined forms of age-related cataract of left eye 02/13/2019    Hyperlipidemia 04/25/2014    Type 2 diabetes mellitus with diabetic polyneuropathy ([address] ) 07/29/2013     She  has a past surgical history that includes Lipoma resection; Tubal ligation; Hand surgery (Left); Hysterectomy; Knee arthroscopy (Left); Colonoscopy; Tonsillectomy; Dilation and curettage of uterus; pr xcapsl ctrc rmvl insj io lens prosth w/o ecp (Right, 1/24/2019); Cataract extraction; pr xcapsl ctrc rmvl insj io lens prosth w/o ecp (Left, 2/14/2019); and US guided thyroid biopsy (7/26/2021)  Her family history includes Diabetes in her brother, paternal grandfather, sister, sister, and sister; Early death (age of onset: 39) in her father; Leukemia in her father; Stomach cancer in her maternal grandfather  She  reports that she has been smoking cigarettes  She has a 8 75 pack-year smoking history  She has never used smokeless tobacco  She reports previous alcohol use  She reports that she does not use drugs  Current Outpatient Medications   Medication Sig Dispense Refill    atorvastatin (LIPITOR) 80 mg tablet Take 1 tablet (80 mg total) by mouth every evening 90 tablet 0    clopidogrel (PLAVIX) 75 mg tablet TAKE 1 TABLET BY MOUTH EVERY DAY 90 tablet 0    ferrous sulfate 324 (65 Fe) mg Take 1 tablet (324 mg total) by mouth daily 30 tablet 1    magnesium oxide (MAG-OX) 400 mg tablet TAKE 1 TABLET BY MOUTH TWICE A  tablet 3    metFORMIN (GLUCOPHAGE) 1000 MG tablet Take 1 tablet (1,000 mg total) by mouth 2 (two) times a day 180 tablet 3    pantoprazole (PROTONIX) 40 mg tablet TAKE 1 TABLET BY MOUTH EVERY DAY 90 tablet 1    quinapril (ACCUPRIL) 40 MG tablet TAKE 1 TABLET BY MOUTH  DAILY AT BEDTIME 90 tablet 3     No current facility-administered medications for this visit       Current Outpatient Medications on File Prior to Visit   Medication Sig    atorvastatin (LIPITOR) 80 mg tablet Take 1 tablet (80 mg total) by mouth every evening    clopidogrel (PLAVIX) 75 mg tablet TAKE 1 TABLET BY MOUTH EVERY DAY    ferrous sulfate 324 (65 Fe) mg Take 1 tablet (324 mg total) by mouth daily  magnesium oxide (MAG-OX) 400 mg tablet TAKE 1 TABLET BY MOUTH TWICE A DAY    metFORMIN (GLUCOPHAGE) 1000 MG tablet Take 1 tablet (1,000 mg total) by mouth 2 (two) times a day    pantoprazole (PROTONIX) 40 mg tablet TAKE 1 TABLET BY MOUTH EVERY DAY    quinapril (ACCUPRIL) 40 MG tablet TAKE 1 TABLET BY MOUTH  DAILY AT BEDTIME    [DISCONTINUED] ferrous sulfate 324 (65 Fe) mg TAKE 1 TABLET (324 MG TOTAL) BY MOUTH 2 (TWO) TIMES A DAY BEFORE MEALS    [DISCONTINUED] ferrous sulfate 324 (65 Fe) mg Take 1 tablet (324 mg total) by mouth daily     No current facility-administered medications on file prior to visit  She is allergic to aleve [naproxen] and sulfa antibiotics            Objective:    Blood pressure 99/55, pulse 71, height 5' (1 524 m), weight 54 4 kg (120 lb)  Physical Exam  Eyes:      Extraocular Movements: Extraocular movements intact  Neurological:      Coordination: Coordination is intact  Romberg sign negative  Deep Tendon Reflexes:      Reflex Scores:       Bicep reflexes are 2+ on the right side and 2+ on the left side  Brachioradialis reflexes are 2+ on the right side and 2+ on the left side  Psychiatric:         Speech: Speech normal          Neurological Exam  Mental Status  Awake, alert and oriented to person, place and time  Speech is normal  Language is fluent with no aphasia  Cranial Nerves  CN II: Visual acuity is normal   CN III, IV, VI: Extraocular movements intact bilaterally  CN V: Facial sensation is normal   CN VII: Full and symmetric facial movement  CN XI: Shoulder shrug strength is normal   CN XII: Tongue midline without atrophy or fasciculations  Motor  Normal muscle bulk throughout  No fasciculations present  Normal muscle tone  No abnormal involuntary movements                                               Right                     Left   Shoulder abduction               4+                          4+   Shoulder adduction               4+ 4+  Elbow flexion                         4+                          4+  Elbow extension                    4+                          4+  Wrist flexion                           4+                          4+  Wrist extension                      4+                          4+  Hip flexion                              4+                          4+  Knee flexion                           4+                          4+  Knee extension                      4+                          4+  Plantarflexion                         5                          5  Dorsiflexion                            5                          5    Sensory  Sensation is intact to light touch, pinprick, vibration and proprioception in all four extremities  Reflexes                                           Right                      Left  Brachioradialis                    2+                         2+  Biceps                                 2+                         2+  Patellar                                Tr                         Tr  Achilles                                Tr                         Tr    Coordination  Finger-to-nose, rapid alternating movements and heel-to-shin normal bilaterally without dysmetria  Gait  Normal casual, toe, heel and tandem gait  Romberg is absent  ROS:    Review of Systems   Constitutional: Positive for unexpected weight change  Negative for appetite change and fever  HENT: Negative  Negative for hearing loss, tinnitus, trouble swallowing and voice change  Eyes: Negative  Negative for photophobia and pain  Respiratory: Negative  Negative for shortness of breath  Cardiovascular: Negative  Negative for palpitations  Gastrointestinal: Negative  Negative for nausea and vomiting  Endocrine: Negative  Negative for cold intolerance  Genitourinary: Negative  Negative for dysuria, frequency and urgency  Musculoskeletal: Positive for gait problem   Negative for myalgias and neck pain  Skin: Negative  Negative for rash  Neurological: Negative for dizziness, tremors, seizures, syncope, facial asymmetry, speech difficulty, weakness, light-headedness, numbness and headaches  Hematological: Negative  Does not bruise/bleed easily  Psychiatric/Behavioral: Negative  Negative for confusion, hallucinations and sleep disturbance  All other systems reviewed and are negative

## 2021-09-09 NOTE — PATIENT INSTRUCTIONS
Continue physical therapy    Continue Atorvastatin (Lipitor) 80mg daily    Continue plavix daily    We recommend weaning down smoking as much as possible; it is best to stop smoking altogether to prevent future strokes    Go to ED if new numbness, new weakness, new walking problems, sudden visual loss    Followup with office if needed

## 2021-09-09 NOTE — ASSESSMENT & PLAN NOTE
Continuing medical management (plavix 75mg daily); Neurosurgery holding from surgical interventions for patient

## 2021-09-09 NOTE — ASSESSMENT & PLAN NOTE
Patient is a 64year old woman with PMHx of Chronic Left GABO CVA admitted previously 1/2020, T2DM, HLD, HTN, Anemia presenting for hospital followup for a Acute ischemic stroke of the Middle Cerebellar vermis  Patient was admitted to [place] [address] 6/6/2021 for positional dizziness, balance dysfunction and nausea  NIHSS at the admission was 0 and patient was out of window for tPA  Patient was placed on dual antiplatelet therapy and patient was stopped on aspirin after 1 week and continued on plavix, which patient has been compliant  Patient no longer has dizziness and has been having improvement in balance, ambulation, and weakness with physical therapy  Reports no side effects from the plavix and continued atorvastatin 80mg daily  8/17, patient was placed on zio patch by cardiology  Patient visited neurosurgery 9/3 and neurosurgery recommended medical management over surgical interventions  Patient has had no falls, no new numbness, no new weakness, no visual disturbances, no sudden visual loss, no head trauma, no dyarthria, no dysphagia since the hospital visit  Hem A1C 6/7/2021- 7 2%  LDL 6/7/2021  72  CTA HEAD AND NECK with and without contrast 06/06/2021: 67% stenosis of the right vertebral artery origin indicating moderate stenosis  60% stenosis of the left vertebral artery origin consistent with mild stenosis  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     Echocardiogram 6/7/2021- EF 60%, no PFOs and no right to left shunt    MRI of the brain 6/7/2021 showed small focus of diffusion restriction in inferior cerebellar vermis indicative of acute cerebellar lacunar infarction  Small chronic left frontal cortical infarction medially    Impression: Patient's Acute ischemic stroke of the Middle Cerebellar vermis is most likely atheroembolic in nature with most probably origin from the vertebral artery stenosis as reflected on CTA head and neck   Seems less likely cardioembolic as Echocardiogram is negative for cardiac abnormalities that would lead to a stroke  Patient has a zio patch for paroxysmal Afib ruleout      Plan:  Continue physical therapy  Continue Atorvastatin (Lipitor) 80mg daily  Continue plavix daily  We recommend weaning down smoking as much as possible; it is best to stop smoking altogether to prevent future strokes  Go to ED if new numbness, new weakness, new walking problems, sudden visual loss  Followup with office if needed

## 2021-09-09 NOTE — PROGRESS NOTES
Daily Note     Today's date: 2021  Patient name: Cecily Kilpatrick  : 1945  MRN: [id number]  Referring provider: Ellyn Manrique MD  Dx:   Encounter Diagnosis     ICD-10-CM    1  Unsteady gait  R26 81                   Subjective: Patient states that she is fatigued to start treatment session  R leg fatigues quickly with outlined exercises   Objective: See treatment diary below      Assessment: Tolerated treatment well  Patient exhibited good technique with therapeutic exercises  Time constraint due to MD appointment but will resume remainder of treatment at nv  Plan: Continue per plan of care        Precautions: FALL RISK;  HTN,  IDDM, HX of CVA x 2      Manuals                                                             Neuro Re-Ed             SLS             DLS EO foam  5 x :20 5 x :20 4 x 20" NV        DLS EC foam  4 x :20 4 x :20 4 x 20" NV        Tandem stance  4 x :05           Side Step  4 laps 1# x 4 laps 1# 4 laps  1# 4 laps        Backward walk  4 laps 4 laps 4 laps  4 laps         Biodex   nv          Ther Ex                          Bike  5' 5' 5 5        Seated Hip flexion  1# 10 1# x 10 1# x 10  1# 10        Seated Knee Ext  1# x 10 1# x 10 1# 10x 1# 10        Hip ADD iso  x10 10 x :05 5" x 10 5" x 10         Hip ABD iso  x10 10 x :05 5"x 10  5 " x 10        Sit to stand   1x7; 1 x 5 2 x 5  2x5                     Ther Activity             Bicep curls   3# 3# 2 x 10  3# 2 x 10                     Gait Training             Ambulate with SPC instruction 5'  2 laps                       Modalities

## 2021-09-09 NOTE — PROGRESS NOTES
Assessment/Plan:       Diagnoses and all orders for this visit:    Type 2 diabetes mellitus with diabetic polyneuropathy, without long-term current use of insulin (HCC)  Comments:  checks her AM sugars, 140-160 on average  no hyper or hypoglycemic events  eye and foot exam up to date  on metformin, continue  Orders:  -     Hemoglobin A1C; Future    Type 2 diabetes mellitus with stage 3 chronic kidney disease, unspecified whether long term insulin use, unspecified whether stage 3a or 3b CKD ([facility] )  Comments:  has appointment with nephrology scheduled for 10/1/21  repeat CMP pending and in chart to be done prior to visit  Encounter for hepatitis C screening test for low risk patient  -     Hepatitis C antibody; Future    Essential hypertension  Comments:  BPs on the lower end in office today  100/49  She is following cardiology  Advised getting a home BP machine and monitoring blood pressures/keeping log  She will call me with readings over the next week  She did not eat breakfast or hydrate prior to office visit today  Counseled on importance of routine meals and hydration  Cerebrovascular accident (CVA), unspecified mechanism ([facility] )  Comments:  has been doing regular PT  has follow up appointment with neurologist today  Hyperlipidemia, unspecified hyperlipidemia type  Comments:  controlled on high dose statin  Tobacco abuse  Comments:  counseled extensively on smoking cessation  she smokes 4-5 cigarettes/day  nicotine patches make her nauseous  discussed trying gum  Troponin level elevated  Comments:  stress test scheduled  Weight loss  Comments:  blood work pending  will continue to monitor  she has lost 10 lbs unintentionally in the past 3-4 months  denies weakness, fatigue, appetite change  Orders:  -     TSH, 3rd generation; Future  -     T4, free; Future    Other iron deficiency anemia  Comments:  managed by heme-onc  needs refill of her oral iron     Orders:  -     ferrous sulfate 324 (65 Fe) mg; Take 1 tablet (324 mg total) by mouth daily            Subjective:      Patient ID: Carroll Evans is a 64 y o  female  HPI  Esperanza Adams presents today for routine follow up visit  She has history of CVA x 2, type II DM, CKD, hypertension, hyperlipidemia, tobacco abuse  No acute complaints other than 10 lb weight loss over the past few months  The following portions of the patient's history were reviewed and updated as appropriate: allergies, current medications, past family history, past medical history, past social history, past surgical history and problem list     Review of Systems   Constitutional: Positive for unexpected weight change  HENT: Negative  Eyes: Negative  Respiratory: Negative  Cardiovascular: Negative  Gastrointestinal: Negative  Endocrine: Negative  Genitourinary: Negative  Musculoskeletal: Negative  Skin: Negative  Allergic/Immunologic: Negative  Neurological: Negative  Hematological: Negative  Psychiatric/Behavioral: Negative  Objective:      BP (!) 100/49   Pulse 73   Temp (!) 96 1 °F (35 6 °C)   Resp 18   Ht 5' (1 524 m)   Wt 54 kg (119 lb)   SpO2 98%   BMI 23 24 kg/m²          Physical Exam  Constitutional:       General: She is not in acute distress  Appearance: She is well-developed  She is not diaphoretic  HENT:      Head: Normocephalic and atraumatic  Cardiovascular:      Rate and Rhythm: Normal rate and regular rhythm  Heart sounds: Normal heart sounds  No murmur heard  No friction rub  No gallop  Pulmonary:      Effort: Pulmonary effort is normal  No respiratory distress  Breath sounds: Normal breath sounds  No wheezing or rales  Chest:      Chest wall: No tenderness  Musculoskeletal:         General: No deformity  Normal range of motion  Cervical back: Normal range of motion and neck supple  Skin:     General: Skin is warm and dry     Neurological:      Mental Status: She is alert and oriented to person, place, and time  Psychiatric:         Behavior: Behavior normal          Thought Content:  Thought content normal          Judgment: Judgment normal

## 2021-09-11 DIAGNOSIS — I63.9 CEREBROVASCULAR ACCIDENT (CVA), UNSPECIFIED MECHANISM (HCC): ICD-10-CM

## 2021-09-13 ENCOUNTER — OFFICE VISIT (OUTPATIENT)
Dept: PHYSICAL THERAPY | Facility: CLINIC | Age: 76
End: 2021-09-13
Payer: COMMERCIAL

## 2021-09-13 DIAGNOSIS — R26.81 UNSTEADY GAIT: Primary | ICD-10-CM

## 2021-09-13 PROCEDURE — 97110 THERAPEUTIC EXERCISES: CPT | Performed by: PHYSICAL THERAPIST

## 2021-09-13 PROCEDURE — 97112 NEUROMUSCULAR REEDUCATION: CPT | Performed by: PHYSICAL THERAPIST

## 2021-09-13 RX ORDER — CLOPIDOGREL BISULFATE 75 MG/1
TABLET ORAL
Qty: 90 TABLET | Refills: 0 | Status: SHIPPED | OUTPATIENT
Start: 2021-09-13 | End: 2022-01-07 | Stop reason: SDUPTHER

## 2021-09-13 NOTE — PROGRESS NOTES
Daily Note     Today's date: 2021  Patient name: Cecily Kilpatrick  : 1945  MRN: [id number]  Referring provider: Ellyn Manrique MD  Dx:   Encounter Diagnosis     ICD-10-CM    1  Unsteady gait  R26 81                   Subjective: Patient reports that she feels that her LEs are getting stronger  She states that she saw neurology but does not have any information on that visit  Patient reports that fatigue continues to be a concern  Objective: See treatment diary below      Assessment: Tolerated treatment well  Patient would benefit from continued PTPatient challenged by balance with eyes closed and feet completely together  Plan: Continue per plan of care        Precautions: FALL RISK;  HTN,  IDDM, HX of CVA x 2      Manuals                                                            Neuro Re-Ed             SLS      4 x :10 ea       DLS EO foam  5 x :20 5 x :20 4 x 20" NV No foam feet together       DLS EC foam  4 x :20 4 x :20 4 x 20" NV Feet together no foam       Tandem stance  4 x :05           Side Step  4 laps 1# x 4 laps 1# 4 laps  1# 4 laps 1# 4 laps       Backward walk  4 laps 4 laps 4 laps  4 laps         Biodex   nv          Ther Ex             Standing knee flexion      1 5# x 10       Bike  5' 5' 5 5 6'       Seated Hip flexion  1# 10 1# x 10 1# x 10  1# 10 1 5#  X 10       Seated Knee Ext  1# x 10 1# x 10 1# 10x 1# 10 1 5# x 10       Hip ADD iso  x10 10 x :05 5" x 10 5" x 10  15 x :05       Hip ABD iso  x10 10 x :05 5"x 10  5 " x 10 15 x :05       Sit to stand   1x7; 1 x 5 2 x 5  2x5 10       Standing hip 3 way      1 5# x 10       Ther Activity             Bicep curls   3# 3# 2 x 10  3# 2 x 10 3# 2 x 10                    Gait Training             Ambulate with SPC instruction 5'  2 laps                       Modalities

## 2021-09-15 ENCOUNTER — OFFICE VISIT (OUTPATIENT)
Dept: PHYSICAL THERAPY | Facility: CLINIC | Age: 76
End: 2021-09-15
Payer: COMMERCIAL

## 2021-09-15 DIAGNOSIS — R26.81 UNSTEADY GAIT: Primary | ICD-10-CM

## 2021-09-15 PROCEDURE — 97110 THERAPEUTIC EXERCISES: CPT

## 2021-09-15 PROCEDURE — 97112 NEUROMUSCULAR REEDUCATION: CPT

## 2021-09-15 NOTE — PROGRESS NOTES
Daily Note     Today's date: 9/15/2021  Patient name: Clifton Lopez  : 1945  MRN: [id number]  Referring provider: Leona Muñoz MD  Dx:   Encounter Diagnosis     ICD-10-CM    1  Unsteady gait  R26 81                   Subjective: Patient states that she has noticed an improvement with strength  Objective: See treatment diary below      Assessment: Tolerated treatment well  Patient exhibited good technique with therapeutic exercises      Plan: Continue per plan of care        Precautions: FALL RISK;  HTN,  IDDM, HX of CVA x 2      Manuals 8/19 8/23 9/1 9/7 9/9 9/13 9/15                                                          Neuro Re-Ed             SLS      4 x :10 ea 4 x 10"      DLS EO foam  5 x :20 5 x :20 4 x 20" NV No foam feet together 20" x 2      DLS EC foam  4 x :20 4 x :20 4 x 20" NV Feet together no foam FT RC FOAM 40" x 2      Tandem stance  4 x :05           Side Step  4 laps 1# x 4 laps 1# 4 laps  1# 4 laps 1# 4 laps 1 5# 4 laps      Backward walk  4 laps 4 laps 4 laps  4 laps         Biodex   nv          Ther Ex             Standing knee flexion      1 5# x 10 1 5# x 20       Bike  5' 5' 5 5 6' 6      Seated Hip flexion  1# 10 1# x 10 1# x 10  1# 10 1 5#  X 10 1 5# x 10      Seated Knee Ext  1# x 10 1# x 10 1# 10x 1# 10 1 5# x 10 1 5# x 20       Hip ADD iso  x10 10 x :05 5" x 10 5" x 10  15 x :05 5" x 15       Hip ABD iso  x10 10 x :05 5"x 10  5 " x 10 15 x :05 5" x 15       Sit to stand   1x7; 1 x 5 2 x 5  2x5 10 10      Standing hip 3 way      1 5# x 10 1 5# 2 x 10       Ther Activity             Bicep curls   3# 3# 2 x 10  3# 2 x 10 3# 2 x 10 3# 2 x 10                    Gait Training             Ambulate with SPC instruction 5'  2 laps                       Modalities

## 2021-09-16 LAB
HBA1C MFR BLD: 8.6 % (ref 4.8–5.6)
HCV AB S/CO SERPL IA: <0.1 S/CO RATIO (ref 0–0.9)
T4 FREE SERPL-MCNC: 1.63 NG/DL (ref 0.82–1.77)
TSH SERPL DL<=0.005 MIU/L-ACNC: 1.39 UIU/ML (ref 0.45–4.5)

## 2021-09-19 DIAGNOSIS — I63.9 STROKE (CEREBRUM) (HCC): ICD-10-CM

## 2021-09-20 RX ORDER — ATORVASTATIN CALCIUM 80 MG/1
TABLET, FILM COATED ORAL
Qty: 90 TABLET | Refills: 3 | Status: SHIPPED | OUTPATIENT
Start: 2021-09-20

## 2021-09-21 ENCOUNTER — CLINICAL SUPPORT (OUTPATIENT)
Dept: CARDIOLOGY CLINIC | Facility: CLINIC | Age: 76
End: 2021-09-21
Payer: COMMERCIAL

## 2021-09-21 DIAGNOSIS — I63.9 CEREBROVASCULAR ACCIDENT (CVA), UNSPECIFIED MECHANISM (HCC): ICD-10-CM

## 2021-09-21 DIAGNOSIS — R77.8 TROPONIN LEVEL ELEVATED: ICD-10-CM

## 2021-09-21 PROCEDURE — 93248 EXT ECG>7D<15D REV&INTERPJ: CPT | Performed by: INTERNAL MEDICINE

## 2021-09-22 ENCOUNTER — OFFICE VISIT (OUTPATIENT)
Dept: PHYSICAL THERAPY | Facility: CLINIC | Age: 76
End: 2021-09-22
Payer: COMMERCIAL

## 2021-09-22 DIAGNOSIS — R26.81 UNSTEADY GAIT: Primary | ICD-10-CM

## 2021-09-22 DIAGNOSIS — I63.9 CEREBROVASCULAR ACCIDENT (CVA), UNSPECIFIED MECHANISM (HCC): ICD-10-CM

## 2021-09-22 PROCEDURE — 97112 NEUROMUSCULAR REEDUCATION: CPT

## 2021-09-22 PROCEDURE — 97140 MANUAL THERAPY 1/> REGIONS: CPT | Performed by: PHYSICAL THERAPIST

## 2021-09-22 PROCEDURE — 97110 THERAPEUTIC EXERCISES: CPT

## 2021-09-22 NOTE — PROGRESS NOTES
PT Re-evaluation     Today's date: 21  Patient name: Collette Ibrahim  : 1945  MRN: [id number]  Referring provider: Florentino Meek MD  Dx:   Encounter Diagnosis     ICD-10-CM    1  Cerebrovascular accident (CVA), unspecified mechanism ([facility] )  I63 9 Ambulatory referral to Physical Therapy   2  Unsteady gait  R26 81 Ambulatory referral to Physical Therapy                  Assessment  Jennifer Doll has been attending physical therapy s/p CVA  She made excellent progress in improving her gait, balance and strength  She does continue to have deficits in balance and strength and would benefit from skilled physical therapy to maximize her functional activity level  Thank you for your referral          Assessment details: Collette Ibrahim is a 64 y o  female with fatigue, gait and balance dysfunction due to a  Cerebrovascular accident (CVA)  She presents with decreased strength, decreased ROM, and ambulatory dysfunction  Due to these impairments, Patient has difficulty performing a/iadls, recreational activities and engaging in social activities  Patient's clinical presentation is consistent with their referring diagnosis  Patient would benefit from skilled physical therapy to address their aforementioned impairments, improve their level of function and to improve their overall quality of life   has been given a home exercise program and is in agreement with the plan of care  Thank you for your referral   Impairments: abnormal coordination, abnormal gait, impaired balance, lacks appropriate home exercise program and safety issue    Symptom irritability: lowBarriers to therapy: Multiple infarct  Patient with anemia  Understanding of Dx/Px/POC: excellent  Goals  ST Goals - 2-4 weeks  1  Patient will ambulate independently with SPC and no LOB in 4 weeks - met  2  Patient will improve her TUG by 3 seconds in 4 weeks - met  3  Patient will perform IADLs without LOB  in 2 weeks - partially met  4    Patient will increase strength by 25% in 4 weeks - partially met  5  Patient will improve her DGI by 3 points in 4 weeks - met    LT Goals - Discharge  1  Patient will improve FOTO score to maximum stated or greater by discharge  2  Patient will return to preferred recreational activity without significant pain increase by discharge   3  Patient will return to all house work related activities without pain by discharge     Plan  Patient would benefit from: skilled physical therapy  Planned therapy interventions: therapeutic training, therapeutic exercise, therapeutic activities, stretching, strengthening, gait training, balance and home exercise program  Frequency: 2x week  Duration in visits: 20  Duration in weeks: 10  Plan of Care beginning date: 8/19/2021  Plan of Care expiration date: 11/1/2021  Treatment plan discussed with: patient        Subjective Evaluation    History of Present Illness  Mechanism of injury: Patient reports that she has had 2 CVs   One was in Jan 2020 and more recently in June 2021  Patient reports that with the June CVA she experience dizziness and had LOB  She was in [facility] and [address] [place] [address] x 1 week each as per patient  She had one visit with a HHCPT and was determined to not be appropriate for services  CC:  She reports that she has weakness in the arms and legs  She also reports a LOB which is not related to any dizziness  She denies falling  She reports that when her son is not home she "does things she is not supposed to" meaning climbing the stairs at home  Function:  She is not going up and down the stairs too often  She is not doing her laundry or grocery shopping as her son has taken over these tasks  She states that she has driven  She typically would work in the garden or in the kitchen  9/22/21:  Patient reports that she is feeling her strength and stamina returning  She was able to make a pot of soup without much fatigue    She feels she can walk further distances as well            Recurrent probem    Quality of life: excellent    Pain  Current pain ratin  At best pain ratin  At worst pain ratin    Social Support  Steps to enter house: yes  Stairs in house: yes   Lives in: multiple-level home  Lives with: adult children    Employment status: not working  Patient Goals  Patient goals for therapy: independence with ADLs/IADLs, return to sport/leisure activities and improved balance          Objective     Neurological Testing     Additional Neurological Details  DTRs:  L>R patellar    Strength/Myotome Testing     Left Hip   Planes of Motion   Flexion: 4   /  4+  Abduction: 4 /  5    Right Hip   Planes of Motion   Flexion: 4 /  4+  Abduction: 4 /  4 +    Left Knee   Flexion: 5  Extension: 4 /  4+    Right Knee   Flexion: 5  Extension: 4 / 5    Left Ankle/Foot   Dorsiflexion: 5    Right Ankle/Foot   Dorsiflexion: 5    Ambulation   Weight-Bearing Status   Weight-Bearing Status (Left): full weight bearing   Weight-Bearing Status (Right): full weight-bearing      Functional Assessment        Single Leg Stance - Eyes Open   Left  Trial 1: 3 seconds      /    3 sec  Trial 2: 3 seconds  Trial 3: 3 seconds  Average: 3 seconds     Right  Trial 1: 5 seconds   6 sec  Trial 2: 6 seconds    Single Leg Stance - Eyes Closed   Left  Trial 1: 10 seconds    Comments  EO feet together:  10 sec  EC feet together:  10 sec    5 x Sit to Stand:19 5 seconds:    Neuro Exam:     Sensation   Light touch LE: left WNL and right WNL    Coordination   Heel to shin: left WNL and right WNL  Finger to nose: left WNL and right WNL  Rapid alternating movements: UE WNL    Transfers Sit to stand: independent Into the car: independent Out of the car: independent     Functional outcomes   Functional outcome gait comment: TU 09 sec  / 9 67 sec  DGI:          Daily Note     Today's date: 2021  Patient name: Douglas Parson  : 1945  MRN: [id number]  Referring provider: Roya Pollard Carlos Owens MD  Dx:   Encounter Diagnosis     ICD-10-CM    1  Unsteady gait  R26 81    2  Cerebrovascular accident (CVA), unspecified mechanism ([address] )  I63 9                   Subjective: patient stated that she is noticing an improvement with her strength  She would like to try walking without a SPC  Objective: See treatment diary below      Assessment: Tolerated treatment well  Good static balance with EO, challenged when EC  Fatigues quickly with LE strengthening TE  Continued PT would be beneficial to improve function         Plan: Continue per plan of care         Precautions: FALL RISK;  HTN,  IDDM, HX of CVA x 2      Manuals 8/19 8/23 9/1 9/7 9/9 9/13 9/15 9/22                                                         Neuro Re-Ed             SLS      4 x :10 ea 4 x 10" 4 x 10"     DLS EO foam  5 x :20 5 x :20 4 x 20" NV No foam feet together 20" x 2   2x20"  No UE     DLS EC foam  4 x :20 4 x :20 4 x 20" NV Feet together no foam FT RC FOAM 40" x 2 Foam 4x20" 1 UE     Tandem stance  4 x :05           Side Step  4 laps 1# x 4 laps 1# 4 laps  1# 4 laps 1# 4 laps 1 5# 4 laps 1 5# 4 laps     Backward walk  4 laps 4 laps 4 laps  4 laps         Biodex   nv          Ther Ex             Standing knee flexion      1 5# x 10 1 5# x 20  1 5# x 20     Bike  5' 5' 5 5 6' 6 6'     Seated Hip flexion  1# 10 1# x 10 1# x 10  1# 10 1 5#  X 10 1 5# x 10 1 5# x 10     Seated Knee Ext  1# x 10 1# x 10 1# 10x 1# 10 1 5# x 10 1 5# x 20  1 5# x 10     Hip ADD iso  x10 10 x :05 5" x 10 5" x 10  15 x :05 5" x 15  5"x15     Hip ABD iso  x10 10 x :05 5"x 10  5 " x 10 15 x :05 5" x 15  5"x15     Sit to stand   1x7; 1 x 5 2 x 5  2x5 10 10 10     Standing hip 3 way      1 5# x 10 1 5# 2 x 10  1 5# 2x10     Ther Activity             Bicep curls   3# 3# 2 x 10  3# 2 x 10 3# 2 x 10 3# 2 x 10  3# 2x10                  Gait Training             Ambulate with SPC instruction 5'  2 laps                       Modalities

## 2021-09-23 ENCOUNTER — OFFICE VISIT (OUTPATIENT)
Dept: FAMILY MEDICINE CLINIC | Facility: CLINIC | Age: 76
End: 2021-09-23
Payer: COMMERCIAL

## 2021-09-23 VITALS
BODY MASS INDEX: 24.19 KG/M2 | TEMPERATURE: 97 F | HEART RATE: 66 BPM | HEIGHT: 60 IN | DIASTOLIC BLOOD PRESSURE: 60 MMHG | RESPIRATION RATE: 18 BRPM | WEIGHT: 123.2 LBS | SYSTOLIC BLOOD PRESSURE: 108 MMHG

## 2021-09-23 DIAGNOSIS — E11.42 TYPE 2 DIABETES MELLITUS WITH DIABETIC POLYNEUROPATHY, WITHOUT LONG-TERM CURRENT USE OF INSULIN (HCC): Primary | ICD-10-CM

## 2021-09-23 PROCEDURE — 4004F PT TOBACCO SCREEN RCVD TLK: CPT | Performed by: FAMILY MEDICINE

## 2021-09-23 PROCEDURE — 3078F DIAST BP <80 MM HG: CPT | Performed by: FAMILY MEDICINE

## 2021-09-23 PROCEDURE — 3074F SYST BP LT 130 MM HG: CPT | Performed by: FAMILY MEDICINE

## 2021-09-23 PROCEDURE — 99213 OFFICE O/P EST LOW 20 MIN: CPT | Performed by: FAMILY MEDICINE

## 2021-09-23 PROCEDURE — 1160F RVW MEDS BY RX/DR IN RCRD: CPT | Performed by: FAMILY MEDICINE

## 2021-09-23 RX ORDER — GLIMEPIRIDE 2 MG/1
2 TABLET ORAL
Qty: 30 TABLET | Refills: 1 | Status: SHIPPED | OUTPATIENT
Start: 2021-09-23 | End: 2021-10-18

## 2021-09-23 NOTE — PROGRESS NOTES
Assessment/Plan:       Diagnoses and all orders for this visit:    Type 2 diabetes mellitus with diabetic polyneuropathy, without long-term current use of insulin ([facility])  Comments:  A1c uncontrolled at 8 6  she was previously on glimepiride, but this was dc'd due to controlled sugars  will restart glimepiride  counseled on diet and exercise  Counseled on keeping blood sugar log  Return in 1 month  Advised to monitor for hypoglycemia  If sugars are low, take half tab  Continue metformin  Orders:  -     glimepiride (AMARYL) 2 mg tablet; Take 1 tablet (2 mg total) by mouth daily with breakfast        Subjective:      Patient ID: Charmayne Lowing is a 64 y o  female  Diabetes  She presents for her follow-up diabetic visit  She has type 2 diabetes mellitus  There are no hypoglycemic associated symptoms  Pertinent negatives for diabetes include no blurred vision, no chest pain, no fatigue, no foot paresthesias, no foot ulcerations, no polydipsia, no polyphagia, no polyuria, no visual change, no weakness and no weight loss  There are no hypoglycemic complications  Current diabetic treatment includes oral agent (monotherapy)  She is compliant with treatment all of the time  She is following a generally healthy diet  Her breakfast blood glucose range is generally 130-140 mg/dl  An ACE inhibitor/angiotensin II receptor blocker is being taken  She does not see a podiatrist Eye exam is current  The following portions of the patient's history were reviewed and updated as appropriate: allergies, current medications, past family history, past medical history, past social history, past surgical history and problem list     Review of Systems   Constitutional: Negative  Negative for fatigue and weight loss  HENT: Negative  Eyes: Negative  Negative for blurred vision  Respiratory: Negative  Cardiovascular: Negative  Negative for chest pain  Gastrointestinal: Negative  Endocrine: Negative    Negative for polydipsia, polyphagia and polyuria  Genitourinary: Negative  Musculoskeletal: Negative  Skin: Negative  Allergic/Immunologic: Negative  Neurological: Negative  Negative for weakness  Hematological: Negative  Psychiatric/Behavioral: Negative  Objective:      /60   Pulse 66   Temp (!) 97 °F (36 1 °C)   Resp 18   Ht 5' (1 524 m)   Wt 55 9 kg (123 lb 3 2 oz)   BMI 24 06 kg/m²          Physical Exam  Constitutional:       General: She is not in acute distress  Appearance: She is well-developed  She is not diaphoretic  HENT:      Head: Normocephalic and atraumatic  Cardiovascular:      Rate and Rhythm: Normal rate and regular rhythm  Heart sounds: Normal heart sounds  No murmur heard  No friction rub  No gallop  Pulmonary:      Effort: Pulmonary effort is normal  No respiratory distress  Breath sounds: Normal breath sounds  No wheezing or rales  Chest:      Chest wall: No tenderness  Musculoskeletal:         General: No deformity  Normal range of motion  Cervical back: Normal range of motion and neck supple  Skin:     General: Skin is warm and dry  Neurological:      Mental Status: She is alert and oriented to person, place, and time  Psychiatric:         Behavior: Behavior normal          Thought Content:  Thought content normal          Judgment: Judgment normal

## 2021-09-24 ENCOUNTER — PATIENT OUTREACH (OUTPATIENT)
Dept: CASE MANAGEMENT | Facility: OTHER | Age: 76
End: 2021-09-24

## 2021-09-24 NOTE — PROGRESS NOTES
Spoke to the patient  She started back on glimepiride this morning for A1C of 8 6  No S&S of hypoglycemia  She checks her blood sugar daily  She is able to verbalize her action plan for low blood sugars  She is very happy with PT & feels she is making progress & improving  She is smoking 4 cigarettes/day & states a continued desire to quit, but is not working on any specific goals at the moment  She does feel it will take her at least a month to cut back further  Patient feels more comfortable setting a small goal to work towards quitting  She will only smoke 3 cigarettes next Wednesday   This OP CM will follow up with her regarding this goal

## 2021-09-27 ENCOUNTER — EVALUATION (OUTPATIENT)
Dept: PHYSICAL THERAPY | Facility: CLINIC | Age: 76
End: 2021-09-27
Payer: COMMERCIAL

## 2021-09-27 DIAGNOSIS — I63.9 CEREBROVASCULAR ACCIDENT (CVA), UNSPECIFIED MECHANISM (HCC): ICD-10-CM

## 2021-09-27 DIAGNOSIS — R26.81 UNSTEADY GAIT: Primary | ICD-10-CM

## 2021-09-27 PROCEDURE — 97112 NEUROMUSCULAR REEDUCATION: CPT

## 2021-09-27 PROCEDURE — 97110 THERAPEUTIC EXERCISES: CPT

## 2021-09-27 NOTE — PROGRESS NOTES
Daily Note     Today's date: 2021  Patient name: Anabel Vivas  : 1945  MRN: [id number]  Referring provider: Freedom Márquez MD  Dx:   Encounter Diagnosis     ICD-10-CM    1  Unsteady gait  R26 81    2  Cerebrovascular accident (CVA), unspecified mechanism ([facility] [address] )  I63 9                   Subjective: Patient did one flight of stairs at home without difficulty,       Objective: See treatment diary below      Assessment: Tolerated treatment well  Patient progressed with increased cuff weight for seated and standing hip strengthening, well tolerated  Poor performance on biodex, rocker board better tolerated  Continued PT would be beneficial to improve function           Plan: Continue per plan of care         Precautions: FALL RISK;  HTN,  IDDM, HX of CVA x 2      Manuals 8/19 8/23 9/1 9/7 9/9 9/13 9/15 9/22 9/27                                                        Neuro Re-Ed             SLS      4 x :10 ea 4 x 10" 4 x 10" 4x10"    DLS EO foam  5 x :20 5 x :20 4 x 20" NV No foam feet together 20" x 2   2x20"  No UE 2x20"  No UE    DLS EC foam  4 x :20 4 x :20 4 x 20" NV Feet together no foam FT RC FOAM 40" x 2 Foam 4x20" 1 UE Foam 4x20" 1 UE    Tandem stance  4 x :05           Side Step  4 laps 1# x 4 laps 1# 4 laps  1# 4 laps 1# 4 laps 1 5# 4 laps 1 5# 4 laps 2# 3 laps    Backward walk  4 laps 4 laps 4 laps  4 laps         Angela simon         10 ea 1 UE    Biodex   nv      trialed LOS    Ther Ex             Standing knee flexion      1 5# x 10 1 5# x 20  1 5# x 20 2# 20x    Bike  5' 5' 5 5 6' 6 6' 6'    Seated Hip flexion  1# 10 1# x 10 1# x 10  1# 10 1 5#  X 10 1 5# x 10 1 5# x 10 2# 10x    Seated Knee Ext  1# x 10 1# x 10 1# 10x 1# 10 1 5# x 10 1 5# x 20  1 5# x 10 2# 2x10    Hip ADD iso  x10 10 x :05 5" x 10 5" x 10  15 x :05 5" x 15  5"x15 5"x15    Hip ABD iso  x10 10 x :05 5"x 10  5 " x 10 15 x :05 5" x 15  5"x15 5"x15    Sit to stand   1x7; 1 x 5 2 x 5  2x5 10 10 10 10    Standing hip 3 way 1 5# x 10 1 5# 2 x 10  1 5# 2x10 2# 2x10    Ther Activity             Bicep curls   3# 3# 2 x 10  3# 2 x 10 3# 2 x 10 3# 2 x 10  3# 2x10 3# 2x10                 Gait Training             Ambulate with SPC instruction 5'  2 laps                       Modalities

## 2021-09-28 ENCOUNTER — PATIENT OUTREACH (OUTPATIENT)
Dept: CASE MANAGEMENT | Facility: OTHER | Age: 76
End: 2021-09-28

## 2021-09-30 ENCOUNTER — OFFICE VISIT (OUTPATIENT)
Dept: PHYSICAL THERAPY | Facility: CLINIC | Age: 76
End: 2021-09-30
Payer: COMMERCIAL

## 2021-09-30 ENCOUNTER — PATIENT OUTREACH (OUTPATIENT)
Dept: CASE MANAGEMENT | Facility: OTHER | Age: 76
End: 2021-09-30

## 2021-09-30 DIAGNOSIS — R26.81 UNSTEADY GAIT: Primary | ICD-10-CM

## 2021-09-30 PROCEDURE — 97110 THERAPEUTIC EXERCISES: CPT | Performed by: PHYSICAL THERAPIST

## 2021-09-30 PROCEDURE — 97140 MANUAL THERAPY 1/> REGIONS: CPT | Performed by: PHYSICAL THERAPIST

## 2021-09-30 PROCEDURE — 97112 NEUROMUSCULAR REEDUCATION: CPT | Performed by: PHYSICAL THERAPIST

## 2021-09-30 NOTE — PROGRESS NOTES
Called the patient to see how she did with her goal for this week in working towards smoking cessation  Left a message requesting a call back, phone number provided

## 2021-09-30 NOTE — PROGRESS NOTES
Daily Note     Today's date: 2021  Patient name: Kiya Caldwell  : 1945  MRN: [id number]  Referring provider: Jane Barclay MD  Dx:   Encounter Diagnosis     ICD-10-CM    1  Unsteady gait  R26 81                   Subjective: Patient reports that she has not been using her cane SPC at all  She was able to walk up 13 stairs without difficulty  Objective: See treatment diary below      Assessment: Tolerated treatment well  Patient would benefit from continued PT      Plan: Continue per plan of care        Precautions: FALL RISK;  HTN,  IDDM, HX of CVA x 2      Manuals 8/19 8/23 9/1 9/7 9/9 9/13 9/15 9/22 9/27 9/30                                                       Neuro Re-Ed             SLS      4 x :10 ea 4 x 10" 4 x 10" 4x10" 4 x :10   DLS EO foam  5 x :20 5 x :20 4 x 20" NV No foam feet together 20" x 2   2x20"  No UE 2x20"  No UE 2 x :30   DLS EC foam  4 x :20 4 x :20 4 x 20" NV Feet together no foam FT RC FOAM 40" x 2 Foam 4x20" 1 UE Foam 4x20" 1 UE 2 x :30   Tandem walk on foam   4 x :05        5 laps   Side Step - foam  4 laps 1# x 4 laps 1# 4 laps  1# 4 laps 1# 4 laps 1 5# 4 laps 1 5# 4 laps 2# 3 laps 5 laps   Backward walk  4 laps 4 laps 4 laps  4 laps         Angela simon         10 ea 1 UE    Biodex   nv      trialed LOS Weight shift L9   Ther Ex             Standing knee flexion      1 5# x 10 1 5# x 20  1 5# x 20 2# 20x 2# 2 x 10   Bike  5' 5' 5 5 6' 6 6' 6' 6'   Seated Hip flexion  1# 10 1# x 10 1# x 10  1# 10 1 5#  X 10 1 5# x 10 1 5# x 10 2# 10x 2# 2 x 10   Seated Knee Ext  1# x 10 1# x 10 1# 10x 1# 10 1 5# x 10 1 5# x 20  1 5# x 10 2# 2x10 2# 2 x 10   Hip ADD iso  x10 10 x :05 5" x 10 5" x 10  15 x :05 5" x 15  5"x15 5"x15    Hip ABD iso  x10 10 x :05 5"x 10  5 " x 10 15 x :05 5" x 15  5"x15 5"x15    Sit to stand   1x7; 1 x 5 2 x 5  2x5 10 10 10 10 2 x 10   Standing hip 3 way      1 5# x 10 1 5# 2 x 10  1 5# 2x10 2# 2x10 2# 2 x 10   Ther Activity             Bicep curls   3# 3# 2 x 10  3# 2 x 10 3# 2 x 10 3# 2 x 10  3# 2x10 3# 2x10 3# 2 x 10                Gait Training             Ambulate with SPC instruction 5'  2 laps                       Modalities

## 2021-10-01 ENCOUNTER — CONSULT (OUTPATIENT)
Dept: NEPHROLOGY | Facility: CLINIC | Age: 76
End: 2021-10-01
Payer: COMMERCIAL

## 2021-10-01 VITALS
SYSTOLIC BLOOD PRESSURE: 100 MMHG | DIASTOLIC BLOOD PRESSURE: 60 MMHG | WEIGHT: 123 LBS | BODY MASS INDEX: 24.15 KG/M2 | HEIGHT: 60 IN

## 2021-10-01 DIAGNOSIS — I12.9 TYPE 2 DIABETES MELLITUS WITH STAGE 3A CHRONIC KIDNEY DISEASE AND HYPERTENSION (HCC): ICD-10-CM

## 2021-10-01 DIAGNOSIS — D50.9 IRON DEFICIENCY ANEMIA, UNSPECIFIED IRON DEFICIENCY ANEMIA TYPE: Primary | Chronic | ICD-10-CM

## 2021-10-01 DIAGNOSIS — E11.22 TYPE 2 DIABETES MELLITUS WITH STAGE 3A CHRONIC KIDNEY DISEASE AND HYPERTENSION (HCC): ICD-10-CM

## 2021-10-01 DIAGNOSIS — N18.31 TYPE 2 DIABETES MELLITUS WITH STAGE 3A CHRONIC KIDNEY DISEASE AND HYPERTENSION (HCC): ICD-10-CM

## 2021-10-01 DIAGNOSIS — N17.9 AKI (ACUTE KIDNEY INJURY) (HCC): ICD-10-CM

## 2021-10-01 DIAGNOSIS — E83.52 HYPERCALCEMIA: ICD-10-CM

## 2021-10-01 PROCEDURE — 99204 OFFICE O/P NEW MOD 45 MIN: CPT | Performed by: INTERNAL MEDICINE

## 2021-10-04 ENCOUNTER — OFFICE VISIT (OUTPATIENT)
Dept: PHYSICAL THERAPY | Facility: CLINIC | Age: 76
End: 2021-10-04
Payer: COMMERCIAL

## 2021-10-04 DIAGNOSIS — R26.81 UNSTEADY GAIT: Primary | ICD-10-CM

## 2021-10-04 LAB
ALBUMIN SERPL-MCNC: 4.3 G/DL (ref 3.7–4.7)
ALBUMIN/GLOB SERPL: 2.3 {RATIO} (ref 1.2–2.2)
ALP SERPL-CCNC: 91 IU/L (ref 44–121)
ALT SERPL-CCNC: 7 IU/L (ref 0–32)
AST SERPL-CCNC: 11 IU/L (ref 0–40)
BASOPHILS # BLD AUTO: 0.1 X10E3/UL (ref 0–0.2)
BASOPHILS NFR BLD AUTO: 1 %
BILIRUB SERPL-MCNC: 0.3 MG/DL (ref 0–1.2)
BUN SERPL-MCNC: 21 MG/DL (ref 8–27)
BUN/CREAT SERPL: 17 (ref 12–28)
CALCIUM SERPL-MCNC: 11.3 MG/DL (ref 8.7–10.3)
CHLORIDE SERPL-SCNC: 102 MMOL/L (ref 96–106)
CO2 SERPL-SCNC: 23 MMOL/L (ref 20–29)
CREAT SERPL-MCNC: 1.26 MG/DL (ref 0.57–1)
EOSINOPHIL # BLD AUTO: 0.2 X10E3/UL (ref 0–0.4)
EOSINOPHIL NFR BLD AUTO: 4 %
ERYTHROCYTE [DISTWIDTH] IN BLOOD BY AUTOMATED COUNT: 14.6 % (ref 11.7–15.4)
GLOBULIN SER-MCNC: 1.9 G/DL (ref 1.5–4.5)
GLUCOSE SERPL-MCNC: 126 MG/DL (ref 65–99)
HCT VFR BLD AUTO: 31.3 % (ref 34–46.6)
HGB BLD-MCNC: 10 G/DL (ref 11.1–15.9)
IMM GRANULOCYTES # BLD: 0.1 X10E3/UL (ref 0–0.1)
IMM GRANULOCYTES NFR BLD: 1 %
IRON SATN MFR SERPL: 10 % (ref 15–55)
IRON SERPL-MCNC: 34 UG/DL (ref 27–139)
LYMPHOCYTES # BLD AUTO: 1.6 X10E3/UL (ref 0.7–3.1)
LYMPHOCYTES NFR BLD AUTO: 28 %
MCH RBC QN AUTO: 29 PG (ref 26.6–33)
MCHC RBC AUTO-ENTMCNC: 31.9 G/DL (ref 31.5–35.7)
MCV RBC AUTO: 91 FL (ref 79–97)
MONOCYTES # BLD AUTO: 0.5 X10E3/UL (ref 0.1–0.9)
MONOCYTES NFR BLD AUTO: 8 %
NEUTROPHILS # BLD AUTO: 3.3 X10E3/UL (ref 1.4–7)
NEUTROPHILS NFR BLD AUTO: 58 %
PLATELET # BLD AUTO: 390 X10E3/UL (ref 150–450)
POTASSIUM SERPL-SCNC: 5.1 MMOL/L (ref 3.5–5.2)
PROT SERPL-MCNC: 6.2 G/DL (ref 6–8.5)
RBC # BLD AUTO: 3.45 X10E6/UL (ref 3.77–5.28)
SL AMB EGFR AFRICAN AMERICAN: 47 ML/MIN/1.73
SL AMB EGFR NON AFRICAN AMERICAN: 41 ML/MIN/1.73
SODIUM SERPL-SCNC: 141 MMOL/L (ref 134–144)
TIBC SERPL-MCNC: 341 UG/DL (ref 250–450)
UIBC SERPL-MCNC: 307 UG/DL (ref 118–369)
WBC # BLD AUTO: 5.7 X10E3/UL (ref 3.4–10.8)

## 2021-10-04 PROCEDURE — 97112 NEUROMUSCULAR REEDUCATION: CPT

## 2021-10-04 PROCEDURE — 97110 THERAPEUTIC EXERCISES: CPT

## 2021-10-06 ENCOUNTER — HOSPITAL ENCOUNTER (OUTPATIENT)
Dept: NON INVASIVE DIAGNOSTICS | Facility: CLINIC | Age: 76
Discharge: HOME | End: 2021-10-06
Payer: COMMERCIAL

## 2021-10-06 DIAGNOSIS — R77.8 TROPONIN LEVEL ELEVATED: ICD-10-CM

## 2021-10-06 PROCEDURE — 93018 CV STRESS TEST I&R ONLY: CPT | Performed by: INTERNAL MEDICINE

## 2021-10-06 PROCEDURE — 93016 CV STRESS TEST SUPVJ ONLY: CPT | Performed by: INTERNAL MEDICINE

## 2021-10-06 PROCEDURE — 78452 HT MUSCLE IMAGE SPECT MULT: CPT | Performed by: INTERNAL MEDICINE

## 2021-10-06 PROCEDURE — 93017 CV STRESS TEST TRACING ONLY: CPT

## 2021-10-06 PROCEDURE — G1004 CDSM NDSC: HCPCS

## 2021-10-06 PROCEDURE — A9502 TC99M TETROFOSMIN: HCPCS

## 2021-10-06 PROCEDURE — 78452 HT MUSCLE IMAGE SPECT MULT: CPT

## 2021-10-06 RX ADMIN — REGADENOSON 0.4 MG: 0.08 INJECTION, SOLUTION INTRAVENOUS at 13:48

## 2021-10-07 ENCOUNTER — OFFICE VISIT (OUTPATIENT)
Dept: HEMATOLOGY ONCOLOGY | Facility: MEDICAL CENTER | Age: 76
End: 2021-10-07
Payer: COMMERCIAL

## 2021-10-07 ENCOUNTER — OFFICE VISIT (OUTPATIENT)
Dept: PHYSICAL THERAPY | Facility: CLINIC | Age: 76
End: 2021-10-07
Payer: COMMERCIAL

## 2021-10-07 VITALS
WEIGHT: 123 LBS | TEMPERATURE: 97.6 F | DIASTOLIC BLOOD PRESSURE: 60 MMHG | HEIGHT: 60 IN | BODY MASS INDEX: 24.15 KG/M2 | OXYGEN SATURATION: 97 % | SYSTOLIC BLOOD PRESSURE: 122 MMHG | HEART RATE: 72 BPM | RESPIRATION RATE: 16 BRPM

## 2021-10-07 DIAGNOSIS — R91.1 SOLITARY PULMONARY NODULE: ICD-10-CM

## 2021-10-07 DIAGNOSIS — I63.9 CEREBROVASCULAR ACCIDENT (CVA), UNSPECIFIED MECHANISM (HCC): ICD-10-CM

## 2021-10-07 DIAGNOSIS — D64.9 NORMOCYTIC ANEMIA: Primary | ICD-10-CM

## 2021-10-07 DIAGNOSIS — R26.81 UNSTEADY GAIT: Primary | ICD-10-CM

## 2021-10-07 DIAGNOSIS — E04.1 THYROID NODULE: ICD-10-CM

## 2021-10-07 DIAGNOSIS — D50.8 OTHER IRON DEFICIENCY ANEMIA: ICD-10-CM

## 2021-10-07 LAB
KAPPA LC FREE SER-MCNC: 41.3 MG/L (ref 3.3–19.4)
KAPPA LC FREE/LAMBDA FREE SER: 1.84 {RATIO} (ref 0.26–1.65)
LAMBDA LC FREE SERPL-MCNC: 22.5 MG/L (ref 5.7–26.3)

## 2021-10-07 PROCEDURE — 99215 OFFICE O/P EST HI 40 MIN: CPT | Performed by: PHYSICIAN ASSISTANT

## 2021-10-07 PROCEDURE — 97110 THERAPEUTIC EXERCISES: CPT

## 2021-10-07 PROCEDURE — 97112 NEUROMUSCULAR REEDUCATION: CPT

## 2021-10-09 LAB — FERRITIN SERPL-MCNC: 12 NG/ML (ref 15–150)

## 2021-10-11 ENCOUNTER — HOSPITAL ENCOUNTER (OUTPATIENT)
Dept: ULTRASOUND IMAGING | Facility: HOSPITAL | Age: 76
Discharge: HOME | End: 2021-10-11
Attending: INTERNAL MEDICINE
Payer: COMMERCIAL

## 2021-10-11 DIAGNOSIS — I12.9 TYPE 2 DIABETES MELLITUS WITH STAGE 3A CHRONIC KIDNEY DISEASE AND HYPERTENSION (HCC): ICD-10-CM

## 2021-10-11 DIAGNOSIS — E11.22 TYPE 2 DIABETES MELLITUS WITH STAGE 3A CHRONIC KIDNEY DISEASE AND HYPERTENSION (HCC): ICD-10-CM

## 2021-10-11 DIAGNOSIS — N18.31 TYPE 2 DIABETES MELLITUS WITH STAGE 3A CHRONIC KIDNEY DISEASE AND HYPERTENSION (HCC): ICD-10-CM

## 2021-10-11 LAB
CHEST PAIN STATEMENT: NORMAL
MAX DIASTOLIC BP: 62 MMHG
MAX HEART RATE: 125 BPM
MAX PREDICTED HEART RATE: 142 BPM
MAX. SYSTOLIC BP: 150 MMHG
PROTOCOL NAME: NORMAL
REASON FOR TERMINATION: NORMAL
TARGET HR FORMULA: NORMAL
TEST INDICATION: NORMAL
TIME IN EXERCISE PHASE: NORMAL

## 2021-10-11 PROCEDURE — 76770 US EXAM ABDO BACK WALL COMP: CPT

## 2021-10-12 ENCOUNTER — OFFICE VISIT (OUTPATIENT)
Dept: PHYSICAL THERAPY | Facility: CLINIC | Age: 76
End: 2021-10-12
Payer: COMMERCIAL

## 2021-10-12 ENCOUNTER — TELEPHONE (OUTPATIENT)
Dept: HEMATOLOGY ONCOLOGY | Facility: MEDICAL CENTER | Age: 76
End: 2021-10-12

## 2021-10-12 DIAGNOSIS — I63.9 CEREBROVASCULAR ACCIDENT (CVA), UNSPECIFIED MECHANISM (HCC): ICD-10-CM

## 2021-10-12 DIAGNOSIS — R26.81 UNSTEADY GAIT: Primary | ICD-10-CM

## 2021-10-12 DIAGNOSIS — D50.9 IRON DEFICIENCY ANEMIA, UNSPECIFIED IRON DEFICIENCY ANEMIA TYPE: Primary | ICD-10-CM

## 2021-10-12 PROCEDURE — 97110 THERAPEUTIC EXERCISES: CPT

## 2021-10-12 PROCEDURE — 97112 NEUROMUSCULAR REEDUCATION: CPT

## 2021-10-14 ENCOUNTER — OFFICE VISIT (OUTPATIENT)
Dept: PHYSICAL THERAPY | Facility: CLINIC | Age: 76
End: 2021-10-14
Payer: COMMERCIAL

## 2021-10-14 DIAGNOSIS — R26.81 UNSTEADY GAIT: Primary | ICD-10-CM

## 2021-10-14 PROCEDURE — 97110 THERAPEUTIC EXERCISES: CPT

## 2021-10-14 PROCEDURE — 97112 NEUROMUSCULAR REEDUCATION: CPT

## 2021-10-15 ENCOUNTER — RA CDI HCC (OUTPATIENT)
Dept: OTHER | Facility: HOSPITAL | Age: 76
End: 2021-10-15

## 2021-10-17 DIAGNOSIS — E11.42 TYPE 2 DIABETES MELLITUS WITH DIABETIC POLYNEUROPATHY, WITHOUT LONG-TERM CURRENT USE OF INSULIN (HCC): ICD-10-CM

## 2021-10-18 RX ORDER — GLIMEPIRIDE 2 MG/1
TABLET ORAL
Qty: 90 TABLET | Refills: 1 | Status: SHIPPED | OUTPATIENT
Start: 2021-10-18 | End: 2021-10-21 | Stop reason: SDUPTHER

## 2021-10-19 ENCOUNTER — OFFICE VISIT (OUTPATIENT)
Dept: PHYSICAL THERAPY | Facility: CLINIC | Age: 76
End: 2021-10-19
Payer: COMMERCIAL

## 2021-10-19 DIAGNOSIS — I63.9 CEREBROVASCULAR ACCIDENT (CVA), UNSPECIFIED MECHANISM (HCC): ICD-10-CM

## 2021-10-19 DIAGNOSIS — R26.81 UNSTEADY GAIT: Primary | ICD-10-CM

## 2021-10-19 PROCEDURE — 97112 NEUROMUSCULAR REEDUCATION: CPT | Performed by: PHYSICAL THERAPIST

## 2021-10-19 PROCEDURE — 97110 THERAPEUTIC EXERCISES: CPT | Performed by: PHYSICAL THERAPIST

## 2021-10-21 ENCOUNTER — OFFICE VISIT (OUTPATIENT)
Dept: FAMILY MEDICINE CLINIC | Facility: CLINIC | Age: 76
End: 2021-10-21
Payer: COMMERCIAL

## 2021-10-21 ENCOUNTER — OFFICE VISIT (OUTPATIENT)
Dept: PHYSICAL THERAPY | Facility: CLINIC | Age: 76
End: 2021-10-21
Payer: COMMERCIAL

## 2021-10-21 VITALS
DIASTOLIC BLOOD PRESSURE: 64 MMHG | RESPIRATION RATE: 18 BRPM | HEIGHT: 60 IN | BODY MASS INDEX: 24.15 KG/M2 | WEIGHT: 123 LBS | HEART RATE: 100 BPM | OXYGEN SATURATION: 99 % | SYSTOLIC BLOOD PRESSURE: 106 MMHG | TEMPERATURE: 97 F

## 2021-10-21 DIAGNOSIS — E11.42 TYPE 2 DIABETES MELLITUS WITH DIABETIC POLYNEUROPATHY, WITHOUT LONG-TERM CURRENT USE OF INSULIN (HCC): ICD-10-CM

## 2021-10-21 DIAGNOSIS — I63.9 CEREBROVASCULAR ACCIDENT (CVA), UNSPECIFIED MECHANISM (HCC): ICD-10-CM

## 2021-10-21 DIAGNOSIS — Z23 NEED FOR VACCINATION: ICD-10-CM

## 2021-10-21 DIAGNOSIS — I10 ESSENTIAL HYPERTENSION: Chronic | ICD-10-CM

## 2021-10-21 DIAGNOSIS — R26.81 UNSTEADY GAIT: Primary | ICD-10-CM

## 2021-10-21 DIAGNOSIS — Z00.00 MEDICARE ANNUAL WELLNESS VISIT, SUBSEQUENT: Primary | ICD-10-CM

## 2021-10-21 DIAGNOSIS — K21.00 GASTROESOPHAGEAL REFLUX DISEASE WITH ESOPHAGITIS WITHOUT HEMORRHAGE: Chronic | ICD-10-CM

## 2021-10-21 PROCEDURE — 1160F RVW MEDS BY RX/DR IN RCRD: CPT | Performed by: FAMILY MEDICINE

## 2021-10-21 PROCEDURE — G0439 PPPS, SUBSEQ VISIT: HCPCS | Performed by: FAMILY MEDICINE

## 2021-10-21 PROCEDURE — G0008 ADMIN INFLUENZA VIRUS VAC: HCPCS

## 2021-10-21 PROCEDURE — 3078F DIAST BP <80 MM HG: CPT | Performed by: FAMILY MEDICINE

## 2021-10-21 PROCEDURE — 90662 IIV NO PRSV INCREASED AG IM: CPT

## 2021-10-21 PROCEDURE — 1125F AMNT PAIN NOTED PAIN PRSNT: CPT | Performed by: FAMILY MEDICINE

## 2021-10-21 PROCEDURE — 97110 THERAPEUTIC EXERCISES: CPT

## 2021-10-21 PROCEDURE — 1101F PT FALLS ASSESS-DOCD LE1/YR: CPT | Performed by: FAMILY MEDICINE

## 2021-10-21 PROCEDURE — 4004F PT TOBACCO SCREEN RCVD TLK: CPT | Performed by: FAMILY MEDICINE

## 2021-10-21 PROCEDURE — 99214 OFFICE O/P EST MOD 30 MIN: CPT | Performed by: FAMILY MEDICINE

## 2021-10-21 PROCEDURE — 3725F SCREEN DEPRESSION PERFORMED: CPT | Performed by: FAMILY MEDICINE

## 2021-10-21 PROCEDURE — 3074F SYST BP LT 130 MM HG: CPT | Performed by: FAMILY MEDICINE

## 2021-10-21 PROCEDURE — 3288F FALL RISK ASSESSMENT DOCD: CPT | Performed by: FAMILY MEDICINE

## 2021-10-21 PROCEDURE — 1170F FXNL STATUS ASSESSED: CPT | Performed by: FAMILY MEDICINE

## 2021-10-21 PROCEDURE — 97112 NEUROMUSCULAR REEDUCATION: CPT

## 2021-10-21 RX ORDER — GLIMEPIRIDE 2 MG/1
2 TABLET ORAL
Qty: 90 TABLET | Refills: 1 | Status: SHIPPED | OUTPATIENT
Start: 2021-10-21 | End: 2022-03-23

## 2021-10-26 ENCOUNTER — OFFICE VISIT (OUTPATIENT)
Dept: PHYSICAL THERAPY | Facility: CLINIC | Age: 76
End: 2021-10-26
Payer: COMMERCIAL

## 2021-10-26 DIAGNOSIS — R26.81 UNSTEADY GAIT: Primary | ICD-10-CM

## 2021-10-26 PROCEDURE — 97112 NEUROMUSCULAR REEDUCATION: CPT

## 2021-10-28 ENCOUNTER — EVALUATION (OUTPATIENT)
Dept: PHYSICAL THERAPY | Facility: CLINIC | Age: 76
End: 2021-10-28
Payer: COMMERCIAL

## 2021-10-28 DIAGNOSIS — R26.81 UNSTEADY GAIT: Primary | ICD-10-CM

## 2021-10-28 PROCEDURE — 97110 THERAPEUTIC EXERCISES: CPT

## 2021-10-29 ENCOUNTER — PATIENT OUTREACH (OUTPATIENT)
Dept: CASE MANAGEMENT | Facility: OTHER | Age: 76
End: 2021-10-29

## 2021-10-31 DIAGNOSIS — Z79.4 TYPE 2 DIABETES MELLITUS WITH DIABETIC POLYNEUROPATHY, WITH LONG-TERM CURRENT USE OF INSULIN (HCC): ICD-10-CM

## 2021-10-31 DIAGNOSIS — E11.42 TYPE 2 DIABETES MELLITUS WITH DIABETIC POLYNEUROPATHY, WITH LONG-TERM CURRENT USE OF INSULIN (HCC): ICD-10-CM

## 2021-11-09 LAB
BASOPHILS # BLD AUTO: 0 X10E3/UL (ref 0–0.2)
BASOPHILS NFR BLD AUTO: 1 %
EOSINOPHIL # BLD AUTO: 0.3 X10E3/UL (ref 0–0.4)
EOSINOPHIL NFR BLD AUTO: 5 %
ERYTHROCYTE [DISTWIDTH] IN BLOOD BY AUTOMATED COUNT: 14.2 % (ref 11.7–15.4)
HCT VFR BLD AUTO: 29 % (ref 34–46.6)
HGB BLD-MCNC: 9.4 G/DL (ref 11.1–15.9)
IMM GRANULOCYTES # BLD: 0 X10E3/UL (ref 0–0.1)
IMM GRANULOCYTES NFR BLD: 0 %
LYMPHOCYTES # BLD AUTO: 1.8 X10E3/UL (ref 0.7–3.1)
LYMPHOCYTES NFR BLD AUTO: 26 %
MCH RBC QN AUTO: 29.3 PG (ref 26.6–33)
MCHC RBC AUTO-ENTMCNC: 32.4 G/DL (ref 31.5–35.7)
MCV RBC AUTO: 90 FL (ref 79–97)
MONOCYTES # BLD AUTO: 0.6 X10E3/UL (ref 0.1–0.9)
MONOCYTES NFR BLD AUTO: 9 %
NEUTROPHILS # BLD AUTO: 4.1 X10E3/UL (ref 1.4–7)
NEUTROPHILS NFR BLD AUTO: 59 %
PLATELET # BLD AUTO: 468 X10E3/UL (ref 150–450)
RBC # BLD AUTO: 3.21 X10E6/UL (ref 3.77–5.28)
WBC # BLD AUTO: 6.9 X10E3/UL (ref 3.4–10.8)

## 2021-11-10 LAB — FERRITIN SERPL-MCNC: 13 NG/ML (ref 15–150)

## 2021-11-12 ENCOUNTER — TELEPHONE (OUTPATIENT)
Dept: HEMATOLOGY ONCOLOGY | Facility: MEDICAL CENTER | Age: 76
End: 2021-11-12

## 2021-11-12 DIAGNOSIS — D50.9 IRON DEFICIENCY ANEMIA, UNSPECIFIED IRON DEFICIENCY ANEMIA TYPE: Primary | ICD-10-CM

## 2021-11-12 RX ORDER — SODIUM CHLORIDE 9 MG/ML
20 INJECTION, SOLUTION INTRAVENOUS ONCE
Status: CANCELLED | OUTPATIENT
Start: 2021-11-19

## 2021-11-19 ENCOUNTER — HOSPITAL ENCOUNTER (OUTPATIENT)
Dept: INFUSION CENTER | Facility: CLINIC | Age: 76
Discharge: HOME | End: 2021-11-19
Payer: COMMERCIAL

## 2021-11-19 VITALS
SYSTOLIC BLOOD PRESSURE: 106 MMHG | TEMPERATURE: 97 F | RESPIRATION RATE: 18 BRPM | DIASTOLIC BLOOD PRESSURE: 59 MMHG | HEART RATE: 109 BPM | OXYGEN SATURATION: 96 %

## 2021-11-19 DIAGNOSIS — D50.9 IRON DEFICIENCY ANEMIA, UNSPECIFIED IRON DEFICIENCY ANEMIA TYPE: Primary | ICD-10-CM

## 2021-11-19 PROCEDURE — 96365 THER/PROPH/DIAG IV INF INIT: CPT

## 2021-11-19 RX ORDER — SODIUM CHLORIDE 9 MG/ML
20 INJECTION, SOLUTION INTRAVENOUS ONCE
Status: CANCELLED | OUTPATIENT
Start: 2021-11-26

## 2021-11-19 RX ORDER — SODIUM CHLORIDE 9 MG/ML
20 INJECTION, SOLUTION INTRAVENOUS ONCE
Status: COMPLETED | OUTPATIENT
Start: 2021-11-19 | End: 2021-11-19

## 2021-11-19 RX ADMIN — FERUMOXYTOL 510 MG: 510 INJECTION INTRAVENOUS at 15:15

## 2021-11-19 RX ADMIN — SODIUM CHLORIDE 20 ML/HR: 0.9 INJECTION, SOLUTION INTRAVENOUS at 14:50

## 2021-11-26 ENCOUNTER — HOSPITAL ENCOUNTER (OUTPATIENT)
Dept: INFUSION CENTER | Facility: CLINIC | Age: 76
Discharge: HOME | End: 2021-11-26
Payer: COMMERCIAL

## 2021-11-26 VITALS
OXYGEN SATURATION: 99 % | SYSTOLIC BLOOD PRESSURE: 112 MMHG | TEMPERATURE: 97.6 F | HEART RATE: 83 BPM | RESPIRATION RATE: 18 BRPM | DIASTOLIC BLOOD PRESSURE: 68 MMHG

## 2021-11-26 DIAGNOSIS — D50.9 IRON DEFICIENCY ANEMIA, UNSPECIFIED IRON DEFICIENCY ANEMIA TYPE: Primary | ICD-10-CM

## 2021-11-26 PROCEDURE — 96365 THER/PROPH/DIAG IV INF INIT: CPT

## 2021-11-26 RX ORDER — SODIUM CHLORIDE 9 MG/ML
20 INJECTION, SOLUTION INTRAVENOUS ONCE
Status: COMPLETED | OUTPATIENT
Start: 2021-11-26 | End: 2021-11-26

## 2021-11-26 RX ORDER — SODIUM CHLORIDE 9 MG/ML
20 INJECTION, SOLUTION INTRAVENOUS ONCE
Status: CANCELLED | OUTPATIENT
Start: 2021-11-26

## 2021-11-26 RX ADMIN — FERUMOXYTOL 510 MG: 510 INJECTION INTRAVENOUS at 14:29

## 2021-11-26 RX ADMIN — SODIUM CHLORIDE 20 ML/HR: 0.9 INJECTION, SOLUTION INTRAVENOUS at 14:29

## 2021-12-28 ENCOUNTER — HOSPITAL ENCOUNTER (OUTPATIENT)
Dept: CT IMAGING | Facility: HOSPITAL | Age: 76
Discharge: HOME | End: 2021-12-28
Attending: FAMILY MEDICINE
Payer: COMMERCIAL

## 2021-12-28 DIAGNOSIS — R91.1 LUNG NODULE: ICD-10-CM

## 2021-12-28 PROCEDURE — G1004 CDSM NDSC: HCPCS

## 2021-12-28 PROCEDURE — 71250 CT THORAX DX C-: CPT

## 2021-12-31 DIAGNOSIS — K21.9 GASTROESOPHAGEAL REFLUX DISEASE WITHOUT ESOPHAGITIS: ICD-10-CM

## 2022-01-03 RX ORDER — PANTOPRAZOLE SODIUM 40 MG/1
TABLET, DELAYED RELEASE ORAL
Qty: 90 TABLET | Refills: 1 | Status: SHIPPED | OUTPATIENT
Start: 2022-01-03 | End: 2022-07-28 | Stop reason: SDUPTHER

## 2022-01-07 DIAGNOSIS — I63.9 CEREBROVASCULAR ACCIDENT (CVA), UNSPECIFIED MECHANISM (HCC): ICD-10-CM

## 2022-01-07 RX ORDER — CLOPIDOGREL BISULFATE 75 MG/1
75 TABLET ORAL DAILY
Qty: 90 TABLET | Refills: 0 | Status: SHIPPED | OUTPATIENT
Start: 2022-01-07 | End: 2022-02-23

## 2022-01-07 NOTE — TELEPHONE ENCOUNTER
Requested medication(s) are due for refill today: Yes  Patient has already received a courtesy refill: No  Other reason request has been forwarded to provider:   Failed Protocol/Dr Kayla Appiah Patient

## 2022-02-08 LAB
BASOPHILS # BLD AUTO: 0 X10E3/UL (ref 0–0.2)
BASOPHILS NFR BLD AUTO: 1 %
EOSINOPHIL # BLD AUTO: 0.3 X10E3/UL (ref 0–0.4)
EOSINOPHIL NFR BLD AUTO: 3 %
ERYTHROCYTE [DISTWIDTH] IN BLOOD BY AUTOMATED COUNT: 14.7 % (ref 11.7–15.4)
HCT VFR BLD AUTO: 34.9 % (ref 34–46.6)
HGB BLD-MCNC: 11.3 G/DL (ref 11.1–15.9)
IMM GRANULOCYTES # BLD: 0.1 X10E3/UL (ref 0–0.1)
IMM GRANULOCYTES NFR BLD: 1 %
LYMPHOCYTES # BLD AUTO: 1.2 X10E3/UL (ref 0.7–3.1)
LYMPHOCYTES NFR BLD AUTO: 14 %
MCH RBC QN AUTO: 31.1 PG (ref 26.6–33)
MCHC RBC AUTO-ENTMCNC: 32.4 G/DL (ref 31.5–35.7)
MCV RBC AUTO: 96 FL (ref 79–97)
MONOCYTES # BLD AUTO: 0.6 X10E3/UL (ref 0.1–0.9)
MONOCYTES NFR BLD AUTO: 7 %
NEUTROPHILS # BLD AUTO: 6.6 X10E3/UL (ref 1.4–7)
NEUTROPHILS NFR BLD AUTO: 74 %
PLATELET # BLD AUTO: 393 X10E3/UL (ref 150–450)
RBC # BLD AUTO: 3.63 X10E6/UL (ref 3.77–5.28)
RETICS/RBC NFR AUTO: 1.8 % (ref 0.6–2.6)
WBC # BLD AUTO: 8.7 X10E3/UL (ref 3.4–10.8)

## 2022-02-09 LAB
FERRITIN SERPL-MCNC: 38 NG/ML (ref 15–150)
IRON SATN MFR SERPL: 26 % (ref 15–55)
IRON SERPL-MCNC: 85 UG/DL (ref 27–139)
TIBC SERPL-MCNC: 322 UG/DL (ref 250–450)
UIBC SERPL-MCNC: 237 UG/DL (ref 118–369)

## 2022-02-10 ENCOUNTER — TELEMEDICINE (OUTPATIENT)
Dept: HEMATOLOGY ONCOLOGY | Facility: MEDICAL CENTER | Age: 77
End: 2022-02-10
Payer: COMMERCIAL

## 2022-02-10 DIAGNOSIS — R31.9 HEMATURIA OF UNKNOWN ETIOLOGY: ICD-10-CM

## 2022-02-10 DIAGNOSIS — R91.1 SOLITARY PULMONARY NODULE: ICD-10-CM

## 2022-02-10 DIAGNOSIS — Z86.2 HX OF IRON DEFICIENCY ANEMIA: Primary | ICD-10-CM

## 2022-02-10 PROCEDURE — G2012 BRIEF CHECK IN BY MD/QHP: HCPCS | Performed by: PHYSICIAN ASSISTANT

## 2022-02-23 DIAGNOSIS — I63.9 CEREBROVASCULAR ACCIDENT (CVA), UNSPECIFIED MECHANISM (HCC): ICD-10-CM

## 2022-02-23 RX ORDER — CLOPIDOGREL BISULFATE 75 MG/1
TABLET ORAL
Qty: 90 TABLET | Refills: 3 | Status: SHIPPED | OUTPATIENT
Start: 2022-02-23

## 2022-03-23 DIAGNOSIS — E11.42 TYPE 2 DIABETES MELLITUS WITH DIABETIC POLYNEUROPATHY, WITHOUT LONG-TERM CURRENT USE OF INSULIN (HCC): ICD-10-CM

## 2022-03-23 RX ORDER — GLIMEPIRIDE 2 MG/1
TABLET ORAL
Qty: 90 TABLET | Refills: 3 | Status: SHIPPED | OUTPATIENT
Start: 2022-03-23

## 2022-03-26 DIAGNOSIS — D50.8 OTHER IRON DEFICIENCY ANEMIA: ICD-10-CM

## 2022-03-28 RX ORDER — FERROUS SULFATE TAB EC 324 MG (65 MG FE EQUIVALENT) 324 (65 FE) MG
TABLET DELAYED RESPONSE ORAL
Qty: 180 TABLET | Refills: 1 | Status: SHIPPED | OUTPATIENT
Start: 2022-03-28 | End: 2022-07-11

## 2022-04-03 LAB
ALBUMIN SERPL-MCNC: 4.3 G/DL (ref 3.7–4.7)
ALBUMIN/GLOB SERPL: 2.2 {RATIO} (ref 1.2–2.2)
ALP SERPL-CCNC: 119 IU/L (ref 44–121)
ALT SERPL-CCNC: 9 IU/L (ref 0–32)
AST SERPL-CCNC: 12 IU/L (ref 0–40)
BILIRUB SERPL-MCNC: 0.3 MG/DL (ref 0–1.2)
BUN SERPL-MCNC: 24 MG/DL (ref 8–27)
BUN/CREAT SERPL: 19 (ref 12–28)
CALCIUM SERPL-MCNC: 11.2 MG/DL (ref 8.7–10.3)
CHLORIDE SERPL-SCNC: 101 MMOL/L (ref 96–106)
CO2 SERPL-SCNC: 22 MMOL/L (ref 20–29)
CREAT SERPL-MCNC: 1.25 MG/DL (ref 0.57–1)
EGFR: 44 ML/MIN/1.73
GLOBULIN SER-MCNC: 2 G/DL (ref 1.5–4.5)
GLUCOSE SERPL-MCNC: 153 MG/DL (ref 65–99)
POTASSIUM SERPL-SCNC: 4.7 MMOL/L (ref 3.5–5.2)
PROT SERPL-MCNC: 6.3 G/DL (ref 6–8.5)
SODIUM SERPL-SCNC: 143 MMOL/L (ref 134–144)

## 2022-04-04 ENCOUNTER — DOCUMENTATION (OUTPATIENT)
Dept: NEPHROLOGY | Facility: CLINIC | Age: 77
End: 2022-04-04

## 2022-04-04 NOTE — PROGRESS NOTES
Talked with Margean Daughters from [facility]   She will send in request for add on ionized calcium per Dr Paulina Gore     Are you able to add an ionized calcium, or Can she have an ionized calcium level checked before her next visit please thank you

## 2022-04-14 ENCOUNTER — TELEPHONE (OUTPATIENT)
Dept: NEPHROLOGY | Facility: CLINIC | Age: 77
End: 2022-04-14

## 2022-04-14 NOTE — TELEPHONE ENCOUNTER
Patient called to reschedule appt for 4/18 because she isn't well  Asked pt if she would be interested in doing a virtual appt, patient declined  Told patient she should play it by ear, as Dr Usman Whitaker is booking out to July and we can see how she feels tomorrow or Monday morning

## 2022-04-16 LAB
ALBUMIN/CREAT UR: 16 MG/G CREAT (ref 0–29)
CREAT UR-MCNC: 59.4 MG/DL
MICROALBUMIN UR-MCNC: 9.5 UG/ML

## 2022-04-26 ENCOUNTER — TELEPHONE (OUTPATIENT)
Dept: HEMATOLOGY ONCOLOGY | Facility: MEDICAL CENTER | Age: 77
End: 2022-04-26

## 2022-04-26 DIAGNOSIS — D50.9 IRON DEFICIENCY ANEMIA, UNSPECIFIED IRON DEFICIENCY ANEMIA TYPE: Primary | ICD-10-CM

## 2022-04-26 LAB
BASOPHILS # BLD AUTO: 0.1 X10E3/UL (ref 0–0.2)
BASOPHILS NFR BLD AUTO: 1 %
EOSINOPHIL # BLD AUTO: 0.2 X10E3/UL (ref 0–0.4)
EOSINOPHIL NFR BLD AUTO: 2 %
ERYTHROCYTE [DISTWIDTH] IN BLOOD BY AUTOMATED COUNT: 12.5 % (ref 11.7–15.4)
FERRITIN SERPL-MCNC: 13 NG/ML (ref 15–150)
HCT VFR BLD AUTO: 30.7 % (ref 34–46.6)
HGB BLD-MCNC: 9.9 G/DL (ref 11.1–15.9)
IMM GRANULOCYTES # BLD: 0 X10E3/UL (ref 0–0.1)
IMM GRANULOCYTES NFR BLD: 0 %
IRON SATN MFR SERPL: 8 % (ref 15–55)
IRON SERPL-MCNC: 30 UG/DL (ref 27–139)
LYMPHOCYTES # BLD AUTO: 1.8 X10E3/UL (ref 0.7–3.1)
LYMPHOCYTES NFR BLD AUTO: 19 %
MCH RBC QN AUTO: 29.4 PG (ref 26.6–33)
MCHC RBC AUTO-ENTMCNC: 32.2 G/DL (ref 31.5–35.7)
MCV RBC AUTO: 91 FL (ref 79–97)
MONOCYTES # BLD AUTO: 0.6 X10E3/UL (ref 0.1–0.9)
MONOCYTES NFR BLD AUTO: 6 %
NEUTROPHILS # BLD AUTO: 6.8 X10E3/UL (ref 1.4–7)
NEUTROPHILS NFR BLD AUTO: 72 %
PLATELET # BLD AUTO: 489 X10E3/UL (ref 150–450)
RBC # BLD AUTO: 3.37 X10E6/UL (ref 3.77–5.28)
TIBC SERPL-MCNC: 384 UG/DL (ref 250–450)
UIBC SERPL-MCNC: 354 UG/DL (ref 118–369)
WBC # BLD AUTO: 9.5 X10E3/UL (ref 3.4–10.8)

## 2022-04-26 NOTE — TELEPHONE ENCOUNTER
Left another voicemail for patient  Her emergency contact son Tay Bolden did not answer phone and does not accept voicemails  Will try patient again tomorrow

## 2022-04-26 NOTE — TELEPHONE ENCOUNTER
----- Message from Eliza Rodriges PA-C sent at 4/26/2022  8:27 AM EDT -----  Pt looks symptomatic from note on 4/14--    Please call pat regarding low hemoglobin + fatigue      REcommend IV IRON, fecal occult testing, U/A  ----- Message -----  From: Interface, [facility] Lab Results In  Sent: 4/26/2022   5:05 AM EDT  To: Eliza Rodriges PA-C    Left voicemail for patient to call my TEAMS number to discuss status    FYI to PA

## 2022-04-27 ENCOUNTER — TELEPHONE (OUTPATIENT)
Dept: HEMATOLOGY ONCOLOGY | Facility: CLINIC | Age: 77
End: 2022-04-27

## 2022-04-27 DIAGNOSIS — D50.9 IRON DEFICIENCY ANEMIA, UNSPECIFIED IRON DEFICIENCY ANEMIA TYPE: Primary | ICD-10-CM

## 2022-04-27 RX ORDER — SODIUM CHLORIDE 9 MG/ML
20 INJECTION, SOLUTION INTRAVENOUS ONCE
Status: CANCELLED | OUTPATIENT
Start: 2022-05-12

## 2022-04-27 RX ORDER — SODIUM CHLORIDE 9 MG/ML
20 INJECTION, SOLUTION INTRAVENOUS ONCE
Status: CANCELLED | OUTPATIENT
Start: 2022-05-09

## 2022-04-27 NOTE — TELEPHONE ENCOUNTER
Spoke with patient  She states her activity tolerance is "fine"  Explained need for testing for U/A and fecal occult blood testing  Patient will go to [facility] [address]  Will have [facility] team fax script  Patient would like to start IV iron after appt with PA on 5/12/2022  Will set up appts

## 2022-04-29 ENCOUNTER — TELEPHONE (OUTPATIENT)
Dept: HEMATOLOGY ONCOLOGY | Facility: CLINIC | Age: 77
End: 2022-04-29

## 2022-05-01 DIAGNOSIS — I10 ESSENTIAL HYPERTENSION: ICD-10-CM

## 2022-05-02 RX ORDER — QUINAPRIL 40 MG/1
TABLET ORAL
Qty: 90 TABLET | Refills: 3 | Status: SHIPPED | OUTPATIENT
Start: 2022-05-02

## 2022-05-09 LAB
BACTERIA URNS QL MICRO: ABNORMAL
BASOPHILS # BLD AUTO: 0 X10E3/UL (ref 0–0.2)
BASOPHILS NFR BLD AUTO: 1 %
CASTS URNS QL MICRO: ABNORMAL /LPF
EOSINOPHIL # BLD AUTO: 0.3 X10E3/UL (ref 0–0.4)
EOSINOPHIL NFR BLD AUTO: 4 %
EPI CELLS #/AREA URNS HPF: ABNORMAL /HPF (ref 0–10)
ERYTHROCYTE [DISTWIDTH] IN BLOOD BY AUTOMATED COUNT: 12.5 % (ref 11.7–15.4)
FERRITIN SERPL-MCNC: 10 NG/ML (ref 15–150)
HCT VFR BLD AUTO: 30 % (ref 34–46.6)
HGB BLD-MCNC: 9.4 G/DL (ref 11.1–15.9)
IMM GRANULOCYTES # BLD: 0 X10E3/UL (ref 0–0.1)
IMM GRANULOCYTES NFR BLD: 1 %
IRON SATN MFR SERPL: 6 % (ref 15–55)
IRON SERPL-MCNC: 23 UG/DL (ref 27–139)
LYMPHOCYTES # BLD AUTO: 2 X10E3/UL (ref 0.7–3.1)
LYMPHOCYTES NFR BLD AUTO: 25 %
MCH RBC QN AUTO: 28.5 PG (ref 26.6–33)
MCHC RBC AUTO-ENTMCNC: 31.3 G/DL (ref 31.5–35.7)
MCV RBC AUTO: 91 FL (ref 79–97)
MONOCYTES # BLD AUTO: 0.8 X10E3/UL (ref 0.1–0.9)
MONOCYTES NFR BLD AUTO: 10 %
NEUTROPHILS # BLD AUTO: 5 X10E3/UL (ref 1.4–7)
NEUTROPHILS NFR BLD AUTO: 59 %
PLATELET # BLD AUTO: 493 X10E3/UL (ref 150–450)
RBC # BLD AUTO: 3.3 X10E6/UL (ref 3.77–5.28)
RBC #/AREA URNS HPF: ABNORMAL /HPF (ref 0–2)
TIBC SERPL-MCNC: 366 UG/DL (ref 250–450)
UIBC SERPL-MCNC: 343 UG/DL (ref 118–369)
WBC # BLD AUTO: 8.1 X10E3/UL (ref 3.4–10.8)
WBC #/AREA URNS HPF: ABNORMAL /HPF (ref 0–5)

## 2022-05-10 ENCOUNTER — TELEPHONE (OUTPATIENT)
Dept: HEMATOLOGY ONCOLOGY | Facility: MEDICAL CENTER | Age: 77
End: 2022-05-10

## 2022-05-10 DIAGNOSIS — N39.0 UTI (URINARY TRACT INFECTION): Primary | ICD-10-CM

## 2022-05-10 LAB
APPEARANCE UR: CLEAR
BACTERIA UR CULT: ABNORMAL
BILIRUB UR QL STRIP: NEGATIVE
COLOR UR: YELLOW
GLUCOSE UR QL: NEGATIVE
HGB UR QL STRIP: NEGATIVE
KETONES UR QL STRIP: NEGATIVE
LEUKOCYTE ESTERASE UR QL STRIP: ABNORMAL
Lab: ABNORMAL
MICRO URNS: ABNORMAL
NITRITE UR QL STRIP: NEGATIVE
PH UR STRIP: 7.5 [PH] (ref 5–7.5)
PROT UR QL STRIP: NEGATIVE
SL AMB ANTIMICROBIAL SUSCEPTIBILITY: ABNORMAL
SL AMB URINALYSIS REFLEX: ABNORMAL
SP GR UR: 1.02 (ref 1–1.03)
UROBILINOGEN UR STRIP-ACNC: 0.2 MG/DL (ref 0.2–1)

## 2022-05-10 RX ORDER — CIPROFLOXACIN 500 MG/1
500 TABLET, FILM COATED ORAL EVERY 12 HOURS SCHEDULED
Qty: 10 TABLET | Refills: 0 | Status: SHIPPED | OUTPATIENT
Start: 2022-05-10 | End: 2022-05-15

## 2022-05-10 NOTE — TELEPHONE ENCOUNTER
----- Message from Oli Ortiz PA-C sent at 5/10/2022 10:07 AM EDT -----  + ecoli in Urine     Cipro 500 mg BID x 5 days    Call and notify pt and make sure pharmacy is accurate  ----- Message -----  From: [facility] Lab Results In  Sent: 5/9/2022  12:05 AM EDT  To: Oli Ortiz PA-C    Left voicemail for patient to call my TEAMS number to discuss results of U/A and interventions

## 2022-05-11 ENCOUNTER — TELEPHONE (OUTPATIENT)
Dept: HEMATOLOGY ONCOLOGY | Facility: MEDICAL CENTER | Age: 77
End: 2022-05-11

## 2022-05-11 NOTE — TELEPHONE ENCOUNTER
Feraheme denied  Venofer is approved   [place] plan changed and Amanda informed  Left voicemail for patient to call my TEAMS number to discuss change    FYI to [facility] infusion team

## 2022-05-12 ENCOUNTER — HOSPITAL ENCOUNTER (OUTPATIENT)
Dept: INFUSION CENTER | Facility: HOSPITAL | Age: 77
Discharge: HOME | End: 2022-05-12
Attending: INTERNAL MEDICINE
Payer: COMMERCIAL

## 2022-05-12 ENCOUNTER — HOSPITAL ENCOUNTER (OUTPATIENT)
Dept: INFUSION CENTER | Facility: HOSPITAL | Age: 77
Discharge: HOME | End: 2022-05-12
Attending: INTERNAL MEDICINE

## 2022-05-12 ENCOUNTER — OFFICE VISIT (OUTPATIENT)
Dept: HEMATOLOGY ONCOLOGY | Facility: MEDICAL CENTER | Age: 77
End: 2022-05-12
Payer: COMMERCIAL

## 2022-05-12 VITALS
TEMPERATURE: 97.8 F | OXYGEN SATURATION: 93 % | WEIGHT: 130.4 LBS | BODY MASS INDEX: 25.47 KG/M2 | HEART RATE: 94 BPM | RESPIRATION RATE: 20 BRPM | DIASTOLIC BLOOD PRESSURE: 64 MMHG | SYSTOLIC BLOOD PRESSURE: 136 MMHG

## 2022-05-12 VITALS
RESPIRATION RATE: 18 BRPM | DIASTOLIC BLOOD PRESSURE: 58 MMHG | TEMPERATURE: 97.1 F | SYSTOLIC BLOOD PRESSURE: 117 MMHG | HEART RATE: 79 BPM | OXYGEN SATURATION: 97 %

## 2022-05-12 DIAGNOSIS — D75.838 REACTIVE THROMBOCYTOSIS: ICD-10-CM

## 2022-05-12 DIAGNOSIS — D50.9 IRON DEFICIENCY ANEMIA, UNSPECIFIED IRON DEFICIENCY ANEMIA TYPE: Primary | ICD-10-CM

## 2022-05-12 DIAGNOSIS — R91.1 SOLITARY PULMONARY NODULE: ICD-10-CM

## 2022-05-12 DIAGNOSIS — N30.00 ACUTE CYSTITIS WITHOUT HEMATURIA: ICD-10-CM

## 2022-05-12 PROCEDURE — 3075F SYST BP GE 130 - 139MM HG: CPT | Performed by: PHYSICIAN ASSISTANT

## 2022-05-12 PROCEDURE — 96365 THER/PROPH/DIAG IV INF INIT: CPT

## 2022-05-12 PROCEDURE — 1160F RVW MEDS BY RX/DR IN RCRD: CPT | Performed by: PHYSICIAN ASSISTANT

## 2022-05-12 PROCEDURE — 3078F DIAST BP <80 MM HG: CPT | Performed by: PHYSICIAN ASSISTANT

## 2022-05-12 PROCEDURE — 4004F PT TOBACCO SCREEN RCVD TLK: CPT | Performed by: PHYSICIAN ASSISTANT

## 2022-05-12 PROCEDURE — 99215 OFFICE O/P EST HI 40 MIN: CPT | Performed by: PHYSICIAN ASSISTANT

## 2022-05-12 RX ORDER — SODIUM CHLORIDE 9 MG/ML
20 INJECTION, SOLUTION INTRAVENOUS ONCE
Status: CANCELLED | OUTPATIENT
Start: 2022-05-19

## 2022-05-12 RX ORDER — SODIUM CHLORIDE 9 MG/ML
20 INJECTION, SOLUTION INTRAVENOUS ONCE
Status: COMPLETED | OUTPATIENT
Start: 2022-05-12 | End: 2022-05-12

## 2022-05-12 RX ADMIN — IRON SUCROSE 200 MG: 20 INJECTION, SOLUTION INTRAVENOUS at 11:24

## 2022-05-12 RX ADMIN — SODIUM CHLORIDE 20 ML/HR: 0.9 INJECTION, SOLUTION INTRAVENOUS at 11:22

## 2022-05-12 NOTE — PROGRESS NOTES
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  Previous endoscopy workup did not demonstrate any cancers origin in 2019  Patient has had an additional episode of iron deficiency anemia this time with thrombocytosis  Patient is still taking oral iron and ferritin continues to drop  Obviously patient is not getting enough with this modality  Patient has opted to undergo IV iron supplementation  Feraheme was not covered and there for the patient will undergo Venofer  Patient is going to head over to the infusion center now for her 1st infusion  We discussed potential side effects which include but are not limited to IV site reactions, tattooing, hypotension, arthralgias, headache, nausea, and anaphylaxis  If patient experiences any side effects they are to call the office to discuss premedications are necessary for subsequent dosing  Regimen:  Venofer 200 mg IV weekly x5 doses  AFTER  Take oral iron PO 65mg elemental qod  Patient will follow-up in three months with blood work prior  Fecal occult study to be dropped off during one of the weekly appointments to the outpatient lab  - Iron Panel (Includes Ferritin, Iron Sat%, Iron, and TIBC); Future  - CBC and differential; Future    3  Acute cystitis without hematuria  Patient was asymptomatic has completed antibiotic therapy  No blood was found in her urine  Patient is to contact PCP for further workup  4  Solitary pulmonary nodule  Patient's CT is scheduled in December with follow-up with primary care doctor who is managing this      The patient is scheduled for follow-up in approximately 3 months  Patient voiced agreement and understanding to the above  Patient advised to call the Hematology/Oncology office with any questions and concerns regarding the above  Barrier(s) to care: None  The patient is  able to self care  Yoon Gould PA-C  Medical Oncology/Hematology  [address]    Subjective     Chief Complaint   Patient presents with    Follow-up     Normocytic anemia       History of present illness: This is a 64year old female with past medical history of diabetes well controlled, GERD on Protonix therapy, hypertension, hyperlipidemia who was found to be iron deficient in January 2020 when hospitalized after having a stroke      Patient was hospitalized after having altered mental status changes   Patient was back to mental status baseline after 16 hours   On admission, the patient had a hemoglobin of 7 3 grams/deciliter and was very microcytic with high RDW   Patient was transfused with 2 units of packed red blood cells in the emergency room   Patient also underwent GI consultation and had a colonoscopy as well as an endoscopy after discharge that did not demonstrate etiology of iron deficiency   Iron studies in January 2020 demonstrated iron saturation of 3% with ferritin of 6 ng/dL      At discharge patient was started on oral iron supplementation twice a day   Patient initially had a lot of constipation but after being on it for some time, this seemed to even now   Patient continue to have follow-up with GI physicians   Follow-up with fecal occult blood test was negative   Patient had a urinalysis completed during hospitalization in January 2020 but follow-up urinalysis has not been completed   Patient denies blood-loss vaginally      In Sept and Oct 2020, patient underwent treatment with IV Venofer 200 mg IV   Patient notes some issues with obtaining venous access but otherwise treatments went well      Follow-up blood work on 11/27 demonstrated hemoglobin of 11 9, platelet count of 829, ferritin of 246, TIBC normalized at 262         2/26/2021:  Ferritin = 142,   TIBC = 284, iron saturation 29%, hemoglobin=  11 9, platelet count 842, MCV 96, RDW 12 5      6/3/2021 Ferritin = 24,   TIBC = 326, iron saturation 20%, hemoglobin=  11 5, platelet count 683, MCV 94, RDW 12 6      7/26/2021: Benign pathology of Thyroid aspiration       10/4/2021:  WBC =5 7, Hemoglobin = 10 0, MCV = 91, platelet = 696, TIBC = 341, iron saturation = 10%    5/7/22 ferritin = 10, hemoglobin = 9 4, platelet count = 826 TIBC = 366    Interval history:  Patient is feeling well good exercise tolerance with taking the laundry up and down the stairs  Patient has problems with driving secondary to one shared car between her and her adult son  Patient notes that she has been noncompliant for stool sample and states that she will try to get this done before her follow-up appointment  We discussed the need for this considering that we are not 696% certain where her blood-loss anemia is coming from/iron deficiency anemia coming from  Patient is still taking oral iron daily  Review of Systems   Constitutional: Positive for fatigue  Negative for appetite change, fever and unexpected weight change  Diaphoresis: mild  HENT: Negative for nosebleeds  Respiratory: Negative for cough, choking and shortness of breath  Negative hemoptysis  Cardiovascular: Negative for chest pain, palpitations and leg swelling  Gastrointestinal: Negative  Negative for abdominal distention, abdominal pain, anal bleeding, blood in stool, constipation, diarrhea, nausea and vomiting  Endocrine: Negative  Negative for cold intolerance  Genitourinary: Negative  Negative for hematuria, menstrual problem, vaginal bleeding, vaginal discharge and vaginal pain  Musculoskeletal: Negative  Negative for arthralgias, myalgias, neck pain and neck stiffness  Skin: Negative  Negative for color change, pallor and rash  Allergic/Immunologic: Negative  Negative for immunocompromised state  Neurological: Negative  Negative for weakness and headaches  Hematological: Negative for adenopathy  Does not bruise/bleed easily  All other systems reviewed and are negative  Patient Active Problem List   Diagnosis    Combined forms of age-related cataract of left eye    Type 2 diabetes mellitus with diabetic polyneuropathy ([address] )    Hyperlipidemia    Personal history of transient ischemic attack (TIA), and cerebral infarction without residual deficits    Family history of tobacco abuse    Tobacco abuse    Arthritis    Essential hypertension    GERD (gastroesophageal reflux disease)    Iron deficiency anemia    Type 2 diabetes mellitus with stage 3a chronic kidney disease and hypertension ([address] )    CVA (cerebral vascular accident) ([address] )    Nodule of upper lobe of right lung    Thyroid nodule greater than or equal to 1 5 cm in diameter incidentally noted on imaging study    Troponin level elevated    Vertebral artery stenosis, bilateral     Past Medical History:   Diagnosis Date    Anemia     Arthritis     Capsulitis of foot     Last assessed 07/29/13    Diabetes ([address] )     type 2    Full dentures     Ganglion     Last assessed 07/29/13    Hearing decreased     Hyperlipidemia     Hypertension     Magnesium deficiency     Sciatica     Wears glasses      Past Surgical History:   Procedure Laterality Date    CATARACT EXTRACTION      COLONOSCOPY      DILATION AND CURETTAGE OF UTERUS      x 4    HAND SURGERY Left     fx repaired w/wrist bone    HYSTERECTOMY      left ovary remains    KNEE ARTHROSCOPY Left     LIPOMA RESECTION      removed from back and left buttock    CO XCAPSL CTRC RMVL INSJ IO LENS PROSTH W/O ECP Right 1/24/2019    Procedure: EXTRACTION EXTRACAPSULAR CATARACT PHACO INTRAOCULAR LENS (IOL);   Surgeon: Joeann Cogan, MD;  Location: [facility] MAIN OR; Service: Ophthalmology    VT XCAPSL CTRC RMVL INSJ IO LENS PROSTH W/O ECP Left 2019    Procedure: EXTRACTION EXTRACAPSULAR CATARACT PHACO INTRAOCULAR LENS (IOL); Surgeon: Ladan Ogden MD;  Location: [facility] MAIN OR;  Service: Ophthalmology    TONSILLECTOMY      and adenoids    TUBAL LIGATION      US GUIDED THYROID BIOPSY  2021     Family History   Problem Relation Age of Onset    Leukemia Father     Early death Father 39        leukemia    Stomach cancer Maternal Grandfather     Diabetes Paternal Grandfather     Diabetes Sister         insulin dependent    Diabetes Brother         insulin    Diabetes Sister         insulin    Diabetes Sister         insulin     Social History     Socioeconomic History    Marital status:      Spouse name: None    Number of children: None    Years of education: None    Highest education level: None   Occupational History    None   Tobacco Use    Smoking status: Current Every Day Smoker     Packs/day: 0 25     Years: 35 00     Pack years: 8 75     Types: Cigarettes     Last attempt to quit: 2021     Years since quittin 9    Smokeless tobacco: Never Used   Vaping Use    Vaping Use: Never used   Substance and Sexual Activity    Alcohol use: Not Currently     Comment: Rarely    Drug use: Never    Sexual activity: Not Currently   Other Topics Concern    None   Social History Narrative    None     Social Determinants of Health     Financial Resource Strain: Low Risk     Difficulty of Paying Living Expenses: Not hard at all   Food Insecurity: No Food Insecurity    Worried About Running Out of Food in the Last Year: Never true    Yadi of Food in the Last Year: Never true   Transportation Needs: No Transportation Needs    Lack of Transportation (Medical): No    Lack of Transportation (Non-Medical):  No   Physical Activity: Inactive    Days of Exercise per Week: 0 days    Minutes of Exercise per Session: 0 min   Stress: No Stress Concern Present    Feeling of Stress :  Only a little   Social Connections: Socially Isolated    Frequency of Communication with Friends and Family: More than three times a week    Frequency of Social Gatherings with Friends and Family: More than three times a week    Attends Restorationism Services: Never    Active Member of Clubs or Organizations: No    Attends Club or Organization Meetings: Never    Marital Status:    Intimate Partner Violence: Not At Risk    Fear of Current or Ex-Partner: No    Emotionally Abused: No    Physically Abused: No    Sexually Abused: No   Housing Stability: Not on file       Current Outpatient Medications:     atorvastatin (LIPITOR) 80 mg tablet, TAKE 1 TABLET BY MOUTH IN  THE EVENING, Disp: 90 tablet, Rfl: 3    ciprofloxacin (CIPRO) 500 mg tablet, Take 1 tablet (500 mg total) by mouth every 12 (twelve) hours for 5 days, Disp: 10 tablet, Rfl: 0    clopidogrel (PLAVIX) 75 mg tablet, TAKE 1 TABLET BY MOUTH  DAILY, Disp: 90 tablet, Rfl: 3    ferrous sulfate 324 (65 Fe) mg, TAKE 1 TABLET (324 MG TOTAL) BY MOUTH 2 (TWO) TIMES A DAY BEFORE MEALS, Disp: 180 tablet, Rfl: 1    glimepiride (AMARYL) 2 mg tablet, TAKE 1 TABLET BY MOUTH  DAILY WITH BREAKFAST, Disp: 90 tablet, Rfl: 3    magnesium oxide (MAG-OX) 400 mg tablet, TAKE 1 TABLET BY MOUTH TWICE A DAY, Disp: 180 tablet, Rfl: 3    metFORMIN (GLUCOPHAGE) 1000 MG tablet, TAKE 1 TABLET BY MOUTH  TWICE DAILY, Disp: 180 tablet, Rfl: 3    pantoprazole (PROTONIX) 40 mg tablet, TAKE 1 TABLET BY MOUTH EVERY DAY, Disp: 90 tablet, Rfl: 1    quinapril (ACCUPRIL) 40 MG tablet, TAKE 1 TABLET BY MOUTH  DAILY AT BEDTIME, Disp: 90 tablet, Rfl: 3  Allergies   Allergen Reactions    Aleve [Naproxen] Other (See Comments)     "weird" sensation entire body    Sulfa Antibiotics Rash       Objective   /64 (BP Location: Left arm, Patient Position: Sitting, Cuff Size: Adult)   Pulse 94   Temp 97 8 °F (36 6 °C) (Temporal)   Resp 20 Wt 59 1 kg (130 lb 6 4 oz)   LMP  (LMP Unknown)   SpO2 93%   BMI 25 47 kg/m²    Physical Exam  Constitutional:       General: She is not in acute distress  Appearance: She is well-developed  HENT:      Head: Normocephalic and atraumatic  Mouth/Throat:      Pharynx: No oropharyngeal exudate  Eyes:      General: No scleral icterus  Pupils: Pupils are equal, round, and reactive to light  Cardiovascular:      Rate and Rhythm: Normal rate and regular rhythm  Heart sounds: No murmur heard  Pulmonary:      Effort: Pulmonary effort is normal  No respiratory distress  Breath sounds: Normal breath sounds  Abdominal:      General: Bowel sounds are normal  There is no distension  Palpations: Abdomen is soft  Tenderness: There is no abdominal tenderness  Musculoskeletal:         General: Normal range of motion  Cervical back: Normal range of motion  Lymphadenopathy:      Cervical: No cervical adenopathy  Skin:     General: Skin is warm  Coloration: Skin is not pale  Findings: No rash  Neurological:      Mental Status: She is alert and oriented to person, place, and time  Cranial Nerves: No cranial nerve deficit  Psychiatric:         Thought Content:  Thought content normal          Result Review  Labs:  Orders Only on 05/07/2022   Component Date Value Ref Range Status    White Blood Cell Count 05/07/2022 8 1  3 4 - 10 8 x10E3/uL Final    Red Blood Cell Count 05/07/2022 3 30 (A) 3 77 - 5 28 x10E6/uL Final    Hemoglobin 05/07/2022 9 4 (A) 11 1 - 15 9 g/dL Final    HCT 05/07/2022 30 0 (A) 34 0 - 46 6 % Final    MCV 05/07/2022 91  79 - 97 fL Final    MCH 05/07/2022 28 5  26 6 - 33 0 pg Final    MCHC 05/07/2022 31 3 (A) 31 5 - 35 7 g/dL Final    RDW 05/07/2022 12 5  11 7 - 15 4 % Final    Platelet Count 90/36/3861 493 (A) 150 - 450 x10E3/uL Final    Neutrophils 05/07/2022 59  Not Estab  % Final    Lymphocytes 05/07/2022 25  Not Estab  % Final    Monocytes 05/07/2022 10  Not Estab  % Final    Eosinophils 05/07/2022 4  Not Estab  % Final    Basophils PCT 05/07/2022 1  Not Estab  % Final    Neutrophils (Absolute) 05/07/2022 5 0  1 4 - 7 0 x10E3/uL Final    Lymphocytes (Absolute) 05/07/2022 2 0  0 7 - 3 1 x10E3/uL Final    Monocytes (Absolute) 05/07/2022 0 8  0 1 - 0 9 x10E3/uL Final    Eosinophils (Absolute) 05/07/2022 0 3  0 0 - 0 4 x10E3/uL Final    Basophils ABS 05/07/2022 0 0  0 0 - 0 2 x10E3/uL Final    Immature Granulocytes 05/07/2022 1  Not Estab  % Final    Immature Granulocytes (Absolute) 05/07/2022 0 0  0 0 - 0 1 x10E3/uL Final    Comment: A hand-written panel/profile was received from your office  In  accordance with the [facility] Ambiguous Test Code Policy dated July 9319, we have assigned CBC with Differential/Platelet, Test Code  #[id number] to this request  If this is not the testing you wished to  receive on this specimen, please contact the [facility] Department to clarify the test order  We  appreciate your business   Specific Gravity 05/07/2022 1 017  1 005 - 1 030 Final    Ph 05/07/2022 7 5  5 0 - 7 5 Final    Color UA 05/07/2022 Yellow  Yellow Final    Urine Appearance 05/07/2022 Clear  Clear Final    Leukocyte Esterase 05/07/2022 Trace (A) Negative Final    Protein 05/07/2022 Negative  Negative/Trace Final    Glucose, 24 HR Urine 05/07/2022 Negative  Negative Final    Ketone, Urine 05/07/2022 Negative  Negative Final    Blood, Urine 05/07/2022 Negative  Negative Final    Bilirubin, Urine 05/07/2022 Negative  Negative Final    Urobilinogen Urine 05/07/2022 0 2  0 2 - 1 0 mg/dL Final    SL AMB NITRITES URINE, QUAL  05/07/2022 Negative  Negative Final    Microscopic Examination 05/07/2022 See below:   Final    Microscopic was indicated and was performed   Urinalysis Reflex 05/07/2022 Comment   Final    This specimen has reflexed to a Urine Culture      SL AMB WBC, URINE 05/07/2022 6-10 (A) 0 - 5 /hpf Final    RBC, Urine 05/07/2022 None seen  0 - 2 /hpf Final    Epithelial Cells (non renal) 05/07/2022 0-10  0 - 10 /hpf Final    Casts 05/07/2022 None seen  None seen /lpf Final    Bacteria, Urine 05/07/2022 Many (A) None seen/Few Final    Urine Culture Result 05/07/2022 Final report (A)  Corrected    Result 1 05/07/2022 Escherichia coli (A)  Corrected    Comment: Greater than 100,000 colony forming units per mL  Cefazolin <=4 ug/mL  Cefazolin with an JOSE <=16 predicts susceptibility to the oral agents  cefaclor, cefdinir, cefpodoxime, cefprozil, cefuroxime, cephalexin,  and loracarbef when used for therapy of uncomplicated urinary tract  infections due to E  coli, Klebsiella pneumoniae, and Proteus  mirabilis        SL AMB ANTIMICROBIAL SUSCEPTIBILITY 05/07/2022 Comment   Final    Comment:       ** S = Susceptible; I = Intermediate; R = Resistant **                     P = Positive; N = Negative              MICS are expressed in micrograms per mL     Antibiotic                 RSLT#1    RSLT#2    RSLT#3    RSLT#4  Amoxicillin/Clavulanic Acid    S  Ampicillin                     S  Cefepime                       S  Ceftriaxone                    S  Cefuroxime                     S  Ciprofloxacin                  S  Ertapenem                      S  Gentamicin                     S  Imipenem                       S  Levofloxacin                   S  Meropenem                      S  Nitrofurantoin                 S  Piperacillin/Tazobactam        S  Tetracycline                   S  Tobramycin                     S  Trimethoprim/Sulfa             S      Total Iron Binding Capacity (TIBC) 05/07/2022 366  250 - 450 ug/dL Final    UIBC 05/07/2022 343  118 - 369 ug/dL Final    Iron, Serum 05/07/2022 23 (A) 27 - 139 ug/dL Final    Iron Saturation 05/07/2022 6 (A) 15 - 55 % Final    Ferritin 05/07/2022 10 (A) 15 - 150 ng/mL Final   Orders Only on 04/25/2022   Component Date Value Ref Range Status  White Blood Cell Count 04/25/2022 9 5  3 4 - 10 8 x10E3/uL Final    Red Blood Cell Count 04/25/2022 3 37 (A) 3 77 - 5 28 x10E6/uL Final    Hemoglobin 04/25/2022 9 9 (A) 11 1 - 15 9 g/dL Final    HCT 04/25/2022 30 7 (A) 34 0 - 46 6 % Final    MCV 04/25/2022 91  79 - 97 fL Final    MCH 04/25/2022 29 4  26 6 - 33 0 pg Final    MCHC 04/25/2022 32 2  31 5 - 35 7 g/dL Final    RDW 04/25/2022 12 5  11 7 - 15 4 % Final    Platelet Count 29/20/7570 489 (A) 150 - 450 x10E3/uL Final    Neutrophils 04/25/2022 72  Not Estab  % Final    Lymphocytes 04/25/2022 19  Not Estab  % Final    Monocytes 04/25/2022 6  Not Estab  % Final    Eosinophils 04/25/2022 2  Not Estab  % Final    Basophils PCT 04/25/2022 1  Not Estab  % Final    Neutrophils (Absolute) 04/25/2022 6 8  1 4 - 7 0 x10E3/uL Final    Lymphocytes (Absolute) 04/25/2022 1 8  0 7 - 3 1 x10E3/uL Final    Monocytes (Absolute) 04/25/2022 0 6  0 1 - 0 9 x10E3/uL Final    Eosinophils (Absolute) 04/25/2022 0 2  0 0 - 0 4 x10E3/uL Final    Basophils ABS 04/25/2022 0 1  0 0 - 0 2 x10E3/uL Final    Immature Granulocytes 04/25/2022 0  Not Estab  % Final    Immature Granulocytes (Absolute) 04/25/2022 0 0  0 0 - 0 1 x10E3/uL Final    Comment: A hand-written panel/profile was received from your office  In  accordance with the [facility] Ambiguous Test Code Policy dated July 5090, we have assigned CBC with Differential/Platelet, Test Code  #[id number] to this request  If this is not the testing you wished to  receive on this specimen, please contact the [facility] Department to clarify the test order  We  appreciate your business        Total Iron Binding Capacity (TIBC) 04/25/2022 384  250 - 450 ug/dL Final    UIBC 04/25/2022 354  118 - 369 ug/dL Final    Iron, Serum 04/25/2022 30  27 - 139 ug/dL Final    Iron Saturation 04/25/2022 8 (A) 15 - 55 % Final    Ferritin 04/25/2022 13 (A) 15 - 150 ng/mL Final       Imaging:   CT chest wo contrast  Narrative: CT CHEST WITHOUT IV CONTRAST    INDICATION:   R91 1: Solitary pulmonary nodule  COMPARISON:  CT, dated 6/6/21  TECHNIQUE: CT examination of the chest was performed without intravenous contrast   Axial, sagittal, and coronal 2D reformatted images were created from the source data and submitted for interpretation  Radiation dose length product (DLP) for this visit:  88 mGy-cm   This examination, like all CT scans performed in the [facility], was performed utilizing techniques to minimize radiation dose exposure, including the use of iterative   reconstruction and automated exposure control  FINDINGS:    LUNGS:  3 mm left upper lobe pulmonary nodule is present (series 3, image 15)  3 mm right upper lobe pulmonary nodule is stable (series 3, image 33)  There is an additional 3 mm right upper lobe pulmonary nodule in close proximity (series 3, image 34)  Additional 3 mm right upper lobe pulmonary nodule is present (series 3, image 36)  3 mm right middle lobe pulmonary nodule is present (series 3, image 57)  3 mm left upper lobe pulmonary nodule is present (series 3, image 21)  There are no focal lung consolidations  Scattered benign granulomas are present  Mild apical predominant centrilobular emphysema is seen  PLEURA:  Unremarkable  HEART/GREAT VESSELS: Heavy atherosclerotic coronary artery calcification is noted  Heart is otherwise unremarkable  No thoracic aortic aneurysm  MEDIASTINUM AND LEVAR:  Unremarkable  CHEST WALL AND LOWER NECK:   Redemonstrated are calcified right thyroid nodules, previously biopsied in July 2021  Otherwise unremarkable  VISUALIZED STRUCTURES IN THE UPPER ABDOMEN:  Cholelithiasis is incompletely visualized  OSSEOUS STRUCTURES:  No acute fracture or destructive osseous lesion  Impression: 1   3 millimeter tiny pulmonary nodules    These can be reassessed in one year with a noncontrast chest CT if the patient is at high risk for a primary lung cancer  2   Mild apical predominant centrilobular emphysema  Workstation performed: [id number]      Please note: This report has been generated by a voice recognition software system  Therefore there may be syntax, spelling, and/or grammatical errors  Please call if you have any questions

## 2022-05-12 NOTE — PLAN OF CARE
Problem: Potential for Falls  Goal: Patient will remain free of falls  Description: INTERVENTIONS:  - Educate patient/family on patient safety including physical limitations  - Instruct patient to call for assistance with activity   - Consult OT/PT to assist with strengthening/mobility   - Keep Call bell within reach  - Keep bed low and locked with side rails adjusted as appropriate  - Keep care items and personal belongings within reach  - Initiate and maintain comfort rounds  - Make Fall Risk Sign visible to staff  - Offer Toileting every Hours, in advance of need  - Initiate/Maintain alarm  - Obtain necessary fall risk management equipment:  - Apply yellow socks and bracelet for high fall risk patients  - Consider moving patient to room near nurses station  Outcome: Progressing

## 2022-05-19 ENCOUNTER — HOSPITAL ENCOUNTER (OUTPATIENT)
Dept: INFUSION CENTER | Facility: HOSPITAL | Age: 77
Discharge: HOME | End: 2022-05-19
Attending: INTERNAL MEDICINE
Payer: COMMERCIAL

## 2022-05-19 VITALS
DIASTOLIC BLOOD PRESSURE: 59 MMHG | RESPIRATION RATE: 20 BRPM | SYSTOLIC BLOOD PRESSURE: 121 MMHG | TEMPERATURE: 96.3 F | OXYGEN SATURATION: 99 % | HEART RATE: 77 BPM

## 2022-05-19 DIAGNOSIS — D50.9 IRON DEFICIENCY ANEMIA, UNSPECIFIED IRON DEFICIENCY ANEMIA TYPE: Primary | ICD-10-CM

## 2022-05-19 PROCEDURE — 96365 THER/PROPH/DIAG IV INF INIT: CPT

## 2022-05-19 RX ORDER — SODIUM CHLORIDE 9 MG/ML
20 INJECTION, SOLUTION INTRAVENOUS ONCE
Status: COMPLETED | OUTPATIENT
Start: 2022-05-19 | End: 2022-05-19

## 2022-05-19 RX ORDER — SODIUM CHLORIDE 9 MG/ML
20 INJECTION, SOLUTION INTRAVENOUS ONCE
Status: CANCELLED | OUTPATIENT
Start: 2022-05-26

## 2022-05-19 RX ADMIN — SODIUM CHLORIDE 20 ML/HR: 9 INJECTION, SOLUTION INTRAVENOUS at 12:45

## 2022-05-19 RX ADMIN — IRON SUCROSE 200 MG: 20 INJECTION, SOLUTION INTRAVENOUS at 12:46

## 2022-05-26 ENCOUNTER — HOSPITAL ENCOUNTER (OUTPATIENT)
Dept: INFUSION CENTER | Facility: HOSPITAL | Age: 77
Discharge: HOME | End: 2022-05-26
Attending: INTERNAL MEDICINE
Payer: COMMERCIAL

## 2022-05-26 VITALS
OXYGEN SATURATION: 99 % | TEMPERATURE: 96.7 F | RESPIRATION RATE: 18 BRPM | DIASTOLIC BLOOD PRESSURE: 61 MMHG | SYSTOLIC BLOOD PRESSURE: 131 MMHG | HEART RATE: 82 BPM

## 2022-05-26 DIAGNOSIS — D50.9 IRON DEFICIENCY ANEMIA, UNSPECIFIED IRON DEFICIENCY ANEMIA TYPE: Primary | ICD-10-CM

## 2022-05-26 PROCEDURE — 96365 THER/PROPH/DIAG IV INF INIT: CPT

## 2022-05-26 RX ORDER — SODIUM CHLORIDE 9 MG/ML
20 INJECTION, SOLUTION INTRAVENOUS ONCE
Status: CANCELLED | OUTPATIENT
Start: 2022-06-02

## 2022-05-26 RX ORDER — SODIUM CHLORIDE 9 MG/ML
20 INJECTION, SOLUTION INTRAVENOUS ONCE
Status: COMPLETED | OUTPATIENT
Start: 2022-05-26 | End: 2022-05-26

## 2022-05-26 RX ADMIN — IRON SUCROSE 200 MG: 20 INJECTION, SOLUTION INTRAVENOUS at 13:05

## 2022-05-26 RX ADMIN — SODIUM CHLORIDE 20 ML/HR: 9 INJECTION, SOLUTION INTRAVENOUS at 13:05

## 2022-06-02 ENCOUNTER — HOSPITAL ENCOUNTER (OUTPATIENT)
Dept: INFUSION CENTER | Facility: HOSPITAL | Age: 77
Discharge: HOME | End: 2022-06-02
Attending: INTERNAL MEDICINE
Payer: COMMERCIAL

## 2022-06-02 VITALS
SYSTOLIC BLOOD PRESSURE: 131 MMHG | DIASTOLIC BLOOD PRESSURE: 58 MMHG | TEMPERATURE: 98 F | HEART RATE: 78 BPM | RESPIRATION RATE: 18 BRPM | OXYGEN SATURATION: 97 %

## 2022-06-02 DIAGNOSIS — D50.9 IRON DEFICIENCY ANEMIA, UNSPECIFIED IRON DEFICIENCY ANEMIA TYPE: Primary | ICD-10-CM

## 2022-06-02 PROCEDURE — 96365 THER/PROPH/DIAG IV INF INIT: CPT

## 2022-06-02 RX ORDER — SODIUM CHLORIDE 9 MG/ML
20 INJECTION, SOLUTION INTRAVENOUS ONCE
Status: CANCELLED | OUTPATIENT
Start: 2022-06-09

## 2022-06-02 RX ORDER — SODIUM CHLORIDE 9 MG/ML
20 INJECTION, SOLUTION INTRAVENOUS ONCE
Status: COMPLETED | OUTPATIENT
Start: 2022-06-02 | End: 2022-06-02

## 2022-06-02 RX ADMIN — SODIUM CHLORIDE 20 ML/HR: 0.9 INJECTION, SOLUTION INTRAVENOUS at 12:43

## 2022-06-02 RX ADMIN — IRON SUCROSE 200 MG: 20 INJECTION, SOLUTION INTRAVENOUS at 12:44

## 2022-06-02 NOTE — PLAN OF CARE
Problem: Potential for Falls  Goal: Patient will remain free of falls  Description: INTERVENTIONS:  - Educate patient/family on patient safety including physical limitations  - Instruct patient to call for assistance with activity   - Consult OT/PT to assist with strengthening/mobility   - Keep Call bell within reach    Outcome: Progressing     Problem: SAFETY ADULT  Goal: Patient will remain free of falls  Description: INTERVENTIONS:  - Educate patient/family on patient safety including physical limitations  - Instruct patient to call for assistance with activity   - Consult OT/PT to assist with strengthening/mobility   - Keep Call bell within reach    Outcome: Progressing     Problem: Knowledge Deficit  Goal: Patient/family/caregiver demonstrates understanding of disease process, treatment plan, medications, and discharge instructions  Description: Complete learning assessment and assess knowledge base    Interventions:  - Provide teaching at level of understanding  - Provide teaching via preferred learning methods  Outcome: Progressing

## 2022-06-09 ENCOUNTER — HOSPITAL ENCOUNTER (OUTPATIENT)
Dept: INFUSION CENTER | Facility: HOSPITAL | Age: 77
Discharge: HOME | End: 2022-06-09
Attending: INTERNAL MEDICINE
Payer: COMMERCIAL

## 2022-06-09 DIAGNOSIS — D50.9 IRON DEFICIENCY ANEMIA, UNSPECIFIED IRON DEFICIENCY ANEMIA TYPE: Primary | ICD-10-CM

## 2022-06-09 PROCEDURE — 96365 THER/PROPH/DIAG IV INF INIT: CPT

## 2022-06-09 RX ORDER — SODIUM CHLORIDE 9 MG/ML
20 INJECTION, SOLUTION INTRAVENOUS ONCE
Status: CANCELLED | OUTPATIENT
Start: 2022-06-09

## 2022-06-09 RX ORDER — SODIUM CHLORIDE 9 MG/ML
20 INJECTION, SOLUTION INTRAVENOUS ONCE
Status: COMPLETED | OUTPATIENT
Start: 2022-06-09 | End: 2022-06-09

## 2022-06-09 RX ADMIN — SODIUM CHLORIDE 20 ML/HR: 0.9 INJECTION, SOLUTION INTRAVENOUS at 12:56

## 2022-06-09 RX ADMIN — IRON SUCROSE 200 MG: 20 INJECTION, SOLUTION INTRAVENOUS at 12:56

## 2022-07-03 DIAGNOSIS — Z76.0 MEDICATION REFILL: ICD-10-CM

## 2022-07-05 RX ORDER — LANOLIN ALCOHOL/MO/W.PET/CERES
CREAM (GRAM) TOPICAL
Qty: 180 TABLET | Refills: 0 | Status: SHIPPED | OUTPATIENT
Start: 2022-07-05 | End: 2022-10-11 | Stop reason: SDUPTHER

## 2022-07-10 DIAGNOSIS — D50.8 OTHER IRON DEFICIENCY ANEMIA: ICD-10-CM

## 2022-07-11 RX ORDER — FERROUS SULFATE TAB EC 324 MG (65 MG FE EQUIVALENT) 324 (65 FE) MG
TABLET DELAYED RESPONSE ORAL
Qty: 180 TABLET | Refills: 2 | Status: SHIPPED | OUTPATIENT
Start: 2022-07-11 | End: 2022-09-12

## 2022-07-26 ENCOUNTER — DOCUMENTATION (OUTPATIENT)
Dept: HEMATOLOGY ONCOLOGY | Facility: MEDICAL CENTER | Age: 77
End: 2022-07-26

## 2022-07-26 NOTE — PROGRESS NOTES
Spoke with patient and provided her with her new date and time to see Wayne Frankel on 9/29/22 at 10:30am originally was 8/15/22 at 11am

## 2022-07-28 DIAGNOSIS — K21.9 GASTROESOPHAGEAL REFLUX DISEASE WITHOUT ESOPHAGITIS: ICD-10-CM

## 2022-07-28 RX ORDER — PANTOPRAZOLE SODIUM 40 MG/1
40 TABLET, DELAYED RELEASE ORAL DAILY
Qty: 90 TABLET | Refills: 1 | Status: SHIPPED | OUTPATIENT
Start: 2022-07-28

## 2022-08-04 ENCOUNTER — TELEPHONE (OUTPATIENT)
Dept: NEPHROLOGY | Facility: CLINIC | Age: 77
End: 2022-08-04

## 2022-09-02 DIAGNOSIS — I63.9 STROKE (CEREBRUM) (HCC): ICD-10-CM

## 2022-09-02 DIAGNOSIS — Z79.4 TYPE 2 DIABETES MELLITUS WITH DIABETIC POLYNEUROPATHY, WITH LONG-TERM CURRENT USE OF INSULIN (HCC): ICD-10-CM

## 2022-09-02 DIAGNOSIS — E11.42 TYPE 2 DIABETES MELLITUS WITH DIABETIC POLYNEUROPATHY, WITH LONG-TERM CURRENT USE OF INSULIN (HCC): ICD-10-CM

## 2022-09-06 RX ORDER — ATORVASTATIN CALCIUM 80 MG/1
TABLET, FILM COATED ORAL
Qty: 90 TABLET | Refills: 3 | Status: SHIPPED | OUTPATIENT
Start: 2022-09-06

## 2022-09-07 ENCOUNTER — RA CDI HCC (OUTPATIENT)
Dept: OTHER | Facility: HOSPITAL | Age: 77
End: 2022-09-07

## 2022-09-07 NOTE — PROGRESS NOTES
[address]  coding opportunities       E11 22  Chart Reviewed number of suggestions sent to Provider: 1     Patients Insurance     Medicare Insurance: [address] [place] [address]

## 2022-09-12 ENCOUNTER — OFFICE VISIT (OUTPATIENT)
Dept: FAMILY MEDICINE CLINIC | Facility: CLINIC | Age: 77
End: 2022-09-12
Payer: COMMERCIAL

## 2022-09-12 VITALS
TEMPERATURE: 97.3 F | HEIGHT: 60 IN | SYSTOLIC BLOOD PRESSURE: 102 MMHG | RESPIRATION RATE: 16 BRPM | OXYGEN SATURATION: 93 % | DIASTOLIC BLOOD PRESSURE: 52 MMHG | BODY MASS INDEX: 23.36 KG/M2 | WEIGHT: 119 LBS | HEART RATE: 53 BPM

## 2022-09-12 DIAGNOSIS — Z78.0 POST-MENOPAUSAL: ICD-10-CM

## 2022-09-12 DIAGNOSIS — D50.8 OTHER IRON DEFICIENCY ANEMIA: ICD-10-CM

## 2022-09-12 DIAGNOSIS — E11.42 TYPE 2 DIABETES MELLITUS WITH DIABETIC POLYNEUROPATHY, WITHOUT LONG-TERM CURRENT USE OF INSULIN (HCC): Primary | ICD-10-CM

## 2022-09-12 DIAGNOSIS — I10 ESSENTIAL HYPERTENSION: Chronic | ICD-10-CM

## 2022-09-12 DIAGNOSIS — E11.42 TYPE 2 DIABETES MELLITUS WITH DIABETIC POLYNEUROPATHY, WITHOUT LONG-TERM CURRENT USE OF INSULIN (HCC): ICD-10-CM

## 2022-09-12 DIAGNOSIS — D47.3 ESSENTIAL (HEMORRHAGIC) THROMBOCYTHEMIA (HCC): ICD-10-CM

## 2022-09-12 LAB — SL AMB POCT HEMOGLOBIN AIC: 5.6 (ref ?–6.5)

## 2022-09-12 PROCEDURE — 1160F RVW MEDS BY RX/DR IN RCRD: CPT | Performed by: FAMILY MEDICINE

## 2022-09-12 PROCEDURE — 3288F FALL RISK ASSESSMENT DOCD: CPT | Performed by: FAMILY MEDICINE

## 2022-09-12 PROCEDURE — 83036 HEMOGLOBIN GLYCOSYLATED A1C: CPT | Performed by: FAMILY MEDICINE

## 2022-09-12 PROCEDURE — 99214 OFFICE O/P EST MOD 30 MIN: CPT | Performed by: FAMILY MEDICINE

## 2022-09-12 PROCEDURE — 1101F PT FALLS ASSESS-DOCD LE1/YR: CPT | Performed by: FAMILY MEDICINE

## 2022-09-12 PROCEDURE — 3078F DIAST BP <80 MM HG: CPT | Performed by: FAMILY MEDICINE

## 2022-09-12 PROCEDURE — 3725F SCREEN DEPRESSION PERFORMED: CPT | Performed by: FAMILY MEDICINE

## 2022-09-12 PROCEDURE — 3074F SYST BP LT 130 MM HG: CPT | Performed by: FAMILY MEDICINE

## 2022-09-12 RX ORDER — GLIMEPIRIDE 1 MG/1
1 TABLET ORAL
Qty: 90 TABLET | Refills: 1 | Status: SHIPPED | OUTPATIENT
Start: 2022-09-12

## 2022-09-12 RX ORDER — FERROUS SULFATE TAB EC 324 MG (65 MG FE EQUIVALENT) 324 (65 FE) MG
TABLET DELAYED RESPONSE ORAL
Qty: 180 TABLET | Refills: 2 | Status: SHIPPED | OUTPATIENT
Start: 2022-09-12

## 2022-09-12 NOTE — PATIENT INSTRUCTIONS

## 2022-09-15 LAB
BASOPHILS # BLD AUTO: 0 X10E3/UL (ref 0–0.2)
BASOPHILS NFR BLD AUTO: 1 %
EOSINOPHIL # BLD AUTO: 0.2 X10E3/UL (ref 0–0.4)
EOSINOPHIL NFR BLD AUTO: 2 %
ERYTHROCYTE [DISTWIDTH] IN BLOOD BY AUTOMATED COUNT: 14.4 % (ref 11.7–15.4)
HCT VFR BLD AUTO: 29.1 % (ref 34–46.6)
HGB BLD-MCNC: 9.3 G/DL (ref 11.1–15.9)
IMM GRANULOCYTES # BLD: 0 X10E3/UL (ref 0–0.1)
IMM GRANULOCYTES NFR BLD: 0 %
LYMPHOCYTES # BLD AUTO: 1.6 X10E3/UL (ref 0.7–3.1)
LYMPHOCYTES NFR BLD AUTO: 18 %
MCH RBC QN AUTO: 32.3 PG (ref 26.6–33)
MCHC RBC AUTO-ENTMCNC: 32 G/DL (ref 31.5–35.7)
MCV RBC AUTO: 101 FL (ref 79–97)
MONOCYTES # BLD AUTO: 0.7 X10E3/UL (ref 0.1–0.9)
MONOCYTES NFR BLD AUTO: 8 %
NEUTROPHILS # BLD AUTO: 6.3 X10E3/UL (ref 1.4–7)
NEUTROPHILS NFR BLD AUTO: 71 %
PLATELET # BLD AUTO: 473 X10E3/UL (ref 150–450)
RBC # BLD AUTO: 2.88 X10E6/UL (ref 3.77–5.28)
WBC # BLD AUTO: 8.9 X10E3/UL (ref 3.4–10.8)

## 2022-09-16 LAB
FERRITIN SERPL-MCNC: 62 NG/ML (ref 15–150)
IRON SATN MFR SERPL: 17 % (ref 15–55)
IRON SERPL-MCNC: 49 UG/DL (ref 27–139)
TIBC SERPL-MCNC: 294 UG/DL (ref 250–450)
UIBC SERPL-MCNC: 245 UG/DL (ref 118–369)

## 2022-09-28 ENCOUNTER — TELEPHONE (OUTPATIENT)
Dept: HEMATOLOGY ONCOLOGY | Facility: CLINIC | Age: 77
End: 2022-09-28

## 2022-09-28 NOTE — TELEPHONE ENCOUNTER
Appointment Cancellation Or Reschedule     Person calling in Patient    Provider THE [facility] Visit Date and Time  9/29/22   Office Visit Location [place]   Did patient want to reschedule their office appointment? If so, when was it scheduled to? Yes 10/3/22 3pm   Did you have STAR scheduled for this appointment? no   Do you need STAR set up for your new appointment? If yes, please send to "PATIENT RIDESHARE" pool for STAR rescheduling no   If you are cancelling appointment, can we notify STAR to cancel ride? If yes, please send to "PATIENT RIDESHARE" pool for STAR to cancel service no   Is this patient calling to reschedule an infusion appointment? no   When is their next infusion appointment? no   Is this patient a Chemo patient? no   Reason for Cancellation or Reschedule Not feeling well     If the patient is a treatment patient, please route this to the office nurse  If the patient is not on treatment, please route to the office MA  If the patient is a surgical oncology patient, please route to surg/onc clinical pool

## 2022-10-03 ENCOUNTER — OFFICE VISIT (OUTPATIENT)
Dept: HEMATOLOGY ONCOLOGY | Facility: MEDICAL CENTER | Age: 77
End: 2022-10-03
Payer: COMMERCIAL

## 2022-10-03 VITALS
SYSTOLIC BLOOD PRESSURE: 126 MMHG | WEIGHT: 120 LBS | HEIGHT: 60 IN | RESPIRATION RATE: 16 BRPM | DIASTOLIC BLOOD PRESSURE: 66 MMHG | BODY MASS INDEX: 23.56 KG/M2 | HEART RATE: 78 BPM | OXYGEN SATURATION: 100 % | TEMPERATURE: 97.8 F

## 2022-10-03 DIAGNOSIS — D75.839 THROMBOCYTOSIS: ICD-10-CM

## 2022-10-03 DIAGNOSIS — D53.9 MACROCYTIC ANEMIA: Primary | ICD-10-CM

## 2022-10-03 PROCEDURE — 99214 OFFICE O/P EST MOD 30 MIN: CPT | Performed by: PHYSICIAN ASSISTANT

## 2022-10-03 NOTE — PROGRESS NOTES
[facility] [address] [address]  Hematology Ambulatory Follow-Up  Can Ramírez 1945, [id number]  10/3/2022      Assessment and Plan   This is a 78-year-old female with past medical history of iron deficiency anemia of unknown etiology 1st discovered after having a stroke in the hospital in 2020  Lower following hematologic issues:    1  Macrocytic anemia  Patient recently underwent IV iron infusions  Patient tolerated these well as and symptoms have improved significantly however hemoglobin has remained low in the 9 grams/deciliter range about the same as she was prior to being treated with IV iron  Patient now exhibits macrocytic anemia  At the time of blood work patient was significantly ill sleeping 20/24 days question acute illness causing anemia, etc   Patient will undergo repeat blood work in the next two weeks  - CBC and differential; Future  - Reticulocytes; Future  - Vitamin B12; Future  - Folate; Future  - TSH, 3rd generation with Free T4 reflex; Future    2  Thrombocytosis  Previously thrombocytosis with spot to be secondary  However, IV iron has not corrected thrombus is cytosis  Patient was sick at the time of the appointment  Repeat platelet count has been requested  If platelets do not resolve JAK2 mutation and myeloproliferative workup should be investigated considering the patient's continuation of fatigue     - CBC and differential; Future        The patient is scheduled for follow-up in approximately 3-4 months with Dr Ramy Avalos  Patient voiced agreement and understanding to the above  Patient advised to call the Hematology/Oncology office with any questions and concerns regarding the above  Barrier(s) to care: None  The patient is able to self care  Yoon Gould PA-C  Medical Oncology/Hematology  [address]    Subjective     Chief Complaint   Patient presents with    Follow-up     Iron deficiency anemia, unspecified iron deficiency anemia type       History of present illness: This is a 64year old female with past medical history of diabetes well controlled, GERD on Protonix therapy, hypertension, hyperlipidemia who was found to be iron deficient in January 2020 when hospitalized after having a stroke      Patient was hospitalized after having altered mental status changes   Patient was back to mental status baseline after 16 hours   On admission, the patient had a hemoglobin of 7 3 grams/deciliter and was very microcytic with high RDW   Patient was transfused with 2 units of packed red blood cells in the emergency room   Patient also underwent GI consultation and had a colonoscopy as well as an endoscopy after discharge that did not demonstrate etiology of iron deficiency   Iron studies in January 2020 demonstrated iron saturation of 3% with ferritin of 6 ng/dL      At discharge patient was started on oral iron supplementation twice a day   Patient initially had a lot of constipation but after being on it for some time, this seemed to even now   Patient continue to have follow-up with GI physicians   Follow-up with fecal occult blood test was negative   Patient had a urinalysis completed during hospitalization in January 2020 but follow-up urinalysis has not been completed   Patient denies blood-loss vaginally      In Sept and Oct 2020, patient underwent treatment with IV Venofer 200 mg IV   Patient notes some issues with obtaining venous access but otherwise treatments went well      Follow-up blood work on 11/27 demonstrated hemoglobin of 11 9, platelet count of 851, ferritin of 246, TIBC normalized at 262         2/26/2021:  Ferritin = 142,   TIBC = 284, iron saturation 29%, hemoglobin=  11 9, platelet count 229, MCV 96, RDW 12 5      6/3/2021 Ferritin = 24,   TIBC = 326, iron saturation 20%, hemoglobin=  11 5, platelet count 531, MCV 94, RDW 12 6      7/26/2021: Benign pathology of Thyroid aspiration       10/4/2021:  WBC =5 7, Hemoglobin = 10 0, MCV = 91, platelet = 387, UWEN = 341, iron saturation = 10%     5/7/22 ferritin = 10, hemoglobin = 9 4, platelet count = 209 TIBC = 366     9/15/22 WBC = 8 9, hemoglobin 9 3, , platelet count 703, ferritin = 62, TIBC 294    Interval history:  No major changes  Still feeling fatigued  Review of Systems   Constitutional: Positive for fatigue  Negative for appetite change, fever and unexpected weight change  HENT: Negative for nosebleeds  Respiratory: Negative for cough, choking and shortness of breath  Negative hemoptysis  Cardiovascular: Negative for chest pain, palpitations and leg swelling  Gastrointestinal: Negative for abdominal distention, abdominal pain, anal bleeding, blood in stool, constipation, diarrhea, nausea and vomiting  Endocrine: Negative  Negative for cold intolerance  Genitourinary: Negative  Negative for hematuria, menstrual problem, vaginal bleeding, vaginal discharge and vaginal pain  Musculoskeletal: Negative  Negative for arthralgias, myalgias, neck pain and neck stiffness  Skin: Negative  Negative for color change, pallor and rash  Allergic/Immunologic: Negative  Negative for immunocompromised state  Neurological: Negative  Negative for weakness and headaches  Hematological: Negative for adenopathy  Does not bruise/bleed easily  All other systems reviewed and are negative      Patient Active Problem List   Diagnosis    Combined forms of age-related cataract of left eye    Type 2 diabetes mellitus with diabetic polyneuropathy ([facility] )    Hyperlipidemia    Personal history of transient ischemic attack (TIA), and cerebral infarction without residual deficits    Family history of tobacco abuse    Tobacco abuse    Arthritis    Essential hypertension    GERD (gastroesophageal reflux disease)    Iron deficiency anemia    Type 2 diabetes mellitus with stage 3a chronic kidney disease and hypertension ([address] )    Essential (hemorrhagic) thrombocythemia ([facility] [address] [facility]    CVA (cerebral vascular accident) ([facility] [address] )    Nodule of upper lobe of right lung    Thyroid nodule greater than or equal to 1 5 cm in diameter incidentally noted on imaging study    Troponin level elevated    Vertebral artery stenosis, bilateral     Past Medical History:   Diagnosis Date    Anemia     Arthritis     Capsulitis of foot     Last assessed 07/29/13    Diabetes ([facility] [address] )     type 2    Full dentures     Ganglion     Last assessed 07/29/13    Hearing decreased     Hyperlipidemia     Hypertension     Magnesium deficiency     Sciatica     Wears glasses      Past Surgical History:   Procedure Laterality Date    CATARACT EXTRACTION      COLONOSCOPY      DILATION AND CURETTAGE OF UTERUS      x 4    HAND SURGERY Left     fx repaired w/wrist bone    HYSTERECTOMY      left ovary remains    KNEE ARTHROSCOPY Left     LIPOMA RESECTION      removed from back and left buttock    AR XCAPSL CTRC RMVL INSJ IO LENS PROSTH W/O ECP Right 1/24/2019    Procedure: EXTRACTION EXTRACAPSULAR CATARACT PHACO INTRAOCULAR LENS (IOL); Surgeon: Dorie Diaz MD;  Location: [facility] MAIN OR;  Service: Ophthalmology    AR XCAPSL CTRC RMVL INSJ IO LENS PROSTH W/O ECP Left 2/14/2019    Procedure: EXTRACTION EXTRACAPSULAR CATARACT PHACO INTRAOCULAR LENS (IOL);   Surgeon: Dorie Diaz MD;  Location: [facility] MAIN OR;  Service: Ophthalmology    TONSILLECTOMY      and adenoids    TUBAL LIGATION      US GUIDED THYROID BIOPSY  7/26/2021     Family History   Problem Relation Age of Onset    Leukemia Father     Early death Father 39        leukemia    Stomach cancer Maternal Grandfather     Diabetes Paternal Grandfather     Diabetes Sister         insulin dependent    Diabetes Brother         insulin    Diabetes Sister         insulin    Diabetes Sister         insulin     Social History Socioeconomic History    Marital status:      Spouse name: None    Number of children: None    Years of education: None    Highest education level: None   Occupational History    None   Tobacco Use    Smoking status: Current Every Day Smoker     Packs/day: 0 25     Years: 35 00     Pack years: 8 75     Types: Cigarettes     Last attempt to quit: 2021     Years since quittin 3    Smokeless tobacco: Never Used   Vaping Use    Vaping Use: Never used   Substance and Sexual Activity    Alcohol use: Not Currently     Comment: Rarely    Drug use: Never    Sexual activity: Not Currently   Other Topics Concern    None   Social History Narrative    None     Social Determinants of Health     Financial Resource Strain: Not on file   Food Insecurity: Not on file   Transportation Needs: Not on file   Physical Activity: Not on file   Stress: Not on file   Social Connections: Not on file   Intimate Partner Violence: Not on file   Housing Stability: Not on file       Current Outpatient Medications:     atorvastatin (LIPITOR) 80 mg tablet, TAKE 1 TABLET BY MOUTH IN  THE EVENING, Disp: 90 tablet, Rfl: 3    clopidogrel (PLAVIX) 75 mg tablet, TAKE 1 TABLET BY MOUTH  DAILY, Disp: 90 tablet, Rfl: 3    ferrous sulfate 324 (65 Fe) mg, Take 1 tablet on Monday, Wed and Friday, Disp: 180 tablet, Rfl: 2    glimepiride (AMARYL) 1 mg tablet, Take 1 tablet (1 mg total) by mouth daily with breakfast, Disp: 90 tablet, Rfl: 1    magnesium Oxide (MAG-OX) 400 mg TABS, TAKE 1 TABLET BY MOUTH TWICE A DAY, Disp: 180 tablet, Rfl: 0    metFORMIN (GLUCOPHAGE) 1000 MG tablet, TAKE 1 TABLET BY MOUTH  TWICE DAILY, Disp: 180 tablet, Rfl: 3    pantoprazole (PROTONIX) 40 mg tablet, Take 1 tablet (40 mg total) by mouth daily, Disp: 90 tablet, Rfl: 1    quinapril (ACCUPRIL) 40 MG tablet, TAKE 1 TABLET BY MOUTH  DAILY AT BEDTIME, Disp: 90 tablet, Rfl: 3  Allergies   Allergen Reactions    Aleve [Naproxen] Other (See Comments) "weird" sensation entire body    Sulfa Antibiotics Rash       Objective   /66 (BP Location: Left arm, Patient Position: Sitting, Cuff Size: Adult)   Pulse 78   Temp 97 8 °F (36 6 °C)   Resp 16   Ht 5' (1 524 m)   Wt 54 4 kg (120 lb)   LMP  (LMP Unknown)   SpO2 100%   BMI 23 44 kg/m²    Physical Exam  Constitutional:       General: She is not in acute distress  Appearance: She is well-developed  HENT:      Head: Normocephalic and atraumatic  Eyes:      General: No scleral icterus  Pupils: Pupils are equal, round, and reactive to light  Cardiovascular:      Rate and Rhythm: Normal rate and regular rhythm  Heart sounds: No murmur heard  Pulmonary:      Effort: Pulmonary effort is normal  No respiratory distress  Skin:     General: Skin is warm  Coloration: Skin is not pale  Findings: No rash  Neurological:      Mental Status: She is alert and oriented to person, place, and time  Psychiatric:         Thought Content:  Thought content normal          Result Review  Labs:  Orders Only on 09/15/2022   Component Date Value Ref Range Status    White Blood Cell Count 09/15/2022 8 9  3 4 - 10 8 x10E3/uL Final    Red Blood Cell Count 09/15/2022 2 88 (A) 3 77 - 5 28 x10E6/uL Final    Hemoglobin 09/15/2022 9 3 (A) 11 1 - 15 9 g/dL Final    HCT 09/15/2022 29 1 (A) 34 0 - 46 6 % Final    MCV 09/15/2022 101 (A) 79 - 97 fL Final    MCH 09/15/2022 32 3  26 6 - 33 0 pg Final    MCHC 09/15/2022 32 0  31 5 - 35 7 g/dL Final    RDW 09/15/2022 14 4  11 7 - 15 4 % Final    Platelet Count 04/46/9712 473 (A) 150 - 450 x10E3/uL Final    Neutrophils 09/15/2022 71  Not Estab  % Final    Lymphocytes 09/15/2022 18  Not Estab  % Final    Monocytes 09/15/2022 8  Not Estab  % Final    Eosinophils 09/15/2022 2  Not Estab  % Final    Basophils PCT 09/15/2022 1  Not Estab  % Final    Neutrophils (Absolute) 09/15/2022 6 3  1 4 - 7 0 x10E3/uL Final    Lymphocytes (Absolute) 09/15/2022 1 6  0 7 - 3 1 x10E3/uL Final    Monocytes (Absolute) 09/15/2022 0 7  0 1 - 0 9 x10E3/uL Final    Eosinophils (Absolute) 09/15/2022 0 2  0 0 - 0 4 x10E3/uL Final    Basophils ABS 09/15/2022 0 0  0 0 - 0 2 x10E3/uL Final    Immature Granulocytes 09/15/2022 0  Not Estab  % Final    Immature Granulocytes (Absolute) 09/15/2022 0 0  0 0 - 0 1 x10E3/uL Final    Comment: A hand-written panel/profile was received from your office  In  accordance with the [facility] Ambiguous Test Code Policy dated July 1556, we have assigned CBC with Differential/Platelet, Test Code  #743182 to this request  If this is not the testing you wished to  receive on this specimen, please contact the [facility] Department to clarify the test order  We  appreciate your business   Total Iron Binding Capacity (TIBC) 09/15/2022 294  250 - 450 ug/dL Final    UIBC 09/15/2022 245  118 - 369 ug/dL Final    Iron, Serum 09/15/2022 49  27 - 139 ug/dL Final    Iron Saturation 09/15/2022 17  15 - 55 % Final    Ferritin 09/15/2022 62  15 - 150 ng/mL Final       Please note: This report has been generated by a voice recognition software system  Therefore there may be syntax, spelling, and/or grammatical errors  Please call if you have any questions

## 2022-10-11 DIAGNOSIS — Z76.0 MEDICATION REFILL: ICD-10-CM

## 2022-10-11 RX ORDER — LANOLIN ALCOHOL/MO/W.PET/CERES
400 CREAM (GRAM) TOPICAL 2 TIMES DAILY
Qty: 180 TABLET | Refills: 0 | Status: SHIPPED | OUTPATIENT
Start: 2022-10-11

## 2022-10-22 LAB
BASOPHILS # BLD AUTO: 0.1 X10E3/UL (ref 0–0.2)
BASOPHILS NFR BLD AUTO: 1 %
EOSINOPHIL # BLD AUTO: 0.3 X10E3/UL (ref 0–0.4)
EOSINOPHIL NFR BLD AUTO: 5 %
ERYTHROCYTE [DISTWIDTH] IN BLOOD BY AUTOMATED COUNT: 13.2 % (ref 11.7–15.4)
HCT VFR BLD AUTO: 28.9 % (ref 34–46.6)
HGB BLD-MCNC: 9.4 G/DL (ref 11.1–15.9)
IMM GRANULOCYTES # BLD: 0 X10E3/UL (ref 0–0.1)
IMM GRANULOCYTES NFR BLD: 0 %
LYMPHOCYTES # BLD AUTO: 1.4 X10E3/UL (ref 0.7–3.1)
LYMPHOCYTES NFR BLD AUTO: 21 %
MCH RBC QN AUTO: 32.5 PG (ref 26.6–33)
MCHC RBC AUTO-ENTMCNC: 32.5 G/DL (ref 31.5–35.7)
MCV RBC AUTO: 100 FL (ref 79–97)
MONOCYTES # BLD AUTO: 0.7 X10E3/UL (ref 0.1–0.9)
MONOCYTES NFR BLD AUTO: 10 %
NEUTROPHILS # BLD AUTO: 4.3 X10E3/UL (ref 1.4–7)
NEUTROPHILS NFR BLD AUTO: 63 %
PLATELET # BLD AUTO: 426 X10E3/UL (ref 150–450)
RBC # BLD AUTO: 2.89 X10E6/UL (ref 3.77–5.28)
RETICS/RBC NFR AUTO: 2.9 % (ref 0.6–2.6)
WBC # BLD AUTO: 6.7 X10E3/UL (ref 3.4–10.8)

## 2022-10-23 LAB
FOLATE SERPL-MCNC: 3.9 NG/ML
TSH SERPL DL<=0.005 MIU/L-ACNC: 2.13 UIU/ML (ref 0.45–4.5)
VIT B12 SERPL-MCNC: 110 PG/ML (ref 232–1245)

## 2022-10-24 ENCOUNTER — TELEPHONE (OUTPATIENT)
Dept: HEMATOLOGY ONCOLOGY | Facility: MEDICAL CENTER | Age: 77
End: 2022-10-24

## 2022-10-24 DIAGNOSIS — E53.8 FOLATE DEFICIENCY: ICD-10-CM

## 2022-10-24 DIAGNOSIS — D64.9 NORMOCYTIC ANEMIA: Primary | ICD-10-CM

## 2022-10-24 DIAGNOSIS — E53.8 VITAMIN B 12 DEFICIENCY: ICD-10-CM

## 2022-10-24 RX ORDER — FOLIC ACID 1 MG/1
1000 TABLET ORAL DAILY
Qty: 90 TABLET | Refills: 1 | Status: SHIPPED | OUTPATIENT
Start: 2022-10-24

## 2022-10-24 NOTE — TELEPHONE ENCOUNTER
----- Message from Lázaro Garcia PA-C sent at 10/24/2022  9:02 AM EDT -----  Folate and B 12 are low-    Will send out Folate supplement to pharmacy  B12 OTC recommend 2000 IU daily ( chewable or sublingual tablets best )    Recheck B 12, folate and cbc prior to follow up appt in 1/2023- will place orders  Please notify pt by phone incase there are questions regarding OTC b12    Thanks      Attempted to leave Voicemail message, mailbox is full  Will reattempt Marco Antonio Hare is a 76 year old male presenting to the walk-in clinic today for wheezing which started on emmanuel arlet. Patient says he feels like it has gotten worse and also he is more sob. Patient denies fever or chills.   Denies known Latex allergy or symptoms of Latex sensitivity.  Medications reviewed and updated.  Patient would like communication of their results via:        Cell Phone:   Telephone Information:   Mobile [phone]     Okay to leave a message containing results? Yes

## 2022-10-25 NOTE — TELEPHONE ENCOUNTER
Patient called asking how much B12 she should take  I informed her in the recommendation below, 2000 IU daily  Patient voiced understanding

## 2022-11-25 ENCOUNTER — VBI (OUTPATIENT)
Dept: ADMINISTRATIVE | Facility: OTHER | Age: 77
End: 2022-11-25

## 2022-11-25 NOTE — TELEPHONE ENCOUNTER
11/25/22 2:01 PM     [employer] SmartForm used to document caregap status      AbdoulayeBeba Kirkland

## 2022-11-28 DIAGNOSIS — K21.9 GASTROESOPHAGEAL REFLUX DISEASE WITHOUT ESOPHAGITIS: ICD-10-CM

## 2022-11-29 RX ORDER — PANTOPRAZOLE SODIUM 40 MG/1
TABLET, DELAYED RELEASE ORAL
Qty: 90 TABLET | Refills: 1 | Status: SHIPPED | OUTPATIENT
Start: 2022-11-29

## 2022-12-02 NOTE — ASSESSMENT & PLAN NOTE
Continue iron supplementation Report called to 7th floor rn. Vs stable. Denies pain.  sent to patient room.

## 2023-01-03 ENCOUNTER — TELEPHONE (OUTPATIENT)
Dept: HEMATOLOGY ONCOLOGY | Facility: CLINIC | Age: 78
End: 2023-01-03

## 2023-01-03 NOTE — TELEPHONE ENCOUNTER
I called to tell Patient that her appointment has been moved to 1/23/2023 at 3:30 because of Ze Kidd is rounding  pt confirmed

## 2023-01-16 DIAGNOSIS — Z76.0 MEDICATION REFILL: ICD-10-CM

## 2023-01-16 RX ORDER — LANOLIN ALCOHOL/MO/W.PET/CERES
CREAM (GRAM) TOPICAL
Qty: 180 TABLET | Refills: 0 | Status: SHIPPED | OUTPATIENT
Start: 2023-01-16

## 2023-01-19 ENCOUNTER — TELEPHONE (OUTPATIENT)
Dept: HEMATOLOGY ONCOLOGY | Facility: CLINIC | Age: 78
End: 2023-01-19

## 2023-01-19 NOTE — TELEPHONE ENCOUNTER
Appointment Cancellation Or Reschedule     Person calling in Patient    If other than patient calling, are they listed on the communication consent form? Provider Antoni Adams PA-C   Office Visit Date and Time 1/23/23   Office Visit Location [facility]   Did patient want to reschedule their office appointment? If so, when was it scheduled to? 2/21/23   Did you have STAR scheduled for this appointment? no   Do you need STAR set up for your new appointment? If yes, please send to "PATIENT RIDESHARE" pool for STAR rescheduling no   If you are cancelling appointment, can we notify STAR to cancel ride? If yes, please send to "PATIENT RIDESHARE" pool for STAR to cancel service no   Is this patient calling to reschedule an infusion appointment? no   When is their next infusion appointment? no   Is this patient a Chemo patient? no   Reason for Cancellation or Reschedule Not feeling well      If the patient is a treatment patient, please route this to the office nurse  If the patient is not on treatment, please route to the Clerical pool based on location  If the patient is a surgical oncology patient, please route to surg/onc clinical pool  Route message as high priority if same day cancellation

## 2023-01-25 DIAGNOSIS — E11.42 TYPE 2 DIABETES MELLITUS WITH DIABETIC POLYNEUROPATHY, WITHOUT LONG-TERM CURRENT USE OF INSULIN (HCC): ICD-10-CM

## 2023-01-26 RX ORDER — GLIMEPIRIDE 1 MG/1
TABLET ORAL
Qty: 90 TABLET | Refills: 0 | Status: SHIPPED | OUTPATIENT
Start: 2023-01-26

## 2023-02-15 ENCOUNTER — RA CDI HCC (OUTPATIENT)
Dept: OTHER | Facility: HOSPITAL | Age: 78
End: 2023-02-15

## 2023-02-15 DIAGNOSIS — I63.9 CEREBROVASCULAR ACCIDENT (CVA), UNSPECIFIED MECHANISM (HCC): ICD-10-CM

## 2023-02-15 NOTE — PROGRESS NOTES
[facility]  coding opportunities          Chart Reviewed number of suggestions sent to Provider: 1   E11 [id number]    Patients Insurance     Medicare Insurance: [employer]

## 2023-02-16 RX ORDER — CLOPIDOGREL BISULFATE 75 MG/1
TABLET ORAL
Qty: 90 TABLET | Refills: 3 | Status: SHIPPED | OUTPATIENT
Start: 2023-02-16

## 2023-02-17 ENCOUNTER — TELEPHONE (OUTPATIENT)
Dept: HEMATOLOGY ONCOLOGY | Facility: MEDICAL CENTER | Age: 78
End: 2023-02-17

## 2023-02-17 NOTE — TELEPHONE ENCOUNTER
I tried contacting patient in regards to her lab work reminder  Patient did not answer and mailbox was full

## 2023-02-21 ENCOUNTER — TELEPHONE (OUTPATIENT)
Dept: HEMATOLOGY ONCOLOGY | Facility: MEDICAL CENTER | Age: 78
End: 2023-02-21

## 2023-02-23 ENCOUNTER — TELEPHONE (OUTPATIENT)
Dept: HEMATOLOGY ONCOLOGY | Facility: MEDICAL CENTER | Age: 78
End: 2023-02-23

## 2023-02-23 NOTE — TELEPHONE ENCOUNTER
Patient called back in regards to her missed call  I informed the patient that I just wanted to remind her to have her blood work completed prior to her appt on 2/28/23 at 3:00pm  Patient states understanding

## 2023-02-26 ENCOUNTER — TELEPHONE (OUTPATIENT)
Dept: OTHER | Facility: OTHER | Age: 78
End: 2023-02-26

## 2023-02-26 ENCOUNTER — TELEPHONE (OUTPATIENT)
Dept: OTHER | Facility: HOSPITAL | Age: 78
End: 2023-02-26

## 2023-02-26 LAB
BASOPHILS # BLD AUTO: 0 X10E3/UL (ref 0–0.2)
BASOPHILS NFR BLD AUTO: 1 %
EOSINOPHIL # BLD AUTO: 0.2 X10E3/UL (ref 0–0.4)
EOSINOPHIL NFR BLD AUTO: 3 %
ERYTHROCYTE [DISTWIDTH] IN BLOOD BY AUTOMATED COUNT: 14.8 % (ref 11.7–15.4)
FOLATE SERPL-MCNC: >20 NG/ML
HCT VFR BLD AUTO: 19.2 % (ref 34–46.6)
HGB BLD-MCNC: 5.5 G/DL (ref 11.1–15.9)
IMM GRANULOCYTES # BLD: 0 X10E3/UL (ref 0–0.1)
IMM GRANULOCYTES NFR BLD: 0 %
LYMPHOCYTES # BLD AUTO: 1.2 X10E3/UL (ref 0.7–3.1)
LYMPHOCYTES NFR BLD AUTO: 18 %
MCH RBC QN AUTO: 21.6 PG (ref 26.6–33)
MCHC RBC AUTO-ENTMCNC: 28.6 G/DL (ref 31.5–35.7)
MCV RBC AUTO: 75 FL (ref 79–97)
MONOCYTES # BLD AUTO: 0.6 X10E3/UL (ref 0.1–0.9)
MONOCYTES NFR BLD AUTO: 9 %
NEUTROPHILS # BLD AUTO: 4.8 X10E3/UL (ref 1.4–7)
NEUTROPHILS NFR BLD AUTO: 69 %
PLATELET # BLD AUTO: 485 X10E3/UL (ref 150–450)
RBC # BLD AUTO: 2.55 X10E6/UL (ref 3.77–5.28)
RETICS/RBC NFR AUTO: 2.1 % (ref 0.6–2.6)
TSH SERPL DL<=0.005 MIU/L-ACNC: 1.74 UIU/ML (ref 0.45–4.5)
VIT B12 SERPL-MCNC: 262 PG/ML (ref 232–1245)
WBC # BLD AUTO: 6.9 X10E3/UL (ref 3.4–10.8)

## 2023-02-26 NOTE — TELEPHONE ENCOUNTER
Lab Result: Hemoglobin 5 5   Date/Time Drawn: 02/25/2023  0954   Ordering Provider: Dr Lyndsay Bergman Name: Michael Higgins       The following critical/stat result was read back to the lab as stated above and [employer] to the on-call provider  The provider confirmed receipt of the message

## 2023-02-26 NOTE — TELEPHONE ENCOUNTER
Received a critical lab Hgb 5 5  Called patient  She did not answer  The answering machine mailbox was full  Primary notified

## 2023-02-27 ENCOUNTER — HOSPITAL ENCOUNTER (INPATIENT)
Facility: HOSPITAL | Age: 78
LOS: 3 days | Discharge: HOME HEALTH | End: 2023-03-02
Attending: EMERGENCY MEDICINE | Admitting: INTERNAL MEDICINE

## 2023-02-27 ENCOUNTER — TELEPHONE (OUTPATIENT)
Dept: HEMATOLOGY ONCOLOGY | Facility: MEDICAL CENTER | Age: 78
End: 2023-02-27

## 2023-02-27 DIAGNOSIS — K92.2 GI BLEED: Primary | ICD-10-CM

## 2023-02-27 DIAGNOSIS — D50.8 OTHER IRON DEFICIENCY ANEMIA: ICD-10-CM

## 2023-02-27 DIAGNOSIS — D64.9 ANEMIA: ICD-10-CM

## 2023-02-27 DIAGNOSIS — D50.9 IRON DEFICIENCY ANEMIA, UNSPECIFIED IRON DEFICIENCY ANEMIA TYPE: Primary | Chronic | ICD-10-CM

## 2023-02-27 DIAGNOSIS — E11.42 TYPE 2 DIABETES MELLITUS WITH DIABETIC POLYNEUROPATHY, WITH LONG-TERM CURRENT USE OF INSULIN (HCC): ICD-10-CM

## 2023-02-27 DIAGNOSIS — Z79.4 TYPE 2 DIABETES MELLITUS WITH DIABETIC POLYNEUROPATHY, WITH LONG-TERM CURRENT USE OF INSULIN (HCC): ICD-10-CM

## 2023-02-27 PROBLEM — D50.0 IRON DEFICIENCY ANEMIA DUE TO CHRONIC BLOOD LOSS: Status: ACTIVE | Noted: 2023-02-27

## 2023-02-27 LAB
ABO GROUP BLD: NORMAL
ALBUMIN SERPL BCP-MCNC: 4.1 G/DL (ref 3.5–5)
ALP SERPL-CCNC: 98 U/L (ref 34–104)
ALT SERPL W P-5'-P-CCNC: 8 U/L (ref 7–52)
ANION GAP SERPL CALCULATED.3IONS-SCNC: 7 MMOL/L (ref 4–13)
APTT PPP: 25 SECONDS (ref 23–37)
AST SERPL W P-5'-P-CCNC: 10 U/L (ref 13–39)
BASOPHILS # BLD AUTO: 0.02 THOUSANDS/ÂΜL (ref 0–0.1)
BASOPHILS # BLD AUTO: 0.03 THOUSANDS/ÂΜL (ref 0–0.1)
BASOPHILS NFR BLD AUTO: 0 % (ref 0–1)
BASOPHILS NFR BLD AUTO: 0 % (ref 0–1)
BILIRUB SERPL-MCNC: 0.7 MG/DL (ref 0.2–1)
BLD GP AB SCN SERPL QL: NEGATIVE
BUN SERPL-MCNC: 33 MG/DL (ref 5–25)
CALCIUM SERPL-MCNC: 10 MG/DL (ref 8.4–10.2)
CHLORIDE SERPL-SCNC: 104 MMOL/L (ref 96–108)
CO2 SERPL-SCNC: 27 MMOL/L (ref 21–32)
CREAT SERPL-MCNC: 1.27 MG/DL (ref 0.6–1.3)
EOSINOPHIL # BLD AUTO: 0.14 THOUSAND/ÂΜL (ref 0–0.61)
EOSINOPHIL # BLD AUTO: 0.15 THOUSAND/ÂΜL (ref 0–0.61)
EOSINOPHIL NFR BLD AUTO: 2 % (ref 0–6)
EOSINOPHIL NFR BLD AUTO: 2 % (ref 0–6)
ERYTHROCYTE [DISTWIDTH] IN BLOOD BY AUTOMATED COUNT: 15.9 % (ref 11.6–15.1)
ERYTHROCYTE [DISTWIDTH] IN BLOOD BY AUTOMATED COUNT: 15.9 % (ref 11.6–15.1)
GFR SERPL CREATININE-BSD FRML MDRD: 40 ML/MIN/1.73SQ M
GLUCOSE SERPL-MCNC: 156 MG/DL (ref 65–140)
GLUCOSE SERPL-MCNC: 192 MG/DL (ref 65–140)
GLUCOSE SERPL-MCNC: 97 MG/DL (ref 65–140)
HCT VFR BLD AUTO: 20.5 % (ref 34.8–46.1)
HCT VFR BLD AUTO: 23.6 % (ref 34.8–46.1)
HGB BLD-MCNC: 5.7 G/DL (ref 11.5–15.4)
HGB BLD-MCNC: 7.3 G/DL (ref 11.5–15.4)
IMM GRANULOCYTES # BLD AUTO: 0.03 THOUSAND/UL (ref 0–0.2)
IMM GRANULOCYTES # BLD AUTO: 0.03 THOUSAND/UL (ref 0–0.2)
IMM GRANULOCYTES NFR BLD AUTO: 0 % (ref 0–2)
IMM GRANULOCYTES NFR BLD AUTO: 0 % (ref 0–2)
INR PPP: 0.91 (ref 0.84–1.19)
LYMPHOCYTES # BLD AUTO: 0.9 THOUSANDS/ÂΜL (ref 0.6–4.47)
LYMPHOCYTES # BLD AUTO: 1.12 THOUSANDS/ÂΜL (ref 0.6–4.47)
LYMPHOCYTES NFR BLD AUTO: 12 % (ref 14–44)
LYMPHOCYTES NFR BLD AUTO: 14 % (ref 14–44)
MCH RBC QN AUTO: 21.7 PG (ref 26.8–34.3)
MCH RBC QN AUTO: 23.5 PG (ref 26.8–34.3)
MCHC RBC AUTO-ENTMCNC: 27.8 G/DL (ref 31.4–37.4)
MCHC RBC AUTO-ENTMCNC: 30.9 G/DL (ref 31.4–37.4)
MCV RBC AUTO: 76 FL (ref 82–98)
MCV RBC AUTO: 78 FL (ref 82–98)
MONOCYTES # BLD AUTO: 0.44 THOUSAND/ÂΜL (ref 0.17–1.22)
MONOCYTES # BLD AUTO: 0.69 THOUSAND/ÂΜL (ref 0.17–1.22)
MONOCYTES NFR BLD AUTO: 6 % (ref 4–12)
MONOCYTES NFR BLD AUTO: 9 % (ref 4–12)
NEUTROPHILS # BLD AUTO: 5.71 THOUSANDS/ÂΜL (ref 1.85–7.62)
NEUTROPHILS # BLD AUTO: 5.76 THOUSANDS/ÂΜL (ref 1.85–7.62)
NEUTS SEG NFR BLD AUTO: 75 % (ref 43–75)
NEUTS SEG NFR BLD AUTO: 80 % (ref 43–75)
NRBC BLD AUTO-RTO: 0 /100 WBCS
NRBC BLD AUTO-RTO: 0 /100 WBCS
PLATELET # BLD AUTO: 369 THOUSANDS/UL (ref 149–390)
PLATELET # BLD AUTO: 481 THOUSANDS/UL (ref 149–390)
PMV BLD AUTO: 9.8 FL (ref 8.9–12.7)
PMV BLD AUTO: 9.9 FL (ref 8.9–12.7)
POTASSIUM SERPL-SCNC: 4.7 MMOL/L (ref 3.5–5.3)
PROT SERPL-MCNC: 6.1 G/DL (ref 6.4–8.4)
PROTHROMBIN TIME: 12.4 SECONDS (ref 11.6–14.5)
RBC # BLD AUTO: 2.63 MILLION/UL (ref 3.81–5.12)
RBC # BLD AUTO: 3.11 MILLION/UL (ref 3.81–5.12)
RH BLD: POSITIVE
SODIUM SERPL-SCNC: 138 MMOL/L (ref 135–147)
SPECIMEN EXPIRATION DATE: NORMAL
TSH SERPL DL<=0.05 MIU/L-ACNC: 1.48 UIU/ML (ref 0.45–4.5)
WBC # BLD AUTO: 7.26 THOUSAND/UL (ref 4.31–10.16)
WBC # BLD AUTO: 7.76 THOUSAND/UL (ref 4.31–10.16)

## 2023-02-27 PROCEDURE — 30233N1 TRANSFUSION OF NONAUTOLOGOUS RED BLOOD CELLS INTO PERIPHERAL VEIN, PERCUTANEOUS APPROACH: ICD-10-PCS | Performed by: EMERGENCY MEDICINE

## 2023-02-27 RX ORDER — FERROUS SULFATE 325(65) MG
325 TABLET ORAL
Status: DISCONTINUED | OUTPATIENT
Start: 2023-02-27 | End: 2023-03-02 | Stop reason: HOSPADM

## 2023-02-27 RX ORDER — ACETAMINOPHEN 325 MG/1
650 TABLET ORAL EVERY 6 HOURS PRN
Status: DISCONTINUED | OUTPATIENT
Start: 2023-02-27 | End: 2023-03-02 | Stop reason: HOSPADM

## 2023-02-27 RX ORDER — PANTOPRAZOLE SODIUM 40 MG/10ML
40 INJECTION, POWDER, LYOPHILIZED, FOR SOLUTION INTRAVENOUS ONCE
Status: COMPLETED | OUTPATIENT
Start: 2023-02-27 | End: 2023-02-27

## 2023-02-27 RX ORDER — NICOTINE 21 MG/24HR
14 PATCH, TRANSDERMAL 24 HOURS TRANSDERMAL DAILY
Status: DISCONTINUED | OUTPATIENT
Start: 2023-02-27 | End: 2023-03-02 | Stop reason: HOSPADM

## 2023-02-27 RX ORDER — PANTOPRAZOLE SODIUM 40 MG/10ML
40 INJECTION, POWDER, LYOPHILIZED, FOR SOLUTION INTRAVENOUS EVERY 12 HOURS SCHEDULED
Status: DISCONTINUED | OUTPATIENT
Start: 2023-02-27 | End: 2023-03-01

## 2023-02-27 RX ORDER — ACETAMINOPHEN 325 MG/1
650 TABLET ORAL ONCE
Status: COMPLETED | OUTPATIENT
Start: 2023-02-27 | End: 2023-02-27

## 2023-02-27 RX ORDER — INSULIN LISPRO 100 [IU]/ML
1-5 INJECTION, SOLUTION INTRAVENOUS; SUBCUTANEOUS
Status: DISCONTINUED | OUTPATIENT
Start: 2023-02-27 | End: 2023-03-02 | Stop reason: HOSPADM

## 2023-02-27 RX ORDER — ONDANSETRON 2 MG/ML
4 INJECTION INTRAMUSCULAR; INTRAVENOUS EVERY 6 HOURS PRN
Status: DISCONTINUED | OUTPATIENT
Start: 2023-02-27 | End: 2023-03-02 | Stop reason: HOSPADM

## 2023-02-27 RX ORDER — GLIMEPIRIDE 2 MG/1
1 TABLET ORAL
Status: DISCONTINUED | OUTPATIENT
Start: 2023-02-28 | End: 2023-03-01

## 2023-02-27 RX ORDER — LISINOPRIL 20 MG/1
40 TABLET ORAL DAILY
Status: DISCONTINUED | OUTPATIENT
Start: 2023-02-27 | End: 2023-02-28

## 2023-02-27 RX ORDER — DIPHENHYDRAMINE HCL 25 MG
12.5 TABLET ORAL ONCE
Status: COMPLETED | OUTPATIENT
Start: 2023-02-27 | End: 2023-02-27

## 2023-02-27 RX ORDER — ATORVASTATIN CALCIUM 80 MG/1
80 TABLET, FILM COATED ORAL EVERY EVENING
Status: DISCONTINUED | OUTPATIENT
Start: 2023-02-27 | End: 2023-03-02 | Stop reason: HOSPADM

## 2023-02-27 RX ORDER — FOLIC ACID 1 MG/1
1000 TABLET ORAL DAILY
Status: DISCONTINUED | OUTPATIENT
Start: 2023-02-27 | End: 2023-03-02 | Stop reason: HOSPADM

## 2023-02-27 RX ADMIN — FERROUS SULFATE TAB 325 MG (65 MG ELEMENTAL FE) 325 MG: 325 (65 FE) TAB at 14:19

## 2023-02-27 RX ADMIN — PANTOPRAZOLE SODIUM 40 MG: 40 INJECTION, POWDER, FOR SOLUTION INTRAVENOUS at 12:00

## 2023-02-27 RX ADMIN — PANTOPRAZOLE SODIUM 40 MG: 40 INJECTION, POWDER, FOR SOLUTION INTRAVENOUS at 21:46

## 2023-02-27 RX ADMIN — LISINOPRIL 40 MG: 20 TABLET ORAL at 14:19

## 2023-02-27 RX ADMIN — INSULIN LISPRO 1 UNITS: 100 INJECTION, SOLUTION INTRAVENOUS; SUBCUTANEOUS at 21:49

## 2023-02-27 RX ADMIN — FOLIC ACID 1000 MCG: 1 TABLET ORAL at 14:19

## 2023-02-27 RX ADMIN — DIPHENHYDRAMINE HYDROCHLORIDE 12.5 MG: 25 TABLET ORAL at 16:45

## 2023-02-27 RX ADMIN — ATORVASTATIN CALCIUM 80 MG: 80 TABLET, FILM COATED ORAL at 17:17

## 2023-02-27 RX ADMIN — ACETAMINOPHEN 650 MG: 325 TABLET ORAL at 16:45

## 2023-02-27 RX ADMIN — INSULIN LISPRO 1 UNITS: 100 INJECTION, SOLUTION INTRAVENOUS; SUBCUTANEOUS at 16:45

## 2023-02-27 NOTE — ASSESSMENT & PLAN NOTE
Lab Results   Component Value Date    HGBA1C 5 6 09/12/2022       No results for input(s): POCGLU in the last 72 hours      Blood Sugar Average: Last 72 hrs:  Holding Metformin  Continue home medication glimepiride  ACCU Checks ACHS  SSI  Hypoglycemic protocol

## 2023-02-27 NOTE — CASE MANAGEMENT
Case Management Assessment & Discharge Planning Note    Patient name Fernanda Chilel  Location [facility] 416/4 Cristobal-* MRN [id number]  : 1945 Date 2023       Current Admission Date: 2023  Current Admission Diagnosis:Iron deficiency anemia due to chronic blood loss   Patient Active Problem List    Diagnosis Date Noted   • Iron deficiency anemia due to chronic blood loss 2023   • Vertebral artery stenosis, bilateral 2021   • CVA (cerebral vascular accident) ([email or web address] ) 2021   • Nodule of upper lobe of right lung 2021   • Thyroid nodule greater than or equal to 1 5 cm in diameter incidentally noted on imaging study 2021   • Troponin level elevated 2021   • Type 2 diabetes mellitus with stage 3a chronic kidney disease and hypertension ([email or web address] ) 2021   • Essential (hemorrhagic) thrombocythemia ([place] [address] )    • Iron deficiency anemia 2020   • GERD (gastroesophageal reflux disease) 2020   • Personal history of transient ischemic attack (TIA), and cerebral infarction without residual deficits 2020   • Family history of tobacco abuse 2020   • Tobacco abuse 2020   • Arthritis 2020   • Essential hypertension 2020   • Combined forms of age-related cataract of left eye 2019   • Hyperlipidemia 2014   • Type 2 diabetes mellitus with diabetic polyneuropathy ([email or web address] ) 2013      LOS (days): 0  Geometric Mean LOS (GMLOS) (days):   Days to GMLOS:     OBJECTIVE:    Risk of Unplanned Readmission Score: 10 74       Current admission status: Inpatient  Referral Reason: Other (Discharge planning)    Preferred Pharmacy:   [id number] [place] [facility] - [address] [facility]  Phone: [phone] Fax: [phone]    [facility] ([address] [facility],   [address] [place] [address] Kamran Maddie Landon [address] [place] [address]  Phone: [phone] Fax: [phone] [facility] ([facility] ) - Keshawn Champion U  23   [facility] [address] [place] [address]  Phone: [phone] Fax: [phone]    Primary Care Provider: Irma Don MD    Primary Insurance: Isauromaryjo Kody [facility]  Secondary Insurance:     ASSESSMENT:  [address] - Son   Primary Phone: [phone] (Mobile)                Readmission Root Cause  30 Day Readmission: No    Patient Information  Admitted from[de-identified] Home  Mental Status: Alert  During Assessment patient was accompanied by: Son, Other-Comment (granddaughter Abby Cross)  Assessment information provided by[de-identified] Patient, Son  Primary Caregiver: Self  Support Systems: Son, Family members  [place] of Residence: [address] [place] [address] do you live in?: [place] entry access options   Select all that apply : Stairs  Number of steps to enter home : 4  Type of Current Residence: 2 story home  Upon entering residence, is there a bedroom on the main floor (no further steps)?: Yes  Upon entering residence, is there a bathroom on the main floor (no further steps)?: Yes  In the last 12 months, was there a time when you were not able to pay the mortgage or rent on time?: No  In the last 12 months, how many places have you lived?: 1  In the last 12 months, was there a time when you did not have a steady place to sleep or slept in a shelter (including now)?: No  Homeless/housing insecurity resource given?: N/A  Living Arrangements: Lives w/ Son (son Claudell Headland lives in patient's home)  Is patient a ?: No    Activities of Daily Living Prior to Admission  Functional Status: Independent  Completes ADLs independently?: Yes  Ambulates independently?: Yes  Does patient use assisted devices?: Yes  Assisted Devices (DME) used: Straight Cane  Does patient currently own DME?: Yes  What DME does the patient currently own?: Alcon Rojas  Does patient have a history of Outpatient Therapy (PT/OT)?: Yes  Does the patient have a history of Short-Term Rehab?: Yes ([facility])  Does patient have a history of HHC?: Yes ([facility])  Does patient currently have [facility]?: No    Patient Information Continued  Income Source: Pension/half-way  Does patient have prescription coverage?: Yes  Within the past 12 months, you worried that your food would run out before you got the money to buy more : Never true  Within the past 12 months, the food you bought just didn't last and you didn't have money to get more : Never true  Food insecurity resource given?: N/A  Does patient receive dialysis treatments?: No  Does patient have a history of substance abuse?: No  Does patient have a history of Mental Health Diagnosis?: No    Means of Transportation  Means of Transport to Appts[de-identified] Family transport  In the past 12 months, has lack of transportation kept you from medical appointments or from getting medications?: No  In the past 12 months, has lack of transportation kept you from meetings, work, or from getting things needed for daily living?: No  Was application for public transport provided?: N/A      DISCHARGE DETAILS:    Discharge planning discussed with[de-identified] Patient, son Briseida Perez and granddtr Manju Parker of Choice: Yes     Comments - Freedom of Choice: SW spoke with patient, son Briseida Perez and granddaughter Elie Zhou at bedside to introduce role of CM and review [address]  Patient lives in a [facility] home in [place] with a first-floor setup  Briseida Perez lives with her and provides transportation  Patient uses a cane to ambulate, particularly at night, and is independent with ADL's  Her preference is to return home at discharge  SW will continue to follow for discharge planning needs  CM contacted family/caregiver?: Yes     Contacts  Patient Contacts: Danielle Galeas (son)  Relationship to Patient[de-identified] Family  Contact Method:  In Person  Reason/Outcome: Emergency Contact, Discharge Planning

## 2023-02-27 NOTE — ED PROVIDER NOTES
History  Chief Complaint   Patient presents with   • Abnormal Lab     Patient sent by Dr Joe Rosales [facility] for lab work,  Hg results in Dr Joe Rosales ofc is 5  Patient states she is a little bit dizzy and has hx of anemia  Patient has a history of anemia  She states she has not needed a blood transfusion in years  Patient has been under the care of a hematologist and states that she had "all the tests "  She recently has noted increased lightheadedness and dizziness particularly with exertion or changing position  She has not lost any blood that she is aware of  Denies hematemesis hemoptysis vaginal or rectal bleeding  She denies chest pain or abdominal pain  Patient had recent blood work done that reported a hemoglobin of 5 5  Patient was referred to the ED for evaluation and treatment          Prior to Admission Medications   Prescriptions Last Dose Informant Patient Reported?  Taking?   atorvastatin (LIPITOR) 80 mg tablet 2/27/2023  No Yes   Sig: TAKE 1 TABLET BY MOUTH IN  THE EVENING   clopidogrel (PLAVIX) 75 mg tablet 2/26/2023  No Yes   Sig: TAKE 1 TABLET BY MOUTH  DAILY   ferrous sulfate 324 (65 Fe) mg 2/26/2023  No Yes   Sig: Take 1 tablet on Monday, Wed and Friday   folic acid (FOLVITE) 1 mg tablet 2/26/2023  No Yes   Sig: Take 1 tablet (1,000 mcg total) by mouth daily   glimepiride (AMARYL) 1 mg tablet 2/26/2023  No Yes   Sig: TAKE 1 TABLET BY MOUTH  DAILY WITH BREAKFAST   magnesium Oxide (MAG-OX) 400 mg TABS 2/26/2023  No Yes   Sig: TAKE 1 TABLET (400 MG TOTAL) BY MOUTH 2 TIMES A DAY   metFORMIN (GLUCOPHAGE) 1000 MG tablet 2/26/2023  No Yes   Sig: TAKE 1 TABLET BY MOUTH  TWICE DAILY   pantoprazole (PROTONIX) 40 mg tablet 2/26/2023  No Yes   Sig: TAKE 1 TABLET BY MOUTH  DAILY   quinapril (ACCUPRIL) 40 MG tablet 2/26/2023  No Yes   Sig: TAKE 1 TABLET BY MOUTH  DAILY AT BEDTIME      Facility-Administered Medications: None       Past Medical History:   Diagnosis Date   • Anemia    • Arthritis    • Capsulitis of foot     Last assessed 13   • Diabetes [facility])     type 2   • Full dentures    • Ganglion     Last assessed 13   • Hearing decreased    • Hyperlipidemia    • Hypertension    • Magnesium deficiency    • Sciatica    • Wears glasses        Past Surgical History:   Procedure Laterality Date   • CATARACT EXTRACTION     • COLONOSCOPY     • DILATION AND CURETTAGE OF UTERUS      x 4   • HAND SURGERY Left     fx repaired w/wrist bone   • HYSTERECTOMY      left ovary remains   • KNEE ARTHROSCOPY Left    • LIPOMA RESECTION      removed from back and left buttock   • NY XCAPSL CTRC RMVL INSJ IO LENS PROSTH W/O ECP Right 2019    Procedure: EXTRACTION EXTRACAPSULAR CATARACT PHACO INTRAOCULAR LENS (IOL); Surgeon: Marie Xiong MD;  Location: [facility] MAIN OR;  Service: Ophthalmology   • NY XCAPSL CTRC RMVL INSJ IO LENS PROSTH W/O ECP Left 2019    Procedure: EXTRACTION EXTRACAPSULAR CATARACT PHACO INTRAOCULAR LENS (IOL); Surgeon: Marie Xiong MD;  Location: [facility] MAIN OR;  Service: Ophthalmology   • TONSILLECTOMY      and adenoids   • TUBAL LIGATION     • US GUIDED THYROID BIOPSY  2021       Family History   Problem Relation Age of Onset   • Leukemia Father    • Early death Father 39        leukemia   • Stomach cancer Maternal Grandfather    • Diabetes Paternal Grandfather    • Diabetes Sister         insulin dependent   • Diabetes Brother         insulin   • Diabetes Sister         insulin   • Diabetes Sister         insulin     I have reviewed and agree with the history as documented      E-Cigarette/Vaping   • E-Cigarette Use Never User      E-Cigarette/Vaping Substances   • Nicotine No    • THC No    • CBD No    • Flavoring No    • Other No    • Unknown No      Social History     Tobacco Use   • Smoking status: Every Day     Packs/day: 0 25     Years: 35 00     Pack years: 8 75     Types: Cigarettes     Last attempt to quit: 2021     Years since quittin 7   • Smokeless tobacco: Never Vaping Use   • Vaping Use: Never used   Substance Use Topics   • Alcohol use: Not Currently     Comment: Rarely   • Drug use: Never       Review of Systems   Constitutional: Positive for fatigue  Negative for chills, diaphoresis and fever  HENT: Negative for congestion and sore throat  Eyes: Negative for visual disturbance  Respiratory: Negative for cough and shortness of breath  Cardiovascular: Negative for chest pain  Gastrointestinal: Negative for abdominal pain, anal bleeding, blood in stool, diarrhea, nausea and vomiting  Genitourinary: Negative for dysuria and hematuria  Musculoskeletal: Negative for back pain  Skin: Negative for color change and rash  Neurological: Positive for dizziness, weakness and light-headedness  Negative for syncope  Hematological: Bruises/bleeds easily  Psychiatric/Behavioral: Negative for confusion  All other systems reviewed and are negative  Physical Exam  Physical Exam  Vitals and nursing note reviewed  Constitutional:       Appearance: Normal appearance  HENT:      Head: Normocephalic  Right Ear: External ear normal       Left Ear: External ear normal       Nose: Nose normal       Mouth/Throat:      Pharynx: Oropharynx is clear  Eyes:      Pupils: Pupils are equal, round, and reactive to light  Cardiovascular:      Rate and Rhythm: Normal rate and regular rhythm  Pulses: Normal pulses  Heart sounds: Normal heart sounds  Pulmonary:      Effort: Pulmonary effort is normal       Breath sounds: Normal breath sounds  Abdominal:      Palpations: Abdomen is soft  Tenderness: There is no abdominal tenderness  Genitourinary:     Rectum: Guaiac result positive  Musculoskeletal:         General: Normal range of motion  Cervical back: Normal range of motion and neck supple  Skin:     General: Skin is warm and dry  Capillary Refill: Capillary refill takes less than 2 seconds     Neurological:      General: No focal deficit present  Mental Status: She is alert and oriented to person, place, and time  Psychiatric:         Mood and Affect: Mood normal          Behavior: Behavior normal          Vital Signs  ED Triage Vitals [02/27/23 1003]   Temperature Pulse Respirations Blood Pressure SpO2   98 2 °F (36 8 °C) 90 18 138/58 99 %      Temp Source Heart Rate Source Patient Position - Orthostatic VS BP Location FiO2 (%)   Oral Monitor Sitting Right arm --      Pain Score       No Pain           Vitals:    02/27/23 1003   BP: 138/58   Pulse: 90   Patient Position - Orthostatic VS: Sitting         Visual Acuity      ED Medications  Medications   pantoprazole (PROTONIX) injection 40 mg (40 mg Intravenous Given 2/27/23 1200)       Diagnostic Studies  Results Reviewed     Procedure Component Value Units Date/Time    CBC and differential [[id number]]  (Abnormal) Collected: 02/27/23 1037    Lab Status: Final result Specimen: Blood from Arm, Right Updated: 02/27/23 1125     WBC 7 26 Thousand/uL      RBC 2 63 Million/uL      Hemoglobin 5 7 g/dL      Hematocrit 20 5 %      MCV 78 fL      MCH 21 7 pg      MCHC 27 8 g/dL      RDW 15 9 %      MPV 9 8 fL      Platelets 253 Thousands/uL      nRBC 0 /100 WBCs      Neutrophils Relative 80 %      Immat GRANS % 0 %      Lymphocytes Relative 12 %      Monocytes Relative 6 %      Eosinophils Relative 2 %      Basophils Relative 0 %      Neutrophils Absolute 5 71 Thousands/µL      Immature Grans Absolute 0 03 Thousand/uL      Lymphocytes Absolute 0 90 Thousands/µL      Monocytes Absolute 0 44 Thousand/µL      Eosinophils Absolute 0 15 Thousand/µL      Basophils Absolute 0 03 Thousands/µL     Narrative: This is an appended report  These results have been appended to a previously verified report      Comprehensive metabolic panel [[id number]]  (Abnormal) Collected: 02/27/23 1037    Lab Status: Final result Specimen: Blood from Arm, Right Updated: 02/27/23 1104     Sodium 138 mmol/L Potassium 4 7 mmol/L      Chloride 104 mmol/L      CO2 27 mmol/L      ANION GAP 7 mmol/L      BUN 33 mg/dL      Creatinine 1 27 mg/dL      Glucose 97 mg/dL      Calcium 10 0 mg/dL      AST 10 U/L      ALT 8 U/L      Alkaline Phosphatase 98 U/L      Total Protein 6 1 g/dL      Albumin 4 1 g/dL      Total Bilirubin 0 70 mg/dL      eGFR 40 ml/min/1 73sq m     Narrative:      [place] guidelines for Chronic Kidney Disease (CKD):   •  Stage 1 with normal or high GFR (GFR > 90 mL/min/1 73 square meters)  •  Stage 2 Mild CKD (GFR = 60-89 mL/min/1 73 square meters)  •  Stage 3A Moderate CKD (GFR = 45-59 mL/min/1 73 square meters)  •  Stage 3B Moderate CKD (GFR = 30-44 mL/min/1 73 square meters)  •  Stage 4 Severe CKD (GFR = 15-29 mL/min/1 73 square meters)  •  Stage 5 End Stage CKD (GFR <15 mL/min/1 73 square meters)  Note: GFR calculation is accurate only with a steady state creatinine    Protime-INR [[id number]]  (Normal) Collected: 02/27/23 1037    Lab Status: Final result Specimen: Blood from Arm, Right Updated: 02/27/23 1100     Protime 12 4 seconds      INR 0 91    APTT [[id number]]  (Normal) Collected: 02/27/23 1037    Lab Status: Final result Specimen: Blood from Arm, Right Updated: 02/27/23 1100     PTT 25 seconds                  No orders to display              Procedures  ECG 12 Lead Documentation Only    Date/Time: 2/27/2023 11:00 AM  Performed by: Reid Corado MD  Authorized by: Reid Corado MD     Indications / Diagnosis:  Anemia  ECG reviewed by me, the ED Provider: yes    Patient location:  ED  Interpretation:     Interpretation: abnormal    Rate:     ECG rate:  69    ECG rate assessment: normal    Rhythm:     Rhythm: sinus rhythm    Ectopy:     Ectopy: none    QRS:     QRS axis:  Left    QRS intervals:  Normal  Conduction:     Conduction: normal    ST segments:     ST segments:  Normal  T waves:     T waves: normal               ED Course Medical Decision Making  Patient has baseline anemia in the 9-1/2-10-1/2 range  May have thalassemia  She recently dropped almost half of her circulating blood volume asymptomatically  She is guaiac positive indicating gastrointestinal bleeding  Protonix has been ordered, blood will be transfused  Last colonoscopy and endoscopy was about 2 years ago by patient report    Anemia: acute illness or injury  GI bleed: acute illness or injury  Amount and/or Complexity of Data Reviewed  Labs: ordered  Risk  Prescription drug management  Decision regarding hospitalization  Disposition  Final diagnoses:   Anemia   GI bleed     Time reflects when diagnosis was documented in both MDM as applicable and the Disposition within this note     Time User Action Codes Description Comment    2/27/2023 12:12 PM Kaye Root [D64 9] Anemia     2/27/2023 12:12 PM Kaye Root [K92 2] GI bleed       ED Disposition     ED Disposition   Admit    Condition   Stable    Date/Time   Mon Feb 27, 2023 12:12 PM    Comment   Case was discussed with hospitalist and the patient's admission status was agreed to be Admission Status: observation status to the service of Dr Gabriella Carlos    None         Patient's Medications   Discharge Prescriptions    No medications on file       No discharge procedures on file      PDMP Review     None          ED Provider  Electronically Signed by           Miya Paris MD  02/27/23 9486

## 2023-02-27 NOTE — H&P (VIEW-ONLY)
Consultation -[facility]   Salvador Degroot 76 y o  female MRN: [id number]     Unit/Bed#: ED 09 Encounter: [id number]        Physician Requesting Consult: Dr Beverly Hobson     Reason for Consult / Principal Problem: iron def anemia, heme pos stool     HPI:  75 y/o female withhistory of anemia   She states she has not needed a blood transfusion in years  Brit Peters has been under the care of a hematologist and states that she had multiple tests in past for workup   She recently has noted increased lightheadedness and dizziness particularly with exertion or changing position    Denies hematemesis hemoptysis vaginal or rectal bleeding   She denies chest pain or abdominal pain   Patient had recent blood work done that reported a hemoglobin of 5 5, noted to be 5 7 on admission with microcytic indices   Patient was referred to the ED for evaluation and treatment  Hemoglobin in June 2021 was 10 6  Patient had EGD and colonoscopy in June 2020 and patient was noted to have mild duodenitis in the bulb and second portion and biopsies were taken and was noted to have gastritis which was biopsied with grade B esophagitis and was recommended to continue on pantoprazole  Colonoscopy at that time revealed mild erythematous changes at ileocecal valve with a normal terminal ileum mild sigmoid diverticulosis and internal hemorrhoids with otherwise normal colonoscopy with good prep  No capsule endoscopy was pursued at that time as patient was thought to perhaps have poor oral intake or malabsorptive process  Biopsies on EGD and colonoscopy were normal with no evidence of H  pylori or celiac disease  Patient reports she has been taking Plavix for several years after her stroke but denies any NSAID use  Patient denies any dark stools and states that she believes she was taking iron 3 times a week and previously did require iron infusions  Son is at bedside    Patient denies any significant abdominal pain or upper GI symptoms  Patient reports that her appetite comes and goes with but does not necessarily have any dietary restrictions such as vegetarian diet  She otherwise denies any family history of gastrointestinal malignancy  She believes she lost a few pounds  Patient complaining that she is hungry  Patient had upcoming appointment for heme Onc tomorrow but was referred to ER        Allergies:          Allergies   Allergen Reactions   • Aleve [Naproxen] Other (See Comments)       "weird" sensation entire body   • Sulfa Antibiotics Rash         Medications:  Current Facility-Administered Medications:   •  pantoprazole (PROTONIX) injection 40 mg, 40 mg, Intravenous, Once, Erasmo Domínguez MD     Current Outpatient Medications:   •  atorvastatin (LIPITOR) 80 mg tablet, TAKE 1 TABLET BY MOUTH IN  THE EVENING, Disp: 90 tablet, Rfl: 3  •  clopidogrel (PLAVIX) 75 mg tablet, TAKE 1 TABLET BY MOUTH  DAILY, Disp: 90 tablet, Rfl: 3  •  ferrous sulfate 324 (65 Fe) mg, Take 1 tablet on Monday, Wed and Friday, Disp: 180 tablet, Rfl: 2  •  folic acid (FOLVITE) 1 mg tablet, Take 1 tablet (1,000 mcg total) by mouth daily, Disp: 90 tablet, Rfl: 1  •  glimepiride (AMARYL) 1 mg tablet, TAKE 1 TABLET BY MOUTH  DAILY WITH BREAKFAST, Disp: 90 tablet, Rfl: 0  •  magnesium Oxide (MAG-OX) 400 mg TABS, TAKE 1 TABLET (400 MG TOTAL) BY MOUTH 2 TIMES A DAY, Disp: 180 tablet, Rfl: 0  •  metFORMIN (GLUCOPHAGE) 1000 MG tablet, TAKE 1 TABLET BY MOUTH  TWICE DAILY, Disp: 180 tablet, Rfl: 3  •  pantoprazole (PROTONIX) 40 mg tablet, TAKE 1 TABLET BY MOUTH  DAILY, Disp: 90 tablet, Rfl: 1  •  quinapril (ACCUPRIL) 40 MG tablet, TAKE 1 TABLET BY MOUTH  DAILY AT BEDTIME, Disp: 90 tablet, Rfl: 3     Past Medical history:  Medical History        Past Medical History:   Diagnosis Date   • Anemia     • Arthritis     • Capsulitis of foot       Last assessed 07/29/13   • Diabetes [facility])       type 2   • Full dentures     • Ganglion       Last assessed 07/29/13 • Hearing decreased     • Hyperlipidemia     • Hypertension     • Magnesium deficiency     • Sciatica     • Wears glasses              Past Surgical History:   Surgical History         Past Surgical History:   Procedure Laterality Date   • CATARACT EXTRACTION       • COLONOSCOPY       • DILATION AND CURETTAGE OF UTERUS         x 4   • HAND SURGERY Left       fx repaired w/wrist bone   • HYSTERECTOMY         left ovary remains   • KNEE ARTHROSCOPY Left     • LIPOMA RESECTION         removed from back and left buttock   • [place] XCAPSL CTRC RMVL INSJ IO LENS PROSTH W/O ECP Right 1/24/2019     Procedure: EXTRACTION EXTRACAPSULAR CATARACT PHACO INTRAOCULAR LENS (IOL); Surgeon: Noelle Cortés MD;  Location: [facility] MAIN OR;  Service: Ophthalmology   • [place] XCAPSL CTRC RMVL INSJ IO LENS PROSTH W/O ECP Left 2/14/2019     Procedure: EXTRACTION EXTRACAPSULAR CATARACT PHACO INTRAOCULAR LENS (IOL);   Surgeon: Noelle Cortés MD;  Location: [facility] MAIN OR;  Service: Ophthalmology   • TONSILLECTOMY         and adenoids   • TUBAL LIGATION       • US GUIDED THYROID BIOPSY   7/26/2021            Family History:   Family History         Family History   Problem Relation Age of Onset   • Leukemia Father     • Early death Father 39         leukemia   • Stomach cancer Maternal Grandfather     • Diabetes Paternal Grandfather     • Diabetes Sister           insulin dependent   • Diabetes Brother           insulin   • Diabetes Sister           insulin   • Diabetes Sister           insulin            Social history:   Social History               Socioeconomic History   • Marital status:        Spouse name: Not on file   • Number of children: Not on file   • Years of education: Not on file   • Highest education level: Not on file   Occupational History   • Not on file   Tobacco Use   • Smoking status: Every Day       Packs/day: 0 25       Years: 35 00       Pack years: 8 75       Types: Cigarettes       Last attempt to quit: 6/5/2021       Years since quittin 7   • Smokeless tobacco: Never   Vaping Use   • Vaping Use: Never used   Substance and Sexual Activity   • Alcohol use: Not Currently       Comment: Rarely   • Drug use: Never   • Sexual activity: Not Currently   Other Topics Concern   • Not on file   Social History Narrative   • Not on file      Social Determinants of Health      Financial Resource Strain: Not on file   Food Insecurity: Not on file   Transportation Needs: Not on file   Physical Activity: Not on file   Stress: Not on file   Social Connections: Not on file   Intimate Partner Violence: Not on file   Housing Stability: Not on file            Review of Systems: All other systems were reviewed and were negative, otherwise please refer to HPI     Physical Exam: /58 (BP Location: Right arm)   Pulse 90   Temp 98 2 °F (36 8 °C) (Oral)   Resp 18   Wt 53 3 kg (117 lb 9 6 oz)   LMP  (LMP Unknown)   SpO2 99%   BMI 22 97 kg/m²      General Appearance:    Alert, cooperative, no distress, appears stated age   Head:    Normocephalic, without obvious abnormality, atraumatic   Eyes:    No scleral icterus               Mouth:  Mucosa moist   Neck:   Supple, symmetrical, trachea midline, no thyromegaly         Lungs:     Clear to auscultation bilaterally, respirations unlabored         Heart:    Regular rate and rhythm, S1 and S2 normal, no murmur, rub   or gallop      Abdomen:     Soft, non-tender, bowel sounds active all four quadrants,     no r/r/g   Genitalia:   deferred   Rectal:   deferred   Extremities:   Extremities normal,no cyanosis or edema         Skin:   Skin color, texture, turgor normal, no rashes or lesions         Neurologic:   Grossly intact, no focal deficit               Lab Results:   Recent Results         Recent Results (from the past 24 hour(s))   CBC and differential     Collection Time: 23 10:37 AM   Result Value Ref Range     WBC 7 26 4 31 - 10 16 Thousand/uL     RBC 2 63 (L) 3 81 - 5 12 Million/uL   Hemoglobin 5 7 (LL) 11 5 - 15 4 g/dL     Hematocrit 20 5 (L) 34 8 - 46 1 %     MCV 78 (L) 82 - 98 fL     MCH 21 7 (L) 26 8 - 34 3 pg     MCHC 27 8 (L) 31 4 - 37 4 g/dL     RDW 15 9 (H) 11 6 - 15 1 %     MPV 9 8 8 9 - 12 7 fL     Platelets 252 (H) 130 - 390 Thousands/uL     nRBC 0 /100 WBCs     Neutrophils Relative 80 (H) 43 - 75 %     Immat GRANS % 0 0 - 2 %     Lymphocytes Relative 12 (L) 14 - 44 %     Monocytes Relative 6 4 - 12 %     Eosinophils Relative 2 0 - 6 %     Basophils Relative 0 0 - 1 %     Neutrophils Absolute 5 71 1 85 - 7 62 Thousands/µL     Immature Grans Absolute 0 03 0 00 - 0 20 Thousand/uL     Lymphocytes Absolute 0 90 0 60 - 4 47 Thousands/µL     Monocytes Absolute 0 44 0 17 - 1 22 Thousand/µL     Eosinophils Absolute 0 15 0 00 - 0 61 Thousand/µL     Basophils Absolute 0 03 0 00 - 0 10 Thousands/µL   Protime-INR     Collection Time: 02/27/23 10:37 AM   Result Value Ref Range     Protime 12 4 11 6 - 14 5 seconds     INR 0 91 0 84 - 1 19   APTT     Collection Time: 02/27/23 10:37 AM   Result Value Ref Range     PTT 25 23 - 37 seconds   Comprehensive metabolic panel     Collection Time: 02/27/23 10:37 AM   Result Value Ref Range     Sodium 138 135 - 147 mmol/L     Potassium 4 7 3 5 - 5 3 mmol/L     Chloride 104 96 - 108 mmol/L     CO2 27 21 - 32 mmol/L     ANION GAP 7 4 - 13 mmol/L     BUN 33 (H) 5 - 25 mg/dL     Creatinine 1 27 0 60 - 1 30 mg/dL     Glucose 97 65 - 140 mg/dL     Calcium 10 0 8 4 - 10 2 mg/dL     AST 10 (L) 13 - 39 U/L     ALT 8 7 - 52 U/L     Alkaline Phosphatase 98 34 - 104 U/L     Total Protein 6 1 (L) 6 4 - 8 4 g/dL     Albumin 4 1 3 5 - 5 0 g/dL     Total Bilirubin 0 70 0 20 - 1 00 mg/dL     eGFR 40 ml/min/1 73sq m   Type and screen     Collection Time: 02/27/23 10:37 AM   Result Value Ref Range     ABO Grouping A       Rh Factor Positive       Antibody Screen Negative       Specimen Expiration Date 20230302     Prepare Leukoreduced RBC: 2 Units     Collection Time: 02/27/23 11:51 AM   Result Value Ref Range     Unit Product Code I0449C06       Unit Number [id number]       Unit ABO A       Unit RH POS       Crossmatch Compatible       Unit Dispense Status Crossmatched       Unit Product Volume 350 mL     Unit Product Code C2773Z98       Unit Number [id number]       Unit ABO A       Unit RH NEG       Crossmatch Compatible       Unit Dispense Status Crossmatched       Unit Product Volume 351 ml            Imaging Studies: No results found      Assessment/Plan:   Iron deficiency anemia-  Patient does have chronic iron deficiency anemia and presents to ER with abnormal laboratory values and noted to have hemoglobin of 5 7 on admission with microcytic indices and heme pos stool  Patient denies any significant upper GI symptoms and does have history of esophagitis in the past   Biopsies in the past were negative for celiac disease as well as H  Pylori  Denies any regular NSAID use  Does take Protonix daily at home per chart   -Last dose of Plavix was taken yesterday and will continue to hold for further gi evaluation  -Discussed with patient and patient would like to eat today and plan is for 2 units of packed RBCs and we will then transition to clear liquid diet and plan for EGD and colonoscopy on Wednesday  -No evidence of overt bleeding according to nursing and stool was brown in color  -If EGD and colonoscopy are negative for bleeding source again then would recommend capsule endoscopy as an outpatient for further evaluation    -Monitor serial H&H  -Can continue IV Protonix twice daily at this time  -Spoke with hospitalist team regarding case as well as ER nurse  -Spoke with son at bedside     Thank you for the consultation    Case will be discussed with Dr Justin Tamez

## 2023-02-27 NOTE — CONSULTS
Consultation -[facility]   Fernanda Chilel 76 y o  female MRN: [id number]     Unit/Bed#: ED 09 Encounter: [id number]        Physician Requesting Consult: Dr Kathy Gamino     Reason for Consult / Principal Problem: iron def anemia, heme pos stool     HPI:  75 y/o female withhistory of anemia   She states she has not needed a blood transfusion in years  Sybil Ewing has been under the care of a hematologist and states that she had multiple tests in past for workup   She recently has noted increased lightheadedness and dizziness particularly with exertion or changing position    Denies hematemesis hemoptysis vaginal or rectal bleeding   She denies chest pain or abdominal pain   Patient had recent blood work done that reported a hemoglobin of 5 5, noted to be 5 7 on admission with microcytic indices   Patient was referred to the ED for evaluation and treatment  Hemoglobin in June 2021 was 10 6  Patient had EGD and colonoscopy in June 2020 and patient was noted to have mild duodenitis in the bulb and second portion and biopsies were taken and was noted to have gastritis which was biopsied with grade B esophagitis and was recommended to continue on pantoprazole  Colonoscopy at that time revealed mild erythematous changes at ileocecal valve with a normal terminal ileum mild sigmoid diverticulosis and internal hemorrhoids with otherwise normal colonoscopy with good prep  No capsule endoscopy was pursued at that time as patient was thought to perhaps have poor oral intake or malabsorptive process  Biopsies on EGD and colonoscopy were normal with no evidence of H  pylori or celiac disease  Patient reports she has been taking Plavix for several years after her stroke but denies any NSAID use  Patient denies any dark stools and states that she believes she was taking iron 3 times a week and previously did require iron infusions  Son is at bedside    Patient denies any significant abdominal pain or upper GI symptoms  Patient reports that her appetite comes and goes with but does not necessarily have any dietary restrictions such as vegetarian diet  She otherwise denies any family history of gastrointestinal malignancy  She believes she lost a few pounds  Patient complaining that she is hungry  Patient had upcoming appointment for heme Onc tomorrow but was referred to ER        Allergies:          Allergies   Allergen Reactions   • Aleve [Naproxen] Other (See Comments)       "weird" sensation entire body   • Sulfa Antibiotics Rash         Medications:  Current Facility-Administered Medications:   •  pantoprazole (PROTONIX) injection 40 mg, 40 mg, Intravenous, Once, Christy Barragan MD     Current Outpatient Medications:   •  atorvastatin (LIPITOR) 80 mg tablet, TAKE 1 TABLET BY MOUTH IN  THE EVENING, Disp: 90 tablet, Rfl: 3  •  clopidogrel (PLAVIX) 75 mg tablet, TAKE 1 TABLET BY MOUTH  DAILY, Disp: 90 tablet, Rfl: 3  •  ferrous sulfate 324 (65 Fe) mg, Take 1 tablet on Monday, Wed and Friday, Disp: 180 tablet, Rfl: 2  •  folic acid (FOLVITE) 1 mg tablet, Take 1 tablet (1,000 mcg total) by mouth daily, Disp: 90 tablet, Rfl: 1  •  glimepiride (AMARYL) 1 mg tablet, TAKE 1 TABLET BY MOUTH  DAILY WITH BREAKFAST, Disp: 90 tablet, Rfl: 0  •  magnesium Oxide (MAG-OX) 400 mg TABS, TAKE 1 TABLET (400 MG TOTAL) BY MOUTH 2 TIMES A DAY, Disp: 180 tablet, Rfl: 0  •  metFORMIN (GLUCOPHAGE) 1000 MG tablet, TAKE 1 TABLET BY MOUTH  TWICE DAILY, Disp: 180 tablet, Rfl: 3  •  pantoprazole (PROTONIX) 40 mg tablet, TAKE 1 TABLET BY MOUTH  DAILY, Disp: 90 tablet, Rfl: 1  •  quinapril (ACCUPRIL) 40 MG tablet, TAKE 1 TABLET BY MOUTH  DAILY AT BEDTIME, Disp: 90 tablet, Rfl: 3     Past Medical history:  Medical History        Past Medical History:   Diagnosis Date   • Anemia     • Arthritis     • Capsulitis of foot       Last assessed 07/29/13   • Diabetes [facility])       type 2   • Full dentures     • Ganglion       Last assessed 07/29/13 • Hearing decreased     • Hyperlipidemia     • Hypertension     • Magnesium deficiency     • Sciatica     • Wears glasses              Past Surgical History:   Surgical History         Past Surgical History:   Procedure Laterality Date   • CATARACT EXTRACTION       • COLONOSCOPY       • DILATION AND CURETTAGE OF UTERUS         x 4   • HAND SURGERY Left       fx repaired w/wrist bone   • HYSTERECTOMY         left ovary remains   • KNEE ARTHROSCOPY Left     • LIPOMA RESECTION         removed from back and left buttock   • [place] XCAPSL CTRC RMVL INSJ IO LENS PROSTH W/O ECP Right 1/24/2019     Procedure: EXTRACTION EXTRACAPSULAR CATARACT PHACO INTRAOCULAR LENS (IOL); Surgeon: Rc Rodrigez MD;  Location: [facility] MAIN OR;  Service: Ophthalmology   • [place] XCAPSL CTRC RMVL INSJ IO LENS PROSTH W/O ECP Left 2/14/2019     Procedure: EXTRACTION EXTRACAPSULAR CATARACT PHACO INTRAOCULAR LENS (IOL);   Surgeon: Rc Rodrigez MD;  Location: [facility] MAIN OR;  Service: Ophthalmology   • TONSILLECTOMY         and adenoids   • TUBAL LIGATION       • US GUIDED THYROID BIOPSY   7/26/2021            Family History:   Family History         Family History   Problem Relation Age of Onset   • Leukemia Father     • Early death Father 39         leukemia   • Stomach cancer Maternal Grandfather     • Diabetes Paternal Grandfather     • Diabetes Sister           insulin dependent   • Diabetes Brother           insulin   • Diabetes Sister           insulin   • Diabetes Sister           insulin            Social history:   Social History               Socioeconomic History   • Marital status:        Spouse name: Not on file   • Number of children: Not on file   • Years of education: Not on file   • Highest education level: Not on file   Occupational History   • Not on file   Tobacco Use   • Smoking status: Every Day       Packs/day: 0 25       Years: 35 00       Pack years: 8 75       Types: Cigarettes       Last attempt to quit: 6/5/2021       Years since quittin 7   • Smokeless tobacco: Never   Vaping Use   • Vaping Use: Never used   Substance and Sexual Activity   • Alcohol use: Not Currently       Comment: Rarely   • Drug use: Never   • Sexual activity: Not Currently   Other Topics Concern   • Not on file   Social History Narrative   • Not on file      Social Determinants of Health      Financial Resource Strain: Not on file   Food Insecurity: Not on file   Transportation Needs: Not on file   Physical Activity: Not on file   Stress: Not on file   Social Connections: Not on file   Intimate Partner Violence: Not on file   Housing Stability: Not on file            Review of Systems: All other systems were reviewed and were negative, otherwise please refer to HPI     Physical Exam: /58 (BP Location: Right arm)   Pulse 90   Temp 98 2 °F (36 8 °C) (Oral)   Resp 18   Wt 53 3 kg (117 lb 9 6 oz)   LMP  (LMP Unknown)   SpO2 99%   BMI 22 97 kg/m²      General Appearance:    Alert, cooperative, no distress, appears stated age   Head:    Normocephalic, without obvious abnormality, atraumatic   Eyes:    No scleral icterus               Mouth:  Mucosa moist   Neck:   Supple, symmetrical, trachea midline, no thyromegaly         Lungs:     Clear to auscultation bilaterally, respirations unlabored         Heart:    Regular rate and rhythm, S1 and S2 normal, no murmur, rub   or gallop      Abdomen:     Soft, non-tender, bowel sounds active all four quadrants,     no r/r/g   Genitalia:   deferred   Rectal:   deferred   Extremities:   Extremities normal,no cyanosis or edema         Skin:   Skin color, texture, turgor normal, no rashes or lesions         Neurologic:   Grossly intact, no focal deficit               Lab Results:   Recent Results         Recent Results (from the past 24 hour(s))   CBC and differential     Collection Time: 23 10:37 AM   Result Value Ref Range     WBC 7 26 4 31 - 10 16 Thousand/uL     RBC 2 63 (L) 3 81 - 5 12 Million/uL   Hemoglobin 5 7 (LL) 11 5 - 15 4 g/dL     Hematocrit 20 5 (L) 34 8 - 46 1 %     MCV 78 (L) 82 - 98 fL     MCH 21 7 (L) 26 8 - 34 3 pg     MCHC 27 8 (L) 31 4 - 37 4 g/dL     RDW 15 9 (H) 11 6 - 15 1 %     MPV 9 8 8 9 - 12 7 fL     Platelets 242 (H) 742 - 390 Thousands/uL     nRBC 0 /100 WBCs     Neutrophils Relative 80 (H) 43 - 75 %     Immat GRANS % 0 0 - 2 %     Lymphocytes Relative 12 (L) 14 - 44 %     Monocytes Relative 6 4 - 12 %     Eosinophils Relative 2 0 - 6 %     Basophils Relative 0 0 - 1 %     Neutrophils Absolute 5 71 1 85 - 7 62 Thousands/µL     Immature Grans Absolute 0 03 0 00 - 0 20 Thousand/uL     Lymphocytes Absolute 0 90 0 60 - 4 47 Thousands/µL     Monocytes Absolute 0 44 0 17 - 1 22 Thousand/µL     Eosinophils Absolute 0 15 0 00 - 0 61 Thousand/µL     Basophils Absolute 0 03 0 00 - 0 10 Thousands/µL   Protime-INR     Collection Time: 02/27/23 10:37 AM   Result Value Ref Range     Protime 12 4 11 6 - 14 5 seconds     INR 0 91 0 84 - 1 19   APTT     Collection Time: 02/27/23 10:37 AM   Result Value Ref Range     PTT 25 23 - 37 seconds   Comprehensive metabolic panel     Collection Time: 02/27/23 10:37 AM   Result Value Ref Range     Sodium 138 135 - 147 mmol/L     Potassium 4 7 3 5 - 5 3 mmol/L     Chloride 104 96 - 108 mmol/L     CO2 27 21 - 32 mmol/L     ANION GAP 7 4 - 13 mmol/L     BUN 33 (H) 5 - 25 mg/dL     Creatinine 1 27 0 60 - 1 30 mg/dL     Glucose 97 65 - 140 mg/dL     Calcium 10 0 8 4 - 10 2 mg/dL     AST 10 (L) 13 - 39 U/L     ALT 8 7 - 52 U/L     Alkaline Phosphatase 98 34 - 104 U/L     Total Protein 6 1 (L) 6 4 - 8 4 g/dL     Albumin 4 1 3 5 - 5 0 g/dL     Total Bilirubin 0 70 0 20 - 1 00 mg/dL     eGFR 40 ml/min/1 73sq m   Type and screen     Collection Time: 02/27/23 10:37 AM   Result Value Ref Range     ABO Grouping A       Rh Factor Positive       Antibody Screen Negative       Specimen Expiration Date 20230302     Prepare Leukoreduced RBC: 2 Units     Collection Time: 02/27/23 11:51 AM   Result Value Ref Range     Unit Product Code [id number]       Unit Number [id number]       Unit ABO A       Unit RH POS       Crossmatch Compatible       Unit Dispense Status Crossmatched       Unit Product Volume 350 mL     Unit Product Code [id number]       Unit Number [id number]       Unit ABO A       Unit RH NEG       Crossmatch Compatible       Unit Dispense Status Crossmatched       Unit Product Volume 351 ml            Imaging Studies: No results found      Assessment/Plan:   Iron deficiency anemia-  Patient does have chronic iron deficiency anemia and presents to ER with abnormal laboratory values and noted to have hemoglobin of 5 7 on admission with microcytic indices and heme pos stool  Patient denies any significant upper GI symptoms and does have history of esophagitis in the past   Biopsies in the past were negative for celiac disease as well as H  Pylori  Denies any regular NSAID use  Does take Protonix daily at home per chart   -Last dose of Plavix was taken yesterday and will continue to hold for further gi evaluation  -Discussed with patient and patient would like to eat today and plan is for 2 units of packed RBCs and we will then transition to clear liquid diet and plan for EGD and colonoscopy on Wednesday  -No evidence of overt bleeding according to nursing and stool was brown in color  -If EGD and colonoscopy are negative for bleeding source again then would recommend capsule endoscopy as an outpatient for further evaluation    -Monitor serial H&H  -Can continue IV Protonix twice daily at this time  -Spoke with hospitalist team regarding case as well as ER nurse  -Spoke with son at bedside     Thank you for the consultation    Case will be discussed with Dr Riccardo Bailey

## 2023-02-27 NOTE — H&P
Lonny 36 1945, 76 y o  female MRN: [id number]  Unit/Bed#: [address] [place] [address] Encounter: [id number]  Primary Care Provider: Mellissa Casillas MD   Date and time admitted to hospital: 2/27/2023 10:11 AM    * Iron deficiency anemia due to chronic blood loss  Assessment & Plan  POA  · Patient presented to the ED sent by Dr Arcadio Mooney (oncology) office with abnormal lab work   · Hemoglobin on admission was noted to be 5 7 with baseline June 2021 at 10 6  · Hemoccult positive  · ED gave Protonix 40 mg IV x1 dose  · Transfused 2 units PRBC  · Will continue Protonix 40 mg IV twice daily  · Continue Iron supplement  · Monitor and trend hemoglobin and hematocrit daily  · Transfuse to keep hemoglobin > 7  · GI consult, will plan for EGD/colonoscopy on Wednesday 3/1  · Supportive care    CVA (cerebral vascular accident) [facility])  Assessment & Plan  Holding Plavix for GI Bleed/ Positive hemoccult    GERD (gastroesophageal reflux disease)  Assessment & Plan  Continue Protonix    Essential hypertension  Assessment & Plan  Lisinopril substituted for Quinapril in hospital    Tobacco abuse  Assessment & Plan  Counseled on complete cessation  Nicotine patch    Hyperlipidemia  Assessment & Plan  Continue Statin    Type 2 diabetes mellitus with diabetic polyneuropathy ([facility] [address] )  Assessment & Plan  Lab Results   Component Value Date    HGBA1C 5 6 09/12/2022       No results for input(s): POCGLU in the last 72 hours  Blood Sugar Average: Last 72 hrs:  Holding Metformin  Continue home medication glimepiride  ACCU Checks ACHS  SSI  Hypoglycemic protocol    VTE Pharmacologic Prophylaxis:   Moderate Risk (Score 3-4) - Pharmacological DVT Prophylaxis Contraindicated  Sequential Compression Devices Ordered  Code Status: Level 1 - Full Code     Discussion with family: Updated  (son) at bedside      Anticipated Length of Stay: Patient will be admitted on an inpatient basis with an anticipated length of stay of greater than 2 midnights secondary to Questionable GI bleed, EGD/colonoscopy on Wednesday 3/1  Total Time Spent on Date of Encounter in care of patient: 40 minutes This time was spent on one or more of the following: performing physical exam; counseling and coordination of care; obtaining or reviewing history; documenting in the medical record; reviewing/ordering tests, medications or procedures; communicating with other healthcare professionals and discussing with patient's family/caregivers  Chief Complaint: Abnormal lab (hemoglobin 5 7) sent in by the oncology office for evaluation and treatment  History of Present Illness:  Lilian Dye is a 76 y o  female with a PMH of hypertension, diabetes mellitus type 2, CKD stage III, hyperlipidemia, tobacco abuse, iron deficiency anemia, GERD, and prior CVA of 1/2020   who was sent to the ED from the heme oncology office for evaluation and treatment of hemoglobin of 5 5  In ED, Protonix 40 mg IV was administered and 2 units of PRBC ordered to be transfused  On examination, patient complaint of lightheadedness and dizziness which has progressively worsened with exertion or change in position in the past week  GI was consulted and plan to perform EGD/colonoscopy on Wednesday 3/1  Patient denies black stool or blood in stool, hematemesis or hemoptysis  Review of Systems:  Review of Systems   Constitutional: Positive for fatigue  HENT: Negative for congestion  Eyes: Negative  Respiratory: Positive for shortness of breath  Cardiovascular: Negative  Gastrointestinal: Negative for abdominal pain, blood in stool and diarrhea  Neurological: Positive for dizziness, weakness and light-headedness  Psychiatric/Behavioral: Negative for agitation         Past Medical and Surgical History:   Past Medical History:   Diagnosis Date   • Anemia    • Arthritis    • Capsulitis of foot     Last assessed 07/29/13   • Diabetes ([facility] [address] )     type 2 • Full dentures    • Ganglion     Last assessed 07/29/13   • Hearing decreased    • Hyperlipidemia    • Hypertension    • Magnesium deficiency    • Sciatica    • Wears glasses        Past Surgical History:   Procedure Laterality Date   • CATARACT EXTRACTION     • COLONOSCOPY     • DILATION AND CURETTAGE OF UTERUS      x 4   • HAND SURGERY Left     fx repaired w/wrist bone   • HYSTERECTOMY      left ovary remains   • KNEE ARTHROSCOPY Left    • LIPOMA RESECTION      removed from back and left buttock   • ME XCAPSL CTRC RMVL INSJ IO LENS PROSTH W/O ECP Right 1/24/2019    Procedure: EXTRACTION EXTRACAPSULAR CATARACT PHACO INTRAOCULAR LENS (IOL); Surgeon: Puja Yao MD;  Location: [facility] MAIN OR;  Service: Ophthalmology   • ME XCAPSL CTRC RMVL INSJ IO LENS PROSTH W/O ECP Left 2/14/2019    Procedure: EXTRACTION EXTRACAPSULAR CATARACT PHACO INTRAOCULAR LENS (IOL); Surgeon: Puja Yao MD;  Location: [facility] MAIN OR;  Service: Ophthalmology   • TONSILLECTOMY      and adenoids   • TUBAL LIGATION     • US GUIDED THYROID BIOPSY  7/26/2021       Meds/Allergies:  Prior to Admission medications    Medication Sig Start Date End Date Taking?  Authorizing Provider   atorvastatin (LIPITOR) 80 mg tablet TAKE 1 TABLET BY MOUTH IN  THE EVENING 9/6/22  Yes Mariana Malagon MD   clopidogrel (PLAVIX) 75 mg tablet TAKE 1 TABLET BY MOUTH  DAILY 2/16/23  Yes Mariana Malagon MD   ferrous sulfate 324 (65 Fe) mg Take 1 tablet on Monday, Wed and Friday 9/12/22  Yes Mariana Malagon MD   folic acid (FOLVITE) 1 mg tablet Take 1 tablet (1,000 mcg total) by mouth daily 10/24/22  Yes Yoon Gould PA-C   glimepiride (AMARYL) 1 mg tablet TAKE 1 TABLET BY MOUTH  DAILY WITH BREAKFAST 1/26/23  Yes Naeem Mccoy,    magnesium Oxide (MAG-OX) 400 mg TABS TAKE 1 TABLET (400 MG TOTAL) BY MOUTH 2 TIMES A DAY 1/16/23  Yes Colt Long,    metFORMIN (GLUCOPHAGE) 1000 MG tablet TAKE 1 TABLET BY MOUTH  TWICE DAILY 9/6/22  Yes Mariana Malagon MD pantoprazole (PROTONIX) 40 mg tablet TAKE 1 TABLET BY MOUTH  DAILY 22  Yes Camilo Lazo MD   quinapril (ACCUPRIL) 40 MG tablet TAKE 1 TABLET BY MOUTH  DAILY AT BEDTIME 22  Yes Camilo Lazo MD     I have reviewed home medications with patient personally  Allergies: Allergies   Allergen Reactions   • Aleve [Naproxen] Other (See Comments)     "weird" sensation entire body   • Sulfa Antibiotics Rash       Social History:  Marital Status:    Occupation:   Patient Pre-hospital Living Situation:   Patient Pre-hospital Level of Mobility:   Patient Pre-hospital Diet Restrictions:   Substance Use History:   Social History     Substance and Sexual Activity   Alcohol Use Never    Comment: Rarely     Social History     Tobacco Use   Smoking Status Every Day   • Packs/day: 0 25   • Years: 35 00   • Pack years: 8 75   • Types: Cigarettes   • Last attempt to quit: 2021   • Years since quittin 7   Smokeless Tobacco Never     Social History     Substance and Sexual Activity   Drug Use Never       Family History:  Family History   Problem Relation Age of Onset   • Leukemia Father    • Early death Father 39        leukemia   • Stomach cancer Maternal Grandfather    • Diabetes Paternal Grandfather    • Diabetes Sister         insulin dependent   • Diabetes Brother         insulin   • Diabetes Sister         insulin   • Diabetes Sister         insulin       Physical Exam:     Vitals:   Blood Pressure: (!) 127/42 (23 1534)  Pulse: 77 (23 1534)  Temperature: 98 2 °F (36 8 °C) (23 1534)  Temp Source: Oral (23 1534)  Respirations: 18 (23 1534)  Weight - Scale: 53 3 kg (117 lb 9 6 oz) (23 1003)  SpO2: 96 % (23 1534)    Physical Exam  Constitutional:       Appearance: She is ill-appearing  HENT:      Head: Normocephalic  Mouth/Throat:      Mouth: Mucous membranes are dry  Eyes:      General: No scleral icterus    Cardiovascular:      Rate and Rhythm: Normal rate       Heart sounds: Normal heart sounds  Pulmonary:      Effort: No respiratory distress  Breath sounds: Normal breath sounds  Abdominal:      General: Bowel sounds are normal  There is no distension  Palpations: Abdomen is soft  Skin:     General: Skin is dry  Capillary Refill: Capillary refill takes 2 to 3 seconds  Coloration: Skin is pale  Neurological:      Mental Status: She is alert and oriented to person, place, and time  Psychiatric:         Behavior: Behavior normal          Judgment: Judgment normal          Additional Data:     Lab Results:  Results from last 7 days   Lab Units 02/27/23  1037   WBC Thousand/uL 7 26   HEMOGLOBIN g/dL 5 7*   HEMATOCRIT % 20 5*   PLATELETS Thousands/uL 481*   NEUTROS PCT % 80*   LYMPHS PCT % 12*   MONOS PCT % 6   EOS PCT % 2     Results from last 7 days   Lab Units 02/27/23  1037   SODIUM mmol/L 138   POTASSIUM mmol/L 4 7   CHLORIDE mmol/L 104   CO2 mmol/L 27   BUN mg/dL 33*   CREATININE mg/dL 1 27   ANION GAP mmol/L 7   CALCIUM mg/dL 10 0   ALBUMIN g/dL 4 1   TOTAL BILIRUBIN mg/dL 0 70   ALK PHOS U/L 98   ALT U/L 8   AST U/L 10*   GLUCOSE RANDOM mg/dL 97     Results from last 7 days   Lab Units 02/27/23  1037   INR  0 91     Results from last 7 days   Lab Units 02/27/23  1603   POC GLUCOSE mg/dl 156*               Lines/Drains:  Invasive Devices     Peripheral Intravenous Line  Duration           Peripheral IV 02/27/23 Left;Proximal;Ventral (anterior) Forearm <1 day    Peripheral IV 02/27/23 Proximal;Right;Ventral (anterior) Forearm <1 day                    Imaging: No pertinent imaging reviewed  No orders to display       EKG and Other Studies Reviewed on Admission:   · EKG: NSR  HR 70's  ** Please Note: This note has been constructed using a voice recognition system   **

## 2023-02-27 NOTE — ASSESSMENT & PLAN NOTE
POA   · Patient presented to the ED sent by Dr Arcadio Mooney (oncology) office with abnormal lab work   · Hemoglobin on admission was noted to be 5 7 with baseline June 2021 at 10 6    · Hemoccult positive  · ED gave Protonix 40 mg IV x1 dose  · Transfused 2 units PRBC  · Will continue Protonix 40 mg IV twice daily  · Continue Iron supplement  · Monitor and trend hemoglobin and hematocrit daily  · Transfuse to keep hemoglobin > 7  · GI consult, will plan for EGD/colonoscopy on Wednesday 3/1  · Supportive care

## 2023-02-27 NOTE — TELEPHONE ENCOUNTER
Spoke with patient  Patient denies SOB,or signs of bleeding or any other abnormal symptoms  Stools are within normal limits  "I feel fine"  Patient will have son take her to the ED at [facility]

## 2023-02-27 NOTE — PLAN OF CARE
Problem: PAIN - ADULT  Goal: Verbalizes/displays adequate comfort level or baseline comfort level  Description: Interventions:  - Encourage patient to monitor pain and request assistance  - Assess pain using appropriate pain scale  - Administer analgesics based on type and severity of pain and evaluate response  - Implement non-pharmacological measures as appropriate and evaluate response  - Consider cultural and social influences on pain and pain management  - Notify physician/advanced practitioner if interventions unsuccessful or patient reports new pain  Outcome: Progressing     Problem: INFECTION - ADULT  Goal: Absence or prevention of progression during hospitalization  Description: INTERVENTIONS:  - Assess and monitor for signs and symptoms of infection  - Monitor lab/diagnostic results  - Monitor all insertion sites, i e  indwelling lines, tubes, and drains  - Monitor endotracheal if appropriate and nasal secretions for changes in amount and color  - [place] appropriate cooling/warming therapies per order  - Administer medications as ordered  - Instruct and encourage patient and family to use good hand hygiene technique  - Identify and instruct in appropriate isolation precautions for identified infection/condition  Outcome: Progressing     Problem: SAFETY ADULT  Goal: Patient will remain free of falls  Description: INTERVENTIONS:  - Educate patient/family on patient safety including physical limitations  - Instruct patient to call for assistance with activity   - Consult OT/PT to assist with strengthening/mobility   - Keep Call bell within reach  - Keep bed low and locked with side rails adjusted as appropriate  - Keep care items and personal belongings within reach  - Initiate and maintain comfort rounds  - Make Fall Risk Sign visible to staff  - Offer Toileting every 2 Hours, in advance of need  - Initiate/Maintain bed/chair alarm  - Obtain necessary fall risk management equipment: bed/chair alarm  - Apply yellow socks and bracelet for high fall risk patients  - Consider moving patient to room near nurses station  Outcome: Progressing  Goal: Maintain or return to baseline ADL function  Description: INTERVENTIONS:  -  Assess patient's ability to carry out ADLs; assess patient's baseline for ADL function and identify physical deficits which impact ability to perform ADLs (bathing, care of mouth/teeth, toileting, grooming, dressing, etc )  - Assess/evaluate cause of self-care deficits   - Assess range of motion  - Assess patient's mobility; develop plan if impaired  - Assess patient's need for assistive devices and provide as appropriate  - Encourage maximum independence but intervene and supervise when necessary  - Involve family in performance of ADLs  - Assess for home care needs following discharge   - Consider OT consult to assist with ADL evaluation and planning for discharge  - Provide patient education as appropriate  Outcome: Progressing  Goal: Maintains/Returns to pre admission functional level  Description: INTERVENTIONS:  - Perform BMAT or MOVE assessment daily    - Set and communicate daily mobility goal to care team and patient/family/caregiver  - Collaborate with rehabilitation services on mobility goals if consulted  - Perform Range of Motion 3 times a day  - Reposition patient every 3 hours    - Dangle patient 3 times a day  - Stand patient 3 times a day  - Ambulate patient 3 times a day  - Out of bed to chair 3 times a day   - Out of bed for meals 3 times a day  - Out of bed for toileting  - Record patient progress and toleration of activity level   Outcome: Progressing     Problem: DISCHARGE PLANNING  Goal: Discharge to home or other facility with appropriate resources  Description: INTERVENTIONS:  - Identify barriers to discharge w/patient and caregiver  - Arrange for needed discharge resources and transportation as appropriate  - Identify discharge learning needs (meds, wound care, etc )  - Arrange for interpretive services to assist at discharge as needed  - Refer to Case Management Department for coordinating discharge planning if the patient needs post-hospital services based on physician/advanced practitioner order or complex needs related to functional status, cognitive ability, or social support system  Outcome: Progressing     Problem: Knowledge Deficit  Goal: Patient/family/caregiver demonstrates understanding of disease process, treatment plan, medications, and discharge instructions  Description: Complete learning assessment and assess knowledge base    Interventions:  - Provide teaching at level of understanding  - Provide teaching via preferred learning methods  Outcome: Progressing

## 2023-02-28 PROBLEM — I95.9 HYPOTENSION: Status: ACTIVE | Noted: 2023-02-28

## 2023-02-28 LAB
ABO GROUP BLD BPU: NORMAL
ANION GAP SERPL CALCULATED.3IONS-SCNC: 7 MMOL/L (ref 4–13)
BPU ID: NORMAL
BUN SERPL-MCNC: 33 MG/DL (ref 5–25)
CALCIUM SERPL-MCNC: 8.8 MG/DL (ref 8.4–10.2)
CHLORIDE SERPL-SCNC: 106 MMOL/L (ref 96–108)
CO2 SERPL-SCNC: 23 MMOL/L (ref 21–32)
CREAT SERPL-MCNC: 1.31 MG/DL (ref 0.6–1.3)
CROSSMATCH: NORMAL
ERYTHROCYTE [DISTWIDTH] IN BLOOD BY AUTOMATED COUNT: 16.5 % (ref 11.6–15.1)
EST. AVERAGE GLUCOSE BLD GHB EST-MCNC: 105 MG/DL
FERRITIN SERPL-MCNC: 4 NG/ML (ref 8–388)
GFR SERPL CREATININE-BSD FRML MDRD: 38 ML/MIN/1.73SQ M
GLUCOSE SERPL-MCNC: 111 MG/DL (ref 65–140)
GLUCOSE SERPL-MCNC: 193 MG/DL (ref 65–140)
GLUCOSE SERPL-MCNC: 301 MG/DL (ref 65–140)
GLUCOSE SERPL-MCNC: 61 MG/DL (ref 65–140)
GLUCOSE SERPL-MCNC: 88 MG/DL (ref 65–140)
HBA1C MFR BLD: 5.3 %
HCT VFR BLD AUTO: 23.3 % (ref 34.8–46.1)
HCT VFR BLD AUTO: 27.4 % (ref 34.8–46.1)
HGB BLD-MCNC: 7 G/DL (ref 11.5–15.4)
HGB BLD-MCNC: 8.3 G/DL (ref 11.5–15.4)
IRON SATN MFR SERPL: 3 % (ref 15–50)
IRON SERPL-MCNC: 13 UG/DL (ref 50–170)
MCH RBC QN AUTO: 23.9 PG (ref 26.8–34.3)
MCHC RBC AUTO-ENTMCNC: 30.3 G/DL (ref 31.4–37.4)
MCV RBC AUTO: 79 FL (ref 82–98)
PLATELET # BLD AUTO: 329 THOUSANDS/UL (ref 149–390)
PMV BLD AUTO: 9.8 FL (ref 8.9–12.7)
POTASSIUM SERPL-SCNC: 4 MMOL/L (ref 3.5–5.3)
RBC # BLD AUTO: 3.47 MILLION/UL (ref 3.81–5.12)
SODIUM SERPL-SCNC: 136 MMOL/L (ref 135–147)
TIBC SERPL-MCNC: 440 UG/DL (ref 250–450)
UNIT DISPENSE STATUS: NORMAL
UNIT PRODUCT CODE: NORMAL
UNIT PRODUCT VOLUME: 350 ML
UNIT PRODUCT VOLUME: 350 ML
UNIT PRODUCT VOLUME: 351 ML
UNIT RH: NORMAL
WBC # BLD AUTO: 6.2 THOUSAND/UL (ref 4.31–10.16)

## 2023-02-28 RX ORDER — ACETAMINOPHEN 325 MG/1
650 TABLET ORAL ONCE
Status: COMPLETED | OUTPATIENT
Start: 2023-02-28 | End: 2023-02-28

## 2023-02-28 RX ORDER — SODIUM CHLORIDE 9 MG/ML
50 INJECTION, SOLUTION INTRAVENOUS CONTINUOUS
Status: DISCONTINUED | OUTPATIENT
Start: 2023-02-28 | End: 2023-03-01

## 2023-02-28 RX ORDER — DIPHENHYDRAMINE HCL 25 MG
25 TABLET ORAL ONCE
Status: COMPLETED | OUTPATIENT
Start: 2023-02-28 | End: 2023-02-28

## 2023-02-28 RX ADMIN — INFLUENZA A VIRUS A/VICTORIA/2570/2019 IVR-215 (H1N1) ANTIGEN (FORMALDEHYDE INACTIVATED), INFLUENZA A VIRUS A/DARWIN/9/2021 SAN-010 (H3N2) ANTIGEN (FORMALDEHYDE INACTIVATED), INFLUENZA B VIRUS B/PHUKET/3073/2013 ANTIGEN (FORMALDEHYDE INACTIVATED), AND INFLUENZA B VIRUS B/MICHIGAN/01/2021 ANTIGEN (FORMALDEHYDE INACTIVATED) 0.7 ML: 60; 60; 60; 60 INJECTION, SUSPENSION INTRAMUSCULAR at 10:42

## 2023-02-28 RX ADMIN — ACETAMINOPHEN 650 MG: 325 TABLET ORAL at 02:28

## 2023-02-28 RX ADMIN — DIPHENHYDRAMINE HYDROCHLORIDE 25 MG: 25 TABLET ORAL at 02:29

## 2023-02-28 RX ADMIN — INSULIN LISPRO 1 UNITS: 100 INJECTION, SOLUTION INTRAVENOUS; SUBCUTANEOUS at 12:14

## 2023-02-28 RX ADMIN — GLIMEPIRIDE 1 MG: 2 TABLET ORAL at 09:38

## 2023-02-28 RX ADMIN — ATORVASTATIN CALCIUM 80 MG: 80 TABLET, FILM COATED ORAL at 17:45

## 2023-02-28 RX ADMIN — FERROUS SULFATE TAB 325 MG (65 MG ELEMENTAL FE) 325 MG: 325 (65 FE) TAB at 09:38

## 2023-02-28 RX ADMIN — BISACODYL 20 MG: 5 TABLET, COATED ORAL at 16:34

## 2023-02-28 RX ADMIN — SODIUM CHLORIDE 50 ML/HR: 0.9 INJECTION, SOLUTION INTRAVENOUS at 01:50

## 2023-02-28 RX ADMIN — PANTOPRAZOLE SODIUM 40 MG: 40 INJECTION, POWDER, FOR SOLUTION INTRAVENOUS at 09:38

## 2023-02-28 RX ADMIN — FOLIC ACID 1000 MCG: 1 TABLET ORAL at 09:38

## 2023-02-28 RX ADMIN — POLYETHYLENE GLYCOL 3350, SODIUM SULFATE ANHYDROUS, SODIUM BICARBONATE, SODIUM CHLORIDE, POTASSIUM CHLORIDE 4000 ML: 236; 22.74; 6.74; 5.86; 2.97 POWDER, FOR SOLUTION ORAL at 16:33

## 2023-02-28 RX ADMIN — SODIUM CHLORIDE 50 ML/HR: 0.9 INJECTION, SOLUTION INTRAVENOUS at 09:30

## 2023-02-28 RX ADMIN — SODIUM CHLORIDE 500 ML: 0.9 INJECTION, SOLUTION INTRAVENOUS at 00:40

## 2023-02-28 RX ADMIN — PANTOPRAZOLE SODIUM 40 MG: 40 INJECTION, POWDER, FOR SOLUTION INTRAVENOUS at 21:41

## 2023-02-28 NOTE — PLAN OF CARE
Problem: SAFETY ADULT  Goal: Patient will remain free of falls  Description: INTERVENTIONS:  - Educate patient/family on patient safety including physical limitations  - Instruct patient to call for assistance with activity   - Consult OT/PT to assist with strengthening/mobility   - Keep Call bell within reach  - Keep bed low and locked with side rails adjusted as appropriate  - Keep care items and personal belongings within reach  - Initiate and maintain comfort rounds  - Make Fall Risk Sign visible to staff  - Offer Toileting every 2 Hours, in advance of need  - Initiate/Maintain bed/chair alarm  - Obtain necessary fall risk management equipment: bed/chair alarm  - Apply yellow socks and bracelet for high fall risk patients  - Consider moving patient to room near nurses station  Outcome: Progressing  Goal: Maintain or return to baseline ADL function  Description: INTERVENTIONS:  -  Assess patient's ability to carry out ADLs; assess patient's baseline for ADL function and identify physical deficits which impact ability to perform ADLs (bathing, care of mouth/teeth, toileting, grooming, dressing, etc )  - Assess/evaluate cause of self-care deficits   - Assess range of motion  - Assess patient's mobility; develop plan if impaired  - Assess patient's need for assistive devices and provide as appropriate  - Encourage maximum independence but intervene and supervise when necessary  - Involve family in performance of ADLs  - Assess for home care needs following discharge   - Consider OT consult to assist with ADL evaluation and planning for discharge  - Provide patient education as appropriate  Outcome: Progressing  Goal: Maintains/Returns to pre admission functional level  Description: INTERVENTIONS:  - Perform BMAT or MOVE assessment daily    - Set and communicate daily mobility goal to care team and patient/family/caregiver     - Collaborate with rehabilitation services on mobility goals if consulted  - Perform Range of Motion 3 times a day  - Reposition patient every 3 hours    - Dangle patient 3 times a day  - Stand patient 3 times a day  - Ambulate patient 3 times a day  - Out of bed to chair 3 times a day   - Out of bed for meals 3 times a day  - Out of bed for toileting  - Record patient progress and toleration of activity level   Outcome: Progressing

## 2023-02-28 NOTE — UTILIZATION REVIEW
Initial Clinical Review    Admission: Date/Time/Statement:   Admission Orders (From admission, onward)     Ordered        02/27/23 1412  Inpatient Admission  Once                   Orders Placed This Encounter   Procedures   • Inpatient Admission     Standing Status:   Standing     Number of Occurrences:   1     Order Specific Question:   Level of Care     Answer:   Med Surg [16]     Order Specific Question:   Estimated length of stay     Answer:   More than 2 Midnights     Order Specific Question:   Certification     Answer:   I certify that inpatient services are medically necessary for this patient for a duration of greater than two midnights  See H&P and MD Progress Notes for additional information about the patient's course of treatment  ED Arrival Information     Expected   2/27/2023     Arrival   2/27/2023 09:36    Acuity   Urgent            Means of arrival   Walk-In    Escorted by   Self    Service   Hospitalist    Admission type   Emergency            Arrival complaint   doc sent for labs           Chief Complaint   Patient presents with   • Abnormal Lab     Patient sent by Dr Shawna Patrick of[facility] for lab work,  Hg results in Dr Shawna Patrick of[facility] is 5  Patient states she is a little bit dizzy and has hx of anemia  Initial Presentation:   76 yom to ER from oncologist office for anemia  Pt reports lightheadedness & dizziness increased with position changes; found to have Hgb 5 5 on OP labs, sent to ER  Hx hypertension, diabetes mellitus type 2, CKD stage III, hyperlipidemia, tobacco abuse, iron deficiency anemia, GERD, CVA  Presents pale,  fatigued, SOB, weak & dizzy  Admission work-up showing Hgb 5 7, elevated BUN  Admitted to inpatient status for iron deficiency anemia due to chronic blood loss  Transfusion 2uprbc's ordered, started on IV PPI, GI consulted  Date: 2/28/23   Day 2:   Hgb s/p 3uprbc's @ 8 3  IV PPI per GI  Abd +BS, non-tender, non-distended  Plan EGD/colonoscopy tomorrow       Per GI: iron deficiency anemia  Hgb 5 7 on admission with microcytic indices and heme+ stool  Pt was given 3 units PRBCs since admission, hgb 7 overnight, hgb was 8 3 this am  Plavix on hold, plan EGD/colonoscopy tomorrow  Monitor H&H  Continue IV PPI  Date: 3/1/23    Day 3: Has surpassed a 2nd midnight with active treatments and services  IV PPI & IVF continued  Hgb 9 3, s/p 3uprbc's       ED Triage Vitals [02/27/23 1003]   Temperature Pulse Respirations Blood Pressure SpO2   98 2 °F (36 8 °C) 90 18 138/58 99 %      Temp Source Heart Rate Source Patient Position - Orthostatic VS BP Location FiO2 (%)   Oral Monitor Sitting Right arm --      Pain Score       No Pain          Wt Readings from Last 1 Encounters:   02/27/23 53 3 kg (117 lb 9 6 oz)     Additional Vital Signs:   02/28/23 07:23:06 98 2 °F (36 8 °C) 58 16 122/48 Abnormal  73 92 % -- --   02/28/23 05:22:48 98 4 °F (36 9 °C) 59 14 128/53 78 91 % -- --   02/28/23 03:36:23 97 6 °F (36 4 °C) 57 16 94/56 69 94 % -- --   02/28/23 03:13:26 98 4 °F (36 9 °C) 54 Abnormal  16 88/60 Abnormal  69 94 % -- --   02/28/23 02:36:35 98 6 °F (37 °C) 68 17 81/35 Abnormal   50 Abnormal  97 % -- --   BP: Pre transfusion VS at 02/28/23 0236   02/28/23 01:44:12 -- 61 17 90/45 Abnormal   60 Abnormal  94 % -- --   BP: post 500mL fluid bolus NP yurik aware at 02/28/23 0144   02/27/23 2354 97 6 °F (36 4 °C) 65 16 82/32 Abnormal   -- 94 % -- Lying   BP: NP Yurik aware at 02/27/23 2354   02/27/23 19:35:14 98 8 °F (37 1 °C) 72 20 106/40 Abnormal  62 Abnormal  94 % -- --   02/27/23 18:33:10 97 2 °F (36 2 °C) Abnormal  75 18 111/43 Abnormal  66 94 % -- --   02/27/23 1700 99 °F (37 2 °C) 81 18 121/46 Abnormal  71 94 % None (Room air) Sitting   02/27/23 1634 99 4 °F (37 4 °C) 89 18 122/44 Abnormal  -- 96 % -- --   02/27/23 16:32:30 99 4 °F (37 4 °C) 79 -- 122/44 Abnormal  70 94 % -- --   02/27/23 15:34:31 98 2 °F (36 8 °C) 77 18 127/42 Abnormal  70 96 % None (Room air) Sitting   02/27/23 1534 98 2 °F (36 8 °C) 82 18 127/42 Abnormal  -- 96 % None (Room air) --   02/27/23 14:15:07 98 6 °F (37 °C) 76 20 135/61 86 98 % None (Room air) Sitting     Pertinent Labs/Diagnostic Test Results:   2/27 Ekg=  Rhythm: sinus rhythm     Ectopy:     Ectopy: none     QRS:     QRS axis:  Left     QRS intervals:  Normal   Conduction:     Conduction: normal     ST segments:     ST segments:  Normal   T waves:     T waves: normal      Results from last 7 days   Lab Units 03/01/23  0502 02/28/23  0940 02/28/23  0206 02/27/23  2145 02/27/23  1037 02/27/23  1037 02/25/23  0954   WBC Thousand/uL 9 42 6 20  --  7 76   < > 7 26  --    WHITE BLOOD CELL COUNT  x10E3/uL  --   --   --   --   --   --  6 9   HEMOGLOBIN g/dL 9 3* 8 3* 7 0* 7 3*  --  5 7*  --    HEMOGLOBIN  g/dL  --   --   --   --   --   --  5 5*   HEMATOCRIT % 30 5* 27 4* 23 3* 23 6*  --  20 5*  --    HEMATOCRIT  %  --   --   --   --   --   --  19 2*   PLATELETS Thousands/uL 379 329  --  369   < > 481*  --    PLATELETS  Z68A9/YV  --   --   --   --   --   --  485*   NEUTROS ABS Thousands/µL  --   --   --  5 76  --  5 71  --    NEUTROS ABS  x10E3/uL  --   --   --   --   --   --  4 8    < > = values in this interval not displayed       Results from last 7 days   Lab Units 02/25/23  0954   RETICULOCYTE COUNT % 2 1     Results from last 7 days   Lab Units 03/01/23  0502 02/28/23  0940 02/27/23  1037   SODIUM mmol/L 141 136 138   POTASSIUM mmol/L 3 9 4 0 4 7   CHLORIDE mmol/L 112* 106 104   CO2 mmol/L 23 23 27   ANION GAP mmol/L 6 7 7   BUN mg/dL 24 33* 33*   CREATININE mg/dL 1 11 1 31* 1 27   EGFR ml/min/1 73sq m 47 38 40   CALCIUM mg/dL 9 6 8 8 10 0     Results from last 7 days   Lab Units 02/27/23  1037   AST U/L 10*   ALT U/L 8   ALK PHOS U/L 98   TOTAL PROTEIN g/dL 6 1*   ALBUMIN g/dL 4 1   TOTAL BILIRUBIN mg/dL 0 70     Results from last 7 days   Lab Units 03/01/23  0709 02/28/23  2135 02/28/23  1637 02/28/23  1208 02/28/23  0741 02/27/23 2014 02/27/23  1603   POC GLUCOSE mg/dl 123 88 61* 193* 111 192* 156*     Results from last 7 days   Lab Units 03/01/23  0502 02/28/23  0940 02/27/23  1037   GLUCOSE RANDOM mg/dL 99 301* 97       Results from last 7 days   Lab Units 02/27/23  1037   PROTIME seconds 12 4   INR  0 91   PTT seconds 25     Results from last 7 days   Lab Units 02/27/23  1037   TSH 3RD GENERATON uIU/mL 1 481       Results from last 7 days   Lab Units 02/27/23  1037   FERRITIN ng/mL 4*     ED Treatment:   Medication Administration from 02/27/2023 0848 to 02/27/2023 1315       Date/Time Order Dose Route Action     02/27/2023 1200 EST pantoprazole (PROTONIX) injection 40 mg 40 mg Intravenous Given        Past Medical History:   Diagnosis Date   • Anemia    • Arthritis    • Capsulitis of foot     Last assessed 07/29/13   • Diabetes ([facility] )     type 2   • Full dentures    • Ganglion     Last assessed 07/29/13   • Hearing decreased    • Hyperlipidemia    • Hypertension    • Magnesium deficiency    • Sciatica    • Wears glasses      Present on Admission:  • Essential hypertension  • CVA (cerebral vascular accident) ([facility] )  • Hyperlipidemia  • Iron deficiency anemia  • GERD (gastroesophageal reflux disease)  • Type 2 diabetes mellitus with diabetic polyneuropathy (HCC)  • Iron deficiency anemia due to chronic blood loss      Admitting Diagnosis: Anemia [D64 9]  GI bleed [K92 2]  Abnormal laboratory test [R89 9]  Age/Sex: 76 y o  female  Admission Orders:  Consult GI  accuchecks  Scd/foot pumps  Transfuse 2uprbc's 2/27, 1uprbc's 2/28    Scheduled Medications:  atorvastatin, 80 mg, Oral, QPM  ferrous sulfate, 325 mg, Oral, Daily With Breakfast  folic acid, 2,735 mcg, Oral, Daily  glimepiride, 1 mg, Oral, Daily With Breakfast  influenza vaccine, 0 7 mL, Intramuscular, Once  insulin lispro, 1-5 Units, Subcutaneous, TID AC  insulin lispro, 1-5 Units, Subcutaneous, HS  nicotine, 14 mg, Transdermal, Daily  pantoprazole, 40 mg, Intravenous, Q12H LLUVIA    Continuous IV Infusions:  sodium chloride, 50 mL/hr, Intravenous, Continuous    PRN Meds:  acetaminophen, 650 mg, Oral, Q6H PRN  ondansetron, 4 mg, Intravenous, Q6H PRN    Network Utilization Review Department  ATTENTION: Please call with any questions or concerns to [phone] and carefully listen to the prompts so that you are directed to the right person  All voicemails are confidential   Calvin Ramos all requests for admission clinical reviews, approved or denied determinations and any other requests to dedicated fax number below belonging to the campus where the patient is receiving treatment   List of dedicated fax numbers for the Facilities:  [address] DENIALS (Administrative/Medical Necessity) [phone]   [address] (Maternity/NICU/Pediatrics) [phone]   7 Dionne Black [phone]   Corina Love  [phone]   [address] [address] [facility] [phone]   1550 [facility] Daphnie Sinclair [facility] 134 815 [facility] [phone]

## 2023-02-28 NOTE — PROGRESS NOTES
Progress Note - Michael Carvalho 76 y o  female MRN: [id number]    Unit/Bed#: [address] [place] [address] Encounter: [id number]        Assessment/Plan:  Iron deficiency anemia-  Patient does have chronic iron deficiency anemia and presents to ER with abnormal laboratory values and noted to have hemoglobin of 5 7 on admission with microcytic indices and heme pos stool   Patient denies any significant upper GI symptoms and does have history of esophagitis in the past   Biopsies in the past were negative for celiac disease as well as H  Pylori   Denies any regular NSAID use   Does take Protonix daily at home per chart  Pt was given 3 units PRBCs since admission, hgb 7 overnight, hgb was 8 3 this am  -Last dose of Plavix was taken 2/26 and will continue to hold for further gi evaluation  -Plan for EGD and colonoscopy tomorrow  -No evidence of overt bleeding according to nursing and stool was brown in color  -BP noted to be low last night, improved with holding bp meds and fluids  -If EGD and colonoscopy are negative for bleeding source again then would recommend capsule endoscopy as an outpatient for further evaluation    -Monitor serial H&H  -Can continue IV Protonix twice daily at this time  - Prep has been ordered for tomorrow    Subjective:   Pt is lying in bed  Patient reports that she cannot recall if she moved her bowels  Denies any significant abdominal pain  Nurse reports she did not visualize bowel movement for patient today      Objective:     Vitals: BP (!) 122/48   Pulse 58   Temp 98 2 °F (36 8 °C)   Resp 16   Wt 53 3 kg (117 lb 9 6 oz)   LMP  (LMP Unknown)   SpO2 92%   BMI 22 97 kg/m²       Physical Exam:  Gen-alert, nad  Abd-+bs, NT  ND, no r/r/g       Lab, Imaging and other studies:   Recent Results (from the past 72 hour(s))   Archie Suarez Default    Collection Time: 02/25/23  9:54 AM   Result Value Ref Range    White Blood Cell Count 6 9 3 4 - 10 8 x10E3/uL    Red Blood Cell Count 2 55 (LL) 3 77 - 5 28 x10E6/uL    Hemoglobin 5 5 (<) 11 1 - 15 9 g/dL    HCT 19 2 (L) 34 0 - 46 6 %    MCV 75 (L) 79 - 97 fL    MCH 21 6 (L) 26 6 - 33 0 pg    MCHC 28 6 (L) 31 5 - 35 7 g/dL    RDW 14 8 11 7 - 15 4 %    Platelet Count 748 (H) 150 - 450 x10E3/uL    Neutrophils 69 Not Estab  %    Lymphocytes 18 Not Estab  %    Monocytes 9 Not Estab  %    Eosinophils 3 Not Estab  %    Basophils PCT 1 Not Estab  %    Neutrophils (Absolute) 4 8 1 4 - 7 0 x10E3/uL    Lymphocytes (Absolute) 1 2 0 7 - 3 1 x10E3/uL    Monocytes (Absolute) 0 6 0 1 - 0 9 x10E3/uL    Eosinophils (Absolute) 0 2 0 0 - 0 4 x10E3/uL    Basophils ABS 0 0 0 0 - 0 2 x10E3/uL    Immature Granulocytes 0 Not Estab  %    Immature Granulocytes (Absolute) 0 0 0 0 - 0 1 x10E3/uL   Vitamin B12/Folate, Serum Panel    Collection Time: 02/25/23  9:54 AM   Result Value Ref Range    Vitamin B-12 262 232 - 1,245 pg/mL    FOLATE, SERUM >20 0 >3 0 ng/mL   TSH, 3rd generation with Free T4 reflex    Collection Time: 02/25/23  9:54 AM   Result Value Ref Range    TSH 1 740 0 450 - 4 500 uIU/mL   Reticulocytes    Collection Time: 02/25/23  9:54 AM   Result Value Ref Range    Reticulocyte Count 2 1 0 6 - 2 6 %   CBC and differential    Collection Time: 02/27/23 10:37 AM   Result Value Ref Range    WBC 7 26 4 31 - 10 16 Thousand/uL    RBC 2 63 (L) 3 81 - 5 12 Million/uL    Hemoglobin 5 7 (LL) 11 5 - 15 4 g/dL    Hematocrit 20 5 (L) 34 8 - 46 1 %    MCV 78 (L) 82 - 98 fL    MCH 21 7 (L) 26 8 - 34 3 pg    MCHC 27 8 (L) 31 4 - 37 4 g/dL    RDW 15 9 (H) 11 6 - 15 1 %    MPV 9 8 8 9 - 12 7 fL    Platelets 885 (H) 389 - 390 Thousands/uL    nRBC 0 /100 WBCs    Neutrophils Relative 80 (H) 43 - 75 %    Immat GRANS % 0 0 - 2 %    Lymphocytes Relative 12 (L) 14 - 44 %    Monocytes Relative 6 4 - 12 %    Eosinophils Relative 2 0 - 6 %    Basophils Relative 0 0 - 1 %    Neutrophils Absolute 5 71 1 85 - 7 62 Thousands/µL    Immature Grans Absolute 0 03 0 00 - 0 20 Thousand/uL    Lymphocytes Absolute 0 90 0 60 - 4 47 Thousands/µL    Monocytes Absolute 0 44 0 17 - 1 22 Thousand/µL    Eosinophils Absolute 0 15 0 00 - 0 61 Thousand/µL    Basophils Absolute 0 03 0 00 - 0 10 Thousands/µL   Protime-INR    Collection Time: 02/27/23 10:37 AM   Result Value Ref Range    Protime 12 4 11 6 - 14 5 seconds    INR 0 91 0 84 - 1 19   APTT    Collection Time: 02/27/23 10:37 AM   Result Value Ref Range    PTT 25 23 - 37 seconds   Comprehensive metabolic panel    Collection Time: 02/27/23 10:37 AM   Result Value Ref Range    Sodium 138 135 - 147 mmol/L    Potassium 4 7 3 5 - 5 3 mmol/L    Chloride 104 96 - 108 mmol/L    CO2 27 21 - 32 mmol/L    ANION GAP 7 4 - 13 mmol/L    BUN 33 (H) 5 - 25 mg/dL    Creatinine 1 27 0 60 - 1 30 mg/dL    Glucose 97 65 - 140 mg/dL    Calcium 10 0 8 4 - 10 2 mg/dL    AST 10 (L) 13 - 39 U/L    ALT 8 7 - 52 U/L    Alkaline Phosphatase 98 34 - 104 U/L    Total Protein 6 1 (L) 6 4 - 8 4 g/dL    Albumin 4 1 3 5 - 5 0 g/dL    Total Bilirubin 0 70 0 20 - 1 00 mg/dL    eGFR 40 ml/min/1 73sq m   Type and screen    Collection Time: 02/27/23 10:37 AM   Result Value Ref Range    ABO Grouping A     Rh Factor Positive     Antibody Screen Negative     Specimen Expiration Date [id number]    TSH, 3rd generation    Collection Time: 02/27/23 10:37 AM   Result Value Ref Range    TSH 3RD GENERATON 1 481 0 450 - 4 500 uIU/mL   Fingerstick Glucose (POCT)    Collection Time: 02/27/23  4:03 PM   Result Value Ref Range    POC Glucose 156 (H) 65 - 140 mg/dl   Fingerstick Glucose (POCT)    Collection Time: 02/27/23  8:14 PM   Result Value Ref Range    POC Glucose 192 (H) 65 - 140 mg/dl   CBC and differential    Collection Time: 02/27/23  9:45 PM   Result Value Ref Range    WBC 7 76 4 31 - 10 16 Thousand/uL    RBC 3 11 (L) 3 81 - 5 12 Million/uL    Hemoglobin 7 3 (L) 11 5 - 15 4 g/dL    Hematocrit 23 6 (L) 34 8 - 46 1 %    MCV 76 (L) 82 - 98 fL    MCH 23 5 (L) 26 8 - 34 3 pg    MCHC 30 9 (L) 31 4 - 37 4 g/dL    RDW 15 9 (H) 11 6 - 15 1 % MPV 9 9 8 9 - 12 7 fL    Platelets 257 233 - 462 Thousands/uL    nRBC 0 /100 WBCs    Neutrophils Relative 75 43 - 75 %    Immat GRANS % 0 0 - 2 %    Lymphocytes Relative 14 14 - 44 %    Monocytes Relative 9 4 - 12 %    Eosinophils Relative 2 0 - 6 %    Basophils Relative 0 0 - 1 %    Neutrophils Absolute 5 76 1 85 - 7 62 Thousands/µL    Immature Grans Absolute 0 03 0 00 - 0 20 Thousand/uL    Lymphocytes Absolute 1 12 0 60 - 4 47 Thousands/µL    Monocytes Absolute 0 69 0 17 - 1 22 Thousand/µL    Eosinophils Absolute 0 14 0 00 - 0 61 Thousand/µL    Basophils Absolute 0 02 0 00 - 0 10 Thousands/µL   Hemoglobin and hematocrit, blood    Collection Time: 02/28/23  2:06 AM   Result Value Ref Range    Hemoglobin 7 0 (L) 11 5 - 15 4 g/dL    Hematocrit 23 3 (L) 34 8 - 46 1 %   Prepare Leukoreduced RBC: 2 Units    Collection Time: 02/28/23  6:15 AM   Result Value Ref Range    Unit Product Code G6083V06     Unit Number [id number]     Unit ABO A     Unit [facility] POS     Crossmatch Compatible     Unit Dispense Status Presumed Trans     Unit Product Volume 350 mL    Unit Product Code Q0236Q85     Unit Number [id number]     Unit ABO A     Unit RH NEG     Crossmatch Compatible     Unit Dispense Status Presumed Trans     Unit Product Volume 351 ml   Prepare Leukoreduced RBC: 1 Units, Leukoreduced    Collection Time: 02/28/23  6:15 AM   Result Value Ref Range    Unit Product Code I7423K63     Unit Number [id number]     Unit ABO A     Unit [facility] POS     Crossmatch Compatible     Unit Dispense Status Presumed Trans     Unit Product Volume 350 mL   Fingerstick Glucose (POCT)    Collection Time: 02/28/23  7:41 AM   Result Value Ref Range    POC Glucose 111 65 - 140 mg/dl

## 2023-02-28 NOTE — ASSESSMENT & PLAN NOTE
POA   · Patient presented to the ED sent by Dr Shawna Patrick (oncology) office with abnormal lab work   · Hemoglobin on admission was noted to be 5 7 with baseline June 2021 at 10 6  · Hemoccult positive  · ED gave Protonix 40 mg IV x1 dose  · Hemoglobin 8 3 s/p 2 units PRBC  · Will continue Protonix 40 mg IV twice daily  · Continue Iron supplement  · Monitor and trend hemoglobin and hematocrit daily  · Transfuse to keep hemoglobin > 7  · GI consult, will plan for EGD/colonoscopy on Wednesday 3/1  · NPO after midnight  · If EGD and colonoscopy are negative for bleeding source will recommend capsule endoscopy as an outpatient for further evaluation per GI    · Supportive care

## 2023-02-28 NOTE — ASSESSMENT & PLAN NOTE
Lab Results   Component Value Date    HGBA1C 5 3 02/27/2023       Recent Labs     02/27/23  1603 02/27/23  2014 02/28/23  0741 02/28/23  1208   POCGLU 156* 192* 111 193*       Blood Sugar Average: Last 72 hrs:  (P) 163   Holding Metformin  Continue home medication glimepiride  ACCU Checks ACHS  SSI  Hypoglycemic protocol

## 2023-02-28 NOTE — PROGRESS NOTES
[address]  Progress Note - Salvador Degroot 1945, 76 y o  female MRN: [id number]  Unit/Bed#: [address] [place] [address] Encounter: [id number]  Primary Care Provider: Len Hoffmann MD   Date and time admitted to hospital: 2/27/2023 10:11 AM    * Iron deficiency anemia due to chronic blood loss  Assessment & Plan  POA  · Patient presented to the ED sent by Dr Chele Rowan (oncology) office with abnormal lab work   · Hemoglobin on admission was noted to be 5 7 with baseline June 2021 at 10 6  · Hemoccult positive  · ED gave Protonix 40 mg IV x1 dose  · Hemoglobin 8 3 s/p 2 units PRBC  · Will continue Protonix 40 mg IV twice daily  · Continue Iron supplement  · Monitor and trend hemoglobin and hematocrit daily  · Transfuse to keep hemoglobin > 7  · GI consult, will plan for EGD/colonoscopy on Wednesday 3/1  · NPO after midnight  · If EGD and colonoscopy are negative for bleeding source will recommend capsule endoscopy as an outpatient for further evaluation per GI    · Supportive care    Hypotension  Assessment & Plan  · Patient noted to be hypotensive with SBP in 80s to 90s and DBP 32-50  · Fluid resuscitation was initiated with improvement  · Hold blood pressure medication lisinopril for low BP  · Continue to monitor    CVA (cerebral vascular accident) ([facility] [address] )  Assessment & Plan  Holding Plavix for GI Bleed/ Positive hemoccult    GERD (gastroesophageal reflux disease)  Assessment & Plan  Continue Protonix    Essential hypertension  Assessment & Plan  Lisinopril substituted for Quinapril in hospital    Hyperlipidemia  Assessment & Plan  Continue Statin    Type 2 diabetes mellitus with diabetic polyneuropathy [facility])  Assessment & Plan  Lab Results   Component Value Date    HGBA1C 5 3 02/27/2023       Recent Labs     02/27/23  1603 02/27/23 2014 02/28/23  0741 02/28/23  1208   POCGLU 156* 192* 111 193*       Blood Sugar Average: Last 72 hrs:  (P) 163   Holding Metformin  Continue home medication glimepiride  ACCU Checks ACHS  SSI  Hypoglycemic protocol        VTE Pharmacologic Prophylaxis:   Moderate Risk (Score 3-4) - Pharmacological DVT Prophylaxis Contraindicated  Sequential Compression Devices Ordered  Patient Centered Rounds: I performed bedside rounds with nursing staff today  Discussions with Specialists or Other Care Team Provider: Yes    Education and Discussions with Family / Patient: Attempted to update  (son) via phone  Unable to contact  Total Time Spent on Date of Encounter in care of patient: 35 minutes This time was spent on one or more of the following: performing physical exam; counseling and coordination of care; obtaining or reviewing history; documenting in the medical record; reviewing/ordering tests, medications or procedures; communicating with other healthcare professionals and discussing with patient's family/caregivers  Current Length of Stay: 1 day(s)  Current Patient Status: Inpatient   Certification Statement: The patient will continue to require additional inpatient hospital stay due to EGD/colonoscopy for 3/1/2023  Discharge Plan: Anticipate discharge in 24-48 hrs to home  Code Status: Level 1 - Full Code    Subjective:   Patient seen and examined at bedside, reports feeling a lot better since the transfusion yesterday  Offers no complaints    Objective:     Vitals:   Temp (24hrs), Av 4 °F (36 9 °C), Min:97 2 °F (36 2 °C), Max:99 4 °F (37 4 °C)    Temp:  [97 2 °F (36 2 °C)-99 4 °F (37 4 °C)] 98 2 °F (36 8 °C)  HR:  [54-89] 58  Resp:  [14-20] 16  BP: ()/(32-60) 122/48  SpO2:  [91 %-97 %] 92 %  Body mass index is 22 97 kg/m²  Input and Output Summary (last 24 hours): Intake/Output Summary (Last 24 hours) at 2023 1603  Last data filed at 2023 0524  Gross per 24 hour   Intake 689 58 ml   Output 150 ml   Net 539 58 ml       Physical Exam:   Physical Exam     Appearance: She is ill-appearing  HENT:      Head: Normocephalic  Mouth/Throat:      Mouth: Mucous membranes are dry  Eyes:      General: No scleral icterus  Cardiovascular:      Rate and Rhythm: Normal rate  Heart sounds: Normal heart sounds  Pulmonary:      Effort: No respiratory distress  Breath sounds: Normal breath sounds  Abdominal:      General: Bowel sounds are normal  There is no distension  Palpations: Abdomen is soft  Skin:     General: Skin is dry  Capillary Refill: Capillary refill takes 2 to 3 seconds  Coloration: Skin is pale  Neurological:      Mental Status: She is alert and oriented to person, place, and time  Psychiatric:         Behavior: Behavior normal          Judgment: Judgment normal      Additional Data:     Labs:  Results from last 7 days   Lab Units 02/28/23  0940 02/28/23  0206 02/27/23  2145   WBC Thousand/uL 6 20  --  7 76   HEMOGLOBIN g/dL 8 3*   < > 7 3*   HEMATOCRIT % 27 4*   < > 23 6*   PLATELETS Thousands/uL 329  --  369   NEUTROS PCT %  --   --  75   LYMPHS PCT %  --   --  14   MONOS PCT %  --   --  9   EOS PCT %  --   --  2    < > = values in this interval not displayed       Results from last 7 days   Lab Units 02/28/23  0940 02/27/23  1037   SODIUM mmol/L 136 138   POTASSIUM mmol/L 4 0 4 7   CHLORIDE mmol/L 106 104   CO2 mmol/L 23 27   BUN mg/dL 33* 33*   CREATININE mg/dL 1 31* 1 27   ANION GAP mmol/L 7 7   CALCIUM mg/dL 8 8 10 0   ALBUMIN g/dL  --  4 1   TOTAL BILIRUBIN mg/dL  --  0 70   ALK PHOS U/L  --  98   ALT U/L  --  8   AST U/L  --  10*   GLUCOSE RANDOM mg/dL 301* 97     Results from last 7 days   Lab Units 02/27/23  1037   INR  0 91     Results from last 7 days   Lab Units 02/28/23  1208 02/28/23  0741 02/27/23  2014 02/27/23  1603   POC GLUCOSE mg/dl 193* 111 192* 156*     Results from last 7 days   Lab Units 02/27/23  2145   HEMOGLOBIN A1C % 5 3           Lines/Drains:  Invasive Devices     Peripheral Intravenous Line  Duration           Peripheral IV 02/27/23 Left;Proximal;Ventral (anterior) Forearm 1 day    Peripheral IV 02/27/23 Proximal;Right;Ventral (anterior) Forearm 1 day                      Imaging: No pertinent imaging reviewed  Recent Cultures (last 7 days):         Last 24 Hours Medication List:   Current Facility-Administered Medications   Medication Dose Route Frequency Provider Last Rate   • acetaminophen  650 mg Oral Q6H PRN ROLAND Lin     • atorvastatin  80 mg Oral QPM ROLAND Lin     • bisacodyl  20 mg Oral Once ANTHONY Wise     • ferrous sulfate  325 mg Oral Daily With Breakfast ROLAND Lin     • folic acid  1,524 mcg Oral Daily Olayide D ROLAND Casiano     • glimepiride  1 mg Oral Daily With Breakfast [place]ide D ROLAND Casiano     • insulin lispro  1-5 Units Subcutaneous TID AC Olayide D ROLAND Casiano     • insulin lispro  1-5 Units Subcutaneous HS Olayide D ROLAND Casiano     • nicotine  14 mg Transdermal Daily Olayide D ROLAND Casiano     • ondansetron  4 mg Intravenous Q6H PRN ROLAND Lin     • pantoprazole  40 mg Intravenous Q12H [address] Olayide D ROLAND Casiano     • polyethylene glycol  4,000 mL Oral Once ANTHONY Wise     • sodium chloride  50 mL/hr Intravenous Continuous ROLAND Torres 50 mL/hr (02/28/23 0930)        Today, Patient Was Seen By: ROLAND Lin    **Please Note: This note may have been constructed using a voice recognition system  **

## 2023-02-28 NOTE — ASSESSMENT & PLAN NOTE
· Patient noted to be hypotensive with SBP in 80s to 90s and DBP 32-50  · Fluid resuscitation was initiated with improvement  · Hold blood pressure medication lisinopril for low BP  · Continue to monitor

## 2023-03-01 ENCOUNTER — APPOINTMENT (OUTPATIENT)
Dept: PERIOP | Facility: HOSPITAL | Age: 78
End: 2023-03-01

## 2023-03-01 ENCOUNTER — ANESTHESIA EVENT (INPATIENT)
Dept: PERIOP | Facility: HOSPITAL | Age: 78
End: 2023-03-01

## 2023-03-01 ENCOUNTER — ANESTHESIA (INPATIENT)
Dept: PERIOP | Facility: HOSPITAL | Age: 78
End: 2023-03-01

## 2023-03-01 PROBLEM — IMO0001 SMOKING: Status: ACTIVE | Noted: 2020-01-09

## 2023-03-01 PROBLEM — F17.200 SMOKING: Status: ACTIVE | Noted: 2020-01-09

## 2023-03-01 PROBLEM — I95.9 HYPOTENSION: Status: RESOLVED | Noted: 2023-02-28 | Resolved: 2023-03-01

## 2023-03-01 LAB
ANION GAP SERPL CALCULATED.3IONS-SCNC: 6 MMOL/L (ref 4–13)
BUN SERPL-MCNC: 24 MG/DL (ref 5–25)
CALCIUM SERPL-MCNC: 9.6 MG/DL (ref 8.4–10.2)
CHLORIDE SERPL-SCNC: 112 MMOL/L (ref 96–108)
CO2 SERPL-SCNC: 23 MMOL/L (ref 21–32)
CREAT SERPL-MCNC: 1.11 MG/DL (ref 0.6–1.3)
ERYTHROCYTE [DISTWIDTH] IN BLOOD BY AUTOMATED COUNT: 16.7 % (ref 11.6–15.1)
GFR SERPL CREATININE-BSD FRML MDRD: 47 ML/MIN/1.73SQ M
GLUCOSE SERPL-MCNC: 121 MG/DL (ref 65–140)
GLUCOSE SERPL-MCNC: 121 MG/DL (ref 65–140)
GLUCOSE SERPL-MCNC: 123 MG/DL (ref 65–140)
GLUCOSE SERPL-MCNC: 223 MG/DL (ref 65–140)
GLUCOSE SERPL-MCNC: 99 MG/DL (ref 65–140)
HCT VFR BLD AUTO: 30.5 % (ref 34.8–46.1)
HGB BLD-MCNC: 9.3 G/DL (ref 11.5–15.4)
MAGNESIUM SERPL-MCNC: 1.6 MG/DL (ref 1.9–2.7)
MCH RBC QN AUTO: 24 PG (ref 26.8–34.3)
MCHC RBC AUTO-ENTMCNC: 30.5 G/DL (ref 31.4–37.4)
MCV RBC AUTO: 79 FL (ref 82–98)
PHOSPHATE SERPL-MCNC: 2 MG/DL (ref 2.3–4.1)
PLATELET # BLD AUTO: 379 THOUSANDS/UL (ref 149–390)
PMV BLD AUTO: 9.6 FL (ref 8.9–12.7)
POTASSIUM SERPL-SCNC: 3.9 MMOL/L (ref 3.5–5.3)
RBC # BLD AUTO: 3.88 MILLION/UL (ref 3.81–5.12)
SODIUM SERPL-SCNC: 141 MMOL/L (ref 135–147)
WBC # BLD AUTO: 9.42 THOUSAND/UL (ref 4.31–10.16)

## 2023-03-01 PROCEDURE — 0DBK8ZZ EXCISION OF ASCENDING COLON, VIA NATURAL OR ARTIFICIAL OPENING ENDOSCOPIC: ICD-10-PCS | Performed by: INTERNAL MEDICINE

## 2023-03-01 PROCEDURE — 0DBP8ZZ EXCISION OF RECTUM, VIA NATURAL OR ARTIFICIAL OPENING ENDOSCOPIC: ICD-10-PCS | Performed by: INTERNAL MEDICINE

## 2023-03-01 PROCEDURE — 0D568ZZ DESTRUCTION OF STOMACH, VIA NATURAL OR ARTIFICIAL OPENING ENDOSCOPIC: ICD-10-PCS | Performed by: INTERNAL MEDICINE

## 2023-03-01 PROCEDURE — 0DB38ZX EXCISION OF LOWER ESOPHAGUS, VIA NATURAL OR ARTIFICIAL OPENING ENDOSCOPIC, DIAGNOSTIC: ICD-10-PCS | Performed by: INTERNAL MEDICINE

## 2023-03-01 RX ORDER — PANTOPRAZOLE SODIUM 40 MG/1
40 TABLET, DELAYED RELEASE ORAL
Status: DISCONTINUED | OUTPATIENT
Start: 2023-03-02 | End: 2023-03-02 | Stop reason: HOSPADM

## 2023-03-01 RX ORDER — CLOPIDOGREL BISULFATE 75 MG/1
75 TABLET ORAL DAILY
Status: DISCONTINUED | OUTPATIENT
Start: 2023-03-01 | End: 2023-03-02 | Stop reason: HOSPADM

## 2023-03-01 RX ORDER — DEXTROSE AND SODIUM CHLORIDE 5; .45 G/100ML; G/100ML
50 INJECTION, SOLUTION INTRAVENOUS CONTINUOUS
Status: DISCONTINUED | OUTPATIENT
Start: 2023-03-01 | End: 2023-03-01

## 2023-03-01 RX ORDER — LIDOCAINE HYDROCHLORIDE 10 MG/ML
INJECTION, SOLUTION EPIDURAL; INFILTRATION; INTRACAUDAL; PERINEURAL AS NEEDED
Status: DISCONTINUED | OUTPATIENT
Start: 2023-03-01 | End: 2023-03-01

## 2023-03-01 RX ORDER — ONDANSETRON 2 MG/ML
4 INJECTION INTRAMUSCULAR; INTRAVENOUS ONCE AS NEEDED
Status: CANCELLED | OUTPATIENT
Start: 2023-03-01

## 2023-03-01 RX ORDER — LANOLIN ALCOHOL/MO/W.PET/CERES
400 CREAM (GRAM) TOPICAL 2 TIMES DAILY
Status: DISCONTINUED | OUTPATIENT
Start: 2023-03-01 | End: 2023-03-02 | Stop reason: HOSPADM

## 2023-03-01 RX ORDER — EPHEDRINE SULFATE 50 MG/ML
INJECTION INTRAVENOUS AS NEEDED
Status: DISCONTINUED | OUTPATIENT
Start: 2023-03-01 | End: 2023-03-01

## 2023-03-01 RX ORDER — SODIUM CHLORIDE, SODIUM LACTATE, POTASSIUM CHLORIDE, CALCIUM CHLORIDE 600; 310; 30; 20 MG/100ML; MG/100ML; MG/100ML; MG/100ML
INJECTION, SOLUTION INTRAVENOUS CONTINUOUS PRN
Status: DISCONTINUED | OUTPATIENT
Start: 2023-03-01 | End: 2023-03-01

## 2023-03-01 RX ORDER — PROPOFOL 10 MG/ML
INJECTION, EMULSION INTRAVENOUS AS NEEDED
Status: DISCONTINUED | OUTPATIENT
Start: 2023-03-01 | End: 2023-03-01

## 2023-03-01 RX ADMIN — SODIUM CHLORIDE, SODIUM LACTATE, POTASSIUM CHLORIDE, AND CALCIUM CHLORIDE: .6; .31; .03; .02 INJECTION, SOLUTION INTRAVENOUS at 15:46

## 2023-03-01 RX ADMIN — PROPOFOL 20 MG: 10 INJECTION, EMULSION INTRAVENOUS at 16:28

## 2023-03-01 RX ADMIN — LIDOCAINE HYDROCHLORIDE 50 MG: 10 INJECTION, SOLUTION EPIDURAL; INFILTRATION; INTRACAUDAL; PERINEURAL at 15:49

## 2023-03-01 RX ADMIN — PROPOFOL 20 MG: 10 INJECTION, EMULSION INTRAVENOUS at 16:14

## 2023-03-01 RX ADMIN — PANTOPRAZOLE SODIUM 40 MG: 40 INJECTION, POWDER, FOR SOLUTION INTRAVENOUS at 09:29

## 2023-03-01 RX ADMIN — EPHEDRINE SULFATE 10 MG: 50 INJECTION, SOLUTION INTRAVENOUS at 16:07

## 2023-03-01 RX ADMIN — PROPOFOL 20 MG: 10 INJECTION, EMULSION INTRAVENOUS at 16:10

## 2023-03-01 RX ADMIN — PROPOFOL 20 MG: 10 INJECTION, EMULSION INTRAVENOUS at 15:59

## 2023-03-01 RX ADMIN — NICOTINE 14 MG: 14 PATCH, EXTENDED RELEASE TRANSDERMAL at 09:29

## 2023-03-01 RX ADMIN — DEXTROSE AND SODIUM CHLORIDE 50 ML/HR: 5; .45 INJECTION, SOLUTION INTRAVENOUS at 11:56

## 2023-03-01 RX ADMIN — SODIUM CHLORIDE 50 ML/HR: 0.9 INJECTION, SOLUTION INTRAVENOUS at 05:03

## 2023-03-01 RX ADMIN — CLOPIDOGREL BISULFATE 75 MG: 75 TABLET ORAL at 17:34

## 2023-03-01 RX ADMIN — PROPOFOL 20 MG: 10 INJECTION, EMULSION INTRAVENOUS at 15:54

## 2023-03-01 RX ADMIN — PROPOFOL 20 MG: 10 INJECTION, EMULSION INTRAVENOUS at 16:18

## 2023-03-01 RX ADMIN — INSULIN LISPRO 1 UNITS: 100 INJECTION, SOLUTION INTRAVENOUS; SUBCUTANEOUS at 21:39

## 2023-03-01 RX ADMIN — PROPOFOL 60 MG: 10 INJECTION, EMULSION INTRAVENOUS at 15:49

## 2023-03-01 RX ADMIN — CYANOCOBALAMIN TAB 500 MCG 1000 MCG: 500 TAB at 11:20

## 2023-03-01 RX ADMIN — DIBASIC SODIUM PHOSPHATE, MONOBASIC POTASSIUM PHOSPHATE AND MONOBASIC SODIUM PHOSPHATE 2 TABLET: 852; 155; 130 TABLET ORAL at 17:34

## 2023-03-01 RX ADMIN — MAGNESIUM OXIDE TAB 400 MG (241.3 MG ELEMENTAL MG) 400 MG: 400 (241.3 MG) TAB at 17:34

## 2023-03-01 RX ADMIN — IRON SUCROSE 200 MG: 20 INJECTION, SOLUTION INTRAVENOUS at 11:59

## 2023-03-01 RX ADMIN — ATORVASTATIN CALCIUM 80 MG: 80 TABLET, FILM COATED ORAL at 17:34

## 2023-03-01 NOTE — PLAN OF CARE
Problem: PAIN - ADULT  Goal: Verbalizes/displays adequate comfort level or baseline comfort level  Description: Interventions:  - Encourage patient to monitor pain and request assistance  - Assess pain using appropriate pain scale  - Administer analgesics based on type and severity of pain and evaluate response  - Implement non-pharmacological measures as appropriate and evaluate response  - Consider cultural and social influences on pain and pain management  - Notify physician/advanced practitioner if interventions unsuccessful or patient reports new pain  Outcome: Progressing     Problem: INFECTION - ADULT  Goal: Absence or prevention of progression during hospitalization  Description: INTERVENTIONS:  - Assess and monitor for signs and symptoms of infection  - Monitor lab/diagnostic results  - Monitor all insertion sites, i e  indwelling lines, tubes, and drains  - Monitor endotracheal if appropriate and nasal secretions for changes in amount and color  - [place] appropriate cooling/warming therapies per order  - Administer medications as ordered  - Instruct and encourage patient and family to use good hand hygiene technique  - Identify and instruct in appropriate isolation precautions for identified infection/condition  Outcome: Progressing     Problem: SAFETY ADULT  Goal: Patient will remain free of falls  Description: INTERVENTIONS:  - Educate patient/family on patient safety including physical limitations  - Instruct patient to call for assistance with activity   - Consult OT/PT to assist with strengthening/mobility   - Keep Call bell within reach  - Keep bed low and locked with side rails adjusted as appropriate  - Keep care items and personal belongings within reach  - Initiate and maintain comfort rounds  - Make Fall Risk Sign visible to staff  - Offer Toileting every 2 Hours, in advance of need  - Initiate/Maintain bed/chair alarm  - Obtain necessary fall risk management equipment: bed/chair alarm  - Apply yellow socks and bracelet for high fall risk patients  - Consider moving patient to room near nurses station  Outcome: Progressing  Goal: Maintain or return to baseline ADL function  Description: INTERVENTIONS:  -  Assess patient's ability to carry out ADLs; assess patient's baseline for ADL function and identify physical deficits which impact ability to perform ADLs (bathing, care of mouth/teeth, toileting, grooming, dressing, etc )  - Assess/evaluate cause of self-care deficits   - Assess range of motion  - Assess patient's mobility; develop plan if impaired  - Assess patient's need for assistive devices and provide as appropriate  - Encourage maximum independence but intervene and supervise when necessary  - Involve family in performance of ADLs  - Assess for home care needs following discharge   - Consider OT consult to assist with ADL evaluation and planning for discharge  - Provide patient education as appropriate  Outcome: Progressing  Goal: Maintains/Returns to pre admission functional level  Description: INTERVENTIONS:  - Perform BMAT or MOVE assessment daily    - Set and communicate daily mobility goal to care team and patient/family/caregiver  - Collaborate with rehabilitation services on mobility goals if consulted  - Perform Range of Motion 3 times a day  - Reposition patient every 3 hours    - Dangle patient 3 times a day  - Stand patient 3 times a day  - Ambulate patient 3 times a day  - Out of bed to chair 3 times a day   - Out of bed for meals 3 times a day  - Out of bed for toileting  - Record patient progress and toleration of activity level   Outcome: Progressing     Problem: DISCHARGE PLANNING  Goal: Discharge to home or other facility with appropriate resources  Description: INTERVENTIONS:  - Identify barriers to discharge w/patient and caregiver  - Arrange for needed discharge resources and transportation as appropriate  - Identify discharge learning needs (meds, wound care, etc )  - Arrange for interpretive services to assist at discharge as needed  - Refer to Case Management Department for coordinating discharge planning if the patient needs post-hospital services based on physician/advanced practitioner order or complex needs related to functional status, cognitive ability, or social support system  Outcome: Progressing     Problem: Knowledge Deficit  Goal: Patient/family/caregiver demonstrates understanding of disease process, treatment plan, medications, and discharge instructions  Description: Complete learning assessment and assess knowledge base  Interventions:  - Provide teaching at level of understanding  - Provide teaching via preferred learning methods  Outcome: Progressing     Problem: MOBILITY - ADULT  Goal: Maintain or return to baseline ADL function  Description: INTERVENTIONS:  -  Assess patient's ability to carry out ADLs; assess patient's baseline for ADL function and identify physical deficits which impact ability to perform ADLs (bathing, care of mouth/teeth, toileting, grooming, dressing, etc )  - Assess/evaluate cause of self-care deficits   - Assess range of motion  - Assess patient's mobility; develop plan if impaired  - Assess patient's need for assistive devices and provide as appropriate  - Encourage maximum independence but intervene and supervise when necessary  - Involve family in performance of ADLs  - Assess for home care needs following discharge   - Consider OT consult to assist with ADL evaluation and planning for discharge  - Provide patient education as appropriate  Outcome: Progressing  Goal: Maintains/Returns to pre admission functional level  Description: INTERVENTIONS:  - Perform BMAT or MOVE assessment daily    - Set and communicate daily mobility goal to care team and patient/family/caregiver  - Collaborate with rehabilitation services on mobility goals if consulted  - Perform Range of Motion 3 times a day  - Reposition patient every 3 hours    - Dangle patient 3 times a day  - Stand patient 3 times a day  - Ambulate patient 3 times a day  - Out of bed to chair 3 times a day   - Out of bed for meals 3 times a day  - Out of bed for toileting  - Record patient progress and toleration of activity level   Outcome: Progressing

## 2023-03-01 NOTE — PROGRESS NOTES
[address]  Progress Note - Vernon Irvin 1945, 76 y o  female MRN: [id number]  Unit/Bed#: [address] [place] [address] Encounter: [id number]  Primary Care Provider: Karyle Basque, MD   Date and time admitted to hospital: 2/27/2023 10:11 AM    * Iron deficiency anemia due to chronic blood loss  Assessment & Plan  POA  · Patient presented to the ED sent by Dr Juan Manuel Benitez (oncology) office with abnormal lab work   · Hemoglobin on admission was noted to be 5 7 with baseline June 2021 at 10 6  · Hemoccult positive  · ED gave Protonix 40 mg IV x1 dose  · Hemoglobin 8 3 s/p 2 units PRBC  · EGD 3/1: Small AVM proximal stomach cauterized, erythema distal esophagus irregular squamocolumnar junction  Biopsy taken    · Colonoscopy 3/1: 2 small polyps removed, sigmoid diverticulosis  · Per GI, follow-up biopsies, high-fiber diet, okay to start Plavix, may need outpatient capsule endoscopy pending course  · Continue Protonix daily  · Continue iron supplement  · Give Venofer 200 mg IV daily while hospitalized  · Resume high-fiber diet    CVA (cerebral vascular accident) ([facility] [address] )  Assessment & Plan  Okay to resume Plavix  Continue statin    GERD (gastroesophageal reflux disease)  Assessment & Plan  Continue Protonix    Essential hypertension  Assessment & Plan  Lisinopril substituted for Quinapril in hospital    Smoking  Assessment & Plan  Counseled on complete cessation  Nicotine patch    Hyperlipidemia  Assessment & Plan  Continue Statin    Type 2 diabetes mellitus with diabetic polyneuropathy [facility])  Assessment & Plan  Lab Results   Component Value Date    HGBA1C 5 3 02/27/2023       Recent Labs     02/28/23  1637 02/28/23  2135 03/01/23  0709 03/01/23  1150   POCGLU 61* 88 123 121       Blood Sugar Average: Last 72 hrs:  (P) 130 625   Holding Metformin and glimepiride  ACCU Checks ACHS  SSI  Hypoglycemic protocol    Hypotension-resolved as of 3/1/2023  Assessment & Plan  · Patient noted to be hypotensive with SBP in 80s to 90s and DBP 32-50  · Fluid resuscitation was initiated with improvement  · Hold blood pressure medication lisinopril for low BP  · Continue to monitor        VTE Pharmacologic Prophylaxis:   Moderate Risk (Score 3-4) - Pharmacological DVT Prophylaxis Contraindicated  Sequential Compression Devices Ordered  Patient Centered Rounds: I performed bedside rounds with nursing staff today  Discussions with Specialists or Other Care Team Provider: nursing, CM    Education and Discussions with Family / Patient: Attempted to update  (son) via phone  Left voicemail  Total Time Spent on Date of Encounter in care of patient: 35 minutes This time was spent on one or more of the following: performing physical exam; counseling and coordination of care; obtaining or reviewing history; documenting in the medical record; reviewing/ordering tests, medications or procedures; communicating with other healthcare professionals and discussing with patient's family/caregivers  Current Length of Stay: 2 day(s)  Current Patient Status: Inpatient   Certification Statement: The patient will continue to require additional inpatient hospital stay due to anemia, needs EGD/colonoscopy   Discharge Plan: Anticipate discharge in 24-48 hrs to home  Code Status: Level 1 - Full Code    Subjective:   Patient seen and examined at bedside  Her room was changed from the 4th floor to the 3rd floor  She wants to eat  No complaints  Wants coffee from [employer] which her son will bring in for her  Objective:     Vitals:   Temp (24hrs), Av 4 °F (36 9 °C), Min:98 °F (36 7 °C), Max:99 1 °F (37 3 °C)    Temp:  [98 °F (36 7 °C)-99 1 °F (37 3 °C)] 98 1 °F (36 7 °C)  HR:  [59-99] 80  Resp:  [18] 18  BP: (115-157)/(44-83) 115/57  SpO2:  [91 %-100 %] 94 %  Body mass index is 22 97 kg/m²  Input and Output Summary (last 24 hours):      Intake/Output Summary (Last 24 hours) at 3/1/2023 5680  Last data filed at 3/1/2023 1636  Gross per 24 hour   Intake 250 ml   Output 1170 ml   Net -920 ml       Physical Exam:   Physical Exam  Vitals and nursing note reviewed  Constitutional:       General: She is not in acute distress  Appearance: She is not toxic-appearing or diaphoretic  HENT:      Head: Normocephalic  Mouth/Throat:      Mouth: Mucous membranes are moist    Eyes:      Conjunctiva/sclera: Conjunctivae normal    Cardiovascular:      Rate and Rhythm: Normal rate  Pulmonary:      Effort: Pulmonary effort is normal       Breath sounds: Normal breath sounds  No wheezing, rhonchi or rales  Abdominal:      General: Bowel sounds are normal       Palpations: Abdomen is soft  Musculoskeletal:         General: Normal range of motion  Cervical back: Normal range of motion  Right lower leg: No edema  Left lower leg: No edema  Skin:     General: Skin is warm and dry  Capillary Refill: Capillary refill takes less than 2 seconds  Neurological:      Mental Status: She is alert and oriented to person, place, and time  Mental status is at baseline  Psychiatric:         Mood and Affect: Mood normal          Behavior: Behavior normal          Thought Content: Thought content normal          Additional Data:     Labs:  Results from last 7 days   Lab Units 03/01/23  0502 02/28/23  0206 02/27/23  2145   WBC Thousand/uL 9 42   < > 7 76   HEMOGLOBIN g/dL 9 3*   < > 7 3*   HEMATOCRIT % 30 5*   < > 23 6*   PLATELETS Thousands/uL 379   < > 369   NEUTROS PCT %  --   --  75   LYMPHS PCT %  --   --  14   MONOS PCT %  --   --  9   EOS PCT %  --   --  2    < > = values in this interval not displayed       Results from last 7 days   Lab Units 03/01/23  0502 02/28/23  0940 02/27/23  1037   SODIUM mmol/L 141   < > 138   POTASSIUM mmol/L 3 9   < > 4 7   CHLORIDE mmol/L 112*   < > 104   CO2 mmol/L 23   < > 27   BUN mg/dL 24   < > 33*   CREATININE mg/dL 1 11   < > 1 27   ANION GAP mmol/L 6   < > 7   CALCIUM mg/dL 9 6   < > 10 0   ALBUMIN g/dL  --   --  4 1   TOTAL BILIRUBIN mg/dL  --   --  0 70   ALK PHOS U/L  --   --  98   ALT U/L  --   --  8   AST U/L  --   --  10*   GLUCOSE RANDOM mg/dL 99   < > 97    < > = values in this interval not displayed  Results from last 7 days   Lab Units 02/27/23  1037   INR  0 91     Results from last 7 days   Lab Units 03/01/23  1150 03/01/23  0709 02/28/23  2135 02/28/23  1637 02/28/23  1208 02/28/23  0741 02/27/23 2014 02/27/23  1603   POC GLUCOSE mg/dl 121 123 88 61* 193* 111 192* 156*     Results from last 7 days   Lab Units 02/27/23  2145   HEMOGLOBIN A1C % 5 3           Lines/Drains:  Invasive Devices     Peripheral Intravenous Line  Duration           Peripheral IV 03/01/23 Distal;Left;Upper;Ventral (anterior) Arm <1 day                      Imaging: No pertinent imaging reviewed      Recent Cultures (last 7 days):         Last 24 Hours Medication List:   Current Facility-Administered Medications   Medication Dose Route Frequency Provider Last Rate   • acetaminophen  650 mg Oral Q6H PRN ROLAND Wheeler     • atorvastatin  80 mg Oral QPM ROLAND Wheeler     • cyanocobalamin  1,000 mcg Oral Daily ROLAND Steward     • ferrous sulfate  325 mg Oral Daily With Breakfast ROLAND Wheeler     • folic acid  7,013 mcg Oral Daily Olayide D ROLAND Casiano     • insulin lispro  1-5 Units Subcutaneous TID AC Olayide D ROLAND Casiano     • insulin lispro  1-5 Units Subcutaneous HS Olayide D ROLAND Casiano     • iron sucrose  200 mg Intravenous Daily ROLAND Steward 200 mg (03/01/23 1159)   • nicotine  14 mg Transdermal Daily Olayide D ROLAND Casiano     • ondansetron  4 mg Intravenous Q6H PRN ROLAND Wheeler     • [START ON 3/2/2023] pantoprazole  40 mg Oral Early Morning ROLAND Steward          Today, Patient Was Seen By: ROLAND Steward    **Please Note: This note may have been constructed using a voice recognition system  **

## 2023-03-01 NOTE — INTERVAL H&P NOTE
H&P reviewed  After examining the patient I find no changes in the patients condition since the H&P had been written      Vitals:    03/01/23 1456   BP: 157/69   Pulse: 60   Resp: 18   Temp:    SpO2: 95%

## 2023-03-01 NOTE — ANESTHESIA POSTPROCEDURE EVALUATION
Post-Op Assessment Note    CV Status:  Stable  Pain Score: 0    Pain management: adequate     Mental Status:  Awake   Hydration Status:  Stable   PONV Controlled:  None   Airway Patency:  Patent      Post Op Vitals Reviewed: Yes      Staff: CRNA         No notable events documented      /64 (03/01/23 1635)    Temp      Pulse 99 (03/01/23 1635)   Resp 18 (03/01/23 1635)    SpO2 100 % (03/01/23 1635)

## 2023-03-01 NOTE — ASSESSMENT & PLAN NOTE
Lab Results   Component Value Date    HGBA1C 5 3 02/27/2023       Recent Labs     02/28/23  1637 02/28/23  2135 03/01/23  0709 03/01/23  1150   POCGLU 61* 88 123 121       Blood Sugar Average: Last 72 hrs:  (P) 130 625   Holding Metformin and glimepiride  ACCU Checks ACHS  SSI  Hypoglycemic protocol

## 2023-03-01 NOTE — ANESTHESIA PREPROCEDURE EVALUATION
Procedure:  COLONOSCOPY  EGD    Relevant Problems   ANESTHESIA (within normal limits)      CARDIO   (+) Essential hypertension   (+) Hyperlipidemia      ENDO   (+) Type 2 diabetes mellitus with diabetic polyneuropathy (HCC)   (+) Type 2 diabetes mellitus with stage 3a chronic kidney disease and hypertension (HCC)      GI/HEPATIC   (+) GERD (gastroesophageal reflux disease)      HEMATOLOGY   (+) Iron deficiency anemia   (+) Iron deficiency anemia due to chronic blood loss      MUSCULOSKELETAL   (+) Arthritis      NEURO/PSYCH   (+) CVA (cerebral vascular accident) ([facility] [address] )   (+) Personal history of transient ischemic attack (TIA), and cerebral infarction without residual deficits      PULMONARY   (+) Smoking        Physical Exam    Airway    Mallampati score: II  TM Distance: >3 FB  Neck ROM: full     Dental       Cardiovascular  Rhythm: regular, Rate: normal,     Pulmonary  Comment: Wheezing disappeared after coughing , Wheezes,     Other Findings        Anesthesia Plan  ASA Score- 4     Anesthesia Type- IV sedation with anesthesia with ASA Monitors  Additional Monitors:   Airway Plan:           Plan Factors-Exercise tolerance (METS): >4 METS  Chart reviewed  EKG reviewed  Existing labs reviewed  Patient summary reviewed  Patient is a current smoker  Patient not instructed to abstain from smoking on day of procedure  Obstructive sleep apnea risk education given perioperatively  Induction-     Postoperative Plan-     Informed Consent- Anesthetic plan and risks discussed with patient  I personally reviewed this patient with the CRNA  Discussed and agreed on the Anesthesia Plan with the CRNA  Claudine Manrique

## 2023-03-01 NOTE — ASSESSMENT & PLAN NOTE
POA   · Patient presented to the ED sent by Dr Shawna Patrick (oncology) office with abnormal lab work   · Hemoglobin on admission was noted to be 5 7 with baseline June 2021 at 10 6  · Hemoccult positive  · ED gave Protonix 40 mg IV x1 dose  · Hemoglobin 8 3 s/p 2 units PRBC  · EGD 3/1: Small AVM proximal stomach cauterized, erythema distal esophagus irregular squamocolumnar junction  Biopsy taken    · Colonoscopy 3/1: 2 small polyps removed, sigmoid diverticulosis  · Per GI, follow-up biopsies, high-fiber diet, okay to start Plavix, may need outpatient capsule endoscopy pending course  · Continue Protonix daily  · Continue iron supplement  · Give Venofer 200 mg IV daily while hospitalized  · Resume high-fiber diet

## 2023-03-02 ENCOUNTER — TELEPHONE (OUTPATIENT)
Dept: GASTROENTEROLOGY | Facility: CLINIC | Age: 78
End: 2023-03-02

## 2023-03-02 ENCOUNTER — HOME HEALTH ADMISSION (OUTPATIENT)
Dept: HOME HEALTH SERVICES | Facility: HOME HEALTHCARE | Age: 78
End: 2023-03-02

## 2023-03-02 ENCOUNTER — TRANSITIONAL CARE MANAGEMENT (OUTPATIENT)
Dept: FAMILY MEDICINE CLINIC | Facility: CLINIC | Age: 78
End: 2023-03-02

## 2023-03-02 VITALS
RESPIRATION RATE: 18 BRPM | TEMPERATURE: 98 F | HEART RATE: 56 BPM | DIASTOLIC BLOOD PRESSURE: 58 MMHG | SYSTOLIC BLOOD PRESSURE: 121 MMHG | HEIGHT: 60 IN | WEIGHT: 117.6 LBS | OXYGEN SATURATION: 95 % | BODY MASS INDEX: 23.09 KG/M2

## 2023-03-02 DIAGNOSIS — D50.9 IRON DEFICIENCY ANEMIA, UNSPECIFIED IRON DEFICIENCY ANEMIA TYPE: Primary | ICD-10-CM

## 2023-03-02 LAB
ANION GAP SERPL CALCULATED.3IONS-SCNC: 6 MMOL/L (ref 4–13)
ATRIAL RATE: 69 BPM
BUN SERPL-MCNC: 24 MG/DL (ref 5–25)
CALCIUM SERPL-MCNC: 8.5 MG/DL (ref 8.4–10.2)
CHLORIDE SERPL-SCNC: 109 MMOL/L (ref 96–108)
CO2 SERPL-SCNC: 24 MMOL/L (ref 21–32)
CREAT SERPL-MCNC: 1.19 MG/DL (ref 0.6–1.3)
ERYTHROCYTE [DISTWIDTH] IN BLOOD BY AUTOMATED COUNT: 17.6 % (ref 11.6–15.1)
GFR SERPL CREATININE-BSD FRML MDRD: 43 ML/MIN/1.73SQ M
GLUCOSE SERPL-MCNC: 128 MG/DL (ref 65–140)
GLUCOSE SERPL-MCNC: 140 MG/DL (ref 65–140)
GLUCOSE SERPL-MCNC: 250 MG/DL (ref 65–140)
HCT VFR BLD AUTO: 27.4 % (ref 34.8–46.1)
HGB BLD-MCNC: 8.4 G/DL (ref 11.5–15.4)
MAGNESIUM SERPL-MCNC: 1.5 MG/DL (ref 1.9–2.7)
MCH RBC QN AUTO: 24.1 PG (ref 26.8–34.3)
MCHC RBC AUTO-ENTMCNC: 30.7 G/DL (ref 31.4–37.4)
MCV RBC AUTO: 79 FL (ref 82–98)
P AXIS: 53 DEGREES
PHOSPHATE SERPL-MCNC: 3.8 MG/DL (ref 2.3–4.1)
PLATELET # BLD AUTO: 290 THOUSANDS/UL (ref 149–390)
PMV BLD AUTO: 10.4 FL (ref 8.9–12.7)
POTASSIUM SERPL-SCNC: 4.1 MMOL/L (ref 3.5–5.3)
PR INTERVAL: 148 MS
QRS AXIS: -32 DEGREES
QRSD INTERVAL: 86 MS
QT INTERVAL: 378 MS
QTC INTERVAL: 405 MS
RBC # BLD AUTO: 3.48 MILLION/UL (ref 3.81–5.12)
SODIUM SERPL-SCNC: 139 MMOL/L (ref 135–147)
T WAVE AXIS: 41 DEGREES
VENTRICULAR RATE: 69 BPM
WBC # BLD AUTO: 7.37 THOUSAND/UL (ref 4.31–10.16)

## 2023-03-02 RX ORDER — FERROUS SULFATE TAB EC 324 MG (65 MG FE EQUIVALENT) 324 (65 FE) MG
324 TABLET DELAYED RESPONSE ORAL
Qty: 180 TABLET | Refills: 0
Start: 2023-03-02

## 2023-03-02 RX ORDER — MAGNESIUM SULFATE HEPTAHYDRATE 40 MG/ML
2 INJECTION, SOLUTION INTRAVENOUS ONCE
Status: COMPLETED | OUTPATIENT
Start: 2023-03-02 | End: 2023-03-02

## 2023-03-02 RX ADMIN — MAGNESIUM SULFATE HEPTAHYDRATE 2 G: 40 INJECTION, SOLUTION INTRAVENOUS at 10:22

## 2023-03-02 RX ADMIN — CYANOCOBALAMIN TAB 500 MCG 1000 MCG: 500 TAB at 08:33

## 2023-03-02 RX ADMIN — PANTOPRAZOLE SODIUM 40 MG: 40 TABLET, DELAYED RELEASE ORAL at 05:30

## 2023-03-02 RX ADMIN — INSULIN LISPRO 2 UNITS: 100 INJECTION, SOLUTION INTRAVENOUS; SUBCUTANEOUS at 11:25

## 2023-03-02 RX ADMIN — FERROUS SULFATE TAB 325 MG (65 MG ELEMENTAL FE) 325 MG: 325 (65 FE) TAB at 07:38

## 2023-03-02 RX ADMIN — CLOPIDOGREL BISULFATE 75 MG: 75 TABLET ORAL at 08:33

## 2023-03-02 RX ADMIN — FOLIC ACID 1000 MCG: 1 TABLET ORAL at 08:33

## 2023-03-02 RX ADMIN — IRON SUCROSE 200 MG: 20 INJECTION, SOLUTION INTRAVENOUS at 08:35

## 2023-03-02 RX ADMIN — MAGNESIUM OXIDE TAB 400 MG (241.3 MG ELEMENTAL MG) 400 MG: 400 (241.3 MG) TAB at 08:33

## 2023-03-02 NOTE — CASE MANAGEMENT
Case Management Discharge Planning Note    Patient name Vernon Irvin  Location [address] [place] [address]/[address] [place]-* MRN [id number]  : 1945 Date 3/2/2023       Current Admission Date: 2023  Current Admission Diagnosis:Iron deficiency anemia due to chronic blood loss   Patient Active Problem List    Diagnosis Date Noted   • Iron deficiency anemia due to chronic blood loss 2023   • Vertebral artery stenosis, bilateral 2021   • CVA (cerebral vascular accident) ([facility] ) 2021   • Nodule of upper lobe of right lung 2021   • Thyroid nodule greater than or equal to 1 5 cm in diameter incidentally noted on imaging study 2021   • Troponin level elevated 2021   • Type 2 diabetes mellitus with stage 3a chronic kidney disease and hypertension ([place] [address] ) 2021   • Essential (hemorrhagic) thrombocythemia ([place] [address] )    • Iron deficiency anemia 2020   • GERD (gastroesophageal reflux disease) 2020   • Personal history of transient ischemic attack (TIA), and cerebral infarction without residual deficits 2020   • Family history of tobacco abuse 2020   • Smoking 2020   • Arthritis 2020   • Essential hypertension 2020   • Combined forms of age-related cataract of left eye 2019   • Hyperlipidemia 2014   • Type 2 diabetes mellitus with diabetic polyneuropathy ([place] [address] ) 2013      LOS (days): 3  Geometric Mean LOS (GMLOS) (days): 2 70  Days to GMLOS:-0 2     OBJECTIVE:  Risk of Unplanned Readmission Score: 11 25         Current admission status: Inpatient   Preferred Pharmacy:   [id number] - [address] [facility]  Phone: [phone] Fax: [phone]    [facility] ([address] [place] [address]  [address] [place] [address] [address] [place] [address]  Phone: [phone] Fax: [phone] Delivery ([facility] ) - [place] [address]   [address] [facility] [address]  Phone: [phone] Fax: [phone]    Primary Care Provider: Korey Pham MD    Primary Insurance: Adrienne Cardenas [facility]  Secondary Insurance:     DISCHARGE DETAILS:    Discharge planning discussed with[de-identified] Sammy Florentino (son)  Freedom of Choice: Yes  Comments - Freedom of Choice: Discussed [facility] preference, son did not have any preference as long it was approved by insurance  CM contacted family/caregiver?: Yes  Were Treatment Team discharge recommendations reviewed with patient/caregiver?: Yes  Did patient/caregiver verbalize understanding of patient care needs?: Yes  Were patient/caregiver advised of the risks associated with not following Treatment Team discharge recommendations?: Yes    Contacts  Patient Contacts: Marcie Del Real (son)  Relationship to Patient[de-identified] Family  Contact Method: Phone  Phone Number: [phone]    [facility]         Is the patient interested in [facility] at discharge?: Yes  [address] requested[de-identified] Occupational Therapy, Physical [address] Name[de-identified] Other  [address] [place] [address] Provider[de-identified] PCP  Home Health Services Needed[de-identified] Evaluate Functional Status and Safety, Gait/ADL Training, Strengthening/Theraputic Exercises to Improve Function  Homebound Criteria Met[de-identified] Requires the Assistance of Another Person for Safe Ambulation or to Leave the Home, Uses an Assist Device (i e  cane, walker, etc)  Supporting Clincal Findings[de-identified] Fatigues Easliy in [employer], Limited Endurance     Other Referral/Resources/Interventions Provided:  Interventions: [facility]  Referral Comments: CM spoke with patient's son Sammy Florentino over phone, introduced self,& role and purpose of phone call to discuss discharge planning  Discussed therapy recs for Home Physical Therapy and Geronimo agreeable for Cm to place blanket referral for [facility] - PT, OT   Allie Dominguez concerned that he was getting difference messages from everyone and wanted to speak to someone about it  RN Tay Hook made aware and will be calling Gurwinder Vigil to clarify concerns  CM placed blanket referral via [address] for [facility]- PT, OT, awaiting response  Gurwinder Vigil also mentioned that patient has all needed dme at home       Treatment Team Recommendation: Home with [address] [facility]  Discharge Destination Plan[de-identified] Home with [place] at Discharge : Family      ETA of Transport (Date): 03/02/23  ETA of Transport (Time):  (TBD)

## 2023-03-02 NOTE — ASSESSMENT & PLAN NOTE
POA   · Patient presented to the ED sent by Dr Wilma Jimenez (oncology) office with abnormal lab work   · Hemoglobin on admission was noted to be 5 7 with baseline June 2021 at 10 6  · Hemoccult positive  · Hemoglobin 8 3 s/p 2 units PRBC  · Iron panel revealed severe iron deficiency anemia  · Given Venofer 200 mg IV x2 doses  · EGD 3/1: Small AVM proximal stomach cauterized, erythema distal esophagus irregular squamocolumnar junction  Biopsy taken  · Colonoscopy 3/1: 2 small polyps removed, sigmoid diverticulosis  Biopsies taken    · Per GI, follow-up biopsies, high-fiber diet, okay to start Plavix, recommend outpatient capsule endoscopy  · Continue Protonix daily  · Continue iron supplement daily  · Start B12 supplementation  · CBC in 1 week with outpatient follow-up with PCP, GI, hematology

## 2023-03-02 NOTE — PLAN OF CARE
Problem: PAIN - ADULT  Goal: Verbalizes/displays adequate comfort level or baseline comfort level  Description: Interventions:  - Encourage patient to monitor pain and request assistance  - Assess pain using appropriate pain scale  - Administer analgesics based on type and severity of pain and evaluate response  - Implement non-pharmacological measures as appropriate and evaluate response  - Consider cultural and social influences on pain and pain management  - Notify physician/advanced practitioner if interventions unsuccessful or patient reports new pain  Outcome: Progressing     Problem: INFECTION - ADULT  Goal: Absence or prevention of progression during hospitalization  Description: INTERVENTIONS:  - Assess and monitor for signs and symptoms of infection  - Monitor lab/diagnostic results  - Monitor all insertion sites, i e  indwelling lines, tubes, and drains  - Monitor endotracheal if appropriate and nasal secretions for changes in amount and color  - [place] appropriate cooling/warming therapies per order  - Administer medications as ordered  - Instruct and encourage patient and family to use good hand hygiene technique  - Identify and instruct in appropriate isolation precautions for identified infection/condition  Outcome: Progressing     Problem: SAFETY ADULT  Goal: Patient will remain free of falls  Description: INTERVENTIONS:  - Educate patient/family on patient safety including physical limitations  - Instruct patient to call for assistance with activity   - Consult OT/PT to assist with strengthening/mobility   - Keep Call bell within reach  - Keep bed low and locked with side rails adjusted as appropriate  - Keep care items and personal belongings within reach  - Initiate and maintain comfort rounds  - Make Fall Risk Sign visible to staff  - Offer Toileting every 2 Hours, in advance of need  - Initiate/Maintain bed/chair alarm  - Obtain necessary fall risk management equipment: bed/chair alarm  - Apply yellow socks and bracelet for high fall risk patients  - Consider moving patient to room near nurses station  Outcome: Progressing  Goal: Maintain or return to baseline ADL function  Description: INTERVENTIONS:  -  Assess patient's ability to carry out ADLs; assess patient's baseline for ADL function and identify physical deficits which impact ability to perform ADLs (bathing, care of mouth/teeth, toileting, grooming, dressing, etc )  - Assess/evaluate cause of self-care deficits   - Assess range of motion  - Assess patient's mobility; develop plan if impaired  - Assess patient's need for assistive devices and provide as appropriate  - Encourage maximum independence but intervene and supervise when necessary  - Involve family in performance of ADLs  - Assess for home care needs following discharge   - Consider OT consult to assist with ADL evaluation and planning for discharge  - Provide patient education as appropriate  Outcome: Progressing  Goal: Maintains/Returns to pre admission functional level  Description: INTERVENTIONS:  - Perform BMAT or MOVE assessment daily    - Set and communicate daily mobility goal to care team and patient/family/caregiver  - Collaborate with rehabilitation services on mobility goals if consulted  - Perform Range of Motion 3 times a day  - Reposition patient every 3 hours    - Dangle patient 3 times a day  - Stand patient 3 times a day  - Ambulate patient 3 times a day  - Out of bed to chair 3 times a day   - Out of bed for meals 3 times a day  - Out of bed for toileting  - Record patient progress and toleration of activity level   Outcome: Progressing     Problem: DISCHARGE PLANNING  Goal: Discharge to home or other facility with appropriate resources  Description: INTERVENTIONS:  - Identify barriers to discharge w/patient and caregiver  - Arrange for needed discharge resources and transportation as appropriate  - Identify discharge learning needs (meds, wound care, etc )  - Arrange for interpretive services to assist at discharge as needed  - Refer to Case Management Department for coordinating discharge planning if the patient needs post-hospital services based on physician/advanced practitioner order or complex needs related to functional status, cognitive ability, or social support system  Outcome: Progressing     Problem: Knowledge Deficit  Goal: Patient/family/caregiver demonstrates understanding of disease process, treatment plan, medications, and discharge instructions  Description: Complete learning assessment and assess knowledge base  Interventions:  - Provide teaching at level of understanding  - Provide teaching via preferred learning methods  Outcome: Progressing     Problem: MOBILITY - ADULT  Goal: Maintain or return to baseline ADL function  Description: INTERVENTIONS:  -  Assess patient's ability to carry out ADLs; assess patient's baseline for ADL function and identify physical deficits which impact ability to perform ADLs (bathing, care of mouth/teeth, toileting, grooming, dressing, etc )  - Assess/evaluate cause of self-care deficits   - Assess range of motion  - Assess patient's mobility; develop plan if impaired  - Assess patient's need for assistive devices and provide as appropriate  - Encourage maximum independence but intervene and supervise when necessary  - Involve family in performance of ADLs  - Assess for home care needs following discharge   - Consider OT consult to assist with ADL evaluation and planning for discharge  - Provide patient education as appropriate  Outcome: Progressing  Goal: Maintains/Returns to pre admission functional level  Description: INTERVENTIONS:  - Perform BMAT or MOVE assessment daily    - Set and communicate daily mobility goal to care team and patient/family/caregiver  - Collaborate with rehabilitation services on mobility goals if consulted  - Perform Range of Motion 3 times a day  - Reposition patient every 3 hours    - Dangle patient 3 times a day  - Stand patient 3 times a day  - Ambulate patient 3 times a day  - Out of bed to chair 3 times a day   - Out of bed for meals 3 times a day  - Out of bed for toileting  - Record patient progress and toleration of activity level   Outcome: Progressing     Problem: Prexisting or High Potential for Compromised Skin Integrity  Goal: Skin integrity is maintained or improved  Description: INTERVENTIONS:  - Identify patients at risk for skin breakdown  - Assess and monitor skin integrity  - Assess and monitor nutrition and hydration status  - Monitor labs   - Assess for incontinence   - Turn and reposition patient  - Assist with mobility/ambulation  - Relieve pressure over bony prominences  - Avoid friction and shearing  - Provide appropriate hygiene as needed including keeping skin clean and dry  - Evaluate need for skin moisturizer/barrier cream  - Collaborate with interdisciplinary team   - Patient/family teaching  - Consider wound care consult   Outcome: Progressing

## 2023-03-02 NOTE — CASE MANAGEMENT
Case Management Discharge Planning Note    Patient name Cam Erb  Location [address] [place] [address]/[address] [place]-* MRN [id number]  : 1945 Date 3/2/2023       Current Admission Date: 2023  Current Admission Diagnosis:Iron deficiency anemia due to chronic blood loss   Patient Active Problem List    Diagnosis Date Noted   • Iron deficiency anemia due to chronic blood loss 2023   • Vertebral artery stenosis, bilateral 2021   • CVA (cerebral vascular accident) ([facility] ) 2021   • Nodule of upper lobe of right lung 2021   • Thyroid nodule greater than or equal to 1 5 cm in diameter incidentally noted on imaging study 2021   • Troponin level elevated 2021   • Type 2 diabetes mellitus with stage 3a chronic kidney disease and hypertension ([place] [address] ) 2021   • Essential (hemorrhagic) thrombocythemia ([place] [address] )    • Iron deficiency anemia 2020   • GERD (gastroesophageal reflux disease) 2020   • Personal history of transient ischemic attack (TIA), and cerebral infarction without residual deficits 2020   • Family history of tobacco abuse 2020   • Smoking 2020   • Arthritis 2020   • Essential hypertension 2020   • Combined forms of age-related cataract of left eye 2019   • Hyperlipidemia 2014   • Type 2 diabetes mellitus with diabetic polyneuropathy ([place] [address] ) 2013      LOS (days): 3  Geometric Mean LOS (GMLOS) (days): 2 70  Days to GMLOS:-0 2     OBJECTIVE:  Risk of Unplanned Readmission Score: 11 25         Current admission status: Inpatient   Preferred Pharmacy:   [id number] - [address] [facility]  Phone: [phone] Fax: [phone]    [facility] ([address] [facility],   [address] [place] [address] [address]  [facility] [address]  Phone: [phone] Fax: [phone] Delivery ([facility] ) - [place] - [address]   [address] [facility] [address]  Phone: [phone] Fax: [phone]    Primary Care Provider: Bouchra Russo MD    Primary Insurance: Calvin Ramirezu [facility]  Secondary Insurance:     DISCHARGE DETAILS:    Discharge planning discussed with[de-identified] Carol Ann Pedro (son)  Freedom of Choice: Yes  Comments - Freedom of Choice: Discussed [facility] preference, son did not have any preference as long it was approved by insurance  CM contacted family/caregiver?: Yes  Were Treatment Team discharge recommendations reviewed with patient/caregiver?: Yes  Did patient/caregiver verbalize understanding of patient care needs?: Yes  Were patient/caregiver advised of the risks associated with not following Treatment Team discharge recommendations?: Yes    Contacts  Patient Contacts: Niesha Gomes (son)  Relationship to Patient[de-identified] Family  Contact Method: Phone  Phone Number: [phone]    [facility]         Is the patient interested in [address] at discharge?: Yes  [address] requested[de-identified] Occupational Therapy, Physical [address] Name[de-identified] [facility] Provider[de-identified] PCP  Home Health Services Needed[de-identified] Evaluate Functional Status and Safety, Gait/ADL Training, Strengthening/Theraputic Exercises to Improve Function  Homebound Criteria Met[de-identified] Requires the Assistance of Another Person for Safe Ambulation or to Leave the Home, Uses an Assist Device (i e  cane, walker, etc)  Supporting Clincal Findings[de-identified] Fatigues Easliy in [employer], Limited Endurance     Other Referral/Resources/Interventions Provided:  Interventions: [facility]  Referral Comments: CM reserved [facility] [address]- PT, OT in home  Patient made aware       Treatment Team Recommendation: Home with [address]  Discharge Destination Plan[de-identified] Home with [place] at Discharge : Family      ETA of Transport (Date): 03/02/23  ETA of Transport (Time):  (TBD)

## 2023-03-02 NOTE — ASSESSMENT & PLAN NOTE
· Counseled on complete cessation  · Nicotine patch while hospitalized    Patient can buy over-the-counter nicotine patches if she desires after discharge

## 2023-03-02 NOTE — PHYSICAL THERAPY NOTE
PHYSICAL THERAPY EVALUATION/TREATMENT     03/02/23 1005   Note Type   Note type Evaluation   Pain Assessment   Pain Assessment Tool 0-10   Pain Score No Pain   Restrictions/Precautions   Other Precautions Fall Risk   Home Living   Type of [address] Two [address]   Home Equipment Cane   Additional Comments patient reports independence without assistive device prior to admission although using a cane at times at night for safety   Prior Function   Level of [place] Independent with ADLs   Lives With Son   Receives Help From Family   IADLs Independent with meal prep; Independent with medication management; Family/Friend/Other provides transportation   Comments patient completing household tasks including laundry   General   Additional Pertinent History chart reviewed, pateint admitted with anemia from her doctor's office  Patient now with essentially 3 days of bedrest with deconditioning and resulting gait dysfunction   Family/Caregiver Present No   Cognition   Overall Cognitive Status WFL   Arousal/Participation Cooperative   Orientation Level Oriented X4   Following Commands Follows all commands and directions without difficulty   Subjective   Subjective patient states feeling weak from not walking   RLE Assessment   RLE Assessment WFL  (strength 3+/4-)   LLE Assessment   LLE Assessment WFL  (strength 3+/4-)   Coordination   Movements are Fluid and Coordinated 0   Bed Mobility   Supine to Sit 7  Independent   Sit to Supine 7  Independent   Transfers   Sit to Stand 4  Minimal assistance   Additional items Assist x 1   Stand to Sit 5  Supervision   Ambulation/Elevation   Gait Assistance 4  Minimal assist   Additional items Assist x 1;Verbal cues; Tactile cues   Assistive Device   (handhold)   Distance 40 feet with change in direction, unsteady gait patterning and loss of balance laterally at times   Balance   Static Sitting Fair +   Dynamic Sitting Fair   Static Standing Fair   Dynamic Standing Fair -   Ambulatory Fair -   Activity Tolerance   Activity Tolerance Patient limited by fatigue  (limited by weakness)   Nurse Made Aware yes   Assessment   Prognosis Good   Problem List Decreased range of motion;Decreased endurance; Impaired balance;Decreased mobility; Decreased coordination   Goals   Patient Goals to walk independently   STG Expiration Date 03/09/23   Short Term Goal #1 transfers and gait independently with roller walker   Short Term Goal #2 GAIT endurance to functional household distances   LTG Expiration Date 03/16/23   Long Term Goal #1 independent gait and transfers without assistive device for functional community distances   Plan   Treatment/Interventions ADL retraining;Functional transfer training;LE strengthening/ROM; Therapeutic exercise; Endurance training;Patient/family training;Equipment eval/education;Gait training; Compensatory technique education   PT Frequency Other (Comment)  (5x/w)   Recommendation   PT Discharge Recommendation Home with home health rehabilitation   Equipment Recommended [address] [place] [address] Recommended Wheeled walker   AM-PAC Basic Mobility Inpatient   Turning in Flat Bed Without Bedrails 4   Lying on Back to Sitting on Edge of Flat Bed Without Bedrails 3   Moving Bed to Chair 3   Standing Up From Chair Using Arms 3   Walk in Room 3   Climb 3-5 Stairs With Railing 2   Basic Mobility Inpatient Raw Score 18   Basic Mobility Standardized Score 41 05   Highest Level Of Mobility   JH-HLM Goal 6: Walk 10 steps or more   JH-HLM Achieved 7: Walk 25 feet or more   Barthel Index   Feeding 10   Bathing 0   Grooming Score 0   Dressing Score 5   Bladder Score 10   Bowels Score 10   Toilet Use Score 5   Transfers (Bed/Chair) Score 10   Mobility (Level Surface) Score 0   Stairs Score 5   Barthel Index Score 55   Additional Treatment Session   Start Time 0950   End Time 1005   Treatment Assessment s:patient states feeling weak and unsteady with walking O:BLE exercise completed as listed below  Gait trainging with roller walker with supervision, 30 feet A: Patient presents as generally deconditioned and tolerated short distance gait well with walker and will benefit from continued home PT and use of roller walker   Patient demonstrated good understanding of functional limitations   Exercises   Hip Flexion Sitting;10 reps;Bilateral   Hip Abduction Sitting;10 reps;Bilateral   Knee AROM Long Arc Quad Sitting;10 reps   Ankle Pumps Sitting;10 reps;Bilateral   Balance training  sidestepping and backward walking completed for balance and coordination   Licensure   [address] Number  Aniceto Devan PT [id number]

## 2023-03-02 NOTE — ACP (ADVANCE CARE PLANNING)
Advanced Care Planning Progress Note    Serious Illness Conversation    1  What is your understanding now of where you are with your illness? Prognostic Understanding: appropriate understanding of prognosis     2  How much information about what is likely to be ahead with your illness would you like to have? Information: patient wants to be fully informed     3  What did you (clinician) communicate to the patient? Prognostic Communication: Function - I hope that this is not the case, but I’m worried that this may be as strong as you will feel, and things are likely to get more difficult  4  If your health situation worsens, what are your most important goals? Goals: be independent, not be a burden, be at home, be physically comfortable     5  What are the biggest fears and worries about the future and your health? Fears/Worries: being dependent, loss of mobility     6  What abilities are so critical to your life that you cannot imagine living without them? Unacceptable Function: being in chronic severe pain, not being able to care for myself, including toileting and feeding, being unable to communicate effectively, not being myself     7  What gives you strength as you think about the future with your illness? Getting home, getting stronger     8  If you become sicker, how much are you willing to go through for the possibility of gaining more time? Be in the hospital: Yes Have a feeding tube: No   Be in the ICU: Yes Live in a nursing home: No   Be on a ventilator: Yes Be uncomfortable: No   Be on dialysis: No Undergo aggressive test and/or procedures: Yes   9  How much does your proxy and family know about your priorities and wishes? Discussion Discussion: extensive discussion with family about goals and wishes     Kateryna Schmitt heard you say that getting strong and getting home is really important to you   Keeping that in mind, and what we know about your illness, I recommend that we continue medical management, arrange HHS, pursue capsule endoscopy, CBC, and GI/hematology follow-up  This will help us make sure that your treatment plans reflect what’s important to you  How does this plan sound to you? I will do everything I can to help you through this    Patient verbalized understanding of the plan     I have spent 15 minutes speaking with my patient on advanced care planning today or during this visit     Advanced directives  Five Wishes: Patient does not have Five Wishes- would not like information         Laura Cooney

## 2023-03-02 NOTE — NURSING NOTE
Pt discharge, belongings including glasses and dentures taken with pt, discharge instructions given to pt, pt verbalized understanding

## 2023-03-02 NOTE — PROGRESS NOTES
Progress Note - Fernanda Chilel 76 y o  female MRN: [id number]    Unit/Bed#: [address] [place] [address] Encounter: [id number]        Assessment/Plan:  Iron deficiency anemia-  Patient does have chronic iron deficiency anemia and presents to ER with abnormal laboratory values and noted to have hemoglobin of 5 7 on admission with microcytic indices and heme pos stool   Patient denies any significant upper GI symptoms and does have history of esophagitis in the past   Biopsies in the past were negative for celiac disease as well as H  Pylori   Denies any regular NSAID use   Does take Protonix daily at home per chart  Pt was given 3 units PRBCs since admission,  hgb was 8 4 this am  -had egd/colon which showed small avm in proximal stomach, s/p apc, colon showed 2 small polyps removed, sigmoid diverticulosis   -would recommend capsule endoscopy as an outpatient for further evaluation, plan is for dc today  -spoke with pt and will order capsule to be done in office to eval for any small bowel bleeding source  -f/u path from procedures    Subjective:   Pt is seen briefly and she was getting discharged- went over egd/colon findings- pt offers no gi complaints       Objective:     Vitals: /58 (BP Location: Right arm)   Pulse 56   Temp 98 °F (36 7 °C) (Oral)   Resp 18   Ht 5' (1 524 m)   Wt 53 3 kg (117 lb 9 6 oz)   LMP  (LMP Unknown)   SpO2 95%   BMI 22 97 kg/m²       Physical Exam:  Gen-alert, nad  Abd-+bs, NT, ND, no r/r/g       Lab, Imaging and other studies:   Recent Results (from the past 72 hour(s))   CBC and differential    Collection Time: 02/27/23 10:37 AM   Result Value Ref Range    WBC 7 26 4 31 - 10 16 Thousand/uL    RBC 2 63 (L) 3 81 - 5 12 Million/uL    Hemoglobin 5 7 (LL) 11 5 - 15 4 g/dL    Hematocrit 20 5 (L) 34 8 - 46 1 %    MCV 78 (L) 82 - 98 fL    MCH 21 7 (L) 26 8 - 34 3 pg    MCHC 27 8 (L) 31 4 - 37 4 g/dL    RDW 15 9 (H) 11 6 - 15 1 %    MPV 9 8 8 9 - 12 7 fL    Platelets 979 (H) 997 - 390 Thousands/uL    nRBC 0 /100 WBCs    Neutrophils Relative 80 (H) 43 - 75 %    Immat GRANS % 0 0 - 2 %    Lymphocytes Relative 12 (L) 14 - 44 %    Monocytes Relative 6 4 - 12 %    Eosinophils Relative 2 0 - 6 %    Basophils Relative 0 0 - 1 %    Neutrophils Absolute 5 71 1 85 - 7 62 Thousands/µL    Immature Grans Absolute 0 03 0 00 - 0 20 Thousand/uL    Lymphocytes Absolute 0 90 0 60 - 4 47 Thousands/µL    Monocytes Absolute 0 44 0 17 - 1 22 Thousand/µL    Eosinophils Absolute 0 15 0 00 - 0 61 Thousand/µL    Basophils Absolute 0 03 0 00 - 0 10 Thousands/µL   Protime-INR    Collection Time: 02/27/23 10:37 AM   Result Value Ref Range    Protime 12 4 11 6 - 14 5 seconds    INR 0 91 0 84 - 1 19   APTT    Collection Time: 02/27/23 10:37 AM   Result Value Ref Range    PTT 25 23 - 37 seconds   Comprehensive metabolic panel    Collection Time: 02/27/23 10:37 AM   Result Value Ref Range    Sodium 138 135 - 147 mmol/L    Potassium 4 7 3 5 - 5 3 mmol/L    Chloride 104 96 - 108 mmol/L    CO2 27 21 - 32 mmol/L    ANION GAP 7 4 - 13 mmol/L    BUN 33 (H) 5 - 25 mg/dL    Creatinine 1 27 0 60 - 1 30 mg/dL    Glucose 97 65 - 140 mg/dL    Calcium 10 0 8 4 - 10 2 mg/dL    AST 10 (L) 13 - 39 U/L    ALT 8 7 - 52 U/L    Alkaline Phosphatase 98 34 - 104 U/L    Total Protein 6 1 (L) 6 4 - 8 4 g/dL    Albumin 4 1 3 5 - 5 0 g/dL    Total Bilirubin 0 70 0 20 - 1 00 mg/dL    eGFR 40 ml/min/1 73sq m   Type and screen    Collection Time: 02/27/23 10:37 AM   Result Value Ref Range    ABO Grouping A     Rh Factor Positive     Antibody Screen Negative     Specimen Expiration Date [id number]    TSH, 3rd generation    Collection Time: 02/27/23 10:37 AM   Result Value Ref Range    TSH 3RD GENERATON 1 481 0 450 - 4 500 uIU/mL   Iron Saturation %    Collection Time: 02/27/23 10:37 AM   Result Value Ref Range    Iron Saturation 3 (L) 15 - 50 %    TIBC 440 250 - 450 ug/dL    Iron 13 (L) 50 - 170 ug/dL   Ferritin    Collection Time: 02/27/23 10:37 AM   Result Value Ref Range    Ferritin 4 (L) 8 - 388 ng/mL   Fingerstick Glucose (POCT)    Collection Time: 02/27/23  4:03 PM   Result Value Ref Range    POC Glucose 156 (H) 65 - 140 mg/dl   Fingerstick Glucose (POCT)    Collection Time: 02/27/23  8:14 PM   Result Value Ref Range    POC Glucose 192 (H) 65 - 140 mg/dl   Hemoglobin A1c w/EAG Estimation (Orders if not completed within the last 90 days)    Collection Time: 02/27/23  9:45 PM   Result Value Ref Range    Hemoglobin A1C 5 3 Normal 3 8-5 6%; PreDiabetic 5 7-6 4%;  Diabetic >=6 5%; Glycemic control for adults with diabetes <7 0% %     mg/dl   CBC and differential    Collection Time: 02/27/23  9:45 PM   Result Value Ref Range    WBC 7 76 4 31 - 10 16 Thousand/uL    RBC 3 11 (L) 3 81 - 5 12 Million/uL    Hemoglobin 7 3 (L) 11 5 - 15 4 g/dL    Hematocrit 23 6 (L) 34 8 - 46 1 %    MCV 76 (L) 82 - 98 fL    MCH 23 5 (L) 26 8 - 34 3 pg    MCHC 30 9 (L) 31 4 - 37 4 g/dL    RDW 15 9 (H) 11 6 - 15 1 %    MPV 9 9 8 9 - 12 7 fL    Platelets 881 227 - 784 Thousands/uL    nRBC 0 /100 WBCs    Neutrophils Relative 75 43 - 75 %    Immat GRANS % 0 0 - 2 %    Lymphocytes Relative 14 14 - 44 %    Monocytes Relative 9 4 - 12 %    Eosinophils Relative 2 0 - 6 %    Basophils Relative 0 0 - 1 %    Neutrophils Absolute 5 76 1 85 - 7 62 Thousands/µL    Immature Grans Absolute 0 03 0 00 - 0 20 Thousand/uL    Lymphocytes Absolute 1 12 0 60 - 4 47 Thousands/µL    Monocytes Absolute 0 69 0 17 - 1 22 Thousand/µL    Eosinophils Absolute 0 14 0 00 - 0 61 Thousand/µL    Basophils Absolute 0 02 0 00 - 0 10 Thousands/µL   Hemoglobin and hematocrit, blood    Collection Time: 02/28/23  2:06 AM   Result Value Ref Range    Hemoglobin 7 0 (L) 11 5 - 15 4 g/dL    Hematocrit 23 3 (L) 34 8 - 46 1 %   Prepare Leukoreduced RBC: 2 Units    Collection Time: 02/28/23  6:15 AM   Result Value Ref Range    Unit Product Code Z6055K29     Unit Number [id number]     Unit ABO A     Unit [facility] POS     Crossmatch Compatible     Unit Dispense Status Presumed Trans     Unit Product Volume 350 mL    Unit Product Code W1609B18     Unit Number [id number]     Unit ABO A     Unit RH NEG     Crossmatch Compatible     Unit Dispense Status Presumed Trans     Unit Product Volume 351 ml   Prepare Leukoreduced RBC: 1 Units, Leukoreduced    Collection Time: 02/28/23  6:15 AM   Result Value Ref Range    Unit Product Code D0945L51     Unit Number [id number]     Unit ABO A     Unit [facility] POS     Crossmatch Compatible     Unit Dispense Status Presumed Trans     Unit Product Volume 350 mL   Fingerstick Glucose (POCT)    Collection Time: 02/28/23  7:41 AM   Result Value Ref Range    POC Glucose 111 65 - 140 mg/dl   Basic metabolic panel    Collection Time: 02/28/23  9:40 AM   Result Value Ref Range    Sodium 136 135 - 147 mmol/L    Potassium 4 0 3 5 - 5 3 mmol/L    Chloride 106 96 - 108 mmol/L    CO2 23 21 - 32 mmol/L    ANION GAP 7 4 - 13 mmol/L    BUN 33 (H) 5 - 25 mg/dL    Creatinine 1 31 (H) 0 60 - 1 30 mg/dL    Glucose 301 (H) 65 - 140 mg/dL    Calcium 8 8 8 4 - 10 2 mg/dL    eGFR 38 ml/min/1 73sq m   CBC (With Platelets)    Collection Time: 02/28/23  9:40 AM   Result Value Ref Range    WBC 6 20 4  31 - 10 16 Thousand/uL    RBC 3 47 (L) 3 81 - 5 12 Million/uL    Hemoglobin 8 3 (L) 11 5 - 15 4 g/dL    Hematocrit 27 4 (L) 34 8 - 46 1 %    MCV 79 (L) 82 - 98 fL    MCH 23 9 (L) 26 8 - 34 3 pg    MCHC 30 3 (L) 31 4 - 37 4 g/dL    RDW 16 5 (H) 11 6 - 15 1 %    Platelets 206 546 - 469 Thousands/uL    MPV 9 8 8 9 - 12 7 fL   Fingerstick Glucose (POCT)    Collection Time: 02/28/23 12:08 PM   Result Value Ref Range    POC Glucose 193 (H) 65 - 140 mg/dl   Fingerstick Glucose (POCT)    Collection Time: 02/28/23  4:37 PM   Result Value Ref Range    POC Glucose 61 (L) 65 - 140 mg/dl   Fingerstick Glucose (POCT)    Collection Time: 02/28/23  9:35 PM   Result Value Ref Range    POC Glucose 88 65 - 140 mg/dl   Basic metabolic panel    Collection Time: 03/01/23  5:02 AM   Result Value Ref Range    Sodium 141 135 - 147 mmol/L    Potassium 3 9 3 5 - 5 3 mmol/L    Chloride 112 (H) 96 - 108 mmol/L    CO2 23 21 - 32 mmol/L    ANION GAP 6 4 - 13 mmol/L    BUN 24 5 - 25 mg/dL    Creatinine 1 11 0 60 - 1 30 mg/dL    Glucose 99 65 - 140 mg/dL    Calcium 9 6 8 4 - 10 2 mg/dL    eGFR 47 ml/min/1 73sq m   CBC    Collection Time: 03/01/23  5:02 AM   Result Value Ref Range    WBC 9 42 4 31 - 10 16 Thousand/uL    RBC 3 88 3 81 - 5 12 Million/uL    Hemoglobin 9 3 (L) 11 5 - 15 4 g/dL    Hematocrit 30 5 (L) 34 8 - 46 1 %    MCV 79 (L) 82 - 98 fL    MCH 24 0 (L) 26 8 - 34 3 pg    MCHC 30 5 (L) 31 4 - 37 4 g/dL    RDW 16 7 (H) 11 6 - 15 1 %    Platelets 193 007 - 374 Thousands/uL    MPV 9 6 8 9 - 12 7 fL   Magnesium    Collection Time: 03/01/23  5:02 AM   Result Value Ref Range    Magnesium 1 6 (L) 1 9 - 2 7 mg/dL   Phosphorus    Collection Time: 03/01/23  5:02 AM   Result Value Ref Range    Phosphorus 2 0 (L) 2 3 - 4 1 mg/dL   Fingerstick Glucose (POCT)    Collection Time: 03/01/23  7:09 AM   Result Value Ref Range    POC Glucose 123 65 - 140 mg/dl   Fingerstick Glucose (POCT)    Collection Time: 03/01/23 11:50 AM   Result Value Ref Range    POC Glucose 121 65 - 140 mg/dl   Fingerstick Glucose (POCT)    Collection Time: 03/01/23  5:21 PM   Result Value Ref Range    POC Glucose 121 65 - 140 mg/dl   Fingerstick Glucose (POCT)    Collection Time: 03/01/23  9:16 PM   Result Value Ref Range    POC Glucose 223 (H) 65 - 140 mg/dl   CBC    Collection Time: 03/02/23  5:30 AM   Result Value Ref Range    WBC 7 37 4 31 - 10 16 Thousand/uL    RBC 3 48 (L) 3 81 - 5 12 Million/uL    Hemoglobin 8 4 (L) 11 5 - 15 4 g/dL    Hematocrit 27 4 (L) 34 8 - 46 1 %    MCV 79 (L) 82 - 98 fL    MCH 24 1 (L) 26 8 - 34 3 pg    MCHC 30 7 (L) 31 4 - 37 4 g/dL    RDW 17 6 (H) 11 6 - 15 1 %    Platelets 135 033 - 218 Thousands/uL    MPV 10 4 8 9 - 12 7 fL   Basic metabolic panel    Collection Time: 03/02/23 5:30 AM   Result Value Ref Range    Sodium 139 135 - 147 mmol/L    Potassium 4 1 3 5 - 5 3 mmol/L    Chloride 109 (H) 96 - 108 mmol/L    CO2 24 21 - 32 mmol/L    ANION GAP 6 4 - 13 mmol/L    BUN 24 5 - 25 mg/dL    Creatinine 1 19 0 60 - 1 30 mg/dL    Glucose 140 65 - 140 mg/dL    Calcium 8 5 8 4 - 10 2 mg/dL    eGFR 43 ml/min/1 73sq m   Magnesium    Collection Time: 03/02/23  5:30 AM   Result Value Ref Range    Magnesium 1 5 (L) 1 9 - 2 7 mg/dL   Phosphorus    Collection Time: 03/02/23  5:30 AM   Result Value Ref Range    Phosphorus 3 8 2 3 - 4 1 mg/dL   Fingerstick Glucose (POCT)    Collection Time: 03/02/23  7:14 AM   Result Value Ref Range    POC Glucose 128 65 - 140 mg/dl

## 2023-03-02 NOTE — TELEPHONE ENCOUNTER
----- Message from Jose Alfredo Dennis PA-C sent at 3/2/2023  2:41 PM EST -----  Regarding: capsule  Please schedule pt for capsule in office- no need for sbft

## 2023-03-02 NOTE — DISCHARGE INSTR - AVS FIRST PAGE
Iron deficiency Anemia:  S/p EGD with AVM s/p cauterization   Colonoscopy unremarkable   Please increase your iron supplement to once a day  Please start a B12 supplement once daily   Take Miralax to prevent constipation  Follow a diabetic, high-fiber diet  Check blood work in 1-2 weeks with a CBC  Follow-up with primary care provider, GI, and hematology  Consider a capsule endoscopy for further evaluation

## 2023-03-02 NOTE — DISCHARGE SUMMARY
[place]  64   Discharge- Freada Lipoma 1945, 76 y o  female MRN: [id number]  Unit/Bed#: [address] [place] [address] Encounter: [id number]  Primary Care Provider: Halina Wren MD   Date and time admitted to hospital: 2/27/2023 10:11 AM    * Iron deficiency anemia due to chronic blood loss  Assessment & Plan  POA  · Patient presented to the ED sent by Dr Jovanny Munoz (oncology) office with abnormal lab work   · Hemoglobin on admission was noted to be 5 7 with baseline June 2021 at 10 6  · Hemoccult positive  · Hemoglobin 8 3 s/p 2 units PRBC  · Iron panel revealed severe iron deficiency anemia  · Given Venofer 200 mg IV x2 doses  · EGD 3/1: Small AVM proximal stomach cauterized, erythema distal esophagus irregular squamocolumnar junction  Biopsy taken  · Colonoscopy 3/1: 2 small polyps removed, sigmoid diverticulosis  Biopsies taken  · Per GI, follow-up biopsies, high-fiber diet, okay to start Plavix, recommend outpatient capsule endoscopy  · Continue Protonix daily  · Continue iron supplement daily  · Start B12 supplementation  · CBC in 1 week with outpatient follow-up with PCP, GI, hematology    Type 2 diabetes mellitus with diabetic polyneuropathy [facility])  Assessment & Plan  Lab Results   Component Value Date    HGBA1C 5 3 02/27/2023       Recent Labs     03/01/23  1721 03/01/23  2116 03/02/23  0714 03/02/23  1123   POCGLU 121 223* 128 250*       Blood Sugar Average: Last 72 hrs:  (P) 147 25   · Okay to resume metformin and glimepiride on discharge   · Given her GFR, recommend decreasing metformin from 1000 mg  BID to 1000 mg daily    CVA (cerebral vascular accident) ([facility] )  Assessment & Plan  · Okay to resume Plavix per GI  · Continue statin    GERD (gastroesophageal reflux disease)  Assessment & Plan  · Continue Protonix    Essential hypertension  Assessment & Plan  · Resume quinapril on discharge    Smoking  Assessment & Plan  · Counseled on complete cessation  · Nicotine patch while hospitalized  Patient can buy over-the-counter nicotine patches if she desires after discharge    Hyperlipidemia  Assessment & Plan  · Continue Lipitor      Medical Problems     Resolved Problems  Date Reviewed: 2/28/2023          Resolved    Hypotension 3/1/2023     Resolved by  Kamaljit Flores        Discharging Physician / Practitioner: ROLAND Flores  PCP: Mariana Malagon MD  Admission Date:   Admission Orders (From admission, onward)     Ordered        02/27/23 1412  Inpatient Admission  Once            02/27/23 1214  Place in Observation  Once                      Discharge Date: 03/02/23    Consultations During Hospital Stay:  · Gastroenterology    Procedures Performed:   · EGD 3/1: Small AVM proximal stomach cauterized, erythema distal esophagus irregular squamocolumnar junction  Biopsy taken  · Colonoscopy 3/1: 2 small polyps removed, sigmoid diverticulosis    Significant Findings / Test Results:   · Small AVM in the proximal stomach, cauterized  · Iron deficiency anemia  · B12 deficiency    Incidental Findings:   · Anemia, B12 and iron deficiency  · I reviewed the above mentioned incidental findings with the patient and/or family and they expressed understanding  Test Results Pending at Discharge (will require follow up): · Esophagus and colon tissue biopsies     Outpatient Tests Requested:  · CBC in 1 to 2 weeks  · Outpatient capsule endoscopy    Complications: None    Reason for Admission: Anemia    Hospital Course:   Jose Medina is a 76 y o  female patient with a past medical history including tobacco abuse, GERD, hypertension, type 2 diabetes mellitus, CKD, CVA on Plavix who originally presented to the hospital on 2/27/2023 due to anemia  Outpatient blood work by her hematologist revealed a hemoglobin of 5 5 g/dL on routine blood work prior to her upcoming appointment  Patient denied any chest pain, palpitations, or history of bleeding    She did report fatigue, dizziness, and shortness of breath  She was given 2 units of PRBCs and subsequently admitted for further evaluation and treatment  A fecal occult blood was positive  She underwent an EGD and colonoscopy  EGD revealed an AVM which was cauterized  Colonoscopy was unremarkable aside for polyps  Biopsies were taken  Patient was given Venofer for 2 doses  She was started on B12  She is to increase her iron supplement  She will need a high-fiber diet  She will need a CBC and a capsule endoscopy as an outpatient  She is to follow-up with her PCP, gastroenterologist, and hematologist   Case management has arranged for home health services on discharge  Please see above list of diagnoses and related plan for additional information  Condition at Discharge: stable    Discharge Day Visit / Exam:   Subjective: Patient seen and examined at bedside  She is resting comfortably  Eating and drinking well  Tolerated IV Venofer  No active bleeding or excessive bruising  No shortness of breath or chest pain  Ambulating to and from the bathroom  Desires discharge home today  Will follow-up for an outpatient CBC and capsule endoscopy  Vitals: Blood Pressure: 121/58 (03/02/23 0736)  Pulse: 56 (03/02/23 0736)  Temperature: 98 °F (36 7 °C) (03/02/23 0736)  Temp Source: Oral (03/02/23 0736)  Respirations: 18 (03/02/23 0736)  Height: 5' (152 4 cm) (02/28/23 1930)  Weight - Scale: 53 3 kg (117 lb 9 6 oz) (02/27/23 1003)  SpO2: 95 % (03/02/23 0736)  Exam:   Physical Exam  Vitals and nursing note reviewed  Constitutional:       General: She is not in acute distress  Appearance: She is ill-appearing (Mildly deconditioned)  She is not toxic-appearing or diaphoretic  Comments: Pleasant, talkative female resting comfortably in bed on room air   HENT:      Head: Normocephalic        Mouth/Throat:      Mouth: Mucous membranes are moist    Eyes:      Conjunctiva/sclera: Conjunctivae normal    Cardiovascular:      Rate and Rhythm: Normal rate  Pulmonary:      Effort: Pulmonary effort is normal       Breath sounds: Normal breath sounds  No wheezing, rhonchi or rales  Abdominal:      General: Bowel sounds are normal       Palpations: Abdomen is soft  Musculoskeletal:         General: Normal range of motion  Cervical back: Normal range of motion  Right lower leg: No edema  Left lower leg: No edema  Skin:     General: Skin is warm and dry  Capillary Refill: Capillary refill takes less than 2 seconds  Neurological:      Mental Status: She is alert and oriented to person, place, and time  Mental status is at baseline  Psychiatric:         Mood and Affect: Mood normal          Behavior: Behavior normal          Thought Content: Thought content normal          Judgment: Judgment normal           Discussion with Family: Updated  (son) via phone  Discharge instructions/Information to patient and family:   See after visit summary for information provided to patient and family  Provisions for Follow-Up Care:  See after visit summary for information related to follow-up care and any pertinent home health orders  Disposition:   Home    Planned Readmission: None     Discharge Statement:  I spent > 30 minutes discharging the patient  This time was spent on the day of discharge  I had direct contact with the patient on the day of discharge  Greater than 50% of the total time was spent examining patient, answering all patient questions, arranging and discussing plan of care with patient as well as directly providing post-discharge instructions  Additional time then spent on discharge activities  Discharge Medications:  See after visit summary for reconciled discharge medications provided to patient and/or family        **Please Note: This note may have been constructed using a voice recognition system**

## 2023-03-02 NOTE — ASSESSMENT & PLAN NOTE
Lab Results   Component Value Date    HGBA1C 5 3 02/27/2023       Recent Labs     03/01/23  1721 03/01/23  2116 03/02/23  0714 03/02/23  1123   POCGLU 121 223* 128 250*       Blood Sugar Average: Last 72 hrs:  (P) 147 25   · Okay to resume metformin and glimepiride on discharge   · Given her GFR, recommend decreasing metformin from 1000 mg  BID to 1000 mg daily

## 2023-03-03 ENCOUNTER — HOME CARE VISIT (OUTPATIENT)
Dept: HOME HEALTH SERVICES | Facility: HOME HEALTHCARE | Age: 78
End: 2023-03-03

## 2023-03-03 NOTE — UTILIZATION REVIEW
NOTIFICATION OF ADMISSION DISCHARGE   This is a Notification of Discharge from [address]  Please be advised that this patient has been discharge from our facility  Below you will find the admission and discharge date and time including the patient’s disposition  UTILIZATION REVIEW CONTACT:  Yulisa Norwood  Utilization   Network Utilization Review Department  Phone: [phone] x carefully listen to the prompts  All voicemails are confidential   Email: [email or web address]     ADMISSION INFORMATION  PRESENTATION DATE: 2/27/2023 10:11 AM  OBERVATION ADMISSION DATE:   INPATIENT ADMISSION DATE: 2/27/23  2:12 PM   DISCHARGE DATE: 3/2/2023  2:47 PM   DISPOSITION:Home with Home Health Care    IMPORTANT INFORMATION:  Send all requests for admission clinical reviews, approved or denied determinations and any other requests to dedicated fax number below belonging to the campus where the patient is receiving treatment   List of dedicated fax numbers:  [address] DENIALS (Administrative/Medical Necessity) [phone]   [address] (Maternity/NICU/Pediatrics) [phone]   [facility] [phone]   [place] [address] [phone]   [address] [phone]   [address] [facility] [phone] [address] [facility] [phone]   [address] [place], [place] [address]lindsey\[facility] [place] [facility]\"" 119 [phone]   [facility]  [phone]   [address] [facility] [phone]   [address] [facility] [address] [facility] [phone]

## 2023-03-07 ENCOUNTER — OFFICE VISIT (OUTPATIENT)
Dept: FAMILY MEDICINE CLINIC | Facility: CLINIC | Age: 78
End: 2023-03-07

## 2023-03-07 VITALS
SYSTOLIC BLOOD PRESSURE: 156 MMHG | WEIGHT: 126 LBS | TEMPERATURE: 97.5 F | OXYGEN SATURATION: 98 % | HEART RATE: 80 BPM | DIASTOLIC BLOOD PRESSURE: 90 MMHG | RESPIRATION RATE: 16 BRPM | HEIGHT: 60 IN | BODY MASS INDEX: 24.74 KG/M2

## 2023-03-07 DIAGNOSIS — D50.9 IRON DEFICIENCY ANEMIA, UNSPECIFIED IRON DEFICIENCY ANEMIA TYPE: Primary | Chronic | ICD-10-CM

## 2023-03-07 DIAGNOSIS — N18.31 TYPE 2 DIABETES MELLITUS WITH STAGE 3A CHRONIC KIDNEY DISEASE AND HYPERTENSION (HCC): ICD-10-CM

## 2023-03-07 DIAGNOSIS — R60.0 BILATERAL LOWER EXTREMITY EDEMA: ICD-10-CM

## 2023-03-07 DIAGNOSIS — I63.9 CEREBROVASCULAR ACCIDENT (CVA), UNSPECIFIED MECHANISM (HCC): ICD-10-CM

## 2023-03-07 DIAGNOSIS — E11.22 TYPE 2 DIABETES MELLITUS WITH STAGE 3A CHRONIC KIDNEY DISEASE AND HYPERTENSION (HCC): ICD-10-CM

## 2023-03-07 DIAGNOSIS — D47.3 ESSENTIAL (HEMORRHAGIC) THROMBOCYTHEMIA (HCC): ICD-10-CM

## 2023-03-07 DIAGNOSIS — N18.31 CHRONIC KIDNEY DISEASE, STAGE 3A (HCC): ICD-10-CM

## 2023-03-07 DIAGNOSIS — I12.9 TYPE 2 DIABETES MELLITUS WITH STAGE 3A CHRONIC KIDNEY DISEASE AND HYPERTENSION (HCC): ICD-10-CM

## 2023-03-07 NOTE — PROGRESS NOTES
Name: Grant Lemus      : 1945      MRN: [id number]  Encounter Provider: Chelle Ragland MD  Encounter Date: 3/7/2023   Encounter department: [address]     1  Iron deficiency anemia, unspecified iron deficiency anemia type  Assessment & Plan:  She will have CBC repeated and follow up with GI and hematology outpatient  Currently asymptomatic  2  Cerebrovascular accident (CVA), unspecified mechanism ([facility] [address] [address] )  Assessment & Plan:  Continue statin      3  Essential (hemorrhagic) thrombocythemia ([facility] [address] )  Comments: Follows hematology  4  Chronic kidney disease, stage 3a ([facility] [address] )  Comments:  Stable  5  Type 2 diabetes mellitus with stage 3a chronic kidney disease and hypertension ([facility] )  Assessment & Plan:  Continue metformin and glimepiride  Lab Results   Component Value Date    HGBA1C 5 3 2023         6  Bilateral lower extremity edema  -     VAS lower limb venous duplex study, complete bilateral; Future; Expected date: 2023         TCM Call     Date and time call was made  3/2/2023  3:01 PM    Hospital care reviewed  Records reviewed    Patient was hospitialized at  [address] [place] [address]    Date of Admission  23    Date of discharge  23    Diagnosis  Iron deficiency anemia due to chronic blood loss    Disposition  Home    Were the patients medications reviewed and updated  Yes    Current Symptoms  None      TCM Call     Post hospital issues  None    Should patient be enrolled in anticoag monitoring? No    Scheduled for follow up?   Yes    Patients specialists  Other (comment)    Neurologist name  Ruy Jean Baptiste    Other specialists names  Vijay Betancourt PA-C (Hematology and Oncology) on 3/23/2023    Other specialists contcat #  Dr Estela Peralta    Did you obtain your prescribed medications  Yes    Do you need help managing your prescriptions or medications  No    Is transportation to your appointment needed  Yes    Specify why  Her son drives her    I have advised the patient to call PCP with any new or worsening symptoms  Elza Martinez  Family    The type of support provided  Physical; Emotional    Do you have social support  Yes, as much as I need    Are you recieving any outpatient services  No    Are you recieving home care services  Yes    Types of home care services  Nurse visit; Home PT  OT    Interperter language line needed  No    Counseling  Patient    Counseling topics  Activities of daily living; Importance of RX compliance; patient and family education; instructions for management; Risk factor reduction; Home health agency benefits    Comments  Lala Morton states that she is doing fine so far  She knows to go to the ER If any chest pain, dypsnea and call if any vomiting or any dark tarry stools, abd pain, etc Eliud Caban LPN        Subjective      HPI   Lala Morton is here today for hospital discharge follow up visit  She was recently hospitalized at [address] [place] [address] for hemoglobin of 5 5  on outpatient blood work  She was given PRBCs and Venofer x 2  Underwent EGD and colonoscopy  EGD revealed an AVM that was cauterized  Colonoscopy with polyps  Discharged on iron supplement and was advised to follow up outpatient for CBC and capsule endoscopy  Today she reports 3 days of right calf and foot swelling and pain  Denies shortness of breath/chest tightness  Otherwise feels ok  Review of Systems   Constitutional: Negative  HENT: Negative  Eyes: Negative  Respiratory: Negative  Cardiovascular: Positive for leg swelling  Gastrointestinal: Negative  Endocrine: Negative  Genitourinary: Negative  Musculoskeletal: Negative  Skin: Negative  Allergic/Immunologic: Negative  Neurological: Negative  Hematological: Negative  Psychiatric/Behavioral: Negative          Current Outpatient Medications on File Prior to Visit   Medication Sig   • atorvastatin (LIPITOR) 80 mg tablet TAKE 1 TABLET BY MOUTH IN  THE EVENING   • clopidogrel (PLAVIX) 75 mg tablet TAKE 1 TABLET BY MOUTH  DAILY   • cyanocobalamin (VITAMIN B-12) 1000 MCG tablet Take 1 tablet (1,000 mcg total) by mouth daily Do not start before March 3, 2023  • ferrous sulfate 324 (65 Fe) mg Take 1 tablet (324 mg total) by mouth daily before breakfast   • folic acid (FOLVITE) 1 mg tablet Take 1 tablet (1,000 mcg total) by mouth daily   • glimepiride (AMARYL) 1 mg tablet TAKE 1 TABLET BY MOUTH  DAILY WITH BREAKFAST   • magnesium Oxide (MAG-OX) 400 mg TABS TAKE 1 TABLET (400 MG TOTAL) BY MOUTH 2 TIMES A DAY   • metFORMIN (GLUCOPHAGE) 1000 MG tablet Take 1 tablet (1,000 mg total) by mouth daily with breakfast   • pantoprazole (PROTONIX) 40 mg tablet TAKE 1 TABLET BY MOUTH  DAILY   • quinapril (ACCUPRIL) 40 MG tablet TAKE 1 TABLET BY MOUTH  DAILY AT BEDTIME       Objective     /90 (BP Location: Left arm, Patient Position: Sitting, Cuff Size: Standard)   Pulse 80   Temp 97 5 °F (36 4 °C) (Temporal)   Resp 16   Ht 5' (1 524 m)   Wt 57 2 kg (126 lb)   LMP  (LMP Unknown)   SpO2 98%   BMI 24 61 kg/m²     Physical Exam  Constitutional:       General: She is not in acute distress  Appearance: She is well-developed  She is not diaphoretic  HENT:      Head: Normocephalic and atraumatic  Cardiovascular:      Rate and Rhythm: Normal rate and regular rhythm  Pulses: no weak pulses     Heart sounds: Normal heart sounds  No murmur heard  No friction rub  No gallop  Pulmonary:      Effort: Pulmonary effort is normal  No respiratory distress  Breath sounds: Normal breath sounds  No wheezing or rales  Chest:      Chest wall: No tenderness  Musculoskeletal:         General: No deformity  Normal range of motion  Cervical back: Normal range of motion and neck supple  Feet:      Right foot:      Skin integrity: No ulcer, skin breakdown, erythema, warmth, callus or dry skin        Left foot:      Skin integrity: No ulcer, skin breakdown, erythema, warmth, callus or dry skin  Skin:     General: Skin is warm and dry  Neurological:      Mental Status: She is alert and oriented to person, place, and time  Psychiatric:         Behavior: Behavior normal          Thought Content: Thought content normal          Judgment: Judgment normal      Patient's shoes and socks removed  Right Foot/Ankle   Right Foot Inspection  Skin Exam: skin normal and skin intact  No dry skin, no warmth, no callus, no erythema, no maceration, no abnormal color, no pre-ulcer, no ulcer and no callus  Toe Exam: ROM and strength within normal limits  Sensory   Monofilament testing: intact    Vascular  Capillary refills: < 3 seconds          Left Foot/Ankle  Left Foot Inspection  Skin Exam: skin normal and skin intact  No dry skin, no warmth, no erythema, no maceration, normal color, no pre-ulcer, no ulcer and no callus  Toe Exam: ROM and strength within normal limits       Sensory   Monofilament testing: intact    Vascular  Capillary refills: < 3 seconds        Assign Risk Category  No deformity present  No loss of protective sensation  No weak pulses  Risk: 0    Grady Hensley MD

## 2023-03-07 NOTE — RESULT ENCOUNTER NOTE
Patient was informed via my chart biopsies were benign  Repeat EGD in 1 year, no further screening colonoscopies

## 2023-03-07 NOTE — TELEPHONE ENCOUNTER
I called patient and the voice mail was full so I was unable to leave message  We will try calling again   Thank you

## 2023-03-08 NOTE — TELEPHONE ENCOUNTER
I called and spoke to patient  She is scheduled for capsule on 3/21/2023 at 8 am at [address] office  Dr Gael Romo  Iron deficiency anemia  Instructions mailed to patient  Please see if Daina Moran is needed   Thank you

## 2023-03-13 ENCOUNTER — HOSPITAL ENCOUNTER (OUTPATIENT)
Dept: RADIOLOGY | Facility: HOSPITAL | Age: 78
Discharge: HOME | End: 2023-03-13
Attending: FAMILY MEDICINE

## 2023-03-13 DIAGNOSIS — R60.0 BILATERAL LOWER EXTREMITY EDEMA: ICD-10-CM

## 2023-03-14 ENCOUNTER — TELEPHONE (OUTPATIENT)
Dept: OTHER | Facility: OTHER | Age: 78
End: 2023-03-14

## 2023-03-14 NOTE — TELEPHONE ENCOUNTER
I spoke with the patient in regards to rescheduling her appt today with Deena Jhaveri due to no completed labs  Patient states she wasn't able to have them completed  Informed patient that I can fax labs to [facility] where she goes and she can go today  Informed patient I can schedule her for a follow up next week on 2/28/23 at 8:30am or 3:00pm  Patient accepted 3:00pm appt  Informed patient she is now scheduled for 2/28/23 at 3:00pm  Patient understood 
Digestive

## 2023-03-14 NOTE — TELEPHONE ENCOUNTER
Pt called, requesting a call back from office at [facility] regarding any diabetic  instructions that's needed prior to upcoming appointment on 03/21 at 8 am   Please assist

## 2023-03-17 ENCOUNTER — APPOINTMENT (OUTPATIENT)
Dept: LAB | Facility: HOSPITAL | Age: 78
End: 2023-03-17

## 2023-03-17 DIAGNOSIS — D64.9 ANEMIA: ICD-10-CM

## 2023-03-17 LAB
ERYTHROCYTE [DISTWIDTH] IN BLOOD BY AUTOMATED COUNT: 23.4 % (ref 11.6–15.1)
HCT VFR BLD AUTO: 34.9 % (ref 34.8–46.1)
HGB BLD-MCNC: 10.1 G/DL (ref 11.5–15.4)
MCH RBC QN AUTO: 25.3 PG (ref 26.8–34.3)
MCHC RBC AUTO-ENTMCNC: 28.9 G/DL (ref 31.4–37.4)
MCV RBC AUTO: 88 FL (ref 82–98)
PLATELET # BLD AUTO: 478 THOUSANDS/UL (ref 149–390)
PMV BLD AUTO: 9.8 FL (ref 8.9–12.7)
RBC # BLD AUTO: 3.99 MILLION/UL (ref 3.81–5.12)
WBC # BLD AUTO: 9.36 THOUSAND/UL (ref 4.31–10.16)

## 2023-03-20 NOTE — PROGRESS NOTES
Addended by: MERISSA REVELES on: 3/20/2023 05:32 PM     Modules accepted: Orders     Called the patient to check in & remind her of her plan cut back to 3 cigarettes tomorrow  Left a message to remind her & encourage her  Will follow up

## 2023-03-23 ENCOUNTER — OFFICE VISIT (OUTPATIENT)
Dept: HEMATOLOGY ONCOLOGY | Facility: MEDICAL CENTER | Age: 78
End: 2023-03-23

## 2023-03-23 ENCOUNTER — TELEPHONE (OUTPATIENT)
Dept: HEMATOLOGY ONCOLOGY | Facility: MEDICAL CENTER | Age: 78
End: 2023-03-23

## 2023-03-23 VITALS
OXYGEN SATURATION: 99 % | HEIGHT: 60 IN | WEIGHT: 120.2 LBS | TEMPERATURE: 97.9 F | RESPIRATION RATE: 16 BRPM | HEART RATE: 93 BPM | SYSTOLIC BLOOD PRESSURE: 134 MMHG | DIASTOLIC BLOOD PRESSURE: 68 MMHG | BODY MASS INDEX: 23.6 KG/M2

## 2023-03-23 DIAGNOSIS — D50.9 IRON DEFICIENCY ANEMIA, UNSPECIFIED IRON DEFICIENCY ANEMIA TYPE: Primary | ICD-10-CM

## 2023-03-23 DIAGNOSIS — D75.839 THROMBOCYTOSIS: ICD-10-CM

## 2023-03-23 DIAGNOSIS — D62 ACUTE BLOOD LOSS ANEMIA: ICD-10-CM

## 2023-03-23 RX ORDER — SODIUM CHLORIDE 9 MG/ML
20 INJECTION, SOLUTION INTRAVENOUS ONCE
Status: CANCELLED | OUTPATIENT
Start: 2023-03-31

## 2023-03-23 NOTE — TELEPHONE ENCOUNTER
While we try to accommodate patient requests, our priority is to schedule treatment according to Doctor's orders and site availability  1  Does the Provider use the intake sheet or checkout note? Yes  2  What would be a preferred day of the week that would work best for your infusion appointment? Anyday  3  Do you prefer mornings or afternoons for your appointments? Afternoon  4  Are there any days or dates that do not work for your schedule, including any upcoming vacations? No  5  We are going to try our best to schedule you at the infusion center closest to your home  In the event that we are unable to what would be your next preferred infusion site or sites? 1  [facility]  2      6  Do you have transportation to take you to all of your appointments? Yes  7   Would you like the infusion center to draw labs from your port? (disregard if patient doesn't have a port or need labs for infusion appointment)

## 2023-03-23 NOTE — PROGRESS NOTES
[address] [address]  Hematology Ambulatory Follow-Up  Bernie La, 1945, [id number]  3/23/2023      Assessment and Plan   1  Iron deficiency anemia, unspecified iron deficiency anemia type; 2  Acute blood loss anemia  Ferritin = 4 in February 2023 during hospitalization for acute blood loss anemia with hemoglobin of 5 g/dL  Patient received 3 blood transfusions followed by 1 transfusion of Venofer 200 mg as an inpatient  While patient's hemoglobin is slowly recovering, patient's iron stores are significantly depleted  Patient's-hemoglobin is in the 10 g/dL range  Patient will follow-up with IV iron  Patient has been taking oral iron, but secondary to Colonoscopy this will have to be discontinued over the next weeks  Patient previously underwent treatment with Venofer without significant toxicities  We reviewed toxicities  Patient will contact the office with any questions or concerns  Patient will proceed with Venofer 300 mg IV weekly x4  Etiology: Patient had AVMs found on endoscopy  Patient likely has recurrent anemia secondary to this issue  Patient is going for capsule endoscopy for further evaluation of small bowel  Patient may require more aggressive IV iron treatments in order to keep hemoglobin elevated enough in the event of an additional GI bleeding     - CBC and differential; Future  - Iron Panel (Includes Ferritin, Iron Sat%, Iron, and TIBC); Future  - Ferritin; Future    3  Thrombocytosis  Thrombocytosis is likely secondary to blood loss anemia  Patient's macrocytosis is likely also due to increased platelet count from acute blood loss anemia  We will continue to observe  - CBC and differential; Future  - Iron Panel (Includes Ferritin, Iron Sat%, Iron, and TIBC); Future  - Ferritin;  Future      The patient is scheduled for follow-up in approximately 4 months with   Landy  Patient voiced agreement and understanding to the above  Patient advised to call the Hematology/Oncology office with any questions and concerns regarding the above  Barrier(s) to care: None  The patient is able to self care  Yoon Gould PA-C  Medical Oncology/Hematology  [address]    Subjective     Chief Complaint   Patient presents with   • Follow-up     Macrocytic anemia        History of present illness: This is a 63 year old female with past medical history of diabetes well controlled, GERD on Protonix therapy, hypertension, hyperlipidemia who was found to be iron deficient in January 2020 when hospitalized after having a stroke      Patient was hospitalized after having altered mental status changes   Patient was back to mental status baseline after 16 hours   On admission, the patient had a hemoglobin of 7 3 grams/deciliter and was very microcytic with high RDW   Patient was transfused with 2 units of packed red blood cells in the emergency room   Patient also underwent GI consultation and had a colonoscopy as well as an endoscopy after discharge that did not demonstrate etiology of iron deficiency   Iron studies in January 2020 demonstrated iron saturation of 3% with ferritin of 6 ng/dL      At discharge patient was started on oral iron supplementation twice a day   Patient initially had a lot of constipation but after being on it for some time, this seemed to even now   Patient continue to have follow-up with GI physicians   Follow-up with fecal occult blood test was negative   Patient had a urinalysis completed during hospitalization in January 2020 but follow-up urinalysis has not been completed   Patient denies blood-loss vaginally      In Sept and Oct 2020, patient underwent treatment with IV Venofer 200 mg IV   Patient notes some issues with obtaining venous access but otherwise treatments went well      Follow-up blood work on 11/27 demonstrated hemoglobin of 11 9, platelet count of 270, ferritin of 246, TIBC normalized at 262         2/26/2021:  Ferritin = 142,   TIBC = 284, iron saturation 29%, hemoglobin=  11 9, platelet count 621, MCV 96, RDW 12 5      6/3/2021 Ferritin = 24,   TIBC = 326, iron saturation 20%, hemoglobin=  11 5, platelet count 758, MCV 94, RDW 12 6      7/26/2021: Benign pathology of Thyroid aspiration       10/4/2021:  WBC =5 7, Hemoglobin = 10 0, MCV = 91, platelet = 011, LLAX = 341, iron saturation = 10%     5/7/22 ferritin = 10, hemoglobin = 9 4, platelet count = 571 TIBC = 366     9/15/22 WBC = 8 9, hemoglobin 9 3, , platelet count 459, ferritin = 62, TIBC 294    2/27/2023: Ferritin = 4, TIBC 440, hemoglobin = 5 7, MCV 78, platelet count 41, RDW 15 9, white blood cell count within normal limits  Patient was hospitalized in late February and early March discharged on 3/2  Patient received 3 units of packed red blood cells, 1 dose of Venofer 200 mg and underwent colonoscopy and endoscopy  Endoscopy demonstrated small AVM proximal stomach with spontaneous bleeding cauterized during examination  03/23/23 :  Lab Results   Component Value Date    IRON 13 (L) 02/27/2023    TIBC 440 02/27/2023    FERRITIN 4 (L) 02/27/2023     Lab Results   Component Value Date    WBC 9 36 03/17/2023    HGB 10 1 (L) 03/17/2023    HCT 34 9 03/17/2023    MCV 88 03/17/2023     (H) 03/17/2023       Interval history: Patient admits to having shortness of breath and other symptoms of recurrent anemia prior to blood work being completed  Patient states that she just did not want to share that she was feeling poorly  Patient today in contrast feels much better  Patient is tolerating oral iron however this needs to be stopped for endoscopy scheduled in 10 days     Review of Systems   Constitutional: Negative for appetite change, fatigue, fever and unexpected weight change  HENT: Negative for nosebleeds      Respiratory: Negative for cough, choking and shortness of breath  Negative hemoptysis  Cardiovascular: Negative for chest pain, palpitations and leg swelling  Gastrointestinal: Negative  Negative for abdominal distention, abdominal pain, anal bleeding, blood in stool, constipation, diarrhea, nausea and vomiting  Endocrine: Negative  Negative for cold intolerance  Genitourinary: Negative  Negative for hematuria, menstrual problem, vaginal bleeding, vaginal discharge and vaginal pain  Musculoskeletal: Negative  Negative for arthralgias, myalgias, neck pain and neck stiffness  Skin: Negative  Negative for color change, pallor and rash  Allergic/Immunologic: Negative  Negative for immunocompromised state  Neurological: Negative  Negative for weakness and headaches  Hematological: Negative for adenopathy  Does not bruise/bleed easily  All other systems reviewed and are negative        Patient Active Problem List   Diagnosis   • Combined forms of age-related cataract of left eye   • Type 2 diabetes mellitus with diabetic polyneuropathy ([facility] )   • Hyperlipidemia   • Personal history of transient ischemic attack (TIA), and cerebral infarction without residual deficits   • Family history of tobacco abuse   • Smoking   • Arthritis   • Essential hypertension   • GERD (gastroesophageal reflux disease)   • Iron deficiency anemia   • Type 2 diabetes mellitus with stage 3a chronic kidney disease and hypertension ([facility] )   • Essential (hemorrhagic) thrombocythemia (HCC)   • CVA (cerebral vascular accident) ([facility] )   • Nodule of upper lobe of right lung   • Thyroid nodule greater than or equal to 1 5 cm in diameter incidentally noted on imaging study   • Troponin level elevated   • Vertebral artery stenosis, bilateral   • Thrombocytosis   • Acute blood loss anemia     Past Medical History:   Diagnosis Date   • Anemia    • Arthritis    • Capsulitis of foot     Last assessed 07/29/13   • Diabetes ([facility] )     type 2   • Full dentures    • Ganglion     Last assessed 13   • Hearing decreased    • Hyperlipidemia    • Hypertension    • Magnesium deficiency    • Sciatica    • Wears glasses      Past Surgical History:   Procedure Laterality Date   • CATARACT EXTRACTION     • COLONOSCOPY     • DILATION AND CURETTAGE OF UTERUS      x 4   • HAND SURGERY Left     fx repaired w/wrist bone   • HYSTERECTOMY      left ovary remains   • KNEE ARTHROSCOPY Left    • LIPOMA RESECTION      removed from back and left buttock   • NJ XCAPSL CTRC RMVL INSJ IO LENS PROSTH W/O ECP Right 2019    Procedure: EXTRACTION EXTRACAPSULAR CATARACT PHACO INTRAOCULAR LENS (IOL); Surgeon: Sohan Mcgraw MD;  Location: [facility] MAIN OR;  Service: Ophthalmology   • NJ XCAPSL CTRC RMVL INSJ IO LENS PROSTH W/O ECP Left 2019    Procedure: EXTRACTION EXTRACAPSULAR CATARACT PHACO INTRAOCULAR LENS (IOL);   Surgeon: Sohan Mcgraw MD;  Location: [facility] MAIN OR;  Service: Ophthalmology   • TONSILLECTOMY      and adenoids   • TUBAL LIGATION     • US GUIDED THYROID BIOPSY  2021     Family History   Problem Relation Age of Onset   • Leukemia Father    • Early death Father 39        leukemia   • Stomach cancer Maternal Grandfather    • Diabetes Paternal Grandfather    • Diabetes Sister         insulin dependent   • Diabetes Brother         insulin   • Diabetes Sister         insulin   • Diabetes Sister         insulin     Social History     Socioeconomic History   • Marital status:      Spouse name: None   • Number of children: None   • Years of education: None   • Highest education level: None   Occupational History   • None   Tobacco Use   • Smoking status: Every Day     Packs/day: 0 25     Years: 35 00     Pack years: 8 75     Types: Cigarettes     Last attempt to quit: 2021     Years since quittin 7   • Smokeless tobacco: Never   Vaping Use   • Vaping Use: Never used   Substance and Sexual Activity   • Alcohol use: Never     Comment: Rarely   • Drug use: Never   • Sexual activity: Not Currently   Other Topics Concern   • None   Social History Narrative   • None     Social Determinants of Health     Financial Resource Strain: Not on file   Food Insecurity: No Food Insecurity   • Worried About Running Out of Food in the Last Year: Never true   • Ran Out of Food in the Last Year: Never true   Transportation Needs: No Transportation Needs   • Lack of Transportation (Medical): No   • Lack of Transportation (Non-Medical):  No   Physical Activity: Not on file   Stress: Not on file   Social Connections: Not on file   Intimate Partner Violence: Not on file   Housing Stability: Low Risk    • Unable to Pay for Housing in the Last Year: No   • Number of Places Lived in the Last Year: 1   • Unstable Housing in the Last Year: No       Current Outpatient Medications:   •  atorvastatin (LIPITOR) 80 mg tablet, TAKE 1 TABLET BY MOUTH IN  THE EVENING, Disp: 90 tablet, Rfl: 3  •  clopidogrel (PLAVIX) 75 mg tablet, TAKE 1 TABLET BY MOUTH  DAILY, Disp: 90 tablet, Rfl: 3  •  cyanocobalamin (VITAMIN B-12) 1000 MCG tablet, Take 1 tablet (1,000 mcg total) by mouth daily Do not start before March 3, 2023 , Disp: 30 tablet, Rfl: 0  •  ferrous sulfate 324 (65 Fe) mg, Take 1 tablet (324 mg total) by mouth daily before breakfast, Disp: 180 tablet, Rfl: 0  •  folic acid (FOLVITE) 1 mg tablet, Take 1 tablet (1,000 mcg total) by mouth daily, Disp: 90 tablet, Rfl: 1  •  glimepiride (AMARYL) 1 mg tablet, TAKE 1 TABLET BY MOUTH  DAILY WITH BREAKFAST, Disp: 90 tablet, Rfl: 0  •  magnesium Oxide (MAG-OX) 400 mg TABS, TAKE 1 TABLET (400 MG TOTAL) BY MOUTH 2 TIMES A DAY, Disp: 180 tablet, Rfl: 0  •  metFORMIN (GLUCOPHAGE) 1000 MG tablet, Take 1 tablet (1,000 mg total) by mouth daily with breakfast, Disp: 180 tablet, Rfl: 0  •  pantoprazole (PROTONIX) 40 mg tablet, TAKE 1 TABLET BY MOUTH  DAILY, Disp: 90 tablet, Rfl: 1  •  quinapril (ACCUPRIL) 40 MG tablet, TAKE 1 TABLET BY MOUTH  DAILY AT BEDTIME, Disp: 90 tablet, Rfl: 3  Allergies   Allergen Reactions   • Aleve [Naproxen] Other (See Comments)     "weird" sensation entire body   • Sulfa Antibiotics Rash       Objective   /68 (BP Location: Left arm, Patient Position: Sitting, Cuff Size: Adult)   Pulse 93   Temp 97 9 °F (36 6 °C) (Tympanic)   Resp 16   Ht 5' (1 524 m)   Wt 54 5 kg (120 lb 3 2 oz)   LMP  (LMP Unknown)   SpO2 99%   BMI 23 47 kg/m²    Physical Exam  Constitutional:       General: She is not in acute distress  Appearance: She is well-developed  HENT:      Head: Normocephalic and atraumatic  Mouth/Throat:      Pharynx: No oropharyngeal exudate  Eyes:      General: No scleral icterus  Pupils: Pupils are equal, round, and reactive to light  Cardiovascular:      Rate and Rhythm: Normal rate and regular rhythm  Heart sounds: No murmur heard  Pulmonary:      Effort: Pulmonary effort is normal  No respiratory distress  Breath sounds: Wheezing (right loung) present  Abdominal:      General: Bowel sounds are normal  There is no distension  Palpations: Abdomen is soft  Tenderness: There is no abdominal tenderness  Musculoskeletal:         General: Normal range of motion  Cervical back: Normal range of motion  Lymphadenopathy:      Cervical: No cervical adenopathy  Skin:     General: Skin is warm  Coloration: Skin is not pale  Findings: No rash  Neurological:      Mental Status: She is alert and oriented to person, place, and time  Cranial Nerves: No cranial nerve deficit  Psychiatric:         Thought Content:  Thought content normal          Result Review  Labs:  Appointment on 03/17/2023   Component Date Value Ref Range Status   • WBC 03/17/2023 9 36  4 31 - 10 16 Thousand/uL Final   • RBC 03/17/2023 3 99  3 81 - 5 12 Million/uL Final   • Hemoglobin 03/17/2023 10 1 (L)  11 5 - 15 4 g/dL Final   • Hematocrit 03/17/2023 34 9  34 8 - 46 1 % Final   • MCV 03/17/2023 88  82 - 98 fL Final   • MCH 03/17/2023 25 3 (L)  26 8 - 34 3 pg Final   • MCHC 03/17/2023 28 9 (L)  31 4 - 37 4 g/dL Final   • RDW 03/17/2023 23 4 (H)  11 6 - 15 1 % Final   • Platelets 61/74/4277 478 (H)  149 - 390 Thousands/uL Final   • MPV 03/17/2023 9 8  8 9 - 12 7 fL Final     Lab Results   Component Value Date    IRON 13 (L) 02/27/2023    TIBC 440 02/27/2023    FERRITIN 4 (L) 02/27/2023       Please note: This report has been generated by a voice recognition software system  Therefore there may be syntax, spelling, and/or grammatical errors  Please call if you have any questions

## 2023-03-23 NOTE — TELEPHONE ENCOUNTER
Please schedule patient for IV iron, patient does not have a preference on the day but patient will like to be scheduled in the afternoon  Thank you

## 2023-03-31 ENCOUNTER — HOSPITAL ENCOUNTER (OUTPATIENT)
Dept: INFUSION CENTER | Facility: HOSPITAL | Age: 78
End: 2023-03-31
Attending: INTERNAL MEDICINE

## 2023-03-31 VITALS
HEART RATE: 71 BPM | TEMPERATURE: 97.4 F | RESPIRATION RATE: 18 BRPM | OXYGEN SATURATION: 100 % | SYSTOLIC BLOOD PRESSURE: 170 MMHG | DIASTOLIC BLOOD PRESSURE: 70 MMHG

## 2023-03-31 DIAGNOSIS — D62 ACUTE BLOOD LOSS ANEMIA: Primary | ICD-10-CM

## 2023-03-31 DIAGNOSIS — D50.9 IRON DEFICIENCY ANEMIA, UNSPECIFIED IRON DEFICIENCY ANEMIA TYPE: ICD-10-CM

## 2023-03-31 DIAGNOSIS — D75.839 THROMBOCYTOSIS: ICD-10-CM

## 2023-03-31 RX ORDER — SODIUM CHLORIDE 9 MG/ML
20 INJECTION, SOLUTION INTRAVENOUS ONCE
Status: COMPLETED | OUTPATIENT
Start: 2023-03-31 | End: 2023-03-31

## 2023-03-31 RX ORDER — SODIUM CHLORIDE 9 MG/ML
20 INJECTION, SOLUTION INTRAVENOUS ONCE
Status: CANCELLED | OUTPATIENT
Start: 2023-04-07

## 2023-03-31 RX ADMIN — SODIUM CHLORIDE 20 ML/HR: 0.9 INJECTION, SOLUTION INTRAVENOUS at 14:31

## 2023-03-31 RX ADMIN — IRON SUCROSE 300 MG: 20 INJECTION, SOLUTION INTRAVENOUS at 14:31

## 2023-04-04 ENCOUNTER — OFFICE VISIT (OUTPATIENT)
Dept: GASTROENTEROLOGY | Facility: CLINIC | Age: 78
End: 2023-04-04

## 2023-04-04 ENCOUNTER — TELEPHONE (OUTPATIENT)
Dept: GASTROENTEROLOGY | Facility: CLINIC | Age: 78
End: 2023-04-04

## 2023-04-04 DIAGNOSIS — D50.9 IRON DEFICIENCY ANEMIA, UNSPECIFIED IRON DEFICIENCY ANEMIA TYPE: Chronic | ICD-10-CM

## 2023-04-04 NOTE — PROGRESS NOTES
PT HERE FOR CAPSULE ENDOSCOPY, TOLERATED BOWEL PREP, REMAINED NPO AFTER MIDNIGHT   REVIEWED CAPSULE AND INSTRUCTIONS, PT VERBALIZED UNDERSTANDING, PT TO RETURN AT 4:00 PM

## 2023-04-07 ENCOUNTER — HOSPITAL ENCOUNTER (OUTPATIENT)
Dept: INFUSION CENTER | Facility: HOSPITAL | Age: 78
End: 2023-04-07
Attending: INTERNAL MEDICINE

## 2023-04-07 VITALS
TEMPERATURE: 96.8 F | HEART RATE: 89 BPM | SYSTOLIC BLOOD PRESSURE: 160 MMHG | OXYGEN SATURATION: 100 % | DIASTOLIC BLOOD PRESSURE: 60 MMHG | RESPIRATION RATE: 18 BRPM

## 2023-04-07 DIAGNOSIS — D50.9 IRON DEFICIENCY ANEMIA, UNSPECIFIED IRON DEFICIENCY ANEMIA TYPE: ICD-10-CM

## 2023-04-07 DIAGNOSIS — D62 ACUTE BLOOD LOSS ANEMIA: Primary | ICD-10-CM

## 2023-04-07 DIAGNOSIS — D75.839 THROMBOCYTOSIS: ICD-10-CM

## 2023-04-07 RX ORDER — SODIUM CHLORIDE 9 MG/ML
20 INJECTION, SOLUTION INTRAVENOUS ONCE
Status: COMPLETED | OUTPATIENT
Start: 2023-04-07 | End: 2023-04-07

## 2023-04-07 RX ORDER — SODIUM CHLORIDE 9 MG/ML
20 INJECTION, SOLUTION INTRAVENOUS ONCE
OUTPATIENT
Start: 2023-04-14

## 2023-04-07 RX ADMIN — IRON SUCROSE 300 MG: 20 INJECTION, SOLUTION INTRAVENOUS at 15:11

## 2023-04-07 RX ADMIN — SODIUM CHLORIDE 20 ML/HR: 0.9 INJECTION, SOLUTION INTRAVENOUS at 15:12

## 2023-04-21 ENCOUNTER — HOSPITAL ENCOUNTER (OUTPATIENT)
Dept: INFUSION CENTER | Facility: HOSPITAL | Age: 78
Discharge: HOME | End: 2023-04-21
Attending: INTERNAL MEDICINE

## 2023-04-21 VITALS
TEMPERATURE: 97.6 F | OXYGEN SATURATION: 99 % | DIASTOLIC BLOOD PRESSURE: 67 MMHG | RESPIRATION RATE: 20 BRPM | SYSTOLIC BLOOD PRESSURE: 147 MMHG | HEART RATE: 87 BPM

## 2023-04-21 DIAGNOSIS — D75.839 THROMBOCYTOSIS: ICD-10-CM

## 2023-04-21 DIAGNOSIS — D62 ACUTE BLOOD LOSS ANEMIA: Primary | ICD-10-CM

## 2023-04-21 DIAGNOSIS — D50.9 IRON DEFICIENCY ANEMIA, UNSPECIFIED IRON DEFICIENCY ANEMIA TYPE: ICD-10-CM

## 2023-04-21 LAB
BASOPHILS # BLD AUTO: 0.03 THOUSANDS/ΜL (ref 0–0.1)
BASOPHILS NFR BLD AUTO: 1 % (ref 0–1)
EOSINOPHIL # BLD AUTO: 0.1 THOUSAND/ΜL (ref 0–0.61)
EOSINOPHIL NFR BLD AUTO: 2 % (ref 0–6)
ERYTHROCYTE [DISTWIDTH] IN BLOOD BY AUTOMATED COUNT: 21.3 % (ref 11.6–15.1)
HCT VFR BLD AUTO: 35.5 % (ref 34.8–46.1)
HGB BLD-MCNC: 11 G/DL (ref 11.5–15.4)
IMM GRANULOCYTES # BLD AUTO: 0.02 THOUSAND/UL (ref 0–0.2)
IMM GRANULOCYTES NFR BLD AUTO: 0 % (ref 0–2)
LYMPHOCYTES # BLD AUTO: 0.88 THOUSANDS/ΜL (ref 0.6–4.47)
LYMPHOCYTES NFR BLD AUTO: 17 % (ref 14–44)
MCH RBC QN AUTO: 28.6 PG (ref 26.8–34.3)
MCHC RBC AUTO-ENTMCNC: 31 G/DL (ref 31.4–37.4)
MCV RBC AUTO: 92 FL (ref 82–98)
MONOCYTES # BLD AUTO: 0.49 THOUSAND/ΜL (ref 0.17–1.22)
MONOCYTES NFR BLD AUTO: 10 % (ref 4–12)
NEUTROPHILS # BLD AUTO: 3.53 THOUSANDS/ΜL (ref 1.85–7.62)
NEUTS SEG NFR BLD AUTO: 70 % (ref 43–75)
NRBC BLD AUTO-RTO: 0 /100 WBCS
PLATELET # BLD AUTO: 313 THOUSANDS/UL (ref 149–390)
PMV BLD AUTO: 9.8 FL (ref 8.9–12.7)
RBC # BLD AUTO: 3.85 MILLION/UL (ref 3.81–5.12)
WBC # BLD AUTO: 5.05 THOUSAND/UL (ref 4.31–10.16)

## 2023-04-21 RX ORDER — SODIUM CHLORIDE 9 MG/ML
20 INJECTION, SOLUTION INTRAVENOUS ONCE
Status: CANCELLED | OUTPATIENT
Start: 2023-04-21

## 2023-04-21 RX ORDER — SODIUM CHLORIDE 9 MG/ML
20 INJECTION, SOLUTION INTRAVENOUS ONCE
Status: COMPLETED | OUTPATIENT
Start: 2023-04-21 | End: 2023-04-21

## 2023-04-21 RX ADMIN — IRON SUCROSE 300 MG: 20 INJECTION, SOLUTION INTRAVENOUS at 14:35

## 2023-04-21 RX ADMIN — SODIUM CHLORIDE 20 ML/HR: 0.9 INJECTION, SOLUTION INTRAVENOUS at 14:34

## 2023-04-25 ENCOUNTER — TELEPHONE (OUTPATIENT)
Dept: OTHER | Facility: OTHER | Age: 78
End: 2023-04-25

## 2023-04-25 NOTE — TELEPHONE ENCOUNTER
Patient is returning call to office   Per patient office called her about taking another test   please fu

## 2023-05-03 NOTE — TELEPHONE ENCOUNTER
Patient called back  She does not know how to empty her voicemail  She will have the phone with her today  Please return her call

## 2023-05-03 NOTE — TELEPHONE ENCOUNTER
SPOKE WITH PT, INFORMED OF CAPSULE ENDOSCOPY RESULTS AND RECOMMENDATION FOR CT SCAN AND BLOOD WORK  ALL QUESTIONS ANSWERED

## 2023-05-05 DIAGNOSIS — Z76.0 MEDICATION REFILL: ICD-10-CM

## 2023-05-07 RX ORDER — LANOLIN ALCOHOL/MO/W.PET/CERES
CREAM (GRAM) TOPICAL
Qty: 180 TABLET | Refills: 0 | Status: SHIPPED | OUTPATIENT
Start: 2023-05-07

## 2023-05-12 ENCOUNTER — TELEPHONE (OUTPATIENT)
Dept: FAMILY MEDICINE CLINIC | Facility: CLINIC | Age: 78
End: 2023-05-12

## 2023-07-16 LAB
BASOPHILS # BLD AUTO: 0 X10E3/UL (ref 0–0.2)
BASOPHILS NFR BLD AUTO: 1 %
EOSINOPHIL # BLD AUTO: 0.2 X10E3/UL (ref 0–0.4)
EOSINOPHIL NFR BLD AUTO: 3 %
ERYTHROCYTE [DISTWIDTH] IN BLOOD BY AUTOMATED COUNT: 12.7 % (ref 11.7–15.4)
FERRITIN SERPL-MCNC: 90 NG/ML (ref 15–150)
HCT VFR BLD AUTO: 39.6 % (ref 34–46.6)
HGB BLD-MCNC: 13 G/DL (ref 11.1–15.9)
IMM GRANULOCYTES # BLD: 0 X10E3/UL (ref 0–0.1)
IMM GRANULOCYTES NFR BLD: 1 %
IRON SATN MFR SERPL: 26 % (ref 15–55)
IRON SERPL-MCNC: 79 UG/DL (ref 27–139)
LYMPHOCYTES # BLD AUTO: 1.2 X10E3/UL (ref 0.7–3.1)
LYMPHOCYTES NFR BLD AUTO: 21 %
MCH RBC QN AUTO: 29.9 PG (ref 26.6–33)
MCHC RBC AUTO-ENTMCNC: 32.8 G/DL (ref 31.5–35.7)
MCV RBC AUTO: 91 FL (ref 79–97)
MONOCYTES # BLD AUTO: 0.6 X10E3/UL (ref 0.1–0.9)
MONOCYTES NFR BLD AUTO: 10 %
NEUTROPHILS # BLD AUTO: 3.8 X10E3/UL (ref 1.4–7)
NEUTROPHILS NFR BLD AUTO: 64 %
PLATELET # BLD AUTO: 342 X10E3/UL (ref 150–450)
RBC # BLD AUTO: 4.35 X10E6/UL (ref 3.77–5.28)
TIBC SERPL-MCNC: 304 UG/DL (ref 250–450)
UIBC SERPL-MCNC: 225 UG/DL (ref 118–369)
WBC # BLD AUTO: 5.9 X10E3/UL (ref 3.4–10.8)

## 2023-07-18 ENCOUNTER — OFFICE VISIT (OUTPATIENT)
Dept: FAMILY MEDICINE CLINIC | Facility: CLINIC | Age: 78
End: 2023-07-18
Payer: COMMERCIAL

## 2023-07-18 VITALS
SYSTOLIC BLOOD PRESSURE: 122 MMHG | HEIGHT: 60 IN | OXYGEN SATURATION: 96 % | WEIGHT: 119 LBS | DIASTOLIC BLOOD PRESSURE: 60 MMHG | HEART RATE: 86 BPM | RESPIRATION RATE: 18 BRPM | TEMPERATURE: 97.8 F | BODY MASS INDEX: 23.36 KG/M2

## 2023-07-18 DIAGNOSIS — Z78.0 POST-MENOPAUSAL: ICD-10-CM

## 2023-07-18 DIAGNOSIS — E78.5 HYPERLIPIDEMIA, UNSPECIFIED HYPERLIPIDEMIA TYPE: Chronic | ICD-10-CM

## 2023-07-18 DIAGNOSIS — D50.9 IRON DEFICIENCY ANEMIA, UNSPECIFIED IRON DEFICIENCY ANEMIA TYPE: Chronic | ICD-10-CM

## 2023-07-18 DIAGNOSIS — I10 ESSENTIAL HYPERTENSION: Chronic | ICD-10-CM

## 2023-07-18 DIAGNOSIS — F17.200 SMOKING: ICD-10-CM

## 2023-07-18 DIAGNOSIS — N18.31 TYPE 2 DIABETES MELLITUS WITH STAGE 3A CHRONIC KIDNEY DISEASE AND HYPERTENSION (HCC): ICD-10-CM

## 2023-07-18 DIAGNOSIS — Z00.00 MEDICARE ANNUAL WELLNESS VISIT, SUBSEQUENT: Primary | ICD-10-CM

## 2023-07-18 DIAGNOSIS — E11.22 TYPE 2 DIABETES MELLITUS WITH STAGE 3A CHRONIC KIDNEY DISEASE AND HYPERTENSION (HCC): ICD-10-CM

## 2023-07-18 DIAGNOSIS — I12.9 TYPE 2 DIABETES MELLITUS WITH STAGE 3A CHRONIC KIDNEY DISEASE AND HYPERTENSION (HCC): ICD-10-CM

## 2023-07-18 PROCEDURE — 99214 OFFICE O/P EST MOD 30 MIN: CPT | Performed by: FAMILY MEDICINE

## 2023-07-18 PROCEDURE — G0439 PPPS, SUBSEQ VISIT: HCPCS | Performed by: FAMILY MEDICINE

## 2023-07-18 NOTE — PROGRESS NOTES
Assessment and Plan:     1. Medicare annual wellness visit, subsequent    2. 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 Orders:  -     Albumin / creatinine urine ratio; Future; Expected date: 07/18/2023  -     Hemoglobin A1C; Future; Expected date: 07/18/2023  -     Albumin / creatinine urine ratio  -     Hemoglobin A1C    3. Essential hypertension  Comments:  Stable. 4. Hyperlipidemia, unspecified hyperlipidemia type  Comments:  Continue statin. Will check lipid panel. Orders:  -     Lipid Panel with Direct LDL reflex; Future    5. Iron deficiency anemia, unspecified iron deficiency anemia type  Comments: Follows hematology. Recently had blood work done which shows improvement. 6. Smoking  Comments:  Counseled on smoking cessation. Smokes half a pack a day. Not ready to quit. Aware of risks. 7. Post-menopausal  -     DXA bone density spine hip and pelvis; Future; Expected date: 07/18/2023      Preventive health issues were discussed with patient, and age appropriate screening tests were ordered as noted in patient's After Visit Summary. Personalized health advice and appropriate referrals for health education or preventive services given if needed, as noted in patient's After Visit Summary. History of Present Illness:     Patient presents for a Medicare Wellness Visit    HPI   Patient Care Team:  Will Crenshaw MD as PCP - General (Family Medicine)     Review of Systems:     Review of Systems   Constitutional: Negative. HENT: Negative. Eyes: Negative. Respiratory: Negative. Cardiovascular: Negative. Gastrointestinal: Negative. Endocrine: Negative. Genitourinary: Negative. Musculoskeletal: Negative. Skin: Negative. Allergic/Immunologic: Negative. Neurological: Negative. Hematological: Negative. Psychiatric/Behavioral: Negative.          Problem List:     Patient Active Problem List   Diagnosis   • Combined forms of age-related cataract of left eye   • Type 2 diabetes mellitus with diabetic polyneuropathy ([address])   • Hyperlipidemia   • Personal history of transient ischemic attack (TIA), and cerebral infarction without residual deficits   • Family history of tobacco abuse   • Smoking   • Arthritis   • Essential hypertension   • GERD (gastroesophageal reflux disease)   • Iron deficiency anemia   • Type 2 diabetes mellitus with stage 3a chronic kidney disease and hypertension ([address])   • Essential (hemorrhagic) thrombocythemia (HCC)   • CVA (cerebral vascular accident) ([address])   • Nodule of upper lobe of right lung   • Thyroid nodule greater than or equal to 1.5 cm in diameter incidentally noted on imaging study   • Troponin level elevated   • Vertebral artery stenosis, bilateral   • Thrombocytosis   • Acute blood loss anemia      Past Medical and Surgical History:     Past Medical History:   Diagnosis Date   • Anemia    • Arthritis    • Capsulitis of foot     Last assessed 07/29/13   • Diabetes ([address])     type 2   • Full dentures    • Ganglion     Last assessed 07/29/13   • Hearing decreased    • Hyperlipidemia    • Hypertension    • Magnesium deficiency    • Sciatica    • Wears glasses      Past Surgical History:   Procedure Laterality Date   • CATARACT EXTRACTION     • COLONOSCOPY     • DILATION AND CURETTAGE OF UTERUS      x 4   • HAND SURGERY Left     fx repaired w/wrist bone   • HYSTERECTOMY      left ovary remains   • KNEE ARTHROSCOPY Left    • LIPOMA RESECTION      removed from back and left buttock   • LA XCAPSL CTRC RMVL INSJ IO LENS PROSTH W/O ECP Right 1/24/2019    Procedure: EXTRACTION EXTRACAPSULAR CATARACT PHACO INTRAOCULAR LENS (IOL); Surgeon: Taqueria Calderón MD;  Location: [facility] MAIN OR;  Service: Ophthalmology   • LA XCAPSL CTRC RMVL INSJ IO LENS PROSTH W/O ECP Left 2/14/2019    Procedure: EXTRACTION EXTRACAPSULAR CATARACT PHACO INTRAOCULAR LENS (IOL);   Surgeon: Isabella Servin MD;  Location: [facility] MAIN OR;  Service: Ophthalmology   • TONSILLECTOMY      and adenoids   • TUBAL LIGATION     • US GUIDED THYROID BIOPSY  2021      Family History:     Family History   Problem Relation Age of Onset   • Leukemia Father    • Early death Father 39        leukemia   • Stomach cancer Maternal Grandfather    • Diabetes Paternal Grandfather    • Diabetes Sister         insulin dependent   • Diabetes Brother         insulin   • Diabetes Sister         insulin   • Diabetes Sister         insulin      Social History:     Social History     Socioeconomic History   • Marital status:      Spouse name: None   • Number of children: None   • Years of education: None   • Highest education level: None   Occupational History   • None   Tobacco Use   • Smoking status: Every Day     Packs/day: 0.25     Years: 35.00     Total pack years: 8.75     Types: Cigarettes     Start date: 34     Last attempt to quit: 2021     Years since quittin.1   • Smokeless tobacco: Never   Vaping Use   • Vaping Use: Never used   Substance and Sexual Activity   • Alcohol use: Not Currently     Comment: Rarely   • Drug use: Never   • Sexual activity: Not Currently   Other Topics Concern   • None   Social History Narrative   • None     Social Determinants of Health     Financial Resource Strain: Low Risk  (2023)    Overall Financial Resource Strain (CARDIA)    • Difficulty of Paying Living Expenses: Not hard at all   Food Insecurity: No Food Insecurity (2023)    Hunger Vital Sign    • Worried About Running Out of Food in the Last Year: Never true    • Ran Out of Food in the Last Year: Never true   Transportation Needs: No Transportation Needs (2023)    PRAPARE - Transportation    • Lack of Transportation (Medical): No    • Lack of Transportation (Non-Medical):  No   Physical Activity: Inactive (2021)    Exercise Vital Sign    • Days of Exercise per Week: 0 days    • Minutes of Exercise per Session: 0 min   Stress: No Stress Concern Present (6/23/2021)    [facility]    • Feeling of Stress : Only a little   Social Connections: Socially Isolated (6/23/2021)    Social Connection and Isolation Panel [NHANES]    • Frequency of Communication with Friends and Family: More than three times a week    • Frequency of Social Gatherings with Friends and Family: More than three times a week    • Attends [employer] Services: Never    • Active Member of Clubs or Organizations: No    • Attends Club or Organization Meetings: Never    • Marital Status:    Intimate Partner Violence: Not At Risk (6/23/2021)    Humiliation, Afraid, Rape, and Kick questionnaire    • Fear of Current or Ex-Partner: No    • Emotionally Abused: No    • Physically Abused: No    • Sexually Abused: No   Housing Stability: [address]  (2/27/2023)    Housing Stability Vital Sign    • Unable to Pay for Housing in the Last Year: No    • Number of Places Lived in the Last Year: 1    • Unstable Housing in the Last Year: No      Medications and Allergies:     Current Outpatient Medications   Medication Sig Dispense Refill   • atorvastatin (LIPITOR) 80 mg tablet TAKE 1 TABLET BY MOUTH IN  THE EVENING 90 tablet 3   • clopidogrel (PLAVIX) 75 mg tablet TAKE 1 TABLET BY MOUTH  DAILY 90 tablet 3   • cyanocobalamin (VITAMIN B-12) 1000 MCG tablet Take 1 tablet (1,000 mcg total) by mouth daily Do not start before March 3, 2023. 30 tablet 0   • ferrous sulfate 324 (65 Fe) mg Take 1 tablet (324 mg total) by mouth daily before breakfast 694 tablet 0   • folic acid (FOLVITE) 1 mg tablet TAKE 1 TABLET BY MOUTH EVERY DAY 90 tablet 1   • glimepiride (AMARYL) 1 mg tablet TAKE 1 TABLET BY MOUTH DAILY  WITH BREAKFAST 90 tablet 1   • magnesium Oxide (MAG-OX) 400 mg TABS TAKE 1 TABLET BY MOUTH 2 TIMES A DAY.  180 tablet 0   • metFORMIN (GLUCOPHAGE) 1000 MG tablet Take 1 tablet (1,000 mg total) by mouth daily with breakfast 180 tablet 0   • pantoprazole (PROTONIX) 40 mg tablet TAKE 1 TABLET BY MOUTH DAILY 90 tablet 1   • quinapril (ACCUPRIL) 40 MG tablet TAKE 1 TABLET BY MOUTH  DAILY AT BEDTIME 90 tablet 3     No current facility-administered medications for this visit. Allergies   Allergen Reactions   • Aleve [Naproxen] Other (See Comments)     "weird" sensation entire body   • Sulfa Antibiotics Rash      Immunizations:     Immunization History   Administered Date(s) Administered   • Influenza Quadrivalent Preservative Free 3 years and older IM 11/13/2015   • Influenza Split High Dose Preservative Free IM 01/06/2017, 11/30/2017   • Influenza, high dose seasonal 0.7 mL 10/26/2018, 01/24/2020, 10/20/2020, 10/21/2021, 02/28/2023   • Influenza, seasonal, injectable 11/10/2003, 11/29/2004, 11/28/2005   • Pneumococcal Conjugate 13-Valent 05/20/2019   • Pneumococcal Polysaccharide PPV23 11/10/2003, 10/20/2008   • Tuberculin Skin Test-PPD Intradermal 06/13/2021      Health Maintenance:         Topic Date Due   • Hepatitis C Screening  Completed         Topic Date Due   • Influenza Vaccine (1) 09/01/2023      Medicare Screening Tests and Risk Assessments:     Rivera Brian is here for her Subsequent Wellness visit. Health Risk Assessment:   Patient rates overall health as good. Patient feels that their physical health rating is same. Patient is satisfied with their life. Eyesight was rated as same. Hearing was rated as same. Patient feels that their emotional and mental health rating is same. Patients states they are never, rarely angry. Patient states they are sometimes unusually tired/fatigued. Pain experienced in the last 7 days has been none. Patient states that she has experienced no weight loss or gain in last 6 months. Depression Screening:   PHQ-2 Score: 0      Fall Risk Screening:    In the past year, patient has experienced: no history of falling in past year      Urinary Incontinence Screening:   Patient has leaked urine accidently in the last six months. Home Safety:  Patient does not have trouble with stairs inside or outside of their home. Patient has working smoke alarms and has working carbon monoxide detector. Home safety hazards include: none. Nutrition:   Current diet is Regular and Diabetic. Medications:   Patient is not currently taking any over-the-counter supplements. Patient is able to manage medications. Activities of Daily Living (ADLs)/Instrumental Activities of Daily Living (IADLs):   Walk and transfer into and out of bed and chair?: Yes  Dress and groom yourself?: Yes    Bathe or shower yourself?: Yes    Feed yourself?  Yes  Do your laundry/housekeeping?: Yes  Manage your money, pay your bills and track your expenses?: Yes  Make your own meals?: Yes    Do your own shopping?: Yes    Previous Hospitalizations:   Any hospitalizations or ED visits within the last 12 months?: Yes    How many hospitalizations have you had in the last year?: 1-2    Advance Care Planning:   Living will: No    Durable POA for healthcare: No    Advanced directive: No      PREVENTIVE SCREENINGS      Cardiovascular Screening:    General: Screening Not Indicated and History Lipid Disorder      Diabetes Screening:     General: Screening Not Indicated and History Diabetes      Colorectal Cancer Screening:     General: Screening Current      Breast Cancer Screening:     General: Screening Not Indicated      Cervical Cancer Screening:    General: Screening Not Indicated      Osteoporosis Screening:    General: Risks and Benefits Discussed    Due for: DXA Axial      Abdominal Aortic Aneurysm (AAA) Screening:        General: Screening Not Indicated      Lung Cancer Screening:     General: Screening Not Indicated      Hepatitis C Screening:    General: Screening Current    Screening, Brief Intervention, and Referral to Treatment (SBIRT)    Screening  Typical number of drinks in a day: 0  Typical number of drinks in a week: 0  Interpretation: Low risk drinking behavior. Single Item Drug Screening:  How often have you used an illegal drug (including marijuana) or a prescription medication for non-medical reasons in the past year? never    Single Item Drug Screen Score: 0  Interpretation: Negative screen for possible drug use disorder    Brief Intervention  Alcohol & drug use screenings were reviewed. No concerns regarding substance use disorder identified. No results found. Physical Exam:     /60   Pulse 86   Temp 97.8 °F (36.6 °C)   Resp 18   Ht 5' (1.524 m)   Wt 54 kg (119 lb)   LMP  (LMP Unknown)   SpO2 96%   BMI 23.24 kg/m²     Physical Exam  Vitals and nursing note reviewed. Constitutional:       General: She is not in acute distress. Appearance: She is well-developed. HENT:      Head: Normocephalic and atraumatic. Eyes:      Conjunctiva/sclera: Conjunctivae normal.   Cardiovascular:      Rate and Rhythm: Normal rate and regular rhythm. Heart sounds: No murmur heard. Pulmonary:      Effort: Pulmonary effort is normal. No respiratory distress. Breath sounds: Normal breath sounds. Abdominal:      Palpations: Abdomen is soft. Tenderness: There is no abdominal tenderness. Musculoskeletal:         General: No swelling. Cervical back: Neck supple. Skin:     General: Skin is warm and dry. Capillary Refill: Capillary refill takes less than 2 seconds. Neurological:      Mental Status: She is alert.    Psychiatric:         Mood and Affect: Mood normal.          Mackenzie Grant MD

## 2023-07-18 NOTE — PATIENT INSTRUCTIONS
Medicare Preventive Visit Patient Instructions  Thank you for completing your Welcome to Medicare Visit or Medicare Annual Wellness Visit today. Your next wellness visit will be due in one year (7/18/2024). The screening/preventive services that you may require over the next 5-10 years are detailed below. Some tests may not apply to you based off risk factors and/or age. Screening tests ordered at today's visit but not completed yet may show as past due. Also, please note that scanned in results may not display below. Preventive Screenings:  Service Recommendations Previous Testing/Comments   Colorectal Cancer Screening  * Colonoscopy    * Fecal Occult Blood Test (FOBT)/Fecal Immunochemical Test (FIT)  * Fecal DNA/Cologuard Test  * Flexible Sigmoidoscopy Age: 43-73 years old   Colonoscopy: every 10 years (may be performed more frequently if at higher risk)  OR  FOBT/FIT: every 1 year  OR  Cologuard: every 3 years  OR  Sigmoidoscopy: every 5 years  Screening may be recommended earlier than age 39 if at higher risk for colorectal cancer. Also, an individualized decision between you and your healthcare provider will decide whether screening between the ages of 77-80 would be appropriate. Colonoscopy: 03/01/2023  FOBT/FIT: 07/23/2020  Cologuard: Not on file  Sigmoidoscopy: Not on file          Breast Cancer Screening Age: 36 years old  Frequency: every 1-2 years  Not required if history of left and right mastectomy Mammogram: 11/22/2017        Cervical Cancer Screening Between the ages of 21-29, pap smear recommended once every 3 years. Between the ages of 32-69, can perform pap smear with HPV co-testing every 5 years.    Recommendations may differ for women with a history of total hysterectomy, cervical cancer, or abnormal pap smears in past. Pap Smear: Not on file    Screening Not Indicated   Hepatitis C Screening Once for adults born between [address] [place] [address]  More frequently in patients at high risk for Hepatitis C Hep C Antibody: 09/15/2021    Screening Current   Diabetes Screening 1-2 times per year if you're at risk for diabetes or have pre-diabetes Fasting glucose: No results in last 5 years (No results in last 5 years)  A1C: 5.3 % (2/27/2023)  Screening Not Indicated  History Diabetes   Cholesterol Screening Once every 5 years if you don't have a lipid disorder. May order more often based on risk factors. Lipid panel: 06/07/2021    Screening Not Indicated  History Lipid Disorder     Other Preventive Screenings Covered by Medicare:  1. Abdominal Aortic Aneurysm (AAA) Screening: covered once if your at risk. You're considered to be at risk if you have a family history of AAA. 2. Lung Cancer Screening: covers low dose CT scan once per year if you meet all of the following conditions: (1) Age 48-67; (2) No signs or symptoms of lung cancer; (3) Current smoker or have quit smoking within the last 15 years; (4) You have a tobacco smoking history of at least 20 pack years (packs per day multiplied by number of years you smoked); (5) You get a written order from a healthcare provider. 3. Glaucoma Screening: covered annually if you're considered high risk: (1) You have diabetes OR (2) Family history of glaucoma OR (3)  aged 48 and older OR (3)  American aged 72 and older  3. Osteoporosis Screening: covered every 2 years if you meet one of the following conditions: (1) You're estrogen deficient and at risk for osteoporosis based off medical history and other findings; (2) Have a vertebral abnormality; (3) On glucocorticoid therapy for more than 3 months; (4) Have primary hyperparathyroidism; (5) On osteoporosis medications and need to assess response to drug therapy. · Last bone density test (DXA Scan): Not on file. 5. HIV Screening: covered annually if you're between the age of 14-79. Also covered annually if you are younger than 13 and older than 72 with risk factors for HIV infection.  For pregnant patients, it is covered up to 3 times per pregnancy. Immunizations:  Immunization Recommendations   Influenza Vaccine Annual influenza vaccination during flu season is recommended for all persons aged >= 6 months who do not have contraindications   Pneumococcal Vaccine   * Pneumococcal conjugate vaccine = PCV13 (Prevnar 13), PCV15 (Vaxneuvance), PCV20 (Prevnar 20)  * Pneumococcal polysaccharide vaccine = PPSV23 (Pneumovax) Adults 20-63 years old: 1-3 doses may be recommended based on certain risk factors  Adults 72 years old: 1-2 doses may be recommended based off what pneumonia vaccine you previously received   Hepatitis B Vaccine 3 dose series if at intermediate or high risk (ex: diabetes, end stage renal disease, liver disease)   Tetanus (Td) Vaccine - COST NOT COVERED BY MEDICARE PART B Following completion of primary series, a booster dose should be given every 10 years to maintain immunity against tetanus. Td may also be given as tetanus wound prophylaxis. Tdap Vaccine - COST NOT COVERED BY MEDICARE PART B Recommended at least once for all adults. For pregnant patients, recommended with each pregnancy. Shingles Vaccine (Shingrix) - COST NOT COVERED BY MEDICARE PART B  2 shot series recommended in those aged 48 and above     Health Maintenance Due:      Topic Date Due   • Hepatitis C Screening  Completed     Immunizations Due:      Topic Date Due   • COVID-19 Vaccine (1) Never done   • Influenza Vaccine (1) 09/01/2023     Advance Directives   What are advance directives? Advance directives are legal documents that state your wishes and plans for medical care. These plans are made ahead of time in case you lose your ability to make decisions for yourself. Advance directives can apply to any medical decision, such as the treatments you want, and if you want to donate organs. What are the types of advance directives?   There are many types of advance directives, and each state has rules about how to use them. You may choose a combination of any of the following:  · Living will: This is a written record of the treatment you want. You can also choose which treatments you do not want, which to limit, and which to stop at a certain time. This includes surgery, medicine, IV fluid, and tube feedings. · Durable power of  for healthcare [facility]): This is a written record that states who you want to make healthcare choices for you when you are unable to make them for yourself. This person, called a proxy, is usually a family member or a friend. You may choose more than 1 proxy. · Do not resuscitate (DNR) order:  A DNR order is used in case your heart stops beating or you stop breathing. It is a request not to have certain forms of treatment, such as CPR. A DNR order may be included in other types of advance directives. · Medical directive: This covers the care that you want if you are in a coma, near death, or unable to make decisions for yourself. You can list the treatments you want for each condition. Treatment may include pain medicine, surgery, blood transfusions, dialysis, IV or tube feedings, and a ventilator (breathing machine). · Values history: This document has questions about your views, beliefs, and how you feel and think about life. This information can help others choose the care that you would choose. Why are advance directives important? An advance directive helps you control your care. Although spoken wishes may be used, it is better to have your wishes written down. Spoken wishes can be misunderstood, or not followed. Treatments may be given even if you do not want them. An advance directive may make it easier for your family to make difficult choices about your care. Urinary Incontinence   Urinary incontinence (UI)  is when you lose control of your bladder. UI develops because your bladder cannot store or empty urine properly.  The 3 most common types of UI are stress incontinence, urge incontinence, or both. Medicines:   · May be given to help strengthen your bladder control. Report any side effects of medication to your healthcare provider. Do pelvic muscle exercises often:  Your pelvic muscles help you stop urinating. Squeeze these muscles tight for 5 seconds, then relax for 5 seconds. Gradually work up to squeezing for 10 seconds. Do 3 sets of 15 repetitions a day, or as directed. This will help strengthen your pelvic muscles and improve bladder control. Train your bladder:  Go to the bathroom at set times, such as every 2 hours, even if you do not feel the urge to go. You can also try to hold your urine when you feel the urge to go. For example, hold your urine for 5 minutes when you feel the urge to go. As that becomes easier, hold your urine for 10 minutes. Self-care:   · Keep a UI record. Write down how often you leak urine and how much you leak. Make a note of what you were doing when you leaked urine. · Drink liquids as directed. You may need to limit the amount of liquid you drink to help control your urine leakage. Do not drink any liquid right before you go to bed. Limit or do not have drinks that contain caffeine or alcohol. · Prevent constipation. Eat a variety of high-fiber foods. Good examples are high-fiber cereals, beans, vegetables, and whole-grain breads. Walking is the best way to trigger your intestines to have a bowel movement. · Exercise regularly and maintain a healthy weight. Weight loss and exercise will decrease pressure on your bladder and help you control your leakage. · Use a catheter as directed  to help empty your bladder. A catheter is a tiny, plastic tube that is put into your bladder to drain your urine. · Go to behavior therapy as directed. Behavior therapy may be used to help you learn to control your urge to urinate.     Cigarette Smoking and Your Health   Risks to your health if you smoke:  Nicotine and other chemicals found in tobacco damage every cell in your body. Even if you are a light smoker, you have an increased risk for cancer, heart disease, and lung disease. If you are pregnant or have diabetes, smoking increases your risk for complications. Benefits to your health if you stop smoking:   · You decrease respiratory symptoms such as coughing, wheezing, and shortness of breath. · You reduce your risk for cancers of the lung, mouth, throat, kidney, bladder, pancreas, stomach, and cervix. If you already have cancer, you increase the benefits of chemotherapy. You also reduce your risk for cancer returning or a second cancer from developing. · You reduce your risk for heart disease, blood clots, heart attack, and stroke. · You reduce your risk for lung infections, and diseases such as pneumonia, asthma, chronic bronchitis, and emphysema. · Your circulation improves. More oxygen can be delivered to your body. If you have diabetes, you lower your risk for complications, such as kidney, artery, and eye diseases. You also lower your risk for nerve damage. Nerve damage can lead to amputations, poor vision, and blindness. · You improve your body's ability to heal and to fight infections. For more information and support to stop smoking:   · Smoke[employer]. gov  Phone: 9- 546 - 260-8705  Web Address: www.[employer]. [facility] [address] Information is for End User's use only and may not be sold, redistributed or otherwise used for commercial purposes.  All illustrations and images included in CareNotes® are the copyrighted property of [employer] Inc[employer] or [address] [place] [address]

## 2023-07-21 ENCOUNTER — TELEPHONE (OUTPATIENT)
Dept: HEMATOLOGY ONCOLOGY | Facility: CLINIC | Age: 78
End: 2023-07-21

## 2023-07-21 NOTE — TELEPHONE ENCOUNTER
Appointment Confirmation   Who are you speaking with? Patient   If it is not the patient, are they listed on an active communication consent form? N/A   Which provider is the appointment scheduled with? Shannon Dickens PA-C   When is the appointment scheduled? Please list date and time 7/25/3 11:00AM   At which location is the appointment scheduled to take place? [facility]   Did caller verbalize understanding of appointment details?  Yes

## 2023-07-24 ENCOUNTER — LAB (OUTPATIENT)
Dept: LAB | Facility: HOSPITAL | Age: 78
End: 2023-07-24
Attending: FAMILY MEDICINE
Payer: COMMERCIAL

## 2023-07-24 ENCOUNTER — TELEPHONE (OUTPATIENT)
Dept: HEMATOLOGY ONCOLOGY | Facility: CLINIC | Age: 78
End: 2023-07-24

## 2023-07-24 DIAGNOSIS — D50.9 IRON DEFICIENCY ANEMIA, UNSPECIFIED IRON DEFICIENCY ANEMIA TYPE: ICD-10-CM

## 2023-07-24 DIAGNOSIS — D75.839 THROMBOCYTOSIS: ICD-10-CM

## 2023-07-24 DIAGNOSIS — K63.9 SMALL BOWEL LESION: ICD-10-CM

## 2023-07-24 DIAGNOSIS — I12.9 TYPE 2 DIABETES MELLITUS WITH STAGE 3A CHRONIC KIDNEY DISEASE AND HYPERTENSION (HCC): Primary | ICD-10-CM

## 2023-07-24 DIAGNOSIS — E11.22 TYPE 2 DIABETES MELLITUS WITH STAGE 3A CHRONIC KIDNEY DISEASE AND HYPERTENSION (HCC): Primary | ICD-10-CM

## 2023-07-24 DIAGNOSIS — D50.0 IRON DEFICIENCY ANEMIA DUE TO CHRONIC BLOOD LOSS: Chronic | ICD-10-CM

## 2023-07-24 DIAGNOSIS — N18.31 TYPE 2 DIABETES MELLITUS WITH STAGE 3A CHRONIC KIDNEY DISEASE AND HYPERTENSION (HCC): Primary | ICD-10-CM

## 2023-07-24 DIAGNOSIS — D62 ACUTE BLOOD LOSS ANEMIA: ICD-10-CM

## 2023-07-24 DIAGNOSIS — E78.5 HYPERLIPIDEMIA, UNSPECIFIED HYPERLIPIDEMIA TYPE: Chronic | ICD-10-CM

## 2023-07-24 LAB
BASOPHILS # BLD AUTO: 0.03 THOUSANDS/ÂΜL (ref 0–0.1)
BASOPHILS NFR BLD AUTO: 0 % (ref 0–1)
EOSINOPHIL # BLD AUTO: 0.15 THOUSAND/ÂΜL (ref 0–0.61)
EOSINOPHIL NFR BLD AUTO: 1 % (ref 0–6)
ERYTHROCYTE [DISTWIDTH] IN BLOOD BY AUTOMATED COUNT: 13.5 % (ref 11.6–15.1)
EST. AVERAGE GLUCOSE BLD GHB EST-MCNC: 151 MG/DL
FERRITIN SERPL-MCNC: 40 NG/ML (ref 11–307)
HBA1C MFR BLD: 6.9 %
HCT VFR BLD AUTO: 39.6 % (ref 34.8–46.1)
HGB BLD-MCNC: 12.9 G/DL (ref 11.5–15.4)
IMM GRANULOCYTES # BLD AUTO: 0.05 THOUSAND/UL (ref 0–0.2)
IMM GRANULOCYTES NFR BLD AUTO: 1 % (ref 0–2)
IRON SATN MFR SERPL: 18 % (ref 15–50)
IRON SERPL-MCNC: 57 UG/DL (ref 50–170)
LYMPHOCYTES # BLD AUTO: 1.2 THOUSANDS/ÂΜL (ref 0.6–4.47)
LYMPHOCYTES NFR BLD AUTO: 11 % (ref 14–44)
MCH RBC QN AUTO: 30.7 PG (ref 26.8–34.3)
MCHC RBC AUTO-ENTMCNC: 32.6 G/DL (ref 31.4–37.4)
MCV RBC AUTO: 94 FL (ref 82–98)
MONOCYTES # BLD AUTO: 0.66 THOUSAND/ÂΜL (ref 0.17–1.22)
MONOCYTES NFR BLD AUTO: 6 % (ref 4–12)
NEUTROPHILS # BLD AUTO: 8.41 THOUSANDS/ÂΜL (ref 1.85–7.62)
NEUTS SEG NFR BLD AUTO: 81 % (ref 43–75)
NRBC BLD AUTO-RTO: 0 /100 WBCS
PLATELET # BLD AUTO: 304 THOUSANDS/UL (ref 149–390)
PMV BLD AUTO: 9.7 FL (ref 8.9–12.7)
RBC # BLD AUTO: 4.2 MILLION/UL (ref 3.81–5.12)
TIBC SERPL-MCNC: 312 UG/DL (ref 250–450)
WBC # BLD AUTO: 10.5 THOUSAND/UL (ref 4.31–10.16)

## 2023-07-24 PROCEDURE — 83550 IRON BINDING TEST: CPT

## 2023-07-24 PROCEDURE — 36415 COLL VENOUS BLD VENIPUNCTURE: CPT

## 2023-07-24 PROCEDURE — 83036 HEMOGLOBIN GLYCOSYLATED A1C: CPT

## 2023-07-24 PROCEDURE — 82728 ASSAY OF FERRITIN: CPT

## 2023-07-24 PROCEDURE — 85025 COMPLETE CBC W/AUTO DIFF WBC: CPT

## 2023-07-24 PROCEDURE — 83540 ASSAY OF IRON: CPT

## 2023-07-25 ENCOUNTER — TELEPHONE (OUTPATIENT)
Dept: HEMATOLOGY ONCOLOGY | Facility: CLINIC | Age: 78
End: 2023-07-25

## 2023-07-25 ENCOUNTER — OFFICE VISIT (OUTPATIENT)
Dept: HEMATOLOGY ONCOLOGY | Facility: CLINIC | Age: 78
End: 2023-07-25
Payer: COMMERCIAL

## 2023-07-25 VITALS
SYSTOLIC BLOOD PRESSURE: 140 MMHG | OXYGEN SATURATION: 99 % | BODY MASS INDEX: 23.16 KG/M2 | HEART RATE: 78 BPM | DIASTOLIC BLOOD PRESSURE: 56 MMHG | WEIGHT: 118 LBS | TEMPERATURE: 97.2 F | HEIGHT: 60 IN | RESPIRATION RATE: 16 BRPM

## 2023-07-25 DIAGNOSIS — K55.20 AVM (ARTERIOVENOUS MALFORMATION) OF COLON: ICD-10-CM

## 2023-07-25 DIAGNOSIS — K63.89 SMALL BOWEL MASS: ICD-10-CM

## 2023-07-25 DIAGNOSIS — D50.9 IRON DEFICIENCY ANEMIA, UNSPECIFIED IRON DEFICIENCY ANEMIA TYPE: Primary | ICD-10-CM

## 2023-07-25 PROCEDURE — 99215 OFFICE O/P EST HI 40 MIN: CPT | Performed by: PHYSICIAN ASSISTANT

## 2023-07-25 RX ORDER — SODIUM CHLORIDE 9 MG/ML
20 INJECTION, SOLUTION INTRAVENOUS ONCE
OUTPATIENT
Start: 2023-08-01

## 2023-07-25 NOTE — PATIENT INSTRUCTIONS
[facility]  [facility] [place] - [phone]    Your Team Members:  Advanced Practitioner:  Shannon Dickens PA-C  Oncology Nurse:   Jocelin Sue RN ([phone]) M-F 8am - 4:30pm    Please answer Private and Unavailable Calls - this may be your team(s) contacting you. I  If you have medical questions/concerns/issues - contact us either by (1) My Chart (2) Hope Line

## 2023-07-25 NOTE — PROGRESS NOTES
[place] [id number]  Hematology Ambulatory Follow-Up  Nathalie Wilkinson, 1945, [id number]  7/25/2023      Assessment and Plan   1. Iron deficiency anemia, unspecified iron deficiency anemia type  Etiology of JAMES is likely AVM which were found previously on endoscopy. She recently had capsule endoscopy 04/04/23 which again showed AVM in small bowel and two AVMs in proximal colon. No active bleeding. Patient has noticed improvement in energy following 1200mg IV Venofer, which she tolerated well. Hgb improved over 1g/dL and now within normal range (12g/dL). Ferritin now 40. Given the possibility of recurrent bleeding with AVMs will continue the patient on IV Venofer maintenance, 300mg monthly x 4 doses. She is in agreement with plan. Will follow up CBC, iron panel in 6 months.     - CBC and differential; Future  - Iron Panel (Includes Ferritin, Iron Sat%, Iron, and TIBC); Future    2. AVM (arteriovenous malformation) of colon  As above. 3. Small bowel mass  Recent capsule endoscopy also revealed ? submucosal lesion mid jejunum vs extrinsic compression. Patient was advised to complete CT a/p however she has not done so yet. She is agreeable to schedule this CT today, appointment scheduled for 08/02/23. Continue follow up with GI. The patient is scheduled for follow-up in approximately 6m with labs prior. Patient voiced agreement and understanding to the above. Patient advised to call the Hematology/Oncology office with any questions and concerns regarding the above. Barrier(s) to care: None  The patient is able to self care. Yoon Gould PA-C  Medical Oncology/Hematology  [address]    Subjective     Chief Complaint   Patient presents with   • Follow-up       History of present illness:    This is a 63 year old female with past medical history of diabetes well controlled, GERD on Protonix therapy, hypertension, hyperlipidemia who was found to be iron deficient in January 2020 when hospitalized after having a stroke.     Patient was hospitalized after having altered mental status changes.  Patient was back to mental status baseline after 16 hours.  On admission, the patient had a hemoglobin of 7.3 grams/deciliter and was very microcytic with high RDW.  Patient was transfused with 2 units of packed red blood cells in the emergency room.  Patient also underwent GI consultation and had a colonoscopy as well as an endoscopy after discharge that did not demonstrate etiology of iron deficiency.  Iron studies in January 2020 demonstrated iron saturation of 3% with ferritin of 6 ng/dL.     At discharge patient was started on oral iron supplementation twice a day.  Patient initially had a lot of constipation but after being on it for some time, this seemed to even now.  Patient continue to have follow-up with GI physicians.  Follow-up with fecal occult blood test was negative.  Patient had a urinalysis completed during hospitalization in January 2020 but follow-up urinalysis has not been completed.  Patient denies blood-loss vaginally.     In Sept and Oct 2020, patient underwent treatment with IV Venofer 200 mg IV.  Patient notes some issues with obtaining venous access but otherwise treatments went well.     Follow-up blood work on 11/27 demonstrated hemoglobin of 11.9, platelet count of 058, ferritin of 246, TIBC normalized at 262.        2/26/2021:  Ferritin = 142,   TIBC = 284, iron saturation 29%, hemoglobin=  11.9, platelet count 584, MCV 96, RDW 12.5      6/3/2021 Ferritin = 24,   TIBC = 326, iron saturation 20%, hemoglobin=  11.5, platelet count 508, MCV 94, RDW 12.6      7/26/2021: Benign pathology of Thyroid aspiration.      10/4/2021:  WBC =5.7, Hemoglobin = 10.0, MCV = 91, platelet = 494, VNXH = 341, iron saturation = 10%     5/7/22 ferritin = 10, hemoglobin = 9.4, platelet count = 943 TIBC = 366     9/15/22 WBC = 8.9, hemoglobin 9.3, , platelet count 412, ferritin = 62, TIBC 294     2/27/2023: Ferritin = 4, TIBC 440, hemoglobin = 5.7, MCV 78, platelet count 41, RDW 15.9, white blood cell count within normal limits.     Patient was hospitalized in late February and early March discharged on 3/2. Patient received 3 units of packed red blood cells, 1 dose of Venofer 200 mg and underwent colonoscopy and endoscopy. Endoscopy demonstrated small AVM proximal stomach with spontaneous bleeding cauterized during examination. 3/17/23  hemoglobin=  10.1, platelet count 745, MCV 88, RDW 23.4    03/31-04/21/23: IV Venofer 300mg weekly x 4    Capsule video endoscopy 04/04/2023: no active bleeding. gastritis, small bowel AVM, ? submucosal lesion mid jejunum vs extrinsic compression, two AVMs proximal colon. 07/24/23: Ferritin = 40, hemoglobin=  12.9, platelet count 383, MCV 94, RDW 13.5    Interval history: Patient reports overall improvement in energy. Has been going to the grocery store and doing laundry again. She had video capsule endoscopy which revealed possible lesion in the small bowel. She was advised to have follow up CT a/p which she has not schedule yet due to external social stressors going on. Review of Systems   Constitutional: Negative for chills, fatigue, fever and unexpected weight change. Respiratory: Negative for shortness of breath. Gastrointestinal: Negative for abdominal pain and blood in stool. Genitourinary: Negative for hematuria. Neurological: Negative for dizziness and numbness. All other systems reviewed and are negative.       Patient Active Problem List   Diagnosis   • Combined forms of age-related cataract of left eye   • Type 2 diabetes mellitus with diabetic polyneuropathy ([address])   • Hyperlipidemia   • Personal history of transient ischemic attack (TIA), and cerebral infarction without residual deficits   • Family history of tobacco abuse   • Smoking   • Arthritis   • Essential hypertension   • GERD (gastroesophageal reflux disease)   • Iron deficiency anemia   • Type 2 diabetes mellitus with stage 3a chronic kidney disease and hypertension (HCC)   • Essential (hemorrhagic) thrombocythemia (HCC)   • CVA (cerebral vascular accident) ([address])   • Nodule of upper lobe of right lung   • Thyroid nodule greater than or equal to 1.5 cm in diameter incidentally noted on imaging study   • Troponin level elevated   • Vertebral artery stenosis, bilateral   • Thrombocytosis   • Acute blood loss anemia     Past Medical History:   Diagnosis Date   • Anemia    • Arthritis    • Capsulitis of foot     Last assessed 07/29/13   • Diabetes ([address])     type 2   • Full dentures    • Ganglion     Last assessed 07/29/13   • Hearing decreased    • Hyperlipidemia    • Hypertension    • Magnesium deficiency    • Sciatica    • Wears glasses      Past Surgical History:   Procedure Laterality Date   • CATARACT EXTRACTION     • COLONOSCOPY     • DILATION AND CURETTAGE OF UTERUS      x 4   • HAND SURGERY Left     fx repaired w/wrist bone   • HYSTERECTOMY      left ovary remains   • KNEE ARTHROSCOPY Left    • LIPOMA RESECTION      removed from back and left buttock   • PA XCAPSL CTRC RMVL INSJ IO LENS PROSTH W/O ECP Right 1/24/2019    Procedure: EXTRACTION EXTRACAPSULAR CATARACT PHACO INTRAOCULAR LENS (IOL); Surgeon: Miesha Flores MD;  Location: [facility] [place] MAIN OR;  Service: Ophthalmology   • PA XCAPSL CTRC RMVL INSJ IO LENS PROSTH W/O ECP Left 2/14/2019    Procedure: EXTRACTION EXTRACAPSULAR CATARACT PHACO INTRAOCULAR LENS (IOL);   Surgeon: Miesha Flores MD;  Location: [facility] [place] MAIN OR;  Service: Ophthalmology   • TONSILLECTOMY      and adenoids   • TUBAL LIGATION     • US GUIDED THYROID BIOPSY  7/26/2021     Family History   Problem Relation Age of Onset   • Leukemia Father    • Early death Father 39        leukemia   • Stomach cancer Maternal Grandfather    • Diabetes Paternal Grandfather    • Diabetes Sister         insulin dependent   • Diabetes Brother         insulin   • Diabetes Sister         insulin   • Diabetes Sister         insulin     Social History     Socioeconomic History   • Marital status:      Spouse name: None   • Number of children: None   • Years of education: None   • Highest education level: None   Occupational History   • None   Tobacco Use   • Smoking status: Every Day     Packs/day: 0.25     Years: 35.00     Total pack years: 8.75     Types: Cigarettes     Start date: 34     Last attempt to quit: 2021     Years since quittin.1   • Smokeless tobacco: Never   Vaping Use   • Vaping Use: Never used   Substance and Sexual Activity   • Alcohol use: Not Currently     Comment: Rarely   • Drug use: Never   • Sexual activity: Not Currently   Other Topics Concern   • None   Social History Narrative   • None     Social Determinants of Health     Financial Resource Strain: Low Risk  (2023)    Overall Financial Resource Strain (CARDIA)    • Difficulty of Paying Living Expenses: Not hard at all   Food Insecurity: No Food Insecurity (2023)    Hunger Vital Sign    • Worried About Running Out of Food in the Last Year: Never true    • Ran Out of Food in the Last Year: Never true   Transportation Needs: No Transportation Needs (2023)    PRAPARE - Transportation    • Lack of Transportation (Medical): No    • Lack of Transportation (Non-Medical): No   Physical Activity: Inactive (2021)    Exercise Vital Sign    • Days of Exercise per Week: 0 days    • Minutes of Exercise per Session: 0 min   Stress: No Stress Concern Present (2021)    [address] [facility]    • Feeling of Stress :  Only a little   Social Connections: Socially Isolated (2021)    Social Connection and Isolation Panel [NHANES]    • Frequency of Communication with Friends and Family: More than three times a week    • Frequency of Social Gatherings with Friends and Family: More than three times a week    • Attends Zoroastrianism Services: Never    • Active Member of Clubs or Organizations: No    • Attends Club or Organization Meetings: Never    • Marital Status:    Intimate Partner Violence: Not At Risk (6/23/2021)    Humiliation, Afraid, Rape, and Kick questionnaire    • Fear of Current or Ex-Partner: No    • Emotionally Abused: No    • Physically Abused: No    • Sexually Abused: No   Housing Stability: Low Risk  (2/27/2023)    Housing Stability Vital Sign    • Unable to Pay for Housing in the Last Year: No    • Number of [address] in the Last Year: 1    • Unstable Housing in the Last Year: No       Current Outpatient Medications:   •  atorvastatin (LIPITOR) 80 mg tablet, TAKE 1 TABLET BY MOUTH IN  THE EVENING, Disp: 90 tablet, Rfl: 3  •  clopidogrel (PLAVIX) 75 mg tablet, TAKE 1 TABLET BY MOUTH  DAILY, Disp: 90 tablet, Rfl: 3  •  cyanocobalamin (VITAMIN B-12) 1000 MCG tablet, Take 1 tablet (1,000 mcg total) by mouth daily Do not start before March 3, 2023., Disp: 30 tablet, Rfl: 0  •  ferrous sulfate 324 (65 Fe) mg, Take 1 tablet (324 mg total) by mouth daily before breakfast, Disp: 180 tablet, Rfl: 0  •  folic acid (FOLVITE) 1 mg tablet, TAKE 1 TABLET BY MOUTH EVERY DAY, Disp: 90 tablet, Rfl: 1  •  glimepiride (AMARYL) 1 mg tablet, TAKE 1 TABLET BY MOUTH DAILY  WITH BREAKFAST, Disp: 90 tablet, Rfl: 1  •  magnesium Oxide (MAG-OX) 400 mg TABS, TAKE 1 TABLET BY MOUTH 2 TIMES A DAY., Disp: 180 tablet, Rfl: 0  •  metFORMIN (GLUCOPHAGE) 1000 MG tablet, Take 1 tablet (1,000 mg total) by mouth daily with breakfast, Disp: 180 tablet, Rfl: 0  •  pantoprazole (PROTONIX) 40 mg tablet, TAKE 1 TABLET BY MOUTH DAILY, Disp: 90 tablet, Rfl: 1  •  quinapril (ACCUPRIL) 40 MG tablet, TAKE 1 TABLET BY MOUTH  DAILY AT BEDTIME, Disp: 90 tablet, Rfl: 3  Allergies   Allergen Reactions   • Aleve [Naproxen] Other (See Comments)     "weird" sensation entire body   • Sulfa Antibiotics Rash       Objective   /56 (BP Location: Left arm, Patient Position: Sitting, Cuff Size: Adult)   Pulse 78   Temp (!) 97.2 °F (36.2 °C) (Temporal)   Resp 16   Ht 5' (1.524 m)   Wt 53.5 kg (118 lb)   LMP  (LMP Unknown)   SpO2 99%   BMI 23.05 kg/m²    Physical Exam  Constitutional:       General: She is not in acute distress. Appearance: She is well-developed. HENT:      Head: Normocephalic and atraumatic. Mouth/Throat:      Pharynx: No oropharyngeal exudate. Eyes:      General: No scleral icterus. Pupils: Pupils are equal, round, and reactive to light. Cardiovascular:      Rate and Rhythm: Normal rate and regular rhythm. Heart sounds: No murmur heard. Pulmonary:      Effort: Pulmonary effort is normal. No respiratory distress. Breath sounds: Normal breath sounds. Abdominal:      General: Bowel sounds are normal. There is no distension. Palpations: Abdomen is soft. Tenderness: There is no abdominal tenderness. Musculoskeletal:         General: Normal range of motion. Cervical back: Normal range of motion. Lymphadenopathy:      Cervical: No cervical adenopathy. Skin:     General: Skin is warm. Coloration: Skin is not pale. Findings: No rash. Neurological:      Mental Status: She is alert and oriented to person, place, and time. Cranial Nerves: No cranial nerve deficit. Psychiatric:         Thought Content:  Thought content normal.       Result Review  Labs:  Appointment on 07/24/2023   Component Date Value Ref Range Status   • WBC 07/24/2023 10.50 (H)  4.31 - 10.16 Thousand/uL Final   • RBC 07/24/2023 4.20  3.81 - 5.12 Million/uL Final   • Hemoglobin 07/24/2023 12.9  11.5 - 15.4 g/dL Final   • Hematocrit 07/24/2023 39.6  34.8 - 46.1 % Final   • MCV 07/24/2023 94  82 - 98 fL Final   • MCH 07/24/2023 30.7  26.8 - 34.3 pg Final   • MCHC 07/24/2023 32.6  31.4 - 37.4 g/dL Final   • RDW 07/24/2023 13.5  11.6 - 15.1 % Final   • MPV 07/24/2023 9.7  8.9 - 12.7 fL Final   • Platelets 29/55/3153 304  149 - 390 Thousands/uL Final   • nRBC 07/24/2023 0  /100 WBCs Final   • Neutrophils Relative 07/24/2023 81 (H)  43 - 75 % Final   • Immat GRANS % 07/24/2023 1  0 - 2 % Final   • Lymphocytes Relative 07/24/2023 11 (L)  14 - 44 % Final   • Monocytes Relative 07/24/2023 6  4 - 12 % Final   • Eosinophils Relative 07/24/2023 1  0 - 6 % Final   • Basophils Relative 07/24/2023 0  0 - 1 % Final   • Neutrophils Absolute 07/24/2023 8.41 (H)  1.85 - 7.62 Thousands/µL Final   • Immature Grans Absolute 07/24/2023 0.05  0.00 - 0.20 Thousand/uL Final   • Lymphocytes Absolute 07/24/2023 1.20  0.60 - 4.47 Thousands/µL Final   • Monocytes Absolute 07/24/2023 0.66  0.17 - 1.22 Thousand/µL Final   • Eosinophils Absolute 07/24/2023 0.15  0.00 - 0.61 Thousand/µL Final   • Basophils Absolute 07/24/2023 0.03  0.00 - 0.10 Thousands/µL Final   • Hemoglobin A1C 07/24/2023 6.9 (H)  Normal 4.0-5.6%; PreDiabetic 5.7-6.4%; Diabetic >=6.5%; Glycemic control for adults with diabetes <7.0% % Final   • EAG 07/24/2023 151  mg/dl Final   • Iron Saturation 07/24/2023 18  15 - 50 % Final   • TIBC 07/24/2023 312  250 - 450 ug/dL Final   • Iron 07/24/2023 57  50 - 170 ug/dL Final    Patients treated with metal-binding drugs (ie. Deferoxamine) may have depressed iron values.    • Ferritin 07/24/2023 40  11 - 307 ng/mL Final   Orders Only on 07/15/2023   Component Date Value Ref Range Status   • White Blood Cell Count 07/15/2023 5.9  3.4 - 10.8 x10E3/uL Final   • Red Blood Cell Count 07/15/2023 4.35  3.77 - 5.28 x10E6/uL Final   • Hemoglobin 07/15/2023 13.0  11.1 - 15.9 g/dL Final   • HCT 07/15/2023 39.6  34.0 - 46.6 % Final   • MCV 07/15/2023 91  79 - 97 fL Final   • MCH 07/15/2023 29.9  26.6 - 33.0 pg Final   • MCHC 07/15/2023 32.8  31.5 - 35.7 g/dL Final   • RDW 07/15/2023 12.7  11.7 - 15.4 % Final   • Platelet Count 49/23/2603 342  150 - 450 x10E3/uL Final • Neutrophils 07/15/2023 64  Not Estab. % Final   • Lymphocytes 07/15/2023 21  Not Estab. % Final   • Monocytes 07/15/2023 10  Not Estab. % Final   • Eosinophils 07/15/2023 3  Not Estab. % Final   • Basophils PCT 07/15/2023 1  Not Estab. % Final   • Neutrophils (Absolute) 07/15/2023 3.8  1.4 - 7.0 x10E3/uL Final   • Lymphocytes (Absolute) 07/15/2023 1.2  0.7 - 3.1 x10E3/uL Final   • Monocytes (Absolute) 07/15/2023 0.6  0.1 - 0.9 x10E3/uL Final   • Eosinophils (Absolute) 07/15/2023 0.2  0.0 - 0.4 x10E3/uL Final   • Basophils ABS 07/15/2023 0.0  0.0 - 0.2 x10E3/uL Final   • Immature Granulocytes 07/15/2023 1  Not Estab. % Final   • Immature Granulocytes (Absolute) 07/15/2023 0.0  0.0 - 0.1 x10E3/uL Final    Comment: A hand-written panel/profile was received from your office. In  accordance with the [facility] Ambiguous Test Code Policy dated July 7894, we have assigned CBC with Differential/Platelet, Test Code  #591991 to this request. If this is not the testing you wished to  receive on this specimen, please contact the [facility] Department to clarify the test order. We  appreciate your business. • Total Iron Binding Capacity (TIBC) 07/15/2023 304  250 - 450 ug/dL Final   • UIBC 07/15/2023 225  118 - 369 ug/dL Final   • Iron, Serum 07/15/2023 79  27 - 139 ug/dL Final   • Iron Saturation 07/15/2023 26  15 - 55 % Final   • Ferritin 07/15/2023 90  15 - 150 ng/mL Final       Imaging:   Capsule video endoscopy 04/04/2023  no active bleeding. gastritis, small bowel AVM, ? submucosal lesion mid jejunum vs extrinsic compression, two AVMs proximal colon    Please note: This report has been generated by a voice recognition software system. Therefore there may be syntax, spelling, and/or grammatical errors. Please call if you have any questions.

## 2023-07-28 ENCOUNTER — APPOINTMENT (OUTPATIENT)
Dept: LAB | Facility: HOSPITAL | Age: 78
End: 2023-07-28
Attending: FAMILY MEDICINE
Payer: COMMERCIAL

## 2023-07-28 ENCOUNTER — TELEPHONE (OUTPATIENT)
Dept: FAMILY MEDICINE CLINIC | Facility: CLINIC | Age: 78
End: 2023-07-28

## 2023-07-28 LAB
ANION GAP SERPL CALCULATED.3IONS-SCNC: 6 MMOL/L
BUN SERPL-MCNC: 30 MG/DL (ref 5–25)
CALCIUM SERPL-MCNC: 11.9 MG/DL (ref 8.4–10.2)
CHLORIDE SERPL-SCNC: 103 MMOL/L (ref 96–108)
CHOLEST SERPL-MCNC: 126 MG/DL
CO2 SERPL-SCNC: 32 MMOL/L (ref 21–32)
CREAT SERPL-MCNC: 1.19 MG/DL (ref 0.6–1.3)
CREAT UR-MCNC: 88.8 MG/DL
GFR SERPL CREATININE-BSD FRML MDRD: 43 ML/MIN/1.73SQ M
GLUCOSE P FAST SERPL-MCNC: 158 MG/DL (ref 65–99)
HDLC SERPL-MCNC: 44 MG/DL
LDLC SERPL CALC-MCNC: 46 MG/DL (ref 0–100)
MICROALBUMIN UR-MCNC: 110 MG/L (ref 0–20)
MICROALBUMIN/CREAT 24H UR: 124 MG/G CREATININE (ref 0–30)
POTASSIUM SERPL-SCNC: 4.7 MMOL/L (ref 3.5–5.3)
SODIUM SERPL-SCNC: 141 MMOL/L (ref 135–147)
TRIGL SERPL-MCNC: 178 MG/DL

## 2023-07-28 PROCEDURE — 82043 UR ALBUMIN QUANTITATIVE: CPT

## 2023-07-28 PROCEDURE — 80061 LIPID PANEL: CPT

## 2023-07-28 PROCEDURE — 82570 ASSAY OF URINE CREATININE: CPT

## 2023-07-28 PROCEDURE — 80048 BASIC METABOLIC PNL TOTAL CA: CPT

## 2023-07-28 PROCEDURE — 36415 COLL VENOUS BLD VENIPUNCTURE: CPT

## 2023-07-28 NOTE — RESULT ENCOUNTER NOTE
Please call the patient regarding her abnormal result.   Patient should have her elevated calcium followed up with her primary care provider additionally drink adequate fluids due to mildly elevated BUN

## 2023-07-28 NOTE — TELEPHONE ENCOUNTER
----- Message from Denia Page MD sent at 7/28/2023  2:34 PM EDT -----  Can we please schedule Carla for a follow up to review blood work. Thank you.

## 2023-07-30 ENCOUNTER — RA CDI HCC (OUTPATIENT)
Dept: OTHER | Facility: HOSPITAL | Age: 78
End: 2023-07-30

## 2023-07-30 NOTE — PROGRESS NOTES
[address] [facility] coding opportunities     E11.42     Chart Reviewed number of suggestions sent to Provider: 1     Patients Insurance     Medicare Insurance: [id number]

## 2023-08-01 ENCOUNTER — OFFICE VISIT (OUTPATIENT)
Dept: FAMILY MEDICINE CLINIC | Facility: CLINIC | Age: 78
End: 2023-08-01
Payer: COMMERCIAL

## 2023-08-01 VITALS
WEIGHT: 118 LBS | SYSTOLIC BLOOD PRESSURE: 128 MMHG | BODY MASS INDEX: 23.16 KG/M2 | HEART RATE: 84 BPM | RESPIRATION RATE: 16 BRPM | DIASTOLIC BLOOD PRESSURE: 70 MMHG | TEMPERATURE: 98 F | HEIGHT: 60 IN

## 2023-08-01 DIAGNOSIS — E11.42 TYPE 2 DIABETES MELLITUS WITH DIABETIC POLYNEUROPATHY, WITH LONG-TERM CURRENT USE OF INSULIN (HCC): ICD-10-CM

## 2023-08-01 DIAGNOSIS — Z79.4 TYPE 2 DIABETES MELLITUS WITH DIABETIC POLYNEUROPATHY, WITH LONG-TERM CURRENT USE OF INSULIN (HCC): ICD-10-CM

## 2023-08-01 DIAGNOSIS — E83.52 HYPERCALCEMIA: Primary | ICD-10-CM

## 2023-08-01 DIAGNOSIS — Z76.0 MEDICATION REFILL: ICD-10-CM

## 2023-08-01 DIAGNOSIS — E78.5 HYPERLIPIDEMIA, UNSPECIFIED HYPERLIPIDEMIA TYPE: Chronic | ICD-10-CM

## 2023-08-01 PROCEDURE — 99214 OFFICE O/P EST MOD 30 MIN: CPT | Performed by: FAMILY MEDICINE

## 2023-08-01 RX ORDER — GLIMEPIRIDE 2 MG/1
2 TABLET ORAL
Qty: 90 TABLET | Refills: 0 | Status: SHIPPED | OUTPATIENT
Start: 2023-08-01 | End: 2023-08-01 | Stop reason: SDUPTHER

## 2023-08-01 RX ORDER — LANOLIN ALCOHOL/MO/W.PET/CERES
400 CREAM (GRAM) TOPICAL 2 TIMES DAILY
Qty: 180 TABLET | Refills: 0 | Status: SHIPPED | OUTPATIENT
Start: 2023-08-01 | End: 2023-08-02 | Stop reason: SDUPTHER

## 2023-08-01 RX ORDER — LANOLIN ALCOHOL/MO/W.PET/CERES
400 CREAM (GRAM) TOPICAL 2 TIMES DAILY
Qty: 180 TABLET | Refills: 0 | Status: CANCELLED | OUTPATIENT
Start: 2023-08-01

## 2023-08-01 RX ORDER — GLIMEPIRIDE 2 MG/1
2 TABLET ORAL
Qty: 90 TABLET | Refills: 0 | Status: SHIPPED | OUTPATIENT
Start: 2023-08-01 | End: 2024-01-28

## 2023-08-01 NOTE — PROGRESS NOTES
Name: Sharif Farley      : 1945      MRN: [id number]  Encounter Provider: Emmy Ashton MD  Encounter Date: 2023   Encounter department: [facility]     1. Hypercalcemia  Comments:  Unclear etiology. Will repeat calcium level. If still elevated, will refer to endocrinology. Orders:  -     CALCIUM & CALCIUM IONIZED; Future    2. Medication refill  -     magnesium Oxide (MAG-OX) 400 mg TABS; Take 1 tablet (400 mg total) by mouth 2 (two) times a day    3. Type 2 diabetes mellitus with diabetic polyneuropathy, with long-term current use of insulin ([facility])  Comments:  A1c increasing since she cut down dose of amaryl from 2mg to 1mg daily. She will go back up to 2mg. If she does have hypoglycemia, aware to go back to 1mg. Assessment & Plan:    Lab Results   Component Value Date    HGBA1C 6.9 (H) 2023       Orders:  -     glimepiride (AMARYL) 2 mg tablet; Take 1 tablet (2 mg total) by mouth daily with breakfast    4. Hyperlipidemia, unspecified hyperlipidemia type  Assessment & Plan:  Lab Results   Component Value Date    CHOLESTEROL 126 2023    CHOLESTEROL 152 2021    CHOLESTEROL 114 2020     Lab Results   Component Value Date    HDL 44 (L) 2023    HDL 58 2021    HDL 47 2020     Lab Results   Component Value Date    TRIG 178 (H) 2023    TRIG 109 2021    TRIG 153 (H) 2020     Lab Results   Component Value Date    [address] [facility] 93 2020   Continue statin. Counseled on diet/exercise. Subjective      HPI   Karla Rodas is here to follow up on blood work. She was noted to have elevated calcium level on recent routine labs. She does not take any OTC calcium supplement. She reports no acute issues/concerns. Review of Systems   Constitutional: Negative. HENT: Negative. Eyes: Negative. Respiratory: Negative. Cardiovascular: Negative. Gastrointestinal: Negative. Endocrine: Negative. Genitourinary: Negative. Musculoskeletal: Negative. Skin: Negative. Allergic/Immunologic: Negative. Neurological: Negative. Hematological: Negative. Psychiatric/Behavioral: Negative. Current Outpatient Medications on File Prior to Visit   Medication Sig   • atorvastatin (LIPITOR) 80 mg tablet TAKE 1 TABLET BY MOUTH IN  THE EVENING   • clopidogrel (PLAVIX) 75 mg tablet TAKE 1 TABLET BY MOUTH  DAILY   • cyanocobalamin (VITAMIN B-12) 1000 MCG tablet Take 1 tablet (1,000 mcg total) by mouth daily Do not start before March 3, 2023. • ferrous sulfate 324 (65 Fe) mg Take 1 tablet (324 mg total) by mouth daily before breakfast   • folic acid (FOLVITE) 1 mg tablet TAKE 1 TABLET BY MOUTH EVERY DAY   • metFORMIN (GLUCOPHAGE) 1000 MG tablet Take 1 tablet (1,000 mg total) by mouth daily with breakfast   • pantoprazole (PROTONIX) 40 mg tablet TAKE 1 TABLET BY MOUTH DAILY   • quinapril (ACCUPRIL) 40 MG tablet TAKE 1 TABLET BY MOUTH  DAILY AT BEDTIME   • [DISCONTINUED] glimepiride (AMARYL) 1 mg tablet TAKE 1 TABLET BY MOUTH DAILY  WITH BREAKFAST   • [DISCONTINUED] magnesium Oxide (MAG-OX) 400 mg TABS TAKE 1 TABLET BY MOUTH 2 TIMES A DAY. Objective     /70   Pulse 84   Temp 98 °F (36.7 °C) (Tympanic)   Resp 16   Ht 5' (1.524 m)   Wt 53.5 kg (118 lb)   LMP  (LMP Unknown)   BMI 23.05 kg/m²     Physical Exam  Constitutional:       General: She is not in acute distress. Appearance: She is well-developed. She is not diaphoretic. HENT:      Head: Normocephalic and atraumatic. Cardiovascular:      Rate and Rhythm: Normal rate and regular rhythm. Heart sounds: Normal heart sounds. No murmur heard. No friction rub. No gallop. Pulmonary:      Effort: Pulmonary effort is normal. No respiratory distress. Breath sounds: Normal breath sounds. No wheezing or rales. Chest:      Chest wall: No tenderness. Musculoskeletal:         General: No deformity.  Normal range of motion. Cervical back: Normal range of motion and neck supple. Skin:     General: Skin is warm and dry. Neurological:      Mental Status: She is alert and oriented to person, place, and time. Psychiatric:         Behavior: Behavior normal.         Thought Content:  Thought content normal.         Judgment: Judgment normal.       Suhbham García MD

## 2023-08-01 NOTE — ASSESSMENT & PLAN NOTE
Lab Results   Component Value Date    CHOLESTEROL 126 07/28/2023    CHOLESTEROL 152 06/07/2021    CHOLESTEROL 114 11/27/2020     Lab Results   Component Value Date    HDL 44 (L) 07/28/2023    HDL 58 06/07/2021    HDL 47 11/27/2020     Lab Results   Component Value Date    TRIG 178 (H) 07/28/2023    TRIG 109 06/07/2021    TRIG 153 (H) 11/27/2020     Lab Results   Component Value Date    [address] [facility] 93 01/09/2020   Continue statin. Counseled on diet/exercise.

## 2023-08-01 NOTE — ASSESSMENT & PLAN NOTE
A1c has worsened recently.  She had cut down metformin   Lab Results   Component Value Date    HGBA1C 6.9 (H) 07/24/2023

## 2023-08-02 ENCOUNTER — HOSPITAL ENCOUNTER (OUTPATIENT)
Dept: RADIOLOGY | Facility: HOSPITAL | Age: 78
Discharge: HOME | End: 2023-08-02
Attending: INTERNAL MEDICINE
Payer: COMMERCIAL

## 2023-08-02 DIAGNOSIS — D50.0 IRON DEFICIENCY ANEMIA DUE TO CHRONIC BLOOD LOSS: Chronic | ICD-10-CM

## 2023-08-02 DIAGNOSIS — Z76.0 MEDICATION REFILL: ICD-10-CM

## 2023-08-02 DIAGNOSIS — K63.9 SMALL BOWEL LESION: ICD-10-CM

## 2023-08-02 PROCEDURE — G1004 CDSM NDSC: HCPCS

## 2023-08-02 PROCEDURE — 74177 CT ABD & PELVIS W/CONTRAST: CPT

## 2023-08-02 RX ADMIN — IOHEXOL 100 ML: 350 INJECTION, SOLUTION INTRAVENOUS at 09:14

## 2023-08-03 DIAGNOSIS — E53.8 FOLATE DEFICIENCY: ICD-10-CM

## 2023-08-03 RX ORDER — LANOLIN ALCOHOL/MO/W.PET/CERES
400 CREAM (GRAM) TOPICAL 2 TIMES DAILY
Qty: 180 TABLET | Refills: 0 | Status: SHIPPED | OUTPATIENT
Start: 2023-08-03

## 2023-08-03 RX ORDER — FOLIC ACID 1 MG/1
TABLET ORAL
Qty: 90 TABLET | Refills: 1 | Status: SHIPPED | OUTPATIENT
Start: 2023-08-03

## 2023-08-04 ENCOUNTER — HOSPITAL ENCOUNTER (OUTPATIENT)
Dept: INFUSION CENTER | Facility: HOSPITAL | Age: 78
Discharge: HOME | End: 2023-08-04

## 2023-08-08 ENCOUNTER — HOSPITAL ENCOUNTER (OUTPATIENT)
Dept: INFUSION CENTER | Facility: HOSPITAL | Age: 78
Discharge: HOME | End: 2023-08-08
Payer: COMMERCIAL

## 2023-08-08 VITALS
RESPIRATION RATE: 18 BRPM | SYSTOLIC BLOOD PRESSURE: 150 MMHG | HEART RATE: 97 BPM | OXYGEN SATURATION: 98 % | TEMPERATURE: 96.8 F | DIASTOLIC BLOOD PRESSURE: 66 MMHG

## 2023-08-08 DIAGNOSIS — D62 ACUTE BLOOD LOSS ANEMIA: Primary | ICD-10-CM

## 2023-08-08 DIAGNOSIS — D75.839 THROMBOCYTOSIS: ICD-10-CM

## 2023-08-08 DIAGNOSIS — D50.9 IRON DEFICIENCY ANEMIA, UNSPECIFIED IRON DEFICIENCY ANEMIA TYPE: ICD-10-CM

## 2023-08-08 RX ORDER — SODIUM CHLORIDE 9 MG/ML
20 INJECTION, SOLUTION INTRAVENOUS ONCE
Status: COMPLETED | OUTPATIENT
Start: 2023-08-08 | End: 2023-08-08

## 2023-08-08 RX ORDER — SODIUM CHLORIDE 9 MG/ML
20 INJECTION, SOLUTION INTRAVENOUS ONCE
OUTPATIENT
Start: 2023-09-05

## 2023-08-08 RX ADMIN — SODIUM CHLORIDE 20 ML/HR: 9 INJECTION, SOLUTION INTRAVENOUS at 13:57

## 2023-08-08 RX ADMIN — IRON SUCROSE 300 MG: 20 INJECTION, SOLUTION INTRAVENOUS at 13:57

## 2023-08-09 ENCOUNTER — TELEPHONE (OUTPATIENT)
Age: 78
End: 2023-08-09

## 2023-08-09 ENCOUNTER — TELEPHONE (OUTPATIENT)
Dept: GASTROENTEROLOGY | Facility: CLINIC | Age: 78
End: 2023-08-09

## 2023-08-09 DIAGNOSIS — R93.89 ABNORMAL CAT SCAN: Primary | ICD-10-CM

## 2023-08-09 NOTE — TELEPHONE ENCOUNTER
Patient was called and given results of recent CAT scan more specifically large gallbladder stones with distention of the gallbladder she is asymptomatic. She likewise had some pancreatic cysts as well as complex cyst in the left kidney. We will refer her to general surgery and in 3 months she will get MRI MRCP to evaluate pancreas cyst as well as renal cysts she understands the need for BMP prior. Should she have any discomfort right upper quadrant epigastric she will go to the emergency room.

## 2023-08-09 NOTE — TELEPHONE ENCOUNTER
Forwarding for your attention, test originally ordered 4/12/23, also forwarding to [facility] providers pool.

## 2023-08-09 NOTE — TELEPHONE ENCOUNTER
Natalia from [address] called in stating there were immediate findings on the pt's CT scan.

## 2023-08-14 ENCOUNTER — OFFICE VISIT (OUTPATIENT)
Dept: GASTROENTEROLOGY | Facility: CLINIC | Age: 78
End: 2023-08-14
Payer: COMMERCIAL

## 2023-08-14 VITALS
BODY MASS INDEX: 22.78 KG/M2 | SYSTOLIC BLOOD PRESSURE: 143 MMHG | DIASTOLIC BLOOD PRESSURE: 66 MMHG | HEART RATE: 80 BPM | WEIGHT: 116 LBS | HEIGHT: 60 IN

## 2023-08-14 DIAGNOSIS — K86.2 PANCREATIC CYST: Primary | ICD-10-CM

## 2023-08-14 DIAGNOSIS — D50.0 IRON DEFICIENCY ANEMIA DUE TO CHRONIC BLOOD LOSS: ICD-10-CM

## 2023-08-14 PROCEDURE — 99214 OFFICE O/P EST MOD 30 MIN: CPT | Performed by: PHYSICIAN ASSISTANT

## 2023-08-14 NOTE — PROGRESS NOTES
[facility] - Outpatient Follow-up Note  Honey Bishop 78 y.o. female MRN: [id number]  Encounter: [id number]          ASSESSMENT AND PLAN:      1. Iron deficiency anemia due to chronic blood loss  78-year-old female who presents for office visit due to iron deficiency anemia. Had work-up as below which was notable for AVM in stomach as well as small bowel but hemoglobin has been stable and remains on iron supplementation with last hemoglobin of 12.9. We will see patient in 3 months and recommended a CBC prior to that. 2. Gallstone  Patient with large gallstone greater than 2 cm which predisposes her to gallbladder malignancy and patient has been referred to surgery and has an appointment at the end of the month    3. Pancreatic lesion  Patient with tiny low-density lesions of the pancreas which are measuring up to 4 mm and MRI MRCP was ordered as she also had a complex cyst in left kidney with septation and MRI of abdomen with contrast has also been ordered, pt will need to have BMP prior to next MRI within 90 days    We will see patient in 3 months for a follow-up  ______________________________________________________________________    SUBJECTIVE:    Patient does have chronic iron deficiency anemia and presents for follow up. Pt was hospitalized in March of this year noted to have hemoglobin of 5.7 on admission with microcytic indices and heme pos stool.  She had egd/colon which showed small avm in proximal stomach, s/p apc, colon showed 2 small polyps removed, sigmoid diverticulosis   Patient then had capsule endoscopy for further evaluation which showed gastritis and small bowel AVM with question submucosal lesion in mid jejunum versus extrinsic compression with 2 AVMs in the proximal colon and suboptimal prep of the distal bowel.   There was no active bleeding but some AVMs were visualized and CAT scan was done which showed large calcified stone in the gallbladder neck a massively distended gallbladder and there was a couple low-density lesions in the pancreas and suggested follow-up exam in 2 years to ensure stability as well as a complex cyst in the midportion of the kidney on the left with septation and recommended multiphase renal protocol CT or MRI. Hemoglobin was stable at 12.9 at the end of last month which is significantly improved from earlier this year. MRI MRCP was recommended in 3 months per Dr. María Elena Wright note. Pt also referred to surgery and has appointment because of large gallstone of 2.2 cm, no complaints of abdominal pain. Patient does have upcoming blood work for her primary doctor but hemoglobin was actually very good at 12.9 at the end of last month as well as normal iron studies and she is going to follow-up with hematology in January and is due for CBC prior. REVIEW OF SYSTEMS IS OTHERWISE NEGATIVE. Historical Information   Past Medical History:   Diagnosis Date   • Anemia    • Arthritis    • Capsulitis of foot     Last assessed 07/29/13   • Diabetes ([address])     type 2   • Full dentures    • Ganglion     Last assessed 07/29/13   • Hearing decreased    • Hyperlipidemia    • Hypertension    • Magnesium deficiency    • Sciatica    • Wears glasses      Past Surgical History:   Procedure Laterality Date   • CATARACT EXTRACTION     • COLONOSCOPY     • DILATION AND CURETTAGE OF UTERUS      x 4   • HAND SURGERY Left     fx repaired w/wrist bone   • HYSTERECTOMY      left ovary remains   • KNEE ARTHROSCOPY Left    • LIPOMA RESECTION      removed from back and left buttock   • VA XCAPSL CTRC RMVL INSJ IO LENS PROSTH W/O ECP Right 1/24/2019    Procedure: EXTRACTION EXTRACAPSULAR CATARACT PHACO INTRAOCULAR LENS (IOL); Surgeon: George Larose MD;  Location: [facility] MAIN OR;  Service: Ophthalmology   • VA XCAPSL CTRC RMVL INSJ IO LENS PROSTH W/O ECP Left 2/14/2019    Procedure: EXTRACTION EXTRACAPSULAR CATARACT PHACO INTRAOCULAR LENS (IOL);   Surgeon: George Larose MD;  Location: [facility] MAIN OR;  Service: Ophthalmology   • TONSILLECTOMY      and adenoids   • TUBAL LIGATION     • US GUIDED THYROID BIOPSY  2021     Social History   Social History     Substance and Sexual Activity   Alcohol Use Not Currently    Comment: Rarely     Social History     Substance and Sexual Activity   Drug Use Never     Social History     Tobacco Use   Smoking Status Every Day   • Packs/day: 0.25   • Years: 35.00   • Total pack years: 8.75   • Types: Cigarettes   • Start date: 34   • Last attempt to quit: 2021   • Years since quittin.1   Smokeless Tobacco Never     Family History   Problem Relation Age of Onset   • Leukemia Father    • Early death Father 39        leukemia   • Stomach cancer Maternal Grandfather    • Diabetes Paternal Grandfather    • Diabetes Sister         insulin dependent   • Diabetes Brother         insulin   • Diabetes Sister         insulin   • Diabetes Sister         insulin       Meds/Allergies       Current Outpatient Medications:   •  atorvastatin (LIPITOR) 80 mg tablet  •  clopidogrel (PLAVIX) 75 mg tablet  •  cyanocobalamin (VITAMIN B-12) 1000 MCG tablet  •  ferrous sulfate 324 (65 Fe) mg  •  folic acid (FOLVITE) 1 mg tablet  •  glimepiride (AMARYL) 2 mg tablet  •  magnesium Oxide (MAG-OX) 400 mg TABS  •  metFORMIN (GLUCOPHAGE) 1000 MG tablet  •  pantoprazole (PROTONIX) 40 mg tablet  •  quinapril (ACCUPRIL) 40 MG tablet    Allergies   Allergen Reactions   • Aleve [Naproxen] Other (See Comments)     "weird" sensation entire body   • Sulfa Antibiotics Rash           Objective     Blood pressure 143/66, pulse 80, height 5' (1.524 m), weight 52.6 kg (116 lb), not currently breastfeeding. Body mass index is 22.65 kg/m².       PHYSICAL EXAM:      General Appearance:   Alert, cooperative, no distress   HEENT:   Normocephalic, atraumatic, anicteric.     Neck:  Supple, symmetrical, trachea midline   Lungs:   Clear to auscultation bilaterally; no rales, rhonchi or wheezing; respirations unlabored    Heart[de-identified]   Regular rate and rhythm; no murmur, rub, or gallop. Abdomen:   Soft, non-tender, non-distended; normal bowel sounds; no masses, no organomegaly    Genitalia:   Deferred    Rectal:   Deferred    Extremities:  No cyanosis, clubbing or edema    Pulses:  2+ and symmetric    Skin:  No jaundice, rashes, or lesions    Lymph nodes:  No palpable cervical lymphadenopathy        Lab Results:   No visits with results within 1 Day(s) from this visit.    Latest known visit with results is:   Lab on 07/24/2023   Component Date Value   • WBC 07/24/2023 10.50 (H)    • RBC 07/24/2023 4.20    • Hemoglobin 07/24/2023 12.9    • Hematocrit 07/24/2023 39.6    • MCV 07/24/2023 94    • MCH 07/24/2023 30.7    • MCHC 07/24/2023 32.6    • RDW 07/24/2023 13.5    • MPV 07/24/2023 9.7    • Platelets 49/48/1105 304    • nRBC 07/24/2023 0    • Neutrophils Relative 07/24/2023 81 (H)    • Immat GRANS % 07/24/2023 1    • Lymphocytes Relative 07/24/2023 11 (L)    • Monocytes Relative 07/24/2023 6    • Eosinophils Relative 07/24/2023 1    • Basophils Relative 07/24/2023 0    • Neutrophils Absolute 07/24/2023 8.41 (H)    • Immature Grans Absolute 07/24/2023 0.05    • Lymphocytes Absolute 07/24/2023 1.20    • Monocytes Absolute 07/24/2023 0.66    • Eosinophils Absolute 07/24/2023 0.15    • Basophils Absolute 07/24/2023 0.03    • Cholesterol 07/28/2023 126    • Triglycerides 07/28/2023 178 (H)    • HDL, Direct 07/28/2023 44 (L)    • LDL Calculated 07/28/2023 46    • Sodium 07/28/2023 141    • Potassium 07/28/2023 4.7    • Chloride 07/28/2023 103    • CO2 07/28/2023 32    • ANION GAP 07/28/2023 6    • BUN 07/28/2023 30 (H)    • Creatinine 07/28/2023 1.19    • Glucose, Fasting 07/28/2023 158 (H)    • Calcium 07/28/2023 11.9 (H)    • eGFR 07/28/2023 43    • Creatinine, Ur 07/28/2023 88.8    • Albumin,U,Random 07/28/2023 110.0 (H)    • Albumin Creat Ratio 07/28/2023 124 (H)    • Hemoglobin A1C 07/24/2023 6.9 (H) • EAG 07/24/2023 151    • Iron Saturation 07/24/2023 18    • TIBC 07/24/2023 312    • Iron 07/24/2023 57    • Ferritin 07/24/2023 40          Radiology Results:   CT abdomen pelvis w contrast    Result Date: 8/9/2023  Narrative: CT ABDOMEN AND PELVIS WITH IV CONTRAST INDICATION:   K63.9: Disease of intestine, unspecified D50.0: Iron deficiency anemia secondary to blood loss (chronic). COMPARISON: CT abdomen pelvis 1/30/2013; CT kidneys and bladder 10/11/2021 TECHNIQUE:  CT examination of the abdomen and pelvis was performed. Multiplanar 2D reformatted images were created from the source data. This examination, like all CT scans performed in the [facility], was performed utilizing techniques to minimize radiation dose exposure, including the use of iterative reconstruction and automated exposure control. Radiation dose length product (DLP) for this visit:  418.93 mGy-cm IV Contrast:  100 mL of iohexol (OMNIPAQUE) Enteric Contrast:  Enteric contrast was administered. FINDINGS: ABDOMEN LOWER CHEST:  No clinically significant abnormality identified in the visualized lower chest. LIVER/BILIARY TREE: A 3 mm low-density lesion in the left lobe on image 2/28 is too small to further characterize but may represent a cyst. GALLBLADDER: There is a large calcified stone in the gallbladder neck measuring 2.2 cm. The gallbladder is quite distended. No obvious wall thickening or pericholecystic fluid by CT scan. SPLEEN:  Unremarkable. PANCREAS: There is a 4 mm low-density lesion in the distal pancreatic body on image 2/48 and a 3 mm low-density lesion in the pancreatic tail on image 2/39. No obvious pancreatic ductal dilatation or distal atrophy. ADRENAL GLANDS:  Unremarkable. KIDNEYS/URETERS: There is a complex cyst in the midportion of the left kidney with septation measuring 1.3 cm on image 2/47. There is a simple cyst anteriorly in the midportion of the left kidney measuring 15 mm.  A 5 mm relatively hyperdense lesion in the posterior midportion of the left kidney is seen on image 2/50. Other small low-density renal lesions are favored to represent cysts, but too small to definitively characterize on this exam. STOMACH AND BOWEL: There is colonic diverticulosis without evidence of acute diverticulitis. No bowel obstruction. APPENDIX:  No findings to suggest appendicitis. ABDOMINOPELVIC CAVITY:  No ascites. No pneumoperitoneum. No lymphadenopathy. VESSELS: Atherosclerotic changes are present. No evidence of aneurysm. PELVIS REPRODUCTIVE ORGANS: Surgical changes of prior hysterectomy. URINARY BLADDER:  Unremarkable. ABDOMINAL WALL/INGUINAL REGIONS:  Unremarkable. OSSEOUS STRUCTURES:  No acute fracture or destructive osseous lesion. Impression: 1. Large calcified stone in the gallbladder neck and massively distended gallbladder. This raises the possibility of gallbladder obstruction. Correlate clinically and consider further work-up with an ultrasound if warranted. 2. There are couple tiny low-density lesions in the pancreas measuring up to 4 mm. Suggest follow-up exam in 2 years to evaluate for stability. 3. Complex cyst in the midportion of the left kidney with septation. There is also a hyperdense nodule in the posterior midportion of the left kidney measuring 5 mm which is indeterminate. Recommend attention on follow-up. Given its small size, consider follow-up with multiphase renal protocol CT or MRI as this may not be well visualized by ultrasound. The study was marked in [facility] for immediate notification.  Workstation performed: [id number]

## 2023-08-24 DIAGNOSIS — I63.9 STROKE (CEREBRUM) (HCC): ICD-10-CM

## 2023-08-24 DIAGNOSIS — Z79.4 TYPE 2 DIABETES MELLITUS WITH DIABETIC POLYNEUROPATHY, WITH LONG-TERM CURRENT USE OF INSULIN (HCC): ICD-10-CM

## 2023-08-24 DIAGNOSIS — E11.42 TYPE 2 DIABETES MELLITUS WITH DIABETIC POLYNEUROPATHY, WITH LONG-TERM CURRENT USE OF INSULIN (HCC): ICD-10-CM

## 2023-08-24 RX ORDER — ATORVASTATIN CALCIUM 80 MG/1
TABLET, FILM COATED ORAL
Qty: 90 TABLET | Refills: 3 | Status: SHIPPED | OUTPATIENT
Start: 2023-08-24

## 2023-08-30 DIAGNOSIS — D75.839 THROMBOCYTOSIS: Primary | ICD-10-CM

## 2023-08-30 DIAGNOSIS — D50.9 IRON DEFICIENCY ANEMIA, UNSPECIFIED IRON DEFICIENCY ANEMIA TYPE: ICD-10-CM

## 2023-08-30 DIAGNOSIS — D62 ACUTE BLOOD LOSS ANEMIA: ICD-10-CM

## 2023-08-30 RX ORDER — SODIUM CHLORIDE 9 MG/ML
20 INJECTION, SOLUTION INTRAVENOUS ONCE
OUTPATIENT
Start: 2023-09-01

## 2023-08-31 ENCOUNTER — CONSULT (OUTPATIENT)
Dept: SURGERY | Facility: CLINIC | Age: 78
End: 2023-08-31
Payer: COMMERCIAL

## 2023-08-31 VITALS
BODY MASS INDEX: 22.81 KG/M2 | SYSTOLIC BLOOD PRESSURE: 135 MMHG | HEIGHT: 60 IN | OXYGEN SATURATION: 97 % | DIASTOLIC BLOOD PRESSURE: 65 MMHG | HEART RATE: 71 BPM | WEIGHT: 116.2 LBS | TEMPERATURE: 96.9 F

## 2023-08-31 DIAGNOSIS — R93.89 ABNORMAL CAT SCAN: ICD-10-CM

## 2023-08-31 DIAGNOSIS — K56.3: Primary | ICD-10-CM

## 2023-08-31 PROCEDURE — 99204 OFFICE O/P NEW MOD 45 MIN: CPT | Performed by: SPECIALIST

## 2023-08-31 NOTE — PROGRESS NOTES
Surgical consult and history and Physical Examination - General Surgery   [facility]  Sapphire Hyde 78 y.o. female MRN: [id number]  : [id number] PCP: Calvin Flores MD    History of Present Illness   Chief Complaint:   Mary Cui for possible gallstone    HPI:  Sapphire Hyde is a 78 y.o. female who presents to office with finding on CT scan as patient was being worked up for upper GI bleeding  Chronic anemia secondary to chronic blood loss  Iron infusions    Any nausea vomiting abdominal pain had some dyspepsia, bloating or gassiness    Had a CT scan which revealed large calcified stone stuck in the neck of the gallbladder    She is scheduled for MRCP  And for some blood work 3 more infusions for iron. Historical Information   The following portions of the patient's history were reviewed and updated as appropriate  Past Medical History:   Diagnosis Date   • Anemia    • Arthritis    • Capsulitis of foot     Last assessed 07/29/13   • Diabetes ([address])     type 2   • Full dentures    • Ganglion     Last assessed 07/29/13   • Hearing decreased    • Hyperlipidemia    • Hypertension    • Magnesium deficiency    • Sciatica    • Wears glasses      Past Surgical History:   Procedure Laterality Date   • CATARACT EXTRACTION     • COLONOSCOPY     • DILATION AND CURETTAGE OF UTERUS      x 4   • HAND SURGERY Left     fx repaired w/wrist bone   • HYSTERECTOMY      left ovary remains   • KNEE ARTHROSCOPY Left    • LIPOMA RESECTION      removed from back and left buttock   • LA XCAPSL CTRC RMVL INSJ IO LENS PROSTH W/O ECP Right 1/24/2019    Procedure: EXTRACTION EXTRACAPSULAR CATARACT PHACO INTRAOCULAR LENS (IOL); Surgeon: Ekta Jean Baptiste MD;  Location: [facility] MAIN OR;  Service: Ophthalmology   • LA XCAPSL CTRC RMVL INSJ IO LENS PROSTH W/O ECP Left 2/14/2019    Procedure: EXTRACTION EXTRACAPSULAR CATARACT PHACO INTRAOCULAR LENS (IOL);   Surgeon: Ekta Jean Baptiste MD;  Location: [facility] MAIN OR;  Service: Ophthalmology   • TONSILLECTOMY      and adenoids   • TUBAL LIGATION     • US GUIDED THYROID BIOPSY  2021     Social History   Social History     Substance and Sexual Activity   Alcohol Use Not Currently    Comment: Rarely     Social History     Substance and Sexual Activity   Drug Use Never     Social History     Tobacco Use   Smoking Status Every Day   • Packs/day: 0.25   • Years: 35.00   • Total pack years: 8.75   • Types: Cigarettes   • Start date: 34   • Last attempt to quit: 2021   • Years since quittin.2   Smokeless Tobacco Never     Family History:   Family History   Problem Relation Age of Onset   • Leukemia Father    • Early death Father 39        leukemia   • Stomach cancer Maternal Grandfather    • Diabetes Paternal Grandfather    • Diabetes Sister         insulin dependent   • Diabetes Brother         insulin   • Diabetes Sister         insulin   • Diabetes Sister         insulin       Meds/Allergies   Allergies   Allergen Reactions   • Aleve [Naproxen] Other (See Comments)     "weird" sensation entire body   • Sulfa Antibiotics Rash       Current Outpatient Medications:   •  atorvastatin (LIPITOR) 80 mg tablet, TAKE 1 TABLET BY MOUTH IN  THE EVENING, Disp: 90 tablet, Rfl: 3  •  clopidogrel (PLAVIX) 75 mg tablet, TAKE 1 TABLET BY MOUTH  DAILY, Disp: 90 tablet, Rfl: 3  •  cyanocobalamin (VITAMIN B-12) 1000 MCG tablet, Take 1 tablet (1,000 mcg total) by mouth daily Do not start before March 3, 2023., Disp: 30 tablet, Rfl: 0  •  ferrous sulfate 324 (65 Fe) mg, Take 1 tablet (324 mg total) by mouth daily before breakfast, Disp: 180 tablet, Rfl: 0  •  folic acid (FOLVITE) 1 mg tablet, TAKE 1 TABLET BY MOUTH EVERY DAY, Disp: 90 tablet, Rfl: 1  •  glimepiride (AMARYL) 2 mg tablet, Take 1 tablet (2 mg total) by mouth daily with breakfast, Disp: 90 tablet, Rfl: 0  •  magnesium Oxide (MAG-OX) 400 mg TABS, Take 1 tablet (400 mg total) by mouth 2 (two) times a day, Disp: 180 tablet, Rfl: 0  • metFORMIN (GLUCOPHAGE) 1000 MG tablet, TAKE 1 TABLET BY MOUTH  TWICE DAILY, Disp: 180 tablet, Rfl: 3  •  pantoprazole (PROTONIX) 40 mg tablet, TAKE 1 TABLET BY MOUTH DAILY, Disp: 90 tablet, Rfl: 1  •  quinapril (ACCUPRIL) 40 MG tablet, TAKE 1 TABLET BY MOUTH  DAILY AT BEDTIME, Disp: 90 tablet, Rfl: 3     REVIEW OF SYSTEMS  Constitutional:  Denies fever or chills   Eyes:  Denies change in visual acuity   HENT:  Denies nasal congestion or sore throat   Respiratory:  Denies cough or shortness of breath   Cardiovascular:  Denies chest pain or edema   GI:  Denies abdominal pain, nausea, vomiting, bloody stools or diarrhea   :  Denies dysuria, frequency, difficulty in micturition and nocturia  Musculoskeletal:  Denies back pain or joint pain   Neurologic:  Denies headache, focal weakness or sensory changes multiple strokes in the past but no residual deficits patient was on Plavix but this has been stopped secondary to upper GI bleed  Endocrine:  Denies polyuria or polydipsia   Insulin-dependent diabetes mellitus on metformin  Lymphatic:  Denies swollen glands   Psychiatric:  Denies depression or anxiety     Objective   Current Vitals:   /65 (BP Location: Left arm, Patient Position: Sitting, Cuff Size: Standard)   Pulse 71   Temp (!) 96.9 °F (36.1 °C) (Core)   Ht 5' (1.524 m)   Wt 52.7 kg (116 lb 3.2 oz)   LMP  (LMP Unknown)   SpO2 97%   BMI 22.69 kg/m²   Body mass index is 22.69 kg/m². PHYSICAL EXAMS  General:  Patient is not in acute distress, laying in the bed comfortably, awake, alert responding to commands,   HEENT:  Both pupils normal-size atraumatic, normocephalic, nonicteric  Neck:  JVP not raised. Trachea central  Respiratory:  normal Breath sounds clear to auscultation,  Cardiovascular:  S1-S2 normal without any murmur   GI:  Abdomen soft nontender.  Liver and spleen normal size, no free fluid, hernial sites unremarkable without any cough impulse  Musculoskeletal:  No back pain  Integument:  No skin rashes or ulceration  Lymphatic:  No cervical or inguinal lymphadenopathy  Neurologic:  Patient is awake alert, responding to command, well-oriented to time and place and person moving all extremities ambulating well    Visit Diagnosis: . Diagnoses and all orders for this visit:    Gallstone ileus of large intestine (HCC)    Abnormal CAT scan  -     Ambulatory Referral to General Surgery       Plan of care was discussed with patient in detail    Pertinent labs reviewed  Pertinent images and available reads personally reviewed  Procedure: CT abdomen pelvis w contrast    Result Date: 8/9/2023  Narrative: CT ABDOMEN AND PELVIS WITH IV CONTRAST INDICATION:   K63.9: Disease of intestine, unspecified D50.0: Iron deficiency anemia secondary to blood loss (chronic). COMPARISON: CT abdomen pelvis 1/30/2013; CT kidneys and bladder 10/11/2021 TECHNIQUE:  CT examination of the abdomen and pelvis was performed. Multiplanar 2D reformatted images were created from the source data. This examination, like all CT scans performed in the [facility], was performed utilizing techniques to minimize radiation dose exposure, including the use of iterative reconstruction and automated exposure control. Radiation dose length product (DLP) for this visit:  418.93 mGy-cm IV Contrast:  100 mL of iohexol (OMNIPAQUE) Enteric Contrast:  Enteric contrast was administered. FINDINGS: ABDOMEN LOWER CHEST:  No clinically significant abnormality identified in the visualized lower chest. LIVER/BILIARY TREE: A 3 mm low-density lesion in the left lobe on image 2/28 is too small to further characterize but may represent a cyst. GALLBLADDER: There is a large calcified stone in the gallbladder neck measuring 2.2 cm. The gallbladder is quite distended. No obvious wall thickening or pericholecystic fluid by CT scan. SPLEEN:  Unremarkable.  PANCREAS: There is a 4 mm low-density lesion in the distal pancreatic body on image 2/48 and a 3 mm low-density lesion in the pancreatic tail on image 2/39. No obvious pancreatic ductal dilatation or distal atrophy. ADRENAL GLANDS:  Unremarkable. KIDNEYS/URETERS: There is a complex cyst in the midportion of the left kidney with septation measuring 1.3 cm on image 2/47. There is a simple cyst anteriorly in the midportion of the left kidney measuring 15 mm. A 5 mm relatively hyperdense lesion in the posterior midportion of the left kidney is seen on image 2/50. Other small low-density renal lesions are favored to represent cysts, but too small to definitively characterize on this exam. STOMACH AND BOWEL: There is colonic diverticulosis without evidence of acute diverticulitis. No bowel obstruction. APPENDIX:  No findings to suggest appendicitis. ABDOMINOPELVIC CAVITY:  No ascites. No pneumoperitoneum. No lymphadenopathy. VESSELS: Atherosclerotic changes are present. No evidence of aneurysm. PELVIS REPRODUCTIVE ORGANS: Surgical changes of prior hysterectomy. URINARY BLADDER:  Unremarkable. ABDOMINAL WALL/INGUINAL REGIONS:  Unremarkable. OSSEOUS STRUCTURES:  No acute fracture or destructive osseous lesion. Impression: 1. Large calcified stone in the gallbladder neck and massively distended gallbladder. This raises the possibility of gallbladder obstruction. Correlate clinically and consider further work-up with an ultrasound if warranted. 2. There are couple tiny low-density lesions in the pancreas measuring up to 4 mm. Suggest follow-up exam in 2 years to evaluate for stability. 3. Complex cyst in the midportion of the left kidney with septation. There is also a hyperdense nodule in the posterior midportion of the left kidney measuring 5 mm which is indeterminate. Recommend attention on follow-up. Given its small size, consider follow-up with multiphase renal protocol CT or MRI as this may not be well visualized by ultrasound. The study was marked in [facility] for immediate notification.  Workstation performed: [id number]     Pertinent notes reviewed    Assessment/Plan   Assessment:  Chronic GERD of large gallstones impacted in the neck of the gallbladder    Patient is scheduled for MRCP    Symptomatic   Encounter Diagnoses   Name Primary? • Abnormal CAT scan    • Gallstone ileus of large intestine (HCC) Yes    . Plan:  The risks, benefits, alternatives,and probabilities of success were discussed in detail with no guarantee made as to outcome. All questions were answered to the patient's satisfaction. Patient understands that some of patient's symptoms or all symptoms may persist even after surgery and wished to proceed with surgery    Follow-up for after her MRCP and blood work for CBC CMP    To schedule for surgery if she is agreeable  Aware that we will attempt laparoscopic but chances are higher with active bleeding converted to open in view of large stone    Counseling / Coordination of Care  Expained patient family in detailed about coordination of care. A description of the counseling / coordination of care:  I performed an interim history, pertinent images and labs, performed a physical examination to arrive at the plan delineated above with associated thought processes. Family member/primary contact updated - Son at the bedside    Dr Linnea Claude,  [address] [place] [address] [place]    Office   Tel. [phone]  Fax.  [phone]

## 2023-09-01 ENCOUNTER — HOSPITAL ENCOUNTER (OUTPATIENT)
Dept: INFUSION CENTER | Facility: HOSPITAL | Age: 78
Discharge: HOME | End: 2023-09-01

## 2023-09-19 ENCOUNTER — HOSPITAL ENCOUNTER (OUTPATIENT)
Dept: RADIOLOGY | Facility: HOSPITAL | Age: 78
Discharge: HOME | End: 2023-09-19
Payer: COMMERCIAL

## 2023-09-19 DIAGNOSIS — K86.2 PANCREATIC CYST: ICD-10-CM

## 2023-09-19 PROCEDURE — G1004 CDSM NDSC: HCPCS

## 2023-09-19 PROCEDURE — 74183 MRI ABD W/O CNTR FLWD CNTR: CPT

## 2023-09-19 PROCEDURE — A9585 GADOBUTROL INJECTION: HCPCS | Performed by: PHYSICIAN ASSISTANT

## 2023-09-19 RX ORDER — GADOBUTROL 604.72 MG/ML
5 INJECTION INTRAVENOUS
Status: COMPLETED | OUTPATIENT
Start: 2023-09-19 | End: 2023-09-19

## 2023-09-19 RX ADMIN — GADOBUTROL 5 ML: 604.72 INJECTION INTRAVENOUS at 12:41

## 2023-09-27 ENCOUNTER — TELEPHONE (OUTPATIENT)
Dept: GASTROENTEROLOGY | Facility: CLINIC | Age: 78
End: 2023-09-27

## 2023-09-27 DIAGNOSIS — D75.839 THROMBOCYTOSIS: Primary | ICD-10-CM

## 2023-09-27 DIAGNOSIS — D62 ACUTE BLOOD LOSS ANEMIA: ICD-10-CM

## 2023-09-27 DIAGNOSIS — D50.9 IRON DEFICIENCY ANEMIA, UNSPECIFIED IRON DEFICIENCY ANEMIA TYPE: ICD-10-CM

## 2023-09-27 DIAGNOSIS — R93.89 ABNORMAL CAT SCAN: Primary | ICD-10-CM

## 2023-09-27 RX ORDER — SODIUM CHLORIDE 9 MG/ML
20 INJECTION, SOLUTION INTRAVENOUS ONCE
OUTPATIENT
Start: 2023-09-29

## 2023-09-27 NOTE — TELEPHONE ENCOUNTER
I called and spoke to patient and went over results of MRI. She verbalized understanding. Ov scheduled. Appt with surgeon also scheduled.

## 2023-09-27 NOTE — TELEPHONE ENCOUNTER
Patients GI provider:  AMANDA MERCADO    Number to return call: [phone]     Reason for call: Pt returning call for results. Please return patient call.

## 2023-09-27 NOTE — TELEPHONE ENCOUNTER
----- Message from Bret Shell PA-C sent at 9/26/2023  3:31 PM EDT -----  Please let patient know that she had 6 mm pancreatic cyst and other smaller cyst with recommended follow-up in 2 years, she does have gallbladder with large calculus and generally we recommend cholecystectomy so please let me know if she is interested in that and I can place a referral to surgeon

## 2023-09-29 ENCOUNTER — HOSPITAL ENCOUNTER (OUTPATIENT)
Dept: INFUSION CENTER | Facility: HOSPITAL | Age: 78
Discharge: HOME | End: 2023-09-29

## 2023-10-11 ENCOUNTER — OFFICE VISIT (OUTPATIENT)
Dept: SURGERY | Facility: CLINIC | Age: 78
End: 2023-10-11
Payer: COMMERCIAL

## 2023-10-11 VITALS
WEIGHT: 114.4 LBS | HEIGHT: 60 IN | SYSTOLIC BLOOD PRESSURE: 135 MMHG | DIASTOLIC BLOOD PRESSURE: 70 MMHG | TEMPERATURE: 95.9 F | OXYGEN SATURATION: 97 % | HEART RATE: 105 BPM | BODY MASS INDEX: 22.46 KG/M2

## 2023-10-11 DIAGNOSIS — Z09 SURGICAL FOLLOW-UP CARE: ICD-10-CM

## 2023-10-11 DIAGNOSIS — I10 ESSENTIAL HYPERTENSION: Chronic | ICD-10-CM

## 2023-10-11 DIAGNOSIS — E11.42 TYPE 2 DIABETES MELLITUS WITH DIABETIC POLYNEUROPATHY (HCC): Chronic | ICD-10-CM

## 2023-10-11 DIAGNOSIS — K80.11 CALCULUS OF GALLBLADDER WITH CHRONIC CHOLECYSTITIS WITH OBSTRUCTION: ICD-10-CM

## 2023-10-11 DIAGNOSIS — K82.1 GALLBLADDER HYDROPS: Primary | ICD-10-CM

## 2023-10-11 PROBLEM — K80.19 GALLSTONES AND INFLAMMATION OF GALLBLADDER WITH OBSTRUCTION: Status: ACTIVE | Noted: 2023-10-11

## 2023-10-11 PROCEDURE — 99214 OFFICE O/P EST MOD 30 MIN: CPT | Performed by: SPECIALIST

## 2023-10-11 RX ORDER — CHLORHEXIDINE GLUCONATE 4 G/100ML
SOLUTION TOPICAL DAILY PRN
OUTPATIENT
Start: 2023-10-11

## 2023-10-11 RX ORDER — HEPARIN SODIUM 5000 [USP'U]/ML
5000 INJECTION, SOLUTION INTRAVENOUS; SUBCUTANEOUS ONCE
OUTPATIENT
Start: 2023-10-11 | End: 2023-10-11

## 2023-10-11 RX ORDER — CEFAZOLIN SODIUM 2 G/50ML
2000 SOLUTION INTRAVENOUS ONCE
OUTPATIENT
Start: 2023-10-11 | End: 2023-10-11

## 2023-10-11 RX ORDER — METRONIDAZOLE 500 MG/100ML
500 INJECTION, SOLUTION INTRAVENOUS ONCE
OUTPATIENT
Start: 2023-10-11 | End: 2023-10-11

## 2023-10-11 NOTE — PROGRESS NOTES
P General Surgery Office Visit Follow up   [facility]  Patient: Melvin Saucedo   : 1945 Sex: female MRN: [id number]   CSN: [id number] PCP: Santiago Smith MD    Assessment/ Plan:  Melvin Saucedo is a 78 y.o. female  day(s) follow-up after MRI Surgical follow-up care [Z09]    Plan  Hydrops of the gallbladder with large gallstone stuck in the neck of the gallbladder  Patient is asymptomatic suppressed with chronic cholecystitis with obstruction  Advise laparoscopic cholecystectomy  Proper consent was obtained. The risks, benefits, alternatives,and probabilities of success were discussed in detail with no guarantee made as to outcome. All questions were answered to the patient's satisfaction. Patient agreed with the present plan of care    Risk associated with the procedures was explained as follow  Patient risk for bleeding infection pain injury to bowel or duct jaundice ERCP reoperation bile leak diarrhea  Is associated risk for heart attack/stroke as patient has previous multiple histories of stroke/PE DVT    Preoperative prep was explained to the patient  NPO from midnight prior to surgery  Laxative for 2 days every day at bedtime prior to surgery  Patient was advised to take any laxative which works for patient like MiraLax, Dulcolax, Colace, Senokot, warm prune juice   Light meals 2 days prior to surgery. Avoid heavy meal night before surgery    Patient was explained about a skin prep chlorhexidine lotion/soap.   To apply over the skin and take shower night before surgery and the early morning apply and take shower before coming to the hospital for surgery    Patient was advised not to shave the area, the area will be clipped with a hair clipper in the hospital    Will order blood work CBC CMP and EKG prior to surgery    Patient may need cardiac clearance and will be referred to the cardiologist prior to surgery    Antibiotics 1 dose prior to surgery ordered    Venodyne and SubQ heparin for DVT and PE prevention 1 dose prior to surgery ordered      SUBJECTIVE:   I am here for my gallbladder surgery my MRI and CT scan Dr. Sabrina Butcher has fix my bleeding from my colon after colonoscopy and I do not need more infusions my anemia has resolved    OBJECTIVE:  No complaints  No fever no chills no rigors  Tolerating p.o.  Diet well  Normal bowel movement no constipation or diarrhea  Ambulating well     Vitals:   /70 (BP Location: Left arm, Patient Position: Sitting, Cuff Size: Standard)   Pulse 105   Temp (!) 95.9 °F (35.5 °C) (Core)   Ht 5' (1.524 m)   Wt 51.9 kg (114 lb 6.4 oz)   LMP  (LMP Unknown)   SpO2 97%   BMI 22.34 kg/m²     Active medications:    Current Outpatient Medications:     atorvastatin (LIPITOR) 80 mg tablet, TAKE 1 TABLET BY MOUTH IN  THE EVENING, Disp: 90 tablet, Rfl: 3    clopidogrel (PLAVIX) 75 mg tablet, TAKE 1 TABLET BY MOUTH  DAILY, Disp: 90 tablet, Rfl: 3    cyanocobalamin (VITAMIN B-12) 1000 MCG tablet, Take 1 tablet (1,000 mcg total) by mouth daily Do not start before March 3, 2023., Disp: 30 tablet, Rfl: 0    ferrous sulfate 324 (65 Fe) mg, Take 1 tablet (324 mg total) by mouth daily before breakfast, Disp: 180 tablet, Rfl: 0    folic acid (FOLVITE) 1 mg tablet, TAKE 1 TABLET BY MOUTH EVERY DAY, Disp: 90 tablet, Rfl: 1    glimepiride (AMARYL) 2 mg tablet, TAKE 1 TABLET BY MOUTH DAILY  WITH BREAKFAST, Disp: 90 tablet, Rfl: 1    magnesium Oxide (MAG-OX) 400 mg TABS, Take 1 tablet (400 mg total) by mouth 2 (two) times a day, Disp: 180 tablet, Rfl: 0    metFORMIN (GLUCOPHAGE) 1000 MG tablet, TAKE 1 TABLET BY MOUTH  TWICE DAILY, Disp: 180 tablet, Rfl: 3    pantoprazole (PROTONIX) 40 mg tablet, TAKE 1 TABLET BY MOUTH DAILY, Disp: 90 tablet, Rfl: 1    quinapril (ACCUPRIL) 40 MG tablet, TAKE 1 TABLET BY MOUTH  DAILY AT BEDTIME, Disp: 90 tablet, Rfl: 3    Physical Exam:   General Alert awake   Normocephalic atraumatic PERRLA   Lungs clear bilaterally  Cardiac normal S1 normal S2  Abdomen soft,non tender Bowel sounds present  Skin: moist  Ext: No clubbing, cyanosis, edema    Visit Diagnosis: . Diagnoses and all orders for this visit:    Surgical follow-up care    Type 2 diabetes mellitus with diabetic polyneuropathy ([address])    Essential hypertension    Gallbladder hydrops    Calculus of gallbladder with chronic cholecystitis with obstruction       Plan of care was discussed with patient in detail    Pertinent labs reviewed  Most Recent Labs:   No visits with results within 2 Week(s) from this visit.    Latest known visit with results is:   Lab on 07/24/2023   Component Date Value Ref Range Status    WBC 07/24/2023 10.50 (H)  4.31 - 10.16 Thousand/uL Final    RBC 07/24/2023 4.20  3.81 - 5.12 Million/uL Final    Hemoglobin 07/24/2023 12.9  11.5 - 15.4 g/dL Final    Hematocrit 07/24/2023 39.6  34.8 - 46.1 % Final    MCV 07/24/2023 94  82 - 98 fL Final    MCH 07/24/2023 30.7  26.8 - 34.3 pg Final    MCHC 07/24/2023 32.6  31.4 - 37.4 g/dL Final    RDW 07/24/2023 13.5  11.6 - 15.1 % Final    MPV 07/24/2023 9.7  8.9 - 12.7 fL Final    Platelets 33/20/9235 304  149 - 390 Thousands/uL Final    nRBC 07/24/2023 0  /100 WBCs Final    Neutrophils Relative 07/24/2023 81 (H)  43 - 75 % Final    Immat GRANS % 07/24/2023 1  0 - 2 % Final    Lymphocytes Relative 07/24/2023 11 (L)  14 - 44 % Final    Monocytes Relative 07/24/2023 6  4 - 12 % Final    Eosinophils Relative 07/24/2023 1  0 - 6 % Final    Basophils Relative 07/24/2023 0  0 - 1 % Final    Neutrophils Absolute 07/24/2023 8.41 (H)  1.85 - 7.62 Thousands/µL Final    Immature Grans Absolute 07/24/2023 0.05  0.00 - 0.20 Thousand/uL Final    Lymphocytes Absolute 07/24/2023 1.20  0.60 - 4.47 Thousands/µL Final    Monocytes Absolute 07/24/2023 0.66  0.17 - 1.22 Thousand/µL Final    Eosinophils Absolute 07/24/2023 0.15  0.00 - 0.61 Thousand/µL Final    Basophils Absolute 07/24/2023 0.03  0.00 - 0.10 Thousands/µL Final    Cholesterol 07/28/2023 126 See Comment mg/dL Final    Cholesterol:         Pediatric <18 Years        Desirable          <170 mg/dL      Borderline High    170-199 mg/dL      High               >=200 mg/dL        Adult >=18 Years            Desirable         <200 mg/dL      Borderline High   200-239 mg/dL      High              >239 mg/dL      Triglycerides 07/28/2023 178 (H)  See Comment mg/dL Final    Triglyceride:     0-9Y            <75mg/dL     10Y-17Y         <90 mg/dL       >=18Y     Normal          <150 mg/dL     Borderline High 150-199 mg/dL     High            200-499 mg/dL        Very High       >499 mg/dL    Specimen collection should occur prior to Metamizole administration due to the potential for falsely depressed results. HDL, Direct 07/28/2023 44 (L)  >=50 mg/dL Final    LDL Calculated 07/28/2023 46  0 - 100 mg/dL Final    LDL Cholesterol:     Optimal           <100 mg/dl     Near Optimal      100-129 mg/dl     Above Optimal       Borderline High 130-159 mg/dl       High            160-189 mg/dl       Very High       >189 mg/dl         This screening LDL is a calculated result. It does not have the accuracy of the Direct Measured LDL in the monitoring of patients with hyperlipidemia and/or statin therapy. Direct Measure LDL (RJA571) must be ordered separately in these patients.     Sodium 07/28/2023 141  135 - 147 mmol/L Final    Potassium 07/28/2023 4.7  3.5 - 5.3 mmol/L Final    Chloride 07/28/2023 103  96 - 108 mmol/L Final    CO2 07/28/2023 32  21 - 32 mmol/L Final    ANION GAP 07/28/2023 6  mmol/L Final    BUN 07/28/2023 30 (H)  5 - 25 mg/dL Final    Creatinine 07/28/2023 1.19  0.60 - 1.30 mg/dL Final    Standardized to IDMS reference method    Glucose, Fasting 07/28/2023 158 (H)  65 - 99 mg/dL Final    Calcium 07/28/2023 11.9 (H)  8.4 - 10.2 mg/dL Final    eGFR 07/28/2023 43  ml/min/1.73sq m Final    Creatinine, Ur 07/28/2023 88.8  mg/dL Final    Albumin,U,Random 07/28/2023 110.0 (H)  0.0 - 20.0 mg/L Final Albumin Creat Ratio 07/28/2023 124 (H)  0 - 30 mg/g creatinine Final    Hemoglobin A1C 07/24/2023 6.9 (H)  Normal 4.0-5.6%; PreDiabetic 5.7-6.4%; Diabetic >=6.5%; Glycemic control for adults with diabetes <7.0% % Final    EAG 07/24/2023 151  mg/dl Final    Iron Saturation 07/24/2023 18  15 - 50 % Final    TIBC 07/24/2023 312  250 - 450 ug/dL Final    Iron 07/24/2023 57  50 - 170 ug/dL Final    Patients treated with metal-binding drugs (ie. Deferoxamine) may have depressed iron values. Ferritin 07/24/2023 40  11 - 307 ng/mL Final       Pertinent images and available reads personally reviewed  Procedure: MRI abdomen w wo contrast and mrcp    Result Date: 9/26/2023  Narrative: MRI OF THE ABDOMEN WITH AND WITHOUT CONTRAST WITH MRCP INDICATION: 78 years / Female  K86.2: Cyst of pancreas. Follow-up of renal and pancreatic lesions seen on recent ultrasound. COMPARISON: CT abdomen pelvis 8/2/2023 and 1/30/2013. Basil Freire TECHNIQUE:  Multiplanar/multisequence MRI of the abdomen with 3D MRCP was performed before and after administration of contrast. IV Contrast:  5 mL of Gadobutrol injection (SINGLE-DOSE) DIAGNOSTIC QUALITY: Motion limited FINDINGS: LOWER CHEST:   Unremarkable. LIVER: Normal in size and configuration. No suspicious mass. The subcentimeter left hepatic hypodensity seen on the CT is only identified as a 7 mm left hepatic lesion on #4/11 on today's study. It is obscured by motion artifact on other pre and postcontrast images. However this lesion was also present on the 2013  CT and is therefore benign. The hepatic veins and portal veins are patent. BILE DUCTS:  No intrahepatic or extrahepatic bile duct dilation. Common bile duct is normal in caliber. No choledocholithiasis, biliary stricture or suspicious mass. GALLBLADDER: Hydropic gallbladder with length of 15.1 cm. Reidentified 2.8 cm gallbladder neck calculus. No wall thickening or pericholecystic fluid.  Mild intrahepatic biliary dilation without extrahepatic biliary dilation. No choledocholithiasis. PANCREAS: Scattered subcentimeter simple appearing pancreatic cysts; for example dominant 6 mm pancreatic body cyst #7/21. No pancreatic ductal dilation or areas of abnormal enhancement. ADRENAL GLANDS: Unchanged adrenal gland thickening indicating hyperplasia. SPLEEN:  Normal. KIDNEYS/PROXIMAL URETERS:  No hydroureteronephrosis. No suspicious renal mass. Thinly septated 13 mm left renal interpolar cyst correlates to the CT finding, #6/22. This is a Bosniak 2 lesion and does not need further follow-up. Innumerable simple cysts throughout both kidneys. Scattered subcentimeter left renal hemorrhagic cyst. BOWEL:   No dilated loops of bowel. PERITONEUM/RETROPERITONEUM:  No ascites. LYMPH NODES:  No abdominal lymphadenopathy. VASCULAR STRUCTURES:  No aneurysm. ABDOMINAL WALL:  Unremarkable. OSSEOUS STRUCTURES:  No suspicious osseous lesion. Impression: 6 mm and smaller pancreatic cyst. For simple cyst(s) less than 1.5 cm, recommend followup every 2 year for 2 times. After 4 years, no more followups. This patient has completed all of the recommended followups. Preferred imaging modality: abdomen MRI and  MRCP with and without IV contrast, or triple phase abdomen CT with IV contrast, or abdomen MRI and MRCP without IV contrast. Follow-up is recommended in 2 years. Benign renal cysts. Hydropic gallbladder measuring approximately 15 cm in length with a reidentified 2.8 cm gallbladder calculus. No gallbladder wall thickening or pericholecystic inflammatory changes. Workstation performed: [id number]        Pertinent notes reviewed    Counseling / Coordination of Care  Total Office time /unit time spent today 15minutes. Greater than 50% of total time was spent with the patient and / or family counseling and / or coordination of care.  A description of the counseling / coordination of care:  I performed an interim history, pertinent images and labs, performed a physical examination to arrive at the plan delineated above with associated thought processes. Family member/primary contact son called  updated     Chandu Larsen MD MS FRCS FACS  [facility]  10/11/23 9:53 AM        Portions of the record may have been created with voice recognition software. Occasional wrong word or "sound a like" substitutions may have occurred due to the inherent limitations of voice recognition software. Read the chart carefully and recognize, using context, where substitutions have occurred.

## 2023-10-11 NOTE — H&P (VIEW-ONLY)
P General Surgery Office Visit Follow up   [facility]  Patient: Tylor Newell   : 1945 Sex: female MRN: [id number]   [facility]: [phone] PCP: Fidelina Haney MD    Assessment/ Plan:  Tylor Newell is a 78 y.o. female  day(s) follow-up after MRI Surgical follow-up care [Z09]    Plan  Hydrops of the gallbladder with large gallstone stuck in the neck of the gallbladder  Patient is asymptomatic suppressed with chronic cholecystitis with obstruction  Advise laparoscopic cholecystectomy  Proper consent was obtained. The risks, benefits, alternatives,and probabilities of success were discussed in detail with no guarantee made as to outcome. All questions were answered to the patient's satisfaction. Patient agreed with the present plan of care    Risk associated with the procedures was explained as follow  Patient risk for bleeding infection pain injury to bowel or duct jaundice ERCP reoperation bile leak diarrhea  Is associated risk for heart attack/stroke as patient has previous multiple histories of stroke/PE DVT    Preoperative prep was explained to the patient  NPO from midnight prior to surgery  Laxative for 2 days every day at bedtime prior to surgery  Patient was advised to take any laxative which works for patient like MiraLax, Dulcolax, Colace, Senokot, warm prune juice   Light meals 2 days prior to surgery. Avoid heavy meal night before surgery    Patient was explained about a skin prep chlorhexidine lotion/soap.   To apply over the skin and take shower night before surgery and the early morning apply and take shower before coming to the hospital for surgery    Patient was advised not to shave the area, the area will be clipped with a hair clipper in the hospital    Will order blood work CBC CMP and EKG prior to surgery    Patient may need cardiac clearance and will be referred to the cardiologist prior to surgery    Antibiotics 1 dose prior to surgery ordered    Venodyne and SubQ heparin for DVT and PE prevention 1 dose prior to surgery ordered      SUBJECTIVE:   I am here for my gallbladder surgery my MRI and CT scan Dr. Arpit Almanzar has fix my bleeding from my colon after colonoscopy and I do not need more infusions my anemia has resolved    OBJECTIVE:  No complaints  No fever no chills no rigors  Tolerating p.o.  Diet well  Normal bowel movement no constipation or diarrhea  Ambulating well     Vitals:   /70 (BP Location: Left arm, Patient Position: Sitting, Cuff Size: Standard)   Pulse 105   Temp (!) 95.9 °F (35.5 °C) (Core)   Ht 5' (1.524 m)   Wt 51.9 kg (114 lb 6.4 oz)   LMP  (LMP Unknown)   SpO2 97%   BMI 22.34 kg/m²     Active medications:    Current Outpatient Medications:     atorvastatin (LIPITOR) 80 mg tablet, TAKE 1 TABLET BY MOUTH IN  THE EVENING, Disp: 90 tablet, Rfl: 3    clopidogrel (PLAVIX) 75 mg tablet, TAKE 1 TABLET BY MOUTH  DAILY, Disp: 90 tablet, Rfl: 3    cyanocobalamin (VITAMIN B-12) 1000 MCG tablet, Take 1 tablet (1,000 mcg total) by mouth daily Do not start before March 3, 2023., Disp: 30 tablet, Rfl: 0    ferrous sulfate 324 (65 Fe) mg, Take 1 tablet (324 mg total) by mouth daily before breakfast, Disp: 180 tablet, Rfl: 0    folic acid (FOLVITE) 1 mg tablet, TAKE 1 TABLET BY MOUTH EVERY DAY, Disp: 90 tablet, Rfl: 1    glimepiride (AMARYL) 2 mg tablet, TAKE 1 TABLET BY MOUTH DAILY  WITH BREAKFAST, Disp: 90 tablet, Rfl: 1    magnesium Oxide (MAG-OX) 400 mg TABS, Take 1 tablet (400 mg total) by mouth 2 (two) times a day, Disp: 180 tablet, Rfl: 0    metFORMIN (GLUCOPHAGE) 1000 MG tablet, TAKE 1 TABLET BY MOUTH  TWICE DAILY, Disp: 180 tablet, Rfl: 3    pantoprazole (PROTONIX) 40 mg tablet, TAKE 1 TABLET BY MOUTH DAILY, Disp: 90 tablet, Rfl: 1    quinapril (ACCUPRIL) 40 MG tablet, TAKE 1 TABLET BY MOUTH  DAILY AT BEDTIME, Disp: 90 tablet, Rfl: 3    Physical Exam:   General Alert awake   Normocephalic atraumatic PERRLA   Lungs clear bilaterally  Cardiac normal S1 normal S2  Abdomen soft,non tender Bowel sounds present  Skin: moist  Ext: No clubbing, cyanosis, edema    Visit Diagnosis: . Diagnoses and all orders for this visit:    Surgical follow-up care    Type 2 diabetes mellitus with diabetic polyneuropathy ([address])    Essential hypertension    Gallbladder hydrops    Calculus of gallbladder with chronic cholecystitis with obstruction       Plan of care was discussed with patient in detail    Pertinent labs reviewed  Most Recent Labs:   No visits with results within 2 Week(s) from this visit.    Latest known visit with results is:   Lab on 07/24/2023   Component Date Value Ref Range Status    WBC 07/24/2023 10.50 (H)  4.31 - 10.16 Thousand/uL Final    RBC 07/24/2023 4.20  3.81 - 5.12 Million/uL Final    Hemoglobin 07/24/2023 12.9  11.5 - 15.4 g/dL Final    Hematocrit 07/24/2023 39.6  34.8 - 46.1 % Final    MCV 07/24/2023 94  82 - 98 fL Final    MCH 07/24/2023 30.7  26.8 - 34.3 pg Final    MCHC 07/24/2023 32.6  31.4 - 37.4 g/dL Final    RDW 07/24/2023 13.5  11.6 - 15.1 % Final    MPV 07/24/2023 9.7  8.9 - 12.7 fL Final    Platelets 04/17/0232 304  149 - 390 Thousands/uL Final    nRBC 07/24/2023 0  /100 WBCs Final    Neutrophils Relative 07/24/2023 81 (H)  43 - 75 % Final    Immat GRANS % 07/24/2023 1  0 - 2 % Final    Lymphocytes Relative 07/24/2023 11 (L)  14 - 44 % Final    Monocytes Relative 07/24/2023 6  4 - 12 % Final    Eosinophils Relative 07/24/2023 1  0 - 6 % Final    Basophils Relative 07/24/2023 0  0 - 1 % Final    Neutrophils Absolute 07/24/2023 8.41 (H)  1.85 - 7.62 Thousands/µL Final    Immature Grans Absolute 07/24/2023 0.05  0.00 - 0.20 Thousand/uL Final    Lymphocytes Absolute 07/24/2023 1.20  0.60 - 4.47 Thousands/µL Final    Monocytes Absolute 07/24/2023 0.66  0.17 - 1.22 Thousand/µL Final    Eosinophils Absolute 07/24/2023 0.15  0.00 - 0.61 Thousand/µL Final    Basophils Absolute 07/24/2023 0.03  0.00 - 0.10 Thousands/µL Final    Cholesterol 07/28/2023 126 See Comment mg/dL Final    Cholesterol:         Pediatric <18 Years        Desirable          <170 mg/dL      Borderline High    170-199 mg/dL      High               >=200 mg/dL        Adult >=18 Years            Desirable         <200 mg/dL      Borderline High   200-239 mg/dL      High              >239 mg/dL      Triglycerides 07/28/2023 178 (H)  See Comment mg/dL Final    Triglyceride:     0-9Y            <75mg/dL     10Y-17Y         <90 mg/dL       >=18Y     Normal          <150 mg/dL     Borderline High 150-199 mg/dL     High            200-499 mg/dL        Very High       >499 mg/dL    Specimen collection should occur prior to Metamizole administration due to the potential for falsely depressed results. HDL, Direct 07/28/2023 44 (L)  >=50 mg/dL Final    LDL Calculated 07/28/2023 46  0 - 100 mg/dL Final    LDL Cholesterol:     Optimal           <100 mg/dl     Near Optimal      100-129 mg/dl     Above Optimal       Borderline High 130-159 mg/dl       High            160-189 mg/dl       Very High       >189 mg/dl         This screening LDL is a calculated result. It does not have the accuracy of the Direct Measured LDL in the monitoring of patients with hyperlipidemia and/or statin therapy. Direct Measure LDL (ICI399) must be ordered separately in these patients.     Sodium 07/28/2023 141  135 - 147 mmol/L Final    Potassium 07/28/2023 4.7  3.5 - 5.3 mmol/L Final    Chloride 07/28/2023 103  96 - 108 mmol/L Final    CO2 07/28/2023 32  21 - 32 mmol/L Final    ANION GAP 07/28/2023 6  mmol/L Final    BUN 07/28/2023 30 (H)  5 - 25 mg/dL Final    Creatinine 07/28/2023 1.19  0.60 - 1.30 mg/dL Final    Standardized to IDMS reference method    Glucose, Fasting 07/28/2023 158 (H)  65 - 99 mg/dL Final    Calcium 07/28/2023 11.9 (H)  8.4 - 10.2 mg/dL Final    eGFR 07/28/2023 43  ml/min/1.73sq m Final    Creatinine, Ur 07/28/2023 88.8  mg/dL Final    Albumin,U,Random 07/28/2023 110.0 (H)  0.0 - 20.0 mg/L Final Albumin Creat Ratio 07/28/2023 124 (H)  0 - 30 mg/g creatinine Final    Hemoglobin A1C 07/24/2023 6.9 (H)  Normal 4.0-5.6%; PreDiabetic 5.7-6.4%; Diabetic >=6.5%; Glycemic control for adults with diabetes <7.0% % Final    EAG 07/24/2023 151  mg/dl Final    Iron Saturation 07/24/2023 18  15 - 50 % Final    TIBC 07/24/2023 312  250 - 450 ug/dL Final    Iron 07/24/2023 57  50 - 170 ug/dL Final    Patients treated with metal-binding drugs (ie. Deferoxamine) may have depressed iron values. Ferritin 07/24/2023 40  11 - 307 ng/mL Final       Pertinent images and available reads personally reviewed  Procedure: MRI abdomen w wo contrast and mrcp    Result Date: 9/26/2023  Narrative: MRI OF THE ABDOMEN WITH AND WITHOUT CONTRAST WITH MRCP INDICATION: 78 years / Female  K86.2: Cyst of pancreas. Follow-up of renal and pancreatic lesions seen on recent ultrasound. COMPARISON: CT abdomen pelvis 8/2/2023 and 1/30/2013. Tena Muhammad TECHNIQUE:  Multiplanar/multisequence MRI of the abdomen with 3D MRCP was performed before and after administration of contrast. IV Contrast:  5 mL of Gadobutrol injection (SINGLE-DOSE) DIAGNOSTIC QUALITY: Motion limited FINDINGS: LOWER CHEST:   Unremarkable. LIVER: Normal in size and configuration. No suspicious mass. The subcentimeter left hepatic hypodensity seen on the CT is only identified as a 7 mm left hepatic lesion on #4/11 on today's study. It is obscured by motion artifact on other pre and postcontrast images. However this lesion was also present on the 2013  CT and is therefore benign. The hepatic veins and portal veins are patent. BILE DUCTS:  No intrahepatic or extrahepatic bile duct dilation. Common bile duct is normal in caliber. No choledocholithiasis, biliary stricture or suspicious mass. GALLBLADDER: Hydropic gallbladder with length of 15.1 cm. Reidentified 2.8 cm gallbladder neck calculus. No wall thickening or pericholecystic fluid.  Mild intrahepatic biliary dilation without extrahepatic biliary dilation. No choledocholithiasis. PANCREAS: Scattered subcentimeter simple appearing pancreatic cysts; for example dominant 6 mm pancreatic body cyst #7/21. No pancreatic ductal dilation or areas of abnormal enhancement. ADRENAL GLANDS: Unchanged adrenal gland thickening indicating hyperplasia. SPLEEN:  Normal. KIDNEYS/PROXIMAL URETERS:  No hydroureteronephrosis. No suspicious renal mass. Thinly septated 13 mm left renal interpolar cyst correlates to the CT finding, #6/22. This is a Bosniak 2 lesion and does not need further follow-up. Innumerable simple cysts throughout both kidneys. Scattered subcentimeter left renal hemorrhagic cyst. BOWEL:   No dilated loops of bowel. PERITONEUM/RETROPERITONEUM:  No ascites. LYMPH NODES:  No abdominal lymphadenopathy. VASCULAR STRUCTURES:  No aneurysm. ABDOMINAL WALL:  Unremarkable. OSSEOUS STRUCTURES:  No suspicious osseous lesion. Impression: 6 mm and smaller pancreatic cyst. For simple cyst(s) less than 1.5 cm, recommend followup every 2 year for 2 times. After 4 years, no more followups. This patient has completed all of the recommended followups. Preferred imaging modality: abdomen MRI and  MRCP with and without IV contrast, or triple phase abdomen CT with IV contrast, or abdomen MRI and MRCP without IV contrast. Follow-up is recommended in 2 years. Benign renal cysts. Hydropic gallbladder measuring approximately 15 cm in length with a reidentified 2.8 cm gallbladder calculus. No gallbladder wall thickening or pericholecystic inflammatory changes. Workstation performed: [id number]        Pertinent notes reviewed    Counseling / Coordination of Care  Total Office time /unit time spent today 15minutes. Greater than 50% of total time was spent with the patient and / or family counseling and / or coordination of care.  A description of the counseling / coordination of care:  I performed an interim history, pertinent images and labs, performed a physical examination to arrive at the plan delineated above with associated thought processes. Family member/primary contact son called  updated     Marah Ying MD MS CS FACS  [facility]  10/11/23 9:53 AM        Portions of the record may have been created with voice recognition software. Occasional wrong word or "sound a like" substitutions may have occurred due to the inherent limitations of voice recognition software. Read the chart carefully and recognize, using context, where substitutions have occurred.

## 2023-10-13 ENCOUNTER — CONSULT (OUTPATIENT)
Dept: CARDIOLOGY CLINIC | Facility: CLINIC | Age: 78
End: 2023-10-13
Payer: COMMERCIAL

## 2023-10-13 VITALS
OXYGEN SATURATION: 98 % | BODY MASS INDEX: 22.19 KG/M2 | HEART RATE: 85 BPM | SYSTOLIC BLOOD PRESSURE: 150 MMHG | DIASTOLIC BLOOD PRESSURE: 60 MMHG | HEIGHT: 60 IN | WEIGHT: 113 LBS

## 2023-10-13 DIAGNOSIS — K80.11 CALCULUS OF GALLBLADDER WITH CHRONIC CHOLECYSTITIS WITH OBSTRUCTION: Primary | ICD-10-CM

## 2023-10-13 DIAGNOSIS — K82.1 GALLBLADDER HYDROPS: ICD-10-CM

## 2023-10-13 DIAGNOSIS — E11.42 TYPE 2 DIABETES MELLITUS WITH DIABETIC POLYNEUROPATHY (HCC): Chronic | ICD-10-CM

## 2023-10-13 DIAGNOSIS — I10 ESSENTIAL HYPERTENSION: Chronic | ICD-10-CM

## 2023-10-13 PROCEDURE — 93000 ELECTROCARDIOGRAM COMPLETE: CPT | Performed by: INTERNAL MEDICINE

## 2023-10-13 PROCEDURE — 99214 OFFICE O/P EST MOD 30 MIN: CPT | Performed by: INTERNAL MEDICINE

## 2023-10-13 RX ORDER — METOPROLOL SUCCINATE 25 MG/1
25 TABLET, EXTENDED RELEASE ORAL DAILY
Qty: 90 TABLET | Refills: 2 | Status: SHIPPED | OUTPATIENT
Start: 2023-10-13

## 2023-10-13 NOTE — PROGRESS NOTES
Progress Note - Cardiology Office  [facility]    Melvin Saucedo 78 y.o. female MRN: [id number]  : 1945  Encounter: [id number]      ASSESSMENT:   Perioperative cardiac risk assessment for laparoscopic cholecystectomy under general anesthesia on 10/17/2023  Calculus of gallbladder with chronic cholecystitis with obstruction. Gallbladder hydrops    Essential hypertension  BP today is 150/60 mmHg with heart rate of 95/min  On Accupril 10 mg daily  We will add Toprol XL 25 mg daily    Hyperlipidemia  On atorvastatin 80 mg    History of CVA  Bilateral vertebral artery stenosis  On Plavix and atorvastatin    Type 2 diabetes mellitus  CKD    History of tobacco abuse    Pharmacologic nuclear stress test, 10/06/2021:  Normal study after pharmacologic vasodilatation, EF 84%    TTE, 2021:  EF 55-60%, mild LVH, G1 DD,  Mild MR, trace TR, previously negative bubble study    Ambulatory cardiac monitor, 2021:  Sinus rhythm with no evidence of atrial fibrillation  Occasional short atrial runs nonsustained      RECOMMENDATIONS:  Patient has low to intermediate perioperative cardiac risk for surgery. Add Toprol-XL 25 mg  Continue Accupril and atorvastatin  Patient is also on Plavix for CVA and vertebral artery stenosis prescribed by other physicians/PCP. Please call [phone] if any questions. HPI :     Melvin Saucedo is a 78y.o. year old female who came for perioperative cardiac risk assessment for laparoscopic cholecystectomy under general anesthesia. Patient has calculus of gallbladder and chronic cholecystitis with obstruction and hydrops of the gallbladder. She denies any chest pain or unusual dyspnea although she has a history of tobacco abuse. Her last pharmacologic nuclear stress test was normal and her last echo and nuclear stress test showed normal LV systolic function/ejection fraction. Her EKG does not show any acute ischemic changes.   Her blood pressure is elevated. I have advised her on low-salt diet and I am going to add Toprol-XL 25 mg to her current regime of medications. Patient is also on Plavix for CVA and vertebral artery stenosis. This is being managed by her PCP since it is not due to a cardiac indication    REVIEW OF SYSTEMS:  Denies any new or acute cardiac symptoms. Denies chest pain, unusual dyspnea or syncope  Has arthralgias and some abdominal discomfort      Historical Information   Past Medical History:   Diagnosis Date    Anemia     Arthritis     Capsulitis of foot     Last assessed 07/29/13    Diabetes ([address])     type 2    Full dentures     Ganglion     Last assessed 07/29/13    Hearing decreased     Hyperlipidemia     Hypertension     Magnesium deficiency     Sciatica     Wears glasses      Past Surgical History:   Procedure Laterality Date    CATARACT EXTRACTION      COLONOSCOPY      DILATION AND CURETTAGE OF UTERUS      x 4    HAND SURGERY Left     fx repaired w/wrist bone    HYSTERECTOMY      left ovary remains    KNEE ARTHROSCOPY Left     LIPOMA RESECTION      removed from back and left buttock    MA XCAPSL CTRC RMVL INSJ IO LENS PROSTH W/O ECP Right 1/24/2019    Procedure: EXTRACTION EXTRACAPSULAR CATARACT PHACO INTRAOCULAR LENS (IOL); Surgeon: Fabiola Garrison MD;  Location: [facility] MAIN OR;  Service: Ophthalmology    MA XCAPSL CTRC RMVL INSJ IO LENS PROSTH W/O ECP Left 2/14/2019    Procedure: EXTRACTION EXTRACAPSULAR CATARACT PHACO INTRAOCULAR LENS (IOL);   Surgeon: Fabiola Garrison MD;  Location: [facility] MAIN OR;  Service: Ophthalmology    TONSILLECTOMY      and adenoids    TUBAL LIGATION      US GUIDED THYROID BIOPSY  7/26/2021     Social History     Substance and Sexual Activity   Alcohol Use Not Currently    Comment: Rarely     Social History     Substance and Sexual Activity   Drug Use Never     Social History     Tobacco Use   Smoking Status Every Day    Packs/day: 0.25    Years: 35.00    Total pack years: 8.75    Types: Cigarettes Start date: 34    Last attempt to quit: 2021    Years since quittin.3   Smokeless Tobacco Never     Family History:   Family History   Problem Relation Age of Onset    Leukemia Father     Early death Father 39        leukemia    Stomach cancer Maternal Grandfather     Diabetes Paternal Grandfather     Diabetes Sister         insulin dependent    Diabetes Brother         insulin    Diabetes Sister         insulin    Diabetes Sister         insulin       Meds/Allergies     Allergies   Allergen Reactions    Aleve [Naproxen] Other (See Comments)     "weird" sensation entire body    Sulfa Antibiotics Rash       Current Outpatient Medications:     atorvastatin (LIPITOR) 80 mg tablet, TAKE 1 TABLET BY MOUTH IN  THE EVENING, Disp: 90 tablet, Rfl: 3    clopidogrel (PLAVIX) 75 mg tablet, TAKE 1 TABLET BY MOUTH  DAILY, Disp: 90 tablet, Rfl: 3    cyanocobalamin (VITAMIN B-12) 1000 MCG tablet, Take 1 tablet (1,000 mcg total) by mouth daily Do not start before March 3, 2023., Disp: 30 tablet, Rfl: 0    ferrous sulfate 324 (65 Fe) mg, Take 1 tablet (324 mg total) by mouth daily before breakfast, Disp: 180 tablet, Rfl: 0    folic acid (FOLVITE) 1 mg tablet, TAKE 1 TABLET BY MOUTH EVERY DAY, Disp: 90 tablet, Rfl: 1    glimepiride (AMARYL) 2 mg tablet, TAKE 1 TABLET BY MOUTH DAILY  WITH BREAKFAST, Disp: 90 tablet, Rfl: 1    magnesium Oxide (MAG-OX) 400 mg TABS, Take 1 tablet (400 mg total) by mouth 2 (two) times a day, Disp: 180 tablet, Rfl: 0    metFORMIN (GLUCOPHAGE) 1000 MG tablet, TAKE 1 TABLET BY MOUTH  TWICE DAILY, Disp: 180 tablet, Rfl: 3    metoprolol succinate (TOPROL-XL) 25 mg 24 hr tablet, Take 1 tablet (25 mg total) by mouth daily, Disp: 90 tablet, Rfl: 2    pantoprazole (PROTONIX) 40 mg tablet, TAKE 1 TABLET BY MOUTH DAILY, Disp: 90 tablet, Rfl: 1    quinapril (ACCUPRIL) 40 MG tablet, TAKE 1 TABLET BY MOUTH  DAILY AT BEDTIME, Disp: 90 tablet, Rfl: 3    Vitals: Blood pressure 150/60, pulse 85, height 5' (1.524 m), weight 51.3 kg (113 lb), SpO2 98 %, not currently breastfeeding. Body mass index is 22.07 kg/m². Vitals:    10/13/23 1417   Weight: 51.3 kg (113 lb)     BP Readings from Last 3 Encounters:   10/13/23 150/60   10/11/23 135/70   23 135/65       Physical Exam:  Physical Exam    Neurologic:  Alert & oriented x 3, no new focal deficits, Not in any acute distress,  Constitutional:  Well developed, well nourished, non-toxic appearance   Eyes:  Pupil equal and reacting to light, conjunctiva normal,   HENT:  Atraumatic, oropharynx moist, Neck- normal range of motion, no tenderness,  Neck supple, No JVP, No LNP   Respiratory:  Bilateral air entry, mostly clear to auscultation  Cardiovascular: S1-S2 regular with a I/VI ejection systolic murmur   GI:  Soft, nondistended, normal bowel sounds, nontender, no hepatosplenomegaly appreciated. Musculoskeletal: no deformities.    Skin:  Well hydrated, no rash   Lymphatic:  No lymphadenopathy noted   Extremities: Trace lower extremity edema        Diagnostic Studies Review Cardio:      EKG: Normal sinus rhythm with sinus arrhythmia, heart rate 85/min, LAFB, LVH with repolarization abnormality    Cardiac testing:   Results for orders placed during the hospital encounter of 21    Echo complete with contrast if indicated    Narrative  [address] [place] [address]  [phone]    Transthoracic Echocardiogram  2D, M-mode, Doppler, and Color Doppler    Study date:  2021    Patient: Jayne Lerma  MR number: [id number]  Account number: [de-identified]  : 1945  Age: 66 years  Gender: Female  Status: Inpatient  Location: Bedside  Height: 60 in  Weight: 122.8 lb  BP: 132/ 61 mmHg    Indications: CVA    Diagnoses: I63.30 - Cerebral infarction due to thrombosis of unspecified cerebral artery    Sonographer:  SAMM Martínez  Primary Physician:  Carmina Romo MD  Referring Physician:  Cheryl Lepe CRNP  Group:  [facility]  Interpreting Physician:  Aishwarya Echeverria DO    SUMMARY    LEFT VENTRICLE:  Systolic function was normal by visual assessment. Ejection fraction was estimated in the range of 55 % to 60 %. There were no regional wall motion abnormalities. There was mild concentric hypertrophy. Doppler parameters were consistent with abnormal left ventricular relaxation (grade 1 diastolic dysfunction). MITRAL VALVE:  There was mild regurgitation. AORTIC VALVE:  There was no evidence for stenosis. There was no regurgitation. TRICUSPID VALVE:  There was trace regurgitation. The tricuspid jet envelope definition was inadequate for estimation of RV systolic pressure. COMPARISONS:  There has been no significant interval change. Bubble study was negative on prior study. Comparison was made with the previous study of 14-Jan-2020. HISTORY: PRIOR HISTORY: CVA,Hyperlipidemia,HTN,Diabetes,Tobacco abuse,Gastroesophageal reflux disease. PROCEDURE: The procedure was performed at the bedside. This was a routine study. The transthoracic approach was used. The study included complete 2D imaging, M-mode, complete spectral Doppler, and color Doppler. The heart rate was 58 bpm,  at the start of the study. This was a technically difficult study. LEFT VENTRICLE: Size was normal. Systolic function was normal by visual assessment. Ejection fraction was estimated in the range of 55 % to 60 %. There were no regional wall motion abnormalities. There was mild concentric hypertrophy. DOPPLER: Doppler parameters were consistent with abnormal left ventricular relaxation (grade 1 diastolic dysfunction). RIGHT VENTRICLE: The size was normal. Systolic function was normal. DOPPLER: Systolic pressure was within the normal range. LEFT ATRIUM: The atrium was mildly dilated. RIGHT ATRIUM: Size was normal.    MITRAL VALVE: Valve structure was normal. There was normal leaflet separation.  No echocardiographic evidence for prolapse. DOPPLER: The transmitral velocity was within the normal range. There was no evidence for stenosis. There was mild  regurgitation. AORTIC VALVE: The valve was trileaflet. Leaflets exhibited normal thickness and normal cuspal separation. DOPPLER: Transaortic velocity was within the normal range. There was no evidence for stenosis. There was no regurgitation. TRICUSPID VALVE: The valve structure was normal. There was normal leaflet separation. DOPPLER: The transtricuspid velocity was within the normal range. There was trace regurgitation. The tricuspid jet envelope definition was inadequate for  estimation of RV systolic pressure. PULMONIC VALVE: Leaflets exhibited normal thickness, no calcification, and normal cuspal separation. DOPPLER: The transpulmonic velocity was within the normal range. There was no regurgitation. PERICARDIUM: There was no thickening. There was no pericardial effusion. AORTA: The root exhibited normal size.     PULMONARY ARTERY: The size was normal. The morphology appeared normal.    SYSTEM MEASUREMENT TABLES    2D  EF (Teich): 59.91 %  %FS: 31.41 %  Ao Diam: 3.1 cm  EDV(Teich): 67.18 ml  ESV(Teich): 26.93 ml  IVSd: 1.12 cm  LA Area: 18 cm2  LA Diam: 3.39 cm  LVEDV MOD A4C: 96.62 ml  LVEF MOD A4C: 44.51 %  LVESV MOD A4C: 53.62 ml  LVIDd: 3.93 cm  LVIDs: 2.7 cm  LVLd A4C: 7.52 cm  LVLs A4C: 5.74 cm  LVOT Diam: 1.96 cm  LVPWd: 1.07 cm  RA Area: 11.58 cm2  RVIDd: 2.81 cm  SV (Teich): 40.25 ml  SV MOD A4C: 43 ml    CW  AV Vmax: 1.62 m/s  AV maxPG: 10.53 mmHg    MM  TAPSE: 2.02 cm    PW  MV E/A Ratio: 0.62  E' Sept: 0.06 m/s  E/E' Sept: 11.84  LVOT Vmax: 1.19 m/s  LVOT maxP.66 mmHg  MV A Boni: 1.15 m/s  MV Dec [place]: 2.21 m/s2  MV DecT: 324.92 ms  MV E Boni: 0.72 m/s  MV PHT: 94.23 ms  MVA By PHT: 2.33 cm2    [employer] Accredited Echocardiography Laboratory    Prepared and electronically signed by    DO Bairon Cruz 2021 11:33:19      Results for orders placed during the hospital encounter of 10/06/21    NM myocardial perfusion spect (rx stress and/or rest)    Narrative  [address], [address] [facility]  [phone]    Rest/Stress Gated SPECT Myocardial Perfusion Imaging After Regadenoson    Patient: Laurie Garber  MR number: [id number]  Account number: [de-identified]  : 1945  Age: 64 years  Gender: Female  Status: Outpatient  Location: [address] [place] [address]  Height: 60 in  Weight: 123 lb  BP: 130/ 58 mmHg    Allergies: NAPROXEN, SULFA ANTIBIOTICS    Diagnosis: E78.5 - Hyperlipidemia, unspecified    Primary Physician:  Babita Estrada  RN:  Arabella Quezada RN  Referring Physician:  Imtiaz Young MD  Technician:  Reina Nolasco  Group:  [facility]  Report Prepared By[de-identified]  Arabella Quezada RN  Interpreting Physician:  Mian Alejandre MD    INDICATIONS: Coronary artery disease. HISTORY: The patient is a 64year old  female. Chest pain status: no chest pain. Coronary artery disease risk factors: dyslipidemia, hypertension, smoking, diabetes mellitus, and post-menopausal state. Cardiovascular history:  peripheral vascular occlusive disease and stroke. Medications: clopidogrel, a lipid lowering agent, and diabetic medications. PHYSICAL EXAM: Baseline physical exam screening: no wheezes audible. REST ECG: Normal sinus rhythm. PROCEDURE: The study was performed in the [address]. A regadenoson infusion pharmacologic stress test was performed. Gated SPECT myocardial perfusion imaging was performed after stress. Systolic blood pressure  was 130 mmHg, at the start of the study. Diastolic blood pressure was 58 mmHg, at the start of the study. The heart rate was 69 bpm, at the start of the study. IV double checked.   Regadenoson protocol:  HR bpm SBP mmHg DBP mmHg Symptoms  Baseline 69 130 58 none  Immediate 125 144 44 mild dyspnea, dizziness  1 min 90 150 62 nausea  2 min -- -- -- subsiding  3 min -- -- -- none  IV aminophylline ( 25 mg) was administered at recovery. The patient also performed low level exercise. STRESS SUMMARY: Duration of pharmacologic stress was 3 min. Functional capacity could not be adequately assessed. Maximal heart rate during stress was 125 bpm. The rate-pressure product for the peak heart rate and blood pressure was 23961. There was no chest pain during stress. The stress test was terminated due to protocol completion. Pre oxygen saturation: 100 %. Peak oxygen saturation: 100 %. The stress ECG was negative for ischemia and normal. Arrhythmia during stress:  isolated premature ventricular beats. ISOTOPE ADMINISTRATION:  Resting isotope administration Stress isotope administration  Agent Tetrofosmin Tetrofosmin  Dose 10 mCi 31.5 mCi  Date 10/06/2021 10/06/2021  Injection time 12:48 13:52  Imaging time 13:18 14:42  Injection-image interval 30 min 50 min    The radiopharmaceutical was injected at the peak effect of pharmacologic stress. MYOCARDIAL PERFUSION IMAGING:  The image quality was good. Rotating projection images reveal moderate diaphragmatic attenuation. Left ventricular size was normal. The TID ratio was 1.16. PERFUSION DEFECTS:  -  There was a moderate-sized, moderately severe, paradoxical (reverse redistribution) myocardial perfusion defect of the entire inferior wall likely due to diaphragm attenuation. GATED SPECT:  The calculated left ventricular ejection fraction was 84 %. Left ventricular ejection fraction was within normal limits by visual estimate. Ejection fraction was overestimated due to small heart size. There was no left ventricular regional  abnormality. SUMMARY:  -  Stress results: There was no chest pain during stress. -  ECG conclusions: The stress ECG was negative for ischemia and normal.  -  Perfusion imaging:  There was a moderate-sized, moderately severe, paradoxical (reverse redistribution) myocardial perfusion defect of the entire inferior wall likely due to diaphragm attenuation.  -  Gated SPECT: The calculated left ventricular ejection fraction was 84 %. Left ventricular ejection fraction was within normal limits by visual estimate. There was no left ventricular regional abnormality. IMPRESSIONS: Normal study after pharmacologic vasodilation. There was image artifact, without diagnostic evidence for perfusion abnormality. Left ventricular systolic function was normal.    Prepared and signed by    Rosi Lozada MD  Signed 10/06/2021 15:47:28        Imaging:  Chest X-Ray:   No Chest XR results available for this patient. CT-scan of the chest:     No CTA results available for this patient.   Lab Review   Lab Results   Component Value Date    WBC 10.50 (H) 07/24/2023    HGB 12.9 07/24/2023    HCT 39.6 07/24/2023    MCV 94 07/24/2023    RDW 13.5 07/24/2023     07/24/2023     BMP:  Lab Results   Component Value Date    SODIUM 141 07/28/2023    K 4.7 07/28/2023     07/28/2023    CO2 32 07/28/2023    BUN 30 (H) 07/28/2023    CREATININE 1.19 07/28/2023    GLUC 140 03/02/2023    GLUF 158 (H) 07/28/2023    CALCIUM 11.9 (H) 07/28/2023    CORRECTEDCA 10.5 (H) 06/06/2021    EGFR 43 07/28/2023    MG 1.5 (L) 03/02/2023     LFT:  Lab Results   Component Value Date    AST 10 (L) 02/27/2023    ALT 8 02/27/2023    ALKPHOS 98 02/27/2023    TP 6.1 (L) 02/27/2023    ALB 4.1 02/27/2023      No components found for: "TSH3"  Lab Results   Component Value Date    GZR5KVYNXXTD 1.481 02/27/2023     Lab Results   Component Value Date    HGBA1C 6.9 (H) 07/24/2023     Lipid Profile:   Lab Results   Component Value Date    CHOLESTEROL 126 07/28/2023    HDL 44 (L) 07/28/2023    LDLCALC 46 07/28/2023    TRIG 178 (H) 07/28/2023     Lab Results   Component Value Date    CHOLESTEROL 126 07/28/2023    CHOLESTEROL 152 06/07/2021     Lab Results   Component Value Date TROPONINI 0.40 (H) 06/07/2021     No results found for: "NTBNP"   No results found for this or any previous visit (from the past 672 hour(s)). Dr. Scarlet Michael MD, [facility]      "This note has been constructed using a voice recognition system. Therefore there may be syntax, spelling, and/or grammatical errors.  Please call if you have any questions. "

## 2023-10-16 ENCOUNTER — ANESTHESIA EVENT (OUTPATIENT)
Dept: PERIOP | Facility: HOSPITAL | Age: 78
End: 2023-10-16
Payer: COMMERCIAL

## 2023-10-16 DIAGNOSIS — Z91.89 AT RISK FOR RENAL FAILURE: Primary | ICD-10-CM

## 2023-10-16 NOTE — PRE-PROCEDURE INSTRUCTIONS
Pre-Surgery Instructions:   Medication Instructions    atorvastatin (LIPITOR) 80 mg tablet Take night before surgery    clopidogrel (PLAVIX) 75 mg tablet Last dose 10/15/23    cyanocobalamin (VITAMIN B-12) 1000 MCG tablet Hold day of surgery. ferrous sulfate 324 (65 Fe) mg Hold day of surgery. folic acid (FOLVITE) 1 mg tablet Hold day of surgery. glimepiride (AMARYL) 2 mg tablet Hold day of surgery. magnesium Oxide (MAG-OX) 400 mg TABS Hold day of surgery. metFORMIN (GLUCOPHAGE) 1000 MG tablet Hold day of surgery. metoprolol succinate (TOPROL-XL) 25 mg 24 hr tablet Take night before surgery    pantoprazole (PROTONIX) 40 mg tablet Take day of surgery. quinapril (ACCUPRIL) 40 MG tablet Hold day of surgery. Medication instructions for day surgery reviewed. Please use only a sip of water to take your instructed medications. Avoid all over the counter vitamins, supplements and NSAIDS for one week prior to surgery per anesthesia guidelines. Tylenol is ok to take as needed. Pt to follow Dr. Laura Benoit instructions. Pt to arrive to the [facility] at the given time and proceed to the SDS unit 2nd floor. You will receive a call one business day prior to surgery with an arrival time and hospital directions. If your surgery is scheduled on a Monday, the hospital will be calling you on the Friday prior to your surgery. If you have not heard from anyone by 8pm, please call the hospital supervisor through the hospital  at [phone]. Shadia Spencer [phone]). Do not eat or drink anything after midnight the night before your surgery, including candy, mints, lifesavers, or chewing gum. Do not drink alcohol 24hrs before your surgery. Try not to smoke at least 24hrs before your surgery. Follow the pre surgery showering instructions as listed in the [facility] Surgical Experience Booklet” or otherwise provided by your surgeon's office. Do not shave the surgical area 24 hours before surgery.  Do not apply any lotions, creams, including makeup, cologne, deodorant, or perfumes after showering on the day of your surgery. No contact lenses, eye make-up, or artificial eyelashes. Remove nail polish, including gel polish, and any artificial, gel, or acrylic nails if possible. Remove all jewelry including rings and body piercing jewelry. Wear causal clothing that is easy to take on and off. Consider your type of surgery. Keep any valuables, jewelry, piercings at home. Please bring any specially ordered equipment (sling, braces) if indicated. Arrange for a responsible person to drive you to and from the hospital on the day of your surgery. Visitor Guidelines discussed. Call the surgeon's office with any new illnesses, exposures, or additional questions prior to surgery. Please reference your [facility] Surgical Experience Booklet” for additional information to prepare for your upcoming surgery.

## 2023-10-16 NOTE — ANESTHESIA PREPROCEDURE EVALUATION
Procedure:  CHOLECYSTECTOMY LAPAROSCOPIC (Abdomen)    Relevant Problems   ANESTHESIA (within normal limits)      CARDIO   (+) Essential hypertension   (+) Hyperlipidemia      ENDO   (+) Type 2 diabetes mellitus with diabetic polyneuropathy (HCC)   (+) Type 2 diabetes mellitus with stage 3a chronic kidney disease and hypertension (HCC)      GI/HEPATIC   (+) GERD (gastroesophageal reflux disease)      MUSCULOSKELETAL   (+) Arthritis      NEURO/PSYCH   (+) CVA (cerebral vascular accident) ([address])      PULMONARY   (+) Smoking      Digestive   (+) Gallbladder hydrops   (+) Gallstones and inflammation of gallbladder with obstruction        Physical Exam    Airway    Mallampati score: I  TM Distance: >3 FB  Neck ROM: full     Dental        Cardiovascular  Rhythm: regular, Rate: normal    Pulmonary   Breath sounds clear to auscultation, Decreased breath sounds    Other Findings      Anesthesia Plan  ASA Score- 3     Anesthesia Type- general with ASA Monitors. Additional Monitors:     Airway Plan: ETT. Plan Factors-Exercise tolerance (METS): >4 METS. Chart reviewed. Existing labs reviewed. Patient summary reviewed. Patient is not a current smoker. Induction- intravenous. Postoperative Plan-     Informed Consent- Anesthetic plan and risks discussed with patient. I personally reviewed this patient with the CRNA. Discussed and agreed on the Anesthesia Plan with the CRNA. Derek Zepeda

## 2023-10-17 ENCOUNTER — ANESTHESIA (OUTPATIENT)
Dept: PERIOP | Facility: HOSPITAL | Age: 78
End: 2023-10-17
Payer: COMMERCIAL

## 2023-10-25 DIAGNOSIS — D62 ACUTE BLOOD LOSS ANEMIA: Primary | ICD-10-CM

## 2023-10-25 DIAGNOSIS — D75.839 THROMBOCYTOSIS: ICD-10-CM

## 2023-10-25 DIAGNOSIS — D50.9 IRON DEFICIENCY ANEMIA, UNSPECIFIED IRON DEFICIENCY ANEMIA TYPE: ICD-10-CM

## 2023-10-25 RX ORDER — SODIUM CHLORIDE 9 MG/ML
20 INJECTION, SOLUTION INTRAVENOUS ONCE
OUTPATIENT
Start: 2023-10-27

## 2023-10-27 ENCOUNTER — TELEPHONE (OUTPATIENT)
Dept: INFUSION CENTER | Facility: HOSPITAL | Age: 78
End: 2023-10-27

## 2023-10-30 ENCOUNTER — APPOINTMENT (OUTPATIENT)
Dept: LAB | Facility: HOSPITAL | Age: 78
End: 2023-10-30
Payer: COMMERCIAL

## 2023-10-30 DIAGNOSIS — E11.42 TYPE 2 DIABETES MELLITUS WITH DIABETIC POLYNEUROPATHY (HCC): Chronic | ICD-10-CM

## 2023-10-30 DIAGNOSIS — K82.1 GALLBLADDER HYDROPS: ICD-10-CM

## 2023-10-30 DIAGNOSIS — K80.11 CALCULUS OF GALLBLADDER WITH CHRONIC CHOLECYSTITIS WITH OBSTRUCTION: ICD-10-CM

## 2023-10-30 DIAGNOSIS — I10 ESSENTIAL HYPERTENSION: Chronic | ICD-10-CM

## 2023-10-30 LAB
ALBUMIN SERPL BCP-MCNC: 4.3 G/DL (ref 3.5–5)
ALP SERPL-CCNC: 93 U/L (ref 34–104)
ALT SERPL W P-5'-P-CCNC: 12 U/L (ref 7–52)
ANION GAP SERPL CALCULATED.3IONS-SCNC: 7 MMOL/L
AST SERPL W P-5'-P-CCNC: 12 U/L (ref 13–39)
BILIRUB SERPL-MCNC: 0.72 MG/DL (ref 0.2–1)
BUN SERPL-MCNC: 22 MG/DL (ref 5–25)
CALCIUM SERPL-MCNC: 11.2 MG/DL (ref 8.4–10.2)
CHLORIDE SERPL-SCNC: 100 MMOL/L (ref 96–108)
CO2 SERPL-SCNC: 31 MMOL/L (ref 21–32)
CREAT SERPL-MCNC: 1.07 MG/DL (ref 0.6–1.3)
ERYTHROCYTE [DISTWIDTH] IN BLOOD BY AUTOMATED COUNT: 13.7 % (ref 11.6–15.1)
GFR SERPL CREATININE-BSD FRML MDRD: 49 ML/MIN/1.73SQ M
GLUCOSE SERPL-MCNC: 274 MG/DL (ref 65–140)
HCT VFR BLD AUTO: 37.3 % (ref 34.8–46.1)
HGB BLD-MCNC: 11.8 G/DL (ref 11.5–15.4)
MCH RBC QN AUTO: 30.6 PG (ref 26.8–34.3)
MCHC RBC AUTO-ENTMCNC: 31.6 G/DL (ref 31.4–37.4)
MCV RBC AUTO: 97 FL (ref 82–98)
PLATELET # BLD AUTO: 401 THOUSANDS/UL (ref 149–390)
PMV BLD AUTO: 9.4 FL (ref 8.9–12.7)
POTASSIUM SERPL-SCNC: 4.6 MMOL/L (ref 3.5–5.3)
PROT SERPL-MCNC: 6.6 G/DL (ref 6.4–8.4)
RBC # BLD AUTO: 3.86 MILLION/UL (ref 3.81–5.12)
SODIUM SERPL-SCNC: 138 MMOL/L (ref 135–147)
WBC # BLD AUTO: 7.23 THOUSAND/UL (ref 4.31–10.16)

## 2023-10-30 PROCEDURE — 80053 COMPREHEN METABOLIC PANEL: CPT

## 2023-10-30 PROCEDURE — 36415 COLL VENOUS BLD VENIPUNCTURE: CPT

## 2023-10-30 PROCEDURE — 85027 COMPLETE CBC AUTOMATED: CPT

## 2023-10-30 RX ORDER — SENNOSIDES 8.6 MG
650 CAPSULE ORAL EVERY 8 HOURS PRN
COMMUNITY

## 2023-10-30 NOTE — PRE-PROCEDURE INSTRUCTIONS
Pre-Surgery Instructions:   Medication Instructions    acetaminophen (TYLENOL) 650 mg CR tablet Hold day of surgery. atorvastatin (LIPITOR) 80 mg tablet Take night before surgery    clopidogrel (PLAVIX) 75 mg tablet Last dose 10/29-office to notify Dr. Marian Charles    cyanocobalamin (VITAMIN B-12) 1000 MCG tablet Stop taking 1 day prior to surgery. ferrous sulfate 324 (65 Fe) mg Stop taking 1 day prior to surgery. folic acid (FOLVITE) 1 mg tablet Stop taking 1 day prior to surgery. glimepiride (AMARYL) 2 mg tablet Hold day of surgery. magnesium Oxide (MAG-OX) 400 mg TABS Stop taking 1 day prior to surgery. metFORMIN (GLUCOPHAGE) 1000 MG tablet Hold day of surgery. metoprolol succinate (TOPROL-XL) 25 mg 24 hr tablet Take night before surgery    pantoprazole (PROTONIX) 40 mg tablet Take day of surgery. quinapril (ACCUPRIL) 40 MG tablet Take night before surgery    Medication instructions for day surgery reviewed. Please use only a sip of water to take your instructed medications. Avoid all over the counter vitamins, supplements and NSAIDS for one week prior to surgery per anesthesia guidelines. Tylenol is ok to take as needed. You will receive a call one business day prior to surgery with an arrival time and hospital directions. If your surgery is scheduled on a Monday, the hospital will be calling you on the Friday prior to your surgery. If you have not heard from anyone by 8pm, please call the hospital supervisor through the hospital  at [phone]. Hansel Flores [phone]). Do not eat or drink anything after midnight the night before your surgery, including candy, mints, lifesavers, or chewing gum. Do not drink alcohol 24hrs before your surgery. Try not to smoke at least 24hrs before your surgery. Follow the pre surgery showering instructions as listed in the [facility] Surgical Experience Booklet” or otherwise provided by your surgeon's office.  Do not use a blade to shave the surgical area 1 week before surgery. It is okay to use a clean electric clippers up to 24 hours before surgery. Do not apply any lotions, creams, including makeup, cologne, deodorant, or perfumes after showering on the day of your surgery. Do not use dry shampoo, hair spray, hair gel, or any type of hair products. No contact lenses, eye make-up, or artificial eyelashes. Remove nail polish, including gel polish, and any artificial, gel, or acrylic nails if possible. Remove all jewelry including rings and body piercing jewelry. Wear causal clothing that is easy to take on and off. Consider your type of surgery. Keep any valuables, jewelry, piercings at home. Please bring any specially ordered equipment (sling, braces) if indicated. Arrange for a responsible person to drive you to and from the hospital on the day of your surgery. Visitor Guidelines discussed. Call the surgeon's office with any new illnesses, exposures, or additional questions prior to surgery. Please reference your [facility] Surgical Experience Booklet” for additional information to prepare for your upcoming surgery.

## 2023-10-31 ENCOUNTER — HOSPITAL ENCOUNTER (OUTPATIENT)
Facility: HOSPITAL | Age: 78
Setting detail: OUTPATIENT SURGERY
Discharge: HOME | End: 2023-11-01
Attending: SPECIALIST | Admitting: SPECIALIST
Payer: COMMERCIAL

## 2023-10-31 DIAGNOSIS — E11.42 TYPE 2 DIABETES MELLITUS WITH DIABETIC POLYNEUROPATHY (HCC): ICD-10-CM

## 2023-10-31 DIAGNOSIS — K82.1 GALLBLADDER HYDROPS: ICD-10-CM

## 2023-10-31 DIAGNOSIS — K21.9 GASTROESOPHAGEAL REFLUX DISEASE WITHOUT ESOPHAGITIS: ICD-10-CM

## 2023-10-31 DIAGNOSIS — K80.11 CALCULUS OF GALLBLADDER WITH CHRONIC CHOLECYSTITIS WITH OBSTRUCTION: ICD-10-CM

## 2023-10-31 DIAGNOSIS — I10 ESSENTIAL HYPERTENSION: ICD-10-CM

## 2023-10-31 LAB — GLUCOSE SERPL-MCNC: 140 MG/DL (ref 65–140)

## 2023-10-31 PROCEDURE — 82948 REAGENT STRIP/BLOOD GLUCOSE: CPT

## 2023-10-31 PROCEDURE — 47562 LAPAROSCOPIC CHOLECYSTECTOMY: CPT | Performed by: SPECIALIST

## 2023-10-31 PROCEDURE — 88304 TISSUE EXAM BY PATHOLOGIST: CPT | Performed by: PATHOLOGY

## 2023-10-31 RX ORDER — FENTANYL CITRATE 50 UG/ML
INJECTION, SOLUTION INTRAMUSCULAR; INTRAVENOUS AS NEEDED
Status: DISCONTINUED | OUTPATIENT
Start: 2023-10-31 | End: 2023-10-31

## 2023-10-31 RX ORDER — HYDROMORPHONE HCL IN WATER/PF 6 MG/30 ML
0.2 PATIENT CONTROLLED ANALGESIA SYRINGE INTRAVENOUS EVERY 4 HOURS PRN
Status: DISCONTINUED | OUTPATIENT
Start: 2023-10-31 | End: 2023-11-01 | Stop reason: HOSPADM

## 2023-10-31 RX ORDER — SODIUM CHLORIDE, SODIUM LACTATE, POTASSIUM CHLORIDE, CALCIUM CHLORIDE 600; 310; 30; 20 MG/100ML; MG/100ML; MG/100ML; MG/100ML
125 INJECTION, SOLUTION INTRAVENOUS CONTINUOUS
Status: DISCONTINUED | OUTPATIENT
Start: 2023-10-31 | End: 2023-11-01

## 2023-10-31 RX ORDER — HEPARIN SODIUM 5000 [USP'U]/ML
5000 INJECTION, SOLUTION INTRAVENOUS; SUBCUTANEOUS EVERY 8 HOURS SCHEDULED
Status: DISCONTINUED | OUTPATIENT
Start: 2023-10-31 | End: 2023-11-01 | Stop reason: HOSPADM

## 2023-10-31 RX ORDER — PANTOPRAZOLE SODIUM 40 MG/1
TABLET, DELAYED RELEASE ORAL
Qty: 90 TABLET | Refills: 1 | Status: SHIPPED | OUTPATIENT
Start: 2023-10-31

## 2023-10-31 RX ORDER — METRONIDAZOLE 500 MG/100ML
500 INJECTION, SOLUTION INTRAVENOUS ONCE
Status: COMPLETED | OUTPATIENT
Start: 2023-10-31 | End: 2023-10-31

## 2023-10-31 RX ORDER — ROCURONIUM BROMIDE 10 MG/ML
INJECTION, SOLUTION INTRAVENOUS AS NEEDED
Status: DISCONTINUED | OUTPATIENT
Start: 2023-10-31 | End: 2023-10-31

## 2023-10-31 RX ORDER — MAGNESIUM HYDROXIDE 1200 MG/15ML
LIQUID ORAL AS NEEDED
Status: DISCONTINUED | OUTPATIENT
Start: 2023-10-31 | End: 2023-10-31 | Stop reason: HOSPADM

## 2023-10-31 RX ORDER — BUPIVACAINE HYDROCHLORIDE AND EPINEPHRINE 5; 5 MG/ML; UG/ML
INJECTION, SOLUTION EPIDURAL; INTRACAUDAL; PERINEURAL AS NEEDED
Status: DISCONTINUED | OUTPATIENT
Start: 2023-10-31 | End: 2023-10-31 | Stop reason: HOSPADM

## 2023-10-31 RX ORDER — PROPOFOL 10 MG/ML
INJECTION, EMULSION INTRAVENOUS AS NEEDED
Status: DISCONTINUED | OUTPATIENT
Start: 2023-10-31 | End: 2023-10-31

## 2023-10-31 RX ORDER — SODIUM CHLORIDE, SODIUM GLUCONATE, SODIUM ACETATE, POTASSIUM CHLORIDE, MAGNESIUM CHLORIDE, SODIUM PHOSPHATE, DIBASIC, AND POTASSIUM PHOSPHATE .53; .5; .37; .037; .03; .012; .00082 G/100ML; G/100ML; G/100ML; G/100ML; G/100ML; G/100ML; G/100ML
50 INJECTION, SOLUTION INTRAVENOUS CONTINUOUS
Status: DISCONTINUED | OUTPATIENT
Start: 2023-10-31 | End: 2023-11-01

## 2023-10-31 RX ORDER — ONDANSETRON 2 MG/ML
INJECTION INTRAMUSCULAR; INTRAVENOUS AS NEEDED
Status: DISCONTINUED | OUTPATIENT
Start: 2023-10-31 | End: 2023-10-31

## 2023-10-31 RX ORDER — HEPARIN SODIUM 5000 [USP'U]/ML
5000 INJECTION, SOLUTION INTRAVENOUS; SUBCUTANEOUS ONCE
Status: COMPLETED | OUTPATIENT
Start: 2023-10-31 | End: 2023-10-31

## 2023-10-31 RX ORDER — LIDOCAINE HYDROCHLORIDE 20 MG/ML
INJECTION, SOLUTION EPIDURAL; INFILTRATION; INTRACAUDAL; PERINEURAL AS NEEDED
Status: DISCONTINUED | OUTPATIENT
Start: 2023-10-31 | End: 2023-10-31

## 2023-10-31 RX ORDER — ACETAMINOPHEN 325 MG/1
650 TABLET ORAL EVERY 6 HOURS PRN
Status: DISCONTINUED | OUTPATIENT
Start: 2023-10-31 | End: 2023-11-01 | Stop reason: HOSPADM

## 2023-10-31 RX ORDER — FENTANYL CITRATE/PF 50 MCG/ML
50 SYRINGE (ML) INJECTION
Status: DISCONTINUED | OUTPATIENT
Start: 2023-10-31 | End: 2023-10-31 | Stop reason: HOSPADM

## 2023-10-31 RX ORDER — NICOTINE 21 MG/24HR
1 PATCH, TRANSDERMAL 24 HOURS TRANSDERMAL DAILY
Status: DISCONTINUED | OUTPATIENT
Start: 2023-11-01 | End: 2023-11-01 | Stop reason: HOSPADM

## 2023-10-31 RX ORDER — CEFAZOLIN SODIUM 2 G/50ML
2000 SOLUTION INTRAVENOUS ONCE
Status: COMPLETED | OUTPATIENT
Start: 2023-10-31 | End: 2023-10-31

## 2023-10-31 RX ORDER — DEXAMETHASONE SODIUM PHOSPHATE 10 MG/ML
INJECTION, SOLUTION INTRAMUSCULAR; INTRAVENOUS AS NEEDED
Status: DISCONTINUED | OUTPATIENT
Start: 2023-10-31 | End: 2023-10-31

## 2023-10-31 RX ORDER — OXYCODONE HYDROCHLORIDE AND ACETAMINOPHEN 5; 325 MG/1; MG/1
1 TABLET ORAL ONCE
Status: COMPLETED | OUTPATIENT
Start: 2023-10-31 | End: 2023-10-31

## 2023-10-31 RX ORDER — SODIUM CHLORIDE, SODIUM LACTATE, POTASSIUM CHLORIDE, CALCIUM CHLORIDE 600; 310; 30; 20 MG/100ML; MG/100ML; MG/100ML; MG/100ML
100 INJECTION, SOLUTION INTRAVENOUS CONTINUOUS
Status: DISCONTINUED | OUTPATIENT
Start: 2023-10-31 | End: 2023-11-01

## 2023-10-31 RX ORDER — ONDANSETRON 2 MG/ML
4 INJECTION INTRAMUSCULAR; INTRAVENOUS ONCE AS NEEDED
Status: DISCONTINUED | OUTPATIENT
Start: 2023-10-31 | End: 2023-10-31 | Stop reason: HOSPADM

## 2023-10-31 RX ORDER — CHLORHEXIDINE GLUCONATE 4 G/100ML
SOLUTION TOPICAL DAILY PRN
Status: DISCONTINUED | OUTPATIENT
Start: 2023-10-31 | End: 2023-10-31 | Stop reason: HOSPADM

## 2023-10-31 RX ORDER — ONDANSETRON 2 MG/ML
4 INJECTION INTRAMUSCULAR; INTRAVENOUS EVERY 6 HOURS PRN
Status: DISCONTINUED | OUTPATIENT
Start: 2023-10-31 | End: 2023-11-01 | Stop reason: HOSPADM

## 2023-10-31 RX ADMIN — HEPARIN SODIUM 5000 UNITS: 5000 INJECTION INTRAVENOUS; SUBCUTANEOUS at 22:07

## 2023-10-31 RX ADMIN — DEXAMETHASONE SODIUM PHOSPHATE 10 MG: 10 INJECTION, SOLUTION INTRAMUSCULAR; INTRAVENOUS at 12:58

## 2023-10-31 RX ADMIN — PROPOFOL 50 MG: 10 INJECTION, EMULSION INTRAVENOUS at 12:53

## 2023-10-31 RX ADMIN — SUGAMMADEX 200 MG: 100 INJECTION, SOLUTION INTRAVENOUS at 13:50

## 2023-10-31 RX ADMIN — PROPOFOL 90 MG: 10 INJECTION, EMULSION INTRAVENOUS at 12:29

## 2023-10-31 RX ADMIN — HEPARIN SODIUM 5000 UNITS: 5000 INJECTION, SOLUTION INTRAVENOUS; SUBCUTANEOUS at 11:02

## 2023-10-31 RX ADMIN — LIDOCAINE HYDROCHLORIDE 50 MG: 20 INJECTION, SOLUTION EPIDURAL; INFILTRATION; INTRACAUDAL; PERINEURAL at 12:29

## 2023-10-31 RX ADMIN — FENTANYL CITRATE 50 MCG: 50 INJECTION, SOLUTION INTRAMUSCULAR; INTRAVENOUS at 12:29

## 2023-10-31 RX ADMIN — ROCURONIUM BROMIDE 40 MG: 10 INJECTION, SOLUTION INTRAVENOUS at 12:30

## 2023-10-31 RX ADMIN — ONDANSETRON 4 MG: 2 INJECTION INTRAMUSCULAR; INTRAVENOUS at 12:58

## 2023-10-31 RX ADMIN — OXYCODONE HYDROCHLORIDE AND ACETAMINOPHEN 1 TABLET: 5; 325 TABLET ORAL at 15:52

## 2023-10-31 RX ADMIN — SODIUM CHLORIDE, SODIUM LACTATE, POTASSIUM CHLORIDE, AND CALCIUM CHLORIDE 100 ML/HR: .6; .31; .03; .02 INJECTION, SOLUTION INTRAVENOUS at 11:03

## 2023-10-31 RX ADMIN — SODIUM CHLORIDE, SODIUM GLUCONATE, SODIUM ACETATE, POTASSIUM CHLORIDE, MAGNESIUM CHLORIDE, SODIUM PHOSPHATE, DIBASIC, AND POTASSIUM PHOSPHATE 50 ML/HR: .53; .5; .37; .037; .03; .012; .00082 INJECTION, SOLUTION INTRAVENOUS at 20:52

## 2023-10-31 RX ADMIN — CEFAZOLIN SODIUM 2000 MG: 2 SOLUTION INTRAVENOUS at 12:29

## 2023-10-31 RX ADMIN — FENTANYL CITRATE 50 MCG: 50 INJECTION, SOLUTION INTRAMUSCULAR; INTRAVENOUS at 12:35

## 2023-10-31 RX ADMIN — METRONIDAZOLE: 500 INJECTION, SOLUTION INTRAVENOUS at 12:41

## 2023-10-31 NOTE — DISCHARGE INSTR - AVS FIRST PAGE
1.  You are to follow-up with Dr. Jens Puckett in the office in 2 weeks; please call the office upon discharge to schedule your appointment. 2.  You are to take scheduled Tylenol 650 mg every 6 hours for the next 2 days; thereafter, you may take 650 mg every 6 hours only as needed. 3.  If your pain is not well controlled on scheduled Tylenol, you may take Motrin as needed. You however should ensure that you take the Motrin only after meals. You should also continue outpatient pantoprazole regimen. 4.  Please call the office or come to the emergency room if you develop any of the following;  - Persistent and worsening abdominal pain especially if associated with nausea, vomiting and fever. - Increasing redness and/or wound discharge from your skin incisions. 5.  Your skin incisions were closed with absorbable sutures and covered with glue. The glue will fall off spontaneously over time. Please do not peel the glue off.  6.  You may resume your plavix  7. You can take showers starting 11/2 but you are to avoid swims and bath tub immersion.

## 2023-10-31 NOTE — PERIOPERATIVE NURSING NOTE
Patient assisted oob to bathroom to urinate. Unable to void at this time. Patient states she still is very uncomfortable with pain after pain medication. Patient instructed to support incision with pillow when coughing, deep breathing and with movement. Patient receptive.

## 2023-10-31 NOTE — OP NOTE
General SurgeryCHOLECYSTECTOMY LAPAROSCOPIC Op Note    Ashley Sin  10/31/2023    Pre-op Diagnosis:   Type 2 diabetes mellitus with diabetic polyneuropathy (HCC) [E11.42]  Essential hypertension [I10]  Gallbladder hydrops [K82.1]  Calculus of gallbladder with chronic cholecystitis with obstruction [K80.11]    PMH  Past Medical History:   Diagnosis Date    Anemia     iron deficiency anemia    Arthritis     Capsulitis of foot     Last assessed 07/29/13    Chronic kidney disease     Diabetes ([address])     type 2    Full dentures     Gallstone     Ganglion     Last assessed 07/29/13    Hearing decreased     No aids    Hyperlipidemia     Hypertension     Magnesium deficiency     Sciatica     Wears glasses     Weight loss     30lb [id number]       Post-op Diagnosis:   Patient Active Problem List   Diagnosis    Combined forms of age-related cataract of left eye    Type 2 diabetes mellitus with diabetic polyneuropathy ([address])    Hyperlipidemia    Personal history of transient ischemic attack (TIA), and cerebral infarction without residual deficits    Family history of tobacco abuse    Smoking    Arthritis    Essential hypertension    GERD (gastroesophageal reflux disease)    Iron deficiency anemia    Type 2 diabetes mellitus with stage 3a chronic kidney disease and hypertension (HCC)    Essential (hemorrhagic) thrombocythemia (HCC)    CVA (cerebral vascular accident) ([address])    Nodule of upper lobe of right lung    Thyroid nodule greater than or equal to 1.5 cm in diameter incidentally noted on imaging study    Troponin level elevated    Vertebral artery stenosis, bilateral    Thrombocytosis    Acute blood loss anemia    Abnormal CAT scan    Surgical follow-up care    Gallbladder hydrops    Gallstones and inflammation of gallbladder with obstruction       Procedure(s):  CHOLECYSTECTOMY LAPAROSCOPIC    Surgeon(s):  MD Oswaldo Forte MD    Anesthesia:  General    Operative Findings:  Hydrops of the gallbladder large gallstone stuck in the neck of the gallbladder  Colonic and duodenal adhesion to the Garcia's pouch of the gallbladder      Assistant:  Shivani Stovall MD  5th surgical resident [address] [place] [address] surgical residency program [facility]    Staff:   Circulator: Sarai Aponte RN; Juanis Stallworth RN  Scrub Person: Victorino Ortega was identified by me. Proper consent was obtained. The risks, benefits, alternatives,and probabilities of success were discussed in detail with no guarantee made as to outcome. All questions were answered to the patient's satisfaction. Site was marked prior coming to OR    Operative site was cleansed with ChloraPrep and draped in usual sterile fashion. Tylor Newell was given pre-operative antibiotics. Body mass index is 21.87 kg/m². Tylor Newell is ASA 3 and Fire risk- 3. Tylor Newell was re-identified in the OR. Site was identified and detailed timeout was performed with Anesthesia and OR staff. Previous lower midline incision from previous surgery  SubCostal Kocher incision was made. The skin and subcutaneous tissue was cut along the line of incision. With open Jenifer technique a 10mm port was placed.   Pneumoperitoneum was created with the pressure marked up to 15 mmHg and maintained during the procedure with high flow carbon dioxide  Gallbladder was identified which was markedly distended  Three other ports were placed, one in the subxiphoid 10mm subcostal, one 5mm midclavicular subcostal and other one 5mm ant axillary subcostal.  And infraumbilical under direct vision after selecting the spot which was devoid of any adhesions  gallbladder was could not be grasped with a grasper and the Endo aspiration needle was introduced and the white bile was aspirated approximately 50 cc gallbladder was now able to be grasped grasped with a grasper introduced from the lateralmost 5 mm port at the fundus and was retracted upward laterally. Another grasper was introduced from the midclavicular port 5 mm and Garcia's pouch of the gallbladder was grasped and was retracted downward and laterally has a impacted gallstone which was tried to be pushed up but could not as it was markedly impacted. Had some adhesions to the duodenum and colon which were cut patient has a very short cystic duct  The infundibulum of the gallbladder and cystic duct were clearly identified as well as the cystic artery. After obtaining the “critical view”, Cystic duct was doubly clipped and cut between the clips. Cystic artery was doubly clipped and cut between the clips. Gallbladder was then removed from the gallbladder bed with Bovie dissection. Proper hemostasis was achieved. No active bleeding was noted. The gallbladder was then placed in the Endo pouch and was delivered through the subcostal incision. Incision was enlarged with Bovie cautery under direct vision to deliver the gallbladder    All the 10 mm port sites were closed in double layers with figure of 8-0 Vicryl, 2-0 for the sheath and the skin was approximated with subcuticular Monocryl. Remaining all 5 mm ports were stitch with a single layer with subcuticular Monocryl skin approximation All the ports site were thoroughly infiltrated with Marcaine with Epinephrine. Sterile dressing was applied to all port site incisions    Laurie Garber tolerated the procedure well, remain stable during and after the procedure. At the end of the procedure all the instruments, needle and sponge counts were noted to be correct. Sterile dresing was applied and patient was transfered to recovery area in stable condition.      Estimated Blood Loss:  150 mL    Specimens:  Order Name Source Comment Collection Info Order Time   TISSUE EXAM Gallbladder  Collected By: Linnea Claude, MD 10/31/2023  1:32 PM     Release to patient through Mychart   Immediate              Drains:  Urethral Catheter Latex 16 Fr. (Active)       Complications:  None    Total OR Time  * Missing case tracking time(s) * . or    I was present for the entire procedure   Surgical resident was present through the entire procedure.     Patient Disposition:  PACU       Carri Barrios MD [facility]  Date: 10/31/2023  Time: 2:02 PM  Copy to PCP: Razia Chapin MD hydrocele of the

## 2023-10-31 NOTE — INTERVAL H&P NOTE
H&P reviewed. After examining the patient I find no changes in the patients condition since the H&P had been written.     Vitals:    10/31/23 1051   BP: 149/62   Pulse: 69   Resp: 20   Temp: (!) 96.6 °F (35.9 °C)   SpO2: 99%   Examined and seen by Dr. Tatyana Bhakta MD along with surgical team    No new changes noted cardiology consult note    Was again made aware that in view of her large stone which is stuck at the neck of the gallbladder her procedure may be converted to open procedure    Risk and benefits were again discussed with patient risk of stroke in view of previous stroke and bilateral vertebral artery stenosis were discussed with patient in detail    Site was marked  Antibiotics were reviewed

## 2023-10-31 NOTE — PERIOPERATIVE NURSING NOTE
Spoke with Dr. Elvira Bejarano about patient unable to urinate and having pain 7/10. Verbal order received to admit patient for overnight stay, pain control.

## 2023-10-31 NOTE — ANESTHESIA POSTPROCEDURE EVALUATION
Post-Op Assessment Note    CV Status:  Stable  Pain Score: 0    Pain management: adequate     Mental Status:  Alert and awake   Hydration Status:  Stable   PONV Controlled:  Controlled   Airway Patency:  Patent and adequate      Post Op Vitals Reviewed: Yes      Staff: CRNA         No notable events documented.     BP      Temp     Pulse     Resp      SpO2

## 2023-11-01 VITALS
BODY MASS INDEX: 22.2 KG/M2 | OXYGEN SATURATION: 99 % | HEART RATE: 90 BPM | HEIGHT: 60 IN | WEIGHT: 113.1 LBS | SYSTOLIC BLOOD PRESSURE: 131 MMHG | TEMPERATURE: 98.1 F | DIASTOLIC BLOOD PRESSURE: 45 MMHG | RESPIRATION RATE: 20 BRPM

## 2023-11-01 LAB
ALBUMIN SERPL BCP-MCNC: 3.3 G/DL (ref 3.5–5)
ALP SERPL-CCNC: 72 U/L (ref 34–104)
ALT SERPL W P-5'-P-CCNC: 24 U/L (ref 7–52)
ANION GAP SERPL CALCULATED.3IONS-SCNC: 7 MMOL/L
AST SERPL W P-5'-P-CCNC: 26 U/L (ref 13–39)
BASOPHILS # BLD AUTO: 0.01 THOUSANDS/ÂΜL (ref 0–0.1)
BASOPHILS NFR BLD AUTO: 0 % (ref 0–1)
BILIRUB SERPL-MCNC: 0.53 MG/DL (ref 0.2–1)
BUN SERPL-MCNC: 23 MG/DL (ref 5–25)
CALCIUM ALBUM COR SERPL-MCNC: 9.9 MG/DL (ref 8.3–10.1)
CALCIUM SERPL-MCNC: 9.3 MG/DL (ref 8.4–10.2)
CHLORIDE SERPL-SCNC: 104 MMOL/L (ref 96–108)
CO2 SERPL-SCNC: 27 MMOL/L (ref 21–32)
CREAT SERPL-MCNC: 0.96 MG/DL (ref 0.6–1.3)
EOSINOPHIL # BLD AUTO: 0 THOUSAND/ÂΜL (ref 0–0.61)
EOSINOPHIL NFR BLD AUTO: 0 % (ref 0–6)
ERYTHROCYTE [DISTWIDTH] IN BLOOD BY AUTOMATED COUNT: 14 % (ref 11.6–15.1)
GFR SERPL CREATININE-BSD FRML MDRD: 56 ML/MIN/1.73SQ M
GLUCOSE P FAST SERPL-MCNC: 157 MG/DL (ref 65–99)
GLUCOSE SERPL-MCNC: 157 MG/DL (ref 65–140)
GLUCOSE SERPL-MCNC: 189 MG/DL (ref 65–140)
GLUCOSE SERPL-MCNC: 191 MG/DL (ref 65–140)
HCT VFR BLD AUTO: 28.8 % (ref 34.8–46.1)
HGB BLD-MCNC: 9.5 G/DL (ref 11.5–15.4)
IMM GRANULOCYTES # BLD AUTO: 0.04 THOUSAND/UL (ref 0–0.2)
IMM GRANULOCYTES NFR BLD AUTO: 0 % (ref 0–2)
LYMPHOCYTES # BLD AUTO: 0.78 THOUSANDS/ÂΜL (ref 0.6–4.47)
LYMPHOCYTES NFR BLD AUTO: 8 % (ref 14–44)
MCH RBC QN AUTO: 31.6 PG (ref 26.8–34.3)
MCHC RBC AUTO-ENTMCNC: 33 G/DL (ref 31.4–37.4)
MCV RBC AUTO: 96 FL (ref 82–98)
MONOCYTES # BLD AUTO: 0.9 THOUSAND/ÂΜL (ref 0.17–1.22)
MONOCYTES NFR BLD AUTO: 9 % (ref 4–12)
NEUTROPHILS # BLD AUTO: 8.11 THOUSANDS/ÂΜL (ref 1.85–7.62)
NEUTS SEG NFR BLD AUTO: 83 % (ref 43–75)
NRBC BLD AUTO-RTO: 0 /100 WBCS
PLATELET # BLD AUTO: 313 THOUSANDS/UL (ref 149–390)
PMV BLD AUTO: 9.5 FL (ref 8.9–12.7)
POTASSIUM SERPL-SCNC: 3.9 MMOL/L (ref 3.5–5.3)
PROT SERPL-MCNC: 5.2 G/DL (ref 6.4–8.4)
RBC # BLD AUTO: 3.01 MILLION/UL (ref 3.81–5.12)
SODIUM SERPL-SCNC: 138 MMOL/L (ref 135–147)
WBC # BLD AUTO: 9.84 THOUSAND/UL (ref 4.31–10.16)

## 2023-11-01 PROCEDURE — 99024 POSTOP FOLLOW-UP VISIT: CPT | Performed by: PHYSICIAN ASSISTANT

## 2023-11-01 PROCEDURE — 80053 COMPREHEN METABOLIC PANEL: CPT | Performed by: SURGERY

## 2023-11-01 PROCEDURE — 85025 COMPLETE CBC W/AUTO DIFF WBC: CPT | Performed by: SURGERY

## 2023-11-01 PROCEDURE — 82948 REAGENT STRIP/BLOOD GLUCOSE: CPT

## 2023-11-01 RX ORDER — INSULIN LISPRO 100 [IU]/ML
1-5 INJECTION, SOLUTION INTRAVENOUS; SUBCUTANEOUS
Status: DISCONTINUED | OUTPATIENT
Start: 2023-11-01 | End: 2023-11-01 | Stop reason: HOSPADM

## 2023-11-01 RX ADMIN — NICOTINE 1 PATCH: 14 PATCH, EXTENDED RELEASE TRANSDERMAL at 08:29

## 2023-11-01 RX ADMIN — Medication 2.5 MG: at 07:42

## 2023-11-01 RX ADMIN — ACETAMINOPHEN 650 MG: 325 TABLET ORAL at 03:21

## 2023-11-01 RX ADMIN — HEPARIN SODIUM 5000 UNITS: 5000 INJECTION INTRAVENOUS; SUBCUTANEOUS at 06:30

## 2023-11-01 NOTE — PROGRESS NOTES
Progress Note - General Surgery   Jewell Cassidy 78 y.o. female MRN: [id number]  Unit/Bed#: [address] [place] [address] Encounter: [id number]    Assessment:  POD#1 s/p laparoscopic cholecystectomy for hydrops of the gallbladder and chronic cholecystitis      Plan:  Diabetic diet   D/c IV fluids   OOB as tolerated   Postoperative care instructions discussed with patient at bedside  Discharge home today      Subjective/Objective     Subjective: Patient reports her only complaint this morning is fatigue. Patient has tolerated a regular diet for breakfast.  Patient has had her pain under control with Tylenol overnight. Patient does not want any narcotic prescription to go home with. Patient reports her son will take her home today. Objective:     Blood pressure 157/64, pulse 85, temperature 98.6 °F (37 °C), resp. rate 20, height 5' (1.524 m), weight 51.3 kg (113 lb 1.5 oz), SpO2 (!) 89 %, not currently breastfeeding. ,Body mass index is 22.09 kg/m². Intake/Output Summary (Last 24 hours) at 11/1/2023 5555  Last data filed at 10/31/2023 1401  Gross per 24 hour   Intake 700 ml   Output 300 ml   Net 400 ml       Invasive Devices       Peripheral Intravenous Line  Duration             Peripheral IV 10/31/23 Right Hand 1 day                    Physical Exam: General appearance: alert and oriented, in no acute distress  Head: Normocephalic, without obvious abnormality, atraumatic  Lungs: No respiratory distress, no stridor  Heart: regular rate and rhythm  Abdomen: Laparoscopic incisions are clean dry and intact with skin glue, nondistended, soft  Extremities: extremities normal, warm and well-perfused; no cyanosis, clubbing, or edema  Skin: Skin color, texture, turgor normal. No rashes or lesions    Lab, Imaging and other studies:I have personally reviewed pertinent lab results.     VTE Pharmacologic Prophylaxis: Heparin  VTE Mechanical Prophylaxis: sequential compression device

## 2023-11-01 NOTE — PLAN OF CARE
Problem: PAIN - ADULT  Goal: Verbalizes/displays adequate comfort level or baseline comfort level  Description: Interventions:  - Encourage patient to monitor pain and request assistance  - Assess pain using appropriate pain scale  - Administer analgesics based on type and severity of pain and evaluate response  - Implement non-pharmacological measures as appropriate and evaluate response  - Consider cultural and social influences on pain and pain management  - Notify physician/advanced practitioner if interventions unsuccessful or patient reports new pain  Outcome: Progressing     Problem: INFECTION - ADULT  Goal: Absence or prevention of progression during hospitalization  Description: INTERVENTIONS:  - Assess and monitor for signs and symptoms of infection  - Monitor lab/diagnostic results  - Monitor all insertion sites, i.e. indwelling lines, tubes, and drains  - Monitor endotracheal if appropriate and nasal secretions for changes in amount and color  - [place] appropriate cooling/warming therapies per order  - Administer medications as ordered  - Instruct and encourage patient and family to use good hand hygiene technique  - Identify and instruct in appropriate isolation precautions for identified infection/condition  Outcome: Progressing     Problem: DISCHARGE PLANNING  Goal: Discharge to home or other facility with appropriate resources  Description: INTERVENTIONS:  - Identify barriers to discharge w/patient and caregiver  - Arrange for needed discharge resources and transportation as appropriate  - Identify discharge learning needs (meds, wound care, etc.)  - Arrange for interpretive services to assist at discharge as needed  - Refer to Case Management Department for coordinating discharge planning if the patient needs post-hospital services based on physician/advanced practitioner order or complex needs related to functional status, cognitive ability, or social support system  Outcome: Progressing

## 2023-11-01 NOTE — NURSING NOTE
Pt d/c to home. [facility] reviewed with patient and son, all questions answered. IV removed per policy and procedure, occlusive dressing intact. Pt left with all belongings.

## 2023-11-01 NOTE — PLAN OF CARE
Problem: Prexisting or High Potential for Compromised Skin Integrity  Goal: Skin integrity is maintained or improved  Description: INTERVENTIONS:  - Identify patients at risk for skin breakdown  - Assess and monitor skin integrity  - Assess and monitor nutrition and hydration status  - Monitor labs   - Assess for incontinence   - Turn and reposition patient  - Assist with mobility/ambulation  - Relieve pressure over bony prominences  - Avoid friction and shearing  - Provide appropriate hygiene as needed including keeping skin clean and dry  - Evaluate need for skin moisturizer/barrier cream  - Collaborate with interdisciplinary team   - Patient/family teaching  - Consider wound care consult   Outcome: Progressing     Problem: PAIN - ADULT  Goal: Verbalizes/displays adequate comfort level or baseline comfort level  Description: Interventions:  - Encourage patient to monitor pain and request assistance  - Assess pain using appropriate pain scale  - Administer analgesics based on type and severity of pain and evaluate response  - Implement non-pharmacological measures as appropriate and evaluate response  - Consider cultural and social influences on pain and pain management  - Notify physician/advanced practitioner if interventions unsuccessful or patient reports new pain  Outcome: Progressing     Problem: INFECTION - ADULT  Goal: Absence or prevention of progression during hospitalization  Description: INTERVENTIONS:  - Assess and monitor for signs and symptoms of infection  - Monitor lab/diagnostic results  - Monitor all insertion sites, i.e. indwelling lines, tubes, and drains  - Monitor endotracheal if appropriate and nasal secretions for changes in amount and color  - [place] appropriate cooling/warming therapies per order  - Administer medications as ordered  - Instruct and encourage patient and family to use good hand hygiene technique  - Identify and instruct in appropriate isolation precautions for identified infection/condition  Outcome: Progressing  Goal: Absence of fever/infection during neutropenic period  Description: INTERVENTIONS:  - Monitor WBC    Outcome: Progressing     Problem: SAFETY ADULT  Goal: Patient will remain free of falls  Description: INTERVENTIONS:  - Educate patient/family on patient safety including physical limitations  - Instruct patient to call for assistance with activity   - Consult OT/PT to assist with strengthening/mobility   - Keep Call bell within reach  - Keep bed low and locked with side rails adjusted as appropriate  - Keep care items and personal belongings within reach  - Initiate and maintain comfort rounds  - Make Fall Risk Sign visible to staff  - Offer Toileting every 2 Hours, in advance of need  - Initiate/Maintain bed alarm  - Obtain necessary fall risk management equipment: call bell  - Apply yellow socks and bracelet for high fall risk patients  - Consider moving patient to room near nurses station  Outcome: Progressing  Goal: Maintain or return to baseline ADL function  Description: INTERVENTIONS:  -  Assess patient's ability to carry out ADLs; assess patient's baseline for ADL function and identify physical deficits which impact ability to perform ADLs (bathing, care of mouth/teeth, toileting, grooming, dressing, etc.)  - Assess/evaluate cause of self-care deficits   - Assess range of motion  - Assess patient's mobility; develop plan if impaired  - Assess patient's need for assistive devices and provide as appropriate  - Encourage maximum independence but intervene and supervise when necessary  - Involve family in performance of ADLs  - Assess for home care needs following discharge   - Consider OT consult to assist with ADL evaluation and planning for discharge  - Provide patient education as appropriate  Outcome: Progressing  Goal: Maintains/Returns to pre admission functional level  Description: INTERVENTIONS:  - Perform BMAT or MOVE assessment daily.   - Set and communicate daily mobility goal to care team and patient/family/caregiver. - Collaborate with rehabilitation services on mobility goals if consulted  - Perform Range of Motion 2 times a day. - Reposition patient every 2 hours. - Dangle patient 2 times a day  - Stand patient 2 times a day  - Ambulate patient 2 times a day  - Out of bed to chair 2 times a day   - Out of bed for meals 2 times a day  - Out of bed for toileting  - Record patient progress and toleration of activity level   Outcome: Progressing     Problem: DISCHARGE PLANNING  Goal: Discharge to home or other facility with appropriate resources  Description: INTERVENTIONS:  - Identify barriers to discharge w/patient and caregiver  - Arrange for needed discharge resources and transportation as appropriate  - Identify discharge learning needs (meds, wound care, etc.)  - Arrange for interpretive services to assist at discharge as needed  - Refer to Case Management Department for coordinating discharge planning if the patient needs post-hospital services based on physician/advanced practitioner order or complex needs related to functional status, cognitive ability, or social support system  Outcome: Progressing     Problem: Knowledge Deficit  Goal: Patient/family/caregiver demonstrates understanding of disease process, treatment plan, medications, and discharge instructions  Description: Complete learning assessment and assess knowledge base.   Interventions:  - Provide teaching at level of understanding  - Provide teaching via preferred learning methods  Outcome: Progressing

## 2023-11-03 PROCEDURE — 88304 TISSUE EXAM BY PATHOLOGIST: CPT | Performed by: PATHOLOGY

## 2023-11-04 DIAGNOSIS — Z76.0 MEDICATION REFILL: ICD-10-CM

## 2023-11-06 RX ORDER — LANOLIN ALCOHOL/MO/W.PET/CERES
CREAM (GRAM) TOPICAL
Qty: 60 TABLET | Refills: 0 | Status: SHIPPED | OUTPATIENT
Start: 2023-11-06

## 2023-11-06 NOTE — TELEPHONE ENCOUNTER
Patient needs updated blood work. Please place orders. A courtesy refill was provided.      Med failed protocol   Mg Level in normal range and within 360 days

## 2023-11-10 DIAGNOSIS — D50.8 OTHER IRON DEFICIENCY ANEMIA: ICD-10-CM

## 2023-11-10 RX ORDER — FERROUS SULFATE 324(65)MG
324 TABLET, DELAYED RELEASE (ENTERIC COATED) ORAL
Qty: 180 TABLET | Refills: 0 | Status: SHIPPED | OUTPATIENT
Start: 2023-11-10

## 2023-11-10 NOTE — TELEPHONE ENCOUNTER
Reason for call:   [x] Refill   [] Prior Auth  [] Other:     Office:   [x] PCP/Provider -   [] Specialty/Provider -     Medication: ferrous sulfate    Dose/Frequency: 324mg    Quantity: #180    Pharmacy: [facility]    Does the patient have enough for 3 days?    [x] Yes   [] No - Send as HP to POD

## 2023-11-16 ENCOUNTER — OFFICE VISIT (OUTPATIENT)
Dept: SURGERY | Facility: CLINIC | Age: 78
End: 2023-11-16

## 2023-11-16 VITALS — BODY MASS INDEX: 22.19 KG/M2 | HEIGHT: 60 IN | WEIGHT: 113 LBS | TEMPERATURE: 97.1 F

## 2023-11-16 DIAGNOSIS — Z90.49 STATUS POST LAPAROSCOPIC CHOLECYSTECTOMY: ICD-10-CM

## 2023-11-16 DIAGNOSIS — Z09 SURGICAL FOLLOW-UP CARE: Primary | ICD-10-CM

## 2023-11-16 PROCEDURE — 99024 POSTOP FOLLOW-UP VISIT: CPT | Performed by: SPECIALIST

## 2023-11-16 NOTE — PROGRESS NOTES
General Surgery Office Visit Follow up   [facility]  Patient: Melvin Saucedo   : 1945 Sex: female MRN: [id number]   CSN: [id number] PCP: Santiago Smith MD    Assessment/ Plan:  Melvin Saucedo is a 78 y.o. female  day(s) POD #   2 weeks s/p laparoscopic cholecystectomy  Surgical follow-up care [Z09]    Plan  Stable postop   Feeling much better discharge home follow-up    SUBJECTIVE:   Feeling much better my appetite has got better I have no pain I am more active more energetic  OBJECTIVE:  No complaints  No fever no chills no rigors  Tolerating p.o.  Diet well  Normal bowel movement no constipation or diarrhea  Ambulating well   Vitals:   Temp (!) 97.1 °F (36.2 °C) (Core)   Ht 5' (1.524 m)   Wt 51.3 kg (113 lb)   LMP  (LMP Unknown)   BMI 22.07 kg/m²     Active medications:    Current Outpatient Medications:     acetaminophen (TYLENOL) 650 mg CR tablet, Take 650 mg by mouth every 8 (eight) hours as needed for mild pain, Disp: , Rfl:     atorvastatin (LIPITOR) 80 mg tablet, TAKE 1 TABLET BY MOUTH IN  THE EVENING, Disp: 90 tablet, Rfl: 3    clopidogrel (PLAVIX) 75 mg tablet, TAKE 1 TABLET BY MOUTH  DAILY (Patient taking differently: Take 75 mg by mouth every morning Last dose 10/29/23), Disp: 90 tablet, Rfl: 3    cyanocobalamin (VITAMIN B-12) 1000 MCG tablet, Take 1 tablet (1,000 mcg total) by mouth daily Do not start before March 3, 2023., Disp: 30 tablet, Rfl: 0    ferrous sulfate 324 (65 Fe) mg, Take 1 tablet (324 mg total) by mouth daily before breakfast, Disp: 180 tablet, Rfl: 0    folic acid (FOLVITE) 1 mg tablet, TAKE 1 TABLET BY MOUTH EVERY DAY, Disp: 90 tablet, Rfl: 1    glimepiride (AMARYL) 2 mg tablet, TAKE 1 TABLET BY MOUTH DAILY  WITH BREAKFAST, Disp: 90 tablet, Rfl: 1    magnesium Oxide (MAG-OX) 400 mg TABS, TAKE 1 TABLET (400 MG TOTAL) BY MOUTH 2 TIMES A DAY, Disp: 60 tablet, Rfl: 0    metFORMIN (GLUCOPHAGE) 1000 MG tablet, TAKE 1 TABLET BY MOUTH  TWICE DAILY, Disp: 180 tablet, Rfl: 3    metoprolol succinate (TOPROL-XL) 25 mg 24 hr tablet, Take 1 tablet (25 mg total) by mouth daily (Patient taking differently: Take 25 mg by mouth daily at bedtime), Disp: 90 tablet, Rfl: 2    pantoprazole (PROTONIX) 40 mg tablet, TAKE 1 TABLET BY MOUTH DAILY, Disp: 90 tablet, Rfl: 1    quinapril (ACCUPRIL) 40 MG tablet, TAKE 1 TABLET BY MOUTH  DAILY AT BEDTIME, Disp: 90 tablet, Rfl: 3    Physical Exam:   General Alert awake   Normocephalic atraumatic PERRLA   Lungs clear bilaterally  Cardiac normal S1 normal S2  Abdomen soft,non tender Bowel sounds present  Skin: surgical dressing is C/D/I  Ext: No clubbing, cyanosis, edema  Surgical wound well healed    Visit Diagnosis: . Diagnoses and all orders for this visit:    Surgical follow-up care    Status post laparoscopic cholecystectomy       Plan of care was discussed with patient in detail    Pertinent labs reviewed  Most Recent Labs:   No visits with results within 2 Week(s) from this visit. Latest known visit with results is:   Admission on 10/31/2023, Discharged on 11/01/2023   Component Date Value Ref Range Status    POC Glucose 10/31/2023 140  65 - 140 mg/dl Final    Case Report 10/31/2023    Final                    Value:Surgical Pathology Report                         Case: [id number]                                   Authorizing Provider:  Linnea Claude, MD          Collected:           10/31/2023 1331              Ordering Location:     [facility] Received:            10/31/2023 [address]                                     Operating Room                                                               Pathologist:           Darwin Gunter MD                                                           Specimen:    Gallbladder                                                                                Final Diagnosis 10/31/2023    Final                    Value: This result contains rich text formatting which cannot be displayed here. Additional Information 10/31/2023    Final                    Value: This result contains rich text formatting which cannot be displayed here. Gross Description 10/31/2023    Final                    Value: This result contains rich text formatting which cannot be displayed here. Sodium 11/01/2023 138  135 - 147 mmol/L Final    Potassium 11/01/2023 3.9  3.5 - 5.3 mmol/L Final    Chloride 11/01/2023 104  96 - 108 mmol/L Final    CO2 11/01/2023 27  21 - 32 mmol/L Final    ANION GAP 11/01/2023 7  mmol/L Final    BUN 11/01/2023 23  5 - 25 mg/dL Final    Creatinine 11/01/2023 0.96  0.60 - 1.30 mg/dL Final    Standardized to IDMS reference method    Glucose 11/01/2023 157 (H)  65 - 140 mg/dL Final    If the patient is fasting, the ADA then defines impaired fasting glucose as > 100 mg/dL and diabetes as > or equal to 123 mg/dL. Glucose, Fasting 11/01/2023 157 (H)  65 - 99 mg/dL Final    Calcium 11/01/2023 9.3  8.4 - 10.2 mg/dL Final    Corrected Calcium 11/01/2023 9.9  8.3 - 10.1 mg/dL Final    AST 11/01/2023 26  13 - 39 U/L Final    ALT 11/01/2023 24  7 - 52 U/L Final    Specimen collection should occur prior to Sulfasalazine administration due to the potential for falsely depressed results. Alkaline Phosphatase 11/01/2023 72  34 - 104 U/L Final    Total Protein 11/01/2023 5.2 (L)  6.4 - 8.4 g/dL Final    Albumin 11/01/2023 3.3 (L)  3.5 - 5.0 g/dL Final    Total Bilirubin 11/01/2023 0.53  0.20 - 1.00 mg/dL Final    Use of this assay is not recommended for patients undergoing treatment with eltrombopag due to the potential for falsely elevated results. N-acetyl-p-benzoquinone imine (metabolite of Acetaminophen) will generate erroneously low results in samples for patients that have taken an overdose of Acetaminophen.     eGFR 11/01/2023 56  ml/min/1.73sq m Final    WBC 11/01/2023 9.84  4.31 - 10.16 Thousand/uL Final    RBC 11/01/2023 3.01 (L)  3.81 - 5.12 Million/uL Final    Hemoglobin 11/01/2023 9.5 (L)  11.5 - 15.4 g/dL Final    Hematocrit 11/01/2023 28.8 (L)  34.8 - 46.1 % Final    MCV 11/01/2023 96  82 - 98 fL Final    MCH 11/01/2023 31.6  26.8 - 34.3 pg Final    MCHC 11/01/2023 33.0  31.4 - 37.4 g/dL Final    RDW 11/01/2023 14.0  11.6 - 15.1 % Final    MPV 11/01/2023 9.5  8.9 - 12.7 fL Final    Platelets 32/07/0336 313  149 - 390 Thousands/uL Final    nRBC 11/01/2023 0  /100 WBCs Final    Neutrophils Relative 11/01/2023 83 (H)  43 - 75 % Final    Immat GRANS % 11/01/2023 0  0 - 2 % Final    Lymphocytes Relative 11/01/2023 8 (L)  14 - 44 % Final    Monocytes Relative 11/01/2023 9  4 - 12 % Final    Eosinophils Relative 11/01/2023 0  0 - 6 % Final    Basophils Relative 11/01/2023 0  0 - 1 % Final    Neutrophils Absolute 11/01/2023 8.11 (H)  1.85 - 7.62 Thousands/µL Final    Immature Grans Absolute 11/01/2023 0.04  0.00 - 0.20 Thousand/uL Final    Lymphocytes Absolute 11/01/2023 0.78  0.60 - 4.47 Thousands/µL Final    Monocytes Absolute 11/01/2023 0.90  0.17 - 1.22 Thousand/µL Final    Eosinophils Absolute 11/01/2023 0.00  0.00 - 0.61 Thousand/µL Final    Basophils Absolute 11/01/2023 0.01  0.00 - 0.10 Thousands/µL Final    POC Glucose 11/01/2023 191 (H)  65 - 140 mg/dl Final    POC Glucose 11/01/2023 189 (H)  65 - 140 mg/dl Final       Pertinent images and available reads personally reviewed  Procedure: MRI abdomen w wo contrast and mrcp    Result Date: 9/26/2023  Narrative: MRI OF THE ABDOMEN WITH AND WITHOUT CONTRAST WITH MRCP INDICATION: 78 years / Female  K86.2: Cyst of pancreas. Follow-up of renal and pancreatic lesions seen on recent ultrasound. COMPARISON: CT abdomen pelvis 8/2/2023 and 1/30/2013. Meagan Neri TECHNIQUE:  Multiplanar/multisequence MRI of the abdomen with 3D MRCP was performed before and after administration of contrast. IV Contrast:  5 mL of Gadobutrol injection (SINGLE-DOSE) DIAGNOSTIC QUALITY: Motion limited FINDINGS: LOWER CHEST:   Unremarkable.  LIVER: Normal in size and configuration. No suspicious mass. The subcentimeter left hepatic hypodensity seen on the CT is only identified as a 7 mm left hepatic lesion on #4/11 on today's study. It is obscured by motion artifact on other pre and postcontrast images. However this lesion was also present on the 2013  CT and is therefore benign. The hepatic veins and portal veins are patent. BILE DUCTS:  No intrahepatic or extrahepatic bile duct dilation. Common bile duct is normal in caliber. No choledocholithiasis, biliary stricture or suspicious mass. GALLBLADDER: Hydropic gallbladder with length of 15.1 cm. Reidentified 2.8 cm gallbladder neck calculus. No wall thickening or pericholecystic fluid. Mild intrahepatic biliary dilation without extrahepatic biliary dilation. No choledocholithiasis. PANCREAS: Scattered subcentimeter simple appearing pancreatic cysts; for example dominant 6 mm pancreatic body cyst #7/21. No pancreatic ductal dilation or areas of abnormal enhancement. ADRENAL GLANDS: Unchanged adrenal gland thickening indicating hyperplasia. SPLEEN:  Normal. KIDNEYS/PROXIMAL URETERS:  No hydroureteronephrosis. No suspicious renal mass. Thinly septated 13 mm left renal interpolar cyst correlates to the CT finding, #6/22. This is a Bosniak 2 lesion and does not need further follow-up. Innumerable simple cysts throughout both kidneys. Scattered subcentimeter left renal hemorrhagic cyst. BOWEL:   No dilated loops of bowel. PERITONEUM/RETROPERITONEUM:  No ascites. LYMPH NODES:  No abdominal lymphadenopathy. VASCULAR STRUCTURES:  No aneurysm. ABDOMINAL WALL:  Unremarkable. OSSEOUS STRUCTURES:  No suspicious osseous lesion. Impression: 6 mm and smaller pancreatic cyst. For simple cyst(s) less than 1.5 cm, recommend followup every 2 year for 2 times. After 4 years, no more followups. This patient has completed all of the recommended followups.  Preferred imaging modality: abdomen MRI and  MRCP with and without IV contrast, or triple phase abdomen CT with IV contrast, or abdomen MRI and MRCP without IV contrast. Follow-up is recommended in 2 years. Benign renal cysts. Hydropic gallbladder measuring approximately 15 cm in length with a reidentified 2.8 cm gallbladder calculus. No gallbladder wall thickening or pericholecystic inflammatory changes. Workstation performed: [id number]        Pertinent notes reviewed  Final Diagnosis   A. Gallbladder, Cholecystectomy:  - Chronic cholecystitis  - Cholelithiasis      Electronically signed by Manjeet Marlow MD on 11/3/2023 at 12:39 PM       Counseling / Coordination of Care  Total Office time /unit time spent today 15minutes. Greater than 50% of total time was spent with the patient and / or family counseling and / or coordination of care. A description of the counseling / coordination of care:  I performed an interim history, pertinent images and labs, performed a physical examination to arrive at the plan delineated above with associated thought processes. Family member/primary contact son at bedside updated     Claudell Gunther, MD MS CS FACS  [facility]  11/16/23 2:00 PM        Portions of the record may have been created with voice recognition software. Occasional wrong word or "sound a like" substitutions may have occurred due to the inherent limitations of voice recognition software. Read the chart carefully and recognize, using context, where substitutions have occurred.

## 2023-11-21 DIAGNOSIS — D62 ACUTE BLOOD LOSS ANEMIA: Primary | ICD-10-CM

## 2023-11-21 DIAGNOSIS — D50.9 IRON DEFICIENCY ANEMIA, UNSPECIFIED IRON DEFICIENCY ANEMIA TYPE: ICD-10-CM

## 2023-11-21 DIAGNOSIS — D75.839 THROMBOCYTOSIS: ICD-10-CM

## 2023-11-21 RX ORDER — SODIUM CHLORIDE 9 MG/ML
20 INJECTION, SOLUTION INTRAVENOUS ONCE
Status: CANCELLED | OUTPATIENT
Start: 2023-11-24

## 2023-11-24 ENCOUNTER — HOSPITAL ENCOUNTER (OUTPATIENT)
Dept: INFUSION CENTER | Facility: HOSPITAL | Age: 78
End: 2023-11-24
Payer: COMMERCIAL

## 2023-11-24 VITALS
HEART RATE: 71 BPM | TEMPERATURE: 97 F | SYSTOLIC BLOOD PRESSURE: 140 MMHG | OXYGEN SATURATION: 100 % | DIASTOLIC BLOOD PRESSURE: 63 MMHG | RESPIRATION RATE: 18 BRPM

## 2023-11-24 DIAGNOSIS — D50.9 IRON DEFICIENCY ANEMIA, UNSPECIFIED IRON DEFICIENCY ANEMIA TYPE: Primary | ICD-10-CM

## 2023-11-24 DIAGNOSIS — D62 ACUTE BLOOD LOSS ANEMIA: ICD-10-CM

## 2023-11-24 DIAGNOSIS — D75.839 THROMBOCYTOSIS: ICD-10-CM

## 2023-11-24 PROCEDURE — 96365 THER/PROPH/DIAG IV INF INIT: CPT

## 2023-11-24 RX ORDER — SODIUM CHLORIDE 9 MG/ML
20 INJECTION, SOLUTION INTRAVENOUS ONCE
Status: COMPLETED | OUTPATIENT
Start: 2023-11-24 | End: 2023-11-24

## 2023-11-24 RX ORDER — SODIUM CHLORIDE 9 MG/ML
20 INJECTION, SOLUTION INTRAVENOUS ONCE
OUTPATIENT
Start: 2023-12-22

## 2023-11-24 RX ADMIN — IRON SUCROSE 300 MG: 20 INJECTION, SOLUTION INTRAVENOUS at 14:18

## 2023-11-24 RX ADMIN — SODIUM CHLORIDE 20 ML/HR: 0.9 INJECTION, SOLUTION INTRAVENOUS at 14:18

## 2023-11-24 NOTE — PLAN OF CARE
Problem: Potential for Falls  Goal: Patient will remain free of falls  Description: INTERVENTIONS:  - Educate patient/family on patient safety including physical limitations  - Instruct patient to call for assistance with activity   - Keep Call bell within reach  - Keep chair locked    Outcome: Progressing

## 2023-11-29 DIAGNOSIS — Z76.0 MEDICATION REFILL: ICD-10-CM

## 2023-11-29 RX ORDER — LANOLIN ALCOHOL/MO/W.PET/CERES
400 CREAM (GRAM) TOPICAL 2 TIMES DAILY
Qty: 180 TABLET | Refills: 1 | Status: SHIPPED | OUTPATIENT
Start: 2023-11-29

## 2023-12-28 ENCOUNTER — TELEPHONE (OUTPATIENT)
Dept: HEMATOLOGY ONCOLOGY | Facility: CLINIC | Age: 78
End: 2023-12-28

## 2023-12-28 NOTE — TELEPHONE ENCOUNTER
Spoke with patient and rescheduled her appointment to 04/19/2024 at 10:30 in [place]. Patient agreed

## 2024-01-12 ENCOUNTER — RA CDI HCC (OUTPATIENT)
Dept: OTHER | Facility: HOSPITAL | Age: 79
End: 2024-01-12

## 2024-01-12 NOTE — PROGRESS NOTES
E11.22, e11.5  HCC coding opportunities          Chart Reviewed number of suggestions sent to Provider: 2     Patients Insurance     Medicare Insurance: [employer]vantage        9- hypertension

## 2024-01-17 DIAGNOSIS — D75.839 THROMBOCYTOSIS: ICD-10-CM

## 2024-01-17 DIAGNOSIS — D50.9 IRON DEFICIENCY ANEMIA, UNSPECIFIED IRON DEFICIENCY ANEMIA TYPE: ICD-10-CM

## 2024-01-17 DIAGNOSIS — D62 ACUTE BLOOD LOSS ANEMIA: Primary | ICD-10-CM

## 2024-01-17 RX ORDER — SODIUM CHLORIDE 9 MG/ML
20 INJECTION, SOLUTION INTRAVENOUS ONCE
Status: CANCELLED | OUTPATIENT
Start: 2024-01-19

## 2024-01-23 DIAGNOSIS — I63.9 CEREBROVASCULAR ACCIDENT (CVA), UNSPECIFIED MECHANISM (HCC): ICD-10-CM

## 2024-01-24 RX ORDER — CLOPIDOGREL BISULFATE 75 MG/1
TABLET ORAL
Qty: 90 TABLET | Refills: 3 | Status: SHIPPED | OUTPATIENT
Start: 2024-01-24

## 2024-01-31 ENCOUNTER — OFFICE VISIT (OUTPATIENT)
Dept: FAMILY MEDICINE CLINIC | Facility: CLINIC | Age: 79
End: 2024-01-31
Payer: COMMERCIAL

## 2024-01-31 VITALS
HEART RATE: 73 BPM | DIASTOLIC BLOOD PRESSURE: 70 MMHG | BODY MASS INDEX: 22.38 KG/M2 | WEIGHT: 114 LBS | HEIGHT: 60 IN | SYSTOLIC BLOOD PRESSURE: 139 MMHG | RESPIRATION RATE: 18 BRPM | OXYGEN SATURATION: 98 % | TEMPERATURE: 96.8 F

## 2024-01-31 DIAGNOSIS — E78.5 HYPERLIPIDEMIA, UNSPECIFIED HYPERLIPIDEMIA TYPE: Chronic | ICD-10-CM

## 2024-01-31 DIAGNOSIS — I12.9 TYPE 2 DIABETES MELLITUS WITH STAGE 3A CHRONIC KIDNEY DISEASE AND HYPERTENSION (HCC): Chronic | ICD-10-CM

## 2024-01-31 DIAGNOSIS — I82.591 CHRONIC EMBOLISM AND THROMBOSIS OF OTHER SPECIFIED DEEP VEIN OF RIGHT LOWER EXTREMITY (HCC): ICD-10-CM

## 2024-01-31 DIAGNOSIS — I10 ESSENTIAL HYPERTENSION: Primary | Chronic | ICD-10-CM

## 2024-01-31 DIAGNOSIS — N18.31 TYPE 2 DIABETES MELLITUS WITH STAGE 3A CHRONIC KIDNEY DISEASE AND HYPERTENSION (HCC): Chronic | ICD-10-CM

## 2024-01-31 DIAGNOSIS — D47.3 ESSENTIAL (HEMORRHAGIC) THROMBOCYTHEMIA (HCC): ICD-10-CM

## 2024-01-31 DIAGNOSIS — E11.22 TYPE 2 DIABETES MELLITUS WITH STAGE 3A CHRONIC KIDNEY DISEASE AND HYPERTENSION (HCC): Chronic | ICD-10-CM

## 2024-01-31 DIAGNOSIS — K21.00 GASTROESOPHAGEAL REFLUX DISEASE WITH ESOPHAGITIS WITHOUT HEMORRHAGE: Chronic | ICD-10-CM

## 2024-01-31 DIAGNOSIS — F17.210 CIGARETTE SMOKER: ICD-10-CM

## 2024-01-31 PROBLEM — Z90.49 STATUS POST LAPAROSCOPIC CHOLECYSTECTOMY: Status: RESOLVED | Noted: 2023-11-16 | Resolved: 2024-01-31

## 2024-01-31 PROBLEM — K80.19 GALLSTONES AND INFLAMMATION OF GALLBLADDER WITH OBSTRUCTION: Status: RESOLVED | Noted: 2023-10-11 | Resolved: 2024-01-31

## 2024-01-31 PROBLEM — K82.1 GALLBLADDER HYDROPS: Status: RESOLVED | Noted: 2023-10-11 | Resolved: 2024-01-31

## 2024-01-31 PROBLEM — Z81.2 FAMILY HISTORY OF TOBACCO ABUSE: Status: RESOLVED | Noted: 2020-01-09 | Resolved: 2024-01-31

## 2024-01-31 PROBLEM — R79.89 TROPONIN LEVEL ELEVATED: Status: RESOLVED | Noted: 2021-06-06 | Resolved: 2024-01-31

## 2024-01-31 PROBLEM — D62 ACUTE BLOOD LOSS ANEMIA: Status: RESOLVED | Noted: 2023-03-23 | Resolved: 2024-01-31

## 2024-01-31 PROBLEM — Z09 SURGICAL FOLLOW-UP CARE: Status: RESOLVED | Noted: 2023-10-11 | Resolved: 2024-01-31

## 2024-01-31 LAB — SL AMB POCT HEMOGLOBIN AIC: 6.6 (ref ?–6.5)

## 2024-01-31 PROCEDURE — 99214 OFFICE O/P EST MOD 30 MIN: CPT | Performed by: FAMILY MEDICINE

## 2024-01-31 PROCEDURE — 3288F FALL RISK ASSESSMENT DOCD: CPT | Performed by: FAMILY MEDICINE

## 2024-01-31 PROCEDURE — 3078F DIAST BP <80 MM HG: CPT | Performed by: FAMILY MEDICINE

## 2024-01-31 PROCEDURE — 1160F RVW MEDS BY RX/DR IN RCRD: CPT | Performed by: FAMILY MEDICINE

## 2024-01-31 PROCEDURE — 83036 HEMOGLOBIN GLYCOSYLATED A1C: CPT | Performed by: FAMILY MEDICINE

## 2024-01-31 PROCEDURE — 3075F SYST BP GE 130 - 139MM HG: CPT | Performed by: FAMILY MEDICINE

## 2024-01-31 PROCEDURE — 3725F SCREEN DEPRESSION PERFORMED: CPT | Performed by: FAMILY MEDICINE

## 2024-01-31 PROCEDURE — 1159F MED LIST DOCD IN RCRD: CPT | Performed by: FAMILY MEDICINE

## 2024-01-31 PROCEDURE — 1101F PT FALLS ASSESS-DOCD LE1/YR: CPT | Performed by: FAMILY MEDICINE

## 2024-01-31 NOTE — ASSESSMENT & PLAN NOTE
Controlled on glimepiride and metformin.   Lab Results   Component Value Date    HGBA1C 6.6 (A) 01/31/2024

## 2024-01-31 NOTE — PROGRESS NOTES
Name: Carla Atkinson      : 1945      MRN: [id number]  Encounter Provider: Tila Simmons MD  Encounter Date: 2024   Encounter department: [facility]    Assessment & Plan     1. Essential hypertension  Assessment & Plan:  Stable, continue metoprolol and quinapril.       2. Hyperlipidemia, unspecified hyperlipidemia type  Assessment & Plan:  On statin. Continue.     Orders:  -     Lipid Panel with Direct LDL reflex; Future    3. Type 2 diabetes mellitus with stage 3a chronic kidney disease and hypertension (HCC)  Assessment & Plan:  Controlled on glimepiride and metformin.   Lab Results   Component Value Date    HGBA1C 6.6 (A) 2024       Orders:  -     POCT hemoglobin A1c    4. Chronic embolism and thrombosis of other specified deep vein of right lower extremity (HCC)    5. Essential (hemorrhagic) thrombocythemia (HCC)  Assessment & Plan:  Follows heme-onc. She has an appointment coming up and blood work pending.       6. Gastroesophageal reflux disease with esophagitis without hemorrhage  Assessment & Plan:  Stable on pantoprazole.       7. Cigarette smoker  Comments:  Counseled on smoking cessation.        Depression Screening and Follow-up Plan: Patient was screened for depression during today's encounter. They screened negative with a PHQ-2 score of 0.    Urinary Incontinence Plan of Care: counseling topics discussed: use restroom every 2 hours, limit alcohol, caffeine, spicy foods, and acidic foods and limiting fluid intake 3-4 hours before bed.         Subjective      HPI  She is here today for routine follow up visit.   She reports doing well with no acute issues or concerns other than seasonal allergies.     Review of Systems   Constitutional: Negative.    HENT:  Positive for congestion.    Eyes: Negative.    Respiratory: Negative.     Cardiovascular: Negative.    Gastrointestinal: Negative.    Endocrine: Negative.    Genitourinary: Negative.    Musculoskeletal: Negative.     Skin: Negative.    Allergic/Immunologic: Negative.    Neurological: Negative.    Hematological: Negative.    Psychiatric/Behavioral: Negative.         Current Outpatient Medications on File Prior to Visit   Medication Sig    acetaminophen (TYLENOL) 650 mg CR tablet Take 650 mg by mouth every 8 (eight) hours as needed for mild pain    atorvastatin (LIPITOR) 80 mg tablet TAKE 1 TABLET BY MOUTH IN  THE EVENING    clopidogrel (PLAVIX) 75 mg tablet TAKE 1 TABLET BY MOUTH DAILY    cyanocobalamin (VITAMIN B-12) 1000 MCG tablet Take 1 tablet (1,000 mcg total) by mouth daily Do not start before March 3, 2023.    ferrous sulfate 324 (65 Fe) mg Take 1 tablet (324 mg total) by mouth daily before breakfast    folic acid (FOLVITE) 1 mg tablet TAKE 1 TABLET BY MOUTH EVERY DAY    glimepiride (AMARYL) 2 mg tablet TAKE 1 TABLET BY MOUTH DAILY  WITH BREAKFAST    magnesium Oxide (MAG-OX) 400 mg TABS TAKE 1 TABLET BY MOUTH 2 TIMES A DAY.    metFORMIN (GLUCOPHAGE) 1000 MG tablet TAKE 1 TABLET BY MOUTH  TWICE DAILY    metoprolol succinate (TOPROL-XL) 25 mg 24 hr tablet Take 1 tablet (25 mg total) by mouth daily (Patient taking differently: Take 25 mg by mouth daily at bedtime)    pantoprazole (PROTONIX) 40 mg tablet TAKE 1 TABLET BY MOUTH DAILY    quinapril (ACCUPRIL) 40 MG tablet TAKE 1 TABLET BY MOUTH  DAILY AT BEDTIME       Objective     /70   Pulse 73   Temp (!) 96.8 °F (36 °C)   Resp 18   Ht 5' (1.524 m)   Wt 51.7 kg (114 lb)   LMP  (LMP Unknown)   SpO2 98%   BMI 22.26 kg/m²     Physical Exam  Constitutional:       General: She is not in acute distress.     Appearance: She is well-developed. She is not diaphoretic.   HENT:      Head: Normocephalic and atraumatic.   Cardiovascular:      Rate and Rhythm: Normal rate and regular rhythm.      Pulses: no weak pulses     Heart sounds: Normal heart sounds. No murmur heard.     No friction rub. No gallop.   Pulmonary:      Effort: Pulmonary effort is normal. No respiratory  distress.      Breath sounds: Normal breath sounds. No wheezing or rales.   Chest:      Chest wall: No tenderness.   Musculoskeletal:         General: No deformity. Normal range of motion.      Cervical back: Normal range of motion and neck supple.   Feet:      Right foot:      Skin integrity: No ulcer, skin breakdown, erythema, warmth, callus or dry skin.      Left foot:      Skin integrity: No ulcer, skin breakdown, erythema, warmth, callus or dry skin.   Skin:     General: Skin is warm and dry.   Neurological:      Mental Status: She is alert and oriented to person, place, and time.   Psychiatric:         Behavior: Behavior normal.         Thought Content: Thought content normal.         Judgment: Judgment normal.     Patient's shoes and socks removed.    Right Foot/Ankle   Right Foot Inspection  Skin Exam: skin normal and skin intact. No dry skin, no warmth, no callus, no erythema, no maceration, no abnormal color, no pre-ulcer, no ulcer and no callus.     Toe Exam: ROM and strength within normal limits.     Sensory   Monofilament testing: intact    Vascular  Capillary refills: < 3 seconds      Left Foot/Ankle  Left Foot Inspection  Skin Exam: skin normal and skin intact. No dry skin, no warmth, no erythema, no maceration, normal color, no pre-ulcer, no ulcer and no callus.     Toe Exam: ROM and strength within normal limits.     Sensory   Monofilament testing: intact    Vascular  Capillary refills: < 3 seconds      Assign Risk Category  No deformity present  No loss of protective sensation  No weak pulses  Risk: 0     Tila Simmons MD

## 2024-02-08 ENCOUNTER — APPOINTMENT (OUTPATIENT)
Dept: LAB | Facility: HOSPITAL | Age: 79
End: 2024-02-08
Payer: COMMERCIAL

## 2024-02-08 DIAGNOSIS — D50.0 IRON DEFICIENCY ANEMIA DUE TO CHRONIC BLOOD LOSS: ICD-10-CM

## 2024-02-08 DIAGNOSIS — E78.5 HYPERLIPIDEMIA, UNSPECIFIED HYPERLIPIDEMIA TYPE: Chronic | ICD-10-CM

## 2024-02-08 DIAGNOSIS — E83.52 HYPERCALCEMIA: ICD-10-CM

## 2024-02-08 DIAGNOSIS — K55.20 AVM (ARTERIOVENOUS MALFORMATION) OF COLON: ICD-10-CM

## 2024-02-08 DIAGNOSIS — R93.89 ABNORMAL CAT SCAN: ICD-10-CM

## 2024-02-08 DIAGNOSIS — D50.9 IRON DEFICIENCY ANEMIA, UNSPECIFIED IRON DEFICIENCY ANEMIA TYPE: ICD-10-CM

## 2024-02-08 LAB
ANION GAP SERPL CALCULATED.3IONS-SCNC: 6 MMOL/L
BASOPHILS # BLD AUTO: 0.03 THOUSANDS/ÂΜL (ref 0–0.1)
BASOPHILS NFR BLD AUTO: 0 % (ref 0–1)
BUN SERPL-MCNC: 30 MG/DL (ref 5–25)
CA-I BLD-SCNC: 1.32 MMOL/L (ref 1.12–1.32)
CALCIUM SERPL-MCNC: 10.9 MG/DL (ref 8.4–10.2)
CHLORIDE SERPL-SCNC: 107 MMOL/L (ref 96–108)
CHOLEST SERPL-MCNC: 124 MG/DL
CO2 SERPL-SCNC: 29 MMOL/L (ref 21–32)
CREAT SERPL-MCNC: 1.14 MG/DL (ref 0.6–1.3)
EOSINOPHIL # BLD AUTO: 0.21 THOUSAND/ÂΜL (ref 0–0.61)
EOSINOPHIL NFR BLD AUTO: 3 % (ref 0–6)
ERYTHROCYTE [DISTWIDTH] IN BLOOD BY AUTOMATED COUNT: 13.3 % (ref 11.6–15.1)
FERRITIN SERPL-MCNC: 7 NG/ML (ref 11–307)
GFR SERPL CREATININE-BSD FRML MDRD: 45 ML/MIN/1.73SQ M
GLUCOSE P FAST SERPL-MCNC: 141 MG/DL (ref 65–99)
HCT VFR BLD AUTO: 26.8 % (ref 34.8–46.1)
HDLC SERPL-MCNC: 54 MG/DL
HGB BLD-MCNC: 8.3 G/DL (ref 11.5–15.4)
IMM GRANULOCYTES # BLD AUTO: 0.04 THOUSAND/UL (ref 0–0.2)
IMM GRANULOCYTES NFR BLD AUTO: 1 % (ref 0–2)
IRON SATN MFR SERPL: 5 % (ref 15–50)
IRON SERPL-MCNC: 17 UG/DL (ref 50–212)
LDLC SERPL CALC-MCNC: 47 MG/DL (ref 0–100)
LYMPHOCYTES # BLD AUTO: 1.11 THOUSANDS/ÂΜL (ref 0.6–4.47)
LYMPHOCYTES NFR BLD AUTO: 15 % (ref 14–44)
MCH RBC QN AUTO: 27.1 PG (ref 26.8–34.3)
MCHC RBC AUTO-ENTMCNC: 31 G/DL (ref 31.4–37.4)
MCV RBC AUTO: 88 FL (ref 82–98)
MONOCYTES # BLD AUTO: 0.61 THOUSAND/ÂΜL (ref 0.17–1.22)
MONOCYTES NFR BLD AUTO: 8 % (ref 4–12)
NEUTROPHILS # BLD AUTO: 5.5 THOUSANDS/ÂΜL (ref 1.85–7.62)
NEUTS SEG NFR BLD AUTO: 73 % (ref 43–75)
NRBC BLD AUTO-RTO: 0 /100 WBCS
PLATELET # BLD AUTO: 469 THOUSANDS/UL (ref 149–390)
PMV BLD AUTO: 9.5 FL (ref 8.9–12.7)
POTASSIUM SERPL-SCNC: 4.9 MMOL/L (ref 3.5–5.3)
RBC # BLD AUTO: 3.06 MILLION/UL (ref 3.81–5.12)
SODIUM SERPL-SCNC: 142 MMOL/L (ref 135–147)
TIBC SERPL-MCNC: 368 UG/DL (ref 250–450)
TRIGL SERPL-MCNC: 116 MG/DL
UIBC SERPL-MCNC: 351 UG/DL (ref 155–355)
WBC # BLD AUTO: 7.5 THOUSAND/UL (ref 4.31–10.16)

## 2024-02-08 PROCEDURE — 36415 COLL VENOUS BLD VENIPUNCTURE: CPT

## 2024-02-08 PROCEDURE — 83550 IRON BINDING TEST: CPT

## 2024-02-08 PROCEDURE — 85025 COMPLETE CBC W/AUTO DIFF WBC: CPT

## 2024-02-08 PROCEDURE — 82330 ASSAY OF CALCIUM: CPT

## 2024-02-08 PROCEDURE — 83540 ASSAY OF IRON: CPT

## 2024-02-08 PROCEDURE — 82728 ASSAY OF FERRITIN: CPT

## 2024-02-08 PROCEDURE — 80048 BASIC METABOLIC PNL TOTAL CA: CPT

## 2024-02-08 PROCEDURE — 80061 LIPID PANEL: CPT

## 2024-02-09 ENCOUNTER — TELEPHONE (OUTPATIENT)
Dept: HEMATOLOGY ONCOLOGY | Facility: CLINIC | Age: 79
End: 2024-02-09

## 2024-02-09 DIAGNOSIS — D53.9 MACROCYTIC ANEMIA: ICD-10-CM

## 2024-02-09 DIAGNOSIS — K55.20 AVM (ARTERIOVENOUS MALFORMATION) OF COLON: Primary | ICD-10-CM

## 2024-02-09 NOTE — TELEPHONE ENCOUNTER
Please schedule patient for therapy.      Pt with hx of AVMs, pt will IV iron with Ferritin of 7 and low iron saturation and hemoglobin decrease.  Recommend IV venofer 300 mg Iv weekly x 5 ( Derek)  Add h and h weekly to plan.    Call out to patient and she is agreeable to Yoon Gould PA-C recommendations.

## 2024-02-09 NOTE — PROGRESS NOTES
Recommend IV venofer 300 mg Iv weekly x 5 ( Derek)     Plan sent to Yoon Gould PA-C to review and sign.

## 2024-02-12 DIAGNOSIS — D75.839 THROMBOCYTOSIS: Primary | ICD-10-CM

## 2024-02-12 DIAGNOSIS — D50.9 IRON DEFICIENCY ANEMIA, UNSPECIFIED IRON DEFICIENCY ANEMIA TYPE: ICD-10-CM

## 2024-02-12 RX ORDER — SODIUM CHLORIDE 9 MG/ML
20 INJECTION, SOLUTION INTRAVENOUS ONCE
Status: CANCELLED | OUTPATIENT
Start: 2024-02-22

## 2024-02-17 DIAGNOSIS — Z79.4 TYPE 2 DIABETES MELLITUS WITH DIABETIC POLYNEUROPATHY, WITH LONG-TERM CURRENT USE OF INSULIN (HCC): ICD-10-CM

## 2024-02-17 DIAGNOSIS — E11.42 TYPE 2 DIABETES MELLITUS WITH DIABETIC POLYNEUROPATHY, WITH LONG-TERM CURRENT USE OF INSULIN (HCC): ICD-10-CM

## 2024-02-19 ENCOUNTER — TELEPHONE (OUTPATIENT)
Dept: GASTROENTEROLOGY | Facility: CLINIC | Age: 79
End: 2024-02-19

## 2024-02-19 RX ORDER — GLIMEPIRIDE 2 MG/1
2 TABLET ORAL
Qty: 90 TABLET | Refills: 1 | Status: SHIPPED | OUTPATIENT
Start: 2024-02-19

## 2024-02-19 NOTE — TELEPHONE ENCOUNTER
----- Message from Lilian Cobian LPN sent at 2/14/2024 10:19 AM EST -----    ----- Message -----  From: Jeanne Hopkins PA-C  Sent: 2/8/2024   2:40 PM EST  To: [facility] [address]    Please schedule OV for anemia  ----- Message -----  From: Lilian Cobian LPN  Sent: 2/8/2024  12:35 PM EST  To: ANTHONY Ortiz PA-C  [facility] [address] [place]  Please let patient know hemoglobin is lower than it has been and she does have AVMs so please ensure that she is not having bleeding, either dark stools or blood in the stool and she should come in for an office visit as we may need to do an EGD with push enteroscopy      Patient aware and educated. She states she has no symptoms.

## 2024-02-22 ENCOUNTER — HOSPITAL ENCOUNTER (OUTPATIENT)
Dept: INFUSION CENTER | Facility: HOSPITAL | Age: 79
Discharge: HOME | End: 2024-02-22
Payer: COMMERCIAL

## 2024-02-22 VITALS
RESPIRATION RATE: 18 BRPM | SYSTOLIC BLOOD PRESSURE: 145 MMHG | DIASTOLIC BLOOD PRESSURE: 65 MMHG | OXYGEN SATURATION: 100 % | HEART RATE: 71 BPM | TEMPERATURE: 96.2 F

## 2024-02-22 DIAGNOSIS — D50.9 IRON DEFICIENCY ANEMIA, UNSPECIFIED IRON DEFICIENCY ANEMIA TYPE: Primary | ICD-10-CM

## 2024-02-22 DIAGNOSIS — D75.839 THROMBOCYTOSIS: ICD-10-CM

## 2024-02-22 LAB
HCT VFR BLD AUTO: 28.2 % (ref 34.8–46.1)
HGB BLD-MCNC: 8.1 G/DL (ref 11.5–15.4)

## 2024-02-22 PROCEDURE — 85014 HEMATOCRIT: CPT | Performed by: INTERNAL MEDICINE

## 2024-02-22 PROCEDURE — 85018 HEMOGLOBIN: CPT | Performed by: INTERNAL MEDICINE

## 2024-02-22 RX ORDER — SODIUM CHLORIDE 9 MG/ML
20 INJECTION, SOLUTION INTRAVENOUS ONCE
Status: COMPLETED | OUTPATIENT
Start: 2024-02-22 | End: 2024-02-22

## 2024-02-22 RX ORDER — SODIUM CHLORIDE 9 MG/ML
20 INJECTION, SOLUTION INTRAVENOUS ONCE
Status: CANCELLED | OUTPATIENT
Start: 2024-02-29

## 2024-02-22 RX ADMIN — SODIUM CHLORIDE 20 ML/HR: 9 INJECTION, SOLUTION INTRAVENOUS at 14:15

## 2024-02-22 RX ADMIN — IRON SUCROSE 300 MG: 20 INJECTION, SOLUTION INTRAVENOUS at 14:15

## 2024-02-22 NOTE — PROGRESS NOTES
Carla Atkinson  tolerated treatment well with no complications.      Carla Atkinson is aware of future appt on 2/29 at 2:30.     AVS printed and given to Carla Atkinson:  Yes

## 2024-02-29 ENCOUNTER — HOSPITAL ENCOUNTER (OUTPATIENT)
Dept: INFUSION CENTER | Facility: HOSPITAL | Age: 79
Discharge: HOME | End: 2024-02-29
Payer: COMMERCIAL

## 2024-02-29 VITALS
RESPIRATION RATE: 18 BRPM | DIASTOLIC BLOOD PRESSURE: 73 MMHG | TEMPERATURE: 96.5 F | OXYGEN SATURATION: 100 % | SYSTOLIC BLOOD PRESSURE: 166 MMHG

## 2024-02-29 DIAGNOSIS — D50.9 IRON DEFICIENCY ANEMIA, UNSPECIFIED IRON DEFICIENCY ANEMIA TYPE: Primary | ICD-10-CM

## 2024-02-29 DIAGNOSIS — D75.839 THROMBOCYTOSIS: ICD-10-CM

## 2024-02-29 LAB
HCT VFR BLD AUTO: 27.7 % (ref 34.8–46.1)
HGB BLD-MCNC: 8.2 G/DL (ref 11.5–15.4)

## 2024-02-29 PROCEDURE — 85018 HEMOGLOBIN: CPT | Performed by: INTERNAL MEDICINE

## 2024-02-29 PROCEDURE — 85014 HEMATOCRIT: CPT | Performed by: INTERNAL MEDICINE

## 2024-02-29 PROCEDURE — 96365 THER/PROPH/DIAG IV INF INIT: CPT

## 2024-02-29 RX ORDER — SODIUM CHLORIDE 9 MG/ML
20 INJECTION, SOLUTION INTRAVENOUS ONCE
OUTPATIENT
Start: 2024-03-07

## 2024-02-29 RX ORDER — SODIUM CHLORIDE 9 MG/ML
20 INJECTION, SOLUTION INTRAVENOUS ONCE
Status: COMPLETED | OUTPATIENT
Start: 2024-02-29 | End: 2024-02-29

## 2024-02-29 RX ADMIN — IRON SUCROSE 300 MG: 20 INJECTION, SOLUTION INTRAVENOUS at 14:34

## 2024-02-29 RX ADMIN — SODIUM CHLORIDE 20 ML/HR: 9 INJECTION, SOLUTION INTRAVENOUS at 14:34

## 2024-02-29 NOTE — PROGRESS NOTES
Carla Atkinson  tolerated treatment well with no complications.      Carla Atkinson is aware of future appt on 3/7 at 1400.     AVS printed and given to Carla Atkinson: No (Declined by Carla Atkinson)

## 2024-03-14 ENCOUNTER — HOSPITAL ENCOUNTER (OUTPATIENT)
Dept: INFUSION CENTER | Facility: HOSPITAL | Age: 79
Discharge: HOME | End: 2024-03-14

## 2024-03-18 ENCOUNTER — HOSPITAL ENCOUNTER (OUTPATIENT)
Dept: INFUSION CENTER | Facility: HOSPITAL | Age: 79
Discharge: HOME | End: 2024-03-18
Payer: COMMERCIAL

## 2024-03-18 VITALS
DIASTOLIC BLOOD PRESSURE: 63 MMHG | OXYGEN SATURATION: 98 % | SYSTOLIC BLOOD PRESSURE: 159 MMHG | RESPIRATION RATE: 18 BRPM | HEART RATE: 82 BPM | TEMPERATURE: 98.2 F

## 2024-03-18 DIAGNOSIS — D50.9 IRON DEFICIENCY ANEMIA, UNSPECIFIED IRON DEFICIENCY ANEMIA TYPE: Primary | ICD-10-CM

## 2024-03-18 DIAGNOSIS — D75.839 THROMBOCYTOSIS: ICD-10-CM

## 2024-03-18 LAB
HCT VFR BLD AUTO: 31.2 % (ref 34.8–46.1)
HGB BLD-MCNC: 9.3 G/DL (ref 11.5–15.4)

## 2024-03-18 PROCEDURE — 85018 HEMOGLOBIN: CPT | Performed by: INTERNAL MEDICINE

## 2024-03-18 PROCEDURE — 96365 THER/PROPH/DIAG IV INF INIT: CPT

## 2024-03-18 PROCEDURE — 85014 HEMATOCRIT: CPT | Performed by: INTERNAL MEDICINE

## 2024-03-18 RX ORDER — SODIUM CHLORIDE 9 MG/ML
20 INJECTION, SOLUTION INTRAVENOUS ONCE
Status: COMPLETED | OUTPATIENT
Start: 2024-03-18 | End: 2024-03-18

## 2024-03-18 RX ORDER — SODIUM CHLORIDE 9 MG/ML
20 INJECTION, SOLUTION INTRAVENOUS ONCE
Status: CANCELLED | OUTPATIENT
Start: 2024-03-25

## 2024-03-18 RX ADMIN — SODIUM CHLORIDE 20 ML/HR: 9 INJECTION, SOLUTION INTRAVENOUS at 14:10

## 2024-03-18 RX ADMIN — IRON SUCROSE 300 MG: 20 INJECTION, SOLUTION INTRAVENOUS at 14:12

## 2024-03-18 NOTE — PROGRESS NOTES
Carla Atkinson  tolerated treatment well with no complications.      Carla Atkinson is aware of future appt on 3/25/ at 1:00pm.     AVS printed and given to Carla Atkinson:  Yes

## 2024-03-25 ENCOUNTER — HOSPITAL ENCOUNTER (OUTPATIENT)
Dept: INFUSION CENTER | Facility: HOSPITAL | Age: 79
Discharge: HOME | End: 2024-03-25
Payer: COMMERCIAL

## 2024-03-25 VITALS
SYSTOLIC BLOOD PRESSURE: 148 MMHG | TEMPERATURE: 96.1 F | RESPIRATION RATE: 18 BRPM | DIASTOLIC BLOOD PRESSURE: 60 MMHG | HEART RATE: 81 BPM

## 2024-03-25 DIAGNOSIS — D50.9 IRON DEFICIENCY ANEMIA, UNSPECIFIED IRON DEFICIENCY ANEMIA TYPE: Primary | ICD-10-CM

## 2024-03-25 DIAGNOSIS — D75.839 THROMBOCYTOSIS: ICD-10-CM

## 2024-03-25 LAB
HCT VFR BLD AUTO: 32.5 % (ref 34.8–46.1)
HGB BLD-MCNC: 9.7 G/DL (ref 11.5–15.4)

## 2024-03-25 PROCEDURE — 96365 THER/PROPH/DIAG IV INF INIT: CPT

## 2024-03-25 PROCEDURE — 85018 HEMOGLOBIN: CPT | Performed by: INTERNAL MEDICINE

## 2024-03-25 PROCEDURE — 85014 HEMATOCRIT: CPT | Performed by: INTERNAL MEDICINE

## 2024-03-25 RX ORDER — SODIUM CHLORIDE 9 MG/ML
20 INJECTION, SOLUTION INTRAVENOUS ONCE
OUTPATIENT
Start: 2024-04-01

## 2024-03-25 RX ORDER — SODIUM CHLORIDE 9 MG/ML
20 INJECTION, SOLUTION INTRAVENOUS ONCE
Status: COMPLETED | OUTPATIENT
Start: 2024-03-25 | End: 2024-03-25

## 2024-03-25 RX ADMIN — IRON SUCROSE 300 MG: 20 INJECTION, SOLUTION INTRAVENOUS at 13:50

## 2024-03-25 RX ADMIN — SODIUM CHLORIDE 20 ML/HR: 9 INJECTION, SOLUTION INTRAVENOUS at 13:50

## 2024-03-25 NOTE — PROGRESS NOTES
Carla Atkinson  tolerated treatment well with no complications.      Carla Atkinson is aware of future appt on 4/1 at 1300.     AVS printed and given to Carla Atkinson:   No (Declined by Carla Atkinson)

## 2024-03-25 NOTE — PROGRESS NOTES
Infectious Disease progress Note Requested by:dr. issa 
 
Reason:sepsis, right leg cellulitis, right thigh abscess Current abx Prior abx  
vancomycin since 4/30 Pip/tazo x 1 - 5/1 Cefepime 4/30-5/2 Metronidazole 5/1-5/7 Ceftriaxone 5/2-5/7 Lines:  
 
 
Assessment : 
 
78-year-old AA female with a history of congestive heart failure with EF of 31-35 % on 12/28/18, lupus, thrombus cytopenia, chronic kidney disease admitted to [facility] on 4/30/19 for altered mental status. H/o lupus nodules on LE in 1/2019 Now with high fevers, RLE redness, ulcer right leg Clinical picture consistent with sepsis (POA) due to acute right thigh/leg cellulitis, right thigh abscess, necrotic right leg ulcer with possible superinfection. Single positive blood culture for staph epidermidis on 4/30 likely contaminant Right thigh abscess s/p bedside I&D in ed on 4/30/19. No cultures sent. Right leg ulcer with clot/necrosis - cultures 5/1- methicillin resistant staph aureus, group A strep. Vascular surgery consult appreciated. s/p I&D, a cell graft 5/6. No intra op purulence, tissue necrosis or exposed tendon/bone noted. Significantly decreased erythema, tenderness right thigh/leg - no significant induration around the recently drained right thigh abscess. Decreased wbc. Clinically better.  
  
Recommendations: 
  
1. Continue vancomycin 2. F/u Vascular surgery recommendation regarding wound care right leg ulcer 3. Ok to switch to po doxycycline, augmentin till 5/14/19 when discharge planned 
  
Advance Care planning: full code: discussed  with patient/surrogate decision maker: zacarias ledbetter: [phone]. Above plan was discussed in details with  son at bedside. Please call me if any further questions or concerns. Will continue to participate in the care of this patient. HPI: 
 
Feels better. Decreased right leg/thigh pain. No new complaints 
 
 
home Medication List  
  
  
   
 Details Pt reported her BP was high on arrival because she was upset at her son for running late in picking her up. BP came down after pt rested in chair awhile.   polyethylene glycol (MIRALAX) 17 gram/dose powder Take 17 g by mouth daily. torsemide (DEMADEX) 10 mg tablet Take 1 Tab by mouth daily. Qty: 30 Tab, Refills: 6  
  
amoxicillin (AMOXIL) 500 mg capsule Take 500 mg by mouth three (3) times daily. aspirin delayed-release 81 mg tablet Take 81 mg by mouth daily. hydroxychloroquine (PLAQUENIL) 200 mg tablet Take 200 mg by mouth two (2) times a day. metOLazone (ZAROXOLYN) 2.5 mg tablet Take 1 Tab by mouth daily. Qty: 30 Tab, Refills: 5  
  
!! OTHER Incentive Spirometry- Use as directed Qty: 1 Each, Refills: 0  
  
!! OTHER Graded Compression Stockings b/l LE- use as directed Qty: 1 Each, Refills: 0  
  
food supplemt, lactose-reduced (ENSURE ENLIVE) 0.08 gram-1.5 kcal/mL liqd Take 1 Bottle by mouth two (2) times a day. Qty: 60 Bottle, Refills: 0  
  
menthol-zinc oxide (CALMOSEPTINE) 0.44-20.6 % oint Apply  to affected area. carvedilol (COREG) 3.125 mg tablet Take 1 Tab by mouth every twelve (12) hours. Qty: 60 Tab, Refills: 0  
  
acetaminophen (TYLENOL ARTHRITIS PAIN) 650 mg TbER Take 1 Tab by mouth every eight (8) hours. Qty: 15 Tab, Refills: 0  
  
amiodarone (CORDARONE) 200 mg tablet TAKE ONE TABLET EVERY DAY (MAKE AN APPT) Qty: 30 Tab, Refills: 5  
  
memantine (NAMENDA) 10 mg tablet Take 10 mg by mouth two (2) times a day. amitriptyline (ELAVIL) 25 mg tablet Take 25 mg by mouth nightly. !! - Potential duplicate medications found. Please discuss with provider. Current Facility-Administered Medications Medication Dose Route Frequency  predniSONE (DELTASONE) tablet 10 mg  10 mg Oral DAILY WITH BREAKFAST  0.9% sodium chloride infusion  30 mL/hr IntraVENous CONTINUOUS  
 vancomycin (VANCOCIN) 1000 mg in  ml infusion  1,000 mg IntraVENous Q36H  
 aspirin chewable tablet 81 mg  81 mg Oral DAILY  albuterol-ipratropium (DUO-NEB) 2.5 MG-0.5 MG/3 ML  3 mL Nebulization Q4H PRN  
  acetaminophen (TYLENOL) tablet 650 mg  650 mg Oral Q6H PRN  
 amiodarone (CORDARONE) tablet 200 mg  200 mg Oral DAILY  amitriptyline (ELAVIL) tablet 25 mg  25 mg Oral QHS  carvedilol (COREG) tablet 3.125 mg  3.125 mg Oral Q12H  hydroxychloroquine (PLAQUENIL) tablet 200 mg  200 mg Oral BID  memantine (NAMENDA) tablet 10 mg  10 mg Oral BID  menthol-zinc oxide (CALMOSEPTINE) 0.44-20.6 % ointment   Topical BID  polyethylene glycol (MIRALAX) packet 17 g  17 g Oral DAILY  hydrALAZINE (APRESOLINE) 20 mg/mL injection 10 mg  10 mg IntraVENous Q6H PRN  
 heparin (porcine) injection 5,000 Units  5,000 Units SubCUTAneous Q8H  
 sodium chloride (NS) flush 5-10 mL  5-10 mL IntraVENous PRN Allergies: Patient has no known allergies. Temp (24hrs), Av.2 °F (36.8 °C), Min:97.3 °F (36.3 °C), Max:99.2 °F (37.3 °C) Visit Vitals /73 (BP 1 Location: Left arm, BP Patient Position: At rest) Pulse 70 Temp 97.7 °F (36.5 °C) Resp 20 Ht 5' 4\" (1.626 m) Wt 83.5 kg (184 lb) SpO2 99% Breastfeeding? No  
BMI 31.58 kg/m² ROS: 12 point ROS obtained in details. Pertinent positives as mentioned in HPI,  
otherwise negative Physical Exam: 
 
Constitutional: She appears well-developed and well-nourished. No distress. non verbal 
 
HENT:  
Head: Normocephalic and atraumatic. Right Ear: External ear normal.  
Left Ear: External ear normal.  
Nose: Nose normal.  
Eyes: Pupils are equal, round, and reactive to light. Conjunctivae and EOM are normal. No scleral icterus. Neck: Normal range of motion. Neck supple. No JVD present. No tracheal deviation present. No thyromegaly present. Cardiovascular: Normal rate, regular rhythm, normal heart sounds and intact distal pulses. Exam reveals no gallop and no friction rub. No murmur heard. Pulmonary/Chest: Effort normal and breath sounds normal. She exhibits no tenderness. no rales/rhonchi Abdominal: Soft. Bowel sounds are normal. She exhibits no distension. There is no tenderness. There is no rebound and no guarding. Musculoskeletal: She exhibits no edema or tenderness. Right lower extremity with  decreased erythema of right lateral thigh, right leg with decreased overlying warmth/tenderness, anterior tibial 10 x 5 cm open bloody wound with necrotic center, Left lower extremity  without edema/tenderness Lymphadenopathy:  
  She has no cervical adenopathy. Neurological: She is alert. No cranial nerve deficit. Coordination normal.  
Eyes open but does not follow commands, globally weak, right lower extremity is contracted Skin: Skin is dry. Right lower extremity erythema and warm to touch Psychiatric:  
Unable to assess Nursing note and vitals reviewed. 
  
  
 
 
 
 
Labs: Results:  
Chemistry Recent Labs 05/08/19 
1542 05/07/19 
7534 05/06/19 
9451 * 159* 227* * 144 142  
K 4.3 4.6 4.1 * 109* 108 CO2 31 28 29 BUN 86* 87* 88* CREA 1.53* 1.71* 1.95* CA 8.7 8.4* 8.4* AGAP 4 7 5 BUCR 56* 51* 45* CBC w/Diff Recent Labs 05/08/19 
7468 05/07/19 
6734 05/06/19 
5893 WBC 11.0 11.6 12.5 RBC 2.87* 3.02* 3.10* HGB 8.4* 8.8* 8.7* HCT 26.6* 27.8* 28.2*  
 230 259 GRANS 89* 86* 81* LYMPH 4* 11* 3*  
EOS 0 0 0 Microbiology Recent Labs 05/02/19 
1550 05/02/19 
2595 05/01/19 
1050 05/01/19 
0018 04/30/19 1953 CULT NO GROWTH AFTER 13 HOURS MRSA target DNA is not detected (presumptive not colonized with MRSA) MODERATE METHICILLIN RESISTANT STAPHYLOCOCCUS AUREUS*  FEW STREPTOCOCCI, BETA HEMOLYTIC GROUP A*  [facility] CALLED TO AND CORRECTLY REPEATED BY: 
ANÍBAL, RN, CVT AT 5919 5/3/19 TO  MELODY 
  NO ANAEROBES ISOLATED 2 DAYS NO GROWTH 1 DAY NO GROWTH 3 DAYS  
  
 
 
RADIOLOGY: 
 
All available imaging studies/reports in [facility] for this admission were reviewed Dr. Wayne Moreno, Infectious Disease Specialist 
[phone] May 8, 2019 
7:41 AM

## 2024-03-29 ENCOUNTER — TELEPHONE (OUTPATIENT)
Dept: HEMATOLOGY ONCOLOGY | Facility: CLINIC | Age: 79
End: 2024-03-29

## 2024-03-29 NOTE — TELEPHONE ENCOUNTER
Attempted to call pt regarding her appt scheduled on 4/19/24 with Yoon Gould. That appt does need to be rescheduled due to provider scheduling conflict. Mailbox was full, unable to leave message. [employer] message will be sent to pt to call [facility] to reschedule appt. Another attempt will be made to contact pt by phone.     Carla,     I am reaching out regarding your appointment with Yoon Gould PA-C on  4/19/24 at 10:30 AM .    There has been a change to the providers schedule, and we need to reschedule your visit.     Please call the [facility] at [phone] and we would be happy to assist you with rescheduling.     Thank you,   [facility]  Oncology [facility]  [phone]

## 2024-03-31 DIAGNOSIS — K21.9 GASTROESOPHAGEAL REFLUX DISEASE WITHOUT ESOPHAGITIS: ICD-10-CM

## 2024-04-01 ENCOUNTER — HOSPITAL ENCOUNTER (OUTPATIENT)
Dept: INFUSION CENTER | Facility: HOSPITAL | Age: 79
Discharge: HOME | End: 2024-04-01
Payer: COMMERCIAL

## 2024-04-01 VITALS
RESPIRATION RATE: 18 BRPM | OXYGEN SATURATION: 98 % | SYSTOLIC BLOOD PRESSURE: 142 MMHG | TEMPERATURE: 96.3 F | HEART RATE: 90 BPM | DIASTOLIC BLOOD PRESSURE: 56 MMHG

## 2024-04-01 DIAGNOSIS — D75.839 THROMBOCYTOSIS: ICD-10-CM

## 2024-04-01 DIAGNOSIS — D50.9 IRON DEFICIENCY ANEMIA, UNSPECIFIED IRON DEFICIENCY ANEMIA TYPE: Primary | ICD-10-CM

## 2024-04-01 LAB
HCT VFR BLD AUTO: 30.5 % (ref 34.8–46.1)
HGB BLD-MCNC: 9.3 G/DL (ref 11.5–15.4)

## 2024-04-01 PROCEDURE — 85014 HEMATOCRIT: CPT | Performed by: INTERNAL MEDICINE

## 2024-04-01 PROCEDURE — 85018 HEMOGLOBIN: CPT | Performed by: INTERNAL MEDICINE

## 2024-04-01 PROCEDURE — 96365 THER/PROPH/DIAG IV INF INIT: CPT

## 2024-04-01 RX ORDER — SODIUM CHLORIDE 9 MG/ML
20 INJECTION, SOLUTION INTRAVENOUS ONCE
Status: CANCELLED | OUTPATIENT
Start: 2024-04-01

## 2024-04-01 RX ORDER — SODIUM CHLORIDE 9 MG/ML
20 INJECTION, SOLUTION INTRAVENOUS ONCE
Status: COMPLETED | OUTPATIENT
Start: 2024-04-01 | End: 2024-04-01

## 2024-04-01 RX ORDER — PANTOPRAZOLE SODIUM 40 MG/1
TABLET, DELAYED RELEASE ORAL
Qty: 90 TABLET | Refills: 1 | Status: SHIPPED | OUTPATIENT
Start: 2024-04-01

## 2024-04-01 RX ADMIN — SODIUM CHLORIDE 20 ML/HR: 9 INJECTION, SOLUTION INTRAVENOUS at 13:27

## 2024-04-01 RX ADMIN — IRON SUCROSE 300 MG: 20 INJECTION, SOLUTION INTRAVENOUS at 13:27

## 2024-04-01 NOTE — PROGRESS NOTES
..Carla Atkinson  tolerated treatment well with no complications.      Carla Atkinson is aware of f/u with provider.    AVS printed and given to Carla Atkinson:    No (Declined by Carla Atkinson)

## 2024-04-03 ENCOUNTER — TELEPHONE (OUTPATIENT)
Dept: HEMATOLOGY ONCOLOGY | Facility: CLINIC | Age: 79
End: 2024-04-03

## 2024-04-03 NOTE — TELEPHONE ENCOUNTER
I called Carla regarding an appointment that they have scheduled with Yoon Gould PA-C scheduled on  4/19/24           Appointment Change  Cancel, Reschedule, Change to Virtual      Who are you speaking with? Left message for patient/son with new appointment date and time   If it is not the patient, is the caller listed on the communication consent form? N/A   Which provider is the appointment scheduled with? Yoon Gould PA-C   When was the original appointment scheduled?    Please list date and time 4/19/24 10:30am   At which location is the appointment scheduled to take place? [place]   Was the appointment rescheduled?     Was the appointment changed from an in person visit to a virtual visit?    If so, please list the details of the change. 5/28/24 1:00pm    Advised patient/son to contact the [facility] if the new appointment time and date does not work   What is the reason for the appointment change? Provider OOO       Was STAR transport scheduled? No   Does STAR transport need to be scheduled for the new visit (if applicable) No   Does the patient need an infusion appointment rescheduled? No   Does the patient have an upcoming infusion appointment scheduled? If so, when? No   Is the patient undergoing chemotherapy? No   For appointments cancelled with less than 24 hours:  Was the no-show policy reviewed? N/A       Carla,     I am reaching out regarding your appointment with Yoon Gould PA-C on  4/19/24 .    There has been a change to the providers schedule, and your appointment has been cancelled. Your new appointment is scheduled for Tuesday 5/28/24 at 1:00pm with Yoon Gould at the [place] office.    Please call the [facility] at [phone] and we would be happy to assist you with rescheduling.     Thank you,   [facility]  Oncology [facility]  [phone]

## 2024-04-03 NOTE — LETTER
Carla,     I am reaching out regarding your appointment with Yoon Gould PA-C on 4/19/24.    There has been a change to the providers schedule, and your appointment has been cancelled. Your new appointment is scheduled for Tuesday 5/28/24 at 1:00pm with Yoon Gould at the [place] office.    Please call the [facility] at [phone] and we would be happy to assist you with rescheduling.     Thank you,   [facility]  Oncology [facility]  [phone])

## 2024-04-19 ENCOUNTER — TELEPHONE (OUTPATIENT)
Dept: HEMATOLOGY ONCOLOGY | Facility: CLINIC | Age: 79
End: 2024-04-19

## 2024-04-19 DIAGNOSIS — D75.839 THROMBOCYTOSIS: Primary | ICD-10-CM

## 2024-04-19 DIAGNOSIS — D50.9 IRON DEFICIENCY ANEMIA, UNSPECIFIED IRON DEFICIENCY ANEMIA TYPE: ICD-10-CM

## 2024-04-19 NOTE — TELEPHONE ENCOUNTER
Attempted to returned call to patient.  Mailbox full and can not accept messages.  Lab orders placed   
Lab Inquiry   Who are you speaking with? Patient     If it is not the patient, are they listed on an active communication consent form? N/A   Name of ordering provider Yoon Gould PA-C   What is being requested? Lab orders need to be entered   Lab draw location [facility]   What is the best call back number? [phone]   If patient at the lab, Was a live attempts to contact the team made? N/a    Patient would like labs to be placed, due to not feeling well                                                   
Admission

## 2024-05-01 DIAGNOSIS — E53.8 FOLATE DEFICIENCY: ICD-10-CM

## 2024-05-02 RX ORDER — FOLIC ACID 1 MG/1
TABLET ORAL
Qty: 90 TABLET | Refills: 1 | Status: SHIPPED | OUTPATIENT
Start: 2024-05-02

## 2024-05-08 ENCOUNTER — APPOINTMENT (OUTPATIENT)
Dept: LAB | Facility: HOSPITAL | Age: 79
End: 2024-05-08
Payer: COMMERCIAL

## 2024-05-08 DIAGNOSIS — D50.9 IRON DEFICIENCY ANEMIA, UNSPECIFIED IRON DEFICIENCY ANEMIA TYPE: ICD-10-CM

## 2024-05-08 DIAGNOSIS — D75.839 THROMBOCYTOSIS: ICD-10-CM

## 2024-05-08 LAB
BASOPHILS # BLD AUTO: 0.03 THOUSANDS/ÂΜL (ref 0–0.1)
BASOPHILS NFR BLD AUTO: 0 % (ref 0–1)
EOSINOPHIL # BLD AUTO: 0.23 THOUSAND/ÂΜL (ref 0–0.61)
EOSINOPHIL NFR BLD AUTO: 3 % (ref 0–6)
ERYTHROCYTE [DISTWIDTH] IN BLOOD BY AUTOMATED COUNT: 17.2 % (ref 11.6–15.1)
FERRITIN SERPL-MCNC: 89 NG/ML (ref 11–307)
HCT VFR BLD AUTO: 41.4 % (ref 34.8–46.1)
HGB BLD-MCNC: 12.9 G/DL (ref 11.5–15.4)
IMM GRANULOCYTES # BLD AUTO: 0.04 THOUSAND/UL (ref 0–0.2)
IMM GRANULOCYTES NFR BLD AUTO: 1 % (ref 0–2)
IRON SATN MFR SERPL: 32 % (ref 15–50)
IRON SERPL-MCNC: 114 UG/DL (ref 50–212)
LYMPHOCYTES # BLD AUTO: 1.16 THOUSANDS/ÂΜL (ref 0.6–4.47)
LYMPHOCYTES NFR BLD AUTO: 17 % (ref 14–44)
MCH RBC QN AUTO: 29.3 PG (ref 26.8–34.3)
MCHC RBC AUTO-ENTMCNC: 31.2 G/DL (ref 31.4–37.4)
MCV RBC AUTO: 94 FL (ref 82–98)
MONOCYTES # BLD AUTO: 0.49 THOUSAND/ÂΜL (ref 0.17–1.22)
MONOCYTES NFR BLD AUTO: 7 % (ref 4–12)
NEUTROPHILS # BLD AUTO: 5.02 THOUSANDS/ÂΜL (ref 1.85–7.62)
NEUTS SEG NFR BLD AUTO: 72 % (ref 43–75)
NRBC BLD AUTO-RTO: 0 /100 WBCS
PLATELET # BLD AUTO: 421 THOUSANDS/UL (ref 149–390)
PMV BLD AUTO: 9.4 FL (ref 8.9–12.7)
RBC # BLD AUTO: 4.41 MILLION/UL (ref 3.81–5.12)
TIBC SERPL-MCNC: 356 UG/DL (ref 250–450)
UIBC SERPL-MCNC: 242 UG/DL (ref 155–355)
WBC # BLD AUTO: 6.97 THOUSAND/UL (ref 4.31–10.16)

## 2024-05-08 PROCEDURE — 83540 ASSAY OF IRON: CPT

## 2024-05-08 PROCEDURE — 85025 COMPLETE CBC W/AUTO DIFF WBC: CPT

## 2024-05-08 PROCEDURE — 82728 ASSAY OF FERRITIN: CPT

## 2024-05-08 PROCEDURE — 36415 COLL VENOUS BLD VENIPUNCTURE: CPT

## 2024-05-08 PROCEDURE — 83550 IRON BINDING TEST: CPT

## 2024-05-14 ENCOUNTER — TELEPHONE (OUTPATIENT)
Dept: HEMATOLOGY ONCOLOGY | Facility: CLINIC | Age: 79
End: 2024-05-14

## 2024-05-14 NOTE — TELEPHONE ENCOUNTER
Patient Call    Who are you speaking with? Patient    If it is not the patient, are they listed on an active communication consent form? N/A   What is the reason for this call? Pt is calling in regards to her labs she had done on 5/8/24. Pt is looking for the results of those labs. Please call pt back.    Does this require a call back? Yes   If a call back is required, please list best call back number [phone]   If a call back is required, advise that a message will be forwarded to their care team and someone will return their call as soon as possible.   Did you relay this information to the patient? Yes

## 2024-05-15 DIAGNOSIS — D50.8 OTHER IRON DEFICIENCY ANEMIA: ICD-10-CM

## 2024-05-15 DIAGNOSIS — K55.20 AVM (ARTERIOVENOUS MALFORMATION) OF COLON: ICD-10-CM

## 2024-05-15 DIAGNOSIS — D75.839 THROMBOCYTOSIS: Primary | ICD-10-CM

## 2024-05-15 RX ORDER — FERROUS SULFATE 324(65)MG
324 TABLET, DELAYED RELEASE (ENTERIC COATED) ORAL
Qty: 180 TABLET | Refills: 0 | Status: SHIPPED | OUTPATIENT
Start: 2024-05-15

## 2024-05-15 NOTE — PROGRESS NOTES
Telephone Note  [facility] [place]  [address]  [place] [id number] [phone]    Carla Atkinson,1945,[id number]  05/15/24    Patient called the office to discuss lab testing.     Appointment on 05/08/2024   Component Date Value Ref Range Status    WBC 05/08/2024 6.97  4.31 - 10.16 Thousand/uL Final    RBC 05/08/2024 4.41  3.81 - 5.12 Million/uL Final    Hemoglobin 05/08/2024 12.9  11.5 - 15.4 g/dL Final    Hematocrit 05/08/2024 41.4  34.8 - 46.1 % Final    MCV 05/08/2024 94  82 - 98 fL Final    MCH 05/08/2024 29.3  26.8 - 34.3 pg Final    MCHC 05/08/2024 31.2 (L)  31.4 - 37.4 g/dL Final    RDW 05/08/2024 17.2 (H)  11.6 - 15.1 % Final    MPV 05/08/2024 9.4  8.9 - 12.7 fL Final    Platelets 05/08/2024 421 (H)  149 - 390 Thousands/uL Final    nRBC 05/08/2024 0  /100 WBCs Final    Segmented % 05/08/2024 72  43 - 75 % Final    Immature Grans % 05/08/2024 1  0 - 2 % Final    Lymphocytes % 05/08/2024 17  14 - 44 % Final    Monocytes % 05/08/2024 7  4 - 12 % Final    Eosinophils Relative 05/08/2024 3  0 - 6 % Final    Basophils Relative 05/08/2024 0  0 - 1 % Final    Absolute Neutrophils 05/08/2024 5.02  1.85 - 7.62 Thousands/µL Final    Absolute Immature Grans 05/08/2024 0.04  0.00 - 0.20 Thousand/uL Final    Absolute Lymphocytes 05/08/2024 1.16  0.60 - 4.47 Thousands/µL Final    Absolute Monocytes 05/08/2024 0.49  0.17 - 1.22 Thousand/µL Final    Eosinophils Absolute 05/08/2024 0.23  0.00 - 0.61 Thousand/µL Final    Basophils Absolute 05/08/2024 0.03  0.00 - 0.10 Thousands/µL Final    Iron Saturation 05/08/2024 32  15 - 50 % Final    TIBC 05/08/2024 356  250 - 450 ug/dL Final    Iron 05/08/2024 114  50 - 212 ug/dL Final    Patients treated with metal-binding drugs (ie. Deferoxamine) may have depressed iron values.    UIBC 05/08/2024 242  155 - 355 ug/dL Final    Ferritin 05/08/2024 89  11 - 307 ng/mL Final         A/P:  1. Thrombocytosis  Persistence of  thrombocytosis despite IV iron supplementation.    Patient's iron supplementation stopped approximately 6 weeks ago.  Platelet count remains at 421.  We discussed that if supplementation of iron did not result in recovery of platelet count that additional myeloproliferative testing would be warranted.  We discussed this again today.  Since the patient's RDW is still elevated suggesting bone marrow recovery, I have recommended that she wait an additional 6 weeks and follow-up with a CBC.  If at that time the platelet count remains elevated then I would submit myeloproliferative testing to the insurance/lab.    Patient was in agreement with this plan as the alternative would to be to go for this testing more immediately.  Patient is on Plavix no additional aspirin is required.    Patient lives close to [facility].  Subsequent observations will be transferred to the [facility] after 6-week check-in.    2. Other iron deficiency anemia; 3. AVM (arteriovenous malformation) of colon  Patient's ferritin level has recovered nicely after the IV and.  Any side effects.  Patient notes that she is taking oral iron.  No side effects at present.  Hemoglobin is the highest it has been at 12 g/dL.  Recommendation moving forward is to recheck CBC in approximately 6 weeks.  After results, next interval testing will be recommended which will include iron studies.    Patient requested refill of oral iron and this was provided today.  - ferrous sulfate 324 (65 Fe) mg; Take 1 tablet (324 mg total) by mouth daily before breakfast  Dispense: 180 tablet; Refill: 0  - CBC and differential; Future    Yoon Gould PA-C  Medical Oncology/Hematology  [facility]

## 2024-05-17 ENCOUNTER — TELEPHONE (OUTPATIENT)
Dept: HEMATOLOGY ONCOLOGY | Facility: CLINIC | Age: 79
End: 2024-05-17

## 2024-05-17 NOTE — TELEPHONE ENCOUNTER
Patient Call    Who are you speaking with? Patient    If it is not the patient, are they listed on an active communication consent form? N/A   What is the reason for this call? Pt is calling in regards to her iron pill that was sent to the pharmacy on 5/15/24. Pt states the pharmacy did not receive the prescription and she would like to have it sent again to the [facility] . Pt is out of the medication so she would like this sent in today if possible. Please call pt back regarding this.    Does this require a call back? Yes   If a call back is required, please list best call back number [phone]   If a call back is required, advise that a message will be forwarded to their care team and someone will return their call as soon as possible.   Did you relay this information to the patient? Yes

## 2024-05-17 NOTE — TELEPHONE ENCOUNTER
Called and spoke with pharmacist at [facility]. She confirmed they have rx for iron. She states insurance will not cover medication. They can dispense 90 tablets for $6.53.     Returned call to patient and made her aware.

## 2024-06-09 DIAGNOSIS — Z76.0 MEDICATION REFILL: ICD-10-CM

## 2024-06-09 RX ORDER — LANOLIN ALCOHOL/MO/W.PET/CERES
400 CREAM (GRAM) TOPICAL 2 TIMES DAILY
Qty: 180 TABLET | Refills: 1 | Status: SHIPPED | OUTPATIENT
Start: 2024-06-09

## 2024-06-10 ENCOUNTER — APPOINTMENT (OUTPATIENT)
Dept: LAB | Facility: HOSPITAL | Age: 79
End: 2024-06-10
Payer: COMMERCIAL

## 2024-06-10 DIAGNOSIS — D50.8 OTHER IRON DEFICIENCY ANEMIA: ICD-10-CM

## 2024-06-10 LAB
BASOPHILS # BLD AUTO: 0.04 THOUSANDS/ÂΜL (ref 0–0.1)
BASOPHILS NFR BLD AUTO: 0 % (ref 0–1)
EOSINOPHIL # BLD AUTO: 0.1 THOUSAND/ÂΜL (ref 0–0.61)
EOSINOPHIL NFR BLD AUTO: 1 % (ref 0–6)
ERYTHROCYTE [DISTWIDTH] IN BLOOD BY AUTOMATED COUNT: 14.5 % (ref 11.6–15.1)
HCT VFR BLD AUTO: 36.7 % (ref 34.8–46.1)
HGB BLD-MCNC: 11.7 G/DL (ref 11.5–15.4)
IMM GRANULOCYTES # BLD AUTO: 0.05 THOUSAND/UL (ref 0–0.2)
IMM GRANULOCYTES NFR BLD AUTO: 1 % (ref 0–2)
LYMPHOCYTES # BLD AUTO: 0.99 THOUSANDS/ÂΜL (ref 0.6–4.47)
LYMPHOCYTES NFR BLD AUTO: 11 % (ref 14–44)
MCH RBC QN AUTO: 29.5 PG (ref 26.8–34.3)
MCHC RBC AUTO-ENTMCNC: 31.9 G/DL (ref 31.4–37.4)
MCV RBC AUTO: 92 FL (ref 82–98)
MONOCYTES # BLD AUTO: 0.53 THOUSAND/ÂΜL (ref 0.17–1.22)
MONOCYTES NFR BLD AUTO: 6 % (ref 4–12)
NEUTROPHILS # BLD AUTO: 7.19 THOUSANDS/ÂΜL (ref 1.85–7.62)
NEUTS SEG NFR BLD AUTO: 81 % (ref 43–75)
NRBC BLD AUTO-RTO: 0 /100 WBCS
PLATELET # BLD AUTO: 357 THOUSANDS/UL (ref 149–390)
PMV BLD AUTO: 9.5 FL (ref 8.9–12.7)
RBC # BLD AUTO: 3.97 MILLION/UL (ref 3.81–5.12)
WBC # BLD AUTO: 8.9 THOUSAND/UL (ref 4.31–10.16)

## 2024-06-10 PROCEDURE — 85025 COMPLETE CBC W/AUTO DIFF WBC: CPT

## 2024-06-10 PROCEDURE — 36415 COLL VENOUS BLD VENIPUNCTURE: CPT

## 2024-06-11 NOTE — TELEPHONE ENCOUNTER
Airway    Performed by: Trevon Villar DMD  Authorized by: Gael Mabry MD    Final Airway Type:  Supraglottic airway  Final Supraglottic Airway:  Air-Q  SGA Size*:  3.5  Attempts*:  1  Number of Other Approaches Attempted:  1  Unsuccessful Airway(s) Attempted:  SGA   Patient Identified, Procedure confirmed, Emergency equipment available and Safety protocols followed  Location:  OR  Urgency:  Elective  Difficult Airway: No    Indications for Airway Management:  Anesthesia  Mask Difficulty Assessment:  1 - vent by mask   Igel 5 too big with leak. Switched to airq       Was this placed in the Clerical basket?   Una Potts MA

## 2024-07-02 ENCOUNTER — TELEPHONE (OUTPATIENT)
Dept: HEMATOLOGY ONCOLOGY | Facility: CLINIC | Age: 79
End: 2024-07-02

## 2024-07-02 DIAGNOSIS — D75.839 THROMBOCYTOSIS: Primary | ICD-10-CM

## 2024-07-02 NOTE — TELEPHONE ENCOUNTER
ANTHONY Ramirez RN  Followed up with patient CBC.  Let her know that her platelet count has returned to normal range.  I would recommend that the patient follow-up with [facility] in approximately 3 months with another CBC prior.  Please help schedule and communicate with patient.      Attempted to phone patient with above information.  Unable to leave voice message as mailbox is full.

## 2024-07-03 NOTE — TELEPHONE ENCOUNTER
Attempted to phone patient.  Unable to leave voice message.  Mail box is full.        Attempted to phone patient son. Mailbox also full and unable to leave voice message

## 2024-07-11 ENCOUNTER — RA CDI HCC (OUTPATIENT)
Dept: OTHER | Facility: HOSPITAL | Age: 79
End: 2024-07-11

## 2024-07-11 NOTE — PROGRESS NOTES
E11.69,   HCC coding opportunities          Chart Reviewed number of suggestions sent to Provider: 1     Patients Insurance     Medicare Insurance: [employer]vantage

## 2024-07-18 ENCOUNTER — TELEPHONE (OUTPATIENT)
Dept: HEMATOLOGY ONCOLOGY | Facility: MEDICAL CENTER | Age: 79
End: 2024-07-18

## 2024-07-18 NOTE — TELEPHONE ENCOUNTER
Call out to patient in regards to missed appointment.  Patient forgot about today's appointment and wished to reschedule.   Requested date, 8/14  Patient agreeable to time and aware of address

## 2024-07-20 DIAGNOSIS — I63.9 STROKE (CEREBRUM) (HCC): ICD-10-CM

## 2024-07-20 DIAGNOSIS — Z79.4 TYPE 2 DIABETES MELLITUS WITH DIABETIC POLYNEUROPATHY, WITH LONG-TERM CURRENT USE OF INSULIN (HCC): ICD-10-CM

## 2024-07-20 DIAGNOSIS — E11.42 TYPE 2 DIABETES MELLITUS WITH DIABETIC POLYNEUROPATHY, WITH LONG-TERM CURRENT USE OF INSULIN (HCC): ICD-10-CM

## 2024-07-21 RX ORDER — ATORVASTATIN CALCIUM 80 MG/1
TABLET, FILM COATED ORAL
Qty: 100 TABLET | Refills: 1 | Status: SHIPPED | OUTPATIENT
Start: 2024-07-21

## 2024-07-21 RX ORDER — GLIMEPIRIDE 2 MG/1
2 TABLET ORAL
Qty: 100 TABLET | Refills: 1 | Status: SHIPPED | OUTPATIENT
Start: 2024-07-21

## 2024-08-26 DIAGNOSIS — K21.9 GASTROESOPHAGEAL REFLUX DISEASE WITHOUT ESOPHAGITIS: ICD-10-CM

## 2024-08-27 RX ORDER — PANTOPRAZOLE SODIUM 40 MG/1
TABLET, DELAYED RELEASE ORAL
Qty: 90 TABLET | Refills: 1 | Status: SHIPPED | OUTPATIENT
Start: 2024-08-27

## 2024-09-13 ENCOUNTER — TELEPHONE (OUTPATIENT)
Dept: HEMATOLOGY ONCOLOGY | Facility: MEDICAL CENTER | Age: 79
End: 2024-09-13

## 2024-09-13 ENCOUNTER — TELEPHONE (OUTPATIENT)
Dept: HEMATOLOGY ONCOLOGY | Facility: CLINIC | Age: 79
End: 2024-09-13

## 2024-09-13 ENCOUNTER — APPOINTMENT (OUTPATIENT)
Dept: LAB | Facility: HOSPITAL | Age: 79
End: 2024-09-13
Payer: COMMERCIAL

## 2024-09-13 DIAGNOSIS — D50.9 IRON DEFICIENCY ANEMIA, UNSPECIFIED IRON DEFICIENCY ANEMIA TYPE: Primary | ICD-10-CM

## 2024-09-13 DIAGNOSIS — D75.839 THROMBOCYTOSIS: ICD-10-CM

## 2024-09-13 DIAGNOSIS — D50.9 IRON DEFICIENCY ANEMIA, UNSPECIFIED IRON DEFICIENCY ANEMIA TYPE: ICD-10-CM

## 2024-09-13 LAB
BASOPHILS # BLD AUTO: 0.03 THOUSANDS/ΜL (ref 0–0.1)
BASOPHILS NFR BLD AUTO: 1 % (ref 0–1)
EOSINOPHIL # BLD AUTO: 0.12 THOUSAND/ΜL (ref 0–0.61)
EOSINOPHIL NFR BLD AUTO: 2 % (ref 0–6)
ERYTHROCYTE [DISTWIDTH] IN BLOOD BY AUTOMATED COUNT: 14.1 % (ref 11.6–15.1)
FERRITIN SERPL-MCNC: 11 NG/ML (ref 11–307)
HCT VFR BLD AUTO: 26.4 % (ref 34.8–46.1)
HGB BLD-MCNC: 7.7 G/DL (ref 11.5–15.4)
IMM GRANULOCYTES # BLD AUTO: 0.04 THOUSAND/UL (ref 0–0.2)
IMM GRANULOCYTES NFR BLD AUTO: 1 % (ref 0–2)
IRON SATN MFR SERPL: 7 % (ref 15–50)
IRON SERPL-MCNC: 27 UG/DL (ref 50–212)
LYMPHOCYTES # BLD AUTO: 1 THOUSANDS/ΜL (ref 0.6–4.47)
LYMPHOCYTES NFR BLD AUTO: 18 % (ref 14–44)
MCH RBC QN AUTO: 26.2 PG (ref 26.8–34.3)
MCHC RBC AUTO-ENTMCNC: 29.2 G/DL (ref 31.4–37.4)
MCV RBC AUTO: 90 FL (ref 82–98)
MONOCYTES # BLD AUTO: 0.44 THOUSAND/ΜL (ref 0.17–1.22)
MONOCYTES NFR BLD AUTO: 8 % (ref 4–12)
NEUTROPHILS # BLD AUTO: 4.06 THOUSANDS/ΜL (ref 1.85–7.62)
NEUTS SEG NFR BLD AUTO: 70 % (ref 43–75)
NRBC BLD AUTO-RTO: 0 /100 WBCS
PLATELET # BLD AUTO: 445 THOUSANDS/UL (ref 149–390)
PMV BLD AUTO: 9.7 FL (ref 8.9–12.7)
RBC # BLD AUTO: 2.94 MILLION/UL (ref 3.81–5.12)
TIBC SERPL-MCNC: 380 UG/DL (ref 250–450)
UIBC SERPL-MCNC: 353 UG/DL (ref 155–355)
WBC # BLD AUTO: 5.69 THOUSAND/UL (ref 4.31–10.16)

## 2024-09-13 PROCEDURE — 85025 COMPLETE CBC W/AUTO DIFF WBC: CPT

## 2024-09-13 PROCEDURE — 83550 IRON BINDING TEST: CPT

## 2024-09-13 PROCEDURE — 36415 COLL VENOUS BLD VENIPUNCTURE: CPT

## 2024-09-13 PROCEDURE — 82728 ASSAY OF FERRITIN: CPT

## 2024-09-13 PROCEDURE — 83540 ASSAY OF IRON: CPT

## 2024-09-13 NOTE — TELEPHONE ENCOUNTER
----- Message from Marlene Machado PA-C sent at 9/13/2024  3:47 PM EDT -----  Please see how patient is feeling; drop in hgb.   See if any bleeding sxs that she would need to go to the hospital.

## 2024-09-13 NOTE — TELEPHONE ENCOUNTER
Spoke with patient regarding labs (CBC & iron panel) needed to be drawn prior to appointment.  Indicated the scripts are in the system, they are non-fasting and patient can go to any [facility] facility to have the labs drawn.  Patient verbalized understanding.

## 2024-09-13 NOTE — TELEPHONE ENCOUNTER
Spoke with patient to check in with her regarding any bleeding symptoms  Patient denies any bleeding, SOB, blood in stools, dizziness, fatigue  She feels well overall  Has some minimal bruising from tripping over her cat a few days ago  FU with [facility] in office next week

## 2024-10-10 LAB
LEFT EYE DIABETIC RETINOPATHY: NORMAL
RIGHT EYE DIABETIC RETINOPATHY: NORMAL

## 2024-11-03 DIAGNOSIS — E53.8 FOLATE DEFICIENCY: ICD-10-CM

## 2024-11-04 RX ORDER — FOLIC ACID 1 MG/1
TABLET ORAL
Qty: 90 TABLET | Refills: 1 | OUTPATIENT
Start: 2024-11-04

## 2024-11-07 ENCOUNTER — TELEPHONE (OUTPATIENT)
Dept: INFUSION CENTER | Facility: HOSPITAL | Age: 79
End: 2024-11-07

## 2024-11-07 ENCOUNTER — TELEPHONE (OUTPATIENT)
Dept: HEMATOLOGY ONCOLOGY | Facility: CLINIC | Age: 79
End: 2024-11-07

## 2024-11-07 ENCOUNTER — OFFICE VISIT (OUTPATIENT)
Dept: HEMATOLOGY ONCOLOGY | Facility: CLINIC | Age: 79
End: 2024-11-07
Payer: COMMERCIAL

## 2024-11-07 VITALS
WEIGHT: 115 LBS | SYSTOLIC BLOOD PRESSURE: 142 MMHG | OXYGEN SATURATION: 99 % | TEMPERATURE: 98 F | HEART RATE: 112 BPM | HEIGHT: 60 IN | RESPIRATION RATE: 16 BRPM | BODY MASS INDEX: 22.58 KG/M2 | DIASTOLIC BLOOD PRESSURE: 58 MMHG

## 2024-11-07 DIAGNOSIS — E53.8 FOLATE DEFICIENCY: ICD-10-CM

## 2024-11-07 DIAGNOSIS — D50.9 IRON DEFICIENCY ANEMIA, UNSPECIFIED IRON DEFICIENCY ANEMIA TYPE: Primary | ICD-10-CM

## 2024-11-07 DIAGNOSIS — K55.20 AVM (ARTERIOVENOUS MALFORMATION) OF COLON: ICD-10-CM

## 2024-11-07 DIAGNOSIS — D75.838 SECONDARY THROMBOCYTOSIS: ICD-10-CM

## 2024-11-07 DIAGNOSIS — E46 PROTEIN-CALORIE MALNUTRITION, UNSPECIFIED SEVERITY (HCC): ICD-10-CM

## 2024-11-07 PROCEDURE — 99214 OFFICE O/P EST MOD 30 MIN: CPT | Performed by: PHYSICIAN ASSISTANT

## 2024-11-07 PROCEDURE — G2211 COMPLEX E/M VISIT ADD ON: HCPCS | Performed by: PHYSICIAN ASSISTANT

## 2024-11-07 RX ORDER — FOLIC ACID 1 MG/1
1000 TABLET ORAL DAILY
Qty: 90 TABLET | Refills: 1 | Status: SHIPPED | OUTPATIENT
Start: 2024-11-07

## 2024-11-07 RX ORDER — SODIUM CHLORIDE 9 MG/ML
20 INJECTION, SOLUTION INTRAVENOUS ONCE
OUTPATIENT
Start: 2024-11-18

## 2024-11-07 NOTE — TELEPHONE ENCOUNTER
Please call patient to schedule treatments.  
Complex psychosocial needs/coping issues/Pt voiced concerns about  current DX and the episodes of dizziness and vomiting he experienced PTA. Pt has a very supportive family.

## 2024-11-07 NOTE — PATIENT INSTRUCTIONS
[facility] Medical Oncology and Hematology Team  [facility] - [phone]    Your Team Member:  Advanced Practitioner:  Yoon Gould PA-C    Please answer Private and Unavailable Calls - this may be your team(s) contacting you.  If you have medical questions/concerns/issues - contact us either by (1) My Chart (2) [facility]

## 2024-11-07 NOTE — PROGRESS NOTES
[address]  [facility] [place]  [address]  [facility] [id number]  Hematology Ambulatory Follow-Up  Carla Atkinson, 1945, [id number]  11/7/2024      Assessment and Plan   1. Iron deficiency anemia, unspecified iron deficiency anemia type; 2. AVM (arteriovenous malformation) of colon; 3. Thrombocytosis  This is an 79-year-old female with past medical history of iron deficiency anemia in the setting of AVMs of the colon status post ablations.  Unfortunately, patient continues to have blood loss anemia related to the AVM formations.  Patient requires routine screenings.  In September 2024 patient was found to be profoundly anemic with very low ferritin.  Unfortunately, patient did not attend follow-up appointment secondary to headache, and now is following up with me to discuss next steps.    Last blood test were pretty profoundly low.  Considering the patient's pattern I doubt that they have increased much.  Patient will go for blood work at her earliest convenience.  I have recommended that she undergo IV iron supplementation.  Patient and I discussed the atrial venous malformation and how this can be a chronic issue for blood loss anemia.  Patient needs to stay on top of IV iron supplementation.  Patient lives approximately 10 minutes from our [facility].  I will transition her care to a colleague who is there regularly.  I remain available to the patient and to her family if any questions should arise until transfer of care occurs.    Recommendations:  Venofer 300 mg IV weekly x 5 doses    Of note, B12 returns abnormal, I will order oral supplementation or IM injections depending upon the above.  I also made the patient aware that if anemia is less than what it was before, that I will probably order CBC in conjunction with Venofer infusions. Patient was agreeable.    - CBC and differential; Future  - Iron Panel (Includes Ferritin, Iron Sat%, Iron, and TIBC);  Future  - Vitamin B12; Future  - Folate; Future  - CBC and differential; Future  - Iron Panel (Includes Ferritin, Iron Sat%, Iron, and TIBC); Future  - Vitamin B12; Future  - Folate; Future      4. Folate deficiency; 5. Protein-calorie malnutrition, unspecified severity (HCC)  In the past patient has been deficient of folic acid.  She has been taking a supplement daily without significant toxicities or side effects.  This was refilled for the patient for 6 months.    - CBC and differential; Future  - Iron Panel (Includes Ferritin, Iron Sat%, Iron, and TIBC); Future  - Vitamin B12; Future  - Folate; Future    The patient is scheduled for follow-up in approximately 4 months with Harmony Campo PA-C.     Patient voiced agreement and understanding to the above.   Patient advised to call the Hematology/Oncology office with any questions and concerns regarding the above.    I have spent a total time of 35 minutes in caring for this patient on the day of the visit/encounter including Risks and benefits of tx options, Instructions for management, Importance of tx compliance, Documenting in the medical record, and Obtaining or reviewing history  .  Barrier(s) to care: None  The patient is able to self care.    Yoon Gould PA-C  Medical Oncology/Hematology  [facility]    Subjective     Chief Complaint   Patient presents with    Follow-up       History of present illness:   This is a 76 year old female with past medical history of diabetes well controlled, GERD on Protonix therapy, hypertension, hyperlipidemia who was found to be iron deficient in January 2020 when hospitalized after having a stroke.     Patient was hospitalized after having altered mental status changes.  Patient was back to mental status baseline after 16 hours.  On admission, the patient had a hemoglobin of 7.3 grams/deciliter and was very microcytic with high RDW.  Patient was transfused with 2 units of packed red blood  cells in the emergency room.  Patient also underwent GI consultation and had a colonoscopy as well as an endoscopy after discharge that did not demonstrate etiology of iron deficiency.  Iron studies in January 2020 demonstrated iron saturation of 3% with ferritin of 6 ng/dL.     At discharge patient was started on oral iron supplementation twice a day.  Patient initially had a lot of constipation but after being on it for some time, this seemed to even now.  Patient continue to have follow-up with GI physicians.  Follow-up with fecal occult blood test was negative.  Patient had a urinalysis completed during hospitalization in January 2020 but follow-up urinalysis has not been completed.  Patient denies blood-loss vaginally.     In Sept and Oct 2020, patient underwent treatment with IV Venofer 200 mg IV. Patient notes some issues with obtaining venous access but otherwise treatments went well.  Follow-up blood work on 11/27 demonstrated hemoglobin of 11.9, platelet count of 311, ferritin of 246,      6/3/2021 Ferritin = 24,   TIBC = 326, iron saturation 20%, hemoglobin=  11.5, platelet count 355, MCV 94, RDW 12.6   7/26/2021: Benign pathology of Thyroid aspiration.   10/4/2021:  WBC =5.7, Hemoglobin = 10.0, MCV = 91, platelet = 390, TIBC = 341, iron saturation = 10%     5/7/22 ferritin = 10, hemoglobin = 9.4, platelet count = 493 TIBC = 366  May- June 2022 : Venofer 200 mg IV weekly x 5 doses     Patient was hospitalized in late February and early March discharged on 3/2/2023.  Patient received 3 units of packed red blood cells, 1 dose of Venofer 200 mg and underwent colonoscopy and endoscopy.  Endoscopy demonstrated small AVM proximal stomach with spontaneous bleeding cauterized during examination.  2/27/2023: Ferritin = 4, TIBC 440, hemoglobin = 5.7, MCV 78, platelet count 41, RDW 15.9, white blood cell count within normal limits.  3/17/23  hemoglobin=  10.1, platelet count 478, MCV 88, RDW 23.4 (Post discharge)      03/31-04/21/23: IV Venofer 300mg weekly x 4     Capsule endoscopy 04/04/2023: no active bleeding. gastritis, small bowel AVM, ? submucosal lesion mid jejunum vs extrinsic compression, two AVMs proximal colon.     2/22/24-4/1/24 Venofer 300 mg IV weekly x 5 doses  5/15/2024 WBC 6.9, hemoglobin 12.9, MCV 94, platelet count 421, iron saturation 32%, ferritin 89  9/13/2024 WBC 5.6, hemoglobin 7.7, MCV 90, platelet count 445   Ferritin = 11   Patient was called by the office.  Patient was asymptomatic was to follow-up with hematology but canceled appt.      Interval history: Patient is feeling well today.  No specific annoyances beyond her routine sinus issues and arthralgias.  Patient notes that generally feeling well not short of breath with walking.  Patient denies symptoms of iron deficiency or of anemia in September through the present time.  Patient breathing comfortably ambulating with cane.    Review of Systems   Constitutional:  Positive for fatigue. Negative for appetite change, fever and unexpected weight change.   HENT:  Positive for sinus pressure. Negative for nosebleeds.    Respiratory:  Negative for cough, choking and shortness of breath.         Negative hemoptysis.   Cardiovascular:  Negative for chest pain, palpitations and leg swelling.   Gastrointestinal: Negative.  Negative for abdominal distention, abdominal pain, anal bleeding, blood in stool, constipation, diarrhea, nausea and vomiting.   Endocrine: Negative.  Negative for cold intolerance.   Genitourinary: Negative.  Negative for hematuria, menstrual problem, vaginal bleeding, vaginal discharge and vaginal pain.   Musculoskeletal:  Positive for arthralgias. Negative for myalgias, neck pain and neck stiffness.   Skin: Negative.  Negative for color change, pallor and rash.   Allergic/Immunologic: Negative.  Negative for immunocompromised state.   Neurological: Negative.  Negative for weakness and headaches.   Hematological:  Negative for  adenopathy. Does not bruise/bleed easily.   All other systems reviewed and are negative.      Patient Active Problem List   Diagnosis    Combined forms of age-related cataract of left eye    Hyperlipidemia    Personal history of transient ischemic attack (TIA), and cerebral infarction without residual deficits    Nicotine abuse    Arthritis    Essential hypertension    GERD (gastroesophageal reflux disease)    Iron deficiency anemia    Type 2 diabetes mellitus with stage 3a chronic kidney disease and hypertension (HCC)    Essential (hemorrhagic) thrombocythemia (HCC)    CVA (cerebral vascular accident) (HCC)    Nodule of upper lobe of right lung    Thyroid nodule greater than or equal to 1.5 cm in diameter incidentally noted on imaging study    Vertebral artery stenosis, bilateral    Thrombocytosis    Abnormal CAT scan    Chronic embolism and thrombosis of other specified deep vein of right lower extremity (HCC)     Past Medical History:   Diagnosis Date    Anemia     iron deficiency anemia    Arthritis     Capsulitis of foot     Last assessed 07/29/13    Chronic kidney disease     Diabetes (HCC)     type 2    Full dentures     Gallbladder hydrops 10/11/2023    Gallstones and inflammation of gallbladder with obstruction 10/11/2023    Large stone stuck in the neck of the gallbladder advised surgery    Ganglion     Last assessed 07/29/13    Hearing decreased     No aids    Hyperlipidemia     Hypertension     Magnesium deficiency     Sciatica     Status post laparoscopic cholecystectomy     Wears glasses     Weight loss     30lb [id number]     Past Surgical History:   Procedure Laterality Date    CATARACT EXTRACTION      COLONOSCOPY      DILATION AND CURETTAGE OF UTERUS      x 4    HAND SURGERY Left     fx repaired w/wrist bone    HYSTERECTOMY      left ovary remains    KNEE ARTHROSCOPY Left     LIPOMA RESECTION      removed from back and left buttock    MS LAPAROSCOPY SURG CHOLECYSTECTOMY N/A 10/31/2023    Procedure:  CHOLECYSTECTOMY LAPAROSCOPIC;  Surgeon: Moises Marina MD;  Location: [facility] MAIN OR;  Service: General    AK XCAPSL CTRC RMVL INSJ IO LENS PROSTH W/O ECP Right 1/24/2019    Procedure: EXTRACTION EXTRACAPSULAR CATARACT PHACO INTRAOCULAR LENS (IOL);  Surgeon: Flower Bolanos MD;  Location: [facility] MAIN OR;  Service: Ophthalmology    AK XCAPSL CTRC RMVL INSJ IO LENS PROSTH W/O ECP Left 2/14/2019    Procedure: EXTRACTION EXTRACAPSULAR CATARACT PHACO INTRAOCULAR LENS (IOL);  Surgeon: Flower Bolanos MD;  Location: [facility] MAIN OR;  Service: Ophthalmology    TONSILLECTOMY      and adenoids    TUBAL LIGATION      US GUIDED THYROID BIOPSY  7/26/2021     Family History   Problem Relation Age of Onset    Leukemia Father     Early death Father 45        leukemia    Stomach cancer Maternal Grandfather     Diabetes Paternal Grandfather     Diabetes Sister         insulin dependent    Diabetes Brother         insulin    Diabetes Sister         insulin    Diabetes Sister         insulin     Social History     Socioeconomic History    Marital status:      Spouse name: Not on file    Number of children: Not on file    Years of education: Not on file    Highest education level: Not on file   Occupational History    Not on file   Tobacco Use    Smoking status: Every Day     Current packs/day: 0.50     Average packs/day: 0.5 packs/day for 67.8 years (33.9 ttl pk-yrs)     Types: Cigarettes     Start date: 1957     Passive exposure: Current    Smokeless tobacco: Never   Vaping Use    Vaping status: Never Used   Substance and Sexual Activity    Alcohol use: Not Currently     Comment: Rarely    Drug use: Never    Sexual activity: Not Currently     Birth control/protection: Surgical   Other Topics Concern    Not on file   Social History Narrative    Not on file     Social Determinants of Health     Financial Resource Strain: Low Risk  (7/18/2023)    Overall Financial Resource Strain (CARDIA)     Difficulty of Paying Living Expenses: Not hard  at all   Food Insecurity: No Food Insecurity (2/27/2023)    Hunger Vital Sign     Worried About Running Out of Food in the Last Year: Never true     Ran Out of Food in the Last Year: Never true   Transportation Needs: No Transportation Needs (7/18/2023)    PRAPARE - Transportation     Lack of Transportation (Medical): No     Lack of Transportation (Non-Medical): No   Physical Activity: Inactive (6/23/2021)    Exercise Vital Sign     Days of Exercise per Week: 0 days     Minutes of Exercise per Session: 0 min   Stress: No Stress Concern Present (6/23/2021)    [employer] - Occupational Stress Questionnaire     Feeling of Stress : Only a little   Social Connections: Socially Isolated (6/23/2021)    Social Connection and Isolation Panel [NHANES]     Frequency of Communication with Friends and Family: More than three times a week     Frequency of Social Gatherings with Friends and Family: More than three times a week     Attends Cheondoism Services: Never     Active Member of Clubs or Organizations: No     Attends Club or Organization Meetings: Never     Marital Status:    Intimate Partner Violence: Not At Risk (6/23/2021)    Humiliation, Afraid, Rape, and Kick questionnaire     Fear of Current or Ex-Partner: No     Emotionally Abused: No     Physically Abused: No     Sexually Abused: No   Housing Stability: Low Risk  (2/27/2023)    Housing Stability Vital Sign     Unable to Pay for Housing in the Last Year: No     Number of Places Lived in the Last Year: 1     Unstable Housing in the Last Year: No       Current Outpatient Medications:     acetaminophen (TYLENOL) 650 mg CR tablet, Take 650 mg by mouth every 8 (eight) hours as needed for mild pain, Disp: , Rfl:     atorvastatin (LIPITOR) 80 mg tablet, TAKE 1 TABLET BY MOUTH IN THE  EVENING, Disp: 100 tablet, Rfl: 1    clopidogrel (PLAVIX) 75 mg tablet, TAKE 1 TABLET BY MOUTH DAILY, Disp: 90 tablet, Rfl: 3    cyanocobalamin (VITAMIN  "B-12) 1000 MCG tablet, Take 1 tablet (1,000 mcg total) by mouth daily Do not start before March 3, 2023., Disp: 30 tablet, Rfl: 0    ferrous sulfate 324 (65 Fe) mg, Take 1 tablet (324 mg total) by mouth daily before breakfast, Disp: 180 tablet, Rfl: 0    folic acid (FOLVITE) 1 mg tablet, TAKE 1 TABLET BY MOUTH EVERY DAY, Disp: 90 tablet, Rfl: 1    glimepiride (AMARYL) 2 mg tablet, TAKE 1 TABLET BY MOUTH DAILY  WITH BREAKFAST, Disp: 100 tablet, Rfl: 1    magnesium Oxide (MAG-OX) 400 mg TABS, TAKE 1 TABLET BY MOUTH TWICE A DAY, Disp: 180 tablet, Rfl: 1    metFORMIN (GLUCOPHAGE) 1000 MG tablet, TAKE 1 TABLET BY MOUTH TWICE  DAILY, Disp: 180 tablet, Rfl: 1    metoprolol succinate (TOPROL-XL) 25 mg 24 hr tablet, Take 1 tablet (25 mg total) by mouth daily (Patient taking differently: Take 25 mg by mouth daily at bedtime), Disp: 90 tablet, Rfl: 2    pantoprazole (PROTONIX) 40 mg tablet, TAKE 1 TABLET BY MOUTH DAILY, Disp: 90 tablet, Rfl: 1    quinapril (ACCUPRIL) 40 MG tablet, TAKE 1 TABLET BY MOUTH  DAILY AT BEDTIME, Disp: 90 tablet, Rfl: 3  Allergies   Allergen Reactions    Aleve [Naproxen] Other (See Comments)     \"weird\" sensation entire body    Sulfa Antibiotics Rash       Objective   /58 (BP Location: Right arm, Patient Position: Sitting, Cuff Size: Adult)   Pulse (!) 112   Temp 98 °F (36.7 °C) (Temporal)   Resp 16   Ht 5' (1.524 m)   Wt 52.2 kg (115 lb)   LMP  (LMP Unknown)   SpO2 99%   BMI 22.46 kg/m²    Physical Exam  Constitutional:       General: She is not in acute distress.     Appearance: She is well-developed.   HENT:      Head: Normocephalic and atraumatic.   Eyes:      General: No scleral icterus.     Conjunctiva/sclera: Conjunctivae normal.   Cardiovascular:      Rate and Rhythm: Normal rate.   Pulmonary:      Effort: Pulmonary effort is normal. No respiratory distress.   Skin:     General: Skin is warm.      Coloration: Skin is not pale.      Findings: No rash.   Neurological:      Mental " Status: She is alert and oriented to person, place, and time.   Psychiatric:         Thought Content: Thought content normal.     Note:  This report has been generated by a voice recognition software system. Therefore there may be syntax, spelling, and/or grammatical errors. Please call if you have any questions.

## 2024-11-08 DIAGNOSIS — D50.8 OTHER IRON DEFICIENCY ANEMIA: ICD-10-CM

## 2024-11-11 RX ORDER — FERROUS SULFATE 324(65)MG
324 TABLET, DELAYED RELEASE (ENTERIC COATED) ORAL
Qty: 180 TABLET | Refills: 1 | Status: SHIPPED | OUTPATIENT
Start: 2024-11-11

## 2024-11-18 ENCOUNTER — HOSPITAL ENCOUNTER (OUTPATIENT)
Dept: INFUSION CENTER | Facility: HOSPITAL | Age: 79
Discharge: HOME | End: 2024-11-18
Attending: INTERNAL MEDICINE
Payer: COMMERCIAL

## 2024-11-18 VITALS
RESPIRATION RATE: 18 BRPM | DIASTOLIC BLOOD PRESSURE: 58 MMHG | HEART RATE: 97 BPM | TEMPERATURE: 96.3 F | SYSTOLIC BLOOD PRESSURE: 156 MMHG

## 2024-11-18 DIAGNOSIS — D75.839 THROMBOCYTOSIS: Primary | ICD-10-CM

## 2024-11-18 DIAGNOSIS — D50.9 IRON DEFICIENCY ANEMIA, UNSPECIFIED IRON DEFICIENCY ANEMIA TYPE: ICD-10-CM

## 2024-11-18 PROCEDURE — 96365 THER/PROPH/DIAG IV INF INIT: CPT

## 2024-11-18 RX ORDER — SODIUM CHLORIDE 9 MG/ML
20 INJECTION, SOLUTION INTRAVENOUS ONCE
OUTPATIENT
Start: 2024-11-25

## 2024-11-18 RX ORDER — SODIUM CHLORIDE 9 MG/ML
20 INJECTION, SOLUTION INTRAVENOUS ONCE
Status: COMPLETED | OUTPATIENT
Start: 2024-11-18 | End: 2024-11-18

## 2024-11-18 RX ADMIN — SODIUM CHLORIDE 20 ML/HR: 9 INJECTION, SOLUTION INTRAVENOUS at 14:26

## 2024-11-18 RX ADMIN — IRON SUCROSE 300 MG: 20 INJECTION, SOLUTION INTRAVENOUS at 14:26

## 2024-11-18 NOTE — PROGRESS NOTES
Carla Atkinson  tolerated treatment well with no complications.      Carla Atkinson is aware of future appt on 11/25/24.     AVS printed and given to Carla Atkinson:    No (Declined by Carla Atkinson)

## 2024-11-25 ENCOUNTER — HOSPITAL ENCOUNTER (OUTPATIENT)
Dept: INFUSION CENTER | Facility: HOSPITAL | Age: 79
Discharge: HOME | End: 2024-11-25
Attending: INTERNAL MEDICINE
Payer: COMMERCIAL

## 2024-11-25 VITALS
DIASTOLIC BLOOD PRESSURE: 67 MMHG | OXYGEN SATURATION: 98 % | TEMPERATURE: 97.5 F | RESPIRATION RATE: 18 BRPM | SYSTOLIC BLOOD PRESSURE: 155 MMHG | HEART RATE: 81 BPM

## 2024-11-25 DIAGNOSIS — D50.9 IRON DEFICIENCY ANEMIA, UNSPECIFIED IRON DEFICIENCY ANEMIA TYPE: ICD-10-CM

## 2024-11-25 DIAGNOSIS — D75.839 THROMBOCYTOSIS: Primary | ICD-10-CM

## 2024-11-25 PROCEDURE — 96365 THER/PROPH/DIAG IV INF INIT: CPT

## 2024-11-25 RX ORDER — SODIUM CHLORIDE 9 MG/ML
20 INJECTION, SOLUTION INTRAVENOUS ONCE
Status: COMPLETED | OUTPATIENT
Start: 2024-11-25 | End: 2024-11-25

## 2024-11-25 RX ORDER — SODIUM CHLORIDE 9 MG/ML
20 INJECTION, SOLUTION INTRAVENOUS ONCE
Status: CANCELLED | OUTPATIENT
Start: 2024-12-02

## 2024-11-25 RX ADMIN — IRON SUCROSE 300 MG: 20 INJECTION, SOLUTION INTRAVENOUS at 14:42

## 2024-11-25 RX ADMIN — SODIUM CHLORIDE 20 ML/HR: 9 INJECTION, SOLUTION INTRAVENOUS at 14:41

## 2024-11-25 NOTE — PROGRESS NOTES
Carla Atkinson  tolerated treatment well with no complications.      Carla Atkinson is aware of future appt on 12/2 at 1400.     AVS printed and given to Carla Atkinson:  No (Declined by Carla Atkinson)

## 2024-12-02 ENCOUNTER — HOSPITAL ENCOUNTER (OUTPATIENT)
Dept: INFUSION CENTER | Facility: HOSPITAL | Age: 79
Discharge: HOME | End: 2024-12-02
Attending: INTERNAL MEDICINE
Payer: COMMERCIAL

## 2024-12-02 VITALS
SYSTOLIC BLOOD PRESSURE: 142 MMHG | HEART RATE: 86 BPM | OXYGEN SATURATION: 100 % | TEMPERATURE: 96.6 F | RESPIRATION RATE: 18 BRPM | DIASTOLIC BLOOD PRESSURE: 88 MMHG

## 2024-12-02 DIAGNOSIS — D50.9 IRON DEFICIENCY ANEMIA, UNSPECIFIED IRON DEFICIENCY ANEMIA TYPE: ICD-10-CM

## 2024-12-02 DIAGNOSIS — D75.839 THROMBOCYTOSIS: Primary | ICD-10-CM

## 2024-12-02 PROCEDURE — 96365 THER/PROPH/DIAG IV INF INIT: CPT

## 2024-12-02 RX ORDER — SODIUM CHLORIDE 9 MG/ML
20 INJECTION, SOLUTION INTRAVENOUS ONCE
Status: COMPLETED | OUTPATIENT
Start: 2024-12-02 | End: 2024-12-02

## 2024-12-02 RX ORDER — SODIUM CHLORIDE 9 MG/ML
20 INJECTION, SOLUTION INTRAVENOUS ONCE
Status: CANCELLED | OUTPATIENT
Start: 2024-12-09

## 2024-12-02 RX ADMIN — SODIUM CHLORIDE 20 ML/HR: 9 INJECTION, SOLUTION INTRAVENOUS at 14:15

## 2024-12-02 RX ADMIN — IRON SUCROSE 300 MG: 20 INJECTION, SOLUTION INTRAVENOUS at 14:15

## 2024-12-02 NOTE — PROGRESS NOTES
Carla Atkinson  tolerated treatment well with no complications.      Carla Atkinson is aware of future appt on 12/09 at 1400.     AVS printed and given to Carla Atkinson:  No (Declined by Carla Atkinson)

## 2024-12-02 NOTE — PLAN OF CARE
Problem: Potential for Falls  Goal: Patient will remain free of falls  Description: INTERVENTIONS:  - Educate patient/family on patient safety including physical limitations  - Instruct patient to call for assistance with activity   - Consult OT/PT to assist with strengthening/mobility   - Keep Call bell within reach  - Keep bed low and locked with side rails adjusted as appropriate  - Keep care items and personal belongings within reach  - Initiate and maintain comfort rounds  - Make Fall Risk Sign visible to staff  Outcome: Progressing     Problem: DISCHARGE PLANNING  Goal: Discharge to home or other facility with appropriate resources  Description: INTERVENTIONS:  - Identify barriers to discharge w/patient and caregiver  - Arrange for needed discharge resources and transportation as appropriate  - Identify discharge learning needs (meds, wound care, etc.)  - Arrange for interpretive services to assist at discharge as needed  - Refer to Case Management Department for coordinating discharge planning if the patient needs post-hospital services based on physician/advanced practitioner order or complex needs related to functional status, cognitive ability, or social support system  Outcome: Progressing     Problem: Knowledge Deficit  Goal: Patient/family/caregiver demonstrates understanding of disease process, treatment plan, medications, and discharge instructions  Description: Complete learning assessment and assess knowledge base.  Interventions:  - Provide teaching at level of understanding  - Provide teaching via preferred learning methods  Outcome: Progressing

## 2024-12-09 ENCOUNTER — HOSPITAL ENCOUNTER (OUTPATIENT)
Dept: INFUSION CENTER | Facility: HOSPITAL | Age: 79
Discharge: HOME | End: 2024-12-09
Attending: INTERNAL MEDICINE
Payer: COMMERCIAL

## 2024-12-09 VITALS
DIASTOLIC BLOOD PRESSURE: 87 MMHG | SYSTOLIC BLOOD PRESSURE: 156 MMHG | RESPIRATION RATE: 18 BRPM | TEMPERATURE: 96 F | OXYGEN SATURATION: 99 % | HEART RATE: 88 BPM

## 2024-12-09 DIAGNOSIS — D75.839 THROMBOCYTOSIS: Primary | ICD-10-CM

## 2024-12-09 DIAGNOSIS — D50.9 IRON DEFICIENCY ANEMIA, UNSPECIFIED IRON DEFICIENCY ANEMIA TYPE: ICD-10-CM

## 2024-12-09 PROCEDURE — 96365 THER/PROPH/DIAG IV INF INIT: CPT

## 2024-12-09 RX ORDER — SODIUM CHLORIDE 9 MG/ML
20 INJECTION, SOLUTION INTRAVENOUS ONCE
Status: COMPLETED | OUTPATIENT
Start: 2024-12-09 | End: 2024-12-09

## 2024-12-09 RX ORDER — SODIUM CHLORIDE 9 MG/ML
20 INJECTION, SOLUTION INTRAVENOUS ONCE
OUTPATIENT
Start: 2024-12-16

## 2024-12-09 RX ADMIN — SODIUM CHLORIDE 20 ML/HR: 0.9 INJECTION, SOLUTION INTRAVENOUS at 13:59

## 2024-12-09 RX ADMIN — IRON SUCROSE 300 MG: 20 INJECTION, SOLUTION INTRAVENOUS at 13:58

## 2024-12-09 NOTE — PROGRESS NOTES
Carla Atkinson  tolerated treatment well with no complications.      Carla Atkinson is aware of future appt on 12/16 at 1400.     AVS printed and given to Carla Atkinson:  No (Declined by Carla Atkinson)

## 2024-12-12 DIAGNOSIS — I63.9 CEREBROVASCULAR ACCIDENT (CVA), UNSPECIFIED MECHANISM (HCC): ICD-10-CM

## 2024-12-12 RX ORDER — CLOPIDOGREL BISULFATE 75 MG/1
75 TABLET ORAL DAILY
Qty: 60 TABLET | Refills: 0 | Status: SHIPPED | OUTPATIENT
Start: 2024-12-12

## 2024-12-16 ENCOUNTER — TELEPHONE (OUTPATIENT)
Dept: INFUSION CENTER | Facility: HOSPITAL | Age: 79
End: 2024-12-16

## 2024-12-16 ENCOUNTER — HOSPITAL ENCOUNTER (OUTPATIENT)
Dept: INFUSION CENTER | Facility: HOSPITAL | Age: 79
Discharge: HOME | End: 2024-12-16
Attending: INTERNAL MEDICINE

## 2024-12-16 NOTE — TELEPHONE ENCOUNTER
Called pt home number on file and received a message that phone was disconnected. Tried to call son but mailbox is full and can not leave message. Attempted to advise pt she missed her appt today for infusion at @2pm.

## 2024-12-17 DIAGNOSIS — E11.42 TYPE 2 DIABETES MELLITUS WITH DIABETIC POLYNEUROPATHY, WITH LONG-TERM CURRENT USE OF INSULIN (HCC): ICD-10-CM

## 2024-12-17 DIAGNOSIS — Z79.4 TYPE 2 DIABETES MELLITUS WITH DIABETIC POLYNEUROPATHY, WITH LONG-TERM CURRENT USE OF INSULIN (HCC): ICD-10-CM

## 2024-12-17 DIAGNOSIS — I63.9 STROKE (CEREBRUM) (HCC): ICD-10-CM

## 2024-12-18 RX ORDER — GLIMEPIRIDE 2 MG/1
2 TABLET ORAL
Qty: 90 TABLET | Refills: 0 | Status: SHIPPED | OUTPATIENT
Start: 2024-12-18

## 2024-12-18 RX ORDER — ATORVASTATIN CALCIUM 80 MG/1
80 TABLET, FILM COATED ORAL EVERY EVENING
Qty: 90 TABLET | Refills: 1 | Status: SHIPPED | OUTPATIENT
Start: 2024-12-18

## 2024-12-27 DIAGNOSIS — Z76.0 MEDICATION REFILL: ICD-10-CM

## 2024-12-27 RX ORDER — LANOLIN ALCOHOL/MO/W.PET/CERES
400 CREAM (GRAM) TOPICAL 2 TIMES DAILY
Qty: 30 TABLET | Refills: 0 | Status: SHIPPED | OUTPATIENT
Start: 2024-12-27

## 2025-01-11 DIAGNOSIS — Z76.0 MEDICATION REFILL: ICD-10-CM

## 2025-01-13 RX ORDER — MAGNESIUM OXIDE 400 MG
400 TABLET ORAL 2 TIMES DAILY
Qty: 180 TABLET | Refills: 0 | Status: SHIPPED | OUTPATIENT
Start: 2025-01-13

## 2025-01-20 DIAGNOSIS — I63.9 CEREBROVASCULAR ACCIDENT (CVA), UNSPECIFIED MECHANISM (HCC): ICD-10-CM

## 2025-01-22 DIAGNOSIS — K21.9 GASTROESOPHAGEAL REFLUX DISEASE WITHOUT ESOPHAGITIS: ICD-10-CM

## 2025-01-23 RX ORDER — PANTOPRAZOLE SODIUM 40 MG/1
40 TABLET, DELAYED RELEASE ORAL DAILY
Qty: 30 TABLET | Refills: 0 | Status: SHIPPED | OUTPATIENT
Start: 2025-01-23

## 2025-01-28 RX ORDER — CLOPIDOGREL BISULFATE 75 MG/1
75 TABLET ORAL DAILY
Qty: 60 TABLET | Refills: 5 | OUTPATIENT
Start: 2025-01-28

## 2025-02-06 DIAGNOSIS — I63.9 CEREBROVASCULAR ACCIDENT (CVA), UNSPECIFIED MECHANISM (HCC): ICD-10-CM

## 2025-02-07 RX ORDER — CLOPIDOGREL BISULFATE 75 MG/1
75 TABLET ORAL DAILY
Qty: 60 TABLET | Refills: 5 | OUTPATIENT
Start: 2025-02-07

## 2025-02-23 DIAGNOSIS — Z79.4 TYPE 2 DIABETES MELLITUS WITH DIABETIC POLYNEUROPATHY, WITH LONG-TERM CURRENT USE OF INSULIN (HCC): ICD-10-CM

## 2025-02-23 DIAGNOSIS — E11.42 TYPE 2 DIABETES MELLITUS WITH DIABETIC POLYNEUROPATHY, WITH LONG-TERM CURRENT USE OF INSULIN (HCC): ICD-10-CM

## 2025-02-25 RX ORDER — GLIMEPIRIDE 2 MG/1
2 TABLET ORAL
Qty: 90 TABLET | Refills: 3 | Status: SHIPPED | OUTPATIENT
Start: 2025-02-25

## 2025-03-05 ENCOUNTER — TELEPHONE (OUTPATIENT)
Dept: HEMATOLOGY ONCOLOGY | Facility: MEDICAL CENTER | Age: 80
End: 2025-03-05

## 2025-03-07 ENCOUNTER — OFFICE VISIT (OUTPATIENT)
Dept: HEMATOLOGY ONCOLOGY | Facility: MEDICAL CENTER | Age: 80
End: 2025-03-07
Payer: COMMERCIAL

## 2025-03-07 VITALS
DIASTOLIC BLOOD PRESSURE: 76 MMHG | HEART RATE: 88 BPM | WEIGHT: 116 LBS | OXYGEN SATURATION: 99 % | RESPIRATION RATE: 17 BRPM | BODY MASS INDEX: 22.65 KG/M2 | TEMPERATURE: 97.6 F | SYSTOLIC BLOOD PRESSURE: 148 MMHG

## 2025-03-07 DIAGNOSIS — E53.8 B12 DEFICIENCY: ICD-10-CM

## 2025-03-07 DIAGNOSIS — E53.8 FOLATE DEFICIENCY: ICD-10-CM

## 2025-03-07 DIAGNOSIS — D50.8 OTHER IRON DEFICIENCY ANEMIA: Primary | ICD-10-CM

## 2025-03-07 DIAGNOSIS — E46 PROTEIN-CALORIE MALNUTRITION, UNSPECIFIED SEVERITY (HCC): ICD-10-CM

## 2025-03-07 PROCEDURE — 99213 OFFICE O/P EST LOW 20 MIN: CPT | Performed by: PHYSICIAN ASSISTANT

## 2025-03-07 NOTE — ASSESSMENT & PLAN NOTE
This is an 79-year-old female with past medical history of iron deficiency anemia in the setting of AVMs of the colon status post ablations.  Last GI studies (EGD and colonoscopy) were completed on 3/1/2023.    Unfortunately, patient continues to have blood loss anemia related to the AVM formations.      Last lab work was completed on 9/13/2024.  At that time hemoglobin was 7.7, platelets 445,000.  Total iron 27, ferritin 11, iron saturation 7%.     She received Venofer 300 mg weekly x 4 11/18/2024 through 12/9/2024. Patient canceled her fifth weekly iron infusion and did not reschedule.    She has not had lab work drawn since receiving the IV iron.    She is taking ferrous sulfate one tablet daily.    She will repeat lab work as soon as possible.  I will let her know if she requires additional IV iron.

## 2025-03-07 NOTE — PROGRESS NOTES
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  Last GI studies (EGD and colonoscopy) were completed on 3/1/2023.    Unfortunately, patient continues to have blood loss anemia related to the AVM formations.      Last lab work was completed on 2024.  At that time hemoglobin was 7.7, platelets 445,000.  Total iron 27, ferritin 11, iron saturation 7%.     She received Venofer 300 mg weekly x 4 2024 through 2024. Patient canceled her fifth weekly iron infusion and did not reschedule.    She has not had lab work drawn since receiving the IV iron.    She is taking ferrous sulfate one tablet daily.    She will repeat lab work as soon as possible.  I will let her know if she requires additional IV iron.    Folate deficiency  Continue folic acid 1 mg daily.  Orders:    Folate; Future    B12 deficiency  Patient is currently taking vitamin B12 1000 mcg daily.  I will let her know after lab work is completed if she needs to adjust dosing.           Patient will return to clinic in 4 months time, sooner if necessary.    History of Present Illness   Chief Complaint   Patient presents with    Follow-up   This is an 79 year old female with past medical history of diabetes well controlled, GERD on Protonix therapy, hypertension, hyperlipidemia who was found to be iron deficient in 2020 when hospitalized after having a stroke.     Patient was hospitalized after having altered mental status changes.  Patient was back to mental status baseline after 16 hours.  On admission, the patient had a hemoglobin of 7.3 grams/deciliter and was very microcytic with high RDW.   Patient was transfused with 2 units of packed red blood cells in the emergency room.  Patient also underwent GI consultation and had a colonoscopy as well as an endoscopy after discharge that did not demonstrate etiology of iron deficiency.  Iron studies in January 2020 demonstrated iron saturation of 3% with ferritin of 6 ng/dL.     At discharge patient was started on oral iron supplementation twice a day.  Patient initially had a lot of constipation but after being on it for some time, this seemed to even now.  Patient continue to have follow-up with GI physicians. Follow-up with fecal occult blood test was negative.  Patient had a urinalysis completed during hospitalization in January 2020 but follow-up urinalysis has not been completed.  Patient denies blood-loss vaginally.     In Sept and Oct 2020, patient underwent treatment with IV Venofer 200 mg IV. Patient notes some issues with obtaining venous access but otherwise treatments went well.  Follow-up blood work on 11/27 demonstrated hemoglobin of 11.9, platelet count of 311, ferritin of 246,      6/3/2021 Ferritin = 24,   TIBC = 326, iron saturation 20%, hemoglobin=  11.5, platelet count 355, MCV 94, RDW 12.6   7/26/2021: Benign pathology of Thyroid aspiration.   10/4/2021:  WBC =5.7, Hemoglobin = 10.0, MCV = 91, platelet = 390, TIBC = 341, iron saturation = 10%     5/7/22 ferritin = 10, hemoglobin = 9.4, platelet count = 493 TIBC = 366  May- June 2022 : Venofer 200 mg IV weekly x 5 doses     Patient was hospitalized in late February and early March discharged on 3/2/2023.  Patient received 3 units of packed red blood cells, 1 dose of Venofer 200 mg and underwent colonoscopy and endoscopy.  Endoscopy demonstrated small AVM proximal stomach with spontaneous bleeding cauterized during examination.  2/27/2023: Ferritin = 4, TIBC 440, hemoglobin = 5.7, MCV 78, platelet count 41, RDW 15.9, white blood cell count within normal limits.  3/17/23  hemoglobin=  10.1,  platelet count 478, MCV 88, RDW 23.4 (Post discharge)     03/31-04/21/23: IV Venofer 300mg weekly x 4     Capsule endoscopy 04/04/2023: no active bleeding. gastritis, small bowel AVM, ? submucosal lesion mid jejunum vs extrinsic compression, two AVMs proximal colon.      2/22/24-4/1/24 Venofer 300 mg IV weekly x 5 doses  5/15/2024 WBC 6.9, hemoglobin 12.9, MCV 94, platelet count 421, iron saturation 32%, ferritin 89  9/13/2024 WBC 5.6, hemoglobin 7.7, MCV 90, platelet count 445              Ferritin = 11              Patient was called by the office.  Patient was asymptomatic was to follow-up with hematology but canceled appt.    11/18/2024 through 12/9/2024 Venofer 300 mg weekly x 4 (patient canceled her fifth weekly iron infusion and did not reschedule)     Interval history: Patient presents today as a transfer of care.  She previously followed with Yoon Gould PA-C for management of iron deficiency anemia.  She is feeling well.  She says typically when her lab work is consistent with iron deficiency she has to take frequent naps. She says that currently she has good energy.  Her biggest annoyances are arthralgias and allergies.  She has no evidence of GI blood loss.  She denies nausea, vomiting, bowel changes, chest pain, shortness of breath.    Review of Systems   Constitutional:  Negative for appetite change, fatigue, fever and unexpected weight change.   HENT:  Negative for nosebleeds.    Respiratory:  Negative for cough, choking and shortness of breath.         Negative hemoptysis.   Cardiovascular:  Negative for chest pain, palpitations and leg swelling.   Gastrointestinal: Negative.  Negative for abdominal distention, abdominal pain, anal bleeding, blood in stool, constipation, diarrhea, nausea and vomiting.   Endocrine: Negative.  Negative for cold intolerance.   Genitourinary: Negative.  Negative for hematuria, menstrual problem, vaginal bleeding, vaginal discharge and vaginal pain.   Musculoskeletal:   Positive for arthralgias. Negative for myalgias, neck pain and neck stiffness.   Skin: Negative.  Negative for color change, pallor and rash.   Allergic/Immunologic: Negative.  Negative for immunocompromised state.   Neurological: Negative.  Negative for weakness and headaches.   Hematological:  Negative for adenopathy. Does not bruise/bleed easily.   All other systems reviewed and are negative.        Objective   Temp 97.6 °F (36.4 °C) (Temporal)   Resp 17   Wt 52.6 kg (116 lb)   LMP  (LMP Unknown)   BMI 22.65 kg/m²     Physical Exam  Constitutional:       General: She is not in acute distress.     Appearance: Normal appearance. She is well-developed.   HENT:      Head: Normocephalic and atraumatic.   Eyes:      General: No scleral icterus.     Conjunctiva/sclera: Conjunctivae normal.   Cardiovascular:      Rate and Rhythm: Normal rate and regular rhythm.   Pulmonary:      Effort: Pulmonary effort is normal. No respiratory distress.      Breath sounds: Normal breath sounds.   Abdominal:      General: Abdomen is flat. There is no distension.      Palpations: Abdomen is soft.      Tenderness: There is no abdominal tenderness.   Musculoskeletal:      Right lower leg: No edema.      Left lower leg: No edema.   Skin:     General: Skin is warm.      Coloration: Skin is not pale.      Findings: No rash.   Neurological:      Mental Status: She is alert and oriented to person, place, and time.   Psychiatric:         Mood and Affect: Mood normal.         Thought Content: Thought content normal.         Judgment: Judgment normal.     Labs: I have reviewed the following labs:  Results for orders placed or performed in visit on 09/13/24   CBC and differential   Result Value Ref Range    WBC 5.69 4.31 - 10.16 Thousand/uL    RBC 2.94 (L) 3.81 - 5.12 Million/uL    Hemoglobin 7.7 (L) 11.5 - 15.4 g/dL    Hematocrit 26.4 (L) 34.8 - 46.1 %    MCV 90 82 - 98 fL    MCH 26.2 (L) 26.8 - 34.3 pg    MCHC 29.2 (L) 31.4 - 37.4 g/dL     RDW 14.1 11.6 - 15.1 %    MPV 9.7 8.9 - 12.7 fL    Platelets 445 (H) 149 - 390 Thousands/uL    nRBC 0 /100 WBCs    Segmented % 70 43 - 75 %    Immature Grans % 1 0 - 2 %    Lymphocytes % 18 14 - 44 %    Monocytes % 8 4 - 12 %    Eosinophils Relative 2 0 - 6 %    Basophils Relative 1 0 - 1 %    Absolute Neutrophils 4.06 1.85 - 7.62 Thousands/µL    Absolute Immature Grans 0.04 0.00 - 0.20 Thousand/uL    Absolute Lymphocytes 1.00 0.60 - 4.47 Thousands/µL    Absolute Monocytes 0.44 0.17 - 1.22 Thousand/µL    Eosinophils Absolute 0.12 0.00 - 0.61 Thousand/µL    Basophils Absolute 0.03 0.00 - 0.10 Thousands/µL   TIBC Panel (incl. Iron, TIBC, % Iron Saturation)   Result Value Ref Range    Iron Saturation 7 (L) 15 - 50 %    TIBC 380 250 - 450 ug/dL    Iron 27 (L) 50 - 212 ug/dL    UIBC 353 155 - 355 ug/dL   Result Value Ref Range    Ferritin 11 11 - 307 ng/mL

## 2025-03-08 DIAGNOSIS — K21.9 GASTROESOPHAGEAL REFLUX DISEASE WITHOUT ESOPHAGITIS: ICD-10-CM

## 2025-03-10 ENCOUNTER — OFFICE VISIT (OUTPATIENT)
Dept: FAMILY MEDICINE CLINIC | Facility: CLINIC | Age: 80
End: 2025-03-10
Payer: COMMERCIAL

## 2025-03-10 VITALS
DIASTOLIC BLOOD PRESSURE: 56 MMHG | RESPIRATION RATE: 16 BRPM | HEART RATE: 73 BPM | BODY MASS INDEX: 22.58 KG/M2 | HEIGHT: 60 IN | TEMPERATURE: 97.2 F | WEIGHT: 115 LBS | SYSTOLIC BLOOD PRESSURE: 120 MMHG

## 2025-03-10 DIAGNOSIS — E78.5 HYPERLIPIDEMIA, UNSPECIFIED HYPERLIPIDEMIA TYPE: ICD-10-CM

## 2025-03-10 DIAGNOSIS — E11.22 TYPE 2 DIABETES MELLITUS WITH STAGE 3A CHRONIC KIDNEY DISEASE AND HYPERTENSION (HCC): ICD-10-CM

## 2025-03-10 DIAGNOSIS — Z78.0 POST-MENOPAUSAL: ICD-10-CM

## 2025-03-10 DIAGNOSIS — N18.31 TYPE 2 DIABETES MELLITUS WITH STAGE 3A CHRONIC KIDNEY DISEASE AND HYPERTENSION (HCC): ICD-10-CM

## 2025-03-10 DIAGNOSIS — Z00.00 MEDICARE ANNUAL WELLNESS VISIT, SUBSEQUENT: Primary | ICD-10-CM

## 2025-03-10 DIAGNOSIS — I12.9 TYPE 2 DIABETES MELLITUS WITH STAGE 3A CHRONIC KIDNEY DISEASE AND HYPERTENSION (HCC): ICD-10-CM

## 2025-03-10 LAB — SL AMB POCT HEMOGLOBIN AIC: 6.8 (ref ?–6.5)

## 2025-03-10 PROCEDURE — G0439 PPPS, SUBSEQ VISIT: HCPCS | Performed by: FAMILY MEDICINE

## 2025-03-10 PROCEDURE — 83036 HEMOGLOBIN GLYCOSYLATED A1C: CPT | Performed by: FAMILY MEDICINE

## 2025-03-10 RX ORDER — PANTOPRAZOLE SODIUM 40 MG/1
40 TABLET, DELAYED RELEASE ORAL DAILY
Qty: 30 TABLET | Refills: 11 | OUTPATIENT
Start: 2025-03-10

## 2025-03-10 NOTE — PATIENT INSTRUCTIONS
Medicare Preventive Visit Patient Instructions  Thank you for completing your Welcome to Medicare Visit or Medicare Annual Wellness Visit today. Your next wellness visit will be due in one year (3/11/2026).  The screening/preventive services that you may require over the next 5-10 years are detailed below. Some tests may not apply to you based off risk factors and/or age. Screening tests ordered at today's visit but not completed yet may show as past due. Also, please note that scanned in results may not display below.  Preventive Screenings:  Service Recommendations Previous Testing/Comments   Colorectal Cancer Screening  * Colonoscopy    * Fecal Occult Blood Test (FOBT)/Fecal Immunochemical Test (FIT)  * Fecal DNA/Cologuard Test  * Flexible Sigmoidoscopy Age: 45-75 years old   Colonoscopy: every 10 years (may be performed more frequently if at higher risk)  OR  FOBT/FIT: every 1 year  OR  Cologuard: every 3 years  OR  Sigmoidoscopy: every 5 years  Screening may be recommended earlier than age 45 if at higher risk for colorectal cancer. Also, an individualized decision between you and your healthcare provider will decide whether screening between the ages of 76-85 would be appropriate. Colonoscopy: 03/01/2023  FOBT/FIT: Not on file  Cologuard: Not on file  Sigmoidoscopy: Not on file          Breast Cancer Screening Age: 40+ years old  Frequency: every 1-2 years  Not required if history of left and right mastectomy Mammogram: 11/22/2017        Cervical Cancer Screening Between the ages of 21-29, pap smear recommended once every 3 years.   Between the ages of 30-65, can perform pap smear with HPV co-testing every 5 years.   Recommendations may differ for women with a history of total hysterectomy, cervical cancer, or abnormal pap smears in past. Pap Smear: Not on file    Screening Not Indicated   Hepatitis C Screening Once for adults born between 1945 and 1965  More frequently in patients at high risk for Hepatitis C  Hep C Antibody: 09/15/2021    Screening Current   Diabetes Screening 1-2 times per year if you're at risk for diabetes or have pre-diabetes Fasting glucose: 141 mg/dL (2/8/2024)  A1C: 6.6 (1/31/2024)  Screening Not Indicated  History Diabetes   Cholesterol Screening Once every 5 years if you don't have a lipid disorder. May order more often based on risk factors. Lipid panel: 02/08/2024    Screening Not Indicated  History Lipid Disorder     Other Preventive Screenings Covered by Medicare:  Abdominal Aortic Aneurysm (AAA) Screening: covered once if your at risk. You're considered to be at risk if you have a family history of AAA.  Lung Cancer Screening: covers low dose CT scan once per year if you meet all of the following conditions: (1) Age 55-77; (2) No signs or symptoms of lung cancer; (3) Current smoker or have quit smoking within the last 15 years; (4) You have a tobacco smoking history of at least 20 pack years (packs per day multiplied by number of years you smoked); (5) You get a written order from a healthcare provider.  Glaucoma Screening: covered annually if you're considered high risk: (1) You have diabetes OR (2) Family history of glaucoma OR (3)  aged 50 and older OR (4)  American aged 65 and older  Osteoporosis Screening: covered every 2 years if you meet one of the following conditions: (1) You're estrogen deficient and at risk for osteoporosis based off medical history and other findings; (2) Have a vertebral abnormality; (3) On glucocorticoid therapy for more than 3 months; (4) Have primary hyperparathyroidism; (5) On osteoporosis medications and need to assess response to drug therapy.   Last bone density test (DXA Scan): Not on file.  HIV Screening: covered annually if you're between the age of 15-65. Also covered annually if you are younger than 15 and older than 65 with risk factors for HIV infection. For pregnant patients, it is covered up to 3 times per  pregnancy.    Immunizations:  Immunization Recommendations   Influenza Vaccine Annual influenza vaccination during flu season is recommended for all persons aged >= 6 months who do not have contraindications   Pneumococcal Vaccine   * Pneumococcal conjugate vaccine = PCV13 (Prevnar 13), PCV15 (Vaxneuvance), PCV20 (Prevnar 20)  * Pneumococcal polysaccharide vaccine = PPSV23 (Pneumovax) Adults 19-63 yo with certain risk factors or if 65+ yo  If never received any pneumonia vaccine: recommend Prevnar 20 (PCV20)  Give PCV20 if previously received 1 dose of PCV13 or PPSV23   Hepatitis B Vaccine 3 dose series if at intermediate or high risk (ex: diabetes, end stage renal disease, liver disease)   Respiratory syncytial virus (RSV) Vaccine - COVERED BY MEDICARE PART D  * RSVPreF3 (Arexvy) CDC recommends that adults 60 years of age and older may receive a single dose of RSV vaccine using shared clinical decision-making (SCDM)   Tetanus (Td) Vaccine - COST NOT COVERED BY MEDICARE PART B Following completion of primary series, a booster dose should be given every 10 years to maintain immunity against tetanus. Td may also be given as tetanus wound prophylaxis.   Tdap Vaccine - COST NOT COVERED BY MEDICARE PART B Recommended at least once for all adults. For pregnant patients, recommended with each pregnancy.   Shingles Vaccine (Shingrix) - COST NOT COVERED BY MEDICARE PART B  2 shot series recommended in those 19 years and older who have or will have weakened immune systems or those 50 years and older     Health Maintenance Due:      Topic Date Due   • Hepatitis C Screening  Completed   • Lung Cancer Screening  Discontinued     Immunizations Due:  There are no preventive care reminders to display for this patient.  Advance Directives   What are advance directives?  Advance directives are legal documents that state your wishes and plans for medical care. These plans are made ahead of time in case you lose your ability to make  decisions for yourself. Advance directives can apply to any medical decision, such as the treatments you want, and if you want to donate organs.   What are the types of advance directives?  There are many types of advance directives, and each state has rules about how to use them. You may choose a combination of any of the following:  Living will:  This is a written record of the treatment you want. You can also choose which treatments you do not want, which to limit, and which to stop at a certain time. This includes surgery, medicine, IV fluid, and tube feedings.   Durable power of  for healthcare (DPAHC):  This is a written record that states who you want to make healthcare choices for you when you are unable to make them for yourself. This person, called a proxy, is usually a family member or a friend. You may choose more than 1 proxy.  Do not resuscitate (DNR) order:  A DNR order is used in case your heart stops beating or you stop breathing. It is a request not to have certain forms of treatment, such as CPR. A DNR order may be included in other types of advance directives.  Medical directive:  This covers the care that you want if you are in a coma, near death, or unable to make decisions for yourself. You can list the treatments you want for each condition. Treatment may include pain medicine, surgery, blood transfusions, dialysis, IV or tube feedings, and a ventilator (breathing machine).  Values history:  This document has questions about your views, beliefs, and how you feel and think about life. This information can help others choose the care that you would choose.  Why are advance directives important?  An advance directive helps you control your care. Although spoken wishes may be used, it is better to have your wishes written down. Spoken wishes can be misunderstood, or not followed. Treatments may be given even if you do not want them. An advance directive may make it easier for your family  to make difficult choices about your care.   Urinary Incontinence   Urinary incontinence (UI)  is when you lose control of your bladder. UI develops because your bladder cannot store or empty urine properly. The 3 most common types of UI are stress incontinence, urge incontinence, or both.  Medicines:   May be given to help strengthen your bladder control. Report any side effects of medication to your healthcare provider.  Do pelvic muscle exercises often:  Your pelvic muscles help you stop urinating. Squeeze these muscles tight for 5 seconds, then relax for 5 seconds. Gradually work up to squeezing for 10 seconds. Do 3 sets of 15 repetitions a day, or as directed. This will help strengthen your pelvic muscles and improve bladder control.  Train your bladder:  Go to the bathroom at set times, such as every 2 hours, even if you do not feel the urge to go. You can also try to hold your urine when you feel the urge to go. For example, hold your urine for 5 minutes when you feel the urge to go. As that becomes easier, hold your urine for 10 minutes.   Self-care:   Keep a UI record.  Write down how often you leak urine and how much you leak. Make a note of what you were doing when you leaked urine.  Drink liquids as directed. You may need to limit the amount of liquid you drink to help control your urine leakage. Do not drink any liquid right before you go to bed. Limit or do not have drinks that contain caffeine or alcohol.   Prevent constipation.  Eat a variety of high-fiber foods. Good examples are high-fiber cereals, beans, vegetables, and whole-grain breads. Walking is the best way to trigger your intestines to have a bowel movement.  Exercise regularly and maintain a healthy weight.  Weight loss and exercise will decrease pressure on your bladder and help you control your leakage.   Use a catheter as directed  to help empty your bladder. A catheter is a tiny, plastic tube that is put into your bladder to drain your  urine.   Go to behavior therapy as directed.  Behavior therapy may be used to help you learn to control your urge to urinate.    Cigarette Smoking and Your Health   Risks to your health if you smoke:  Nicotine and other chemicals found in tobacco damage every cell in your body. Even if you are a light smoker, you have an increased risk for cancer, heart disease, and lung disease. If you are pregnant or have diabetes, smoking increases your risk for complications.   Benefits to your health if you stop smoking:   You decrease respiratory symptoms such as coughing, wheezing, and shortness of breath.   You reduce your risk for cancers of the lung, mouth, throat, kidney, bladder, pancreas, stomach, and cervix. If you already have cancer, you increase the benefits of chemotherapy. You also reduce your risk for cancer returning or a second cancer from developing.   You reduce your risk for heart disease, blood clots, heart attack, and stroke.   You reduce your risk for lung infections, and diseases such as pneumonia, asthma, chronic bronchitis, and emphysema.  Your circulation improves. More oxygen can be delivered to your body. If you have diabetes, you lower your risk for complications, such as kidney, artery, and eye diseases. You also lower your risk for nerve damage. Nerve damage can lead to amputations, poor vision, and blindness.  You improve your body's ability to heal and to fight infections.  For more information and support to stop smoking:   Smokefree.gov  Phone: 1- 161 - 763-3966  Web Address: www.smokefree.gov     © Copyright [employer] 2018 Information is for End User's use only and may not be sold, redistributed or otherwise used for commercial purposes. All illustrations and images included in CareNotes® are the copyrighted property of [employer] or [employer]

## 2025-03-10 NOTE — ASSESSMENT & PLAN NOTE
Stable.   Lab Results   Component Value Date    HGBA1C 6.6 (A) 01/31/2024       Orders:    Albumin / creatinine urine ratio; Future    POCT hemoglobin A1c

## 2025-03-10 NOTE — PROGRESS NOTES
Name: Carla Atkinson      : 1945      MRN: [id number]  Encounter Provider: Tila Simmons MD  Encounter Date: 3/10/2025   Encounter department: [facility]    Assessment & Plan  Medicare annual wellness visit, subsequent         Type 2 diabetes mellitus with stage 3a chronic kidney disease and hypertension (HCC)  Stable.   Lab Results   Component Value Date    HGBA1C 6.6 (A) 2024       Orders:    Albumin / creatinine urine ratio; Future    POCT hemoglobin A1c    Hyperlipidemia, unspecified hyperlipidemia type    Orders:    Lipid Panel with Direct LDL reflex; Future    Post-menopausal    Orders:    DXA bone density spine hip and pelvis; Future      Depression Screening and Follow-up Plan: Patient was screened for depression during today's encounter. They screened negative with a PHQ-2 score of 2.      Tobacco Cessation Counseling: Tobacco cessation counseling was provided. The patient is sincerely urged to quit consumption of tobacco. She is not ready to quit tobacco. Medication options and side effects of medication not discussed.       Preventive health issues were discussed with patient, and age appropriate screening tests were ordered as noted in patient's After Visit Summary. Personalized health advice and appropriate referrals for health education or preventive services given if needed, as noted in patient's After Visit Summary.    History of Present Illness     HPI   Patient Care Team:  Tila Simmons MD as PCP - General (Family Medicine)  Harmony Campo PA-C as Physician Assistant (Hematology and Oncology)    Review of Systems   Constitutional: Negative.    HENT: Negative.     Eyes: Negative.    Respiratory: Negative.     Cardiovascular: Negative.    Gastrointestinal: Negative.    Endocrine: Negative.    Genitourinary: Negative.    Musculoskeletal: Negative.    Skin: Negative.    Allergic/Immunologic: Negative.    Neurological: Negative.    Hematological: Negative.     Psychiatric/Behavioral: Negative.       Medical History Reviewed by provider this encounter:  Tobacco  Allergies  Meds  Problems  Med Hx  Surg Hx  Fam Hx       Annual Wellness Visit Questionnaire       Health Risk Assessment:   Patient rates overall health as good. Patient feels that their physical health rating is same. Patient is satisfied with their life. Eyesight was rated as slightly better. Hearing was rated as slightly better. Patient feels that their emotional and mental health rating is same. Patients states they are never, rarely angry. Patient states they are never, rarely unusually tired/fatigued. Pain experienced in the last 7 days has been a lot. Patient's pain rating has been 6/10. Patient states that she has experienced no weight loss or gain in last 6 months.     Depression Screening:   PHQ-2 Score: 2      Fall Risk Screening:   In the past year, patient has experienced: no history of falling in past year      Urinary Incontinence Screening:   Patient has not leaked urine accidently in the last six months.     Home Safety:  Patient does not have trouble with stairs inside or outside of their home. Patient has working smoke alarms and has working carbon monoxide detector. Home safety hazards include: none.     Nutrition:   Current diet is Regular.     Medications:   Patient is currently taking over-the-counter supplements. OTC medications include: see medication list. Patient is able to manage medications.     Activities of Daily Living (ADLs)/Instrumental Activities of Daily Living (IADLs):   Walk and transfer into and out of bed and chair?: Yes  Dress and groom yourself?: Yes    Bathe or shower yourself?: Yes    Feed yourself? Yes  Do your laundry/housekeeping?: No  Manage your money, pay your bills and track your expenses?: No  Make your own meals?: Yes    Do your own shopping?: No    Previous Hospitalizations:   Any hospitalizations or ED visits within the last 12 months?: No       Advance Care Planning:   Living will: No    Advanced directive: No    Advanced directive counseling given: Yes      PREVENTIVE SCREENINGS      Cardiovascular Screening:    General: Screening Not Indicated and History Lipid Disorder      Diabetes Screening:     General: Screening Not Indicated and History Diabetes      Colorectal Cancer Screening:     General: Screening Not Indicated      Breast Cancer Screening:     General: Screening Not Indicated      Cervical Cancer Screening:    General: Screening Not Indicated      Osteoporosis Screening:    General: Risks and Benefits Discussed    Due for: DXA Axial      Abdominal Aortic Aneurysm (AAA) Screening:        General: Screening Not Indicated      Lung Cancer Screening:     General: Screening Not Indicated      Hepatitis C Screening:    General: Screening Current    Screening, Brief Intervention, and Referral to Treatment (SBIRT)     Screening  Typical number of drinks in a day: 0  Typical number of drinks in a week: 0  Interpretation: Low risk drinking behavior.    Single Item Drug Screening:  How often have you used an illegal drug (including marijuana) or a prescription medication for non-medical reasons in the past year? never    Single Item Drug Screen Score: 0  Interpretation: Negative screen for possible drug use disorder    Brief Intervention  Alcohol & drug use screenings were reviewed. No concerns regarding substance use disorder identified.     Social Drivers of Health     Financial Resource Strain: Low Risk  (7/18/2023)    Overall Financial Resource Strain (CARDIA)     Difficulty of Paying Living Expenses: Not hard at all   Food Insecurity: No Food Insecurity (3/10/2025)    Hunger Vital Sign     Worried About Running Out of Food in the Last Year: Never true     Ran Out of Food in the Last Year: Never true   Transportation Needs: No Transportation Needs (3/10/2025)    PRAPARE - Transportation     Lack of Transportation (Medical): No     Lack of  Transportation (Non-Medical): No   Housing Stability: Unknown (3/10/2025)    Housing Stability Vital Sign     Unable to Pay for Housing in the Last Year: No     Homeless in the Last Year: No   Utilities: Not At Risk (3/10/2025)    [facility] Utilities     Threatened with loss of utilities: No     No results found.    Objective   /56   Pulse 73   Temp (!) 97.2 °F (36.2 °C)   Resp 16   Ht 5' (1.524 m)   Wt 52.2 kg (115 lb)   LMP  (LMP Unknown)   BMI 22.46 kg/m²     Physical Exam  Vitals and nursing note reviewed.   Constitutional:       General: She is not in acute distress.     Appearance: She is well-developed.   HENT:      Head: Normocephalic and atraumatic.      Right Ear: Tympanic membrane, ear canal and external ear normal. There is no impacted cerumen.      Left Ear: Tympanic membrane, ear canal and external ear normal. There is no impacted cerumen.      Nose: Nose normal.      Mouth/Throat:      Mouth: Mucous membranes are moist.   Eyes:      Conjunctiva/sclera: Conjunctivae normal.   Cardiovascular:      Rate and Rhythm: Normal rate and regular rhythm.      Pulses: no weak pulses.           Dorsalis pedis pulses are 2+ on the right side and 2+ on the left side.      Heart sounds: No murmur heard.  Pulmonary:      Effort: Pulmonary effort is normal. No respiratory distress.      Breath sounds: Normal breath sounds.   Abdominal:      General: Abdomen is flat. There is no distension.      Palpations: Abdomen is soft. There is no mass.      Tenderness: There is no abdominal tenderness. There is no guarding or rebound.      Hernia: No hernia is present.   Musculoskeletal:         General: Normal range of motion.      Cervical back: Neck supple.   Feet:      Right foot:      Skin integrity: No ulcer, skin breakdown, erythema, warmth, callus or dry skin.      Left foot:      Skin integrity: No ulcer, skin breakdown, erythema, warmth, callus or dry skin.   Skin:     General: Skin is warm and dry.    Neurological:      General: No focal deficit present.      Mental Status: She is alert and oriented to person, place, and time.     Patient's shoes and socks removed.    Right Foot/Ankle   Right Foot Inspection  Skin Exam: skin normal and skin intact. No dry skin, no warmth, no callus, no erythema, no maceration, no abnormal color, no pre-ulcer, no ulcer and no callus.     Toe Exam: ROM and strength within normal limits.     Sensory   Monofilament testing: intact    Vascular  Capillary refills: < 3 seconds  The right DP pulse is 2+.     Left Foot/Ankle  Left Foot Inspection  Skin Exam: skin normal and skin intact. No dry skin, no warmth, no erythema, no maceration, normal color, no pre-ulcer, no ulcer and no callus.     Toe Exam: ROM and strength within normal limits.     Sensory   Monofilament testing: intact    Vascular  Capillary refills: < 3 seconds  The left DP pulse is 2+.     Assign Risk Category  No deformity present  No loss of protective sensation  No weak pulses  Risk: 0

## 2025-03-11 ENCOUNTER — TELEPHONE (OUTPATIENT)
Dept: ADMINISTRATIVE | Facility: OTHER | Age: 80
End: 2025-03-11

## 2025-03-11 NOTE — LETTER
Diabetic Eye Exam Form    Date Requested: 25  Patient: Carla Atkinson     Please complete form  Patient : 1945   Referring Provider: Tila Simmons MD      DIABETIC Eye Exam Date _______________________________      Type of Exam MUST be documented for Diabetic Eye Exams. Please CHECK ONE.     Retinal Exam       Dilated Retinal Exam       OCT       Optomap-Iris Exam      Fundus Photography       Left Eye - Please check Retinopathy or No Retinopathy        Exam did show retinopathy    Exam did not show retinopathy       Right Eye - Please check Retinopathy or No Retinopathy       Exam did show retinopathy    Exam did not show retinopathy       Comments __________________________________________________________    Practice Providing Exam ______________________________________________    Exam Performed By (print name) _______________________________________      Provider Signature ___________________________________________________      These reports are needed for  compliance.  Please fax this completed form and a copy of the Diabetic Eye Exam report to our office located at [address] [place] [address] as soon as possible via Fax [phone] attention Carlos: Phone [phone]  We thank you for your assistance in treating our mutual patient.

## 2025-03-11 NOTE — TELEPHONE ENCOUNTER
Upon review of the In Basket request and the patient's chart, initial outreach has been made via fax to facility. Please see Contacts section for details.     Thank you  Carlos Herrmann MA

## 2025-03-11 NOTE — TELEPHONE ENCOUNTER
----- Message from Tila Simmons MD sent at 3/10/2025  1:42 PM EDT -----  03/10/25 1:42 PM    Hello, our patient Carla Atkinson has had Diabetic Eye Exam completed/performed. Please assist in updating the patient chart by making an External outreach to Dr. Flower Bolanos facility located in [place], [place]. The date of service is October 2024.    Thank you,  Tila Simmons  [facility]

## 2025-03-12 NOTE — TELEPHONE ENCOUNTER
Upon review of the In Basket request we were able to locate, review, and update the patient chart as requested for Diabetic Eye Exam.    Any additional questions or concerns should be emailed to the Practice Liaisons via the appropriate education email address, please do not reply via In Basket.    Thank you  Carlos Herrmann MA   [facility]

## 2025-03-25 NOTE — PROGRESS NOTES
Short Stay Endoscopy History and Physical    PCP - Efrain Stone MD    Procedure - Colonoscopy  ASA - per anesthesia  Mallampati - per anesthesia  History of Anesthesia problems - no  Family history Anesthesia problems - no   Plan of anesthesia - General    HPI:  This is a 60 y.o. male here for evaluation of : asymptomatic screening exam      ROS:  Constitutional: No fevers, chills, No weight loss  CV: No chest pain  Pulm: No cough, No shortness of breath  GI: see HPI  Derm: No rash    Medical History:  has a past medical history of Hyperlipidemia and Hypertension.    Surgical History:  has a past surgical history that includes Robot-assisted laparoscopic repair of inguinal hernia (Left, 2/7/2023).    Family History: family history includes Emphysema in his father; Hypertension in his brother, father, and mother; No Known Problems in his sister, son, and son.. Otherwise no colon cancer, inflammatory bowel disease, or GI malignancies.    Social History:  reports that he has never smoked. He has never used smokeless tobacco. He reports that he does not currently use alcohol. He reports that he does not currently use drugs.    Review of patient's allergies indicates:   Allergen Reactions    Ace inhibitors      cough       Medications:   Prescriptions Prior to Admission[1]      Physical Exam:    Vital Signs:   Vitals:    03/25/25 0708   BP: (!) 142/84   Pulse: 93   Resp: 18   Temp: 97.9 °F (36.6 °C)       Gen: NAD, lying comfortably  HENT: NCAT, oropharynx clear  Eyes: anicteric sclerae, EOMI grossly  Neck: supple, no visible masses/goiter  Cardiac: RRR  Lungs: non-labored breathing  Abd: soft, NT/ND, normoactive BS  Ext: no LE edema, warm, well perfused  Skin: skin intact on exposed body parts, no visible rashes, lesions  Neuro: A&Ox4, neuro exam grossly intact, moves all extremities  Psych: appropriate mood, affect        Labs:  Lab Results   Component Value Date    WBC 8.45 10/24/2024    HGB 15.3 10/24/2024     Daily Note     Today's date: 2021  Patient name: Mike Al  : 1945  MRN: [id number]  Referring provider: Otoniel Dyer MD  Dx:   Encounter Diagnosis     ICD-10-CM    1  Chronic pain of right knee  M25 561     G89 29        Start Time: 1500  Stop Time: 1530  Total time in clinic (min): 30 minutes    Subjective: Pt reports that she has noticed an improvement in her walking tolerance, denies soreness following progressions from last visit  Objective: See treatment diary below      Assessment: Tolerated treatment well  Most difficulty descending stairs  Unable to descend using R LE to lower left  Patient demonstrated fatigue post treatment      Plan: Continue per plan of care        Precautions: DM II, Fall Risk    Manuals 2/23 02/25  01/14 01/21 1/25 1/28 2/8 2/11 2/15   PROM 10 10'  8' 8' 8' 8 8  8'                                          Neuro Re-Ed             Q Sets    :05x10 :05 10 :05 10 :05 10 :05 10 :05x10 :05 x10   SLS :05 10 nv  :05x5 :05 x5 :05 5 :05 10 :05 10 :05x10 :05 x10                                                                    Ther Ex             HEP             Bike 6' 6'  6'  6'  6' 6' 6' 6' 6'    TKE Blue :03 20 Blue :03 20   np   Blue :03 20 Blue :03 20 Blue :03x20 Blue :03 x20   SLR 20 1 5# 20  1 5#  20 2x10 20x 1# 20 1# 20 1# x20 1# 20x   SL Hip Abd 20 1 5#  20 1 5#   10   1# 20 1# 20 1# x20 1# 20x   Clamshells R :03 20 R :03 20  R x 20  2x10 20x 20 R 20 R 20 R 20x   LAQ/SAQ 2# 20 2# 20   1 5# 20  20  1# 20 1# 20 1# x20 1# 20x   SL Leg Press 55# 20 nv  np np 50# 20 50# 20 55# 20  55# 20x   Stand Hip 3 way 2# 20 2# 20   10x np   1# 10 ea 1# 20 1# 2x10 1# 20x   Squats 20 20                                     Ther Activity                                       Gait Training             Stairs 1FF 1 FF                        Modalities             CP prn HCT 44.8 10/24/2024     10/24/2024    CHOL 150 10/24/2024    TRIG 154 (H) 10/24/2024    HDL 41 10/24/2024    ALT 36 10/24/2024    AST 19 10/24/2024     10/24/2024    K 3.3 (L) 10/24/2024     10/24/2024    CREATININE 0.9 10/24/2024    BUN 11 10/24/2024    CO2 27 10/24/2024    TSH 0.887 10/27/2022    PSA 0.4 11/06/2006    INR 1.0 07/04/2019    HGBA1C 5.6 10/24/2024       Plan:  Colonoscopy for colon cancer screening    I have explained the risks and benefits of endoscopy procedures to the patient including but not limited to bleeding, perforation, infection, and death.  The patient was asked if they understand and allowed to ask any further questions to their satisfaction.    Kaylen Recinos MD         [1]   Medications Prior to Admission   Medication Sig Dispense Refill Last Dose/Taking    amLODIPine (NORVASC) 10 MG tablet Take 1 tablet (10 mg total) by mouth once daily. 90 tablet 3 3/25/2025 Morning    atorvastatin (LIPITOR) 40 MG tablet Take 1 tablet (40 mg total) by mouth once daily. 90 tablet 3 3/25/2025 Morning    hydroCHLOROthiazide (HYDRODIURIL) 25 MG tablet Take 1 tablet (25 mg total) by mouth once daily. 90 tablet 3 3/24/2025    potassium chloride SA (K-DUR,KLOR-CON) 20 MEQ tablet Take 1 tablet (20 mEq total) by mouth once daily. 90 tablet 3 3/25/2025 Morning

## 2025-04-08 ENCOUNTER — DOCUMENTATION (OUTPATIENT)
Dept: ADMINISTRATIVE | Facility: OTHER | Age: 80
End: 2025-04-08

## 2025-04-08 NOTE — PROGRESS NOTES
04/08/25 9:26 AM    Annual Wellness Visit outreach is not required, patient seen in last 3 months.     Thank you.  Marc Nguyen MA  [facility]

## 2025-04-17 DIAGNOSIS — Z76.0 MEDICATION REFILL: ICD-10-CM

## 2025-04-17 RX ORDER — LANOLIN ALCOHOL/MO/W.PET/CERES
400 CREAM (GRAM) TOPICAL 2 TIMES DAILY
Qty: 180 TABLET | Refills: 0 | Status: CANCELLED | OUTPATIENT
Start: 2025-04-17

## 2025-04-21 DIAGNOSIS — Z76.0 MEDICATION REFILL: ICD-10-CM

## 2025-04-21 RX ORDER — LANOLIN ALCOHOL/MO/W.PET/CERES
400 CREAM (GRAM) TOPICAL 2 TIMES DAILY
Qty: 180 TABLET | Refills: 0 | Status: SHIPPED | OUTPATIENT
Start: 2025-04-21

## 2025-05-10 ENCOUNTER — APPOINTMENT (OUTPATIENT)
Dept: LAB | Facility: HOSPITAL | Age: 80
End: 2025-05-10
Payer: COMMERCIAL

## 2025-05-10 DIAGNOSIS — D75.838 SECONDARY THROMBOCYTOSIS: ICD-10-CM

## 2025-05-10 DIAGNOSIS — E53.8 FOLATE DEFICIENCY: ICD-10-CM

## 2025-05-10 DIAGNOSIS — E11.22 TYPE 2 DIABETES MELLITUS WITH STAGE 3A CHRONIC KIDNEY DISEASE AND HYPERTENSION (HCC): ICD-10-CM

## 2025-05-10 DIAGNOSIS — D50.8 OTHER IRON DEFICIENCY ANEMIA: ICD-10-CM

## 2025-05-10 DIAGNOSIS — I12.9 TYPE 2 DIABETES MELLITUS WITH STAGE 3A CHRONIC KIDNEY DISEASE AND HYPERTENSION (HCC): ICD-10-CM

## 2025-05-10 DIAGNOSIS — E46 PROTEIN-CALORIE MALNUTRITION, UNSPECIFIED SEVERITY (HCC): ICD-10-CM

## 2025-05-10 DIAGNOSIS — K55.20 AVM (ARTERIOVENOUS MALFORMATION) OF COLON: ICD-10-CM

## 2025-05-10 DIAGNOSIS — N18.31 TYPE 2 DIABETES MELLITUS WITH STAGE 3A CHRONIC KIDNEY DISEASE AND HYPERTENSION (HCC): ICD-10-CM

## 2025-05-10 DIAGNOSIS — E78.5 HYPERLIPIDEMIA, UNSPECIFIED HYPERLIPIDEMIA TYPE: ICD-10-CM

## 2025-05-10 DIAGNOSIS — D50.9 IRON DEFICIENCY ANEMIA, UNSPECIFIED IRON DEFICIENCY ANEMIA TYPE: ICD-10-CM

## 2025-05-10 LAB
ALBUMIN SERPL BCG-MCNC: 4.6 G/DL (ref 3.5–5)
ALP SERPL-CCNC: 49 U/L (ref 34–104)
ALT SERPL W P-5'-P-CCNC: 6 U/L (ref 7–52)
ANION GAP SERPL CALCULATED.3IONS-SCNC: 18 MMOL/L (ref 4–13)
AST SERPL W P-5'-P-CCNC: 8 U/L (ref 13–39)
BASOPHILS # BLD AUTO: 0.03 THOUSANDS/ÂΜL (ref 0–0.1)
BASOPHILS NFR BLD AUTO: 0 % (ref 0–1)
BILIRUB SERPL-MCNC: 0.5 MG/DL (ref 0.2–1)
BUN SERPL-MCNC: 39 MG/DL (ref 5–25)
CALCIUM SERPL-MCNC: 10.9 MG/DL (ref 8.4–10.2)
CHLORIDE SERPL-SCNC: 100 MMOL/L (ref 96–108)
CHOLEST SERPL-MCNC: 108 MG/DL (ref ?–200)
CO2 SERPL-SCNC: 20 MMOL/L (ref 21–32)
CREAT SERPL-MCNC: 1.21 MG/DL (ref 0.6–1.3)
EOSINOPHIL # BLD AUTO: 0.21 THOUSAND/ÂΜL (ref 0–0.61)
EOSINOPHIL NFR BLD AUTO: 2 % (ref 0–6)
ERYTHROCYTE [DISTWIDTH] IN BLOOD BY AUTOMATED COUNT: 15.7 % (ref 11.6–15.1)
FERRITIN SERPL-MCNC: 4 NG/ML (ref 30–307)
FOLATE SERPL-MCNC: >22.3 NG/ML
GFR SERPL CREATININE-BSD FRML MDRD: 42 ML/MIN/1.73SQ M
GLUCOSE P FAST SERPL-MCNC: 198 MG/DL (ref 65–99)
HCT VFR BLD AUTO: 26.8 % (ref 34.8–46.1)
HDLC SERPL-MCNC: 44 MG/DL
HGB BLD-MCNC: 7.2 G/DL (ref 11.5–15.4)
IMM GRANULOCYTES # BLD AUTO: 0.06 THOUSAND/UL (ref 0–0.2)
IMM GRANULOCYTES NFR BLD AUTO: 1 % (ref 0–2)
IRON SATN MFR SERPL: 4 % (ref 15–50)
IRON SERPL-MCNC: 17 UG/DL (ref 50–212)
LDLC SERPL CALC-MCNC: 26 MG/DL (ref 0–100)
LYMPHOCYTES # BLD AUTO: 1.12 THOUSANDS/ÂΜL (ref 0.6–4.47)
LYMPHOCYTES NFR BLD AUTO: 12 % (ref 14–44)
MCH RBC QN AUTO: 21.6 PG (ref 26.8–34.3)
MCHC RBC AUTO-ENTMCNC: 26.9 G/DL (ref 31.4–37.4)
MCV RBC AUTO: 81 FL (ref 82–98)
MONOCYTES # BLD AUTO: 0.76 THOUSAND/ÂΜL (ref 0.17–1.22)
MONOCYTES NFR BLD AUTO: 8 % (ref 4–12)
NEUTROPHILS # BLD AUTO: 7.11 THOUSANDS/ÂΜL (ref 1.85–7.62)
NEUTS SEG NFR BLD AUTO: 77 % (ref 43–75)
NRBC BLD AUTO-RTO: 0 /100 WBCS
PLATELET # BLD AUTO: 561 THOUSANDS/UL (ref 149–390)
PMV BLD AUTO: 9.6 FL (ref 8.9–12.7)
POTASSIUM SERPL-SCNC: 4.8 MMOL/L (ref 3.5–5.3)
PROT SERPL-MCNC: 6.7 G/DL (ref 6.4–8.4)
RBC # BLD AUTO: 3.33 MILLION/UL (ref 3.81–5.12)
SODIUM SERPL-SCNC: 138 MMOL/L (ref 135–147)
TIBC SERPL-MCNC: 469 UG/DL (ref 250–450)
TRANSFERRIN SERPL-MCNC: 335 MG/DL (ref 203–362)
TRIGL SERPL-MCNC: 189 MG/DL (ref ?–150)
UIBC SERPL-MCNC: 452 UG/DL (ref 155–355)
VIT B12 SERPL-MCNC: 662 PG/ML (ref 180–914)
WBC # BLD AUTO: 9.29 THOUSAND/UL (ref 4.31–10.16)

## 2025-05-10 PROCEDURE — 36415 COLL VENOUS BLD VENIPUNCTURE: CPT

## 2025-05-10 PROCEDURE — 83550 IRON BINDING TEST: CPT

## 2025-05-10 PROCEDURE — 83540 ASSAY OF IRON: CPT

## 2025-05-10 PROCEDURE — 80053 COMPREHEN METABOLIC PANEL: CPT

## 2025-05-10 PROCEDURE — 85025 COMPLETE CBC W/AUTO DIFF WBC: CPT

## 2025-05-10 PROCEDURE — 82746 ASSAY OF FOLIC ACID SERUM: CPT

## 2025-05-10 PROCEDURE — 80061 LIPID PANEL: CPT

## 2025-05-10 PROCEDURE — 82607 VITAMIN B-12: CPT

## 2025-05-10 PROCEDURE — 82728 ASSAY OF FERRITIN: CPT

## 2025-05-12 ENCOUNTER — TELEPHONE (OUTPATIENT)
Dept: HEMATOLOGY ONCOLOGY | Facility: MEDICAL CENTER | Age: 80
End: 2025-05-12

## 2025-05-12 ENCOUNTER — TELEPHONE (OUTPATIENT)
Dept: INFUSION CENTER | Facility: HOSPITAL | Age: 80
End: 2025-05-12

## 2025-05-12 ENCOUNTER — RESULTS FOLLOW-UP (OUTPATIENT)
Dept: FAMILY MEDICINE CLINIC | Facility: CLINIC | Age: 80
End: 2025-05-12

## 2025-05-12 DIAGNOSIS — D50.8 OTHER IRON DEFICIENCY ANEMIA: Primary | ICD-10-CM

## 2025-05-12 DIAGNOSIS — D50.9 IRON DEFICIENCY ANEMIA, UNSPECIFIED IRON DEFICIENCY ANEMIA TYPE: ICD-10-CM

## 2025-05-12 RX ORDER — SODIUM CHLORIDE 9 MG/ML
20 INJECTION, SOLUTION INTRAVENOUS ONCE
Status: CANCELLED | OUTPATIENT
Start: 2025-05-19

## 2025-05-12 NOTE — TELEPHONE ENCOUNTER
Patient with iron deficiency anemia with hgb 7.2.  Iron studies are consistent with iron deficiency.  She will be treated with Venofer 200 mg weekly x 7.  Will send a message to GI regarding their opinion about repeating GI studies.  Patient was offered blood transfusion but refused.  She has no obvious evidence of GI bleeding.    Patient also with calcium 10.9.  She is taking a calcium supplement daily.  She is unsure of the dosing.  I asked patient to hold calcium supplement for now.    Discussed the above with patient by telephone.  She knows to proceed to the ER should she develop gross evidence of blood loss, lightheadedness, dizziness, syncopal episode.

## 2025-05-14 ENCOUNTER — TELEPHONE (OUTPATIENT)
Dept: GASTROENTEROLOGY | Facility: CLINIC | Age: 80
End: 2025-05-14

## 2025-05-14 NOTE — TELEPHONE ENCOUNTER
Message   Received: Yesterday   Jeanne Hopkins PA-C  [facility] [address]  [place]        Previous Messages        ----- Message -----   From: Harmony Campo PA-C   Sent: 5/12/2025  12:38 PM EDT   To: ANTHONY Ortiz,     You saw Carla back in 2023.  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  She recently had lab work repeated.  Hemoglobin is 7.2.  Iron studies are consistent with iron deficiency anemia.  Wondering if you think she would benefit from any repeated studies.  Last EGD and colonoscopy were a little over 2 years ago.     thanks,     Harmony

## 2025-05-14 NOTE — TELEPHONE ENCOUNTER
Called and spoke to pt and scheduled.  I did offer sooner on 6/3/25 at 10:30am and pt could not do.

## 2025-05-16 DIAGNOSIS — I63.9 STROKE (CEREBRUM) (HCC): ICD-10-CM

## 2025-05-16 RX ORDER — ATORVASTATIN CALCIUM 80 MG/1
80 TABLET, FILM COATED ORAL EVERY EVENING
Qty: 90 TABLET | Refills: 1 | Status: SHIPPED | OUTPATIENT
Start: 2025-05-16

## 2025-05-19 ENCOUNTER — HOSPITAL ENCOUNTER (OUTPATIENT)
Dept: INFUSION CENTER | Facility: HOSPITAL | Age: 80
Discharge: HOME | End: 2025-05-19
Attending: INTERNAL MEDICINE
Payer: COMMERCIAL

## 2025-05-19 VITALS
TEMPERATURE: 97.5 F | OXYGEN SATURATION: 94 % | HEART RATE: 88 BPM | DIASTOLIC BLOOD PRESSURE: 62 MMHG | SYSTOLIC BLOOD PRESSURE: 141 MMHG | RESPIRATION RATE: 18 BRPM

## 2025-05-19 DIAGNOSIS — D50.9 IRON DEFICIENCY ANEMIA, UNSPECIFIED IRON DEFICIENCY ANEMIA TYPE: Primary | ICD-10-CM

## 2025-05-19 RX ORDER — SODIUM CHLORIDE 9 MG/ML
20 INJECTION, SOLUTION INTRAVENOUS ONCE
Status: COMPLETED | OUTPATIENT
Start: 2025-05-19 | End: 2025-05-19

## 2025-05-19 RX ORDER — SODIUM CHLORIDE 9 MG/ML
20 INJECTION, SOLUTION INTRAVENOUS ONCE
OUTPATIENT
Start: 2025-05-26

## 2025-05-19 RX ADMIN — IRON SUCROSE 200 MG: 20 INJECTION, SOLUTION INTRAVENOUS at 13:31

## 2025-05-19 RX ADMIN — SODIUM CHLORIDE 20 ML/HR: 0.9 INJECTION, SOLUTION INTRAVENOUS at 13:31

## 2025-05-19 NOTE — PROGRESS NOTES
Carla Atkinson  tolerated treatment well with no complications.      Carla Atkinson is aware of future appt on 5/29 at 1300.     AVS printed and given to Carla Atkinson:   No (Declined by Carla Atkinson)

## 2025-05-19 NOTE — PLAN OF CARE
Problem: Potential for Falls  Goal: Patient will remain free of falls  Description: INTERVENTIONS:- Educate patient/family on patient safety including physical limitations- Instruct patient to call for assistance with activity - Keep Call bell within reach- Keep care items and personal belongings within reach  Outcome: Progressing

## 2025-05-29 ENCOUNTER — HOSPITAL ENCOUNTER (OUTPATIENT)
Dept: INFUSION CENTER | Facility: HOSPITAL | Age: 80
Discharge: HOME | End: 2025-05-29
Attending: INTERNAL MEDICINE
Payer: COMMERCIAL

## 2025-05-29 VITALS
SYSTOLIC BLOOD PRESSURE: 144 MMHG | TEMPERATURE: 96.3 F | RESPIRATION RATE: 18 BRPM | DIASTOLIC BLOOD PRESSURE: 67 MMHG | HEART RATE: 75 BPM | OXYGEN SATURATION: 100 %

## 2025-05-29 DIAGNOSIS — D50.9 IRON DEFICIENCY ANEMIA, UNSPECIFIED IRON DEFICIENCY ANEMIA TYPE: Primary | ICD-10-CM

## 2025-05-29 PROCEDURE — 96365 THER/PROPH/DIAG IV INF INIT: CPT

## 2025-05-29 RX ORDER — SODIUM CHLORIDE 9 MG/ML
20 INJECTION, SOLUTION INTRAVENOUS ONCE
Status: COMPLETED | OUTPATIENT
Start: 2025-05-29 | End: 2025-05-29

## 2025-05-29 RX ORDER — SODIUM CHLORIDE 9 MG/ML
20 INJECTION, SOLUTION INTRAVENOUS ONCE
Status: CANCELLED | OUTPATIENT
Start: 2025-06-05

## 2025-05-29 RX ADMIN — SODIUM CHLORIDE 20 ML/HR: 0.9 INJECTION, SOLUTION INTRAVENOUS at 13:17

## 2025-05-29 RX ADMIN — IRON SUCROSE 200 MG: 20 INJECTION, SOLUTION INTRAVENOUS at 13:17

## 2025-05-29 NOTE — PLAN OF CARE
Problem: Potential for Falls  Goal: Patient will remain free of falls  Description: INTERVENTIONS:  - Educate patient/family on patient safety including physical limitations  - Instruct patient to call for assistance with activity   - Consider consulting OT/PT to assist with strengthening/mobility based on AM PAC & JH-HLM score  - Consult OT/PT to assist with strengthening/mobility   - Keep Call bell within reach  - Keep bed low and locked with side rails adjusted as appropriate  - Keep care items and personal belongings within reach  - Initiate and maintain comfort rounds  - Make Fall Risk Sign visible to staff  - Offer Toileting every hour in advance of need  - Initiate/Maintain alarm  - Obtain necessary fall risk management equipment  - Apply yellow socks and bracelet for high fall risk patients  - Consider moving patient to room near nurses station  Outcome: Progressing

## 2025-05-29 NOTE — PROGRESS NOTES
Carla Atkinson  tolerated treatment well with no complications.      Carla Atkinson is aware of future appt on 6/5 at 1p.     AVS printed and given to Carla Atkinson:    No (Declined by Carla Atkinson)

## 2025-06-05 ENCOUNTER — HOSPITAL ENCOUNTER (OUTPATIENT)
Dept: INFUSION CENTER | Facility: HOSPITAL | Age: 80
Discharge: HOME | End: 2025-06-05
Attending: INTERNAL MEDICINE
Payer: COMMERCIAL

## 2025-06-05 VITALS
OXYGEN SATURATION: 98 % | TEMPERATURE: 96.7 F | SYSTOLIC BLOOD PRESSURE: 138 MMHG | HEART RATE: 94 BPM | RESPIRATION RATE: 18 BRPM | DIASTOLIC BLOOD PRESSURE: 65 MMHG

## 2025-06-05 DIAGNOSIS — D50.9 IRON DEFICIENCY ANEMIA, UNSPECIFIED IRON DEFICIENCY ANEMIA TYPE: Primary | ICD-10-CM

## 2025-06-05 PROCEDURE — 96365 THER/PROPH/DIAG IV INF INIT: CPT

## 2025-06-05 RX ORDER — SODIUM CHLORIDE 9 MG/ML
20 INJECTION, SOLUTION INTRAVENOUS ONCE
Status: COMPLETED | OUTPATIENT
Start: 2025-06-05 | End: 2025-06-05

## 2025-06-05 RX ORDER — SODIUM CHLORIDE 9 MG/ML
20 INJECTION, SOLUTION INTRAVENOUS ONCE
Status: CANCELLED | OUTPATIENT
Start: 2025-06-12

## 2025-06-05 RX ADMIN — SODIUM CHLORIDE 20 ML/HR: 0.9 INJECTION, SOLUTION INTRAVENOUS at 13:18

## 2025-06-05 RX ADMIN — IRON SUCROSE 200 MG: 20 INJECTION, SOLUTION INTRAVENOUS at 13:18

## 2025-06-05 NOTE — PROGRESS NOTES
Carla Atkinson  tolerated treatment well with no complications.      Carla Atkinson is aware of future appt on 6/12 @ 5640.    AVS printed and given to Carla Atkinson:   No (Declined by Carla Atkinson)

## 2025-06-12 ENCOUNTER — HOSPITAL ENCOUNTER (OUTPATIENT)
Dept: INFUSION CENTER | Facility: HOSPITAL | Age: 80
Discharge: HOME | End: 2025-06-12
Attending: INTERNAL MEDICINE
Payer: COMMERCIAL

## 2025-06-12 VITALS
HEART RATE: 77 BPM | SYSTOLIC BLOOD PRESSURE: 168 MMHG | DIASTOLIC BLOOD PRESSURE: 70 MMHG | RESPIRATION RATE: 18 BRPM | OXYGEN SATURATION: 100 % | TEMPERATURE: 97.3 F

## 2025-06-12 DIAGNOSIS — D50.9 IRON DEFICIENCY ANEMIA, UNSPECIFIED IRON DEFICIENCY ANEMIA TYPE: Primary | ICD-10-CM

## 2025-06-12 PROCEDURE — 96365 THER/PROPH/DIAG IV INF INIT: CPT

## 2025-06-12 RX ORDER — SODIUM CHLORIDE 9 MG/ML
20 INJECTION, SOLUTION INTRAVENOUS ONCE
Status: COMPLETED | OUTPATIENT
Start: 2025-06-12 | End: 2025-06-12

## 2025-06-12 RX ORDER — SODIUM CHLORIDE 9 MG/ML
20 INJECTION, SOLUTION INTRAVENOUS ONCE
Status: CANCELLED | OUTPATIENT
Start: 2025-06-19

## 2025-06-12 RX ADMIN — SODIUM CHLORIDE 20 ML/HR: 9 INJECTION, SOLUTION INTRAVENOUS at 13:48

## 2025-06-12 RX ADMIN — IRON SUCROSE 200 MG: 20 INJECTION, SOLUTION INTRAVENOUS at 13:49

## 2025-06-12 NOTE — PROGRESS NOTES
Carla Atkinson  tolerated treatment well with no complications.      Carla Atkinson is aware of future appt on 6/19/25.     AVS printed and given to Carla Atkinson:    No (Declined by aCrla Atkinson)

## 2025-06-19 ENCOUNTER — HOSPITAL ENCOUNTER (OUTPATIENT)
Dept: INFUSION CENTER | Facility: HOSPITAL | Age: 80
Discharge: HOME | End: 2025-06-19
Attending: INTERNAL MEDICINE
Payer: COMMERCIAL

## 2025-06-19 VITALS
HEART RATE: 91 BPM | OXYGEN SATURATION: 98 % | TEMPERATURE: 98.7 F | RESPIRATION RATE: 18 BRPM | SYSTOLIC BLOOD PRESSURE: 145 MMHG | DIASTOLIC BLOOD PRESSURE: 63 MMHG

## 2025-06-19 DIAGNOSIS — D50.9 IRON DEFICIENCY ANEMIA, UNSPECIFIED IRON DEFICIENCY ANEMIA TYPE: Primary | ICD-10-CM

## 2025-06-19 PROCEDURE — 96365 THER/PROPH/DIAG IV INF INIT: CPT

## 2025-06-19 RX ORDER — SODIUM CHLORIDE 9 MG/ML
20 INJECTION, SOLUTION INTRAVENOUS ONCE
Status: COMPLETED | OUTPATIENT
Start: 2025-06-19 | End: 2025-06-19

## 2025-06-19 RX ORDER — SODIUM CHLORIDE 9 MG/ML
20 INJECTION, SOLUTION INTRAVENOUS ONCE
Status: CANCELLED | OUTPATIENT
Start: 2025-06-26

## 2025-06-19 RX ADMIN — IRON SUCROSE 200 MG: 20 INJECTION, SOLUTION INTRAVENOUS at 13:56

## 2025-06-19 RX ADMIN — SODIUM CHLORIDE 20 ML/HR: 0.9 INJECTION, SOLUTION INTRAVENOUS at 13:55

## 2025-06-19 NOTE — PROGRESS NOTES
Carla Atkinson  tolerated treatment well with no complications.      Carla Atkinson is aware of future appt on 6/26 at 1430.     AVS printed and given to Carla Atkinson:    No (Declined by Carla Atkinson)

## 2025-06-26 ENCOUNTER — HOSPITAL ENCOUNTER (OUTPATIENT)
Dept: INFUSION CENTER | Facility: HOSPITAL | Age: 80
Discharge: HOME | End: 2025-06-26
Attending: INTERNAL MEDICINE
Payer: COMMERCIAL

## 2025-06-26 VITALS
SYSTOLIC BLOOD PRESSURE: 148 MMHG | DIASTOLIC BLOOD PRESSURE: 65 MMHG | HEART RATE: 91 BPM | RESPIRATION RATE: 18 BRPM | TEMPERATURE: 97 F | OXYGEN SATURATION: 97 %

## 2025-06-26 DIAGNOSIS — D50.9 IRON DEFICIENCY ANEMIA, UNSPECIFIED IRON DEFICIENCY ANEMIA TYPE: Primary | ICD-10-CM

## 2025-06-26 PROCEDURE — 96365 THER/PROPH/DIAG IV INF INIT: CPT

## 2025-06-26 RX ORDER — SODIUM CHLORIDE 9 MG/ML
20 INJECTION, SOLUTION INTRAVENOUS ONCE
Status: CANCELLED | OUTPATIENT
Start: 2025-07-03

## 2025-06-26 RX ORDER — SODIUM CHLORIDE 9 MG/ML
20 INJECTION, SOLUTION INTRAVENOUS ONCE
Status: COMPLETED | OUTPATIENT
Start: 2025-06-26 | End: 2025-06-26

## 2025-06-26 RX ADMIN — IRON SUCROSE 200 MG: 20 INJECTION, SOLUTION INTRAVENOUS at 14:37

## 2025-06-26 RX ADMIN — SODIUM CHLORIDE 20 ML/HR: 0.9 INJECTION, SOLUTION INTRAVENOUS at 14:36

## 2025-06-26 NOTE — PROGRESS NOTES
Carla Atkinson  tolerated treatment well with no complications.      Carla Atkinson is aware of future appt on 7/3 at 230.     AVS printed and given to Carla Atkinson:    No (Declined by Carla Atkinson)

## 2025-07-03 ENCOUNTER — HOSPITAL ENCOUNTER (OUTPATIENT)
Dept: INFUSION CENTER | Facility: HOSPITAL | Age: 80
Discharge: HOME | End: 2025-07-03
Attending: INTERNAL MEDICINE
Payer: COMMERCIAL

## 2025-07-03 VITALS
OXYGEN SATURATION: 97 % | RESPIRATION RATE: 18 BRPM | TEMPERATURE: 97.6 F | HEART RATE: 90 BPM | DIASTOLIC BLOOD PRESSURE: 82 MMHG | SYSTOLIC BLOOD PRESSURE: 157 MMHG

## 2025-07-03 DIAGNOSIS — D50.9 IRON DEFICIENCY ANEMIA, UNSPECIFIED IRON DEFICIENCY ANEMIA TYPE: Primary | ICD-10-CM

## 2025-07-03 PROCEDURE — 96365 THER/PROPH/DIAG IV INF INIT: CPT

## 2025-07-03 RX ORDER — SODIUM CHLORIDE 9 MG/ML
20 INJECTION, SOLUTION INTRAVENOUS ONCE
Status: CANCELLED | OUTPATIENT
Start: 2025-07-03

## 2025-07-03 RX ORDER — SODIUM CHLORIDE 9 MG/ML
20 INJECTION, SOLUTION INTRAVENOUS ONCE
Status: COMPLETED | OUTPATIENT
Start: 2025-07-03 | End: 2025-07-03

## 2025-07-03 RX ADMIN — SODIUM CHLORIDE 20 ML/HR: 9 INJECTION, SOLUTION INTRAVENOUS at 14:58

## 2025-07-03 RX ADMIN — IRON SUCROSE 200 MG: 20 INJECTION, SOLUTION INTRAVENOUS at 14:58

## 2025-07-03 NOTE — PLAN OF CARE
Problem: Potential for Falls  Goal: Patient will remain free of falls  Description: INTERVENTIONS:  - Educate patient/family on patient safety including physical limitations  - Instruct patient to call for assistance with activity   - Keep care items and personal belongings within reach  - Initiate and maintain comfort rounds  Outcome: Progressing     
spouse

## 2025-07-16 DIAGNOSIS — Z76.0 MEDICATION REFILL: ICD-10-CM

## 2025-07-16 NOTE — TELEPHONE ENCOUNTER
Reason for call:   [x] Refill   [] Prior Auth  [] Other:     Office:   [x] PCP/Provider -   [] Specialty/Provider -     Medication: Magnesium Oxide     Dose/Frequency: 400 mg tablet taken by mouth 2x daily     Quantity: 180    Pharmacy: [facility]/pharmacy #[id number] - [place] - [address] [phone]     Local Pharmacy   Does the patient have enough for 3 days?   [x] Yes   [] No - Send as HP to POD    Mail Away Pharmacy   Does the patient have enough for 10 days?   [] Yes   [] No - Send as HP to POD

## 2025-07-17 RX ORDER — LANOLIN ALCOHOL/MO/W.PET/CERES
400 CREAM (GRAM) TOPICAL 2 TIMES DAILY
Qty: 180 TABLET | Refills: 0 | Status: SHIPPED | OUTPATIENT
Start: 2025-07-17

## 2025-07-25 ENCOUNTER — TELEPHONE (OUTPATIENT)
Dept: HEMATOLOGY ONCOLOGY | Facility: MEDICAL CENTER | Age: 80
End: 2025-07-25

## 2025-07-28 ENCOUNTER — TELEPHONE (OUTPATIENT)
Dept: HEMATOLOGY ONCOLOGY | Facility: MEDICAL CENTER | Age: 80
End: 2025-07-28

## (undated) DEVICE — ADHESIVE SKIN HIGH VISCOSITY EXOFIN 1ML

## (undated) DEVICE — CANNULA HYDRODISSECTION NUCLEUS 25G X 7/8IN 35DEG 8MM FROM END SINGLE-USE VISITEC

## (undated) DEVICE — 3M™ TEGADERM™ TRANSPARENT FILM DRESSING FRAME STYLE, 1624W, 2-3/8 IN X 2-3/4 IN (6 CM X 7 CM), 100/CT 4CT/CASE: Brand: 3M™ TEGADERM™

## (undated) DEVICE — NEEDLE PERIBULBAR 25G X 7/8 IN

## (undated) DEVICE — CLEARCUT® SLIT KNIFE 2.75MM ANGLED: Brand: CLEARCUT®

## (undated) DEVICE — CHLORAPREP HI-LITE 26ML ORANGE

## (undated) DEVICE — EYE PADS 1 5/8"X2 5/8": Brand: MCKESSON

## (undated) DEVICE — SUT VICRYL 10-0 CS140-6 4 IN V960G

## (undated) DEVICE — GLOVE SRG BIOGEL 7

## (undated) DEVICE — ANTI-FOG SOLUTION WITH FOAM PAD: Brand: DEVON

## (undated) DEVICE — CLIP APPLIER: Brand: ENDO CLIP II

## (undated) DEVICE — TUBING SMOKE EVAC W/FILTRATION DEVICE PLUMEPORT ACTIV

## (undated) DEVICE — B-H IRRIGATING CAN 19GA FLAT ANGLED 8MM: Brand: OPHTHALMIC CANNULA

## (undated) DEVICE — TOWEL SURG XR DETECT GREEN STRL RFD

## (undated) DEVICE — THE MONARCH® "D" CARTRIDGE IS A SINGLE-USE POLYPROPYLENE CARTRIDGE FOR POSTERIOR CHAMBER IOL DELIVERY: Brand: MONARCH® III

## (undated) DEVICE — TURBOSONICS MICROSMOOTH MICROTIP PARTS KIT: Brand: TURBOSONICS, MICROSMOOTH, MICROTIP, ALCON

## (undated) DEVICE — EYE PACK CUSTOM -FINNEGAN

## (undated) DEVICE — DISSECTOR: Brand: ENDO DISSECT

## (undated) DEVICE — DRAPE EQUIPMENT RF WAND

## (undated) DEVICE — TROCAR: Brand: KII FIOS FIRST ENTRY

## (undated) DEVICE — DRAPE TOWEL: Brand: CONVERTORS

## (undated) DEVICE — ACTIVE FMS W/ 0.9 MM INFUSION SLEEVES, 0.9MM 30° ABS* INTREPID* BALANCED TIP: Brand: ALCON

## (undated) DEVICE — BASIC DOUBLE BASIN 2-LF: Brand: MEDLINE INDUSTRIES, INC.

## (undated) DEVICE — AIR INJECT CANNULA 27GA: Brand: OPHTHALMIC CANNULA

## (undated) DEVICE — MICROSURGICAL INSTRUMENT IRR. CYSTITOME 25GA STRAIGHT-REVERSE CUTTING: Brand: ALCON

## (undated) DEVICE — 3M™ TEGADERM™ TRANSPARENT FILM DRESSING FRAME STYLE, 1626W, 4 IN X 4-3/4 IN (10 CM X 12 CM), 50/CT 4CT/CASE: Brand: 3M™ TEGADERM™

## (undated) DEVICE — LATEX FREE 10 FT  INSUFFLATION TUBING, COLDER CONNECTOR WITH 1 MICRON FILTER STERILE ONE TIME USE, 20 U: Brand: SURGICAL DIRECT

## (undated) DEVICE — INTREPID® TRANSFORMER IA HP: Brand: INTREPID®

## (undated) DEVICE — TROCARS: Brand: KII® BALLOON BLUNT TIP SYSTEM

## (undated) DEVICE — 3M™ STERI-STRIP™ REINFORCED ADHESIVE SKIN CLOSURES, R1547, 1/2 IN X 4 IN (12 MM X 100 MM), 6 STRIPS/ENVELOPE: Brand: 3M™ STERI-STRIP™

## (undated) DEVICE — GLOVE INDICATOR PI UNDERGLOVE SZ 7.5 BLUE

## (undated) DEVICE — 2, DISPOSABLE SUCTION/IRRIGATOR WITHOUT DISPOSABLE TIP: Brand: STRYKEFLOW

## (undated) DEVICE — DRAPE UTILITY

## (undated) DEVICE — TROCAR: Brand: KII SLEEVE

## (undated) DEVICE — TISSUE RETRIEVAL SYSTEM: Brand: INZII RETRIEVAL SYSTEM

## (undated) DEVICE — ELECTRODE LAP SPATULA STR E-Z CLEAN 33CM -0018

## (undated) DEVICE — ASTOUND STANDARD SURGICAL GOWN, XL: Brand: CONVERTORS

## (undated) DEVICE — SUT MONOCRYL 4-0 PC-5 18 IN Y823G

## (undated) DEVICE — DRAPE LAPAROTOMY W/POUCHES

## (undated) DEVICE — INTENDED FOR TISSUE SEPARATION, AND OTHER PROCEDURES THAT REQUIRE A SHARP SURGICAL BLADE TO PUNCTURE OR CUT.: Brand: BARD-PARKER SAFETY BLADES SIZE 15, STERILE

## (undated) DEVICE — PACK GENERAL LF

## (undated) DEVICE — SHEARS: Brand: ENDO MINI-SHEARS

## (undated) DEVICE — SUT VICRYL PLUS 0 CT-3 27 IN VCP329H